# Patient Record
Sex: FEMALE | Race: BLACK OR AFRICAN AMERICAN | Employment: UNEMPLOYED | URBAN - METROPOLITAN AREA
[De-identification: names, ages, dates, MRNs, and addresses within clinical notes are randomized per-mention and may not be internally consistent; named-entity substitution may affect disease eponyms.]

---

## 2023-12-11 ENCOUNTER — APPOINTMENT (OUTPATIENT)
Dept: ULTRASOUND IMAGING | Facility: HOSPITAL | Age: 68
End: 2023-12-11
Attending: NURSE PRACTITIONER
Payer: MEDICARE

## 2023-12-11 ENCOUNTER — HOSPITAL ENCOUNTER (EMERGENCY)
Facility: HOSPITAL | Age: 68
Discharge: HOME | End: 2023-12-11
Payer: MEDICARE

## 2023-12-11 VITALS
SYSTOLIC BLOOD PRESSURE: 148 MMHG | BODY MASS INDEX: 38.68 KG/M2 | WEIGHT: 197 LBS | HEART RATE: 62 BPM | OXYGEN SATURATION: 98 % | RESPIRATION RATE: 16 BRPM | DIASTOLIC BLOOD PRESSURE: 86 MMHG | HEIGHT: 60 IN | TEMPERATURE: 98 F

## 2023-12-11 DIAGNOSIS — C54.1 ENDOMETRIAL CANCER (HCC): Primary | ICD-10-CM

## 2023-12-11 LAB
ALBUMIN SERPL-MCNC: 4.2 G/DL (ref 3.2–4.8)
ALBUMIN/GLOB SERPL: 1.2 {RATIO} (ref 1–2)
ALP LIVER SERPL-CCNC: 62 U/L
ALT SERPL-CCNC: 13 U/L
ANION GAP SERPL CALC-SCNC: 5 MMOL/L (ref 0–18)
ANTIBODY SCREEN: NEGATIVE
AST SERPL-CCNC: 17 U/L (ref ?–34)
BASOPHILS # BLD AUTO: 0.11 X10(3) UL (ref 0–0.2)
BASOPHILS NFR BLD AUTO: 0.9 %
BILIRUB SERPL-MCNC: 0.6 MG/DL (ref 0.2–1.1)
BILIRUB UR QL: NEGATIVE
BUN BLD-MCNC: 8 MG/DL (ref 9–23)
BUN/CREAT SERPL: 7.8 (ref 10–20)
CALCIUM BLD-MCNC: 9.9 MG/DL (ref 8.7–10.4)
CHLORIDE SERPL-SCNC: 105 MMOL/L (ref 98–112)
CLARITY UR: CLEAR
CO2 SERPL-SCNC: 31 MMOL/L (ref 21–32)
CREAT BLD-MCNC: 1.02 MG/DL
DEPRECATED RDW RBC AUTO: 42.2 FL (ref 35.1–46.3)
EGFRCR SERPLBLD CKD-EPI 2021: 60 ML/MIN/1.73M2 (ref 60–?)
EOSINOPHIL # BLD AUTO: 0.11 X10(3) UL (ref 0–0.7)
EOSINOPHIL NFR BLD AUTO: 0.9 %
ERYTHROCYTE [DISTWIDTH] IN BLOOD BY AUTOMATED COUNT: 13.7 % (ref 11–15)
GLOBULIN PLAS-MCNC: 3.6 G/DL (ref 2.8–4.4)
GLUCOSE BLD-MCNC: 96 MG/DL (ref 70–99)
GLUCOSE UR-MCNC: NORMAL MG/DL
HCT VFR BLD AUTO: 41.1 %
HGB BLD-MCNC: 13.6 G/DL
HGB UR QL STRIP.AUTO: NEGATIVE
IMM GRANULOCYTES # BLD AUTO: 0.04 X10(3) UL (ref 0–1)
IMM GRANULOCYTES NFR BLD: 0.3 %
KETONES UR-MCNC: NEGATIVE MG/DL
LEUKOCYTE ESTERASE UR QL STRIP.AUTO: NEGATIVE
LIPASE SERPL-CCNC: 42 U/L (ref 13–75)
LYMPHOCYTES # BLD AUTO: 2.81 X10(3) UL (ref 1–4)
LYMPHOCYTES NFR BLD AUTO: 24.1 %
MCH RBC QN AUTO: 27.8 PG (ref 26–34)
MCHC RBC AUTO-ENTMCNC: 33.1 G/DL (ref 31–37)
MCV RBC AUTO: 84 FL
MONOCYTES # BLD AUTO: 1.04 X10(3) UL (ref 0.1–1)
MONOCYTES NFR BLD AUTO: 8.9 %
NEUTROPHILS # BLD AUTO: 7.53 X10 (3) UL (ref 1.5–7.7)
NEUTROPHILS # BLD AUTO: 7.53 X10(3) UL (ref 1.5–7.7)
NEUTROPHILS NFR BLD AUTO: 64.9 %
NITRITE UR QL STRIP.AUTO: NEGATIVE
OSMOLALITY SERPL CALC.SUM OF ELEC: 290 MOSM/KG (ref 275–295)
PH UR: 6 [PH] (ref 5–8)
PLATELET # BLD AUTO: 431 10(3)UL (ref 150–450)
POTASSIUM SERPL-SCNC: 3.9 MMOL/L (ref 3.5–5.1)
PROT SERPL-MCNC: 7.8 G/DL (ref 5.7–8.2)
PROT UR-MCNC: NEGATIVE MG/DL
RBC # BLD AUTO: 4.89 X10(6)UL
RH BLOOD TYPE: POSITIVE
RH BLOOD TYPE: POSITIVE
SODIUM SERPL-SCNC: 141 MMOL/L (ref 136–145)
SP GR UR STRIP: 1.01 (ref 1–1.03)
UROBILINOGEN UR STRIP-ACNC: NORMAL
WBC # BLD AUTO: 11.6 X10(3) UL (ref 4–11)

## 2023-12-11 PROCEDURE — 86900 BLOOD TYPING SEROLOGIC ABO: CPT

## 2023-12-11 PROCEDURE — 85025 COMPLETE CBC W/AUTO DIFF WBC: CPT

## 2023-12-11 PROCEDURE — 86850 RBC ANTIBODY SCREEN: CPT

## 2023-12-11 PROCEDURE — 99285 EMERGENCY DEPT VISIT HI MDM: CPT

## 2023-12-11 PROCEDURE — 80053 COMPREHEN METABOLIC PANEL: CPT

## 2023-12-11 PROCEDURE — 86901 BLOOD TYPING SEROLOGIC RH(D): CPT

## 2023-12-11 PROCEDURE — 81003 URINALYSIS AUTO W/O SCOPE: CPT

## 2023-12-11 PROCEDURE — 76856 US EXAM PELVIC COMPLETE: CPT | Performed by: NURSE PRACTITIONER

## 2023-12-11 PROCEDURE — 99284 EMERGENCY DEPT VISIT MOD MDM: CPT

## 2023-12-11 PROCEDURE — 83690 ASSAY OF LIPASE: CPT

## 2023-12-11 PROCEDURE — 76830 TRANSVAGINAL US NON-OB: CPT | Performed by: NURSE PRACTITIONER

## 2023-12-11 PROCEDURE — 96374 THER/PROPH/DIAG INJ IV PUSH: CPT

## 2023-12-11 PROCEDURE — 96361 HYDRATE IV INFUSION ADD-ON: CPT

## 2023-12-11 RX ORDER — SODIUM CHLORIDE 9 MG/ML
INJECTION, SOLUTION INTRAVENOUS CONTINUOUS
Status: DISCONTINUED | OUTPATIENT
Start: 2023-12-11 | End: 2023-12-11

## 2023-12-11 RX ORDER — KETOROLAC TROMETHAMINE 15 MG/ML
15 INJECTION, SOLUTION INTRAMUSCULAR; INTRAVENOUS ONCE
Status: COMPLETED | OUTPATIENT
Start: 2023-12-11 | End: 2023-12-11

## 2023-12-11 NOTE — ED INITIAL ASSESSMENT (HPI)
C/o RLQ abd pain x2 weeks. Denies N/V/D, fevers or urinary issues. Reports vaginal bleeding x1 month, dark red, no clots. Reports lightheadedness when she stands up.

## 2025-01-01 ENCOUNTER — APPOINTMENT (OUTPATIENT)
Dept: CV DIAGNOSTICS | Facility: HOSPITAL | Age: 70
End: 2025-01-01
Attending: NURSE PRACTITIONER

## 2025-01-01 ENCOUNTER — HOSPITAL ENCOUNTER (INPATIENT)
Facility: HOSPITAL | Age: 70
LOS: 5 days | Discharge: HOSPICE | End: 2025-01-01
Attending: EMERGENCY MEDICINE | Admitting: HOSPITALIST

## 2025-01-01 ENCOUNTER — HOSPITAL ENCOUNTER (INPATIENT)
Facility: HOSPITAL | Age: 70
LOS: 4 days | End: 2025-01-01
Attending: HOSPITALIST | Admitting: HOSPITALIST

## 2025-01-01 ENCOUNTER — APPOINTMENT (OUTPATIENT)
Dept: GENERAL RADIOLOGY | Facility: HOSPITAL | Age: 70
End: 2025-01-01
Attending: EMERGENCY MEDICINE

## 2025-01-01 VITALS
OXYGEN SATURATION: 95 % | TEMPERATURE: 91 F | HEART RATE: 78 BPM | DIASTOLIC BLOOD PRESSURE: 44 MMHG | SYSTOLIC BLOOD PRESSURE: 62 MMHG | RESPIRATION RATE: 16 BRPM

## 2025-01-01 VITALS
HEART RATE: 74 BPM | RESPIRATION RATE: 28 BRPM | OXYGEN SATURATION: 97 % | SYSTOLIC BLOOD PRESSURE: 74 MMHG | WEIGHT: 147.81 LBS | TEMPERATURE: 98 F | BODY MASS INDEX: 29.02 KG/M2 | HEIGHT: 60 IN | DIASTOLIC BLOOD PRESSURE: 51 MMHG

## 2025-01-01 DIAGNOSIS — L89.159 PRESSURE INJURY OF SKIN OF SACRAL REGION, UNSPECIFIED INJURY STAGE: ICD-10-CM

## 2025-01-01 DIAGNOSIS — J90 PLEURAL EFFUSION: Primary | ICD-10-CM

## 2025-01-01 DIAGNOSIS — D69.6 THROMBOCYTOPENIA: ICD-10-CM

## 2025-01-01 LAB
ALBUMIN SERPL-MCNC: 2.3 G/DL (ref 3.2–4.8)
ALBUMIN SERPL-MCNC: 2.5 G/DL (ref 3.2–4.8)
ALBUMIN/GLOB SERPL: 0.6 (ref 1–2)
ALBUMIN/GLOB SERPL: 0.6 (ref 1–2)
ALP LIVER SERPL-CCNC: 309 U/L (ref 55–142)
ALP LIVER SERPL-CCNC: 342 U/L (ref 55–142)
ALT SERPL-CCNC: 10 U/L (ref 10–49)
ALT SERPL-CCNC: 9 U/L (ref 10–49)
ANION GAP SERPL CALC-SCNC: 6 MMOL/L (ref 0–18)
ANION GAP SERPL CALC-SCNC: 7 MMOL/L (ref 0–18)
ANION GAP SERPL CALC-SCNC: 9 MMOL/L (ref 0–18)
AST SERPL-CCNC: 17 U/L (ref ?–34)
AST SERPL-CCNC: 19 U/L (ref ?–34)
ATRIAL RATE: 89 BPM
BASOPHILS # BLD AUTO: 0.05 X10(3) UL (ref 0–0.2)
BASOPHILS # BLD: 0 X10(3) UL (ref 0–0.2)
BASOPHILS # BLD: 0 X10(3) UL (ref 0–0.2)
BASOPHILS NFR BLD AUTO: 0.5 %
BASOPHILS NFR BLD: 0 %
BASOPHILS NFR BLD: 0 %
BILIRUB SERPL-MCNC: 0.5 MG/DL (ref 0.2–1.1)
BILIRUB SERPL-MCNC: 0.6 MG/DL (ref 0.2–1.1)
BILIRUB UR QL: NEGATIVE
BUN BLD-MCNC: 33 MG/DL (ref 9–23)
BUN BLD-MCNC: 34 MG/DL (ref 9–23)
BUN BLD-MCNC: 37 MG/DL (ref 9–23)
BUN/CREAT SERPL: 25.6 (ref 10–20)
BUN/CREAT SERPL: 26.2 (ref 10–20)
BUN/CREAT SERPL: 28 (ref 10–20)
C DIFF TOX B STL QL: NEGATIVE
CALCIUM BLD-MCNC: 8.7 MG/DL (ref 8.7–10.4)
CALCIUM BLD-MCNC: 8.8 MG/DL (ref 8.7–10.4)
CALCIUM BLD-MCNC: 9.1 MG/DL (ref 8.7–10.4)
CHLORIDE SERPL-SCNC: 100 MMOL/L (ref 98–112)
CHLORIDE SERPL-SCNC: 101 MMOL/L (ref 98–112)
CHLORIDE SERPL-SCNC: 99 MMOL/L (ref 98–112)
CLARITY UR: CLEAR
CO2 SERPL-SCNC: 33 MMOL/L (ref 21–32)
CO2 SERPL-SCNC: 34 MMOL/L (ref 21–32)
CO2 SERPL-SCNC: 35 MMOL/L (ref 21–32)
COLOR UR: YELLOW
CREAT BLD-MCNC: 1.26 MG/DL (ref 0.55–1.02)
CREAT BLD-MCNC: 1.32 MG/DL (ref 0.55–1.02)
CREAT BLD-MCNC: 1.33 MG/DL (ref 0.55–1.02)
DEPRECATED RDW RBC AUTO: 61.2 FL (ref 35.1–46.3)
DEPRECATED RDW RBC AUTO: 62.1 FL (ref 35.1–46.3)
DEPRECATED RDW RBC AUTO: 62.7 FL (ref 35.1–46.3)
EGFRCR SERPLBLD CKD-EPI 2021: 43 ML/MIN/1.73M2 (ref 60–?)
EGFRCR SERPLBLD CKD-EPI 2021: 44 ML/MIN/1.73M2 (ref 60–?)
EGFRCR SERPLBLD CKD-EPI 2021: 46 ML/MIN/1.73M2 (ref 60–?)
EOSINOPHIL # BLD AUTO: 0 X10(3) UL (ref 0–0.7)
EOSINOPHIL # BLD: 0 X10(3) UL (ref 0–0.7)
EOSINOPHIL # BLD: 0 X10(3) UL (ref 0–0.7)
EOSINOPHIL NFR BLD AUTO: 0 %
EOSINOPHIL NFR BLD: 0 %
EOSINOPHIL NFR BLD: 0 %
ERYTHROCYTE [DISTWIDTH] IN BLOOD BY AUTOMATED COUNT: 19.9 % (ref 11–15)
ERYTHROCYTE [DISTWIDTH] IN BLOOD BY AUTOMATED COUNT: 20 % (ref 11–15)
ERYTHROCYTE [DISTWIDTH] IN BLOOD BY AUTOMATED COUNT: 20.1 % (ref 11–15)
GLOBULIN PLAS-MCNC: 3.6 G/DL (ref 2–3.5)
GLOBULIN PLAS-MCNC: 3.9 G/DL (ref 2–3.5)
GLUCOSE BLD-MCNC: 113 MG/DL (ref 70–99)
GLUCOSE BLD-MCNC: 131 MG/DL (ref 70–99)
GLUCOSE BLD-MCNC: 134 MG/DL (ref 70–99)
GLUCOSE BLDC GLUCOMTR-MCNC: 89 MG/DL (ref 70–99)
GLUCOSE UR-MCNC: NORMAL MG/DL
HCT VFR BLD AUTO: 23.7 % (ref 35–48)
HCT VFR BLD AUTO: 24.5 % (ref 35–48)
HCT VFR BLD AUTO: 26.7 % (ref 35–48)
HGB BLD-MCNC: 7.2 G/DL (ref 12–16)
HGB BLD-MCNC: 7.5 G/DL (ref 12–16)
HGB BLD-MCNC: 8.3 G/DL (ref 12–16)
HYALINE CASTS #/AREA URNS AUTO: PRESENT /LPF
IMM GRANULOCYTES # BLD AUTO: 0.31 X10(3) UL (ref 0–1)
IMM GRANULOCYTES NFR BLD: 2.8 %
KETONES UR-MCNC: NEGATIVE MG/DL
LEUKOCYTE ESTERASE UR QL STRIP.AUTO: NEGATIVE
LIPASE SERPL-CCNC: 20 U/L (ref 12–53)
LYMPHOCYTES # BLD AUTO: 0.58 X10(3) UL (ref 1–4)
LYMPHOCYTES NFR BLD AUTO: 5.3 %
LYMPHOCYTES NFR BLD: 0.45 X10(3) UL (ref 1–4)
LYMPHOCYTES NFR BLD: 1.04 X10(3) UL (ref 1–4)
LYMPHOCYTES NFR BLD: 4 %
LYMPHOCYTES NFR BLD: 8 %
MAGNESIUM SERPL-MCNC: 1.8 MG/DL (ref 1.6–2.6)
MAGNESIUM SERPL-MCNC: 2 MG/DL (ref 1.6–2.6)
MAGNESIUM SERPL-MCNC: 2 MG/DL (ref 1.6–2.6)
MCH RBC QN AUTO: 25.7 PG (ref 26–34)
MCH RBC QN AUTO: 25.9 PG (ref 26–34)
MCH RBC QN AUTO: 25.9 PG (ref 26–34)
MCHC RBC AUTO-ENTMCNC: 30.4 G/DL (ref 31–37)
MCHC RBC AUTO-ENTMCNC: 30.6 G/DL (ref 31–37)
MCHC RBC AUTO-ENTMCNC: 31.1 G/DL (ref 31–37)
MCV RBC AUTO: 83.4 FL (ref 80–100)
MCV RBC AUTO: 84.5 FL (ref 80–100)
MCV RBC AUTO: 84.6 FL (ref 80–100)
METAMYELOCYTES # BLD: 0.12 X10(3) UL (ref ?–0.01)
METAMYELOCYTES NFR BLD: 1 %
MONOCYTES # BLD AUTO: 0.86 X10(3) UL (ref 0.1–1)
MONOCYTES # BLD: 0.23 X10(3) UL (ref 0.1–1)
MONOCYTES # BLD: 0.35 X10(3) UL (ref 0.1–1)
MONOCYTES NFR BLD AUTO: 7.8 %
MONOCYTES NFR BLD: 2 %
MONOCYTES NFR BLD: 3 %
MYELOCYTES # BLD: 0.11 X10(3) UL (ref ?–0.01)
MYELOCYTES # BLD: 0.12 X10(3) UL (ref ?–0.01)
MYELOCYTES NFR BLD: 1 %
MYELOCYTES NFR BLD: 1 %
NEUTROPHILS # BLD AUTO: 9.02 X10 (3) UL (ref 1.5–7.7)
NEUTROPHILS # BLD AUTO: 9.17 X10 (3) UL (ref 1.5–7.7)
NEUTROPHILS # BLD AUTO: 9.21 X10 (3) UL (ref 1.5–7.7)
NEUTROPHILS # BLD AUTO: 9.21 X10(3) UL (ref 1.5–7.7)
NEUTROPHILS NFR BLD AUTO: 83.6 %
NEUTROPHILS NFR BLD: 86 %
NEUTROPHILS NFR BLD: 92 %
NEUTS BAND NFR BLD: 1 %
NEUTS HYPERSEG # BLD: 10.51 X10(3) UL (ref 1.5–7.7)
NEUTS HYPERSEG # BLD: 9.89 X10(3) UL (ref 1.5–7.7)
NITRITE UR QL STRIP.AUTO: NEGATIVE
NT-PROBNP SERPL-MCNC: 1243 PG/ML (ref ?–125)
NT-PROBNP SERPL-MCNC: 1615 PG/ML (ref ?–125)
OSMOLALITY SERPL CALC.SUM OF ELEC: 300 MOSM/KG (ref 275–295)
OSMOLALITY SERPL CALC.SUM OF ELEC: 302 MOSM/KG (ref 275–295)
OSMOLALITY SERPL CALC.SUM OF ELEC: 304 MOSM/KG (ref 275–295)
P AXIS: 70 DEGREES
P-R INTERVAL: 162 MS
PH UR: 5 (ref 5–8)
PLATELET # BLD AUTO: 79 10(3)UL (ref 150–450)
PLATELET # BLD AUTO: 81 10(3)UL (ref 150–450)
PLATELET # BLD AUTO: 84 10(3)UL (ref 150–450)
PLATELET MORPHOLOGY: NORMAL
PLATELET MORPHOLOGY: NORMAL
PLATELETS.RETICULATED NFR BLD AUTO: 8 % (ref 0–7)
PLATELETS.RETICULATED NFR BLD AUTO: 8 % (ref 0–7)
PLATELETS.RETICULATED NFR BLD AUTO: 8.8 % (ref 0–7)
POTASSIUM SERPL-SCNC: 3.8 MMOL/L (ref 3.5–5.1)
POTASSIUM SERPL-SCNC: 3.9 MMOL/L (ref 3.5–5.1)
POTASSIUM SERPL-SCNC: 4 MMOL/L (ref 3.5–5.1)
PROT SERPL-MCNC: 5.9 G/DL (ref 5.7–8.2)
PROT SERPL-MCNC: 6.4 G/DL (ref 5.7–8.2)
Q-T INTERVAL: 346 MS
QRS DURATION: 84 MS
QTC CALCULATION (BEZET): 420 MS
R AXIS: 87 DEGREES
RBC # BLD AUTO: 2.8 X10(6)UL (ref 3.8–5.3)
RBC # BLD AUTO: 2.9 X10(6)UL (ref 3.8–5.3)
RBC # BLD AUTO: 3.2 X10(6)UL (ref 3.8–5.3)
SODIUM SERPL-SCNC: 141 MMOL/L (ref 136–145)
SODIUM SERPL-SCNC: 141 MMOL/L (ref 136–145)
SODIUM SERPL-SCNC: 142 MMOL/L (ref 136–145)
SP GR UR STRIP: 1.01 (ref 1–1.03)
T AXIS: 84 DEGREES
TOTAL CELLS COUNTED BLD: 100
TOTAL CELLS COUNTED BLD: 100
TROPONIN I SERPL HS-MCNC: 4 NG/L (ref ?–34)
TROPONIN I SERPL HS-MCNC: 6 NG/L (ref ?–34)
UROBILINOGEN UR STRIP-ACNC: 2
VARIANT LYMPHS NFR BLD MANUAL: 1 % (ref ?–4)
VENTRICULAR RATE: 89 BPM
WBC # BLD AUTO: 11 X10(3) UL (ref 4–11)
WBC # BLD AUTO: 11.3 X10(3) UL (ref 4–11)
WBC # BLD AUTO: 11.5 X10(3) UL (ref 4–11)

## 2025-01-01 PROCEDURE — 99233 SBSQ HOSP IP/OBS HIGH 50: CPT | Performed by: HOSPITALIST

## 2025-01-01 PROCEDURE — 99233 SBSQ HOSP IP/OBS HIGH 50: CPT | Performed by: INTERNAL MEDICINE

## 2025-01-01 PROCEDURE — 99497 ADVNCD CARE PLAN 30 MIN: CPT | Performed by: HOSPITALIST

## 2025-01-01 PROCEDURE — 76376 3D RENDER W/INTRP POSTPROCES: CPT | Performed by: NURSE PRACTITIONER

## 2025-01-01 PROCEDURE — 99239 HOSP IP/OBS DSCHRG MGMT >30: CPT | Performed by: HOSPITALIST

## 2025-01-01 PROCEDURE — 99497 ADVNCD CARE PLAN 30 MIN: CPT | Performed by: INTERNAL MEDICINE

## 2025-01-01 PROCEDURE — 99223 1ST HOSP IP/OBS HIGH 75: CPT | Performed by: INTERNAL MEDICINE

## 2025-01-01 PROCEDURE — 93306 TTE W/DOPPLER COMPLETE: CPT | Performed by: NURSE PRACTITIONER

## 2025-01-01 PROCEDURE — 5A0945A ASSISTANCE WITH RESPIRATORY VENTILATION, 24-96 CONSECUTIVE HOURS, HIGH NASAL FLOW/VELOCITY: ICD-10-PCS | Performed by: HOSPITALIST

## 2025-01-01 PROCEDURE — G0316 PROLNG IP/OBS E/M EA ADDL 15 MIN: HCPCS | Performed by: INTERNAL MEDICINE

## 2025-01-01 PROCEDURE — 99498 ADVNCD CARE PLAN ADDL 30 MIN: CPT | Performed by: INTERNAL MEDICINE

## 2025-01-01 PROCEDURE — 71045 X-RAY EXAM CHEST 1 VIEW: CPT | Performed by: EMERGENCY MEDICINE

## 2025-01-01 PROCEDURE — 99223 1ST HOSP IP/OBS HIGH 75: CPT | Performed by: HOSPITALIST

## 2025-01-01 PROCEDURE — 5A0955A ASSISTANCE WITH RESPIRATORY VENTILATION, GREATER THAN 96 CONSECUTIVE HOURS, HIGH NASAL FLOW/VELOCITY: ICD-10-PCS | Performed by: HOSPITALIST

## 2025-01-01 RX ORDER — FUROSEMIDE 10 MG/ML
40 INJECTION INTRAMUSCULAR; INTRAVENOUS EVERY 8 HOURS PRN
Status: DISCONTINUED | OUTPATIENT
Start: 2025-01-01 | End: 2025-01-01

## 2025-01-01 RX ORDER — ACETAMINOPHEN 500 MG
500 TABLET ORAL EVERY 4 HOURS PRN
Status: DISCONTINUED | OUTPATIENT
Start: 2025-01-01 | End: 2025-01-01

## 2025-01-01 RX ORDER — METOCLOPRAMIDE HYDROCHLORIDE 5 MG/ML
5 INJECTION INTRAMUSCULAR; INTRAVENOUS EVERY 8 HOURS PRN
Status: DISCONTINUED | OUTPATIENT
Start: 2025-01-01 | End: 2025-01-01

## 2025-01-01 RX ORDER — HALOPERIDOL 5 MG/ML
1 INJECTION INTRAMUSCULAR
Status: DISCONTINUED | OUTPATIENT
Start: 2025-01-01 | End: 2025-01-01

## 2025-01-01 RX ORDER — DIAZEPAM 10 MG/2ML
2.5 INJECTION, SOLUTION INTRAMUSCULAR; INTRAVENOUS EVERY 6 HOURS PRN
Status: DISCONTINUED | OUTPATIENT
Start: 2025-01-01 | End: 2025-01-01

## 2025-01-01 RX ORDER — HYDROMORPHONE HYDROCHLORIDE 1 MG/ML
0.2 INJECTION, SOLUTION INTRAMUSCULAR; INTRAVENOUS; SUBCUTANEOUS EVERY 2 HOUR PRN
Refills: 0 | Status: DISCONTINUED | OUTPATIENT
Start: 2025-01-01 | End: 2025-01-01

## 2025-01-01 RX ORDER — ONDANSETRON 2 MG/ML
4 INJECTION INTRAMUSCULAR; INTRAVENOUS EVERY 6 HOURS PRN
Status: DISCONTINUED | OUTPATIENT
Start: 2025-01-01 | End: 2025-01-01

## 2025-01-01 RX ORDER — GLYCOPYRROLATE 0.2 MG/ML
0.4 INJECTION, SOLUTION INTRAMUSCULAR; INTRAVENOUS
Status: DISCONTINUED | OUTPATIENT
Start: 2025-01-01 | End: 2025-01-01

## 2025-01-01 RX ORDER — HYDROMORPHONE HYDROCHLORIDE 1 MG/ML
0.4 INJECTION, SOLUTION INTRAMUSCULAR; INTRAVENOUS; SUBCUTANEOUS EVERY 2 HOUR PRN
Refills: 0 | Status: DISCONTINUED | OUTPATIENT
Start: 2025-01-01 | End: 2025-01-01

## 2025-01-01 RX ORDER — MULTIVITAMIN WITH IRON
1000 TABLET ORAL DAILY
Status: DISCONTINUED | OUTPATIENT
Start: 2025-01-01 | End: 2025-01-01

## 2025-01-01 RX ORDER — HYDROMORPHONE HYDROCHLORIDE 1 MG/ML
0.2 INJECTION, SOLUTION INTRAMUSCULAR; INTRAVENOUS; SUBCUTANEOUS EVERY 4 HOURS
Refills: 0 | Status: DISCONTINUED | OUTPATIENT
Start: 2025-01-01 | End: 2025-01-01

## 2025-01-01 RX ORDER — CALCIUM CARBONATE 500 MG/1
1000 TABLET, CHEWABLE ORAL EVERY 6 HOURS PRN
Status: DISCONTINUED | OUTPATIENT
Start: 2025-01-01 | End: 2025-01-01

## 2025-01-01 RX ORDER — MORPHINE SULFATE 4 MG/ML
4 INJECTION, SOLUTION INTRAMUSCULAR; INTRAVENOUS EVERY 2 HOUR PRN
Status: DISCONTINUED | OUTPATIENT
Start: 2025-01-01 | End: 2025-01-01

## 2025-01-01 RX ORDER — MORPHINE SULFATE 20 MG/ML
10 SOLUTION ORAL EVERY 2 HOUR PRN
Refills: 0 | Status: DISCONTINUED | OUTPATIENT
Start: 2025-01-01 | End: 2025-01-01

## 2025-01-01 RX ORDER — MORPHINE SULFATE 2 MG/ML
1 INJECTION, SOLUTION INTRAMUSCULAR; INTRAVENOUS EVERY 2 HOUR PRN
Status: DISCONTINUED | OUTPATIENT
Start: 2025-01-01 | End: 2025-01-01

## 2025-01-01 RX ORDER — HYDROMORPHONE HYDROCHLORIDE 1 MG/ML
0.8 INJECTION, SOLUTION INTRAMUSCULAR; INTRAVENOUS; SUBCUTANEOUS EVERY 2 HOUR PRN
Refills: 0 | Status: DISCONTINUED | OUTPATIENT
Start: 2025-01-01 | End: 2025-01-01

## 2025-01-01 RX ORDER — ATROPINE SULFATE 10 MG/ML
2 SOLUTION/ DROPS OPHTHALMIC EVERY 2 HOUR PRN
Status: DISCONTINUED | OUTPATIENT
Start: 2025-01-01 | End: 2025-01-01

## 2025-01-01 RX ORDER — MORPHINE SULFATE 2 MG/ML
2 INJECTION, SOLUTION INTRAMUSCULAR; INTRAVENOUS EVERY 2 HOUR PRN
Status: DISCONTINUED | OUTPATIENT
Start: 2025-01-01 | End: 2025-01-01

## 2025-01-01 RX ORDER — MIDODRINE HYDROCHLORIDE 5 MG/1
10 TABLET ORAL ONCE
Status: COMPLETED | OUTPATIENT
Start: 2025-01-01 | End: 2025-01-01

## 2025-01-01 RX ORDER — HYDROMORPHONE HYDROCHLORIDE 1 MG/ML
0.2 INJECTION, SOLUTION INTRAMUSCULAR; INTRAVENOUS; SUBCUTANEOUS
Refills: 0 | Status: DISCONTINUED | OUTPATIENT
Start: 2025-01-01 | End: 2025-01-01

## 2025-01-01 RX ORDER — SCOPOLAMINE 1 MG/3D
1 PATCH, EXTENDED RELEASE TRANSDERMAL
Status: DISCONTINUED | OUTPATIENT
Start: 2025-01-01 | End: 2025-01-01

## 2025-01-01 RX ORDER — SODIUM CHLORIDE 0.9 % (FLUSH) 0.9 %
10 SYRINGE (ML) INJECTION AS NEEDED
Status: DISCONTINUED | OUTPATIENT
Start: 2025-01-01 | End: 2025-01-01

## 2025-01-01 RX ORDER — GLYCOPYRROLATE 0.2 MG/ML
0.4 INJECTION, SOLUTION INTRAMUSCULAR; INTRAVENOUS
Status: CANCELLED | OUTPATIENT
Start: 2025-01-01

## 2025-01-01 RX ORDER — ATROPINE SULFATE 10 MG/ML
2 SOLUTION/ DROPS OPHTHALMIC EVERY 2 HOUR PRN
Status: CANCELLED | OUTPATIENT
Start: 2025-01-01

## 2025-01-01 RX ORDER — HALOPERIDOL 5 MG/ML
1 INJECTION INTRAMUSCULAR
Status: CANCELLED | OUTPATIENT
Start: 2025-01-01

## 2025-01-01 RX ORDER — HALOPERIDOL 5 MG/ML
2 INJECTION INTRAMUSCULAR
Status: CANCELLED | OUTPATIENT
Start: 2025-01-01

## 2025-01-01 RX ORDER — ECHINACEA PURPUREA EXTRACT 125 MG
1 TABLET ORAL
Status: DISCONTINUED | OUTPATIENT
Start: 2025-01-01 | End: 2025-01-01

## 2025-01-01 RX ORDER — MAGNESIUM OXIDE 400 MG/1
400 TABLET ORAL ONCE
Status: COMPLETED | OUTPATIENT
Start: 2025-01-01 | End: 2025-01-01

## 2025-01-01 RX ORDER — HYDROMORPHONE HYDROCHLORIDE 1 MG/ML
0.2 INJECTION, SOLUTION INTRAMUSCULAR; INTRAVENOUS; SUBCUTANEOUS EVERY 2 HOUR PRN
Status: DISCONTINUED | OUTPATIENT
Start: 2025-01-01 | End: 2025-01-01

## 2025-01-01 RX ORDER — AMIODARONE HYDROCHLORIDE 200 MG/1
200 TABLET ORAL DAILY
Status: DISCONTINUED | OUTPATIENT
Start: 2025-01-01 | End: 2025-01-01

## 2025-01-01 RX ORDER — BISACODYL 10 MG
10 SUPPOSITORY, RECTAL RECTAL
Status: DISCONTINUED | OUTPATIENT
Start: 2025-01-01 | End: 2025-01-01

## 2025-01-01 RX ORDER — HALOPERIDOL 5 MG/ML
2 INJECTION INTRAMUSCULAR
Status: DISCONTINUED | OUTPATIENT
Start: 2025-01-01 | End: 2025-01-01

## 2025-01-01 RX ORDER — MIDODRINE HYDROCHLORIDE 5 MG/1
15 TABLET ORAL 3 TIMES DAILY
Status: DISCONTINUED | OUTPATIENT
Start: 2025-01-01 | End: 2025-01-01

## 2025-01-01 RX ORDER — MORPHINE SULFATE 2 MG/ML
1 INJECTION, SOLUTION INTRAMUSCULAR; INTRAVENOUS
Status: DISCONTINUED | OUTPATIENT
Start: 2025-01-01 | End: 2025-01-01

## 2025-01-01 RX ORDER — LEVOTHYROXINE SODIUM 100 UG/1
100 TABLET ORAL
Status: DISCONTINUED | OUTPATIENT
Start: 2025-01-01 | End: 2025-01-01

## 2025-01-01 RX ORDER — BUDESONIDE 0.5 MG/2ML
0.5 INHALANT ORAL 2 TIMES DAILY
Status: DISCONTINUED | OUTPATIENT
Start: 2025-01-01 | End: 2025-01-01

## 2025-01-01 RX ORDER — MIDODRINE HYDROCHLORIDE 5 MG/1
10 TABLET ORAL 3 TIMES DAILY
Status: DISCONTINUED | OUTPATIENT
Start: 2025-01-01 | End: 2025-01-01

## 2025-01-01 RX ORDER — BUMETANIDE 1 MG/1
1 TABLET ORAL
Status: DISCONTINUED | OUTPATIENT
Start: 2025-01-01 | End: 2025-01-01

## 2025-01-01 RX ORDER — HYOSCYAMINE SULFATE 0.125 MG
0.12 TABLET,DISINTEGRATING ORAL 4 TIMES DAILY PRN
Status: DISCONTINUED | OUTPATIENT
Start: 2025-01-01 | End: 2025-01-01

## 2025-01-01 RX ORDER — HYDROMORPHONE HYDROCHLORIDE 1 MG/ML
0.2 INJECTION, SOLUTION INTRAMUSCULAR; INTRAVENOUS; SUBCUTANEOUS EVERY 2 HOUR PRN
Refills: 0 | Status: CANCELLED | OUTPATIENT
Start: 2025-01-01

## 2025-04-11 ENCOUNTER — HOSPITAL ENCOUNTER (INPATIENT)
Facility: HOSPITAL | Age: 70
LOS: 5 days | Discharge: HOME HEALTH | End: 2025-04-17
Attending: EMERGENCY MEDICINE | Admitting: INTERNAL MEDICINE
Payer: MEDICARE

## 2025-04-11 ENCOUNTER — APPOINTMENT (OUTPATIENT)
Dept: CT IMAGING | Facility: HOSPITAL | Age: 70
End: 2025-04-11
Attending: EMERGENCY MEDICINE
Payer: MEDICARE

## 2025-04-11 ENCOUNTER — HOSPITAL ENCOUNTER (INPATIENT)
Facility: HOSPITAL | Age: 70
LOS: 5 days | Discharge: HOME | End: 2025-04-17
Attending: EMERGENCY MEDICINE | Admitting: INTERNAL MEDICINE
Payer: MEDICARE

## 2025-04-11 DIAGNOSIS — R06.02 SHORTNESS OF BREATH: Primary | ICD-10-CM

## 2025-04-11 DIAGNOSIS — R59.0 SUPRACLAVICULAR ADENOPATHY: ICD-10-CM

## 2025-04-11 DIAGNOSIS — R13.10 DYSPHAGIA, UNSPECIFIED TYPE: ICD-10-CM

## 2025-04-11 DIAGNOSIS — I31.39 PERICARDIAL EFFUSION (HCC): ICD-10-CM

## 2025-04-11 DIAGNOSIS — R91.8 LUNG MASS: ICD-10-CM

## 2025-04-11 DIAGNOSIS — J96.01 ACUTE HYPOXIC RESPIRATORY FAILURE (HCC): ICD-10-CM

## 2025-04-11 LAB
ALBUMIN SERPL-MCNC: 3.6 G/DL (ref 3.2–4.8)
ALP LIVER SERPL-CCNC: 186 U/L (ref 55–142)
ALT SERPL-CCNC: 11 U/L (ref 10–49)
ANION GAP SERPL CALC-SCNC: 9 MMOL/L (ref 0–18)
ANTIBODY SCREEN: NEGATIVE
APTT PPP: 35.9 SECONDS (ref 23–36)
AST SERPL-CCNC: 25 U/L (ref ?–34)
BASOPHILS # BLD AUTO: 0.05 X10(3) UL (ref 0–0.2)
BASOPHILS NFR BLD AUTO: 0.4 %
BILIRUB DIRECT SERPL-MCNC: 0.3 MG/DL (ref ?–0.3)
BILIRUB SERPL-MCNC: 0.8 MG/DL (ref 0.2–1.1)
BUN BLD-MCNC: 9 MG/DL (ref 9–23)
BUN/CREAT SERPL: 11 (ref 10–20)
CALCIUM BLD-MCNC: 8.8 MG/DL (ref 8.7–10.4)
CHLORIDE SERPL-SCNC: 101 MMOL/L (ref 98–112)
CO2 SERPL-SCNC: 28 MMOL/L (ref 21–32)
CREAT BLD-MCNC: 0.82 MG/DL (ref 0.55–1.02)
DEPRECATED RDW RBC AUTO: 58.6 FL (ref 35.1–46.3)
EGFRCR SERPLBLD CKD-EPI 2021: 77 ML/MIN/1.73M2 (ref 60–?)
EOSINOPHIL # BLD AUTO: 0.02 X10(3) UL (ref 0–0.7)
EOSINOPHIL NFR BLD AUTO: 0.2 %
ERYTHROCYTE [DISTWIDTH] IN BLOOD BY AUTOMATED COUNT: 20.7 % (ref 11–15)
GLUCOSE BLD-MCNC: 119 MG/DL (ref 70–99)
HCT VFR BLD AUTO: 26.7 % (ref 35–48)
HGB BLD-MCNC: 8.5 G/DL (ref 12–16)
IMM GRANULOCYTES # BLD AUTO: 0.07 X10(3) UL (ref 0–1)
IMM GRANULOCYTES NFR BLD: 0.6 %
INR BLD: 1.28 (ref 0.8–1.2)
LYMPHOCYTES # BLD AUTO: 1.06 X10(3) UL (ref 1–4)
LYMPHOCYTES NFR BLD AUTO: 8.6 %
MCH RBC QN AUTO: 25.1 PG (ref 26–34)
MCHC RBC AUTO-ENTMCNC: 31.8 G/DL (ref 31–37)
MCV RBC AUTO: 78.8 FL (ref 80–100)
MONOCYTES # BLD AUTO: 1.29 X10(3) UL (ref 0.1–1)
MONOCYTES NFR BLD AUTO: 10.4 %
NEUTROPHILS # BLD AUTO: 9.88 X10 (3) UL (ref 1.5–7.7)
NEUTROPHILS # BLD AUTO: 9.88 X10(3) UL (ref 1.5–7.7)
NEUTROPHILS NFR BLD AUTO: 79.8 %
NT-PROBNP SERPL-MCNC: 121 PG/ML (ref ?–125)
OSMOLALITY SERPL CALC.SUM OF ELEC: 286 MOSM/KG (ref 275–295)
PLATELET # BLD AUTO: 441 10(3)UL (ref 150–450)
POTASSIUM SERPL-SCNC: 4 MMOL/L (ref 3.5–5.1)
PROT SERPL-MCNC: 7.2 G/DL (ref 5.7–8.2)
PROTHROMBIN TIME: 16.7 SECONDS (ref 11.6–14.8)
RBC # BLD AUTO: 3.39 X10(6)UL (ref 3.8–5.3)
RH BLOOD TYPE: POSITIVE
SODIUM SERPL-SCNC: 138 MMOL/L (ref 136–145)
TROPONIN I SERPL HS-MCNC: <3 NG/L (ref ?–34)
WBC # BLD AUTO: 12.4 X10(3) UL (ref 4–11)

## 2025-04-11 PROCEDURE — 99223 1ST HOSP IP/OBS HIGH 75: CPT | Performed by: HOSPITALIST

## 2025-04-11 PROCEDURE — 70491 CT SOFT TISSUE NECK W/DYE: CPT | Performed by: EMERGENCY MEDICINE

## 2025-04-11 PROCEDURE — 71260 CT THORAX DX C+: CPT | Performed by: EMERGENCY MEDICINE

## 2025-04-11 RX ORDER — IPRATROPIUM BROMIDE AND ALBUTEROL SULFATE 2.5; .5 MG/3ML; MG/3ML
3 SOLUTION RESPIRATORY (INHALATION) ONCE
Status: COMPLETED | OUTPATIENT
Start: 2025-04-11 | End: 2025-04-11

## 2025-04-11 NOTE — ED INITIAL ASSESSMENT (HPI)
PT to ED via wc, with daughter.  PT reporting cough, IRVIN, and difficulty swallowing x 2 weeks, advised to go to ER by PCP.   ED Tech reports 87% on RA, in t2a on 2 L 100%.

## 2025-04-11 NOTE — ED QUICK NOTES
Reports allergic to steroids, unsure of name.   PT is poor historian, daughter is advocating for pt.

## 2025-04-12 ENCOUNTER — APPOINTMENT (OUTPATIENT)
Dept: CV DIAGNOSTICS | Facility: HOSPITAL | Age: 70
End: 2025-04-12
Attending: NURSE PRACTITIONER
Payer: MEDICARE

## 2025-04-12 PROBLEM — R91.8 LUNG MASS: Status: ACTIVE | Noted: 2025-01-01

## 2025-04-12 PROBLEM — I31.39 PERICARDIAL EFFUSION (HCC): Status: ACTIVE | Noted: 2025-01-01

## 2025-04-12 PROBLEM — R59.0 SUPRACLAVICULAR ADENOPATHY: Status: ACTIVE | Noted: 2025-04-12

## 2025-04-12 PROBLEM — J96.01 ACUTE HYPOXIC RESPIRATORY FAILURE (HCC): Status: ACTIVE | Noted: 2025-01-01

## 2025-04-12 PROBLEM — R13.10 DYSPHAGIA, UNSPECIFIED TYPE: Status: ACTIVE | Noted: 2025-01-01

## 2025-04-12 PROBLEM — R91.8 LUNG MASS: Status: ACTIVE | Noted: 2025-04-12

## 2025-04-12 PROBLEM — R59.0 SUPRACLAVICULAR ADENOPATHY: Status: ACTIVE | Noted: 2025-01-01

## 2025-04-12 PROBLEM — J96.01 ACUTE HYPOXIC RESPIRATORY FAILURE (HCC): Status: ACTIVE | Noted: 2025-04-12

## 2025-04-12 PROBLEM — R13.10 DYSPHAGIA, UNSPECIFIED TYPE: Status: ACTIVE | Noted: 2025-04-12

## 2025-04-12 PROBLEM — I31.39 PERICARDIAL EFFUSION (HCC): Status: ACTIVE | Noted: 2025-04-12

## 2025-04-12 LAB
ANION GAP SERPL CALC-SCNC: 7 MMOL/L (ref 0–18)
ATRIAL RATE: 104 BPM
BASOPHILS # BLD AUTO: 0.06 X10(3) UL (ref 0–0.2)
BASOPHILS NFR BLD AUTO: 0.6 %
BUN BLD-MCNC: 6 MG/DL (ref 9–23)
BUN/CREAT SERPL: 8.1 (ref 10–20)
CALCIUM BLD-MCNC: 8.8 MG/DL (ref 8.7–10.4)
CHLORIDE SERPL-SCNC: 103 MMOL/L (ref 98–112)
CO2 SERPL-SCNC: 28 MMOL/L (ref 21–32)
CREAT BLD-MCNC: 0.74 MG/DL (ref 0.55–1.02)
DEPRECATED HBV CORE AB SER IA-ACNC: 966 NG/ML (ref 50–306)
DEPRECATED RDW RBC AUTO: 58.4 FL (ref 35.1–46.3)
EGFRCR SERPLBLD CKD-EPI 2021: 88 ML/MIN/1.73M2 (ref 60–?)
EOSINOPHIL # BLD AUTO: 0.04 X10(3) UL (ref 0–0.7)
EOSINOPHIL NFR BLD AUTO: 0.4 %
ERYTHROCYTE [DISTWIDTH] IN BLOOD BY AUTOMATED COUNT: 20.5 % (ref 11–15)
GLUCOSE BLD-MCNC: 122 MG/DL (ref 70–99)
HCT VFR BLD AUTO: 26 % (ref 35–48)
HGB BLD-MCNC: 8.2 G/DL (ref 12–16)
IMM GRANULOCYTES # BLD AUTO: 0.08 X10(3) UL (ref 0–1)
IMM GRANULOCYTES NFR BLD: 0.7 %
IRON SATN MFR SERPL: 32 % (ref 15–50)
IRON SERPL-MCNC: 37 UG/DL (ref 50–170)
LYMPHOCYTES # BLD AUTO: 0.8 X10(3) UL (ref 1–4)
LYMPHOCYTES NFR BLD AUTO: 7.4 %
MCH RBC QN AUTO: 24.8 PG (ref 26–34)
MCHC RBC AUTO-ENTMCNC: 31.5 G/DL (ref 31–37)
MCV RBC AUTO: 78.5 FL (ref 80–100)
MONOCYTES # BLD AUTO: 1.26 X10(3) UL (ref 0.1–1)
MONOCYTES NFR BLD AUTO: 11.6 %
NEUTROPHILS # BLD AUTO: 8.61 X10 (3) UL (ref 1.5–7.7)
NEUTROPHILS # BLD AUTO: 8.61 X10(3) UL (ref 1.5–7.7)
NEUTROPHILS NFR BLD AUTO: 79.3 %
OSMOLALITY SERPL CALC.SUM OF ELEC: 285 MOSM/KG (ref 275–295)
P AXIS: 76 DEGREES
P-R INTERVAL: 142 MS
PLATELET # BLD AUTO: 417 10(3)UL (ref 150–450)
POTASSIUM SERPL-SCNC: 3.6 MMOL/L (ref 3.5–5.1)
Q-T INTERVAL: 322 MS
QRS DURATION: 80 MS
QTC CALCULATION (BEZET): 423 MS
R AXIS: 70 DEGREES
RBC # BLD AUTO: 3.31 X10(6)UL (ref 3.8–5.3)
SODIUM SERPL-SCNC: 138 MMOL/L (ref 136–145)
T AXIS: 75 DEGREES
TOTAL IRON BINDING CAPACITY: 117 UG/DL (ref 250–425)
TRANSFERRIN SERPL-MCNC: 70 MG/DL (ref 250–380)
VENTRICULAR RATE: 104 BPM
WBC # BLD AUTO: 10.9 X10(3) UL (ref 4–11)

## 2025-04-12 PROCEDURE — 99223 1ST HOSP IP/OBS HIGH 75: CPT | Performed by: INTERNAL MEDICINE

## 2025-04-12 PROCEDURE — 93306 TTE W/DOPPLER COMPLETE: CPT | Performed by: NURSE PRACTITIONER

## 2025-04-12 PROCEDURE — 99233 SBSQ HOSP IP/OBS HIGH 50: CPT | Performed by: HOSPITALIST

## 2025-04-12 PROCEDURE — 76376 3D RENDER W/INTRP POSTPROCES: CPT | Performed by: NURSE PRACTITIONER

## 2025-04-12 RX ORDER — ONDANSETRON 2 MG/ML
4 INJECTION INTRAMUSCULAR; INTRAVENOUS EVERY 6 HOURS PRN
Status: DISCONTINUED | OUTPATIENT
Start: 2025-04-12 | End: 2025-04-17

## 2025-04-12 RX ORDER — PREDNISONE 20 MG/1
40 TABLET ORAL
Status: DISCONTINUED | OUTPATIENT
Start: 2025-04-12 | End: 2025-04-15

## 2025-04-12 RX ORDER — IPRATROPIUM BROMIDE AND ALBUTEROL SULFATE 2.5; .5 MG/3ML; MG/3ML
3 SOLUTION RESPIRATORY (INHALATION) EVERY 6 HOURS PRN
Status: DISCONTINUED | OUTPATIENT
Start: 2025-04-12 | End: 2025-04-17

## 2025-04-12 RX ORDER — HEPARIN SODIUM 5000 [USP'U]/ML
5000 INJECTION, SOLUTION INTRAVENOUS; SUBCUTANEOUS EVERY 12 HOURS
Status: DISCONTINUED | OUTPATIENT
Start: 2025-04-12 | End: 2025-04-17

## 2025-04-12 RX ORDER — ZOLPIDEM TARTRATE 5 MG/1
5 TABLET ORAL NIGHTLY PRN
Status: DISCONTINUED | OUTPATIENT
Start: 2025-04-12 | End: 2025-04-17

## 2025-04-12 RX ORDER — METHYLPREDNISOLONE SODIUM SUCCINATE 40 MG/ML
40 INJECTION INTRAMUSCULAR; INTRAVENOUS EVERY 12 HOURS
Status: DISCONTINUED | OUTPATIENT
Start: 2025-04-12 | End: 2025-04-12

## 2025-04-12 RX ORDER — PANTOPRAZOLE SODIUM 40 MG/1
40 TABLET, DELAYED RELEASE ORAL
Status: DISCONTINUED | OUTPATIENT
Start: 2025-04-12 | End: 2025-04-17

## 2025-04-12 RX ORDER — LEVOTHYROXINE SODIUM 100 UG/1
100 TABLET ORAL
COMMUNITY

## 2025-04-12 RX ORDER — CODEINE PHOSPHATE AND GUAIFENESIN 10; 100 MG/5ML; MG/5ML
5 SOLUTION ORAL EVERY 4 HOURS PRN
Status: DISCONTINUED | OUTPATIENT
Start: 2025-04-12 | End: 2025-04-17

## 2025-04-12 RX ORDER — MAGNESIUM HYDROXIDE/ALUMINUM HYDROXICE/SIMETHICONE 120; 1200; 1200 MG/30ML; MG/30ML; MG/30ML
30 SUSPENSION ORAL 4 TIMES DAILY PRN
Status: DISCONTINUED | OUTPATIENT
Start: 2025-04-12 | End: 2025-04-17

## 2025-04-12 RX ORDER — IPRATROPIUM BROMIDE AND ALBUTEROL SULFATE 2.5; .5 MG/3ML; MG/3ML
3 SOLUTION RESPIRATORY (INHALATION)
Status: DISCONTINUED | OUTPATIENT
Start: 2025-04-12 | End: 2025-04-16

## 2025-04-12 RX ORDER — HYDRALAZINE HYDROCHLORIDE 20 MG/ML
10 INJECTION INTRAMUSCULAR; INTRAVENOUS EVERY 4 HOURS PRN
Status: DISCONTINUED | OUTPATIENT
Start: 2025-04-12 | End: 2025-04-17

## 2025-04-12 RX ORDER — HYDROCODONE BITARTRATE AND ACETAMINOPHEN 5; 325 MG/1; MG/1
1 TABLET ORAL EVERY 6 HOURS PRN
Status: DISCONTINUED | OUTPATIENT
Start: 2025-04-12 | End: 2025-04-17

## 2025-04-12 RX ORDER — ACETAMINOPHEN 325 MG/1
650 TABLET ORAL EVERY 6 HOURS PRN
Status: DISCONTINUED | OUTPATIENT
Start: 2025-04-12 | End: 2025-04-17

## 2025-04-12 RX ORDER — LEVOTHYROXINE SODIUM 100 UG/1
100 TABLET ORAL
Status: DISCONTINUED | OUTPATIENT
Start: 2025-04-12 | End: 2025-04-17

## 2025-04-12 NOTE — PLAN OF CARE
Problem: Patient Centered Care  Goal: Patient preferences are identified and integrated in the patient's plan of care  Description: Interventions:- What would you like us to know as we care for you?   - Provide timely, complete, and accurate information to patient/family- Incorporate patient and family knowledge, values, beliefs, and cultural backgrounds into the planning and delivery of care- Encourage patient/family to participate in care and decision-making at the level they choose- Honor patient and family perspectives and choices  Outcome: Progressing     Problem: Patient/Family Goals  Goal: Patient/Family Long Term Goal  Description: Patient's Long Term Goal: discharge home  Interventions:- - See additional Care Plan goals for specific interventions  Outcome: Progressing  Goal: Patient/Family Short Term Goal  Description: Patient's Short Term Goal: improve shortness of breath   Interventions: - See additional Care Plan goals for specific interventions  Outcome: Progressing     Problem: CARDIOVASCULAR - ADULT  Goal: Maintains optimal cardiac output and hemodynamic stability  Description: INTERVENTIONS:- Monitor vital signs, rhythm, and trends- Monitor for bleeding, hypotension and signs of decreased cardiac output- Evaluate effectiveness of vasoactive medications to optimize hemodynamic stability- Monitor arterial and/or venous puncture sites for bleeding and/or hematoma- Assess quality of pulses, skin color and temperature- Assess for signs of decreased coronary artery perfusion - ex. Angina- Evaluate fluid balance, assess for edema, trend weights  Outcome: Progressing  Goal: Absence of cardiac arrhythmias or at baseline  Description: INTERVENTIONS:- Continuous cardiac monitoring, monitor vital signs, obtain 12 lead EKG if indicated- Evaluate effectiveness of antiarrhythmic and heart rate control medications as ordered- Initiate emergency measures for life threatening arrhythmias- Monitor electrolytes and  administer replacement therapy as ordered  Outcome: Progressing     Problem: METABOLIC/FLUID AND ELECTROLYTES - ADULT  Goal: Glucose maintained within prescribed range  Description: INTERVENTIONS:- Monitor Blood Glucose as ordered- Assess for signs and symptoms of hyperglycemia and hypoglycemia- Administer ordered medications to maintain glucose within target range- Assess barriers to adequate nutritional intake and initiate nutrition consult as needed- Instruct patient on self management of diabetes  Outcome: Progressing  Goal: Electrolytes maintained within normal limits  Description: INTERVENTIONS:- Monitor labs and rhythm and assess patient for signs and symptoms of electrolyte imbalances- Administer electrolyte replacement as ordered- Monitor response to electrolyte replacements, including rhythm and repeat lab results as appropriate- Fluid restriction as ordered- Instruct patient on fluid and nutrition restrictions as appropriate  Outcome: Progressing  Goal: Hemodynamic stability and optimal renal function maintained  Description: INTERVENTIONS:- Monitor labs and assess for signs and symptoms of volume excess or deficit- Monitor intake, output and patient weight- Monitor urine specific gravity, serum osmolarity and serum sodium as indicated or ordered- Monitor response to interventions for patient's volume status, including labs, urine output, blood pressure (other measures as available)- Encourage oral intake as appropriate- Instruct patient on fluid and nutrition restrictions as appropriate  Outcome: Progressing     Bed in lowest position, call light in reach, frequent rounding, nonskid footwear, fall precautions in place.

## 2025-04-12 NOTE — CONSULTS
[facility]  part of 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    She presented with symptoms of increasing shortness of breath for the past month and difficulty swallowing for the past few days.  She states that shortness of breath is been present for a month however she advised to come in today due to difficulty swallowing as well.  She was supposed to have a biopsy done later this week for lymph node biopsies and the CT of her chest.  She states she is able to keep liquids down but has trouble with swallowing foods and solids.  She denies any prior cardiac history such as CAD or heart failure or arrhythmias.  Denies any prior recent fevers infections or chills.  Denies any prior history of pericardial effusions.  In the ER she underwent CTPA was negative for PE.  She was found to have significant lymphadenopathy both cervical and supraclavicular along with hilar left large left lung mass as well.    She was also found to have a pericardial effusion moderate-sized on CT and thus cardiology was consulted.  She denies any recent fevers infections chills, denies any trauma to the chest.  She states she has no prior effusion history that she is aware of.  She feels well.  Denies active chest pain,  shortness of breath or syncope.  Hemodynamically stable    Of note she has had prior admissions due to iron deficiency anemia with hemoglobin as low as 5.7 requiring units of PRBCs with appropriate rise in hemoglobin and symptomatic improvement.  She also has a longstanding history of clear and serous endometrial cancer with prior chemotherapy and radiation and brachytherapy with radiation oncology.  She has had prior imaging including CT chest and neck concerning for metastasis to the hilar mediastinal and supraclavicular lymph nodes and was advised on biopsy.  He was scheduled for an IR supraclavicular lymph node biopsy on April 15, 2025.  She receives her oncology care at [place].    History:  Past Medical History[1]  Past Surgical History[2]  Family History[3]   reports that she has never smoked. She has never used smokeless tobacco. She reports that she does not drink alcohol and does not use drugs.    Allergies:  Allergies[4]    Medications:  Current Hospital Medications[5]    Review of Systems:  All systems were reviewed and are negative except as described above in HPI.     Physical Exam:  Blood pressure 128/69, pulse 110, temperature 98.6 °F (37 °C), temperature source Oral, resp. rate 26, height 5' (1.524 m), weight 162 lb 4.1 oz (73.6 kg), SpO2 99%.  Temp (24hrs), Av.8 °F (37.1 °C), Min:98.6 °F (37 °C), Max:99 °F (37.2 °C)    Wt Readings from Last 3 Encounters:   25 162 lb 4.1 oz (73.6 kg)   23 197 lb (89.4 kg)     No intake or output data in the 24 hours ending 25 0839    Telemetry: SR   General: Alert and oriented in no apparent distress.  HEENT: No focal deficits.  Neck: No appreciable JVD, normal ROM.   Cardiac: Regular rate and rhythm, S1, S2 no rubs or gallops.  Lungs: Clear without wheezes, rales, rhonchi or dullness.  Normal excursions and effort.  Abdomen: Soft, mild tenderness to palpation.   Extremities: no edema.  Peripheral pulses are 2+.  Neurologic: Alert and oriented,  normal affect.  Skin: Warm and dry.     Laboratories and Data:  Diagnostics:  EKG: Sinus tachycardia, mild (102), and now in SR.   Echo: pending.   Stress Test: N/A  Cath: N/A  CTA Chest: negative PE, diffuse lymphadenopathy, and lung mass    Labs:   Lab Results   Component Value Date    WBC 10.9 04/12/2025    RBC 3.31 04/12/2025    HGB 8.2 04/12/2025    HCT 26.0 04/12/2025    MCV 78.5 04/12/2025    MCH 24.8 04/12/2025    MCHC 31.5 04/12/2025    RDW 20.5 04/12/2025    .0 04/12/2025     Lab Results   Component Value Date    INR 1.28 (H) 04/11/2025     No results for input(s): \"BNPML\" in the last 168 hours.  BUN (mg/dL)   Date Value   04/12/2025 6 (L)   04/11/2025 9   12/11/2023 8 (L)     Creatinine (mg/dL)   Date Value   04/12/2025 0.74   04/11/2025 0.82   12/11/2023 1.02       Assessment/Plan:  Problem List[6]    Pericardial Effusion   - noted on CT to have moderate pericardial effusion  - No prior pericardial effusion history, no prior recent fevers infections or chills, no recent trauma.  Concern for possible malignancy in nature etiology.  - Hemodynamically stable, no acute signs of clinical tamponade.  - Will obtain echocardiogram for further evaluation of pericardial effusion.  - Monitor closely for now.    Diffuse Lymphadenopathy   - found to have extensive mediastinal and perihilar lymphadenopathy with concern for malignancy given her history   - appreciate pulmonary and IR / oncology assistance in management and consideration for biopsy   - monitor clinically.     Lung mass  - pulmonary and IR consulted, oncology consulted, defer regarding further biopsy and management, concern given extensive oncology history.   - CT with masslike opacity in NATO  - appreciate other consultant management.     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     Dysphagia  - GI evaluation and oncology management as above.     Oniel Grijalva MD   Advanced Heart Failure and Transplant Cardiology   [facility] ([facility])     C5       [1]   Past Medical History:   Asthma (HCC)   [2]   Past Surgical History:  Procedure Laterality Date    Thyroidectomy      Total abdom hysterectomy     [3] No family history on file.  [4]   Allergies  Allergen Reactions    Aspirin Tightness in Throat    Penicillins HIVES    Other OTHER (SEE COMMENTS)     Pt states IV steroid allergy, states it makes her breathing worse    [5]   Current Facility-Administered Medications:     fluticasone furoate (Arnuity Ellipta) 200 MCG/ACT inhaler 1 puff, 1 puff, Inhalation, Daily    ondansetron (Zofran) 4 MG/2ML injection 4 mg, 4 mg, Intravenous, Q6H PRN    acetaminophen (Tylenol) tab 650 mg, 650 mg, Oral, Q6H PRN    heparin (Porcine) 5000 UNIT/ML injection 5,000 Units, 5,000 Units, Subcutaneous, Q12H    hydrALAzine (Apresoline) 20 mg/mL injection 10 mg, 10 mg, Intravenous, Q4H PRN    HYDROcodone-acetaminophen (Norco) 5-325 MG per tab 1 tablet, 1 tablet, Oral, Q6H PRN    ipratropium-albuterol (Duoneb) 0.5-2.5 (3) MG/3ML inhalation solution 3 mL, 3 mL, Nebulization, Q6H PRN    ipratropium-albuterol (Duoneb) 0.5-2.5 (3) MG/3ML inhalation solution 3 mL, 3 mL, Nebulization, Q6H WA    zolpidem (Ambien) tab 5 mg, 5 mg, Oral, Nightly PRN    alum-mag hydroxide-simethicone (Maalox) 200-200-20 MG/5ML oral suspension 30 mL, 30 mL, Oral, QID PRN    pantoprazole (Protonix) DR tab 40 mg, 40 mg, Oral, QAM AC    guaiFENesin-codeine (Robitussin AC) 100-10 MG/5ML oral solution 5 mL, 5 mL, Oral, Q4H PRN    levothyroxine (Synthroid) tab 100 mcg, 100 mcg, Oral, Before breakfast    predniSONE (Deltasone) tab 40 mg, 40 mg, Oral, Daily with breakfast  [6]   Patient Active Problem List  Diagnosis     Shortness of breath    Acute hypoxic respiratory failure (HCC)    Lung mass    Supraclavicular adenopathy    Dysphagia, unspecified type    Pericardial effusion (HCC)

## 2025-04-12 NOTE — ED PROVIDER NOTES
Patient Seen in: [facility] Emergency Department    History     Chief Complaint   Patient presents with    Difficulty Breathing     Stated Complaint: IRVIN, weakness    Subjective:   HPI  70-year-old female with asthma, history of endometrial carcinoma status post radiation therapy, JOHNNA and BSO and adjuvant chemotherapy in 2024, with recent admission to outside hospital for difficulty breathing and CT imaging concerning for supraclavicular lymphadenopathy and lung mass, who now presents for evaluation of dyspnea and dysphagia.  She has been progressively more short of breath over the past 1 week.  Additionally she is no longer able to swallow solid foods and is only able to tolerate soft foods or liquids.  She feels that food is getting stuck in her chest.  Additionally her shortness of breath is now both with exertion and at rest.  It is not improving with albuterol.  She typically does not wear oxygen at home but was placed on supplemental oxygen in ED triage for hypoxia.  No recent fevers.  She was scheduled for lymph node biopsy next week.    Her daughter is at bedside and provides the history that she received 3 units PRBC transfusion earlier this month for severe anemia.    Objective:     Past Medical History:    Asthma (HCC)              Past Surgical History:   Procedure Laterality Date    Thyroidectomy      Total abdom hysterectomy                  Social History     Socioeconomic History    Marital status: Single   Tobacco Use    Smoking status: Never    Smokeless tobacco: Never   Vaping Use    Vaping status: Never Used   Substance and Sexual Activity    Alcohol use: Never    Drug use: Never     Social Drivers of Health     Food Insecurity: No Food Insecurity (10/29/2024)    Received from [facility]    Food Insecurity     Currently or in the past 3 months, have you worried your food would run out before you had money to buy more?: No     In the past 12 months, have you run out of  food or been unable to get more?: No   Transportation Needs: No Transportation Needs (10/29/2024)    Received from [facility]    Transportation Needs     Currently or in the past 3 months, has lack of transportation kept you from medical appointments, getting food or medicine, or providing care to a family member?: Unrecognized value     Medical Transportation Needs?: No    Received from [facility]    Housing Stability                  Physical Exam     ED Triage Vitals   BP 04/11/25 1846 (!) 138/95   Pulse 04/11/25 1846 106   Resp 04/11/25 1846 24   Temp 04/11/25 1850 98.9 °F (37.2 °C)   Temp src 04/11/25 1850 Oral   SpO2 04/11/25 1846 99 %   O2 Device 04/11/25 1846 None (Room air)       Current Vitals:   Vital Signs  BP: 135/86  Pulse: 103  Resp: (!) 31  Temp: 98.9 °F (37.2 °C)  Temp src: Oral  MAP (mmHg): 99    Oxygen Therapy  SpO2: 94 %  O2 Device: Nasal cannula  O2 Flow Rate (L/min): 2 L/min        Physical Exam  Vitals and nursing note reviewed.   Constitutional:       Appearance: She is well-developed. She is ill-appearing.   HENT:      Head: Normocephalic and atraumatic.   Eyes:      Extraocular Movements: Extraocular movements intact.   Neck:      Comments: Palpable supraclavicular lymph node approximately 3 cm on the left side, also with anterior cervical adenopathy on the right side  Cardiovascular:      Rate and Rhythm: Normal rate and regular rhythm.      Heart sounds: Normal heart sounds.   Pulmonary:      Comments: Conversational dyspnea, scattered end expiratory wheezes  Abdominal:      General: There is no distension.      Palpations: Abdomen is soft.      Tenderness: There is no abdominal tenderness.   Musculoskeletal:         General: Normal range of motion.      Cervical back: Normal range of motion.   Skin:     General: Skin is warm.      Capillary Refill: Capillary refill takes less than 2 seconds.   Neurological:      Mental Status: She is alert.       Comments: No focal deficits       Differential diagnosis includes but is not limited to metastatic disease, primary lymphoma, esophageal metastasis, intrathoracic lymphadenopathy, PE, lymphangitic carcinomatosis, pneumonia  ED Course     Labs Reviewed   BASIC METABOLIC PANEL (8) - Abnormal; Notable for the following components:       Result Value    Glucose 119 (*)     All other components within normal limits   HEPATIC FUNCTION PANEL (7) - Abnormal; Notable for the following components:    Alkaline Phosphatase 186 (*)     All other components within normal limits   CBC WITH DIFFERENTIAL WITH PLATELET - Abnormal; Notable for the following components:    WBC 12.4 (*)     RBC 3.39 (*)     HGB 8.5 (*)     HCT 26.7 (*)     MCV 78.8 (*)     MCH 25.1 (*)     RDW-SD 58.6 (*)     RDW 20.7 (*)     Neutrophil Absolute Prelim 9.88 (*)     Neutrophil Absolute 9.88 (*)     Monocyte Absolute 1.29 (*)     All other components within normal limits   PROTHROMBIN TIME (PT) - Abnormal; Notable for the following components:    PT 16.7 (*)     INR 1.28 (*)     All other components within normal limits   TROPONIN I HIGH SENSITIVITY - Normal   PRO BETA NATRIURETIC PEPTIDE - Normal   PTT, ACTIVATED - Normal   TYPE AND SCREEN    Narrative:     The following orders were created for panel order Type and screen.  Procedure                               Abnormality         Status                     ---------                               -----------         ------                     ABORH (Blood Type)[[id number]]                               Final result               Antibody Screen[[id number]]                                  Final result                 Please view results for these tests on the individual orders.   ABORH (BLOOD TYPE)   ANTIBODY SCREEN     EKG    Rate, intervals and axes as noted on EKG Report.  Rate: 104  Rhythm: Sinus Rhythm  Reading: no STEMI, no ectopy, no prior EKG for comparison           ED Course as of 04/11/25  2249  ------------------------------------------------------------  Time: 04/11 2111  Comment: CBC with ongoing anemia  ------------------------------------------------------------  Time: 04/11 2111  Comment: BMP and LFTs unremarkable, troponin negative, proBNP normal                  CT CHEST PE AORTA (IV ONLY) (CPT=71260)  Result Date: 4/11/2025  CONCLUSION:  1. No evidence of acute pulmonary embolism to the level of the first order subsegmental pulmonary artery branches.  2. Masslike opacity involving the left upper lobe, concerning for potential neoplastic process.  3. Extensive bilateral nodules and micronodules, which could be metastatic in nature.  4. Marked mediastinal/perihilar lymphadenopathy, likely neoplastic.  5. Partially delineated supraclavicular lymphadenopathy.  6. Moderate pericardial effusion, potentially malignant.  7. Small bilateral pleural effusions are present with associated basilar atelectasis, with or without superimposed pneumonia.  8. Mild interlobular septal thickening is present and could be indicative of pulmonary interstitial edema or lymphangitic dissemination of malignancy.  9. Lesser incidental findings as above.    Dictated by (CST): Harpreet Jade MD on 4/11/2025 at 10:08 PM     Finalized by (CST): Harpreet Jade MD on 4/11/2025 at 10:20 PM          CT SOFT TISSUE OF NECK(CONTRAST ONLY) (CPT=70491)  Result Date: 4/11/2025  CONCLUSION:  1. Extensive lymphadenopathy, likely metastatic.  2. Lesser incidental findings as above.     Dictated by (CST): Harpreet Jade MD on 4/11/2025 at 9:59 PM     Finalized by (CST): Harpreet Jade MD on 4/11/2025 at 10:08 PM                    [facility]        Admission disposition: 4/11/2025 10:36 PM         I spent a total of 45 minutes of critical care time in obtaining history, performing a physical exam, bedside monitoring of interventions, collecting and interpreting tests and discussion with consultants but not including time spent performing  procedures.    Medical Decision Making  Patient placed on supplemental nasal cannula oxygen for hypoxia noted at triage.  Labs reviewed as above.  CT chest with concerns for left lung mass and possible lymphangitic dissemination, moderate pericardial effusion, and extensive lymphadenopathy.  Discussed these results with patient's family at bedside and explained that they are concerning for malignancy.  Patient will be admitted to the hospital for further management.    I notified GI, pulmonary, IR, cardiology for consults.  Discussed with hospitalist for admission.    Problems Addressed:  Acute hypoxic respiratory failure (HCC): complicated acute illness or injury with systemic symptoms that poses a threat to life or bodily functions  Dysphagia, unspecified type: undiagnosed new problem with uncertain prognosis  Lung mass: complicated acute illness or injury with systemic symptoms that poses a threat to life or bodily functions  Pericardial effusion (HCC): complicated acute illness or injury with systemic symptoms that poses a threat to life or bodily functions  Shortness of breath: complicated acute illness or injury with systemic symptoms that poses a threat to life or bodily functions  Supraclavicular adenopathy: undiagnosed new problem with uncertain prognosis    Amount and/or Complexity of Data Reviewed  Independent Historian: caregiver     Details: History from daughter as above  External Data Reviewed: notes.     Details: Reviewed discharge summary from outside hospital from 3/25/2025: Describes previous treatment for left upper lobe opacity with azithromycin and doxycycline as an outpatient in March, followed by 1 dose of Levaquin in the [place] ED    Labs: ordered. Decision-making details documented in ED Course.  Radiology: ordered and independent interpretation performed. Decision-making details documented in ED Course.  ECG/medicine tests: ordered and independent interpretation performed. Decision-making  details documented in ED Course.  Discussion of management or test interpretation with external provider(s): As above    Risk  Decision regarding hospitalization.      Disposition and Plan     Clinical Impression:  1. Shortness of breath    2. Acute hypoxic respiratory failure (HCC)    3. Lung mass    4. Supraclavicular adenopathy    5. Dysphagia, unspecified type    6. Pericardial effusion (HCC)         Disposition:  Admit  4/11/2025 10:36 pm    Follow-up:  No follow-up provider specified.  We recommend that you schedule follow up care with a primary care provider within the next three months to obtain basic health screening including reassessment of your blood pressure.      Medications Prescribed:  Current Discharge Medication List          Supplementary Documentation:         Hospital Problems       Present on Admission           ICD-10-CM Noted POA    * (Principal) Shortness of breath R06.02 4/11/2025 Unknown

## 2025-04-12 NOTE — PLAN OF CARE
Problem: Patient Centered Care  Goal: Patient preferences are identified and integrated in the patient's plan of care  Description: Interventions:- What would you like us to know as we care for you? Pt from home alone- Provide timely, complete, and accurate information to patient/family- Incorporate patient and family knowledge, values, beliefs, and cultural backgrounds into the planning and delivery of care- Encourage patient/family to participate in care and decision-making at the level they choose- Honor patient and family perspectives and choices  Outcome: Progressing     Problem: Patient/Family Goals  Goal: Patient/Family Long Term Goal  Description: Patient's Long Term Goal: Interventions:- To improve resp status- See additional Care Plan goals for specific interventions  Outcome: Progressing  Goal: Patient/Family Short Term Goal  Description: Patient's Short Term Goal: Interventions: - Return home- See additional Care Plan goals for specific interventions  Outcome: Progressing     Problem: CARDIOVASCULAR - ADULT  Goal: Maintains optimal cardiac output and hemodynamic stability  Description: INTERVENTIONS:- Monitor vital signs, rhythm, and trends- Monitor for bleeding, hypotension and signs of decreased cardiac output- Evaluate effectiveness of vasoactive medications to optimize hemodynamic stability- Monitor arterial and/or venous puncture sites for bleeding and/or hematoma- Assess quality of pulses, skin color and temperature- Assess for signs of decreased coronary artery perfusion - ex. Angina- Evaluate fluid balance, assess for edema, trend weights  Outcome: Progressing  Goal: Absence of cardiac arrhythmias or at baseline  Description: INTERVENTIONS:- Continuous cardiac monitoring, monitor vital signs, obtain 12 lead EKG if indicated- Evaluate effectiveness of antiarrhythmic and heart rate control medications as ordered- Initiate emergency measures for life threatening arrhythmias- Monitor electrolytes  and administer replacement therapy as ordered  Outcome: Progressing     Problem: METABOLIC/FLUID AND ELECTROLYTES - ADULT  Goal: Glucose maintained within prescribed range  Description: INTERVENTIONS:- Monitor Blood Glucose as ordered- Assess for signs and symptoms of hyperglycemia and hypoglycemia- Administer ordered medications to maintain glucose within target range- Assess barriers to adequate nutritional intake and initiate nutrition consult as needed- Instruct patient on self management of diabetes  Outcome: Progressing  Goal: Electrolytes maintained within normal limits  Description: INTERVENTIONS:- Monitor labs and rhythm and assess patient for signs and symptoms of electrolyte imbalances- Administer electrolyte replacement as ordered- Monitor response to electrolyte replacements, including rhythm and repeat lab results as appropriate- Fluid restriction as ordered- Instruct patient on fluid and nutrition restrictions as appropriate  Outcome: Progressing  Goal: Hemodynamic stability and optimal renal function maintained  Description: INTERVENTIONS:- Monitor labs and assess for signs and symptoms of volume excess or deficit- Monitor intake, output and patient weight- Monitor urine specific gravity, serum osmolarity and serum sodium as indicated or ordered- Monitor response to interventions for patient's volume status, including labs, urine output, blood pressure (other measures as available)- Encourage oral intake as appropriate- Instruct patient on fluid and nutrition restrictions as appropriate  Outcome: Progressing

## 2025-04-12 NOTE — CONSULTS
Is this a shared or split note between Advanced Practice Provider and Physician? Yes       [facility]   Gastroenterology Consultation Note    Kelli Fisher  Patient Status:    Inpatient  Date of Admission:         4/11/2025, Hospital day #0  Attending:   Jim Dai MD  PCP:     Taylor Gallardo MD    Reason for Consultation:  Dysphagia, anemia    History of Present Illness:  Kelli Fisher is a a(n) 70 year old female w/ active medical problems of asthma and history of endometrial cancer s/p JOHNNA, radiation & chemotherapy in 2024, who presents with shortness of breath & dysphagia.     GI consulted for anemia & dysphagia. recent admission at another other hospital and CT showed a lung mass and supraclavicular lymphadenopathy. Completed CT soft tissue neck and CT chest yesterday: Imaging reveals a left upper lobe mass concerning for malignancy, with extensive bilateral pulmonary nodules suggesting possible metastases. There is marked mediastinal and perihilar lymphadenopathy, as well as supraclavicular lymphadenopathy, all likely neoplastic. Moderate pericardial effusion, small bilateral pleural effusions with basilar atelectasis, and mild interlobular septal thickening raise concern for malignant involvement and possible lymphangitic spread.    Patient reports dysphagia with solids & pills x 4 days, sensation of foreign body getting stuck in mid chest, able to clear bolus with drinking water. Tolerating soft foods and liquids. She does reports epigastric pain intermittent for months. No N/V, occasional heartburn, takes tums as needed, last took tums about 1 month ago.    She received an iron infusion outpatient about 2 weeks ago. When admitted at [facility] last week she received 3 units of RBCs for hgb of 5.7. She denies any overt GI bleeding    Prior endoscopies:  EGD: none   Colonoscopy: none     History:  Past Medical History[1]  Past Surgical History[2]  Family History[3]   reports that she has  never smoked. She has never used smokeless tobacco. She reports that she does not drink alcohol and does not use drugs.    Allergies:  Allergies[4]    Medications:  Current Hospital Medications[5]  Prescriptions Prior to Admission[6]    Review of Systems:  CONSTITUTIONAL:  negative for fevers, rigors  EYES:  negative for diplopia   RESPIRATORY:  negative for severe shortness of breath  CARDIOVASCULAR:  negative for crushing sub-sternal chest pain  GASTROINTESTINAL:  see HPI  GENITOURINARY:  negative for dysuria or gross hematuria  SKIN:  negative for jaundice   ALLERGIC/IMMUNOLOGIC:  negative for hay fever  ENDOCRINE:  negative for cold intolerance and heat intolerance  MUSCULOSKELETAL:  negative for joint effusion/severe erythema  NEURO: negative for dizziness or loss of conscious or paresthesias  BEHAVIOR/PSYCH:  negative for psychotic behavior    Physical Exam:    Blood pressure 128/69, pulse 110, temperature 98.6 °F (37 °C), temperature source Oral, resp. rate 26, height 5' (1.524 m), weight 162 lb 4.1 oz (73.6 kg), SpO2 99%. Body mass index is 31.69 kg/m².    General: awake, alert and oriented, no acute distress  HEENT: moist mucus membranes  PULM: no conversational dyspnea  CARDIOVASCULAR: regular rate and rhythm, the extremities are warm and well perfused  GI: soft, non-tender, non-distended, + BS, no rebound/guarding   EXTREMITIES: no edema, moving all extremities  SKIN: no visible rash  NEURO: appropriate and interactive    Laboratory Data:  Lab Results   Component Value Date    WBC 10.9 04/12/2025    HGB 8.2 04/12/2025    HCT 26.0 04/12/2025    .0 04/12/2025    CREATSERUM 0.74 04/12/2025    BUN 6 04/12/2025     04/12/2025    K 3.6 04/12/2025     04/12/2025    CO2 28.0 04/12/2025     04/12/2025    CA 8.8 04/12/2025    ALB 3.6 04/11/2025    ALKPHO 186 04/11/2025    BILT 0.8 04/11/2025    TP 7.2 04/11/2025    AST 25 04/11/2025    ALT 11 04/11/2025    PTT 35.9 04/11/2025    INR 1.28  04/11/2025       Imaging:  CT CHEST PE AORTA (IV ONLY) (CPT=71260)  Result Date: 4/11/2025  CONCLUSION:  1. No evidence of acute pulmonary embolism to the level of the first order subsegmental pulmonary artery branches.  2. Masslike opacity involving the left upper lobe, concerning for potential neoplastic process.  3. Extensive bilateral nodules and micronodules, which could be metastatic in nature.  4. Marked mediastinal/perihilar lymphadenopathy, likely neoplastic.  5. Partially delineated supraclavicular lymphadenopathy.  6. Moderate pericardial effusion, potentially malignant.  7. Small bilateral pleural effusions are present with associated basilar atelectasis, with or without superimposed pneumonia.  8. Mild interlobular septal thickening is present and could be indicative of pulmonary interstitial edema or lymphangitic dissemination of malignancy.  9. Lesser incidental findings as above.    Dictated by (CST): Harpreet Jade MD on 4/11/2025 at 10:08 PM     Finalized by (CST): Harpreet Jade MD on 4/11/2025 at 10:20 PM          CT SOFT TISSUE OF NECK(CONTRAST ONLY) (CPT=70491)  Result Date: 4/11/2025  CONCLUSION:  1. Extensive lymphadenopathy, likely metastatic.  2. Lesser incidental findings as above.     Dictated by (CST): Harpreet Jade MD on 4/11/2025 at 9:59 PM     Finalized by (CST): Harpreet Jade MD on 4/11/2025 at 10:08 PM            Assessment & Plan   Kelli Fisher is a a(n) 70 year old female w/ active medical problems of asthma and history of endometrial cancer s/p JOHNNA, radiation & chemotherapy in 2024, who presents with shortness of breath & dysphagia.     #dysphagia  -solids/pills dysphagia x 4 days, no issue with liquids or soft foods.   -no obvious esophageal involvement on CT neck. Given the concerning CT findings--particularly the mass-like opacity, extensive pulmonary nodules, lymphadenopathy, and possible malignant effusions--further evaluation by Pulmonology is recommended prior to  proceeding with GI endoscopic procedures. These findings may impact both procedural risk and overall management strategy.  -per patient, plan for biopsy on Monday.   -can consider xray esophagus to assess for luminal abnormality, but suspect lung changes are etiology causing motility disturbance or extrinsic compression    #anemia  -hemoglobin stable today at 8.2, no overt GI bleeding  -iron low, % saturation WNL. TIBC low.   -ferritin elevated, likely as acute phase reactant  -no prior EGD/colonoscopy  -etiology possibly malignancy    Recommend:  -daily hemoglobin, goal >7  -monitor for any overt GI bleeding  -empiric treatment with PPI   -agree with pulm work up of lymphadenopathy  -soft diet okay from GI stance, DHT if needed  -defer endoscopic evaluation at this time    Margarette Farley APRTHEODORE    [facility] - Gastroenterology   04/12/25     Case discussed with Dr. Jade & UNRULY Garcia    This note was partially prepared using Dragon Medical voice recognition dictation software. As a result, errors may occur. When identified, these errors have been corrected. While every attempt is made to correct errors during dictation, discrepancies may still exist.          [1]   Past Medical History:   Asthma (HCC)   [2]   Past Surgical History:  Procedure Laterality Date    Thyroidectomy      Total abdom hysterectomy     [3] No family history on file.  [4]   Allergies  Allergen Reactions    Aspirin Tightness in Throat    Penicillins HIVES    Other OTHER (SEE COMMENTS)     Pt states IV steroid allergy, states it makes her breathing worse    [5]   Current Facility-Administered Medications:     fluticasone furoate (Arnuity Ellipta) 200 MCG/ACT inhaler 1 puff, 1 puff, Inhalation, Daily    ondansetron (Zofran) 4 MG/2ML injection 4 mg, 4 mg, Intravenous, Q6H PRN    acetaminophen (Tylenol) tab 650 mg, 650 mg, Oral, Q6H PRN    heparin (Porcine) 5000 UNIT/ML injection 5,000 Units, 5,000 Units, Subcutaneous, Q12H    hydrALAzine  (Apresoline) 20 mg/mL injection 10 mg, 10 mg, Intravenous, Q4H PRN    HYDROcodone-acetaminophen (Norco) 5-325 MG per tab 1 tablet, 1 tablet, Oral, Q6H PRN    ipratropium-albuterol (Duoneb) 0.5-2.5 (3) MG/3ML inhalation solution 3 mL, 3 mL, Nebulization, Q6H PRN    ipratropium-albuterol (Duoneb) 0.5-2.5 (3) MG/3ML inhalation solution 3 mL, 3 mL, Nebulization, Q6H WA    zolpidem (Ambien) tab 5 mg, 5 mg, Oral, Nightly PRN    alum-mag hydroxide-simethicone (Maalox) 200-200-20 MG/5ML oral suspension 30 mL, 30 mL, Oral, QID PRN    pantoprazole (Protonix) DR tab 40 mg, 40 mg, Oral, QAM AC    guaiFENesin-codeine (Robitussin AC) 100-10 MG/5ML oral solution 5 mL, 5 mL, Oral, Q4H PRN    levothyroxine (Synthroid) tab 100 mcg, 100 mcg, Oral, Before breakfast    predniSONE (Deltasone) tab 40 mg, 40 mg, Oral, Daily with breakfast  [6]   Medications Prior to Admission   Medication Sig Dispense Refill Last Dose/Taking    levothyroxine 100 MCG Oral Tab Take 1 tablet (100 mcg total) by mouth before breakfast.   Taking    Albuterol Sulfate HFA (VENTOLIN) 108 (90 BASE) MCG/ACT Inhalation Aero Soln Inhale 1-2 puffs into the lungs every 4 to 6 hours as needed for Wheezing or Shortness of Breath.   Taking As Needed    Fluticasone Propionate  HFA (FLOVENT HFA) 220 MCG/ACT Inhalation Aerosol Inhale 2 puffs into the lungs in the morning and 2 puffs before bedtime.   Taking    predniSONE (DELTASONE) 10 MG Oral Tab Take 40 mg by mouth daily. 40 MG DAILY X 2 DAYS THEN DECREASE BY 10 MG EVERY 2 DAYS THEN D/C

## 2025-04-12 NOTE — CONSULTS
[facility]  part of [facility]    Consult Note    Date:  4/12/2025  Date of Admission:  4/11/2025    Chief Complaint:   Kelli Fisher is a(n) 70 year old female with lung masses and supraclavicular lymph nodes.    HPI:   The patient carries a diagnosis of endometrial carcinoma status post hysterectomy and radiation and chemotherapy in 2024.  She developed dyspnea in mid March and was evaluated in the emergency room at [facility].  She was thought to have pneumonia and asthma exacerbation and was given antibiotics and steroids.  The clinical syndrome recurred and she returned to the emergency room and CT scan of the chest demonstrated lung masses and supraclavicular lymphadenopathy was identified.  The patient was ultimately evaluated at [place] and was given packed red blood cells as he was found to have severe anemia.  Plans were made for biopsy of supraclavicular lymphadenopathy to be performed next week.  However, in the interim she developed inability to swallow.  She notes that solid food will get caught in her chest.  She came to the emergency room for this concern.  She denies personal nor family history of deep venous thrombosis, TB exposure, hemoptysis.  She does have cough with clear phlegm.  There is no fever chills or shakes.  The patient has a prior history of thyroidectomy but reportedly benign findings and she carries the diagnosis of asthma.    History   Past Medical History[1]  Past Surgical History[2]  Family History[3]  Social History: Single, 7 children, no tobacco, no alcohol, used to work for [employer]  Short Social Hx on File[4]  Allergies/Medications:   Allergies: Allergies[5]  Prescriptions Prior to Admission[6]    Review of Systems:   Review of Systems:  Vision abnormal. Ear nose and throat normal. Bowel normal. Bladder function normal. No depression. No thyroid disease. No lymphatic system concerns.  No rash. Muscles and joints unremarkable. No  weight loss no weight gain.    Physical Exam:   Vital Signs:  Blood pressure 128/69, pulse 110, temperature 98.6 °F (37 °C), temperature source Oral, resp. rate 26, height 5' (1.524 m), weight 162 lb 4.1 oz (73.6 kg), SpO2 99%.     Alert white female  HEENT examination is unremarkable with pupils equal round and reactive to light and accommodation.   Neck without thyromegaly, JVD nor bruit.  Golf ball sized bilateral supraclavicular lymphadenopathy firm.  Lungs slightly diminished to auscultation and percussion.  Cardiac regular rate and rhythm no murmur.   Abdomen nontender, without hepatosplenomegaly and no mass appreciable.   Extremities without clubbing cyanosis nor edema.   Neurologic grossly intact with symmetric tone and strength and reflex.  Skin without gross abnormality    Results:     Lab Results   Component Value Date    WBC 10.9 04/12/2025    HGB 8.2 04/12/2025    HCT 26.0 04/12/2025    .0 04/12/2025    CREATSERUM 0.74 04/12/2025    BUN 6 04/12/2025     04/12/2025    K 3.6 04/12/2025     04/12/2025    CO2 28.0 04/12/2025     04/12/2025    CA 8.8 04/12/2025    ALB 3.6 04/11/2025    ALKPHO 186 04/11/2025    BILT 0.8 04/11/2025    TP 7.2 04/11/2025    AST 25 04/11/2025    ALT 11 04/11/2025    PTT 35.9 04/11/2025    INR 1.28 04/11/2025     CT scan of the chest-1. No evidence of acute pulmonary embolism to the level of the first order subsegmental pulmonary artery branches.      2. Masslike opacity involving the left upper lobe, concerning for potential neoplastic process.      3. Extensive bilateral nodules and micronodules, which could be metastatic in nature.      4. Marked mediastinal/perihilar lymphadenopathy, likely neoplastic.      5. Partially delineated supraclavicular lymphadenopathy.      6. Moderate pericardial effusion, potentially malignant.      7. Small bilateral pleural effusions are present with associated basilar atelectasis, with or without superimposed  pneumonia.      8. Mild interlobular septal thickening is present and could be indicative of pulmonary interstitial edema or lymphangitic dissemination of malignancy.    CT scan of the soft tissues of the neck-1. Extensive lymphadenopathy, likely metastatic.     Assessment/Plan:   1.  Lung lesions and dyspnea syndrome-my strong suspicion is that the patient has metastatic neoplasm involving her chest with multiple pulmonary nodules and left upper lobe lung masses.  She was treated for the possibility of pneumonia and asthma; however, I suspect neoplasm likely her uterine carcinoma was the underlying etiology.    Recommendations:  1.  Nebulizer as needed  2.  Needle aspirate of supraclavicular lymphadenopathy  3.  Arnuity Ellipta  4.  Steroid    2.  DVT prophylaxis-subcutaneous heparin    3.  History of adenocarcinoma the uterus    I am delighted to assist with Kelli's care.      Srinath Levy MD  Medical Director, Critical Care, [facility]  Medical Director, [facility]  Pager: [phone]               [1]   Past Medical History:   Asthma (HCC)   [2]   Past Surgical History:  Procedure Laterality Date    Thyroidectomy      Total abdom hysterectomy     [3] No family history on file.  [4]   Social History  Socioeconomic History    Marital status: Single   Tobacco Use    Smoking status: Never    Smokeless tobacco: Never   Vaping Use    Vaping status: Never Used   Substance and Sexual Activity    Alcohol use: Never    Drug use: Never     Social Drivers of Health     Food Insecurity: No Food Insecurity (4/12/2025)    NCSS - Food Insecurity     Worried About Running Out of Food in the Last Year: No     Ran Out of Food in the Last Year: No   Transportation Needs: No Transportation Needs (4/12/2025)    NCSS - Transportation     Lack of Transportation: No   Housing Stability: Not At Risk (4/12/2025)    NCSS - Housing/Utilities     Has Housing: Yes     Worried About Losing Housing: No     Unable to Get  Utilities: No   [5]   Allergies  Allergen Reactions    Aspirin Tightness in Throat    Penicillins HIVES    Other OTHER (SEE COMMENTS)     Pt states IV steroid allergy, states it makes her breathing worse    [6]   Medications Prior to Admission   Medication Sig    levothyroxine 100 MCG Oral Tab Take 1 tablet (100 mcg total) by mouth before breakfast.    Albuterol Sulfate HFA (VENTOLIN) 108 (90 BASE) MCG/ACT Inhalation Aero Soln Inhale 1-2 puffs into the lungs every 4 to 6 hours as needed for Wheezing or Shortness of Breath.    Fluticasone Propionate  HFA (FLOVENT HFA) 220 MCG/ACT Inhalation Aerosol Inhale 2 puffs into the lungs in the morning and 2 puffs before bedtime.    predniSONE (DELTASONE) 10 MG Oral Tab Take 40 mg by mouth daily. 40 MG DAILY X 2 DAYS THEN DECREASE BY 10 MG EVERY 2 DAYS THEN D/C

## 2025-04-12 NOTE — H&P
[facility]  part of [facility]    History & Physical    Kelli Fisher Patient Status:  Emergency    1955 MRN [id number]   Location [facility] EMERGENCY DEPARTMENT Attending Yara Conde MD   Hosp Day # 0 PCP Taylor Gallardo MD     Date:  2025  Date of Admission:  2025    Chief Complaint:  Chief Complaint   Patient presents with    Difficulty Breathing       History of Present Illness:  Kelli Fisher is a(n) 70 year old female, with a past medical history significant for endometrial carcinoma, considered to be free of disease earlier in the year presents with increasing shortness of breath and difficulty swallowing for the past few days.  Recent workup indicated likelihood of pneumonia however possibility of malignancy was also entertained, patient was scheduled to see oncology and have a biopsy done however was not able to make the appointment.  Today comes in with difficulty swallowing solids, claims she is able to keep liquids down however feels like they stay in her stomach for too long.  Workup in ER indicates significant lymphadenopathy cervical and supraclavicular along with hilar and a large left lung mass as well.    History:  Past Medical History:    Asthma (HCC)     Past Surgical History:   Procedure Laterality Date    Thyroidectomy      Total abdom hysterectomy       Family history: No sick contacts   reports that she has never smoked. She has never used smokeless tobacco. She reports that she does not drink alcohol and does not use drugs.    Allergies:  Allergies   Allergen Reactions    Aspirin Tightness in Throat    Penicillins HIVES       Home Medications:  Prior to Admission Medications   Prescriptions Last Dose Informant Patient Reported? Taking?   Albuterol Sulfate HFA (VENTOLIN) 108 (90 BASE) MCG/ACT Inhalation Aero Soln   Yes No   Sig: Inhale 1-2 puffs into the lungs every 4 to 6 hours as needed for Wheezing or Shortness of Breath.   Fluticasone  Propionate  HFA (FLOVENT HFA) 220 MCG/ACT Inhalation Aerosol   Yes No   Sig: Inhale 2 puffs into the lungs 2 (two) times daily.   predniSONE (DELTASONE) 10 MG Oral Tab   Yes No   Sig: Take 40 mg by mouth daily. 40 MG DAILY X 2 DAYS THEN DECREASE BY 10 MG EVERY 2 DAYS THEN D/C      Facility-Administered Medications: None       Review of Systems:  Constitutional:  Weakness, Fatigue.  Eye:  Negative.  Ear/Nose/Mouth/Throat:  Negative.  Respiratory: Shortness of breath and wheezing  Cardiovascular: Negative  Gastrointestinal: Difficulty swallowing, poor appetite  Genitourinary:  Negative  Endocrine:  Negative.  Immunologic:  Negative.  Musculoskeletal:  Negative.  Integumentary:  Negative.  Neurologic:  Negative.  Psychiatric:  Negative.  ROS reviewed as documented in chart    Physical Exam:  Temp:  [98.9 °F (37.2 °C)] 98.9 °F (37.2 °C)  Pulse:  [] 103  Resp:  [24-31] 31  BP: (131-138)/(80-95) 135/86  SpO2:  [94 %-99 %] 94 %    General:  Alert and oriented.  Diffuse skin problem:  None.  Eye:  Pupils are equal, round and reactive to light, extraocular movements are intact, Normal conjunctiva.  HENT:  Normocephalic, oral mucosa is moist.  Head:  Normocephalic, atraumatic.  Neck:  Supple, non-tender, no carotid bruit, no jugular venous distention, no lymphadenopathy, no thyromegaly.  Respiratory:  Lungs are clear to auscultation, respirations are non-labored, breath sounds are equal, symmetrical chest wall expansion.  Cardiovascular:  Normal rate, regular rhythm, no murmur, no edema.  Gastrointestinal:  Soft, non-tender, non-distended, normal bowel sounds, no organomegaly.  Lymphatics:  No lymphadenopathy neck, axilla, groin.  Musculoskeletal: Normal range of motion.  normal strength.  Feet:  Normal pulses.  Neurologic:  Alert, oriented, no focal deficits, cranial nerves II-XII are grossly intact.  Cognition and Speech: Extremely soft-spoken, difficult to understand at times  Psychiatric:  Cooperative, appropriate  mood & affect.      Laboratory Data:   Lab Results   Component Value Date    WBC 12.4 04/11/2025    HGB 8.5 04/11/2025    HCT 26.7 04/11/2025    .0 04/11/2025    CREATSERUM 0.82 04/11/2025    BUN 9 04/11/2025     04/11/2025    K 4.0 04/11/2025     04/11/2025    CO2 28.0 04/11/2025     04/11/2025    CA 8.8 04/11/2025    ALB 3.6 04/11/2025    ALKPHO 186 04/11/2025    BILT 0.8 04/11/2025    TP 7.2 04/11/2025    AST 25 04/11/2025    ALT 11 04/11/2025    PTT 35.9 04/11/2025    INR 1.28 04/11/2025       Imaging:  CT CHEST PE AORTA (IV ONLY) (CPT=71260)  Result Date: 4/11/2025  CONCLUSION:  1. No evidence of acute pulmonary embolism to the level of the first order subsegmental pulmonary artery branches.  2. Masslike opacity involving the left upper lobe, concerning for potential neoplastic process.  3. Extensive bilateral nodules and micronodules, which could be metastatic in nature.  4. Marked mediastinal/perihilar lymphadenopathy, likely neoplastic.  5. Partially delineated supraclavicular lymphadenopathy.  6. Moderate pericardial effusion, potentially malignant.  7. Small bilateral pleural effusions are present with associated basilar atelectasis, with or without superimposed pneumonia.  8. Mild interlobular septal thickening is present and could be indicative of pulmonary interstitial edema or lymphangitic dissemination of malignancy.  9. Lesser incidental findings as above.    Dictated by (CST): Harpreet Jade MD on 4/11/2025 at 10:08 PM     Finalized by (CST): Harpreet Jade MD on 4/11/2025 at 10:20 PM          CT SOFT TISSUE OF NECK(CONTRAST ONLY) (CPT=70491)  Result Date: 4/11/2025  CONCLUSION:  1. Extensive lymphadenopathy, likely metastatic.  2. Lesser incidental findings as above.     Dictated by (CST): Harpreet Jade MD on 4/11/2025 at 9:59 PM     Finalized by (CST): Harpreet Jade MD on 4/11/2025 at 10:08 PM           Assessment and Plan:    Left upper lobe lung mass possible  malignancy  Pulmonary has been consulted IR for possible biopsy considering extensive lymphadenopathy.     Asthma exacerbation   Patient started on DuoNebs, will continue the same as needed.  Patient claims to be allergic to IV steroids, however willing to take oral steroids.  Continue home inhalers.    Dysphagia  Likely secondary to compression from mediastinal adenopathy, GI consulted for possible EGD.  Clears for now.    Prophylaxis  Subcutaneous heparin    CODE STATUS  Full    Primary care physician  Taylor Gallardo MD    MDM: High, acute illness/severe exacerbation of chronic illness posing threat to life.  IV medications requiring close inpatient monitoring  60 minutes spent on this admission - examining patient, obtaining history, reviewing previous medical records, going over test results/imaging and discussing plan of care. All questions answered.     Disposition  Clinical course will dictate outcome      JOSEPH MERCADO MD  4/11/2025  10:39 PM

## 2025-04-12 NOTE — PROGRESS NOTES
[facility]  part of [facility]    Progress Note    Kelli Fisher Patient Status:  Inpatient    1955 MRN [id number]   Location [facility] 4W/SW/SE Attending Jim Dai MD   Hosp Day # 0 PCP Taylor Gallardo MD     Chief Complaint:     Shortness of breath    Subjective:   Subjective:    Patient was seen and examined this morning  Tachycardic normotensive afebrile states shortness of breath this stable at this time denies any fevers or chest pain    Objective:   Blood pressure 128/69, pulse 110, temperature 98.6 °F (37 °C), temperature source Oral, resp. rate 26, height 5' (1.524 m), weight 162 lb 4.1 oz (73.6 kg), SpO2 99%.  Physical Exam    General: Patient is alert and oriented x3 does not appear to be in acute distress at this time  HEENT: EOMI PERRLA, atraumatic normocephalic  Cardiac: S1-S2 appreciated  Lungs: Good air entry bilaterally clear to auscultation  Abdomen: Soft nontender nondistended positive bowel sounds  Ext: Peripheral pulses are positive  Neuro: No focal deficits noted  Psych: Normal mood  Skin: No rashes noted  MSK: Full range of motion intact      Results:   Lab Results   Component Value Date    WBC 10.9 2025    HGB 8.2 (L) 2025    HCT 26.0 (L) 2025    .0 2025    CREATSERUM 0.74 2025    BUN 6 (L) 2025     2025    K 3.6 2025     2025    CO2 28.0 2025     (H) 2025    CA 8.8 2025    ALB 3.6 2025    ALKPHO 186 (H) 2025    BILT 0.8 2025    TP 7.2 2025    AST 25 2025    ALT 11 2025    PTT 35.9 2025    INR 1.28 (H) 2025    LIP 42 2023    TROPHS <3 2025       CT CHEST PE AORTA (IV ONLY) (CPT=71260)  Result Date: 2025  CONCLUSION:  1. No evidence of acute pulmonary embolism to the level of the first order subsegmental pulmonary artery branches.  2. Masslike opacity involving the left upper lobe,  concerning for potential neoplastic process.  3. Extensive bilateral nodules and micronodules, which could be metastatic in nature.  4. Marked mediastinal/perihilar lymphadenopathy, likely neoplastic.  5. Partially delineated supraclavicular lymphadenopathy.  6. Moderate pericardial effusion, potentially malignant.  7. Small bilateral pleural effusions are present with associated basilar atelectasis, with or without superimposed pneumonia.  8. Mild interlobular septal thickening is present and could be indicative of pulmonary interstitial edema or lymphangitic dissemination of malignancy.  9. Lesser incidental findings as above.    Dictated by (CST): Harpreet Jade MD on 4/11/2025 at 10:08 PM     Finalized by (CST): Harpreet Jade MD on 4/11/2025 at 10:20 PM          CT SOFT TISSUE OF NECK(CONTRAST ONLY) (CPT=70491)  Result Date: 4/11/2025  CONCLUSION:  1. Extensive lymphadenopathy, likely metastatic.  2. Lesser incidental findings as above.     Dictated by (CST): Harpreet Jade MD on 4/11/2025 at 9:59 PM     Finalized by (CST): Harpreet Jade MD on 4/11/2025 at 10:08 PM          EKG 12 Lead  Result Date: 4/12/2025  Sinus tachycardia Otherwise normal ECG No previous ECGs found in [place]      Assessment & Plan:       Left upper lobe lung mass possible malignancy  -Pulmonary has been consulted IR for possible biopsy considering extensive lymphadenopathy.   -Will continue monitor closely A-line as I do not     Asthma exacerbation   -Patient started on DuoNebs, will continue the same as needed.  Patient claims to be allergic to -IV steroids, however willing to take oral steroids - patient is allergic to steroids.       Dysphagia  -Likely secondary to compression from mediastinal adenopathy, GI consulted for possible EGD.    -Clears for now.     Prophylaxis  Subcutaneous heparin     CODE STATUS  Full     Primary care physician  Taylor Gallardo MD       Disposition  Clinical course will dictate outcome    Global A/P  -  On CT patient noted to have moderate pericardial effusion  - This could possibly be related to a malignancy  - No current signs of acute tamponade  - Echocardiogram currently pending  - Diffuse lymphadenopathy with lung mass, has been consulted oncology consulted.  - History of endometrial cancer stage IIIb  - prognosis guarded  -Reviewed previous consultant notes  -Reviewed CBC, BMP, Mag, and Phos  -Reviewed tests ordered  -Repeat labs in am  -MDM: High, severe exacerbation of chronic illness posing a threat to life. IV medications requiring close inpatient monitoring.         **Certification      PHYSICIAN Certification of Need for Inpatient Hospitalization - Initial Certification    Patient will require inpatient services that will reasonably be expected to span two midnight's based on the clinical documentation in H+P.   Based on patients current state of illness, I anticipate that, after discharge, patient will require TBD.      Jim Dai MD  4/12/2025

## 2025-04-12 NOTE — PLAN OF CARE
Kelli Fisher Patient Status:  Inpatient    1955 MRN [id number]   Location [facility] 4W/SW/SE Attending Carole Maza MD   Hosp Day # 0 PCP Taylor Gallardo MD       Cardiology Nocturnal APN Note    Page Received: Dr. Conde, ED Physician    HPI:     Patient is a 70 year old female with PMH of asthma, endometrial carcinoma s/p radiation, chemo, JOHNNA and BSO () who presented to the ED with c/o dyspnea and dysphagia. Pt had a recent admission at another other hospital and CT showed a lung mass and supraclavicular lymphadenopathy. Pt reported she is unable to swallow solid foods and feels like solid food is getting stuck in her throat. Pt reporting only being able to swallow soft foods/liquids. Pt also reported having dyspnea at rest and with exertion. On arrival to ED, pt was noted to be hypoxic and supplemental oxygen was applied. Pt denied chest pain. Cardiac work up in ED showed a moderate pericardial effusion-potentially malignant. No PE. Small bilateral pleural effusions. [facility] consulted for pericardial effusion. No previous cardiac testing available for review. HPI\ obtained from chart review and information provided by ED physician.       ED Clinical Course    EKG: Sinus tachycardia. No acute ischemic changes    Labs: Troponin negative, BNP negative, WBC 12.4, Hgb 8.5    Imaging: As noted above in HPI    Medications: Nebulizer treatment        Assessment/Plan:    - 2D echocardiogram pending  - Continue to monitor overnight on telemetry  - Formal cardiology consult to follow in AM.       ABBIE Perez  [facility] [place]  2025  4:24 AM

## 2025-04-12 NOTE — ED QUICK NOTES
Orders for admission, patient is aware of plan and ready to go upstairs. Any questions, please call ED RN Alverto at extension [phone].     Patient Covid vaccination status: Unvaccinated     COVID Test Ordered in ED: None    COVID Suspicion at Admission: N/A    Running Infusions: Medication Infusions[1] None    Mental Status/LOC at time of transport: AxOx4    Other pertinent information:   CIWA score: N/A   NIH score:  N/A             [1]

## 2025-04-13 LAB
ANION GAP SERPL CALC-SCNC: 6 MMOL/L (ref 0–18)
BASOPHILS # BLD AUTO: 0.03 X10(3) UL (ref 0–0.2)
BASOPHILS NFR BLD AUTO: 0.2 %
BUN BLD-MCNC: 10 MG/DL (ref 9–23)
BUN/CREAT SERPL: 13.2 (ref 10–20)
CALCIUM BLD-MCNC: 8.7 MG/DL (ref 8.7–10.4)
CHLORIDE SERPL-SCNC: 103 MMOL/L (ref 98–112)
CO2 SERPL-SCNC: 29 MMOL/L (ref 21–32)
CREAT BLD-MCNC: 0.76 MG/DL (ref 0.55–1.02)
DEPRECATED RDW RBC AUTO: 57.8 FL (ref 35.1–46.3)
EGFRCR SERPLBLD CKD-EPI 2021: 85 ML/MIN/1.73M2 (ref 60–?)
EOSINOPHIL # BLD AUTO: 0 X10(3) UL (ref 0–0.7)
EOSINOPHIL NFR BLD AUTO: 0 %
ERYTHROCYTE [DISTWIDTH] IN BLOOD BY AUTOMATED COUNT: 20.2 % (ref 11–15)
GLUCOSE BLD-MCNC: 135 MG/DL (ref 70–99)
HCT VFR BLD AUTO: 24.1 % (ref 35–48)
HGB BLD-MCNC: 7.8 G/DL (ref 12–16)
IMM GRANULOCYTES # BLD AUTO: 0.15 X10(3) UL (ref 0–1)
IMM GRANULOCYTES NFR BLD: 0.9 %
LYMPHOCYTES # BLD AUTO: 1.15 X10(3) UL (ref 1–4)
LYMPHOCYTES NFR BLD AUTO: 6.8 %
MAGNESIUM SERPL-MCNC: 2 MG/DL (ref 1.6–2.6)
MCH RBC QN AUTO: 25.3 PG (ref 26–34)
MCHC RBC AUTO-ENTMCNC: 32.4 G/DL (ref 31–37)
MCV RBC AUTO: 78.2 FL (ref 80–100)
MONOCYTES # BLD AUTO: 1.48 X10(3) UL (ref 0.1–1)
MONOCYTES NFR BLD AUTO: 8.7 %
NEUTROPHILS # BLD AUTO: 14.17 X10 (3) UL (ref 1.5–7.7)
NEUTROPHILS # BLD AUTO: 14.17 X10(3) UL (ref 1.5–7.7)
NEUTROPHILS NFR BLD AUTO: 83.4 %
OSMOLALITY SERPL CALC.SUM OF ELEC: 287 MOSM/KG (ref 275–295)
PLATELET # BLD AUTO: 401 10(3)UL (ref 150–450)
POTASSIUM SERPL-SCNC: 3.8 MMOL/L (ref 3.5–5.1)
RBC # BLD AUTO: 3.08 X10(6)UL (ref 3.8–5.3)
SODIUM SERPL-SCNC: 138 MMOL/L (ref 136–145)
WBC # BLD AUTO: 17 X10(3) UL (ref 4–11)

## 2025-04-13 PROCEDURE — 99232 SBSQ HOSP IP/OBS MODERATE 35: CPT | Performed by: INTERNAL MEDICINE

## 2025-04-13 PROCEDURE — 99233 SBSQ HOSP IP/OBS HIGH 50: CPT | Performed by: HOSPITALIST

## 2025-04-13 NOTE — PROGRESS NOTES
[facility]  part of [facility]    Progress Note      Assessment and Plan:   1.  Lung lesions and dyspnea syndrome-my strong suspicion is that the patient has metastatic neoplasm involving her chest with multiple pulmonary nodules and left upper lobe lung masses.  She was treated for the possibility of pneumonia and asthma; however, I suspect neoplasm likely her uterine carcinoma was the underlying etiology.  She is breathing a little bit better with the steroid today.  Will await the biopsy tomorrow.     Recommendations:  1.  Nebulizer as needed  2.  Needle aspirate of supraclavicular lymphadenopathy  3.  Arnuity Ellipta  4.  Steroid     2.  DVT prophylaxis-subcutaneous heparin     3.  History of adenocarcinoma the uterus        Subjective:   Kelli Fisher is a(n) 70 year old female who is breathing little bit more comfortably.  Complains of mucus in the throat.    Objective:   Blood pressure 121/81, pulse 96, temperature 98.4 °F (36.9 °C), temperature source Oral, resp. rate 18, height 5' (1.524 m), weight 162 lb 4.1 oz (73.6 kg), SpO2 95%.    Physical Exam alert black female  HEENT examination is unremarkable with pupils equal round and reactive to light and accommodation.   Neck without adenopathy, thyromegaly, JVD nor bruit.   Lungs clear to auscultation and percussion.  Cardiac regular rate and rhythm no murmur.   Abdomen nontender, without hepatosplenomegaly and no mass appreciable.   Extremities without clubbing cyanosis nor edema.   Neurologic grossly intact with symmetric tone and strength and reflex.  Skin without gross abnormality     Results:     Lab Results   Component Value Date    WBC 17.0 04/13/2025    HGB 7.8 04/13/2025    HCT 24.1 04/13/2025    .0 04/13/2025    CREATSERUM 0.76 04/13/2025    BUN 10 04/13/2025     04/13/2025    K 3.8 04/13/2025     04/13/2025    CO2 29.0 04/13/2025     04/13/2025    CA 8.7 04/13/2025    MG 2.0 04/13/2025       Srinath BISHOP  MD [place]  Medical Director, Critical Care, [facility]  Medical Director, [facility]  Pager: [phone]

## 2025-04-13 NOTE — PLAN OF CARE
Patient a+o x4, on RA. Ambulates x1 with walker. Voiding freely. Patient reporting loose, clear stools overnight. Saline locked. Denies pain or nausea. Tolerating full liquid diet. Remote tele in place, no calls. Call light in reach.     Problem: Patient Centered Care  Goal: Patient preferences are identified and integrated in the patient's plan of care  Description: Interventions:- What would you like us to know as we care for you?   - Provide timely, complete, and accurate information to patient/family- Incorporate patient and family knowledge, values, beliefs, and cultural backgrounds into the planning and delivery of care- Encourage patient/family to participate in care and decision-making at the level they choose- Honor patient and family perspectives and choices  Outcome: Progressing     Problem: Patient/Family Goals  Goal: Patient/Family Long Term Goal  Description: Patient's Long Term Goal: discharge home  Interventions:- - See additional Care Plan goals for specific interventions  Outcome: Progressing  Goal: Patient/Family Short Term Goal  Description: Patient's Short Term Goal: improve shortness of breath   Interventions: - See additional Care Plan goals for specific interventions  Outcome: Progressing     Problem: CARDIOVASCULAR - ADULT  Goal: Maintains optimal cardiac output and hemodynamic stability  Description: INTERVENTIONS:- Monitor vital signs, rhythm, and trends- Monitor for bleeding, hypotension and signs of decreased cardiac output- Evaluate effectiveness of vasoactive medications to optimize hemodynamic stability- Monitor arterial and/or venous puncture sites for bleeding and/or hematoma- Assess quality of pulses, skin color and temperature- Assess for signs of decreased coronary artery perfusion - ex. Angina- Evaluate fluid balance, assess for edema, trend weights  Outcome: Progressing  Goal: Absence of cardiac arrhythmias or at baseline  Description: INTERVENTIONS:- Continuous cardiac  monitoring, monitor vital signs, obtain 12 lead EKG if indicated- Evaluate effectiveness of antiarrhythmic and heart rate control medications as ordered- Initiate emergency measures for life threatening arrhythmias- Monitor electrolytes and administer replacement therapy as ordered  Outcome: Progressing     Problem: METABOLIC/FLUID AND ELECTROLYTES - ADULT  Goal: Glucose maintained within prescribed range  Description: INTERVENTIONS:- Monitor Blood Glucose as ordered- Assess for signs and symptoms of hyperglycemia and hypoglycemia- Administer ordered medications to maintain glucose within target range- Assess barriers to adequate nutritional intake and initiate nutrition consult as needed- Instruct patient on self management of diabetes  Outcome: Progressing  Goal: Electrolytes maintained within normal limits  Description: INTERVENTIONS:- Monitor labs and rhythm and assess patient for signs and symptoms of electrolyte imbalances- Administer electrolyte replacement as ordered- Monitor response to electrolyte replacements, including rhythm and repeat lab results as appropriate- Fluid restriction as ordered- Instruct patient on fluid and nutrition restrictions as appropriate  Outcome: Progressing  Goal: Hemodynamic stability and optimal renal function maintained  Description: INTERVENTIONS:- Monitor labs and assess for signs and symptoms of volume excess or deficit- Monitor intake, output and patient weight- Monitor urine specific gravity, serum osmolarity and serum sodium as indicated or ordered- Monitor response to interventions for patient's volume status, including labs, urine output, blood pressure (other measures as available)- Encourage oral intake as appropriate- Instruct patient on fluid and nutrition restrictions as appropriate  Outcome: Progressing

## 2025-04-13 NOTE — PROGRESS NOTES
[facility]  part of [facility]    Progress Note    Kelli Fisher Patient Status:  Inpatient    1955 MRN [id number]   Location [facility] 4W/SW/SE Attending Jim Dai MD   Hosp Day # 1 PCP Taylor Gallardo MD     Chief Complaint:     Shortness of breath    Subjective:   Subjective:    Less shortness of breath, await biopsy    Objective:   Blood pressure 115/74, pulse 104, temperature 97.9 °F (36.6 °C), temperature source Temporal, resp. rate 17, height 5' (1.524 m), weight 162 lb 4.1 oz (73.6 kg), SpO2 96%.  Physical Exam    General: Patient is alert and oriented x3 does not appear to be in acute distress at this time  HEENT: EOMI PERRLA, atraumatic normocephalic  Cardiac: S1-S2 appreciated  Lungs: Good air entry bilaterally clear to auscultation  Abdomen: Soft nontender nondistended positive bowel sounds  Ext: Peripheral pulses are positive  Neuro: No focal deficits noted  Psych: Normal mood  Skin: No rashes noted  MSK: Full range of motion intact      Results:   Lab Results   Component Value Date    WBC 17.0 (H) 2025    HGB 7.8 (L) 2025    HCT 24.1 (L) 2025    .0 2025    CREATSERUM 0.76 2025    BUN 10 2025     2025    K 3.8 2025     2025    CO2 29.0 2025     (H) 2025    CA 8.7 2025    ALB 3.6 2025    ALKPHO 186 (H) 2025    BILT 0.8 2025    TP 7.2 2025    AST 25 2025    ALT 11 2025    PTT 35.9 2025    INR 1.28 (H) 2025    LIP 42 2023    MG 2.0 2025    TROPHS <3 2025       CT CHEST PE AORTA (IV ONLY) (CPT=71260)  Result Date: 2025  CONCLUSION:  1. No evidence of acute pulmonary embolism to the level of the first order subsegmental pulmonary artery branches.  2. Masslike opacity involving the left upper lobe, concerning for potential neoplastic process.  3. Extensive bilateral nodules and micronodules, which  could be metastatic in nature.  4. Marked mediastinal/perihilar lymphadenopathy, likely neoplastic.  5. Partially delineated supraclavicular lymphadenopathy.  6. Moderate pericardial effusion, potentially malignant.  7. Small bilateral pleural effusions are present with associated basilar atelectasis, with or without superimposed pneumonia.  8. Mild interlobular septal thickening is present and could be indicative of pulmonary interstitial edema or lymphangitic dissemination of malignancy.  9. Lesser incidental findings as above.    Dictated by (CST): Harpreet Jade MD on 4/11/2025 at 10:08 PM     Finalized by (CST): Harpreet Jade MD on 4/11/2025 at 10:20 PM          CT SOFT TISSUE OF NECK(CONTRAST ONLY) (CPT=70491)  Result Date: 4/11/2025  CONCLUSION:  1. Extensive lymphadenopathy, likely metastatic.  2. Lesser incidental findings as above.     Dictated by (CST): Harpreet Jade MD on 4/11/2025 at 9:59 PM     Finalized by (CST): Harpreet Jade MD on 4/11/2025 at 10:08 PM          EKG 12 Lead  Result Date: 4/12/2025  Sinus tachycardia Otherwise normal ECG No previous ECGs found in [facility] Confirmed by DO Escobar Asif ([id number]) on 4/12/2025 6:07:02 PM      Assessment & Plan:       Left upper lobe lung mass possible malignancy  -Pulmonary has been consulted IR for possible biopsy considering extensive lymphadenopathy.   -IR consulted for biopsy     Asthma exacerbation   -Patient started on DuoNebs, will continue the same as needed.  Patient claims to be allergic to -IV steroids, however willing to take oral steroids; patient is allergic to steroids.    -Patient has tolerated prednisone     Dysphagia  -Likely secondary to compression from mediastinal adenopathy, GI consulted for possible EGD.    -Clears for now.     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    Prophylaxis  Subcutaneous heparin     CODE STATUS  Full     Primary care physician  Taylor Gallardo MD     Disposition  Clinical course will dictate outcome    Global A/P  - On CT patient noted to have moderate pericardial effusion  - This could possibly be related to a malignancy  - No current signs of acute tamponade  - Diffuse lymphadenopathy with lung mass, has been consulted oncology consulted.  - History of endometrial cancer stage IIIb  - prognosis guarded  -Reviewed previous consultant notes  -Reviewed CBC, BMP, Mag, and Phos  -Reviewed tests ordered  -Repeat labs in am  -MDM: High, severe exacerbation of chronic illness posing a threat to life. IV medications requiring close inpatient monitoring.     Patient will require inpatient services that will reasonably be expected to span two midnight's based on the clinical documentation in H+P.   Based on patients current state of illness, I anticipate that, after discharge, patient will require TBD.    Luis Hart MD

## 2025-04-14 ENCOUNTER — APPOINTMENT (OUTPATIENT)
Dept: INTERVENTIONAL RADIOLOGY/VASCULAR | Facility: HOSPITAL | Age: 70
End: 2025-04-14
Attending: CLINICAL NURSE SPECIALIST
Payer: MEDICARE

## 2025-04-14 LAB
ANION GAP SERPL CALC-SCNC: 8 MMOL/L (ref 0–18)
BUN BLD-MCNC: 9 MG/DL (ref 9–23)
BUN/CREAT SERPL: 11.5 (ref 10–20)
CALCIUM BLD-MCNC: 8.8 MG/DL (ref 8.7–10.4)
CHLORIDE SERPL-SCNC: 102 MMOL/L (ref 98–112)
CO2 SERPL-SCNC: 29 MMOL/L (ref 21–32)
CREAT BLD-MCNC: 0.78 MG/DL (ref 0.55–1.02)
DEPRECATED RDW RBC AUTO: 59.1 FL (ref 35.1–46.3)
EGFRCR SERPLBLD CKD-EPI 2021: 82 ML/MIN/1.73M2 (ref 60–?)
ERYTHROCYTE [DISTWIDTH] IN BLOOD BY AUTOMATED COUNT: 20.5 % (ref 11–15)
GLUCOSE BLD-MCNC: 125 MG/DL (ref 70–99)
HCT VFR BLD AUTO: 26.1 % (ref 35–48)
HGB BLD-MCNC: 8.3 G/DL (ref 12–16)
MCH RBC QN AUTO: 25.2 PG (ref 26–34)
MCHC RBC AUTO-ENTMCNC: 31.8 G/DL (ref 31–37)
MCV RBC AUTO: 79.1 FL (ref 80–100)
OSMOLALITY SERPL CALC.SUM OF ELEC: 288 MOSM/KG (ref 275–295)
PLATELET # BLD AUTO: 406 10(3)UL (ref 150–450)
POTASSIUM SERPL-SCNC: 3.8 MMOL/L (ref 3.5–5.1)
RBC # BLD AUTO: 3.3 X10(6)UL (ref 3.8–5.3)
SODIUM SERPL-SCNC: 139 MMOL/L (ref 136–145)
WBC # BLD AUTO: 16.5 X10(3) UL (ref 4–11)

## 2025-04-14 PROCEDURE — 99232 SBSQ HOSP IP/OBS MODERATE 35: CPT | Performed by: PHYSICIAN ASSISTANT

## 2025-04-14 PROCEDURE — 99233 SBSQ HOSP IP/OBS HIGH 50: CPT | Performed by: HOSPITALIST

## 2025-04-14 PROCEDURE — 07D63ZX EXTRACTION OF LEFT AXILLARY LYMPHATIC, PERCUTANEOUS APPROACH, DIAGNOSTIC: ICD-10-PCS | Performed by: RADIOLOGY

## 2025-04-14 PROCEDURE — 99232 SBSQ HOSP IP/OBS MODERATE 35: CPT | Performed by: REGISTERED NURSE

## 2025-04-14 RX ORDER — CALCIUM CARBONATE 500 MG/1
500 TABLET, CHEWABLE ORAL EVERY 6 HOURS PRN
Status: DISCONTINUED | OUTPATIENT
Start: 2025-04-14 | End: 2025-04-17

## 2025-04-14 RX ORDER — MIDAZOLAM HYDROCHLORIDE 1 MG/ML
INJECTION INTRAMUSCULAR; INTRAVENOUS
Status: DISCONTINUED
Start: 2025-04-14 | End: 2025-04-14 | Stop reason: WASHOUT

## 2025-04-14 RX ORDER — LIDOCAINE HYDROCHLORIDE 20 MG/ML
INJECTION, SOLUTION EPIDURAL; INFILTRATION; INTRACAUDAL; PERINEURAL
Status: DISCONTINUED
Start: 2025-04-14 | End: 2025-04-14 | Stop reason: WASHOUT

## 2025-04-14 NOTE — PROGRESS NOTES
Progress Note  Kelli Fisher Patient Status:  Inpatient    1955 MRN [id number]   Location [facility] 4W/SW/SE Attending Luis Hart MD   Hosp Day # 2 PCP Taylor Gallardo MD     Subjective:  Pt c/o SOB early this am, improved post neb treatment. Now breathing comfortably; denies CP, SOB, orthopnea.     Objective:  /76 (BP Location: Right arm)   Pulse 98   Temp 98.8 °F (37.1 °C) (Oral)   Resp 16   Ht 5' (1.524 m)   Wt 162 lb 4.1 oz (73.6 kg)   SpO2 94%   BMI 31.69 kg/m²     Telemetry: NSR    Intake/Output:    Intake/Output Summary (Last 24 hours) at 2025 1202  Last data filed at 2025 1730  Gross per 24 hour   Intake 350 ml   Output --   Net 350 ml       Last 3 Weights   25 0142 162 lb 4.1 oz (73.6 kg)   25 0134 162 lb 4.1 oz (73.6 kg)   25 1846 172 lb (78 kg)   23 1116 197 lb (89.4 kg)       Labs:  Recent Labs   Lab 25  0703 25  0606 25  0628   * 135* 125*   BUN 6* 10 9   CREATSERUM 0.74 0.76 0.78   EGFRCR 88 85 82   CA 8.8 8.7 8.8    138 139   K 3.6 3.8 3.8    103 102   CO2 28.0 29.0 29.0     Recent Labs   Lab 25  0703 25  0606 25  0628   RBC 3.39* 3.31* 3.08* 3.30*   HGB 8.5* 8.2* 7.8* 8.3*   HCT 26.7* 26.0* 24.1* 26.1*   MCV 78.8* 78.5* 78.2* 79.1*   MCH 25.1* 24.8* 25.3* 25.2*   MCHC 31.8 31.5 32.4 31.8   RDW 20.7* 20.5* 20.2* 20.5*   NEPRELIM 9.88* 8.61* 14.17*  --    WBC 12.4* 10.9 17.0* 16.5*   .0 417.0 401.0 406.0         Recent Labs   Lab 25   TROPHS <3       Diagnostics:  No results found.   Review of Systems   Cardiovascular:  Negative for chest pain, dyspnea on exertion and leg swelling.   Respiratory:  Positive for shortness of breath. Negative for cough.        Physical Exam:    Gen: alert, oriented x 3, NAD  Heent: pupils equal, reactive. Mucous membranes moist.   Neck: no jvd  Cardiac: regular rate and rhythm, normal S1,S2, no murmur, clicks, rub or  gallop  Lungs: CTA  Abd: soft, NT/ND +bs  Ext: no edema  Skin: Warm, dry  Neuro: No focal deficits      Medications:    Scheduled Medications[1]  Medication Infusions[2]      Assessment:  Pericardial Effusion  Moderate pericardial effusion noted on CT  Echo w/small pericardial effusion w/o evidence of hemodynamic compromise  Echo LVEF 50-55%, no RWMA  NATO Mass, Mediastial/Perihilar Lymphadenopathy - Concern for Malignancy  S/P Lymph node biopsy today w/IR  Hx Endometrial Cancer s/p JOHNNA, Radiation, Chemotherapy  Follows w/[facility] for oncology needs  Dysphagia  GI following  Chronic Anemia, Hx PRASANNA - Hgb stable    Plan:  Echo w/small pericardial effusion - no concern for hemodynamic compromise  Further recommendations per primary, pulm, GI  Cardiology will sign off at this time. Can F/U with [facility] as outpatient with Dr Grijalva in 3-4 weeks      Plan of care discussed with patient, RN.    Emily Ariel, APRN  4/14/2025  12:02 PM        ===============================================  Agree with findings, assessment and plan as outlined above.   Undergoing biopsy today.  TTE reviewed, insignificant volume of pericardial effusion.  CT study is not gated, will overestimate pericardial effusion size.  Rest of plan is as above.  I have personally performed the medical decision making in its entirety.   Javi Huerta DO           [1]    fluticasone furoate  1 puff Inhalation Daily    heparin  5,000 Units Subcutaneous Q12H    ipratropium-albuterol  3 mL Nebulization Q6H WA    pantoprazole  40 mg Oral QAM AC    levothyroxine  100 mcg Oral Before breakfast    predniSONE  40 mg Oral Daily with breakfast   [2]

## 2025-04-14 NOTE — PAYOR COMM NOTE
--------------  ADMISSION REVIEW     Payor: [employer] MEDICARE ADV PPO  Subscriber #:  [id number]  Authorization Number: [id number]    Admit date: 4/12/25  Admit time:  1:24 AM       REVIEW DOCUMENTATION:     ED Provider Notes        ED Provider Notes signed by Yara Conde MD at 4/11/2025 10:49 PM        Patient Seen in: [facility] Emergency Department    History     Chief Complaint   Patient presents with    Difficulty Breathing     Stated Complaint: IRVIN, weakness    Subjective:   HPI  70-year-old female with asthma, history of endometrial carcinoma status post radiation therapy, JOHNNA and BSO and adjuvant chemotherapy in 2024, with recent admission to outside hospital for difficulty breathing and CT imaging concerning for supraclavicular lymphadenopathy and lung mass, who now presents for evaluation of dyspnea and dysphagia.  She has been progressively more short of breath over the past 1 week.  Additionally she is no longer able to swallow solid foods and is only able to tolerate soft foods or liquids.  She feels that food is getting stuck in her chest.  Additionally her shortness of breath is now both with exertion and at rest.  It is not improving with albuterol.  She typically does not wear oxygen at home but was placed on supplemental oxygen in ED triage for hypoxia.  No recent fevers.  She was scheduled for lymph node biopsy next week.    Her daughter is at bedside and provides the history that she received 3 units PRBC transfusion earlier this month for severe anemia.    Objective:     Past Medical History:    Asthma (HCC)       Physical Exam     ED Triage Vitals   BP 04/11/25 1846 (!) 138/95   Pulse 04/11/25 1846 106   Resp 04/11/25 1846 24   Temp 04/11/25 1850 98.9 °F (37.2 °C)   Temp src 04/11/25 1850 Oral   SpO2 04/11/25 1846 99 %   O2 Device 04/11/25 1846 None (Room air)       Current Vitals:   Vital Signs  BP: 135/86  Pulse: 103  Resp: (!) 31  Temp: 98.9 °F (37.2 °C)  Temp src: Oral  MAP (mmHg):  99    Oxygen Therapy  SpO2: 94 %  O2 Device: Nasal cannula  O2 Flow Rate (L/min): 2 L/min        Physical Exam  Vitals and nursing note reviewed.   Constitutional:       Appearance: She is well-developed. She is ill-appearing.   HENT:      Head: Normocephalic and atraumatic.   Eyes:      Extraocular Movements: Extraocular movements intact.   Neck:      Comments: Palpable supraclavicular lymph node approximately 3 cm on the left side, also with anterior cervical adenopathy on the right side  Cardiovascular:      Rate and Rhythm: Normal rate and regular rhythm.      Heart sounds: Normal heart sounds.   Pulmonary:      Comments: Conversational dyspnea, scattered end expiratory wheezes  Abdominal:      General: There is no distension.      Palpations: Abdomen is soft.      Tenderness: There is no abdominal tenderness.   Musculoskeletal:         General: Normal range of motion.      Cervical back: Normal range of motion.   Skin:     General: Skin is warm.      Capillary Refill: Capillary refill takes less than 2 seconds.   Neurological:      Mental Status: She is alert.      Comments: No focal deficits       Differential diagnosis includes but is not limited to metastatic disease, primary lymphoma, esophageal metastasis, intrathoracic lymphadenopathy, PE, lymphangitic carcinomatosis, pneumonia  ED Course     Labs Reviewed   BASIC METABOLIC PANEL (8) - Abnormal; Notable for the following components:       Result Value    Glucose 119 (*)     All other components within normal limits   HEPATIC FUNCTION PANEL (7) - Abnormal; Notable for the following components:    Alkaline Phosphatase 186 (*)     All other components within normal limits   CBC WITH DIFFERENTIAL WITH PLATELET - Abnormal; Notable for the following components:    WBC 12.4 (*)     RBC 3.39 (*)     HGB 8.5 (*)     HCT 26.7 (*)     MCV 78.8 (*)     MCH 25.1 (*)     RDW-SD 58.6 (*)     RDW 20.7 (*)     Neutrophil Absolute Prelim 9.88 (*)     Neutrophil Absolute  9.88 (*)     Monocyte Absolute 1.29 (*)     All other components within normal limits   PROTHROMBIN TIME (PT) - Abnormal; Notable for the following components:    PT 16.7 (*)     INR 1.28 (*)     All other components within normal limits   TROPONIN I HIGH SENSITIVITY - Normal   PRO BETA NATRIURETIC PEPTIDE - Normal   PTT, ACTIVATED - Normal   TYPE AND SCREEN    Narrative:     The following orders were created for panel order Type and screen.  Procedure                               Abnormality         Status                     ---------                               -----------         ------                     ABORH (Blood Type)[[id number]]                               Final result               Antibody Screen[[id number]]                                  Final result                 Please view results for these tests on the individual orders.   ABORH (BLOOD TYPE)   ANTIBODY SCREEN     EKG    Rate, intervals and axes as noted on EKG Report.  Rate: 104  Rhythm: Sinus Rhythm  Reading: no STEMI, no ectopy, no prior EKG for comparison           ED Course as of 04/11/25 2249  ------------------------------------------------------------  Time: 04/11 2111  Comment: CBC with ongoing anemia  ------------------------------------------------------------  Time: 04/11 2111  Comment: BMP and LFTs unremarkable, troponin negative, proBNP normal                  CT CHEST PE AORTA (IV ONLY) (CPT=71260)  Result Date: 4/11/2025  CONCLUSION:  1. No evidence of acute pulmonary embolism to the level of the first order subsegmental pulmonary artery branches.  2. Masslike opacity involving the left upper lobe, concerning for potential neoplastic process.  3. Extensive bilateral nodules and micronodules, which could be metastatic in nature.  4. Marked mediastinal/perihilar lymphadenopathy, likely neoplastic.  5. Partially delineated supraclavicular lymphadenopathy.  6. Moderate pericardial effusion, potentially malignant.  7. Small  bilateral pleural effusions are present with associated basilar atelectasis, with or without superimposed pneumonia.  8. Mild interlobular septal thickening is present and could be indicative of pulmonary interstitial edema or lymphangitic dissemination of malignancy.  9. Lesser incidental findings as above.    Dictated by (CST): Harpreet Jade MD on 4/11/2025 at 10:08 PM     Finalized by (CST): Harpreet Jade MD on 4/11/2025 at 10:20 PM          CT SOFT TISSUE OF NECK(CONTRAST ONLY) (CPT=70491)  Result Date: 4/11/2025  CONCLUSION:  1. Extensive lymphadenopathy, likely metastatic.  2. Lesser incidental findings as above.     Dictated by (CST): Harpreet Jade MD on 4/11/2025 at 9:59 PM     Finalized by (CST): Harpreet Jade MD on 4/11/2025 at 10:08 PM                   [facility]        Admission disposition: 4/11/2025 10:36 PM         I spent a total of 45 minutes of critical care time in obtaining history, performing a physical exam, bedside monitoring of interventions, collecting and interpreting tests and discussion with consultants but not including time spent performing procedures.    Medical Decision Making  Patient placed on supplemental nasal cannula oxygen for hypoxia noted at triage.  Labs reviewed as above.  CT chest with concerns for left lung mass and possible lymphangitic dissemination, moderate pericardial effusion, and extensive lymphadenopathy.  Discussed these results with patient's family at bedside and explained that they are concerning for malignancy.  Patient will be admitted to the hospital for further management.    I notified GI, pulmonary, IR, cardiology for consults.  Discussed with hospitalist for admission.    Problems Addressed:  Acute hypoxic respiratory failure (HCC): complicated acute illness or injury with systemic symptoms that poses a threat to life or bodily functions  Dysphagia, unspecified type: undiagnosed new problem with uncertain prognosis  Lung mass: complicated acute illness  or injury with systemic symptoms that poses a threat to life or bodily functions  Pericardial effusion (HCC): complicated acute illness or injury with systemic symptoms that poses a threat to life or bodily functions  Shortness of breath: complicated acute illness or injury with systemic symptoms that poses a threat to life or bodily functions  Supraclavicular adenopathy: undiagnosed new problem with uncertain prognosis    Amount and/or Complexity of Data Reviewed  Independent Historian: caregiver     Details: History from daughter as above  External Data Reviewed: notes.     Details: Reviewed discharge summary from outside hospital from 3/25/2025: Describes previous treatment for left upper lobe opacity with azithromycin and doxycycline as an outpatient in March, followed by 1 dose of Levaquin in the [place] ED    Labs: ordered. Decision-making details documented in ED Course.  Radiology: ordered and independent interpretation performed. Decision-making details documented in ED Course.  ECG/medicine tests: ordered and independent interpretation performed. Decision-making details documented in ED Course.  Discussion of management or test interpretation with external provider(s): As above    Risk  Decision regarding hospitalization.      Disposition and Plan     Clinical Impression:  1. Shortness of breath    2. Acute hypoxic respiratory failure (HCC)    3. Lung mass    4. Supraclavicular adenopathy    5. Dysphagia, unspecified type    6. Pericardial effusion (HCC)         Disposition:  Admit  4/11/2025 10:36 pm     Hospital Problems       Present on Admission           ICD-10-CM Noted POA    * (Principal) Shortness of breath R06.02 4/11/2025 Unknown              Signed by Yara Conde MD on 4/11/2025 10:49 PM           History and Physical    H&P signed by Valentine Banks MD at 4/12/2025  5:48 AM    [facility]  part of [facility]     History & Physical  Date:  4/11/2025  Date of Admission:   4/11/2025     Chief Complaint:      Chief Complaint   Patient presents with    Difficulty Breathing         History of Present Illness:  Kelli Fisher is a(n) 70 year old female, with a past medical history significant for endometrial carcinoma, considered to be free of disease earlier in the year presents with increasing shortness of breath and difficulty swallowing for the past few days.  Recent workup indicated likelihood of pneumonia however possibility of malignancy was also entertained, patient was scheduled to see oncology and have a biopsy done however was not able to make the appointment.  Today comes in with difficulty swallowing solids, claims she is able to keep liquids down however feels like they stay in her stomach for too long.  Workup in ER indicates significant lymphadenopathy cervical and supraclavicular along with hilar and a large left lung mass as well.     Imaging:  CT CHEST PE AORTA (IV ONLY) (CPT=71260)  Result Date: 4/11/2025  CONCLUSION:  1. No evidence of acute pulmonary embolism to the level of the first order subsegmental pulmonary artery branches.  2. Masslike opacity involving the left upper lobe, concerning for potential neoplastic process.  3. Extensive bilateral nodules and micronodules, which could be metastatic in nature.  4. Marked mediastinal/perihilar lymphadenopathy, likely neoplastic.  5. Partially delineated supraclavicular lymphadenopathy.  6. Moderate pericardial effusion, potentially malignant.  7. Small bilateral pleural effusions are present with associated basilar atelectasis, with or without superimposed pneumonia.  8. Mild interlobular septal thickening is present and could be indicative of pulmonary interstitial edema or lymphangitic dissemination of malignancy.  9. Lesser incidental findings as above.    Dictated by (CST): Harpreet Jade MD on 4/11/2025 at 10:08 PM     Finalized by (CST): Harpreet Jade MD on 4/11/2025 at 10:20 PM           CT SOFT TISSUE OF  NECK(CONTRAST ONLY) (CPT=70491)  Result Date: 4/11/2025  CONCLUSION:  1. Extensive lymphadenopathy, likely metastatic.  2. Lesser incidental findings as above.     Dictated by (CST): Harpreet Jade MD on 4/11/2025 at 9:59 PM     Finalized by (CST): Harpreet Jade MD on 4/11/2025 at 10:08 PM            Assessment and Plan:     Left upper lobe lung mass possible malignancy  Pulmonary has been consulted IR for possible biopsy considering extensive lymphadenopathy.      Asthma exacerbation   Patient started on DuoNebs, will continue the same as needed.  Patient claims to be allergic to IV steroids, however willing to take oral steroids.  Continue home inhalers.     Dysphagia  Likely secondary to compression from mediastinal adenopathy, GI consulted for possible EGD.  Clears for now.     Prophylaxis  Subcutaneous heparin     CODE STATUS  Full     Primary care physician  Taylor Gallardo MD     MDM: High, acute illness/severe exacerbation of chronic illness posing threat to life.  IV medications requiring close inpatient monitoring  60 minutes spent on this admission - examining patient, obtaining history, reviewing previous medical records, going over test results/imaging and discussing plan of care. All questions answered.      Disposition  Clinical course will dictate outcome        JOSEPH BANKS MD  4/11/2025  10:39 PM            Signed by Joseph Banks MD on 4/12/2025  5:48 AM       CONSULT  Assessment & Plan   Kelli Fisher is a a(n) 70 year old female w/ active medical problems of asthma and history of endometrial cancer s/p JOHNNA, radiation & chemotherapy in 2024, who presents with shortness of breath & dysphagia.      #dysphagia  -solids/pills dysphagia x 4 days, no issue with liquids or soft foods.   -no obvious esophageal involvement on CT neck. Given the concerning CT findings--particularly the mass-like opacity, extensive pulmonary nodules, lymphadenopathy, and possible malignant effusions--further  evaluation by Pulmonology is recommended prior to proceeding with GI endoscopic procedures. These findings may impact both procedural risk and overall management strategy.  -per patient, plan for biopsy on Monday.   -can consider xray esophagus to assess for luminal abnormality, but suspect lung changes are etiology causing motility disturbance or extrinsic compression     #anemia  -hemoglobin stable today at 8.2, no overt GI bleeding  -iron low, % saturation WNL. TIBC low.   -ferritin elevated, likely as acute phase reactant  -no prior EGD/colonoscopy  -etiology possibly malignancy     Recommend:  -daily hemoglobin, goal >7  -monitor for any overt GI bleeding  -empiric treatment with PPI   -agree with pulm work up of lymphadenopathy  -soft diet okay from GI stance, DHT if needed  -defer endoscopic evaluation at this time     Margarette Farley APRTHEODORE    [facility] - Gastroenterology   04/12/25           CONSULT  Assessment/Plan:   1.  Lung lesions and dyspnea syndrome-my strong suspicion is that the patient has metastatic neoplasm involving her chest with multiple pulmonary nodules and left upper lobe lung masses.  She was treated for the possibility of pneumonia and asthma; however, I suspect neoplasm likely her uterine carcinoma was the underlying etiology.     Recommendations:  1.  Nebulizer as needed  2.  Needle aspirate of supraclavicular lymphadenopathy  3.  Arnuity Ellipta  4.  Steroid     2.  DVT prophylaxis-subcutaneous heparin     3.  History of adenocarcinoma the uterus     I am delighted to assist with Kelli's care.        Srinath Levy MD    CONSULT  Pericardial Effusion   - noted on CT to have moderate pericardial effusion  - No prior pericardial effusion history, no prior recent fevers infections or chills, no recent trauma.  Concern for possible malignancy in nature etiology.  - Hemodynamically stable, no acute signs of clinical tamponade.  - Will obtain echocardiogram for further evaluation of  pericardial effusion.  - Monitor closely for now.     Diffuse Lymphadenopathy   - found to have extensive mediastinal and perihilar lymphadenopathy with concern for malignancy given her history   - appreciate pulmonary and IR / oncology assistance in management and consideration for biopsy   - monitor clinically.      Lung mass  - pulmonary and IR consulted, oncology consulted, defer regarding further biopsy and management, concern given extensive oncology history.   - CT with masslike opacity in NATO  - appreciate other consultant management.      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      Dysphagia  - GI evaluation and oncology management as above.      Oniel Grijalva MD   Advanced Heart Failure and Transplant Cardiology     4/12         Date of Service: 4/12/2025 10:05 AM     Signed            [facility]  part of [facility] [employer]     Progress Note        Chief Complaint:      Shortness of breath     Subjective:   Subjective:     Patient was seen and examined this morning  Tachycardic normotensive afebrile states shortness of breath this stable at this time denies any fevers or chest pain     Objective:   Blood pressure 128/69, pulse 110, temperature 98.6 °F (37 °C), temperature source Oral, resp. rate 26, height 5' (1.524 m), weight 162 lb 4.1 oz (73.6 kg), SpO2 99%.  Physical Exam     General: Patient is alert and oriented x3 does not appear to be in acute distress at this time  HEENT: EOMI PERRLA, atraumatic normocephalic  Cardiac: S1-S2 appreciated  Lungs: Good air entry bilaterally clear to auscultation  Abdomen: Soft nontender nondistended positive bowel sounds  Ext: Peripheral pulses are positive  Neuro: No  focal deficits noted  Psych: Normal mood  Skin: No rashes noted  MSK: Full range of motion intact        Results:         Lab Results   Component Value Date     WBC 10.9 04/12/2025     HGB 8.2 (L) 04/12/2025     HCT 26.0 (L) 04/12/2025     .0 04/12/2025     CREATSERUM 0.74 04/12/2025     BUN 6 (L) 04/12/2025      04/12/2025     K 3.6 04/12/2025      04/12/2025     CO2 28.0 04/12/2025      (H) 04/12/2025     CA 8.8 04/12/2025     ALB 3.6 04/11/2025     ALKPHO 186 (H) 04/11/2025     BILT 0.8 04/11/2025     TP 7.2 04/11/2025     AST 25 04/11/2025     ALT 11 04/11/2025     PTT 35.9 04/11/2025     INR 1.28 (H) 04/11/2025     LIP 42 12/11/2023     TROPHS <3 04/11/2025         CT CHEST PE AORTA (IV ONLY) (CPT=71260)  Result Date: 4/11/2025  CONCLUSION:  1. No evidence of acute pulmonary embolism to the level of the first order subsegmental pulmonary artery branches.  2. Masslike opacity involving the left upper lobe, concerning for potential neoplastic process.  3. Extensive bilateral nodules and micronodules, which could be metastatic in nature.  4. Marked mediastinal/perihilar lymphadenopathy, likely neoplastic.  5. Partially delineated supraclavicular lymphadenopathy.  6. Moderate pericardial effusion, potentially malignant.  7. Small bilateral pleural effusions are present with associated basilar atelectasis, with or without superimposed pneumonia.  8. Mild interlobular septal thickening is present and could be indicative of pulmonary interstitial edema or lymphangitic dissemination of malignancy.  9. Lesser incidental findings as above.    Dictated by (CST): Harpreet Jade MD on 4/11/2025 at 10:08 PM     Finalized by (CST): Harpreet Jdae MD on 4/11/2025 at 10:20 PM           CT SOFT TISSUE OF NECK(CONTRAST ONLY) (CPT=70491)  Result Date: 4/11/2025  CONCLUSION:  1. Extensive lymphadenopathy, likely metastatic.  2. Lesser incidental findings as above.     Dictated by (CST): Harpreet Jade,  MD on 4/11/2025 at 9:59 PM     Finalized by (CST): Harpreet Jade MD on 4/11/2025 at 10:08 PM           EKG 12 Lead  Result Date: 4/12/2025  Sinus tachycardia Otherwise normal ECG No previous ECGs found in [facility]        Assessment & Plan:       Left upper lobe lung mass possible malignancy  -Pulmonary has been consulted IR for possible biopsy considering extensive lymphadenopathy.   -Will continue monitor closely A-line as I do not     Asthma exacerbation   -Patient started on DuoNebs, will continue the same as needed.  Patient claims to be allergic to -IV steroids, however willing to take oral steroids - patient is allergic to steroids.       Dysphagia  -Likely secondary to compression from mediastinal adenopathy, GI consulted for possible EGD.    -Clears for now.     Prophylaxis  Subcutaneous heparin     CODE STATUS  Full     Primary care physician  Taylor Gallardo MD        Disposition  Clinical course will dictate outcome     Global A/P  - On CT patient noted to have moderate pericardial effusion  - This could possibly be related to a malignancy  - No current signs of acute tamponade  - Echocardiogram currently pending  - Diffuse lymphadenopathy with lung mass, has been consulted oncology consulted.  - History of endometrial cancer stage IIIb  - prognosis guarded  -Reviewed previous consultant notes  -Reviewed CBC, BMP, Mag, and Phos  -Reviewed tests ordered  -Repeat labs in am  -MDM: High, severe exacerbation of chronic illness posing a threat to life. IV medications requiring close inpatient monitoring.            **Certification        PHYSICIAN Certification of Need for Inpatient Hospitalization - Initial Certification     Patient will require inpatient services that will reasonably be expected to span two midnight's based on the clinical documentation in H+P.   Based on patients current state of illness, I anticipate that, after discharge, patient will require TBD.        Jim Dai MD  4/12/2025                        4/13      Date of Service: 4/13/2025  8:25 AM     Signed            [facility]  part of [facility]     Progress Note        Chief Complaint:      Shortness of breath     Subjective:   Subjective:     Less shortness of breath, await biopsy     Objective:   Blood pressure 115/74, pulse 104, temperature 97.9 °F (36.6 °C), temperature source Temporal, resp. rate 17, height 5' (1.524 m), weight 162 lb 4.1 oz (73.6 kg), SpO2 96%.  Physical Exam     General: Patient is alert and oriented x3 does not appear to be in acute distress at this time  HEENT: EOMI PERRLA, atraumatic normocephalic  Cardiac: S1-S2 appreciated  Lungs: Good air entry bilaterally clear to auscultation  Abdomen: Soft nontender nondistended positive bowel sounds  Ext: Peripheral pulses are positive  Neuro: No focal deficits noted  Psych: Normal mood  Skin: No rashes noted  MSK: Full range of motion intact        Results:         Lab Results   Component Value Date     WBC 17.0 (H) 04/13/2025     HGB 7.8 (L) 04/13/2025     HCT 24.1 (L) 04/13/2025     .0 04/13/2025     CREATSERUM 0.76 04/13/2025     BUN 10 04/13/2025      04/13/2025     K 3.8 04/13/2025      04/13/2025     CO2 29.0 04/13/2025      (H) 04/13/2025     CA 8.7 04/13/2025     ALB 3.6 04/11/2025     ALKPHO 186 (H) 04/11/2025     BILT 0.8 04/11/2025     TP 7.2 04/11/2025     AST 25 04/11/2025     ALT 11 04/11/2025     PTT 35.9 04/11/2025     INR 1.28 (H) 04/11/2025     LIP 42 12/11/2023     MG 2.0 04/13/2025     TROPHS <3 04/11/2025         CT CHEST PE AORTA (IV ONLY) (CPT=71260)  Result Date: 4/11/2025  CONCLUSION:  1. No evidence of acute pulmonary embolism to the level of the first order subsegmental pulmonary artery branches.  2. Masslike opacity involving the left upper lobe, concerning for potential neoplastic process.  3. Extensive bilateral nodules and micronodules, which could be metastatic in nature.  4. Marked  mediastinal/perihilar lymphadenopathy, likely neoplastic.  5. Partially delineated supraclavicular lymphadenopathy.  6. Moderate pericardial effusion, potentially malignant.  7. Small bilateral pleural effusions are present with associated basilar atelectasis, with or without superimposed pneumonia.  8. Mild interlobular septal thickening is present and could be indicative of pulmonary interstitial edema or lymphangitic dissemination of malignancy.  9. Lesser incidental findings as above.    Dictated by (CST): Harpreet Jade MD on 4/11/2025 at 10:08 PM     Finalized by (CST): Harpreet Jade MD on 4/11/2025 at 10:20 PM           CT SOFT TISSUE OF NECK(CONTRAST ONLY) (CPT=70491)  Result Date: 4/11/2025  CONCLUSION:  1. Extensive lymphadenopathy, likely metastatic.  2. Lesser incidental findings as above.     Dictated by (CST): Harpreet Jade MD on 4/11/2025 at 9:59 PM     Finalized by (CST): Harpreet Jade MD on 4/11/2025 at 10:08 PM           EKG 12 Lead  Result Date: 4/12/2025  Sinus tachycardia Otherwise normal ECG No previous ECGs found in [facility] Confirmed by DO Escobar Asif ([id number]) on 4/12/2025 6:07:02 PM        Assessment & Plan:       Left upper lobe lung mass possible malignancy  -Pulmonary has been consulted IR for possible biopsy considering extensive lymphadenopathy.   -IR consulted for biopsy     Asthma exacerbation   -Patient started on DuoNebs, will continue the same as needed.  Patient claims to be allergic to -IV steroids, however willing to take oral steroids; patient is allergic to steroids.    -Patient has tolerated prednisone     Dysphagia  -Likely secondary to compression from mediastinal adenopathy, GI consulted for possible EGD.    -Clears for now.     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     Prophylaxis  Subcutaneous heparin     CODE STATUS  Full     Primary care physician  Taylor Gallardo MD     Disposition  Clinical course will dictate outcome     Global A/P  - On CT patient noted to have moderate pericardial effusion  - This could possibly be related to a malignancy  - No current signs of acute tamponade  - Diffuse lymphadenopathy with lung mass, has been consulted oncology consulted.  - History of endometrial cancer stage IIIb  - prognosis guarded  -Reviewed previous consultant notes  -Reviewed CBC, BMP, Mag, and Phos  -Reviewed tests ordered  -Repeat labs in am  -MDM: High, severe exacerbation of chronic illness posing a threat to life. IV medications requiring close inpatient monitoring.      Patient will require inpatient services that will reasonably be expected to span two midnight's based on the clinical documentation in H+P.   Based on patients current state of illness, I anticipate that, after discharge, patient will require TBD.     Luis Hart MD                          4/14          Date of Service: 4/14/2025  9:13 AM     Signed            [facility]  part of [facility]     Progress Note        Chief Complaint:      Shortness of breath     Subjective:   Subjective:     Patient is off floor for biopsy this morning.     Objective:   Blood pressure 115/79, pulse 106, temperature 98.8 °F (37.1 °C), temperature source Oral, resp. rate 17, height 5' (1.524 m), weight 162 lb 4.1 oz (73.6 kg), SpO2 94%.        Results:         Lab Results   Component Value Date     WBC 16.5 (H) 04/14/2025     HGB 8.3 (L) 04/14/2025     HCT 26.1 (L) 04/14/2025     .0 04/14/2025     CREATSERUM 0.78 04/14/2025     BUN 9 04/14/2025      04/14/2025     K 3.8 04/14/2025      04/14/2025     CO2 29.0 04/14/2025      (H) 04/14/2025     CA 8.8 04/14/2025      ALB 3.6 04/11/2025     ALKPHO 186 (H) 04/11/2025     BILT 0.8 04/11/2025     TP 7.2 04/11/2025     AST 25 04/11/2025     ALT 11 04/11/2025     PTT 35.9 04/11/2025     INR 1.28 (H) 04/11/2025     LIP 42 12/11/2023     MG 2.0 04/13/2025     TROPHS <3 04/11/2025         No results found.               Assessment & Plan:       Left upper lobe lung mass possible malignancy  -Pulmonary has been consulted IR for possible biopsy considering extensive lymphadenopathy.   -IR consulted for biopsy     Asthma exacerbation   -Patient started on DuoNebs, will continue the same as needed.  Patient claims to be allergic to -IV steroids, however willing to take oral steroids; patient is allergic to steroids.    -Patient has tolerated prednisone     Dysphagia  -Likely secondary to compression from mediastinal adenopathy, GI consulted for possible EGD.    -Clears for now.     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     Prophylaxis  Subcutaneous heparin     CODE STATUS  Full     Primary care physician  Taylor Gallardo MD     Disposition  Clinical course will dictate outcome     Global A/P  - On CT patient noted to have moderate pericardial effusion  - This could possibly be related to a malignancy  - No current signs of acute tamponade  - Diffuse lymphadenopathy with lung mass, has been consulted oncology consulted.  - History of endometrial cancer stage IIIb  - prognosis guarded  -Reviewed previous consultant notes  -Reviewed CBC, BMP, Mag, and Phos  -Reviewed tests ordered  -Repeat labs in am  -MDM: High, severe exacerbation of chronic illness posing a threat to life. IV medications requiring close inpatient monitoring.       Patient will require inpatient services that will reasonably be expected to span two midnight's based on the clinical documentation in H+P.   Based on patients current state of illness, I anticipate that, after discharge, patient will require TBD.     Luis Hart MD                          Vitals (last day)       Date/Time Temp Pulse Resp BP SpO2 Weight O2 Device O2 Flow Rate (L/min) [facility]    04/14/25 1202 -- -- -- -- -- 169 lb (76.7 kg) -- -- CV    04/14/25 1002 -- 98 16 123/76 94 % -- None (Room air) -- BG    04/14/25 0653 -- -- -- -- 94 % -- None (Room air) -- KB    04/14/25 0559 -- -- -- 115/79 88 % -- None (Room air) -- KT    04/14/25 0511 98.8 °F (37.1 °C) 106 17 119/83 92 % -- None (Room air) -- KT    04/13/25 2251 -- 95 -- -- -- -- -- -- GT    04/13/25 2222 -- -- -- -- 96 % -- None (Room air) -- KT    04/13/25 2006 98.5 °F (36.9 °C) 103 17 116/77 93 % -- None (Room air) -- KT    04/13/25 1903 -- 94 -- -- -- -- -- -- GT    04/13/25 1205 -- 96 -- -- -- -- -- -- RW    04/13/25 1141 98.4 °F (36.9 °C) 102 18 121/81 95 % -- None (Room air) -- BS    04/13/25 0436 97.9 °F (36.6 °C) 104 17 115/74 96 % -- None (Room air) -- KB    04/13/25 0300 -- 88 -- -- -- -- -- -- GT            Medication Administration Report  for Phillip Kelli as of 04/05/25 through 04/14/25  Legend:       Medications 04/05 04/06 04/07 04/08 04/09 04/10 04/11 04/12 04/13 04/14                             calcium carbonate (Tums) chewable tab 500 mg  Dose: 500 mg  Freq: Every 6 hours PRN Route: OR  PRN Reasons: Indigestion,Heartburn  Start: 04/14/25 0540             [id number]        fluticasone furoate (Arnuity Ellipta) 200 MCG/ACT inhaler 1 puff  Dose: 1 puff  Freq: Daily Route: IN  Start: 04/12/25 0930   Admin Instructions:   RN TO ADMINISTER  RT To Administer First Dose.           [id number]      [id number]      18408        guaiFENesin-codeine (Robitussin AC) 100-10 MG/5ML oral solution 5 mL  Dose: 5 mL  Freq: Every 4 hours PRN Route: OR  PRN Reason:  cough  Start: 04/12/25 0124             99092        heparin (Porcine) 5000 UNIT/ML injection 5,000 Units  Dose: 5,000 Units  Freq: Every 12 hours Route: SC  Start: 04/12/25 0900           0900 [C]6     43603      29947     18787      (0900) [C]10     2100                                  ipratropium-albuterol (Duoneb) 0.5-2.5 (3) MG/3ML inhalation solution 3 mL  Dose: 3 mL  Freq: Every 6 hours while awake (RT) Route: Nebulization  Start: 04/12/25 0900   Order specific questions:              [id number]     [id number]     [id number]      479660     530312     057006      17     [id number]     2000        ipratropium-albuterol (Duoneb) 0.5-2.5 (3) MG/3ML inhalation solution 3 mL  Dose: 3 mL  Freq: Every 6 hours PRN Route: Nebulization  PRN Reason: Wheezing  Start: 04/12/25 0124   Order specific questions:                [id number]        levothyroxine (Synthroid) tab 100 mcg  Dose: 100 mcg  Freq: Before breakfast Route: OR  Indications of Use: HYPOTHYROIDISM  Start: 04/12/25 0700   Admin Instructions:   Give on an empty stomach. Hold tube feedings 1 hour before AND after.           [id number]      [id number]      [id number]                     pantoprazole (Protonix) DR tab 40 mg  Dose: 40 mg  Freq: Every morning before breakfast Route: OR  Start: 04/12/25 0700   Admin Instructions:   Do not crush           [id number]      [id number]      (0700) [C]25     [id number]        predniSONE (Deltasone) tab 40 mg  Dose: 40 mg  Freq: Daily with breakfast Route: OR  Start: 04/12/25 0800           [id number]      970708      992082                     Completed Medications   Medications 04/05 04/06 04/07 04/08 04/09 04/10 04/11 04/12 04/13 04/14   iopamidol 76% (ISOVUE-370) injection for power injector  Dose: 80 mL  Freq: IMG once as needed Route: IV  PRN Reason: contrast  Start: 04/11/25 2142 End: 04/11/25 2142 [id number]           ipratropium-albuterol (Duoneb) 0.5-2.5 (3) MG/3ML inhalation solution 3 mL  Dose: 3 mL  Freq: Once Route: Nebulization  Start: 04/11/25  1931 End: 04/11/25 1947   Order specific questions:             [id number]

## 2025-04-14 NOTE — BRIEF PROCEDURE NOTE
Patient in holding room 12. Patient A+Ox4, MD to bedside to confirm consent and explain procedure. Patient lying supine, draped in sterile fashion. Patient received 6mL of Lidocaine for local anesthetic. MD obtained sample via 4 passes, samples collected and confirmed by Cytology. Site covered in sterile bandage. Site clean, dry, and intact without signs of bleeding or hematoma. Patient tolerated procedure well. Report given to Mary TOLLIVER.

## 2025-04-14 NOTE — PLAN OF CARE
Patient alert, oriented, up with stand by assistance, no c/o pain, lymph node biopsy done in am, site is intact, family at bedside for most of the day, continue prednisone PO.  Problem: Patient Centered Care  Goal: Patient preferences are identified and integrated in the patient's plan of care  Description: Interventions:- What would you like us to know as we care for you?   - Provide timely, complete, and accurate information to patient/family- Incorporate patient and family knowledge, values, beliefs, and cultural backgrounds into the planning and delivery of care- Encourage patient/family to participate in care and decision-making at the level they choose- Honor patient and family perspectives and choices  Outcome: Progressing     Problem: Patient/Family Goals  Goal: Patient/Family Long Term Goal  Description: Patient's Long Term Goal: discharge home  Interventions:- - See additional Care Plan goals for specific interventions  Outcome: Progressing  Goal: Patient/Family Short Term Goal  Description: Patient's Short Term Goal: improve shortness of breath   Interventions: - See additional Care Plan goals for specific interventions  Outcome: Progressing

## 2025-04-14 NOTE — PROGRESS NOTES
[facility]  part of [facility]    Progress Note    Kelli Fisher Patient Status:  Inpatient    1955 MRN [id number]   Location [facility] 4W/SW/SE Attending Luis Hart MD   Hosp Day # 2 PCP Taylor Gallardo MD     Subjective:   Seen and examined while resting in bed. Daughter at bedside. Had shortness of breath this morning which resolved with nebulizer treatment. Has cough with clear phlegm and wheezing. No fever or chills. On room air.    Objective:   Blood pressure 115/79, pulse 106, temperature 98.8 °F (37.1 °C), temperature source Oral, resp. rate 17, height 5' (1.524 m), weight 162 lb 4.1 oz (73.6 kg), SpO2 94%.  Physical Exam  Vitals and nursing note reviewed.   Constitutional:       General: She is awake. She is not in acute distress.  HENT:      Head: Normocephalic and atraumatic.   Neck:      Comments: Left supraclavicular lymphadenopathy  Pulmonary:      Effort: Pulmonary effort is normal. No respiratory distress.      Breath sounds: No wheezing, rhonchi or rales.   Abdominal:      General: There is no distension.      Palpations: Abdomen is soft.      Tenderness: There is no abdominal tenderness.   Musculoskeletal:      Cervical back: Normal range of motion and neck supple.      Right lower leg: No edema.      Left lower leg: No edema.   Lymphadenopathy:      Cervical: Cervical adenopathy present.   Skin:     General: Skin is warm and dry.   Neurological:      General: No focal deficit present.      Mental Status: She is alert and oriented to person, place, and time.   Psychiatric:         Mood and Affect: Mood normal.         Behavior: Behavior is cooperative.       Results:   Lab Results   Component Value Date    WBC 16.5 (H) 2025    HGB 8.3 (L) 2025    HCT 26.1 (L) 2025    .0 2025    CREATSERUM 0.78 2025    BUN 9 2025     2025    K 3.8 2025     2025    CO2 29.0 2025     (H)  04/14/2025    CA 8.8 04/14/2025    ALB 3.6 04/11/2025    ALKPHO 186 (H) 04/11/2025    BILT 0.8 04/11/2025    TP 7.2 04/11/2025    AST 25 04/11/2025    ALT 11 04/11/2025    PTT 35.9 04/11/2025    INR 1.28 (H) 04/11/2025    LIP 42 12/11/2023    MG 2.0 04/13/2025    TROPHS <3 04/11/2025     CTA chest 4/11/2025:  1. No evidence of acute pulmonary embolism to the level of the first order subsegmental pulmonary artery branches.      2. Masslike opacity involving the left upper lobe, concerning for potential neoplastic process.      3. Extensive bilateral nodules and micronodules, which could be metastatic in nature.      4. Marked mediastinal/perihilar lymphadenopathy, likely neoplastic.      5. Partially delineated supraclavicular lymphadenopathy.      6. Moderate pericardial effusion, potentially malignant.      7. Small bilateral pleural effusions are present with associated basilar atelectasis, with or without superimposed pneumonia.      8. Mild interlobular septal thickening is present and could be indicative of pulmonary interstitial edema or lymphangitic dissemination of malignancy.      9. Lesser incidental findings as above.     Assessment & Plan:   Dyspnea  Pulmonary nodules and NATO lung mass  Likely due to metastases with h/o endometrial cancer  CTA chest 4/11 with multiple pulmonary nodules, NATO mass, mediastinal lymphadenopathy  Recently treated for possibility of pneumonia (s/p doxy, azithro) and asthma exacerbation  Breathing is improved with steroid and nebs  On room air  Plan:  -Await pathology from lymph node biopsy  -DuoNebs  -Prednisone  -Arnuity    History of endometrial cancer  Follows with gyne/onc at [facility]  Plan:  -Follow up gyne/onc after discharge  -Anticipate outpatient PET/CT    Anemia  History of iron deficiency  Recent admission for worsening anemia s/p pRBCs  Hemoglobin stable  Plan:  -Follow hemoglobin    DVT prophylaxis: Subcutaneous heparin    Joselo Rodrigues PA-C  Pulmonary  Medicine  4/14/2025

## 2025-04-14 NOTE — PROGRESS NOTES
[facility]     Gastroenterology Progress Note    Kelli Fisher Patient Status:  Inpatient    1955 MRN [id number]   Location [facility] 4W/SW/SE Attending Luis Hart MD   Hosp Day # 2 PCP Taylor Gallardo MD       Subjective:   Pt's daughter at bedside throughout visit  Pt sitting up in chair, had corn flakes, milk and a banana with breakfast  No painful or difficult swallowing with breakfast or meds, RN confirms no issues  No fever, chills  Complains that she has pain in her neck and clavicle region when coughing or turning her head  No fever, chills  No SOB or chest pain.  Objective:   Blood pressure 115/79, pulse 106, temperature 98.8 °F (37.1 °C), temperature source Oral, resp. rate 17, height 5' (1.524 m), weight 162 lb 4.1 oz (73.6 kg), SpO2 94%. Body mass index is 31.69 kg/m².    Gen: awake, alert patient, NAD  HEENT: EOMI, the sclera appears anicteric, oropharynx clear, mucus membranes appear moist, large lump L neck  CV: RRR  Lung: no conversational dyspnea   Abdomen: soft NTND abdomen with NABS appreciated   Skin: dry, warm, no jaundice  Ext: no LE edema is evident  Neuro: Alert, oriented x4 and interactive  Psych: calm, cooperative    Assessment and Plan:   Kelli Fisher is a a(n) 70 year old female w/ active medical problems of asthma and history of endometrial cancer s/p JOHNNA, radiation & chemotherapy in , who presents with shortness of breath & dysphagia.      #Dysphagia, resolved  -solids/pills dysphagia x 4 days, no issue with liquids or soft foods.   -no obvious esophageal involvement on CT neck. Given the concerning CT findings--particularly the mass-like opacity, extensive pulmonary nodules, lymphadenopathy, and possible malignant effusions--further evaluation by Pulmonology is recommended prior to proceeding with GI endoscopic procedures. These findings may impact both procedural risk and overall management strategy.  -s/p lymph node biopsy today, awaiting  path result     #Anemia  -hemoglobin stable today at 8.2, no overt GI bleeding  -iron low, % saturation WNL. TIBC low.   -ferritin elevated, likely as acute phase reactant  -no prior EGD/colonoscopy  -Given possible malignancy with new lymphadenopathy, likely source of anemia.  Conservative management at this time with IV IR and trending CBC     Recommend:  -Continue soft diet as tolerated  -daily hemoglobin, goal >7  -monitor for any overt GI bleeding  -empiric treatment with PPI   -agree with pulm work up of lymphadenopathy  -defer endoscopic evaluation at this time    GI available as needed.  Please call with further questions or concerns.    Case discussed with Tigist Garcia MD and Carmencita TOLLIVER.    Anahi Stewart DNP, FNP-BC  [facility]  4/14/2025      Results:     Lab Results   Component Value Date    WBC 16.5 (H) 04/14/2025    HGB 8.3 (L) 04/14/2025    HCT 26.1 (L) 04/14/2025    .0 04/14/2025    CREATSERUM 0.78 04/14/2025    BUN 9 04/14/2025     04/14/2025    K 3.8 04/14/2025     04/14/2025    CO2 29.0 04/14/2025     (H) 04/14/2025    CA 8.8 04/14/2025    ALB 3.6 04/11/2025    ALKPHO 186 (H) 04/11/2025    BILT 0.8 04/11/2025    TP 7.2 04/11/2025    AST 25 04/11/2025    ALT 11 04/11/2025    PTT 35.9 04/11/2025    INR 1.28 (H) 04/11/2025    LIP 42 12/11/2023    MG 2.0 04/13/2025       No results found.

## 2025-04-14 NOTE — PROGRESS NOTES
[facility]  part of [facility]    Progress Note    Kelli Fisher Patient Status:  Inpatient    1955 MRN [id number]   Location [facility] 4W/SW/SE Attending Jim Dai MD   Hosp Day # 2 PCP Taylor Gallardo MD     Chief Complaint:     Shortness of breath    Subjective:   Subjective:    Patient is off floor for biopsy this morning.    Objective:   Blood pressure 115/79, pulse 106, temperature 98.8 °F (37.1 °C), temperature source Oral, resp. rate 17, height 5' (1.524 m), weight 162 lb 4.1 oz (73.6 kg), SpO2 94%.      Results:   Lab Results   Component Value Date    WBC 16.5 (H) 2025    HGB 8.3 (L) 2025    HCT 26.1 (L) 2025    .0 2025    CREATSERUM 0.78 2025    BUN 9 2025     2025    K 3.8 2025     2025    CO2 29.0 2025     (H) 2025    CA 8.8 2025    ALB 3.6 2025    ALKPHO 186 (H) 2025    BILT 0.8 2025    TP 7.2 2025    AST 25 2025    ALT 11 2025    PTT 35.9 2025    INR 1.28 (H) 2025    LIP 42 2023    MG 2.0 2025    TROPHS <3 2025       No results found.            Assessment & Plan:       Left upper lobe lung mass possible malignancy  -Pulmonary has been consulted IR for possible biopsy considering extensive lymphadenopathy.   -IR consulted for biopsy     Asthma exacerbation   -Patient started on DuoNebs, will continue the same as needed.  Patient claims to be allergic to -IV steroids, however willing to take oral steroids; patient is allergic to steroids.    -Patient has tolerated prednisone     Dysphagia  -Likely secondary to compression from mediastinal adenopathy, GI consulted for possible EGD.    -Clears for now.     Pericardial effusion  -CT showed moderate pericardial effusion  -Echo showed small pericardial effusion   -Cards on consult    Endometrial cancer   -stage IIIb clear-cell and serous endometrial  cancer status post neoadjuvant EBRT and brachytherapy with radiation oncology, RA TLH, BSO in May 2024 and adjuvant chemotherapy with 4 cycles of carboplatin.    Prophylaxis  Subcutaneous heparin     CODE STATUS  Full     Primary care physician  Taylor Gallardo MD     Disposition  Clinical course will dictate outcome    Global A/P  - On CT patient noted to have moderate pericardial effusion  - This could possibly be related to a malignancy  - No current signs of acute tamponade  - Diffuse lymphadenopathy with lung mass, has been consulted oncology consulted.  - History of endometrial cancer stage IIIb  - prognosis guarded  -Reviewed previous consultant notes  -Reviewed CBC, BMP, Mag, and Phos  -Reviewed tests ordered  -Repeat labs in am  -MDM: High, severe exacerbation of chronic illness posing a threat to life. IV medications requiring close inpatient monitoring.     Patient will require inpatient services that will reasonably be expected to span two midnight's based on the clinical documentation in H+P.   Based on patients current state of illness, I anticipate that, after discharge, patient will require TBD.    Luis Hart MD

## 2025-04-14 NOTE — PROCEDURES
[facility]  part of [facility]  Procedure Note    Kelli Fisher Patient Status:  Inpatient    1955 MRN [id number]   Location [facility] 4W/SW/SE Attending Luis Hart MD   Hosp Day # 2 PCP Taylor Gallardo MD     Procedure: sonographic guided left suprclavicular lymph node    Pre-Procedure Diagnosis:  r/o metastatic disease    Post-Procedure Diagnosis: same    Anesthesia:  Local    Findings:  bulky supraclavicular lymphadenopathy    Specimens: 4 18 gauge core samples    Blood Loss:  minimal    Tourniquet Time: none  Complications:  None      Parviz Ragsdale MD  2025

## 2025-04-14 NOTE — DIETARY NOTE
ADULT NUTRITION INITIAL ASSESSMENT    Pt is at moderate nutrition risk.  Pt meets moderate malnutrition criteria.      RECOMMENDATIONS TO MD: See nutrition intervention for ONS (oral nutrition supplements)    ADMITTING DIAGNOSIS:  Shortness of breath [R06.02]  Pericardial effusion (HCC) [I31.39]  Lung mass [R91.8]  Supraclavicular adenopathy [R59.0]  Dysphagia, unspecified type [R13.10]  Acute hypoxic respiratory failure (HCC) [J96.01]  PERTINENT PAST MEDICAL HISTORY: Past Medical History[1]    PATIENT STATUS: Initial 04/14/25: Seeing pt due to screened at risk by RN at admission for unintentional wt loss and eating poorly due to decreased appetite. Pt with hx of endometrial cancer considered to be free of disease earlier in the year who presents with SOB ad difficulty swallowing for the past few days. Pt reports she can swallow liquids and some very soft solids. Steroids have improved her SOB some.  Bx this am, possible metastatic disease.  Per MD- Dysphagia likely secondary to compression from mediastinal adenopathy,      FOOD/NUTRITION RELATED HISTORY:  Appetite:  decreased due to difficulty swallowing.   Intake:  only liquids since admission   Intake Meeting Needs: No, but oral nutrition supplements (ONS) to maximize  Percent Meals Eaten (last 6 days)       None           Food Allergies: No Known Food Allergies (NKFA)  Cultural/Ethnic/Christian Preferences: Not Obtained    GASTROINTESTINAL: constipation, dysphagia, and nausea    MEDICATIONS: noted   fluticasone furoate  1 puff Inhalation Daily    heparin  5,000 Units Subcutaneous Q12H    ipratropium-albuterol  3 mL Nebulization Q6H WA    pantoprazole  40 mg Oral QAM AC    levothyroxine  100 mcg Oral Before breakfast    predniSONE  40 mg Oral Daily with breakfast     LABS: noted  Recent Labs     04/12/25  0703 04/13/25  0606 04/14/25  0628   * 135* 125*   BUN 6* 10 9   CREATSERUM 0.74 0.76 0.78   CA 8.8 8.7 8.8   MG  --  2.0  --     138 139   K 3.6  3.8 3.8    103 102   CO2 28.0 29.0 29.0   OSMOCALC 285 287 288       WEIGHT HISTORY:  Patient Weight(s) for the past 336 hrs:   Weight   04/14/25 1202 76.7 kg (169 lb)   04/12/25 0142 73.6 kg (162 lb 4.1 oz)   04/12/25 0134 73.6 kg (162 lb 4.1 oz)   04/11/25 1846 78 kg (172 lb)     Wt Readings from Last 10 Encounters:   04/14/25 76.7 kg (169 lb)   12/11/23 89.4 kg (197 lb)       ANTHROPOMETRICS:  HT: 152.4 cm (5')  Wt Readings from Last 1 Encounters:   04/14/25 76.7 kg (169 lb)     Last weight:  asked for standing scale wt--accurate. 169#  BMI: Body mass index is 33.01 kg/m².  Dosing Weight: 76.7 kg - recent weight, utilized for anthropometric calculations  BMI CLASSIFICATION: 30-34.9 kg/m2 - obesity class I  IBW/lbs (Calculated) Female: 100 lbs             169% IBW  Usual Body Wt: 195 lbs 1 yr ago, 175# a few weeks ago       87% UBW    NUTRITION RELATED PHYSICAL FINDINGS:  - Nutrition Focused Physical Exam (NFPE): mild depletion body fat triceps region and mild depletion muscle mass dorsal dale region and calf region  - Fluid Accumulation: none . See RN documentation for details  - Skin Integrity: intact. See RN documentation for details    CRITERIA FOR MALNUTRITION DIAGNOSIS:  Criteria for non-severe malnutrition diagnosis: chronic illness related to body fat mild depletion and muscle mass mild depletion.    NUTRITION DIAGNOSIS/PROBLEM:   Inadequate oral intake related to Decreased ability to consume sufficient energy in the setting of dysphagia as evidenced by pt reports of only able to eat liquids recently--possible metastatic disease.      NUTRITION INTERVENTION:     NUTRITION PRESCRIPTION:   Estimated Nutrition needs: --dosing wt of 76.7 kg - wt taken on 4/14  Calories: 1920 calories/day (25 calories per kg Dosing wt)  Protein: 68-82 g protein/day (1.5-1.8 g protein/kg Dosing wt)    - Diet:       Procedures    Low Fiber/Soft diet Low Fiber/Soft; Is Patient on Accuchecks? No        - Nutrition Care  Plan: Encouraged increased PO intake, Encouraged small frequent meals with emphasis on high calorie/high protein, and Initiated ONS (oral nutritional supplements)  - ONS (Oral Nutrition Supplements)/Meals/Snacks: Ensure Enlive (350 calories/ 20 g protein each) BID Vanilla   - Vitamin and mineral supplements: none  - Feeding assistance: meal set up  - Nutrition education: Discussed importance of adequate energy and protein intake    - Coordination of nutrition care: collaboration with other providers  - Discharge and transfer of nutrition care to new setting or provider: monitor plans    MONITOR AND EVALUATE/NUTRITION GOALS:  - Food and Nutrient Intake:      Monitor: adequacy of PO intake and adequacy of supplement intake  - Food and Nutrient Administration:      Monitor: N/A  - Anthropometric Measurement:    Monitor weight  - Nutrition Goals:      halt wt loss, maintain wt within 5%, PO and supplement greater than 75% of needs, labs within acceptable limits, and improved GI status--monitor swallowing status. Monitor bx results and plans.      DIETITIAN FOLLOW UP: RD to follow and monitor nutrition status    Mariangel Zavala RD, LDN   Clinical Dietitian [id number]         [1]   Past Medical History:   Asthma (HCC)

## 2025-04-14 NOTE — PLAN OF CARE
Patient a+o x4, on RA. Ambulates x1 with walker. Voiding freely. No bm overnight. Saline locked. Denies pain or nausea. Patient reporting sob in am, per md scheduled nebs given early. Tolerating low fiber/soft diet. Call light in reach.         Problem: Patient Centered Care  Goal: Patient preferences are identified and integrated in the patient's plan of care  Description: Interventions:- What would you like us to know as we care for you?   - Provide timely, complete, and accurate information to patient/family- Incorporate patient and family knowledge, values, beliefs, and cultural backgrounds into the planning and delivery of care- Encourage patient/family to participate in care and decision-making at the level they choose- Honor patient and family perspectives and choices  Outcome: Progressing     Problem: Patient/Family Goals  Goal: Patient/Family Long Term Goal  Description: Patient's Long Term Goal: discharge home  Interventions:- - See additional Care Plan goals for specific interventions  Outcome: Progressing  Goal: Patient/Family Short Term Goal  Description: Patient's Short Term Goal: improve shortness of breath   Interventions: - See additional Care Plan goals for specific interventions  Outcome: Progressing     Problem: CARDIOVASCULAR - ADULT  Goal: Maintains optimal cardiac output and hemodynamic stability  Description: INTERVENTIONS:- Monitor vital signs, rhythm, and trends- Monitor for bleeding, hypotension and signs of decreased cardiac output- Evaluate effectiveness of vasoactive medications to optimize hemodynamic stability- Monitor arterial and/or venous puncture sites for bleeding and/or hematoma- Assess quality of pulses, skin color and temperature- Assess for signs of decreased coronary artery perfusion - ex. Angina- Evaluate fluid balance, assess for edema, trend weights  Outcome: Progressing  Goal: Absence of cardiac arrhythmias or at baseline  Description: INTERVENTIONS:- Continuous  cardiac monitoring, monitor vital signs, obtain 12 lead EKG if indicated- Evaluate effectiveness of antiarrhythmic and heart rate control medications as ordered- Initiate emergency measures for life threatening arrhythmias- Monitor electrolytes and administer replacement therapy as ordered  Outcome: Progressing     Problem: METABOLIC/FLUID AND ELECTROLYTES - ADULT  Goal: Glucose maintained within prescribed range  Description: INTERVENTIONS:- Monitor Blood Glucose as ordered- Assess for signs and symptoms of hyperglycemia and hypoglycemia- Administer ordered medications to maintain glucose within target range- Assess barriers to adequate nutritional intake and initiate nutrition consult as needed- Instruct patient on self management of diabetes  Outcome: Progressing  Goal: Electrolytes maintained within normal limits  Description: INTERVENTIONS:- Monitor labs and rhythm and assess patient for signs and symptoms of electrolyte imbalances- Administer electrolyte replacement as ordered- Monitor response to electrolyte replacements, including rhythm and repeat lab results as appropriate- Fluid restriction as ordered- Instruct patient on fluid and nutrition restrictions as appropriate  Outcome: Progressing  Goal: Hemodynamic stability and optimal renal function maintained  Description: INTERVENTIONS:- Monitor labs and assess for signs and symptoms of volume excess or deficit- Monitor intake, output and patient weight- Monitor urine specific gravity, serum osmolarity and serum sodium as indicated or ordered- Monitor response to interventions for patient's volume status, including labs, urine output, blood pressure (other measures as available)- Encourage oral intake as appropriate- Instruct patient on fluid and nutrition restrictions as appropriate  Outcome: Progressing

## 2025-04-15 ENCOUNTER — APPOINTMENT (OUTPATIENT)
Dept: CT IMAGING | Facility: HOSPITAL | Age: 70
End: 2025-04-15
Attending: STUDENT IN AN ORGANIZED HEALTH CARE EDUCATION/TRAINING PROGRAM
Payer: MEDICARE

## 2025-04-15 LAB
ANION GAP SERPL CALC-SCNC: 6 MMOL/L (ref 0–18)
BUN BLD-MCNC: 13 MG/DL (ref 9–23)
BUN/CREAT SERPL: 17.3 (ref 10–20)
CALCIUM BLD-MCNC: 8.7 MG/DL (ref 8.7–10.4)
CHLORIDE SERPL-SCNC: 101 MMOL/L (ref 98–112)
CO2 SERPL-SCNC: 31 MMOL/L (ref 21–32)
CREAT BLD-MCNC: 0.75 MG/DL (ref 0.55–1.02)
DEPRECATED RDW RBC AUTO: 60.1 FL (ref 35.1–46.3)
EGFRCR SERPLBLD CKD-EPI 2021: 86 ML/MIN/1.73M2 (ref 60–?)
ERYTHROCYTE [DISTWIDTH] IN BLOOD BY AUTOMATED COUNT: 20.6 % (ref 11–15)
GLUCOSE BLD-MCNC: 134 MG/DL (ref 70–99)
HCT VFR BLD AUTO: 25.9 % (ref 35–48)
HGB BLD-MCNC: 8.3 G/DL (ref 12–16)
MCH RBC QN AUTO: 25.5 PG (ref 26–34)
MCHC RBC AUTO-ENTMCNC: 32 G/DL (ref 31–37)
MCV RBC AUTO: 79.7 FL (ref 80–100)
OSMOLALITY SERPL CALC.SUM OF ELEC: 288 MOSM/KG (ref 275–295)
PLATELET # BLD AUTO: 422 10(3)UL (ref 150–450)
POTASSIUM SERPL-SCNC: 4.1 MMOL/L (ref 3.5–5.1)
RBC # BLD AUTO: 3.25 X10(6)UL (ref 3.8–5.3)
SODIUM SERPL-SCNC: 138 MMOL/L (ref 136–145)
WBC # BLD AUTO: 17.9 X10(3) UL (ref 4–11)

## 2025-04-15 PROCEDURE — 74177 CT ABD & PELVIS W/CONTRAST: CPT | Performed by: STUDENT IN AN ORGANIZED HEALTH CARE EDUCATION/TRAINING PROGRAM

## 2025-04-15 PROCEDURE — 99233 SBSQ HOSP IP/OBS HIGH 50: CPT | Performed by: HOSPITALIST

## 2025-04-15 PROCEDURE — 99232 SBSQ HOSP IP/OBS MODERATE 35: CPT | Performed by: PHYSICIAN ASSISTANT

## 2025-04-15 RX ORDER — PREDNISONE 20 MG/1
20 TABLET ORAL
Status: DISCONTINUED | OUTPATIENT
Start: 2025-04-16 | End: 2025-04-17

## 2025-04-15 NOTE — PROGRESS NOTES
[facility]  [facility]    Progress Note    Kelli Fisher Patient Status:  Inpatient    1955 MRN [id number]   Location [facility] 4W/SW/SE Attending Luis Hart MD   Hosp Day # 3 PCP Taylor Gallardo MD     Subjective:   Seen and examined while resting in bed. Daughter at bedside. Has dyspnea on exertion and cough with white phlegm. No wheezing. No fever or chills. Appetite is improving. On room air.    Objective:   Blood pressure 115/84, pulse 100, temperature 99.1 °F (37.3 °C), temperature source Oral, resp. rate 18, height 5' (1.524 m), weight 169 lb (76.7 kg), SpO2 92%.  Physical Exam  Vitals and nursing note reviewed.   Constitutional:       General: She is awake. She is not in acute distress.  HENT:      Head: Normocephalic and atraumatic.   Neck:      Comments: Left supraclavicular lymphadenopathy  Pulmonary:      Effort: Pulmonary effort is normal. No respiratory distress.      Breath sounds: No wheezing, rhonchi or rales.   Abdominal:      General: There is no distension.      Palpations: Abdomen is soft.      Tenderness: There is no abdominal tenderness.   Musculoskeletal:      Cervical back: Normal range of motion and neck supple.      Right lower leg: No edema.      Left lower leg: No edema.   Lymphadenopathy:      Cervical: Cervical adenopathy present.   Skin:     General: Skin is warm and dry.   Neurological:      General: No focal deficit present.      Mental Status: She is alert and oriented to person, place, and time.   Psychiatric:         Mood and Affect: Mood normal.         Behavior: Behavior is cooperative.       Results:   Lab Results   Component Value Date    WBC 17.9 (H) 04/15/2025    HGB 8.3 (L) 04/15/2025    HCT 25.9 (L) 04/15/2025    .0 04/15/2025    CREATSERUM 0.75 04/15/2025    BUN 13 04/15/2025     04/15/2025    K 4.1 04/15/2025     04/15/2025    CO2 31.0 04/15/2025     (H) 04/15/2025    CA 8.7 04/15/2025    ALB 3.6  04/11/2025    ALKPHO 186 (H) 04/11/2025    BILT 0.8 04/11/2025    TP 7.2 04/11/2025    AST 25 04/11/2025    ALT 11 04/11/2025    PTT 35.9 04/11/2025    INR 1.28 (H) 04/11/2025    LIP 42 12/11/2023    MG 2.0 04/13/2025    TROPHS <3 04/11/2025     CTA chest 4/11/2025:  1. No evidence of acute pulmonary embolism to the level of the first order subsegmental pulmonary artery branches.      2. Masslike opacity involving the left upper lobe, concerning for potential neoplastic process.      3. Extensive bilateral nodules and micronodules, which could be metastatic in nature.      4. Marked mediastinal/perihilar lymphadenopathy, likely neoplastic.      5. Partially delineated supraclavicular lymphadenopathy.      6. Moderate pericardial effusion, potentially malignant.      7. Small bilateral pleural effusions are present with associated basilar atelectasis, with or without superimposed pneumonia.      8. Mild interlobular septal thickening is present and could be indicative of pulmonary interstitial edema or lymphangitic dissemination of malignancy.      9. Lesser incidental findings as above.     Assessment & Plan:   Dyspnea  Pulmonary nodules and NATO lung mass  Likely due to metastases with h/o endometrial cancer  CTA chest 4/11 with multiple pulmonary nodules, NATO mass, mediastinal lymphadenopathy  Recently treated for possibility of pneumonia (s/p doxy, azithro) and asthma exacerbation  Breathing is improved with steroid and nebs  On room air  Plan:  -Await pathology from lymph node biopsy  -Omar  -Decrease prednisone to 20 mg  -Arnuity    History of endometrial cancer  Previously followed with gyne/onc at [place]; intends to transfer care here  Concern for recurrence/metastases involving chest  S/p IR left supraclavicular biopsy 4/14 - path pending  Plan:  -Await pathology from lymph node biopsy  -Gyne onc consult  -Anticipate outpatient PET/CT    Anemia  History of iron deficiency  Recent admission for worsening  anemia s/p pRBCs  Hemoglobin stable  Plan:  -Follow hemoglobin    DVT prophylaxis: Subcutaneous heparin    Code status: Full code    D/w pulmonary Dr. Levy, hospitalist Dr. Smith, and RN.    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 I discussed the results with patient and one of her daughters at bedside. Gynecology oncology consult pending.    Joselo Rodrigues PA-C  Pulmonary Medicine  4/15/2025

## 2025-04-15 NOTE — PROGRESS NOTES
[facility]  part of [facility]    Progress Note    Kelli Fisher Patient Status:  Inpatient    1955 MRN [id number]   Location [facility] 4W/SW/SE Attending Jim Dai MD   Hosp Day # 3 PCP Taylor Gallardo MD     Chief Complaint:     Shortness of breath    Subjective:   Subjective:    Lymph node biopsy yesterday suggested endometrial cancer.    Patient walks independently, on room air, tearful for the results. She states she does not want to go back to [place], and wants to transfer care to [place].    I discussed with Dr. Penaloza.  I called her oncologist Dr. Arias at [place], who stated the appointment is still open for her.  I discussed with Dr. Herring, who will see the patient.    Objective:   Blood pressure 115/84, pulse 100, temperature 99.1 °F (37.3 °C), temperature source Oral, resp. rate 18, height 5' (1.524 m), weight 169 lb (76.7 kg), SpO2 92%.      Results:   Lab Results   Component Value Date    WBC 17.9 (H) 04/15/2025    HGB 8.3 (L) 04/15/2025    HCT 25.9 (L) 04/15/2025    .0 04/15/2025    CREATSERUM 0.75 04/15/2025    BUN 13 04/15/2025     04/15/2025    K 4.1 04/15/2025     04/15/2025    CO2 31.0 04/15/2025     (H) 04/15/2025    CA 8.7 04/15/2025    ALB 3.6 2025    ALKPHO 186 (H) 2025    BILT 0.8 2025    TP 7.2 2025    AST 25 2025    ALT 11 2025    PTT 35.9 2025    INR 1.28 (H) 2025    LIP 42 2023    MG 2.0 2025    TROPHS <3 2025       No results found.            Assessment & Plan:       Left upper lobe lung mass possible malignancy  -Pulmonary has been consulted IR for possible biopsy considering extensive lymphadenopathy.   -s/p sonographic guided left suprclavicular lymph node by IR on 25     Asthma exacerbation   -Patient started on DuoNebs, will continue the same as needed.  Patient claims to be allergic to -IV steroids, however willing to take oral steroids;  patient is allergic to steroids.    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  -patient would like to transfer oncology care from [place] to [place].  -Dr. Herring is consulted.    Dysphagia  -Likely secondary to compression from mediastinal adenopathy  -resolved  -GI signed off     Pericardial effusion  -CT showed moderate pericardial effusion  -Echo showed small pericardial effusion   -Cards signed off    Prophylaxis  Subcutaneous heparin     CODE STATUS  Full     Primary care physician  Taylor Gallardo MD     Disposition  Clinical course will dictate outcome    Global A/P  - On CT patient noted to have moderate pericardial effusion  - This could possibly be related to a malignancy  - No current signs of acute tamponade  - Diffuse lymphadenopathy with lung mass, has been consulted oncology consulted.  - History of endometrial cancer stage IIIb  - prognosis guarded  -Reviewed previous consultant notes  -Reviewed CBC, BMP, Mag, and Phos  -Reviewed tests ordered  -Repeat labs in am  -MDM: High, severe exacerbation of chronic illness posing a threat to life. IV medications requiring close inpatient monitoring.     Patient will require inpatient services that will reasonably be expected to span two midnight's based on the clinical documentation in H+P.   Based on patients current state of illness, I anticipate that, after discharge, patient will require TBD.    Luis Hart MD

## 2025-04-15 NOTE — PLAN OF CARE
Patient alert, oriented up to bathroom with assistance and a walker, family at bedside, pathology results discussed with patient by , surgical oncology on consult, pt has no c/o pain  Problem: Patient Centered Care  Goal: Patient preferences are identified and integrated in the patient's plan of care  Description: Interventions:- What would you like us to know as we care for you?   - Provide timely, complete, and accurate information to patient/family- Incorporate patient and family knowledge, values, beliefs, and cultural backgrounds into the planning and delivery of care- Encourage patient/family to participate in care and decision-making at the level they choose- Honor patient and family perspectives and choices  Outcome: Progressing     Problem: Patient/Family Goals  Goal: Patient/Family Long Term Goal  Description: Patient's Long Term Goal: discharge home  Interventions:- - See additional Care Plan goals for specific interventions  Outcome: Progressing  Goal: Patient/Family Short Term Goal  Description: Patient's Short Term Goal: improve shortness of breath   Interventions: - See additional Care Plan goals for specific interventions  Outcome: Progressing

## 2025-04-15 NOTE — PAYOR COMM NOTE
--------------  CONTINUED STAY REVIEW----REQUESTING ADDITIONAL DAY 4/15      Payor: [employer]  Subscriber #:  [id number]  Authorization Number: [id number]    Admit date: 4/12/25  Admit time:  1:24 AM       Date of Service: 4/15/2025 10:46 AM          [facility]  part of [facility]     Progress Note        Subjective:   Seen and examined while resting in bed. Daughter at bedside. Has dyspnea on exertion and cough with white phlegm. No wheezing. No fever or chills. Appetite is improving. On room air.     Objective:   Blood pressure 115/84, pulse 100, temperature 99.1 °F (37.3 °C), temperature source Oral, resp. rate 18, height 5' (1.524 m), weight 169 lb (76.7 kg), SpO2 92%.  Physical Exam  Vitals and nursing note reviewed.   Constitutional:       General: She is awake. She is not in acute distress.  HENT:      Head: Normocephalic and atraumatic.   Neck:      Comments: Left supraclavicular lymphadenopathy  Pulmonary:      Effort: Pulmonary effort is normal. No respiratory distress.      Breath sounds: No wheezing, rhonchi or rales.   Abdominal:      General: There is no distension.      Palpations: Abdomen is soft.      Tenderness: There is no abdominal tenderness.   Musculoskeletal:      Cervical back: Normal range of motion and neck supple.      Right lower leg: No edema.      Left lower leg: No edema.   Lymphadenopathy:      Cervical: Cervical adenopathy present.   Skin:     General: Skin is warm and dry.   Neurological:      General: No focal deficit present.      Mental Status: She is alert and oriented to person, place, and time.   Psychiatric:         Mood and Affect: Mood normal.         Behavior: Behavior is cooperative.         Results:         Lab Results   Component Value Date     WBC 17.9 (H) 04/15/2025     HGB 8.3 (L) 04/15/2025     HCT 25.9 (L) 04/15/2025     .0 04/15/2025     CREATSERUM 0.75 04/15/2025     BUN 13 04/15/2025      04/15/2025     K 4.1  04/15/2025      04/15/2025     CO2 31.0 04/15/2025      (H) 04/15/2025     CA 8.7 04/15/2025     ALB 3.6 04/11/2025     ALKPHO 186 (H) 04/11/2025     BILT 0.8 04/11/2025     TP 7.2 04/11/2025     AST 25 04/11/2025     ALT 11 04/11/2025     PTT 35.9 04/11/2025     INR 1.28 (H) 04/11/2025     LIP 42 12/11/2023     MG 2.0 04/13/2025     TROPHS <3 04/11/2025      CTA chest 4/11/2025:  1. No evidence of acute pulmonary embolism to the level of the first order subsegmental pulmonary artery branches.      2. Masslike opacity involving the left upper lobe, concerning for potential neoplastic process.      3. Extensive bilateral nodules and micronodules, which could be metastatic in nature.      4. Marked mediastinal/perihilar lymphadenopathy, likely neoplastic.      5. Partially delineated supraclavicular lymphadenopathy.      6. Moderate pericardial effusion, potentially malignant.      7. Small bilateral pleural effusions are present with associated basilar atelectasis, with or without superimposed pneumonia.      8. Mild interlobular septal thickening is present and could be indicative of pulmonary interstitial edema or lymphangitic dissemination of malignancy.      9. Lesser incidental findings as above.      Assessment & Plan:   Dyspnea  Pulmonary nodules and NATO lung mass  Likely due to metastases with h/o endometrial cancer  CTA chest 4/11 with multiple pulmonary nodules, NATO mass, mediastinal lymphadenopathy  Recently treated for possibility of pneumonia (s/p doxy, azithro) and asthma exacerbation  Breathing is improved with steroid and nebs  On room air  Plan:  -Await pathology from lymph node biopsy  -Omar  -Decrease prednisone to 20 mg  -Arnuity     History of endometrial cancer  Previously followed with gyne/onc at [place]; intends to transfer care here  Concern for recurrence/metastases involving chest  S/p IR left supraclavicular biopsy 4/14 - path pending  Plan:  -Await pathology from lymph  node biopsy  -Gyne onc consult  -Anticipate outpatient PET/CT     Anemia  History of iron deficiency  Recent admission for worsening anemia s/p pRBCs  Hemoglobin stable  Plan:  -Follow hemoglobin     DVT prophylaxis: Subcutaneous heparin     Code status: Full code     D/w pulmonary Dr. Levy, hospitalist Dr. Smith, and RN.     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 I discussed the results with patient and one of her daughters at bedside. Gynecology oncology consult pending.     Joselo Rodrigues PA-C  Pulmonary Medicine  4/15/2025               MEDICATIONS ADMINISTERED IN LAST 1 DAY:  acetaminophen (Tylenol) tab 650 mg       Date Action Dose Route User    4/15/2025 0541 Given 650 mg Oral Cassie Rene RN          calcium carbonate (Tums) chewable tab 500 mg       Date Action Dose Route User    4/14/2025 2125 Given 500 mg Oral Cassie Rene RN          fluticasone furoate (Arnuity Ellipta) 200 MCG/ACT inhaler 1 puff       Date Action Dose Route User    4/15/2025 1006 Given 1 puff Inhalation Mary Toure RN          heparin (Porcine) 5000 UNIT/ML injection 5,000 Units       Date Action Dose Route User    4/15/2025 1000 Given 5,000 Units Subcutaneous (Right Upper Arm) Mary Toure RN          ipratropium-albuterol (Duoneb) 0.5-2.5 (3) MG/3ML inhalation solution 3 mL       Date Action Dose Route User    4/15/2025 1331 Given 3 mL Nebulization Callada, Cecilia, RCP    4/15/2025 0840 Given 3 mL Nebulization Callada, Cecilia, RCP    4/14/2025 1822 Given 3 mL Nebulization Dinh Altamirano, RCSIENNA          levothyroxine (Synthroid) tab 100 mcg       Date Action Dose Route User    4/15/2025 0538 Given 100 mcg Oral Cassie Rene RN          pantoprazole (Protonix) DR tab 40 mg       Date Action Dose Route User    4/15/2025 0538 Given 40 mg Oral Cassie Rene RN          predniSONE (Deltasone) tab 40 mg       Date Action Dose Route User    4/15/2025  1000 Given 40 mg Oral Mary Toure, RN            Vitals (last day)       Date/Time Temp Pulse Resp BP SpO2 Weight O2 Device O2 Flow Rate (L/min) Who    04/15/25 1156 98.2 °F (36.8 °C) 104 18 125/79 95 % -- None (Room air) -- KM    04/15/25 0534 99.1 °F (37.3 °C) 100 18 115/84 92 % -- None (Room air) -- KB    04/14/25 2109 99.5 °F (37.5 °C) 102 17 122/78 94 % -- None (Room air) -- MM    04/14/25 1659 98.6 °F (37 °C) 104 16 116/84 94 % -- None (Room air) -- CV    04/14/25 1202 -- -- -- -- -- 169 lb (76.7 kg) -- -- CV    04/14/25 1002 -- 98 16 123/76 94 % -- None (Room air) -- BG    04/14/25 0653 -- -- -- -- 94 % -- None (Room air) -- KB    04/14/25 0559 -- -- -- 115/79 88 % -- None (Room air) -- KT    04/14/25 0511 98.8 °F (37.1 °C) 106 17 119/83 92 % -- None (Room air) -- KT

## 2025-04-15 NOTE — CONSULTS
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 She has been experiencing SOB for the past few weeks and has had multiple hospitalizations where it was suggested she had pneumonia or symptomatic anemia. Additionally she has experienced difficulty swallowing, stating she has the sensation of food feeling stuck when she swallows which has also been worsening. On admission she had a CT chest notable for 7cm L lung lesion, supraclavicular LAD and mediastinal LAD. This was biopsied yesterday, path consistent with recurrent endometrial cancer.     Patient has a history of endometrial cancer treated with radiation, surgery, and adjuvant chemotherapy. Her last treatment was in 2024. Full treatment history is below. She has been following with her Gyn Onc regularly with pelvic exams, but states her last CT scan was in Dec. She denies abdominal pain, nausea, vomiting, vaginal discharge or vaginal bleeding. No additional concerns or complaints.      2023- Evaluated for PMB, pelvic US with thickened endometrium to 2.7cm and endometrial masses   2023- Pap with adenocarcinoma   2024- cervical tissue with clear cell carcinoma and EMB with mixed clear cell carcinoma (90%) and serous carcinoma (10%)  2024-2024- 2 cycle of cisplatin, XRT and brachytherapy with Rad Onc  25: RA- TLH, BSO, cystoscopy for stage IIIB clear cell and serous endometrial cancer  2024: Completed 4 cycles of carboplatin (declined Taxol and Pembro per notes)  2025: CT  A/P without evidence of disease  3/25/25:  CT chest and neck revealed a left lung lass and nodularity with enlarged mediastinal, hilar, and supraclavicular lymph nodes   3/25/25-3/26/25: Admission for SOB, neck pain, declined blood transfusion    Medications:  Prescriptions Prior to Admission[1]    Allergies:  Allergies[2]     Past Medical History:  Past Medical History[3]    Past Surgical History:  Past Surgical History[4]    Past OB History:  OB History   No obstetric history on file.       Past GYN History:   postmenopausal    Social History:  Social History     Tobacco Use    Smoking status: Never    Smokeless tobacco: Never   Substance Use Topics    Alcohol use: Never        Review of Systems:  Negative other than HPI    OBJECTIVE:  Temp:  [98.2 °F (36.8 °C)-99.5 °F (37.5 °C)] 98.2 °F (36.8 °C)  Pulse:  [100-104] 104  Resp:  [17-18] 18  BP: (115-125)/(78-84) 125/79  SpO2:  [92 %-95 %] 95 %    Intake/Output Summary (Last 24 hours) at 4/15/2025 1830  Last data filed at 4/15/2025 1720  Gross per 24 hour   Intake 960 ml   Output --   Net 960 ml       Physical Exam:  General appearance: alert, appears stated age and cooperative  Head: Normocephalic, without obvious abnormality, atraumatic  Eyes: EOM's intact  Neck: palpable left supraclavicular adenopathy  Lungs: tachypnea, NAD  Abdomen: soft, non-tender; no masses,  no organomegaly  Pelvic: deferred  Neurologic: Grossly normal    Diagnostics:  IR BIOPSY  Result Date: 4/15/2025  PROCEDURE: IR BIOPSY  INDICATIONS: supraclavicular lymph node  COMPARISON:  CT chest 4/11/2025  (S): DARIEL Ragsdale MD  ANESTHESIA/SEDATION: Level of anesthesia/sedation:  Local Anesthetic  ESTIMATED BLOOD LOSS: Less than 5 mL  COMPLICATIONS: None  FINDINGS:  Informed consent was obtained. The patient was positioned supine. The left neck was sterilely prepped and draped.  Preliminary ultrasound demonstrated enlarged left supraclavicular lymphadenopathy. Local Lidocaine was administered.  Under ultrasound guidance, a coaxial 18 gauge needle was advanced into the lesion. A total of 4  cores were obtained.  Samples were touch prepped and reviewed with cytology, confirming adequacy. No appreciable complication         CONCLUSION:  1. Successful left supraclavicular lymphadenopathy biopsy as detailed above.     Dictated by (CST): Parviz Ragsdale MD on 4/15/2025 at 2:51 PM     Finalized by (CST): Parviz Ragsdale MD on 4/15/2025 at 2:54 PM          CARD ECHO 2D DOPPLER (CPT=93306)  Result Date: 2025  Transthoracic Echocardiogram Name:Kelli Fisher Date: 2025 :  1955 Ht:  (60in)  BP: 128 / 69 MRN:  [id number]    Age:  70years    Wt:  (162lb) HR: 96bpm Loc:  [facility]       Gndr: F          BSA: 1.71m^2 Sonographer: ANGELICA Prather RDCS Ordering:    Mercedes Martínez Consulting:  Eddie Kemp ---------------------------------------------------------------------------- History/Indications:  Rule out pericardial effusion ---------------------------------------------------------------------------- Procedure information:  A transthoracic complete 2D study was performed. Additional evaluation included M-mode, complete spectral Doppler, and color Doppler.  Patient status:  Inpatient.  Location:  Bedside.    This was a routine study. Transthoracic echocardiography for diagnosis, ventricular function evaluation, and assessment of valvular function. Image quality was adequate. The study was technically limited due to poor acoustic window availability, body habitus, and patient supine. ECG rhythm:   Normal sinus ---------------------------------------------------------------------------- Conclusions: 1. Left ventricle: The cavity size was normal. Wall thickness was normal.    Systolic function was normal. The estimated ejection fraction was 50-55%,    by biplane method of disks. No diagnostic evidence for regional wall    motion abnormalities. Left ventricular diastolic function parameters were    normal. 2.  Right ventricle: The cavity size was normal. Systolic function was    normal. 3. No significant valvular heart disease (stenosis or regurgitation) 4. Pericardium, extracardiac: A small pericardial effusion was identified    posterior to the heart. No echocardiogram signs of tamponade present (ie,    respiratory valve inflow variation) 5. Pulmonary arteries: Systolic pressure was moderately increased, estimated    to be 49mm Hg. * ---------------------------------------------------------------------------- * Findings: Left ventricle:  The cavity size was normal. Wall thickness was normal. Systolic function was normal. The estimated ejection fraction was 50-55%, by biplane method of disks. No diagnostic evidence for diffuse regional wall motion abnormalities. No diagnostic evidence for regional wall motion abnormalities. Left ventricular diastolic function parameters were normal. Left atrium:  The atrium was normal in size. Right ventricle:  The cavity size was normal. Systolic function was normal. Right atrium:  The atrium was normal in size. Mitral valve:  The valve was structurally normal. Leaflet separation was normal.  Doppler:  Transvalvular velocity was within the normal range. There was no evidence for stenosis. There was no significant regurgitation.    The valve area by pressure half-time was 6.88cm^2. The valve area index by pressure half-time was 4.03cm^2/m^2. Aortic valve:  The valve was structurally normal. The valve was probably trileaflet but cannot rule out bicuspid morphology Cusp separation was normal.  Doppler:  Transvalvular velocity was within the normal range. There was no evidence for stenosis. There was no significant regurgitation.    The valve area (VTI) was 1.43cm^2. The valve area (VTI) index was 0.84cm^2/m^2.   The mean systolic gradient was 7mm Hg. The peak systolic gradient was 14mm Hg. Tricuspid valve:  The valve is structurally normal. Leaflet separation was normal.  Doppler:   Transvalvular velocity was within the normal range. There was no evidence for stenosis. There was trivial regurgitation. Pulmonic valve:   The valve is structurally normal. Cusp separation was normal.  Doppler:  Transvalvular velocity was within the normal range. There was no evidence for stenosis. There was mild regurgitation. Pericardium:  A small pericardial effusion was identified posterior to the heart. No echocardiogram signs of tamponade present (ie, respiratory valve inflow variation) Aorta: Aortic root: The aortic root was normal-sized. Ascending aorta: The ascending aorta was normal. Pulmonary arteries: The main pulmonary artery was normal-sized. Systolic pressure was moderately increased, estimated to be 49mm Hg. Systemic veins:  Central venous respirophasic diameter changes are blunted (< 50%). Inferior vena cava: The IVC was dilated. ---------------------------------------------------------------------------- Measurements  Left ventricle                  Value          Ref  IVS thickness, ED, PLAX         0.8   cm       0.6 - 0.9  LV ID, ED, PLAX                 4.4   cm       3.8 - 5.2  LV ID, ES, PLAX                 2.8   cm       2.2 - 3.5  LV PW thickness, ED, PLAX       0.7   cm       0.6 - 0.9  IVS/LV PW ratio, ED, PLAX       1.14           ---------  LV PW/LV ID ratio, ED, PLAX     0.16           ---------  LV area, ES, A4C                12.6  cm^2     ---------  LV ejection fraction            66    %        54 - 74  Stroke volume/bsa, 2D           26    ml/m^2   ---------  LV end-diastolic volume,        54    ml       48 - 140  1-p A4C  LV end-systolic volume, 1-p     25    ml       12 - 60  A4C  LV ejection fraction, 1-p       51    %        46 - 78  A4C  Stroke volume, 1-p A4C          27    ml       ---------  LV end-diastolic                32    ml/m^2   30 - 82  volume/bsa, 1-p A4C  LV end-systolic volume/bsa,     14    ml/m^2   7 - 35  1-p A4C  Stroke volume/bsa, 1-p A4C      16     ml/m^2   ---------  LV end-diastolic volume,        64    ml       46 - 106  2-p  LV end-diastolic                38    ml/m^2   29 - 61  volume/bsa, 2-p  LV e', lateral                  11.7  cm/sec   >=10.0  LV E/e', lateral                5              <=13  LV e', medial                   10.1  cm/sec   >=7.0  LV E/e', medial                 6              ---------  LV e', average                  10.9  cm/sec   ---------  LV E/e', average                5              <=14  LVOT                            Value          Ref  LVOT ID                         1.8   cm       ---------  LVOT peak velocity, S           1.01  m/sec    ---------  LVOT VTI, S                     17.5  cm       ---------  LVOT peak gradient, S           4     mm Hg    ---------  LVOT mean gradient, S           2     mm Hg    ---------  Stroke volume (SV), LVOT DP     45    ml       ---------  Stroke index (SV/bsa), LVOT     26    ml/m^2   ---------  DP  Aortic valve                    Value          Ref  Aortic valve peak velocity,     1.84  m/sec    ---------  S  Aortic valve VTI, S             31.0  cm       ---------  Aortic mean gradient, S         7     mm Hg    ---------  Aortic peak gradient, S         14    mm Hg    ---------  Aortic valve area, VTI          1.43  cm^2     ---------  Aortic valve area/bsa, VTI      0.84  cm^2/m^2 ---------  Velocity ratio, peak,           0.55           ---------  LVOT/AV  Ascending aorta                 Value          Ref  Ascending aorta ID              3.4   cm       1.9 - 3.5  Left atrium                     Value          Ref  LA volume, ES, 1-p A4C          39    ml       22 - 52  LA volume, ES, 1-p A2C          35    ml       22 - 52  LA volume, ES, A/L              45    ml       ---------  LA volume/bsa, ES, A/L          26    ml/m^2   16 - 34  Mitral valve                    Value          Ref  Mitral E-wave peak velocity     0.58  m/sec    ---------  Mitral A-wave peak velocity      0.83  m/sec    ---------  Mitral deceleration time        109   ms       ---------  Mitral pressure half-time       32    ms       ---------  Mitral E/A ratio, peak          0.7            ---------  Mitral valve area, PHT, DP      6.88  cm^2     ---------  Mitral valve area/bsa, PHT,     4.03  cm^2/m^2 ---------  DP  Pulmonary artery                Value          Ref  PA pressure, S, DP              49    mm Hg    ---------  PA pressure, ED, DP             21    mm Hg    ---------  Tricuspid valve                 Value          Ref  Tricuspid regurg peak       (H) 2.92  m/sec    <=2.8  velocity  Tricuspid peak RV-RA            34    mm Hg    ---------  gradient  Systemic veins                  Value          Ref  Estimated CVP                   15    mm Hg    ---------  Inferior vena cava              Value          Ref  ID                          (H) 2.7   cm       <=2.1  Right ventricle                 Value          Ref  TAPSE, MM                       1.97  cm       >=1.70  RV pressure, S, DP              49    mm Hg    ---------  RV s', lateral                  17.6  cm/sec   >=9.5  Pulmonic valve                  Value          Ref  Pulmonic regurg velocity,       1.27  m/sec    ---------  ED  Pulmonic regurg gradient,       6     mm Hg    ---------  ED Legend: (L)  and  (H)  corinna values outside specified reference range. ---------------------------------------------------------------------------- Prepared and electronically signed by Oniel Grijalva 04/12/2025 16:07     CT CHEST PE AORTA (IV ONLY) (CPT=71260)  Result Date: 4/11/2025  PROCEDURE: CT CHEST PE AORTA (IV ONLY) (CPT=71260)  COMPARISON: [facility], CT SOFT TISSUE OF NECK (CONTRAST ONLY) (CPT=70491), 4/11/2025, 9:30 PM.  INDICATIONS: Shortness of breath. History of cancer.  TECHNIQUE: Multidetector CT images of the chest were obtained with non-ionic intravenous contrast material. Automated exposure control for dose reduction was used.  Adjustment of the mA and/or kV was done based on the patient's size. Iterative reconstruction technique for dose reduction was employed. Dose information was transmitted to the ACR (American College of Radiology) NRDR (National Radiology Data Registry), which includes the Dose Index Registry. Multiplanar reformats and maximum intensity projection images were created.   FINDINGS: VASCULATURE: There is adequate opacification of the pulmonary arterial tree. No suspicious filling defects are identified in the main, lobar, segmental, or proximal subsegmental pulmonary artery branches to suggest acute pulmonary embolism. The distal subsegmental branches are less well assessed. The main pulmonary artery trunk is normal in caliber, measuring 2.9 cm. CARDIAC: The heart is not enlarged. There is no bowing of the interventricular septum to suggest right ventricular strain. Moderate pericardial effusion extends into the superior pericardial recess. THORACIC AORTA: The thoracic aorta has normal-variant two-vessel configuration with a shared origin of the brachiocephalic trunk and left common carotid artery; no dissection is evident. LUNGS/PLEURA: Lobulated masslike airspace disease is demonstrated in the left upper lobe measuring 5.6 x 7.1 x 4.0 cm. Satellite lingular nodularity is evident measuring 1.5 x 1.8 cm, 0.4 cm in diameter, and 0.6 x 0.9 cm (series 4, images 71-72).  A 0.4 cm diameter right middle lobe nodule is seen, and there is a 0.6 x 0.7 cm anterior subpleural nodule (series 4, images 52 and 74, respectively). Right lower lobe nodules are seen measuring 0.7 x 0.6 cm and 0.8 x 0.7 cm (series 4, images 52 and 53). Numerous additional pulmonary nodules and micro nodules are seen.   Small bilateral pleural effusions are seen with associated compressive atelectasis, with or without superimposed airspace consolidation. Minimal interlobular septal thickening is present. No pneumothorax is detected.  AIRWAYS: The  tracheobronchial tree is without central mass or obstructing lesion. MEDIASTINUM/SCOTT: There is prevascular space lymphadenopathy measuring 2.5 x 1.7 cm and 1.5 x 1.8 cm. Right upper paratracheal lymphadenopathy measures up to 3.1 x 5.1 cm.  Infiltrative right lower paratracheal lymphadenopathy measures 4.4 x 2.8 cm. Aortopulmonary window lymphadenopathy measures 2.2 x 2.7 cm. Subcarinal lymphadenopathy measures 3.4 x 1.8 cm.  There may be periesophageal lymphadenopathy in the posterior mediastinum.  Right perihilar lymphadenopathy measures 1.6 x 1.9 cm. CHEST WALL: Please see the separately dictated concurrently performed contrast-enhanced CT of the soft tissues of the neck for further details regarding extensive supraclavicular lymphadenopathy. LIMITED ABDOMEN: Within the parameters of the arterial phase of contrast-enhancement, the included upper abdomen is unremarkable.  BONES: Slight scoliosis is noted. Multilevel degenerative changes are seen throughout the thoracic spine. Confluent anterior ossific bridging may reflect underlying diffuse idiopathic skeletal hyperostosis. Significant degenerative changes of the shoulders are present bilaterally. OTHER: A small hiatal hernia is evident.          CONCLUSION:  1. No evidence of acute pulmonary embolism to the level of the first order subsegmental pulmonary artery branches.  2. Masslike opacity involving the left upper lobe, concerning for potential neoplastic process.  3. Extensive bilateral nodules and micronodules, which could be metastatic in nature.  4. Marked mediastinal/perihilar lymphadenopathy, likely neoplastic.  5. Partially delineated supraclavicular lymphadenopathy.  6. Moderate pericardial effusion, potentially malignant.  7. Small bilateral pleural effusions are present with associated basilar atelectasis, with or without superimposed pneumonia.  8. Mild interlobular septal thickening is present and could be indicative of pulmonary interstitial  edema or lymphangitic dissemination of malignancy.  9. Lesser incidental findings as above.    Dictated by (CST): Harpreet Jade MD on 4/11/2025 at 10:08 PM     Finalized by (CST): Harpreet Jade MD on 4/11/2025 at 10:20 PM          CT SOFT TISSUE OF NECK(CONTRAST ONLY) (CPT=70491)  Result Date: 4/11/2025  PROCEDURE: CT SOFT TISSUE OF NECK (CONTRAST ONLY) (CPT=70491)  COMPARISON: None available.  INDICATIONS: Dysphagia.  TECHNIQUE: Multidetector CT images were obtained through the neck soft tissues following the infusion of non-ionic intravenous contrast material. Automated exposure control for dose reduction was used. Adjustment of the mA and/or kV was done based on the  patient's size. Iterative reconstruction technique for dose reduction was employed. Dose information was transmitted to the ACR (American College of Radiology) NRDR (National Radiology Data Registry), which includes the Dose Index Registry.   FINDINGS:  NASO/OROPHARYNX: No mass or significant asymmetry.  ORAL CAVITY/TONGUE: No visible mass or significant asymmetry.  COMPARTMENTS: The , parotid, carotid, retropharyngeal, and perivertebral spaces are without focal attenuation abnormality. The parapharyngeal fat planes are bilaterally symmetric without effacement. HYPOPHARYNX: No mass or other visible lesion.  LARYNX: No apparent vocal cord mass or asymmetry. NECK GLANDS: The parotid, submandibular, and thyroid glands are unremarkable.  LYMPH NODES: Extensive pathological-appearing and enlarged lymph nodes are demonstrated. A reference right level IVb medial supraclavicular node measures 3.3 x 3.7 cm; there appears to be an adjacent necrotic lymph node measuring 1.4 x 2.0 cm. A right supraclavicular lymph node laterally measures 1.6 x 1.5 cm. A level VIa heterogeneous necrotic-appearing lymph node measures 4.7 x 3.7 cm. Left level [place]/IVb lower jugular/medial supraclavicular nodes measure 3.5 x 3.0 cm and 3.9 x 3.5 cm. VASCULATURE: Limited  views are unremarkable.  SINUSES/MASTOIDS: Limited views show no significant fluid or mucosal thickening.  MEDIASTINUM: Limited views are notable for lymphadenopathy; please see the concurrently performed PE protocol chest CT for further details.  BONES: Multilevel degenerative changes are seen throughout the spine. There may be periapical abscess formation extending to the right maxillary alveolar recess. OTHER: Visualized portions of the contrast enhanced cerebrum and cerebellum are grossly unremarkable. There is dependent subsegmental atelectasis of the visualized lungs bilaterally.          CONCLUSION:  1. Extensive lymphadenopathy, likely metastatic.  2. Lesser incidental findings as above.     Dictated by (CST): Harpreet Jade MD on 4/11/2025 at 9:59 PM     Finalized by (CST): Harpreet Jade MD on 4/11/2025 at 10:08 PM            Data Review:   Recent Labs   Lab 04/13/25  0606 04/14/25  0628 04/15/25  0533   RBC 3.08*   < > 3.25*   HGB 7.8*   < > 8.3*   HCT 24.1*   < > 25.9*   MCV 78.2*   < > 79.7*   MCH 25.3*   < > 25.5*   MCHC 32.4   < > 32.0   RDW 20.2*   < > 20.6*   NEPRELIM 14.17*  --   --    WBC 17.0*   < > 17.9*   .0   < > 422.0    < > = values in this interval not displayed.       Recent Labs   Lab 04/11/25 2027 04/12/25  0703 04/13/25  0606 04/14/25  0628 04/15/25  0533   *   < > 135* 125* 134*   BUN 9   < > 10 9 13   CREATSERUM 0.82   < > 0.76 0.78 0.75   CA 8.8   < > 8.7 8.8 8.7   ALB 3.6  --   --   --   --       < > 138 139 138   K 4.0   < > 3.8 3.8 4.1      < > 103 102 101   CO2 28.0   < > 29.0 29.0 31.0   ALKPHO 186*  --   --   --   --    AST 25  --   --   --   --    ALT 11  --   --   --   --    BILT 0.8  --   --   --   --    TP 7.2  --   --   --   --     < > = values in this interval not displayed.       No results found for: \"\", \"CEA\", \"\"    ASSESSMENT:  Patient is a 70 year old a/f work up of progressive SOB, now found to have recurrent uterine serous  carcinoma. Patient had been treated at [place] as outlined above. Last treatment with Carboplatin in 9/2024 and PHILIP until now    PLAN:  Recurrent endometrial cancer  - Pathology and CT chest studies reviewed with patient and family at bedside. Reviewed that findings are consistent with recurrent cancer. Location of the LAD and lung mass are likely the etiology of her SOB and dysphagia   - Recommend CT AP to establish disease status and possible additional sites of metastasis   -  level added to AM labs to establish new baseline  - Dicussed that treatment of multifocal recurrence is systemic typically with chemotherapy +/- immunotherapy or other targeted treatments. Patient previously declined Taxol, but is agreeable to systemic treatments including chemotherapy agents   - Will arrange molecular testing of hysterectomy specimen as outpatient   - Pt desires to transfer care from [place]. Will establish outpatient follow up with Dr. Herring in 1 week. Follow up information provided and included in discharge paperwork     Dysphagia   - likely 2/2 esophageal compression from mediastinal LAD  - Pt tolerating soft diet w/o nausea/ vomiting     Anemia- Chronic  - Per review in care everywhere, recently pt was diagnosed with Hb of 5.7  - No active bleeding at that time  - Hb responded appropriately to transfusion and is stable ~8 since admission     Remainder of excellent care per primary team    All of the findings and plan were discussed with the patient and her family.  She notes understanding and agrees with the plan of care.  All questions were answered to the best of my ability at this time.    Alicia Franco MD  4/15/2025  6:30 PM         [1]   Medications Prior to Admission   Medication Sig Dispense Refill Last Dose/Taking    levothyroxine 100 MCG Oral Tab Take 1 tablet (100 mcg total) by mouth before breakfast.   Taking    Albuterol Sulfate HFA (VENTOLIN) 108 (90 BASE) MCG/ACT Inhalation Aero Soln Inhale 1-2 puffs  into the lungs every 4 to 6 hours as needed for Wheezing or Shortness of Breath.   Taking As Needed    Fluticasone Propionate  HFA (FLOVENT HFA) 220 MCG/ACT Inhalation Aerosol Inhale 2 puffs into the lungs in the morning and 2 puffs before bedtime.   Taking    predniSONE (DELTASONE) 10 MG Oral Tab Take 40 mg by mouth daily. 40 MG DAILY X 2 DAYS THEN DECREASE BY 10 MG EVERY 2 DAYS THEN D/C      [2]   Allergies  Allergen Reactions    Aspirin Tightness in Throat    Penicillins HIVES    Other OTHER (SEE COMMENTS)     Pt states IV steroid allergy, states it makes her breathing worse    [3]   Past Medical History:   Asthma (HCC)   [4]   Past Surgical History:  Procedure Laterality Date    Thyroidectomy      Total abdom hysterectomy

## 2025-04-15 NOTE — PLAN OF CARE
Patient a+o x4, on RA. Ambulates standby with walker. Voiding freely. Patient reported diarrhea overnight. Saline locked. Denies nausea. Prn tylenol given x1 for neck pain. Biopsy site band aid clean/dry/intact. Tolerating low fiber/soft diet. Call light in reach.     Problem: Patient Centered Care  Goal: Patient preferences are identified and integrated in the patient's plan of care  Description: Interventions:- What would you like us to know as we care for you?   - Provide timely, complete, and accurate information to patient/family- Incorporate patient and family knowledge, values, beliefs, and cultural backgrounds into the planning and delivery of care- Encourage patient/family to participate in care and decision-making at the level they choose- Honor patient and family perspectives and choices  Outcome: Progressing     Problem: Patient/Family Goals  Goal: Patient/Family Long Term Goal  Description: Patient's Long Term Goal: discharge home  Interventions:- - See additional Care Plan goals for specific interventions  Outcome: Progressing  Goal: Patient/Family Short Term Goal  Description: Patient's Short Term Goal: improve shortness of breath   Interventions: - See additional Care Plan goals for specific interventions  Outcome: Progressing     Problem: CARDIOVASCULAR - ADULT  Goal: Maintains optimal cardiac output and hemodynamic stability  Description: INTERVENTIONS:- Monitor vital signs, rhythm, and trends- Monitor for bleeding, hypotension and signs of decreased cardiac output- Evaluate effectiveness of vasoactive medications to optimize hemodynamic stability- Monitor arterial and/or venous puncture sites for bleeding and/or hematoma- Assess quality of pulses, skin color and temperature- Assess for signs of decreased coronary artery perfusion - ex. Angina- Evaluate fluid balance, assess for edema, trend weights  Outcome: Progressing  Goal: Absence of cardiac arrhythmias or at baseline  Description:  INTERVENTIONS:- Continuous cardiac monitoring, monitor vital signs, obtain 12 lead EKG if indicated- Evaluate effectiveness of antiarrhythmic and heart rate control medications as ordered- Initiate emergency measures for life threatening arrhythmias- Monitor electrolytes and administer replacement therapy as ordered  Outcome: Progressing     Problem: METABOLIC/FLUID AND ELECTROLYTES - ADULT  Goal: Glucose maintained within prescribed range  Description: INTERVENTIONS:- Monitor Blood Glucose as ordered- Assess for signs and symptoms of hyperglycemia and hypoglycemia- Administer ordered medications to maintain glucose within target range- Assess barriers to adequate nutritional intake and initiate nutrition consult as needed- Instruct patient on self management of diabetes  Outcome: Progressing  Goal: Electrolytes maintained within normal limits  Description: INTERVENTIONS:- Monitor labs and rhythm and assess patient for signs and symptoms of electrolyte imbalances- Administer electrolyte replacement as ordered- Monitor response to electrolyte replacements, including rhythm and repeat lab results as appropriate- Fluid restriction as ordered- Instruct patient on fluid and nutrition restrictions as appropriate  Outcome: Progressing  Goal: Hemodynamic stability and optimal renal function maintained  Description: INTERVENTIONS:- Monitor labs and assess for signs and symptoms of volume excess or deficit- Monitor intake, output and patient weight- Monitor urine specific gravity, serum osmolarity and serum sodium as indicated or ordered- Monitor response to interventions for patient's volume status, including labs, urine output, blood pressure (other measures as available)- Encourage oral intake as appropriate- Instruct patient on fluid and nutrition restrictions as appropriate  Outcome: Progressing

## 2025-04-16 ENCOUNTER — TELEPHONE (OUTPATIENT)
Dept: PULMONOLOGY | Facility: CLINIC | Age: 70
End: 2025-04-16

## 2025-04-16 ENCOUNTER — PATIENT OUTREACH (OUTPATIENT)
Dept: CASE MANAGEMENT | Age: 70
End: 2025-04-16

## 2025-04-16 DIAGNOSIS — R91.1 LESION OF LUNG: ICD-10-CM

## 2025-04-16 DIAGNOSIS — R91.8 LUNG MASS: ICD-10-CM

## 2025-04-16 DIAGNOSIS — R06.09 OTHER FORM OF DYSPNEA: Primary | ICD-10-CM

## 2025-04-16 LAB
ANION GAP SERPL CALC-SCNC: 8 MMOL/L (ref 0–18)
BUN BLD-MCNC: 13 MG/DL (ref 9–23)
BUN/CREAT SERPL: 16.9 (ref 10–20)
CALCIUM BLD-MCNC: 9 MG/DL (ref 8.7–10.4)
CANCER AG125 SERPL-ACNC: 210 U/ML (ref ?–30.2)
CANCER AG19-9 SERPL-ACNC: 156.3 U/ML (ref ?–35)
CEA SERPL-MCNC: <0.5 NG/ML (ref ?–5)
CHLORIDE SERPL-SCNC: 99 MMOL/L (ref 98–112)
CO2 SERPL-SCNC: 32 MMOL/L (ref 21–32)
CREAT BLD-MCNC: 0.77 MG/DL (ref 0.55–1.02)
DEPRECATED RDW RBC AUTO: 60.4 FL (ref 35.1–46.3)
EGFRCR SERPLBLD CKD-EPI 2021: 83 ML/MIN/1.73M2 (ref 60–?)
ERYTHROCYTE [DISTWIDTH] IN BLOOD BY AUTOMATED COUNT: 21 % (ref 11–15)
GLUCOSE BLD-MCNC: 118 MG/DL (ref 70–99)
HCT VFR BLD AUTO: 26.1 % (ref 35–48)
HGB BLD-MCNC: 8.4 G/DL (ref 12–16)
MCH RBC QN AUTO: 25.6 PG (ref 26–34)
MCHC RBC AUTO-ENTMCNC: 32.2 G/DL (ref 31–37)
MCV RBC AUTO: 79.6 FL (ref 80–100)
OSMOLALITY SERPL CALC.SUM OF ELEC: 289 MOSM/KG (ref 275–295)
PLATELET # BLD AUTO: 412 10(3)UL (ref 150–450)
POTASSIUM SERPL-SCNC: 4.1 MMOL/L (ref 3.5–5.1)
RBC # BLD AUTO: 3.28 X10(6)UL (ref 3.8–5.3)
SODIUM SERPL-SCNC: 139 MMOL/L (ref 136–145)
WBC # BLD AUTO: 18.5 X10(3) UL (ref 4–11)

## 2025-04-16 PROCEDURE — 99232 SBSQ HOSP IP/OBS MODERATE 35: CPT | Performed by: INTERNAL MEDICINE

## 2025-04-16 PROCEDURE — 99233 SBSQ HOSP IP/OBS HIGH 50: CPT | Performed by: INTERNAL MEDICINE

## 2025-04-16 RX ORDER — IPRATROPIUM BROMIDE AND ALBUTEROL SULFATE 2.5; .5 MG/3ML; MG/3ML
3 SOLUTION RESPIRATORY (INHALATION)
Status: DISCONTINUED | OUTPATIENT
Start: 2025-04-16 | End: 2025-04-17

## 2025-04-16 RX ORDER — ALBUTEROL SULFATE 0.83 MG/ML
2.5 SOLUTION RESPIRATORY (INHALATION) EVERY 6 HOURS PRN
Qty: 90 EACH | Refills: 3 | Status: SHIPPED | OUTPATIENT
Start: 2025-04-16

## 2025-04-16 NOTE — PROGRESS NOTES
[facility]  part of [facility]    Progress Note      Assessment and Plan:   1.  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  Has follow-up with gynecologic oncology.  Some dysphagia but tolerating soft diet.     Recommendations:  1.  Nebulizer-will set up at home with albuterol  2.  Discharge planning-okay to home from my perspective.  3.  Arnuity Ellipta  4.  Steroid-will discontinue steroid at discharge  5.  Outpatient follow-up with gynecologic oncology     2.  DVT prophylaxis-subcutaneous heparin     3.  History of adenocarcinoma the uterus        Subjective:   Kelli Fisher is a(n) 70 year old female who is breathing little bit more comfortably.  Tolerating soft diet.    Objective:   Blood pressure 120/77, pulse 104, temperature 98.7 °F (37.1 °C), temperature source Oral, resp. rate 20, height 5' (1.524 m), weight 169 lb (76.7 kg), SpO2 92%.    Physical Exam alert black female  HEENT examination is unremarkable with pupils equal round and reactive to light and accommodation.   Neck without adenopathy, thyromegaly, JVD nor bruit.   Lungs clear to auscultation and percussion.  Cardiac regular rate and rhythm no murmur.   Abdomen nontender, without hepatosplenomegaly and no mass appreciable.   Extremities without clubbing cyanosis nor edema.   Neurologic grossly intact with symmetric tone and strength and reflex.  Skin without gross abnormality     Results:     Lab Results   Component Value Date    WBC 18.5 04/16/2025    HGB 8.4 04/16/2025    HCT 26.1 04/16/2025    .0 04/16/2025    CREATSERUM 0.77 04/16/2025    BUN 13 04/16/2025     04/16/2025    K 4.1 04/16/2025    CL 99 04/16/2025    CO2 32.0 04/16/2025     04/16/2025    CA 9.0 04/16/2025       Srinath Levy MD  Medical Director, Critical Care, [facility]  Medical Director, [facility]  Pager: [phone]

## 2025-04-16 NOTE — PAYOR COMM NOTE
--------------  CONTINUED STAY REVIEW  -REQUESTING EXTRA DAY 4/16  Payor: [facility] MEDICARE ADV PPO  Subscriber #:  [id number]  Authorization Number: [id number]    Admit date: 4/12/25  Admit time:  1:24 AM    REVIEW DOCUMENTATION:      Signed     Date of Service: 4/16/2025 11:05 AM     Signed         [facility]  part of [facility]     Progress Note        Assessment and Plan:   1.  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  Has follow-up with gynecologic oncology.  Some dysphagia but tolerating soft diet.     Recommendations:  1.  Nebulizer-will set up at home with albuterol  2.  Discharge planning  3.  Arnuity Ellipta  4.  Steroid-will discontinue steroid at discharge  5.  Outpatient follow-up with gynecologic oncology     2.  DVT prophylaxis-subcutaneous heparin     3.  History of adenocarcinoma the uterus           Subjective:   Kelli Fisher is a(n) 70 year old female who is breathing little bit more comfortably.  Tolerating soft diet.     Objective:   Blood pressure 120/77, pulse 104, temperature 98.7 °F (37.1 °C), temperature source Oral, resp. rate 20, height 5' (1.524 m), weight 169 lb (76.7 kg), SpO2 92%.     Physical Exam alert black female  HEENT examination is unremarkable with pupils equal round and reactive to light and accommodation.   Neck without adenopathy, thyromegaly, JVD nor bruit.   Lungs clear to auscultation and percussion.  Cardiac regular rate and rhythm no murmur.   Abdomen nontender, without hepatosplenomegaly and no mass appreciable.   Extremities without clubbing cyanosis nor edema.   Neurologic grossly intact with symmetric tone and strength and reflex.  Skin without gross abnormality     Results:            Lab Results   Component Value Date     WBC 18.5 04/16/2025     HGB 8.4 04/16/2025     HCT 26.1 04/16/2025     .0 04/16/2025     CREATSERUM 0.77 04/16/2025     BUN 13 04/16/2025       04/16/2025     K 4.1 04/16/2025     CL 99 04/16/2025     CO2 32.0 04/16/2025      04/16/2025     CA 9.0 04/16/2025         Srianth Levy MD  Medical Director, Critical Care, [facility]  Medical Director, [facility]  Pager: [phone]                        MEDICATIONS ADMINISTERED IN LAST 1 DAY:  acetaminophen (Tylenol) tab 650 mg       Date Action Dose Route User    4/16/2025 1029 Given 650 mg Oral Shy Jin          fluticasone furoate (Arnuity Ellipta) 200 MCG/ACT inhaler 1 puff       Date Action Dose Route User    4/16/2025 1029 Given 1 puff Inhalation Shy Jin          heparin (Porcine) 5000 UNIT/ML injection 5,000 Units       Date Action Dose Route User    4/16/2025 1029 Given 5,000 Units Subcutaneous (Right Upper Abdomen) Shy Jin          iopamidol 76% (ISOVUE-370) injection for power injector       Date Action Dose Route User    4/15/2025 2152 Given 80 mL Intravenous Estelita Montes          ipratropium-albuterol (Duoneb) 0.5-2.5 (3) MG/3ML inhalation solution 3 mL       Date Action Dose Route User    4/16/2025 0739 Given 3 mL Nebulization Antoinette Hines RCP    4/15/2025 2054 Given 3 mL Nebulization Lena Caldwell RCP          ipratropium-albuterol (Duoneb) 0.5-2.5 (3) MG/3ML inhalation solution 3 mL       Date Action Dose Route User    4/16/2025 1207 Given 3 mL Nebulization Antoinette Hines RCP          levothyroxine (Synthroid) tab 100 mcg       Date Action Dose Route User    4/16/2025 0544 Given 100 mcg Oral Maribell Stoddard RN          pantoprazole (Protonix) DR tab 40 mg       Date Action Dose Route User    4/16/2025 0544 Given 40 mg Oral Maribell Stoddard RN          predniSONE (Deltasone) tab 20 mg       Date Action Dose Route User    4/16/2025 1029 Given 20 mg Oral Shy Jin            Vitals (last day)       Date/Time Temp Pulse Resp BP SpO2 Weight O2 Device O2 Flow Rate (L/min) [facility]    04/16/25 1241 98.1 °F (36.7 °C) 105 20 113/81 94 % -- None (Room air)  -- RH    04/16/25 0821 -- 104 20 120/77 92 % -- None (Room air) -- AG    04/16/25 0413 98.7 °F (37.1 °C) 100 16 124/81 93 % -- None (Room air) -- AD    04/15/25 2030 98.9 °F (37.2 °C) 99 18 137/90 95 % -- None (Room air) -- AD    04/15/25 1156 98.2 °F (36.8 °C) 104 18 125/79 95 % -- None (Room air) -- KM    04/15/25 0534 99.1 °F (37.3 °C) 100 18 115/84 92 % -- None (Room air) -- KB          CIWA Scores (since admission)       None

## 2025-04-16 NOTE — DISCHARGE INSTRUCTIONS
[facility] will process the request for nebulizer with your insurance and call you to set up delivery once approved. Any questions please call them:    [address] [place] [address]  Phone: [phone]  Fax: [phone]    Sometimes managing your health at home requires assistance.  The [facility] team has recognized your preference to use [facility].  They can be reached by phone at [phone].  The fax number for your reference is [phone].  A representative from the home health agency will contact you or your family to schedule your first visit.

## 2025-04-16 NOTE — TELEPHONE ENCOUNTER
Telephone call from Dr. Levy-order nebulizer and all related supplies. Order placed. Spoke to Yesi at [facility] who said she would expedite order. All documents faxed and confirmation received.

## 2025-04-16 NOTE — PLAN OF CARE
Pt is A&Ox 4, room air - PRN and scheduled nebs, tolerating low fiber soft diet, voiding freely, passing BMs, tolerating pain with hot packs, ambulating standby assist and a walker. While ambulating in the halls pt HR increased to 125bpm, O2 maintained above 97%. Call light within reach, calls appropriately, I-bed awareness/alarm on.       Problem: Patient Centered Care  Goal: Patient preferences are identified and integrated in the patient's plan of care  Description: Interventions:- What would you like us to know as we care for you?   - Provide timely, complete, and accurate information to patient/family- Incorporate patient and family knowledge, values, beliefs, and cultural backgrounds into the planning and delivery of care- Encourage patient/family to participate in care and decision-making at the level they choose- Honor patient and family perspectives and choices  Outcome: Progressing     Problem: Patient/Family Goals  Goal: Patient/Family Long Term Goal  Description: Patient's Long Term Goal: discharge home  Interventions:- - See additional Care Plan goals for specific interventions  Outcome: Progressing  Goal: Patient/Family Short Term Goal  Description: Patient's Short Term Goal: improve shortness of breath   Interventions: - See additional Care Plan goals for specific interventions  Outcome: Progressing     Problem: CARDIOVASCULAR - ADULT  Goal: Maintains optimal cardiac output and hemodynamic stability  Description: INTERVENTIONS:- Monitor vital signs, rhythm, and trends- Monitor for bleeding, hypotension and signs of decreased cardiac output- Evaluate effectiveness of vasoactive medications to optimize hemodynamic stability- Monitor arterial and/or venous puncture sites for bleeding and/or hematoma- Assess quality of pulses, skin color and temperature- Assess for signs of decreased coronary artery perfusion - ex. Angina- Evaluate fluid balance, assess for edema, trend weights  Outcome:  Progressing  Goal: Absence of cardiac arrhythmias or at baseline  Description: INTERVENTIONS:- Continuous cardiac monitoring, monitor vital signs, obtain 12 lead EKG if indicated- Evaluate effectiveness of antiarrhythmic and heart rate control medications as ordered- Initiate emergency measures for life threatening arrhythmias- Monitor electrolytes and administer replacement therapy as ordered  Outcome: Progressing     Problem: METABOLIC/FLUID AND ELECTROLYTES - ADULT  Goal: Glucose maintained within prescribed range  Description: INTERVENTIONS:- Monitor Blood Glucose as ordered- Assess for signs and symptoms of hyperglycemia and hypoglycemia- Administer ordered medications to maintain glucose within target range- Assess barriers to adequate nutritional intake and initiate nutrition consult as needed- Instruct patient on self management of diabetes  Outcome: Progressing  Goal: Electrolytes maintained within normal limits  Description: INTERVENTIONS:- Monitor labs and rhythm and assess patient for signs and symptoms of electrolyte imbalances- Administer electrolyte replacement as ordered- Monitor response to electrolyte replacements, including rhythm and repeat lab results as appropriate- Fluid restriction as ordered- Instruct patient on fluid and nutrition restrictions as appropriate  Outcome: Progressing  Goal: Hemodynamic stability and optimal renal function maintained  Description: INTERVENTIONS:- Monitor labs and assess for signs and symptoms of volume excess or deficit- Monitor intake, output and patient weight- Monitor urine specific gravity, serum osmolarity and serum sodium as indicated or ordered- Monitor response to interventions for patient's volume status, including labs, urine output, blood pressure (other measures as available)- Encourage oral intake as appropriate- Instruct patient on fluid and nutrition restrictions as appropriate  Outcome: Progressing

## 2025-04-16 NOTE — PROGRESS NOTES
[facility]  part of Located within [facility]     Hospitalist Progress Note     Kelli Fisher Patient Status:  Inpatient    1955 MRN [id number]   Location [facility] 4W/SW/SE Attending Joel Dejesus MD   Hosp Day # 4 PCP Taylor Gallardo MD     Chief Complaint:   Chief Complaint   Patient presents with    Difficulty Breathing        Subjective:     Patient seen sitting in chair.  Daughter on telephone.  Patient denies acute events overnight.  Patient reports she continues to have intermittent shortness of breath.  Feels better after nebulizer treatments.  Patient also describing pain at biopsy site  And dysphagia, but tolerating soft diet.    Objective:      Vital signs:  Vitals:    04/15/25 2030 25 0413 25 0821 25 1241   BP: 137/90 124/81 120/77 113/81   BP Location: Right arm Right arm Right arm Right arm   Pulse: 99 100 104 105   Resp: 18 16 20 20   Temp: 98.9 °F (37.2 °C) 98.7 °F (37.1 °C)  98.1 °F (36.7 °C)   TempSrc: Oral Oral  Temporal   SpO2: 95% 93% 92% 94%   Weight:       Height:           Intake/Output Summary (Last 24 hours) at 2025 1527  Last data filed at 4/15/2025 1720  Gross per 24 hour   Intake 480 ml   Output --   Net 480 ml           Physical Exam:    GENERAL:  Awake and alert, in no acute distress.  HEART:  Regular rhythm, mild tachycardia  LUNGS:  Air entry was decreased.  No increased work of breathing or wheezes   ABDOMEN: Soft and non-tender.    PSYCHIATRIC: Anxious mood    Diagnostic Data:    Labs:    Recent Labs   Lab 25  0703 25  0628 04/15/25  0533 25  0632   WBC  --    < > 16.5* 17.9* 18.5*   HGB  --    < > 8.3* 8.3* 8.4*   MCV  --    < > 79.1* 79.7* 79.6*   PLT  --    < > 406.0 422.0 412.0   INR 1.28*  --   --   --   --     < > = values in this interval not displayed.       Recent Labs   Lab 25  0703 25  0628 04/15/25  0533 25  0632   *   < > 125* 134* 118*   BUN 9   <  > 9 13 13   CREATSERUM 0.82   < > 0.78 0.75 0.77   CA 8.8   < > 8.8 8.7 9.0   ALB 3.6  --   --   --   --       < > 139 138 139   K 4.0   < > 3.8 4.1 4.1      < > 102 101 99   CO2 28.0   < > 29.0 31.0 32.0   ALKPHO 186*  --   --   --   --    AST 25  --   --   --   --    ALT 11  --   --   --   --    BILT 0.8  --   --   --   --    TP 7.2  --   --   --   --     < > = values in this interval not displayed.           Estimated Creatinine Clearance: 49.5 mL/min (based on SCr of 0.77 mg/dL).    Recent Labs   Lab 04/11/25 2027   PTP 16.7*   INR 1.28*            COVID-19  No results found for: \"COVID19\"    Pro-Calcitonin  No results for input(s): \"PCT\" in the last 168 hours.    Cardiac  Recent Labs   Lab 04/11/25 2027   PBNP 121       Inflammatory Markers  Recent Labs   Lab 04/12/25  0703   [place]VIL[place] 966*       Culture:  No results found for this visit on 04/11/25.    CT ABDOMEN+PELVIS(CONTRAST ONLY)(CPT=74177)  Result Date: 4/16/2025  CONCLUSION:  1. History of recurrent endometrial carcinoma on recent supraclavicular lymph node biopsy.  Partially imaged randomly distributed right greater than left basilar lung nodules, which are concerning for hematogenous pulmonary metastases.  These are better assessed on recent prior chest CT. 2. Small 1 cm low-attenuation right hepatic lobe lesion is indeterminate, but a small hepatic metastasis cannot be excluded.  Consider further evaluation with either PET-CT or a liver protocol MRI of the abdomen without and with contrast.  Correlation with any available outside institution imaging would also be helpful to assess stability. 3. Small 0.7 cm indeterminate attenuation left lower pole renal cortical lesion.  Differential considerations include a small solid renal mass versus complex cyst.  Given small size, this should be reassessed on anticipated follow-up exam. 4. No definite additional metastatic disease within the abdomen. 5. Hysterectomy and bilateral salpingo oophorectomy.  6. Small circumferential pericardial effusion.  There are also trace right greater than left pleural effusions.  Malignant pleural and pericardial fluid cannot be excluded. 7. Mild scattered colonic diverticulosis. 8. Small retrocardiac hiatal hernia. 9. Circumferential urinary bladder wall thickening, which may relate to incomplete distention or cystitis.  If there are referable symptoms, urinalysis correlation is requested. 10. Anatomic variant retroaortic left renal vein. 11. Mild anasarca.   A preliminary report was issued by the [facility] teleradiology service. There are no major discrepancies.  elm-remote  Dictated by (CST): Lemuel Gallegos MD on 4/16/2025 at 10:56 AM     Finalized by (CST): Lemuel Gallegos MD on 4/16/2025 at 11:06 AM          IR BIOPSY  Result Date: 4/15/2025  CONCLUSION:  1. Successful left supraclavicular lymphadenopathy biopsy as detailed above.     Dictated by (CST): Parviz Ragsdale MD on 4/15/2025 at 2:51 PM     Finalized by (CST): Parviz Ragsdale MD on 4/15/2025 at 2:54 PM                  Medications: Scheduled Medications[1]    Assessment & Plan:      Left upper lobe lung mass possible malignancy  -Pulmonary has been consulted   -s/p sonographic guided left suprclavicular lymph node by IR on 04/14/25  -Follow-up pathology     Acute asthma exacerbation   -Resolving  - Continue on DuoNebs, will continue the same as needed.    - Pulmonology on consult, recommend discontinuing steroids and discharge.  - Plan for home nebulizer and treatment on discharge.  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  -patient would like to transfer oncology care from [place] to [place].  -Dr. Herring consulted.     Dysphagia  -Likely secondary to compression from mediastinal  adenopathy  - improving, able to tolerate soft diet.  -GI signed off     Pericardial effusion  -CT showed moderate pericardial effusion  -Echo showed small pericardial effusion   -Cards signed off    Dietitian Malnutrition Assessment    Evaluation for Malnutrition: Criteria for non-severe malnutrition diagnosis-         chronic illness related to   Body fat mild depletion., Muscle mass mild depletion.       RD Malnutrition Care Plan: Encouraged increased PO intake., Encouraged small frequent meals with emphasis on high calorie/high protein., Intiated ONS (oral nutritional supplements).    Body Fat/Muscle Mass: Mild depletion body fat., Mild depletion muscle mass. triceps region. dorsal dale region., calf region.    Physician Assessment     Patient has a diagnosis of moderate malnutrition    Addendum: Patient and RN, patient having increased anxiety and concerned about discharging home.  Social work assisting with establishment of outpatient nebulizers.    Plan of care discussed with patient at bedside with daughter on telephone. Discussed management/test result(s) with Rn and pulmonology consultant    Quality:  DVT Prophylaxis: Heparin  CODE status: Full  Estimated date of discharge: TBD  Discharge is dependent on: clinical stability    55 minutes spent discussing with other providers, examining patient, obtaining history, reviewing medical records, interpreting and communicating test results/imaging, ordering tests/medications, discussing plan of care and documenting information.      Joel Dejesus MD          This note was prepared using Dragon Medical voice recognition dictation software. As a result errors may occur. When identified these errors have been corrected. While every attempt is made to correct errors during dictation discrepancies may still exist         [1]    ipratropium-albuterol  3 mL Nebulization QID    predniSONE  20 mg Oral Daily with breakfast    fluticasone furoate  1 puff Inhalation  Daily    heparin  5,000 Units Subcutaneous Q12H    pantoprazole  40 mg Oral QAM AC    levothyroxine  100 mcg Oral Before breakfast

## 2025-04-16 NOTE — CM/SW NOTE
MDO Will need albuterol nebulizer to be used every 6 hours as needed.    Sent inquiry to MD/Pulm/RN requesting full dosing instructions for neb. Await reply.    HME ref sent, liaison notified of need.    1320  Per Dr Levy -nebulizer with Albuterol 1 unit dosed vial every 6 hours as needed. LIBERTAD entered order as above for MD to cosign.    CM notified team that pt will need a script for the Albuterol to fill at her pharmacy    Signed order added to ref, liaison notified of dc order for today.    1500  HME notified CM-the order was approved however, [facility] cannot find a qualifying dx for a nebulizer documented.  Insurance approved dx are: emphysema, bronchitis, asthma and COPD only. Not any of pts documented pulmonary conditions. LIBERTAD asked liaison to notify pt of above.    LIBERTAD called [facility]. LIBERTAD was told that an MD can call in an order to [phone] or fax an order that states \"nebulizer with complete kit\" to [phone]. They can provide one to pt at a discounted rate of $57.45     Per RN pt is requesting her pharmacy be checked.    CM spoke with [facility] on file, they have one in stock however, it is $70 out of pocket only, they do not submit to insurance.  Per RN pt is agreeable to cost.    LIBERTAD notified MD/Pulm MD and RN of need for script for nebulizer, complete kit and prescribed medication to be sent to [facility].    Plan  home    / to remain available for support and/or discharge planning.     Ashley Muñoz, UNRULY    Ext [phone]

## 2025-04-16 NOTE — PROGRESS NOTES
Hospital Follow up for Specialist (expt Discharge 4/16 elm)   Called out to pt who is IP stated to call daughter to schedule    Cardiology   Oniel Grijalva : follow up 3 days  [facility]   [address] [facility] [address]   [phone]  Gynecology Oncology   Dominick Herring: follow up 1w  [address] [facility] [address]   [place], [place] [address] [phone]  PCP   Taylor Gallardo   [address]  [place] [address] [phone]   Left message on voicemail for patient to call transitions specialist back to schedule follow up appointments. Provided Transitions specialist scheduling phone number [phone].

## 2025-04-16 NOTE — PLAN OF CARE
CT abdomen pelvis completed. Patient denied pain. Patient ambulates stand by assist with a walker. Fall precautions in place, bed in lowest position and call light within reach. Patient up in chair with family at the bedside at the start of shift.    Problem: Patient Centered Care  Goal: Patient preferences are identified and integrated in the patient's plan of care  Description: Interventions:- What would you like us to know as we care for you?   - Provide timely, complete, and accurate information to patient/family- Incorporate patient and family knowledge, values, beliefs, and cultural backgrounds into the planning and delivery of care- Encourage patient/family to participate in care and decision-making at the level they choose- Honor patient and family perspectives and choices  Outcome: Progressing     Problem: Patient/Family Goals  Goal: Patient/Family Long Term Goal  Description: Patient's Long Term Goal: discharge home  Interventions:- - See additional Care Plan goals for specific interventions  Outcome: Progressing  Goal: Patient/Family Short Term Goal  Description: Patient's Short Term Goal: improve shortness of breath   Interventions: - See additional Care Plan goals for specific interventions  Outcome: Progressing     Problem: CARDIOVASCULAR - ADULT  Goal: Maintains optimal cardiac output and hemodynamic stability  Description: INTERVENTIONS:- Monitor vital signs, rhythm, and trends- Monitor for bleeding, hypotension and signs of decreased cardiac output- Evaluate effectiveness of vasoactive medications to optimize hemodynamic stability- Monitor arterial and/or venous puncture sites for bleeding and/or hematoma- Assess quality of pulses, skin color and temperature- Assess for signs of decreased coronary artery perfusion - ex. Angina- Evaluate fluid balance, assess for edema, trend weights  Outcome: Progressing  Goal: Absence of cardiac arrhythmias or at baseline  Description: INTERVENTIONS:- Continuous  cardiac monitoring, monitor vital signs, obtain 12 lead EKG if indicated- Evaluate effectiveness of antiarrhythmic and heart rate control medications as ordered- Initiate emergency measures for life threatening arrhythmias- Monitor electrolytes and administer replacement therapy as ordered  Outcome: Progressing     Problem: METABOLIC/FLUID AND ELECTROLYTES - ADULT  Goal: Glucose maintained within prescribed range  Description: INTERVENTIONS:- Monitor Blood Glucose as ordered- Assess for signs and symptoms of hyperglycemia and hypoglycemia- Administer ordered medications to maintain glucose within target range- Assess barriers to adequate nutritional intake and initiate nutrition consult as needed- Instruct patient on self management of diabetes  Outcome: Progressing  Goal: Electrolytes maintained within normal limits  Description: INTERVENTIONS:- Monitor labs and rhythm and assess patient for signs and symptoms of electrolyte imbalances- Administer electrolyte replacement as ordered- Monitor response to electrolyte replacements, including rhythm and repeat lab results as appropriate- Fluid restriction as ordered- Instruct patient on fluid and nutrition restrictions as appropriate  Outcome: Progressing  Goal: Hemodynamic stability and optimal renal function maintained  Description: INTERVENTIONS:- Monitor labs and assess for signs and symptoms of volume excess or deficit- Monitor intake, output and patient weight- Monitor urine specific gravity, serum osmolarity and serum sodium as indicated or ordered- Monitor response to interventions for patient's volume status, including labs, urine output, blood pressure (other measures as available)- Encourage oral intake as appropriate- Instruct patient on fluid and nutrition restrictions as appropriate  Outcome: Progressing

## 2025-04-17 VITALS
HEART RATE: 113 BPM | SYSTOLIC BLOOD PRESSURE: 112 MMHG | DIASTOLIC BLOOD PRESSURE: 73 MMHG | OXYGEN SATURATION: 95 % | BODY MASS INDEX: 33.18 KG/M2 | WEIGHT: 169 LBS | TEMPERATURE: 99 F | RESPIRATION RATE: 22 BRPM | HEIGHT: 60 IN

## 2025-04-17 LAB — GLUCOSE BLDC GLUCOMTR-MCNC: 130 MG/DL (ref 70–99)

## 2025-04-17 PROCEDURE — 99232 SBSQ HOSP IP/OBS MODERATE 35: CPT | Performed by: INTERNAL MEDICINE

## 2025-04-17 PROCEDURE — 99239 HOSP IP/OBS DSCHRG MGMT >30: CPT | Performed by: INTERNAL MEDICINE

## 2025-04-17 NOTE — CM/SW NOTE
CM rec'd call from son Phillip [phone] stating pt called him and told him she has been sitting outside the apt for over an hour.    CM called [facility] for f/u. Per [facility] they are waiting to hear from their amb dispatcher for an upd. CM provided sons name and contact and asked [facility] to call him with upd asap. Son was upd to expect a call.    CM called [facility] with above. Requested he f/u and expedite lift assist asap and call son with resolution.    / to remain available for support and/or discharge planning.     Ashley Muñoz, RN    Ext [phone]

## 2025-04-17 NOTE — PROGRESS NOTES
Calling patient's daughterTheresa to schedule appointments (exp dc 04/16)    Dr Oniel Grijalva  Cardiology  [facility]  [address] [facility] [address]  [place], [place] [id number] [phone]  Follow up 3 days  Apt made:  Tue 05/06 @3:30pm w/ABBIE Cancino Dr  Internal Medicine  [address] [place], [place] [id number] [phone]  Apt made:  Tue 04/22 @4:00pm  Confirmed w/pt's daughterTheresa  Closing encounter

## 2025-04-17 NOTE — DISCHARGE SUMMARY
[facility]  part of [facility]    Discharge Summary    Kelli Fisher Patient Status:  Inpatient    1955 MRN [id number]   Location [facility] 4W/SW/SE Attending Joel Dejesus MD   Hosp Day # 5 PCP Taylor Gallardo MD     Date of Admission: 2025     Date of Discharge: 25      Lace+ Score: 52  59-90 High Risk  29-58 Medium Risk  0-28   Low Risk.    TCM Follow-Up Recommendation:  LACE 29-58: Moderate Risk of readmission after discharge from the hospital.    DISCHARGE DX: Principal Problem:    Shortness of breath  Active Problems:    Acute hypoxic respiratory failure (HCC)    Lung mass    Supraclavicular adenopathy    Dysphagia, unspecified type    Pericardial effusion (HCC)       The patient was seen and examined on day of discharge and this discharge summary is in conjunction with any daily progress note from day of discharge.    HPI per admitting physician: \"Kelli Fisher is a(n) 70 year old female, with a past medical history significant for endometrial carcinoma, considered to be free of disease earlier in the year presents with increasing shortness of breath and difficulty swallowing for the past few days.  Recent workup indicated likelihood of pneumonia however possibility of malignancy was also entertained, patient was scheduled to see oncology and have a biopsy done however was not able to make the appointment.  Today comes in with difficulty swallowing solids, claims she is able to keep liquids down however feels like they stay in her stomach for too long.  Workup in ER indicates significant lymphadenopathy cervical and supraclavicular along with hilar and a large left lung mass as well.\"    Hospital Course:      Left upper lobe lung mass possible malignancy  -Pulmonary consulted   -s/p sonographic guided left suprclavicular lymph node by IR on 25  -Follow-up pathology     Acute asthma exacerbation   -Resolving  - Pulmonology on consult  - Plan for home  nebulizer and treatment on discharge.  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  -patient would like to transfer oncology care from [place] to [place].  -Dr. Herring consulted. Plan for follow up as outpt     Dysphagia  -Likely secondary to compression from mediastinal adenopathy  - improving, able to tolerate soft diet.  -GI signed off     Pericardial effusion  -CT showed moderate pericardial effusion  -Echo showed small pericardial effusion   -Cards signed off      Physical Exam:    Vitals:    04/16/25 1241 04/16/25 1952 04/17/25 0511 04/17/25 0945   BP: 113/81 122/76 127/81    BP Location: Right arm Right arm Right arm    Pulse: 105 112 108 (!) 128   Resp: 20 20 20 26   Temp: 98.1 °F (36.7 °C)  99.3 °F (37.4 °C)    TempSrc: Temporal Temporal Oral    SpO2: 94% 96% 95%    Weight:       Height:         Patient Weight for the past 72 hrs:   Weight   04/14/25 1202 169 lb (76.7 kg)     No intake or output data in the 24 hours ending 04/17/25 1052      GENERAL:  Awake and alert, in no acute distress.  HEART:  Regular rhythm, mild tachycardia  LUNGS:  Air entry was decreased.  No increased work of breathing or wheezes   ABDOMEN: Soft and non-tender.    PSYCHIATRIC: Normal mood    CULTURE:   No results found for this visit on 04/11/25.    IMAGING STUDIES: SOME MAY NEED FOLLOW UP WITH PCP   CT ABDOMEN+PELVIS(CONTRAST ONLY)(CPT=74177)  Result Date: 4/16/2025  CONCLUSION:  1. History of recurrent endometrial carcinoma on recent supraclavicular lymph node biopsy.  Partially imaged randomly distributed right greater than left basilar lung nodules, which are concerning for hematogenous pulmonary metastases.  These are better assessed on recent prior chest CT. 2. Small 1 cm low-attenuation right hepatic lobe lesion is  indeterminate, but a small hepatic metastasis cannot be excluded.  Consider further evaluation with either PET-CT or a liver protocol MRI of the abdomen without and with contrast.  Correlation with any available outside institution imaging would also be helpful to assess stability. 3. Small 0.7 cm indeterminate attenuation left lower pole renal cortical lesion.  Differential considerations include a small solid renal mass versus complex cyst.  Given small size, this should be reassessed on anticipated follow-up exam. 4. No definite additional metastatic disease within the abdomen. 5. Hysterectomy and bilateral salpingo oophorectomy. 6. Small circumferential pericardial effusion.  There are also trace right greater than left pleural effusions.  Malignant pleural and pericardial fluid cannot be excluded. 7. Mild scattered colonic diverticulosis. 8. Small retrocardiac hiatal hernia. 9. Circumferential urinary bladder wall thickening, which may relate to incomplete distention or cystitis.  If there are referable symptoms, urinalysis correlation is requested. 10. Anatomic variant retroaortic left renal vein. 11. Mild anasarca.   A preliminary report was issued by the [facility] Radiology teleradiology service. There are no major discrepancies.  elm-remote  Dictated by (CST): Lemuel Gallegos MD on 4/16/2025 at 10:56 AM     Finalized by (CST): Lemuel Gallegos MD on 4/16/2025 at 11:06 AM          IR BIOPSY  Result Date: 4/15/2025  CONCLUSION:  1. Successful left supraclavicular lymphadenopathy biopsy as detailed above.     Dictated by (CST): Parviz Ragsdale MD on 4/15/2025 at 2:51 PM     Finalized by (CST): Parviz Ragsdale MD on 4/15/2025 at 2:54 PM          CT CHEST PE AORTA (IV ONLY) (CPT=71260)  Result Date: 4/11/2025  CONCLUSION:  1. No evidence of acute pulmonary embolism to the level of the first order subsegmental pulmonary artery branches.  2. Masslike opacity involving the left upper lobe, concerning for potential  neoplastic process.  3. Extensive bilateral nodules and micronodules, which could be metastatic in nature.  4. Marked mediastinal/perihilar lymphadenopathy, likely neoplastic.  5. Partially delineated supraclavicular lymphadenopathy.  6. Moderate pericardial effusion, potentially malignant.  7. Small bilateral pleural effusions are present with associated basilar atelectasis, with or without superimposed pneumonia.  8. Mild interlobular septal thickening is present and could be indicative of pulmonary interstitial edema or lymphangitic dissemination of malignancy.  9. Lesser incidental findings as above.    Dictated by (CST): Harpreet Jade MD on 4/11/2025 at 10:08 PM     Finalized by (CST): Harpreet Jade MD on 4/11/2025 at 10:20 PM          CT SOFT TISSUE OF NECK(CONTRAST ONLY) (CPT=70491)  Result Date: 4/11/2025  CONCLUSION:  1. Extensive lymphadenopathy, likely metastatic.  2. Lesser incidental findings as above.     Dictated by (CST): Harpreet Jade MD on 4/11/2025 at 9:59 PM     Finalized by (CST): Harpreet Jade MD on 4/11/2025 at 10:08 PM            LABS :     Lab Results   Component Value Date    WBC 18.5 (H) 04/16/2025    HGB 8.4 (L) 04/16/2025    HCT 26.1 (L) 04/16/2025    .0 04/16/2025    CREATSERUM 0.77 04/16/2025    BUN 13 04/16/2025     04/16/2025    K 4.1 04/16/2025    CL 99 04/16/2025    CO2 32.0 04/16/2025     (H) 04/16/2025    CA 9.0 04/16/2025    ALB 3.6 04/11/2025    ALKPHO 186 (H) 04/11/2025    BILT 0.8 04/11/2025    TP 7.2 04/11/2025    AST 25 04/11/2025    ALT 11 04/11/2025    PTT 35.9 04/11/2025    INR 1.28 (H) 04/11/2025    LIP 42 12/11/2023    MG 2.0 04/13/2025       Recent Labs   Lab 04/11/25 2026 04/12/25  0703 04/13/25  0606 04/14/25  0628 04/15/25  0533 04/16/25  0632   RBC 3.39* 3.31* 3.08* 3.30* 3.25* 3.28*   HGB 8.5* 8.2* 7.8* 8.3* 8.3* 8.4*   HCT 26.7* 26.0* 24.1* 26.1* 25.9* 26.1*   MCV 78.8* 78.5* 78.2* 79.1* 79.7* 79.6*   MCH 25.1* 24.8* 25.3* 25.2*  25.5* 25.6*   MCHC 31.8 31.5 32.4 31.8 32.0 32.2   RDW 20.7* 20.5* 20.2* 20.5* 20.6* 21.0*   NEPRELIM 9.88* 8.61* 14.17*  --   --   --    WBC 12.4* 10.9 17.0* 16.5* 17.9* 18.5*   .0 417.0 401.0 406.0 422.0 412.0     Recent Labs   Lab 04/11/25 2027 04/12/25  0703 04/14/25  0628 04/15/25  0533 04/16/25  0632   *   < > 125* 134* 118*   BUN 9   < > 9 13 13   CREATSERUM 0.82   < > 0.78 0.75 0.77   CA 8.8   < > 8.8 8.7 9.0   ALB 3.6  --   --   --   --       < > 139 138 139   K 4.0   < > 3.8 4.1 4.1      < > 102 101 99   CO2 28.0   < > 29.0 31.0 32.0   ALKPHO 186*  --   --   --   --    AST 25  --   --   --   --    ALT 11  --   --   --   --    BILT 0.8  --   --   --   --    TP 7.2  --   --   --   --     < > = values in this interval not displayed.     Lab Results   Component Value Date    INR 1.28 (H) 04/11/2025       Disposition: Discharge to Home    Condition at Discharge: Stable     Discharge Medications:      Discharge Medications        START taking these medications        Instructions Prescription details   albuterol (2.5 MG/3ML) 0.083% Nebu  Commonly known as: Ventolin  Replaces: albuterol 108 (90 Base) MCG/ACT Aers      Take 3 mL (2.5 mg total) by nebulization every 6 (six) hours as needed for Wheezing.   Quantity: 90 each  Refills: 3            CONTINUE taking these medications        Instructions Prescription details   Fluticasone Propionate  MCG/ACT Aero  Commonly known as: FLOVENT HFA  Notes to patient: FOR SHORTNESS OF BREATH / WHEEZING      Inhale 2 puffs into the lungs in the morning and 2 puffs before bedtime.   Refills: 0     levothyroxine 100 MCG Tabs  Commonly known as: Synthroid  Notes to patient: FOR LOW THYROID      Take 1 tablet (100 mcg total) by mouth before breakfast.   Refills: 0            STOP taking these medications      albuterol 108 (90 Base) MCG/ACT Aers  Commonly known as: Ventolin HFA  Replaced by: albuterol (2.5 MG/3ML) 0.083% Nebu        predniSONE 10  MG Tabs  Commonly known as: Deltasone                  Where to Get Your Medications        These medications were sent to [id number] - [place], [place] - [address], [phone], [phone]  [address] [facility] [address]      Phone: [phone]   albuterol (2.5 MG/3ML) 0.083% Nebu         Follow up Visits  Oniel Grijalva MD  [address] [facility] [address] [phone]    Go on 5/6/2025  @3:30pm with ABBIE Cancino; for your Cardiology follow up appointment    Dominick Herring DO  [facility] [place] [address], [address] [address] [phone]    Go in 1 week(s)  Office will call to ebony Gallardo MD    Consultants         Provider   Role Specialty     Dominick Herring DO      Consulting Physician Gynecology Oncology     Geoff ePnaloza MD      Consulting Physician Hematology and Oncology     Eddie Kemp MD      Consulting Physician Interventional, Cardiology     Mercedes Martínez APRN      Consulting Physician Nurse Practitioner     SIDDHARTHA Jade MD      Consulting Physician GASTROENTEROLOGY     Srinath Levy MD      Consulting Physician PULMONARY DISEASES     Tony Loaiza MD      Consulting Physician Radiology     Carole Maza MD      Consulting Physician HOSPITALIST              Other Discharge Instructions:       Discharge Instructions         [facility] will process the request for nebulizer with your insurance and call you to set up delivery once approved. Any questions please call them:    [address] [place] [address]  Phone: [phone]  Fax: [phone]        ----------------------------------------------------  38 MIN SPENT ON THIS DC   Joel Dejesus MD    4/17/2025

## 2025-04-17 NOTE — CM/SW NOTE
CM was called by PT-Anticipated therapy need: Home with Home Healthcare    Pt did not meet criteria for home 02    Ref sent, f2f done    CM self ref to case for dc planning    CM called pt who is agreeable to [facility] and [facility] if available. Confirmed address on file and PCP.    Pt requested medicar to get home and has 3 flts of stairs. CM suggested medicar ,covered by her medicaid and lift asst. Pt agreeable.    CM placed medicar w/ lift asst on will call, pcs done. RN to schedule  time at [phone]  CM was notified by RN that pt is saying that her landlord told her the stretcher wont fit bc the hallways are too narrow.     Pt told the same thing to the [facility] liaison and added that her son/dtr will help her up the stairs.    CM cancelled the lift assist    Plan  Home with [facility]    / to remain available for support and/or discharge planning.     Ashley Muñoz, UNRULY    Ext [phone]

## 2025-04-17 NOTE — PROGRESS NOTES
[facility]  part of [facility]    Progress Note      Assessment and Plan:   1.  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  Has follow-up with gynecologic oncology.  Some dysphagia but tolerating soft diet.  Continues to stabilize and is okay to discharge home from my perspective.     Recommendations:  1.  I ordered a home nebulizer and sent in prescription for albuterol unit dose vials.  2.  Discharge planning-okay to home from my perspective.  3.  Arnuity Ellipta  4.  Steroid-will discontinue steroid at discharge  5.  Outpatient follow-up with gynecologic oncology     2.  DVT prophylaxis-subcutaneous heparin     3.  History of adenocarcinoma the uterus        Subjective:   Kelli Fisher is a(n) 70 year old female who is breathing little bit more comfortably.  Tolerating soft diet.    Objective:   Blood pressure 127/81, pulse 108, temperature 99.3 °F (37.4 °C), temperature source Oral, resp. rate 20, height 5' (1.524 m), weight 169 lb (76.7 kg), SpO2 95%.    Physical Exam alert black female  HEENT examination is unremarkable with pupils equal round and reactive to light and accommodation.   Neck without adenopathy, thyromegaly, JVD nor bruit.   Lungs clear to auscultation and percussion.  Cardiac regular rate and rhythm no murmur.   Abdomen nontender, without hepatosplenomegaly and no mass appreciable.   Extremities without clubbing cyanosis nor edema.   Neurologic grossly intact with symmetric tone and strength and reflex.  Skin without gross abnormality     Results:            Srinath Levy MD  Medical Director, Critical Care, [facility]  Medical Director, [facility]  Pager: [phone]

## 2025-04-17 NOTE — PLAN OF CARE
Kelli is A&Ox4. Room air. Denies need for oral pain medication, using heat packs as needed. Saline locked, tolerating low fiber/soft diet. Call light within reach.     Problem: Patient Centered Care  Goal: Patient preferences are identified and integrated in the patient's plan of care  Description: Interventions:- What would you like us to know as we care for you?   - Provide timely, complete, and accurate information to patient/family- Incorporate patient and family knowledge, values, beliefs, and cultural backgrounds into the planning and delivery of care- Encourage patient/family to participate in care and decision-making at the level they choose- Honor patient and family perspectives and choices  Outcome: Progressing     Problem: Patient/Family Goals  Goal: Patient/Family Long Term Goal  Description: Patient's Long Term Goal: discharge home  Interventions:- - See additional Care Plan goals for specific interventions  Outcome: Progressing  Goal: Patient/Family Short Term Goal  Description: Patient's Short Term Goal: improve shortness of breath   Interventions: - See additional Care Plan goals for specific interventions  Outcome: Progressing     Problem: CARDIOVASCULAR - ADULT  Goal: Maintains optimal cardiac output and hemodynamic stability  Description: INTERVENTIONS:- Monitor vital signs, rhythm, and trends- Monitor for bleeding, hypotension and signs of decreased cardiac output- Evaluate effectiveness of vasoactive medications to optimize hemodynamic stability- Monitor arterial and/or venous puncture sites for bleeding and/or hematoma- Assess quality of pulses, skin color and temperature- Assess for signs of decreased coronary artery perfusion - ex. Angina- Evaluate fluid balance, assess for edema, trend weights  Outcome: Progressing  Goal: Absence of cardiac arrhythmias or at baseline  Description: INTERVENTIONS:- Continuous cardiac monitoring, monitor vital signs, obtain 12 lead EKG if indicated- Evaluate  effectiveness of antiarrhythmic and heart rate control medications as ordered- Initiate emergency measures for life threatening arrhythmias- Monitor electrolytes and administer replacement therapy as ordered  Outcome: Progressing     Problem: METABOLIC/FLUID AND ELECTROLYTES - ADULT  Goal: Glucose maintained within prescribed range  Description: INTERVENTIONS:- Monitor Blood Glucose as ordered- Assess for signs and symptoms of hyperglycemia and hypoglycemia- Administer ordered medications to maintain glucose within target range- Assess barriers to adequate nutritional intake and initiate nutrition consult as needed- Instruct patient on self management of diabetes  Outcome: Progressing  Goal: Electrolytes maintained within normal limits  Description: INTERVENTIONS:- Monitor labs and rhythm and assess patient for signs and symptoms of electrolyte imbalances- Administer electrolyte replacement as ordered- Monitor response to electrolyte replacements, including rhythm and repeat lab results as appropriate- Fluid restriction as ordered- Instruct patient on fluid and nutrition restrictions as appropriate  Outcome: Progressing  Goal: Hemodynamic stability and optimal renal function maintained  Description: INTERVENTIONS:- Monitor labs and assess for signs and symptoms of volume excess or deficit- Monitor intake, output and patient weight- Monitor urine specific gravity, serum osmolarity and serum sodium as indicated or ordered- Monitor response to interventions for patient's volume status, including labs, urine output, blood pressure (other measures as available)- Encourage oral intake as appropriate- Instruct patient on fluid and nutrition restrictions as appropriate  Outcome: Progressing

## 2025-04-17 NOTE — PROGRESS NOTES
[facility]  part of [facility]    Gynecologic Oncology Post-Op Progress Note    Kelil Fisher Patient Status:  Inpatient    1955 MRN [id number]   Location 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 She continues to experience SOB but is improving. Ambulated today with PT. Otherwise denies pain, chest pain, nausea or vomiting    Objective/Physical Exam:  General: Alert, orientated x3.  Cooperative.  No apparent distress.  Vital Signs:  Blood pressure 127/81, pulse 108, temperature 99.3 °F (37.4 °C), temperature source Oral, resp. rate 20, height 5' (1.524 m), weight 169 lb (76.7 kg), SpO2 95%.    Labs:  Recent Labs   Lab 25  0606 25  0628 25  0632   RBC 3.08*   < > 3.28*   HGB 7.8*   < > 8.4*   HCT 24.1*   < > 26.1*   MCV 78.2*   < > 79.6*   MCH 25.3*   < > 25.6*   MCHC 32.4   < > 32.2   RDW 20.2*   < > 21.0*   NEPRELIM 14.17*  --   --    WBC 17.0*   < > 18.5*   .0   < > 412.0    < > = values in this interval not displayed.       Recent Labs   Lab 25  0703 25  0628 04/15/25  0533 25  0632   *   < > 125* 134* 118*   BUN 9   < > 9 13 13   CREATSERUM 0.82   < > 0.78 0.75 0.77   CA 8.8   < > 8.8 8.7 9.0   ALB 3.6  --   --   --   --       < > 139 138 139   K 4.0   < > 3.8 4.1 4.1      < > 102 101 99   CO2 28.0   < > 29.0 31.0 32.0   ALKPHO 186*  --   --   --   --    AST 25  --   --   --   --    ALT 11  --   --   --   --    BILT 0.8  --   --   --   --    TP 7.2  --   --   --   --     < > = values in this interval not displayed.       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 Patient had been treated at [facility] as outlined above. Last treatment with Carboplatin in 9/2024 and PHILIP until now.    CT AP reviewed with patient and her daughter. Explained findings of non-specific 1cm liver lesion could represent metastasis. Otherwise no other signs of recurrence.      and CEA elevated, are makers of treatment response that will be followed      PLAN:  Recurrent endometrial cancer- pt wishes to transfer care to Dr. Herring  - Pathology and CT chest studies reviewed with patient and family at bedside. Reviewed that findings are consistent with recurrent cancer. Location of the LAD and lung mass are likely the etiology of her SOB and dysphagia   - Dicussed that treatment of multifocal recurrence is systemic typically with chemotherapy +/- immunotherapy or other targeted treatments. Patient previously declined Taxol, but is agreeable to systemic treatments including chemotherapy agents  - Will consider port as outpatient   - Will arrange molecular testing of hysterectomy specimen as outpatient   - Follow up scheduled with Dr. Herring 4/23. Confirmed with patient and daughter they are aware of the appointment      Dysphagia   - likely 2/2 esophageal compression from mediastinal LAD  - Pt tolerating soft diet w/o nausea/ vomiting      Anemia- Chronic  - Per review in care everywhere, recently pt was diagnosed with Hb of 5.7  - No active bleeding at that time  - Hb responded appropriately to transfusion and is stable ~8 since admission      Remainder of excellent care per primary team. Patient ok for discharge from Gyn Onc POV when medically cleared      All of the findings and plan were discussed with the patient and her family.  She notes understanding and agrees with the plan of care.  All questions were answered to the best of my ability at this time.    Alicia Franco MD       [1]   Patient Active Problem List  Diagnosis     Shortness of breath    Acute hypoxic respiratory failure (HCC)    Lung mass    Supraclavicular adenopathy    Dysphagia, unspecified type    Pericardial effusion (HCC)

## 2025-04-17 NOTE — PHYSICAL THERAPY NOTE
PHYSICAL THERAPY EVALUATION - INPATIENT     Room Number: 472/472-A  Evaluation Date: 4/17/2025  Type of Evaluation: Initial   Physician Order: PT Eval and Treat    Presenting Problem: new onset SOB +trouble swallowing, new lung mass  Co-Morbidities : endometrial cancer s/p radiation, chemo +JOHNNA/BSO 2024; asthma  Reason for Therapy: Mobility Dysfunction and Discharge Planning    PHYSICAL THERAPY ASSESSMENT   Patient is a 70 year old female admitted 4/11/2025 for SOB and difficulty swallowing, new lymphadenopathy and lung mass.  Prior to admission, patient's baseline is independent and living alone in an apartment, able to navigate the 3 flights of stairs without difficulty, no assistive device, driving and shopping.  Patient is currently functioning below baseline with gait, stair negotiation, standing prolonged periods, and performing household tasks.  Patient is requiring modified independent and minimal assist as a result of the following impairments: decreased endurance/aerobic capacity, decreased muscular endurance, medical status, and elevated HR .  Physical Therapy will continue to follow for duration of hospitalization.    Patient will benefit from continued skilled PT Services at discharge to promote prior level of function and safety with additional support and return home with home health PT. Would also benefit from transport into her home upon dc to eliminate need to climb 3 flights of stairs, can work with HHPT to progress. Does have the skills to return home pending lift assist, cannot tolerate climbing 3 flights of stairs    PLAN DURING HOSPITALIZATION  Nursing Mobility Recommendation : 1 Assist  PT Device Recommendation: Rolling walker, Cane, Other (Comment) (as needed)  PT Treatment Plan: Endurance, Energy conservation, Patient education, Gait training, Stair training  Rehab Potential : Good  Frequency (Obs): 3x/week     PHYSICAL THERAPY MEDICAL/SOCIAL HISTORY   History related to current admission:  Kelli Fisher is a(n) 70 year old female, with a past medical history significant for endometrial carcinoma, considered to be free of disease earlier in the year presents with increasing shortness of breath and difficulty swallowing for the past few days.  Recent workup indicated likelihood of pneumonia however possibility of malignancy was also entertained, patient was scheduled to see oncology and have a biopsy done however was not able to make the appointment.  Today comes in with difficulty swallowing solids, claims she is able to keep liquids down however feels like they stay in her stomach for too long.  Workup in ER indicates significant lymphadenopathy cervical and supraclavicular along with hilar and a large left lung mass as well     Problem List  Principal Problem:    Shortness of breath  Active Problems:    Acute hypoxic respiratory failure (HCC)    Lung mass    Supraclavicular adenopathy    Dysphagia, unspecified type    Pericardial effusion (HCC)      HOME SITUATION  Type of Home: Apartment  Home Layout: One level, Other (Comment) (multiple flights to enter apt)  Stairs to Enter : 45 (3 flights of about 15 steps)   Railing: Yes              Lives With: Alone    Drives: Yes   Patient Regularly Uses: Rolling walker, Cane     Prior Level of [place]: Patient reports she lives alone and is independent without a device and manages her apt without difficulty. Drives, shops. Does own RW and cane but does not use    SUBJECTIVE  \"I still feel SOB when I am walking, it is better with oxygen\"    PHYSICAL THERAPY EXAMINATION   OBJECTIVE  Precautions: None  Fall Risk: Standard fall risk    PAIN ASSESSMENT  Rating: 3          COGNITION  Overall Cognitive Status:  WFL - within functional limits    RANGE OF MOTION AND STRENGTH ASSESSMENT  Upper extremity ROM and strength are within functional limits   Lower extremity ROM is within functional limits   Lower extremity strength is within functional limits      BALANCE   Normal sit and stand balance      ACTIVITY TOLERANCE  Pulse: (!) 128 (110 at rest; 128 with activity)  Heart Rate Source: Monitor  Resp: 26                O2 WALK  Oxygen Therapy  SPO2% on Room Air at Rest: 94  SPO2% Ambulation on Room Air: 96    AM-PAC '6-Clicks' INPATIENT SHORT FORM - BASIC MOBILITY  How much difficulty does the patient currently have...  Patient Difficulty: Turning over in bed (including adjusting bedclothes, sheets and blankets)?: None   Patient Difficulty: Sitting down on and standing up from a chair with arms (e.g., wheelchair, bedside commode, etc.): None   Patient Difficulty: Moving from lying on back to sitting on the side of the bed?: None   How much help from another person does the patient currently need...   Help from Another: Moving to and from a bed to a chair (including a wheelchair)?: None   Help from Another: Need to walk in hospital room?: A Little   Help from Another: Climbing 3-5 steps with a railing?: A Little     AM-PAC Score:  Raw Score: 22   Approx Degree of Impairment: 20.91%   Standardized Score (AM-PAC Scale): 53.28   CMS Modifier (G-Code): CJ    FUNCTIONAL ABILITY STATUS  Functional Mobility/Gait Assessment  Gait Assistance: Modified independent  Distance (ft): 100'x2  Assistive Device: Rolling walker  Pattern: Within Functional Limits (slow pace, cues for coordinating breathing, pacing)  Stairs: Stairs  How Many Stairs: 8  Device: 1 Rail  Assist: Minimal assist  Pattern: Ascend and Descend  Ascend and Descend : Step to  Rolling: independent  Supine to Sit: independent  Sit to Supine: independent  Sit to Stand: independent    Exercise/Education Provided:  Energy conservation  Functional activity tolerated  Gait training  Posture  Stair training    Skilled Therapy Provided: UNRULY Finn approves participation in therapy session, RN has pulse oximeter/finger oximeter ready for patient so this is utilized during session to help pt with pacing/energy conservation.  Patient will benefit from this device at home. Patient presents sitting up in chair with son at bedside, reports she is feeling a bit better but apprehensive about all the steps she has to climb to enter her home. She does not currently have the ability to climb the 3 flights so recommend she have lift assist into home and work on this with HHPT. She is able to navigate steps slowly with step to pattern and cues and training in pursed lip breathing and self monitoring. She is able to walk room distances without a device slow and steady with SpO2 94-98%. She is educated throughout session on use of pursed lip breathing and coordinating with all activity, will benefit from ongoing training at home. Heart rates 110-130 at rest and with activity, pt does need to move more slowly than normal and cannot tolerate more than one flight of steps currently. Patient verbalizes understanding of pacing, energy conservation, use of RW or cane, importance of ongoing daily activity but breaking into smaller tasks with good understanding but will benefit from further training. All questions answered and needs in reach, RN aware as is LIBERTAD Ramirez who is working on HH and transport. Pt is up in chair, all needs in reach and family in room.    The patient's Approx Degree of Impairment: 20.91% has been calculated based on documentation in the [facility] '6 clicks' Inpatient Basic Mobility Short Form.  Research supports that patients with this level of impairment may benefit from home with no needs. HOWEVER, pt with significantly elevated baseline and activity HR and will benefit from ongoing skilled therapy to address energy conservation, pacing, use of device as well as STAIR navigation. Patient would also benefit from transport into the home at time of discharge. This was all communicated to LIBERTAD Ramirez who will work with pt on this. Final disposition will be made by interdisciplinary medical team.    Patient End of Session: Up in chair, Needs  met, Call light within reach, RN aware of session/findings, All patient questions and concerns addressed, Hospital anti-slip socks, Family present, Discussed recommendations with /, Other (Comment) (sent message to Dr Dejesus)    CURRENT GOALS  Goals to be met by: 4/25/25  Patient Goal Patient's self-stated goal is: to be independent   Goal #1      Goal #1   Current Status    Goal #2      Goal #2  Current Status    Goal #3 Patient is able to ambulate 450 feet with assist device: walker - rolling at assistance level: modified independent with SpO2 93% or better on room air   Goal #3   Current Status    Goal #4 Patient will negotiate 15 stairs/one curb w/ assistive device and supervision and second person to carry a stool/chair for each landing as pt is not able to get up from sitting on the step with SpO2 of 93% or better on room air   Goal #4   Current Status    Goal #5 Patient to demonstrate independence with home activity/exercise instructions provided to patient in preparation for discharge.   Goal #5   Current Status    Goal #6 Patient to demonstrate independence with pacing and energy conservation throughout all mobility   Goal #6  Current Status      Patient Evaluation Complexity Level:  History Moderate - 1 or 2 personal factors and/or co-morbidities   Examination of body systems Moderate - addressing a total of 3 or more elements   Clinical Presentation Low- Stable   Clinical Decision Making  Low Complexity     Gait Training: 15 minutes  Therapeutic Activity:  18 minutes

## 2025-04-17 NOTE — PLAN OF CARE
Pt A/O x4, VSS. Schd nebs continued. Heat packs for pain to the neck. Ambulating with assist. All dc information given to pt & daughter. Verbalized understanding. Pt going home with C via [facility].     Problem: Patient Centered Care  Goal: Patient preferences are identified and integrated in the patient's plan of care  Description: Interventions:- What would you like us to know as we care for you?   - Provide timely, complete, and accurate information to patient/family- Incorporate patient and family knowledge, values, beliefs, and cultural backgrounds into the planning and delivery of care- Encourage patient/family to participate in care and decision-making at the level they choose- Honor patient and family perspectives and choices  Outcome: Adequate for Discharge     Problem: Patient/Family Goals  Goal: Patient/Family Long Term Goal  Description: Patient's Long Term Goal: discharge home  Interventions:- - See additional Care Plan goals for specific interventions  Outcome: Adequate for Discharge  Goal: Patient/Family Short Term Goal  Description: Patient's Short Term Goal: improve shortness of breath   Interventions: - See additional Care Plan goals for specific interventions  Outcome: Adequate for Discharge     Problem: CARDIOVASCULAR - ADULT  Goal: Maintains optimal cardiac output and hemodynamic stability  Description: INTERVENTIONS:- Monitor vital signs, rhythm, and trends- Monitor for bleeding, hypotension and signs of decreased cardiac output- Evaluate effectiveness of vasoactive medications to optimize hemodynamic stability- Monitor arterial and/or venous puncture sites for bleeding and/or hematoma- Assess quality of pulses, skin color and temperature- Assess for signs of decreased coronary artery perfusion - ex. Angina- Evaluate fluid balance, assess for edema, trend weights  Outcome: Adequate for Discharge  Goal: Absence of cardiac arrhythmias or at baseline  Description: INTERVENTIONS:- Continuous  cardiac monitoring, monitor vital signs, obtain 12 lead EKG if indicated- Evaluate effectiveness of antiarrhythmic and heart rate control medications as ordered- Initiate emergency measures for life threatening arrhythmias- Monitor electrolytes and administer replacement therapy as ordered  Outcome: Adequate for Discharge     Problem: METABOLIC/FLUID AND ELECTROLYTES - ADULT  Goal: Glucose maintained within prescribed range  Description: INTERVENTIONS:- Monitor Blood Glucose as ordered- Assess for signs and symptoms of hyperglycemia and hypoglycemia- Administer ordered medications to maintain glucose within target range- Assess barriers to adequate nutritional intake and initiate nutrition consult as needed- Instruct patient on self management of diabetes  Outcome: Adequate for Discharge  Goal: Electrolytes maintained within normal limits  Description: INTERVENTIONS:- Monitor labs and rhythm and assess patient for signs and symptoms of electrolyte imbalances- Administer electrolyte replacement as ordered- Monitor response to electrolyte replacements, including rhythm and repeat lab results as appropriate- Fluid restriction as ordered- Instruct patient on fluid and nutrition restrictions as appropriate  Outcome: Adequate for Discharge  Goal: Hemodynamic stability and optimal renal function maintained  Description: INTERVENTIONS:- Monitor labs and assess for signs and symptoms of volume excess or deficit- Monitor intake, output and patient weight- Monitor urine specific gravity, serum osmolarity and serum sodium as indicated or ordered- Monitor response to interventions for patient's volume status, including labs, urine output, blood pressure (other measures as available)- Encourage oral intake as appropriate- Instruct patient on fluid and nutrition restrictions as appropriate  Outcome: Adequate for Discharge

## 2025-04-18 NOTE — PAYOR COMM NOTE
--------------  DISCHARGE REVIEW    Payor: BC[employer] MEDICARE ADV PPO  Subscriber #:  [id number]  Authorization Number: [id number]    Admit date: 25  Admit time:   1:24 AM  Discharge Date: 2025  2:25 PM     Admitting Physician: Carole Maza MD  Attending Physician:  No att. providers found  Primary Care Physician: Taylor Gallardo MD          Discharge Summary Notes        Discharge Summary signed by Joel Dejesus MD at 2025  4:35 PM       Author: Joel Dejesus MD Specialty: HOSPITALIST, Internal Medicine Author Type: Physician    Filed: 2025  4:35 PM Date of Service: 2025 10:51 AM Status: Signed    : Joel Dejesus MD (Physician)         [facility]  part of [facility]    Discharge Summary    Kelli Fisher Patient Status:  Inpatient    1955 MRN [id number]   Location [facility] 4W/SW/SE Attending Joel Dejesus MD   Hosp Day # 5 PCP Taylor Gallardo MD     Date of Admission: 2025     Date of Discharge: 25      Lace+ Score: 52  59-90 High Risk  29-58 Medium Risk  0-28   Low Risk.    TCM Follow-Up Recommendation:  LACE 29-58: Moderate Risk of readmission after discharge from the hospital.    DISCHARGE DX: Principal Problem:    Shortness of breath  Active Problems:    Acute hypoxic respiratory failure (HCC)    Lung mass    Supraclavicular adenopathy    Dysphagia, unspecified type    Pericardial effusion (HCC)       The patient was seen and examined on day of discharge and this discharge summary is in conjunction with any daily progress note from day of discharge.    HPI per admitting physician: \"Kelli Fisher is a(n) 70 year old female, with a past medical history significant for endometrial carcinoma, considered to be free of disease earlier in the year presents with increasing shortness of breath and difficulty swallowing for the past few days.  Recent workup indicated likelihood of pneumonia however possibility of  malignancy was also entertained, patient was scheduled to see oncology and have a biopsy done however was not able to make the appointment.  Today comes in with difficulty swallowing solids, claims she is able to keep liquids down however feels like they stay in her stomach for too long.  Workup in ER indicates significant lymphadenopathy cervical and supraclavicular along with hilar and a large left lung mass as well.\"    Hospital Course:      Left upper lobe lung mass possible malignancy  -Pulmonary consulted   -s/p sonographic guided left suprclavicular lymph node by IR on 04/14/25  -Follow-up pathology     Acute asthma exacerbation   -Resolving  - Pulmonology on consult  - Plan for home nebulizer and treatment on discharge.  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  -patient would like to transfer oncology care from [place] to [place].  -Dr. Herring consulted. Plan for follow up as outpt     Dysphagia  -Likely secondary to compression from mediastinal adenopathy  - improving, able to tolerate soft diet.  -GI signed off     Pericardial effusion  -CT showed moderate pericardial effusion  -Echo showed small pericardial effusion   -Cards signed off      Physical Exam:    Vitals:    04/16/25 1241 04/16/25 1952 04/17/25 0511 04/17/25 0945   BP: 113/81 122/76 127/81    BP Location: Right arm Right arm Right arm    Pulse: 105 112 108 (!) 128   Resp: 20 20 20 26   Temp: 98.1 °F (36.7 °C)  99.3 °F (37.4 °C)    TempSrc: Temporal Temporal Oral    SpO2: 94% 96% 95%    Weight:       Height:         Patient Weight for the past 72 hrs:   Weight   04/14/25 1202 169 lb (76.7 kg)     No intake or output data in the 24 hours ending 04/17/25 1052      GENERAL:  Awake and alert, in no acute distress.  HEART:  Regular  rhythm, mild tachycardia  LUNGS:  Air entry was decreased.  No increased work of breathing or wheezes   ABDOMEN: Soft and non-tender.    PSYCHIATRIC: Normal mood    CULTURE:   No results found for this visit on 04/11/25.    IMAGING STUDIES: SOME MAY NEED FOLLOW UP WITH PCP   CT ABDOMEN+PELVIS(CONTRAST ONLY)(CPT=74177)  Result Date: 4/16/2025  CONCLUSION:  1. History of recurrent endometrial carcinoma on recent supraclavicular lymph node biopsy.  Partially imaged randomly distributed right greater than left basilar lung nodules, which are concerning for hematogenous pulmonary metastases.  These are better assessed on recent prior chest CT. 2. Small 1 cm low-attenuation right hepatic lobe lesion is indeterminate, but a small hepatic metastasis cannot be excluded.  Consider further evaluation with either PET-CT or a liver protocol MRI of the abdomen without and with contrast.  Correlation with any available outside institution imaging would also be helpful to assess stability. 3. Small 0.7 cm indeterminate attenuation left lower pole renal cortical lesion.  Differential considerations include a small solid renal mass versus complex cyst.  Given small size, this should be reassessed on anticipated follow-up exam. 4. No definite additional metastatic disease within the abdomen. 5. Hysterectomy and bilateral salpingo oophorectomy. 6. Small circumferential pericardial effusion.  There are also trace right greater than left pleural effusions.  Malignant pleural and pericardial fluid cannot be excluded. 7. Mild scattered colonic diverticulosis. 8. Small retrocardiac hiatal hernia. 9. Circumferential urinary bladder wall thickening, which may relate to incomplete distention or cystitis.  If there are referable symptoms, urinalysis correlation is requested. 10. Anatomic variant retroaortic left renal vein. 11. Mild anasarca.   A preliminary report was issued by the [employer] [facility] teleradiology service. There are no major  discrepancies.  elm-remote  Dictated by (CST): Lemuel Gallegos MD on 4/16/2025 at 10:56 AM     Finalized by (CST): Lemuel Gallegos MD on 4/16/2025 at 11:06 AM          IR BIOPSY  Result Date: 4/15/2025  CONCLUSION:  1. Successful left supraclavicular lymphadenopathy biopsy as detailed above.     Dictated by (CST): Parviz Ragsdale MD on 4/15/2025 at 2:51 PM     Finalized by (CST): Parviz Ragsdale MD on 4/15/2025 at 2:54 PM          CT CHEST PE AORTA (IV ONLY) (CPT=71260)  Result Date: 4/11/2025  CONCLUSION:  1. No evidence of acute pulmonary embolism to the level of the first order subsegmental pulmonary artery branches.  2. Masslike opacity involving the left upper lobe, concerning for potential neoplastic process.  3. Extensive bilateral nodules and micronodules, which could be metastatic in nature.  4. Marked mediastinal/perihilar lymphadenopathy, likely neoplastic.  5. Partially delineated supraclavicular lymphadenopathy.  6. Moderate pericardial effusion, potentially malignant.  7. Small bilateral pleural effusions are present with associated basilar atelectasis, with or without superimposed pneumonia.  8. Mild interlobular septal thickening is present and could be indicative of pulmonary interstitial edema or lymphangitic dissemination of malignancy.  9. Lesser incidental findings as above.    Dictated by (CST): Harpreet Jade MD on 4/11/2025 at 10:08 PM     Finalized by (CST): Harpreet Jade MD on 4/11/2025 at 10:20 PM          CT SOFT TISSUE OF NECK(CONTRAST ONLY) (CPT=70491)  Result Date: 4/11/2025  CONCLUSION:  1. Extensive lymphadenopathy, likely metastatic.  2. Lesser incidental findings as above.     Dictated by (CST): Harpreet Jade MD on 4/11/2025 at 9:59 PM     Finalized by (CST): Harpreet Jade MD on 4/11/2025 at 10:08 PM            LABS :     Lab Results   Component Value Date    WBC 18.5 (H) 04/16/2025    HGB 8.4 (L) 04/16/2025    HCT 26.1 (L) 04/16/2025    .0 04/16/2025    CREATSERUM 0.77  04/16/2025    BUN 13 04/16/2025     04/16/2025    K 4.1 04/16/2025    CL 99 04/16/2025    CO2 32.0 04/16/2025     (H) 04/16/2025    CA 9.0 04/16/2025    ALB 3.6 04/11/2025    ALKPHO 186 (H) 04/11/2025    BILT 0.8 04/11/2025    TP 7.2 04/11/2025    AST 25 04/11/2025    ALT 11 04/11/2025    PTT 35.9 04/11/2025    INR 1.28 (H) 04/11/2025    LIP 42 12/11/2023    MG 2.0 04/13/2025       Recent Labs   Lab 04/11/25 2026 04/12/25  0703 04/13/25  0606 04/14/25  0628 04/15/25  0533 04/16/25  0632   RBC 3.39* 3.31* 3.08* 3.30* 3.25* 3.28*   HGB 8.5* 8.2* 7.8* 8.3* 8.3* 8.4*   HCT 26.7* 26.0* 24.1* 26.1* 25.9* 26.1*   MCV 78.8* 78.5* 78.2* 79.1* 79.7* 79.6*   MCH 25.1* 24.8* 25.3* 25.2* 25.5* 25.6*   MCHC 31.8 31.5 32.4 31.8 32.0 32.2   RDW 20.7* 20.5* 20.2* 20.5* 20.6* 21.0*   NEPRELIM 9.88* 8.61* 14.17*  --   --   --    WBC 12.4* 10.9 17.0* 16.5* 17.9* 18.5*   .0 417.0 401.0 406.0 422.0 412.0     Recent Labs   Lab 04/11/25 2027 04/12/25  0703 04/14/25  0628 04/15/25  0533 04/16/25  0632   *   < > 125* 134* 118*   BUN 9   < > 9 13 13   CREATSERUM 0.82   < > 0.78 0.75 0.77   CA 8.8   < > 8.8 8.7 9.0   ALB 3.6  --   --   --   --       < > 139 138 139   K 4.0   < > 3.8 4.1 4.1      < > 102 101 99   CO2 28.0   < > 29.0 31.0 32.0   ALKPHO 186*  --   --   --   --    AST 25  --   --   --   --    ALT 11  --   --   --   --    BILT 0.8  --   --   --   --    TP 7.2  --   --   --   --     < > = values in this interval not displayed.     Lab Results   Component Value Date    INR 1.28 (H) 04/11/2025       Disposition: Discharge to Home    Condition at Discharge: Stable     Discharge Medications:      Discharge Medications        START taking these medications        Instructions Prescription details   albuterol (2.5 MG/3ML) 0.083% Nebu  Commonly known as: Ventolin  Replaces: albuterol 108 (90 Base) MCG/ACT Aers      Take 3 mL (2.5 mg total) by nebulization every 6 (six) hours as needed for  Wheezing.   Quantity: 90 each  Refills: 3            CONTINUE taking these medications        Instructions Prescription details   Fluticasone Propionate  MCG/ACT Aero  Commonly known as: FLOVENT HFA  Notes to patient: FOR SHORTNESS OF BREATH / WHEEZING      Inhale 2 puffs into the lungs in the morning and 2 puffs before bedtime.   Refills: 0     levothyroxine 100 MCG Tabs  Commonly known as: Synthroid  Notes to patient: FOR LOW THYROID      Take 1 tablet (100 mcg total) by mouth before breakfast.   Refills: 0            STOP taking these medications      albuterol 108 (90 Base) MCG/ACT Aers  Commonly known as: Ventolin HFA  Replaced by: albuterol (2.5 MG/3ML) 0.083% Nebu        predniSONE 10 MG Tabs  Commonly known as: Deltasone                  Where to Get Your Medications        These medications were sent to [id number] [address], [phone], [phone]  [address] [place] [id number]      Phone: [phone]   albuterol (2.5 MG/3ML) 0.083% Nebu         Follow up Visits  Oniel Grijalva MD  [address] [place] [address] [id number] [phone]    Go on 5/6/2025  @3:30pm with ABBIE Cancino; for your Cardiology follow up appointment    Dominick Herring DO  [facility] [place] [address], [address] [id number] [phone]    Go in 1 week(s)  Office will call to ebony Gallardo MD    Consultants         Provider   Role Specialty     Dominick Herring DO      Consulting Physician Gynecology Oncology     Geoff Penaloza MD      Consulting Physician Hematology and Oncology     Eddie Kemp MD      Consulting Physician Interventional, Cardiology     Mercedes Martínez APRN      Consulting Physician Nurse Practitioner     SIDDHARTHA Jade MD      Consulting Physician GASTROENTEROLOGY     Srinath Levy MD      Consulting Physician PULMONARY DISEASES     Tony Loaiza MD      Consulting Physician Radiology      Carole Maza MD      Consulting Physician HOSPITALIST              Other Discharge Instructions:       Discharge Instructions         [facility] will process the request for nebulizer with your insurance and call you to set up delivery once approved. Any questions please call them:    [address] [place] [address]  Phone: [phone]  Fax: [phone]        ----------------------------------------------------  38 MIN SPENT ON THIS DC   Joel Dejesus MD    4/17/2025      Electronically signed by Joel Dejesus MD on 4/17/2025  4:35 PM         REVIEWER COMMENTS

## 2025-04-24 VITALS — HEIGHT: 60 IN | WEIGHT: 169 LBS | BODY MASS INDEX: 33.18 KG/M2

## 2025-04-24 RX ORDER — SODIUM CHLORIDE 9 MG/ML
INJECTION, SOLUTION INTRAVENOUS CONTINUOUS
OUTPATIENT
Start: 2025-04-24

## 2025-04-29 ENCOUNTER — APPOINTMENT (OUTPATIENT)
Dept: GENERAL RADIOLOGY | Facility: HOSPITAL | Age: 70
End: 2025-04-29
Payer: MEDICARE

## 2025-04-29 ENCOUNTER — HOSPITAL ENCOUNTER (INPATIENT)
Facility: HOSPITAL | Age: 70
LOS: 6 days | Discharge: HOME HEALTH | End: 2025-05-06
Attending: EMERGENCY MEDICINE | Admitting: STUDENT IN AN ORGANIZED HEALTH CARE EDUCATION/TRAINING PROGRAM
Payer: MEDICARE

## 2025-04-29 ENCOUNTER — APPOINTMENT (OUTPATIENT)
Dept: CT IMAGING | Facility: HOSPITAL | Age: 70
End: 2025-04-29
Attending: EMERGENCY MEDICINE
Payer: MEDICARE

## 2025-04-29 DIAGNOSIS — D50.9 MICROCYTIC ANEMIA: ICD-10-CM

## 2025-04-29 DIAGNOSIS — C54.1 ENDOMETRIAL CANCER (HCC): ICD-10-CM

## 2025-04-29 DIAGNOSIS — R91.8 LUNG MASS: Primary | ICD-10-CM

## 2025-04-29 DIAGNOSIS — I31.39 PERICARDIAL EFFUSION (HCC): ICD-10-CM

## 2025-04-29 LAB
ALBUMIN SERPL-MCNC: 3.4 G/DL (ref 3.2–4.8)
ALBUMIN/GLOB SERPL: 0.9 {RATIO} (ref 1–2)
ALP LIVER SERPL-CCNC: 250 U/L (ref 55–142)
ALT SERPL-CCNC: 7 U/L (ref 10–49)
ANION GAP SERPL CALC-SCNC: 10 MMOL/L (ref 0–18)
ANTIBODY SCREEN: NEGATIVE
APTT PPP: 35.1 SECONDS (ref 23–36)
AST SERPL-CCNC: 17 U/L (ref ?–34)
BILIRUB SERPL-MCNC: 0.6 MG/DL (ref 0.2–1.1)
BUN BLD-MCNC: 11 MG/DL (ref 9–23)
BUN/CREAT SERPL: 11.8 (ref 10–20)
CALCIUM BLD-MCNC: 8.8 MG/DL (ref 8.7–10.4)
CHLORIDE SERPL-SCNC: 97 MMOL/L (ref 98–112)
CO2 SERPL-SCNC: 27 MMOL/L (ref 21–32)
CREAT BLD-MCNC: 0.93 MG/DL (ref 0.55–1.02)
EGFRCR SERPLBLD CKD-EPI 2021: 67 ML/MIN/1.73M2 (ref 60–?)
GLOBULIN PLAS-MCNC: 3.7 G/DL (ref 2–3.5)
GLUCOSE BLD-MCNC: 125 MG/DL (ref 70–99)
INR BLD: 1.33 (ref 0.8–1.2)
NT-PROBNP SERPL-MCNC: 239 PG/ML (ref ?–125)
OSMOLALITY SERPL CALC.SUM OF ELEC: 279 MOSM/KG (ref 275–295)
POTASSIUM SERPL-SCNC: 3.9 MMOL/L (ref 3.5–5.1)
PROT SERPL-MCNC: 7.1 G/DL (ref 5.7–8.2)
PROTHROMBIN TIME: 17.2 SECONDS (ref 11.6–14.8)
RH BLOOD TYPE: POSITIVE
SODIUM SERPL-SCNC: 134 MMOL/L (ref 136–145)
TROPONIN I SERPL HS-MCNC: 3 NG/L (ref ?–34)

## 2025-04-29 PROCEDURE — 99223 1ST HOSP IP/OBS HIGH 75: CPT | Performed by: INTERNAL MEDICINE

## 2025-04-29 PROCEDURE — 71045 X-RAY EXAM CHEST 1 VIEW: CPT | Performed by: EMERGENCY MEDICINE

## 2025-04-29 PROCEDURE — 71260 CT THORAX DX C+: CPT | Performed by: EMERGENCY MEDICINE

## 2025-04-29 PROCEDURE — 30233N1 TRANSFUSION OF NONAUTOLOGOUS RED BLOOD CELLS INTO PERIPHERAL VEIN, PERCUTANEOUS APPROACH: ICD-10-PCS | Performed by: INTERNAL MEDICINE

## 2025-04-30 ENCOUNTER — APPOINTMENT (OUTPATIENT)
Dept: CV DIAGNOSTICS | Facility: HOSPITAL | Age: 70
End: 2025-04-30
Attending: INTERNAL MEDICINE
Payer: MEDICARE

## 2025-04-30 PROBLEM — D50.9 MICROCYTIC ANEMIA: Status: ACTIVE | Noted: 2025-04-30

## 2025-04-30 PROBLEM — D50.9 MICROCYTIC ANEMIA: Status: ACTIVE | Noted: 2025-01-01

## 2025-04-30 PROBLEM — C54.1 ENDOMETRIAL CANCER (HCC): Status: ACTIVE | Noted: 2025-01-01

## 2025-04-30 PROBLEM — C54.1 ENDOMETRIAL CANCER (HCC): Status: ACTIVE | Noted: 2025-04-30

## 2025-04-30 LAB
ANION GAP SERPL CALC-SCNC: 6 MMOL/L (ref 0–18)
ATRIAL RATE: 122 BPM
BASOPHILS # BLD AUTO: 0.03 X10(3) UL (ref 0–0.2)
BASOPHILS # BLD AUTO: 0.04 X10(3) UL (ref 0–0.2)
BASOPHILS NFR BLD AUTO: 0.3 %
BASOPHILS NFR BLD AUTO: 0.3 %
BUN BLD-MCNC: 9 MG/DL (ref 9–23)
BUN/CREAT SERPL: 11.8 (ref 10–20)
CALCIUM BLD-MCNC: 8.2 MG/DL (ref 8.7–10.4)
CHLORIDE SERPL-SCNC: 104 MMOL/L (ref 98–112)
CO2 SERPL-SCNC: 26 MMOL/L (ref 21–32)
CREAT BLD-MCNC: 0.76 MG/DL (ref 0.55–1.02)
DEPRECATED RDW RBC AUTO: 60.1 FL (ref 35.1–46.3)
DEPRECATED RDW RBC AUTO: 60.1 FL (ref 35.1–46.3)
EGFRCR SERPLBLD CKD-EPI 2021: 85 ML/MIN/1.73M2 (ref 60–?)
EOSINOPHIL # BLD AUTO: 0.01 X10(3) UL (ref 0–0.7)
EOSINOPHIL # BLD AUTO: 0.02 X10(3) UL (ref 0–0.7)
EOSINOPHIL NFR BLD AUTO: 0.1 %
EOSINOPHIL NFR BLD AUTO: 0.2 %
ERYTHROCYTE [DISTWIDTH] IN BLOOD BY AUTOMATED COUNT: 20.7 % (ref 11–15)
ERYTHROCYTE [DISTWIDTH] IN BLOOD BY AUTOMATED COUNT: 21.9 % (ref 11–15)
GLUCOSE BLD-MCNC: 102 MG/DL (ref 70–99)
HCT VFR BLD AUTO: 21.3 % (ref 35–48)
HCT VFR BLD AUTO: 22.1 % (ref 35–48)
HGB BLD-MCNC: 6.8 G/DL (ref 12–16)
HGB BLD-MCNC: 7 G/DL (ref 12–16)
IMM GRANULOCYTES # BLD AUTO: 0.09 X10(3) UL (ref 0–1)
IMM GRANULOCYTES # BLD AUTO: 0.09 X10(3) UL (ref 0–1)
IMM GRANULOCYTES NFR BLD: 0.7 %
IMM GRANULOCYTES NFR BLD: 0.8 %
L PNEUMO AG UR QL: NEGATIVE
LYMPHOCYTES # BLD AUTO: 1.06 X10(3) UL (ref 1–4)
LYMPHOCYTES # BLD AUTO: 1.23 X10(3) UL (ref 1–4)
LYMPHOCYTES NFR BLD AUTO: 10.7 %
LYMPHOCYTES NFR BLD AUTO: 7.8 %
MCH RBC QN AUTO: 24.6 PG (ref 26–34)
MCH RBC QN AUTO: 25.2 PG (ref 26–34)
MCHC RBC AUTO-ENTMCNC: 31.7 G/DL (ref 31–37)
MCHC RBC AUTO-ENTMCNC: 31.9 G/DL (ref 31–37)
MCV RBC AUTO: 77.2 FL (ref 80–100)
MCV RBC AUTO: 79.5 FL (ref 80–100)
MONOCYTES # BLD AUTO: 1.3 X10(3) UL (ref 0.1–1)
MONOCYTES # BLD AUTO: 1.33 X10(3) UL (ref 0.1–1)
MONOCYTES NFR BLD AUTO: 11.3 %
MONOCYTES NFR BLD AUTO: 9.8 %
MRSA DNA SPEC QL NAA+PROBE: NEGATIVE
NEUTROPHILS # BLD AUTO: 11 X10 (3) UL (ref 1.5–7.7)
NEUTROPHILS # BLD AUTO: 11 X10(3) UL (ref 1.5–7.7)
NEUTROPHILS # BLD AUTO: 8.86 X10 (3) UL (ref 1.5–7.7)
NEUTROPHILS # BLD AUTO: 8.86 X10(3) UL (ref 1.5–7.7)
NEUTROPHILS NFR BLD AUTO: 76.7 %
NEUTROPHILS NFR BLD AUTO: 81.3 %
OSMOLALITY SERPL CALC.SUM OF ELEC: 281 MOSM/KG (ref 275–295)
P AXIS: 58 DEGREES
P-R INTERVAL: 128 MS
PLATELET # BLD AUTO: 361 10(3)UL (ref 150–450)
PLATELET # BLD AUTO: 419 10(3)UL (ref 150–450)
POTASSIUM SERPL-SCNC: 4.3 MMOL/L (ref 3.5–5.1)
PROCALCITONIN SERPL-MCNC: 0.33 NG/ML (ref ?–0.05)
Q-T INTERVAL: 298 MS
QRS DURATION: 74 MS
QTC CALCULATION (BEZET): 424 MS
R AXIS: 25 DEGREES
RBC # BLD AUTO: 2.76 X10(6)UL (ref 3.8–5.3)
RBC # BLD AUTO: 2.78 X10(6)UL (ref 3.8–5.3)
SODIUM SERPL-SCNC: 136 MMOL/L (ref 136–145)
STREP PNEUMO ANTIGEN, URINE: NEGATIVE
T AXIS: 51 DEGREES
VENTRICULAR RATE: 122 BPM
WBC # BLD AUTO: 11.5 X10(3) UL (ref 4–11)
WBC # BLD AUTO: 13.5 X10(3) UL (ref 4–11)

## 2025-04-30 PROCEDURE — 93325 DOPPLER ECHO COLOR FLOW MAPG: CPT | Performed by: INTERNAL MEDICINE

## 2025-04-30 PROCEDURE — 93308 TTE F-UP OR LMTD: CPT | Performed by: INTERNAL MEDICINE

## 2025-04-30 PROCEDURE — 93321 DOPPLER ECHO F-UP/LMTD STD: CPT | Performed by: INTERNAL MEDICINE

## 2025-04-30 PROCEDURE — 99223 1ST HOSP IP/OBS HIGH 75: CPT | Performed by: INTERNAL MEDICINE

## 2025-04-30 RX ORDER — IPRATROPIUM BROMIDE AND ALBUTEROL SULFATE 2.5; .5 MG/3ML; MG/3ML
3 SOLUTION RESPIRATORY (INHALATION) EVERY 4 HOURS PRN
Status: DISCONTINUED | OUTPATIENT
Start: 2025-04-30 | End: 2025-05-02

## 2025-04-30 RX ORDER — SODIUM CHLORIDE 9 MG/ML
INJECTION, SOLUTION INTRAVENOUS CONTINUOUS
Status: DISCONTINUED | OUTPATIENT
Start: 2025-04-30 | End: 2025-05-03

## 2025-04-30 RX ORDER — LEVOTHYROXINE SODIUM 100 UG/1
100 TABLET ORAL
Status: DISCONTINUED | OUTPATIENT
Start: 2025-04-30 | End: 2025-05-06

## 2025-04-30 RX ORDER — ACETAMINOPHEN 325 MG/1
650 TABLET ORAL EVERY 6 HOURS PRN
Status: DISCONTINUED | OUTPATIENT
Start: 2025-04-30 | End: 2025-05-06

## 2025-04-30 RX ORDER — MORPHINE SULFATE 2 MG/ML
2 INJECTION, SOLUTION INTRAMUSCULAR; INTRAVENOUS EVERY 2 HOUR PRN
Status: DISCONTINUED | OUTPATIENT
Start: 2025-04-30 | End: 2025-05-06

## 2025-04-30 RX ORDER — MORPHINE SULFATE 4 MG/ML
4 INJECTION, SOLUTION INTRAMUSCULAR; INTRAVENOUS EVERY 2 HOUR PRN
Status: DISCONTINUED | OUTPATIENT
Start: 2025-04-30 | End: 2025-05-06

## 2025-04-30 RX ORDER — MORPHINE SULFATE 2 MG/ML
1 INJECTION, SOLUTION INTRAMUSCULAR; INTRAVENOUS EVERY 2 HOUR PRN
Status: DISCONTINUED | OUTPATIENT
Start: 2025-04-30 | End: 2025-05-06

## 2025-04-30 NOTE — PLAN OF CARE
Problem: Patient Centered Care  Goal: Patient preferences are identified and integrated in the patient's plan of care  Description: Interventions:- What would you like us to know as we care for you?- Provide timely, complete, and accurate information to patient/family- Incorporate patient and family knowledge, values, beliefs, and cultural backgrounds into the planning and delivery of care- Encourage patient/family to participate in care and decision-making at the level they choose- Honor patient and family perspectives and choices  Outcome: Progressing     Pt admitted to room 455. Morphine prn for pain. 2 L O2 maintained. SOB increased when laying flat in bed. IVF started. IV antibiotic given. Neb treatment for mild wheezing. Denies nausea. Purewick in place. Safety measures in place. Frequent rounding being done

## 2025-04-30 NOTE — H&P
[facility]  part of 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 She was recently admitted from  through 2025, for shortness of breath, during which a left upper lobe lung mass was discovered and biopsied, revealing metastatic adenocarcinoma consistent with her known serous endometrial carcinoma. A small pericardial effusion was also diagnosed during that admission.    Today, the patient reports noticing progressive shortness of breath. Upon initial evaluation, she was found to be tachycardic with a pulse of 117, afebrile, and with stable blood pressure. She has underlying acute and chronic anemia, with a hemoglobin of 6.8, representing a 2-gram drop since her recent discharge. The patient was admitted for further treatment and evaluation of her acute hypoxic respiratory failure, which is likely secondary to her recently diagnosed metastasized endometrial carcinoma. There is also concern for a possible superimposed pneumonia.    During her current visit to the emergency room, the patient received one unit of packed red blood cells. A bedside ultrasound completed by the ER physician showed the previously known pericardial effusion without tamponade. Cardiology and Gastroenterology were consulted due to the pericardial effusion and acute and chronic anemia, respectively. Pulmonology was also consulted, and a CT test was ordered for further  evaluation.      History   Past Medical History[1]  Past Surgical History[2]  Family History[3]  Social History:  Short Social Hx on File[4]  Allergies/Medications:   Allergies: Allergies[5]  Prescriptions Prior to Admission[6]    Review of Systems:     Constitutional:  Positive for fatigue.   HENT: Negative.     Eyes: Negative.    Respiratory:  Positive for shortness of breath.    Cardiovascular: Negative.    Gastrointestinal:  Positive for abdominal pain.   Endocrine: Negative.    Genitourinary: Negative.    Musculoskeletal: Negative.    Allergic/Immunologic: Negative.    Neurological: Negative.    Hematological: Negative.    Psychiatric/Behavioral: Negative.         Physical Exam:   Vital Signs:  Blood pressure 109/68, pulse 109, temperature 99.3 °F (37.4 °C), temperature source Oral, resp. rate 15, height 5' (1.524 m), weight 165 lb (74.8 kg), SpO2 100%.  Physical Exam  Vitals reviewed.   Constitutional:       Appearance: She is ill-appearing.   HENT:      Nose: Nose normal.      Mouth/Throat:      Mouth: Mucous membranes are moist.   Eyes:      Pupils: Pupils are equal, round, and reactive to light.   Cardiovascular:      Rate and Rhythm: Tachycardia present.   Pulmonary:      Effort: Respiratory distress present.   Abdominal:      General: Abdomen is flat.   Musculoskeletal:      Cervical back: Normal range of motion.   Skin:     General: Skin is warm.   Neurological:      General: No focal deficit present.           Results:     Lab Results   Component Value Date    WBC 13.5 (H) 04/29/2025    HGB 6.8 (LL) 04/29/2025    HCT 21.3 (L) 04/29/2025    .0 04/29/2025    CREATSERUM 0.93 04/29/2025    BUN 11 04/29/2025     (L) 04/29/2025    K 3.9 04/29/2025    CL 97 (L) 04/29/2025    CO2 27.0 04/29/2025     (H) 04/29/2025    CA 8.8 04/29/2025    ALB 3.4 04/29/2025    ALKPHO 250 (H) 04/29/2025    BILT 0.6 04/29/2025    TP 7.1 04/29/2025    AST 17 04/29/2025    ALT 7 (L) 04/29/2025    PTT 35.1  04/29/2025    INR 1.33 (H) 04/29/2025    LIP 42 12/11/2023    MG 2.0 04/13/2025    TROPHS 3 04/29/2025     No results found.  EKG 12 Lead  Result Date: 4/29/2025  Sinus tachycardia with Premature atrial complexes Low voltage QRS, consider pulmonary disease, pericardial effusion, or normal variant Borderline ECG When compared with ECG of 11-APR-2025 18:37, Premature atrial complexes are now Present      Assessment/Plan:     Assessment and Plan:    1. Acute hypoxic respiratory failure:     - Patient presents with progressive shortness of breath     - Likely secondary to recently diagnosed metastasized endometrial carcinoma     - Left upper lobe lung mass identified during recent admission (April 11-17, 2025)     - Biopsy revealed metastatic adenocarcinoma consistent with known serous endometrial carcinoma     - Concern for possible superimposed pneumonia     - Patient is tachycardic with a pulse of 117 and requires supplemental oxygen     - Initiate empiric antibiotic therapy for possible pneumonia     - Pulmonology consultation for further evaluation and management     - Continue supplemental oxygen as needed     - Monitor oxygen saturation and respiratory status closely    2. Acute and chronic anemia:     - Acute exacerbation of chronic anemia     - Hemoglobin dropped to 6.8 g/dL, a 2 g/dL decrease since recent discharge     - Likely multifactorial, potentially related to underlying malignancy and recent treatments     - Administer 1 unit of packed red blood cells (PRBCs) as ordered in the emergency room     - Gastroenterology consultation for further evaluation of anemia     - Monitor hemoglobin levels closely     - Continue IV PPI     - Evaluate for potential sources of blood loss    3. Pericardial effusion:     - Known small pericardial effusion diagnosed during recent admission     - Bedside ultrasound by ER physician showed no evidence of tamponade     - Cardiology consultation for further evaluation and  management     - Monitor for signs of hemodynamic compromise    4. Leukocytosis:     - Elevated white blood cell count noted on complete blood count     - Potentially indicating an inflammatory or infectious process     - Monitor white blood cell count closely     - Correlate with clinical presentation and other diagnostic studies    5. Stage 3b clear cell and serous endometrial cancer with lung metastasis:     - Known history of stage 3b endometrial cancer with metastasis to lungs     - Status post chemoradiation therapy and surgery     - Recent biopsy of left upper lobe lung mass confirmed metastatic disease         diet: general  ivf: 0.9 NS  dvt prophylaxis: Hold in setting of anemia.   antibiotics: none  tubes: none  central lines: none  code status: full code  dispo: home upon medical clearance    Greater than 70 minutes, >50% time spent counseling and coordinating care regarding treatment and workup.          Crow Arzate MD  4/30/2025         [1]   Past Medical History:   Asthma (HCC)   [2]   Past Surgical History:  Procedure Laterality Date    Thyroidectomy      Total abdom hysterectomy     [3] No family history on file.  [4]   Social History  Socioeconomic History    Marital status: Single   Tobacco Use    Smoking status: Never    Smokeless tobacco: Never   Vaping Use    Vaping status: Never Used   Substance and Sexual Activity    Alcohol use: Never    Drug use: Never     Social Drivers of Health     Food Insecurity: No Food Insecurity (4/12/2025)    NCSS - Food Insecurity     Worried About Running Out of Food in the Last Year: No     Ran Out of Food in the Last Year: No   Transportation Needs: No Transportation Needs (4/12/2025)    NCSS - Transportation     Lack of Transportation: No   Housing Stability: Not At Risk (4/12/2025)    NCSS - Housing/Utilities     Has Housing: Yes     Worried About Losing Housing: No     Unable to Get Utilities: No   [5]   Allergies  Allergen Reactions    Aspirin  Tightness in Throat    Penicillins HIVES    Other OTHER (SEE COMMENTS)     Pt states IV steroid allergy, states it makes her breathing worse    [6] (Not in a hospital admission)

## 2025-04-30 NOTE — ED INITIAL ASSESSMENT (HPI)
Pt arrives to triage with c/o chest discomfort and dehydration x 1 day.    Pt states normally uses the washroom 5+ times a day and has only used the washroom once today.    Hx of asthma

## 2025-04-30 NOTE — ED QUICK NOTES
Orders for admission, patient is aware of plan and ready to go upstairs. Any questions, please call ED RN Valeri at extension [phone].     Patient Covid vaccination status: Unvaccinated     COVID Test Ordered in ED: None    COVID Suspicion at Admission: N/A    Running Infusions: Medication Infusions[1]     Mental Status/LOC at time of transport: A&O x 4    Other pertinent information:   CIWA score: N/A   NIH score:  N/A             [1]

## 2025-04-30 NOTE — CONSULTS
[facility]  part of [facility]    Cardiology Consultation    Kelli Fisher Patient Status:  Inpatient    1955 MRN [id number]   Location [facility] 4W/SW/SE Attending Joel Dejesus MD   Hosp Day # 0 PCP Taylor Gallardo MD     Date of Admission:  2025  Date of Consult:  2025  Reason for Consultation: Pericardial effusion    History of Present Illness:   Patient is a 70 year old female with significant past medical history of metastatic endometrial cancer with lung mets who presents with worsening dyspnea.    In the ED, she was tachycardic with rates in the 100-110s, satting okay on room air.  ECG and rhythm strips with sinus tachycardia.  Labs significant for leukocytosis, anemia.  CXR with left lung mass and diffuse adenopathy, normal heart size, no effusion or pneumothorax.  Assessment and Plan:   Endometrial cancer with lung metastasis  Small pericardial effusion    Dyspnea on exertion  - Presents with worsening dyspnea, found to be tachypneic and tachycardic  - Concerning for infectious process, demand/supply mismatch 2/t anemia or PE, tamponade   - CXR redemonstrating lung mass with diffuse adenopathy; otherwise no vascular congestion or obvious infiltrate  - Will need to evaluate for PE; undergoing CTA of the chest  - Patient did have small pericardial effusion without tamponade on recent TTE   - Currently HDS without clinical tamponade on exam   - Will further assess with a repeat limited TTE  - Appreciate pulm and oncology evaluation    Past Medical History  Past Medical History[1]    Past Surgical History  Past Surgical History[2]    Family History  Family History[3]    Social History  Pediatric History   Patient Parents    Not on file     Other Topics Concern    Not on file   Social History Narrative    Not on file           Current Medications:  Current Hospital Medications[4]  Prescriptions Prior to Admission[5]    Allergies  Allergies[6]    Review of  Systems:   ROS  10 point review of systems completed and negative except as noted.    Physical Exam:   Patient Vitals for the past 24 hrs:   BP Temp Temp src Pulse Resp SpO2 Height Weight   04/30/25 1229 106/74 99 °F (37.2 °C) Oral -- 18 93 % -- --   04/30/25 1114 -- -- -- 102 -- 95 % -- --   04/30/25 0957 113/68 98.7 °F (37.1 °C) Oral 101 20 100 % -- --   04/30/25 0338 97/77 -- Oral 104 20 99 % -- --   04/30/25 0300 103/63 99.3 °F (37.4 °C) Oral 102 19 98 % -- --   04/30/25 0245 109/75 99 °F (37.2 °C) Oral 105 15 98 % -- --   04/30/25 0100 104/72 -- -- 104 15 100 % -- --   04/30/25 0036 109/68 99.3 °F (37.4 °C) Oral 109 15 100 % -- --   04/30/25 0021 111/70 100.1 °F (37.8 °C) -- 112 14 99 % -- --   04/30/25 0000 111/76 -- -- 108 19 100 % -- --   04/29/25 2115 105/85 -- -- 117 17 96 % -- --   04/29/25 2050 -- 97.4 °F (36.3 °C) -- -- -- -- -- --   04/29/25 2038 94/53 -- -- 88 18 95 % 5' (1.524 m) 165 lb (74.8 kg)       Intake/Output:   Last 3 shifts:   Intake/Output                   04/28/25 0700 - 04/29/25 0659 (Not Admitted) 04/29/25 0700 - 04/30/25 0659 04/30/25 0700 - 05/01/25 0659       Intake    Blood  --  350  --    Volume (Transfuse RBC) -- 350 --    Total Intake -- 350 --       Output    Total Output -- -- --       Net I/O     -- 350 --          Vent Settings:    Hemodynamic parameters (last 24 hours):    Scheduled Meds: Scheduled Medications[7]  Continuous Infusions: Medication Infusions[8]    Physical Exam:  General: Alert and oriented x 3.   HEENT: Normocephalic, anicteric sclera, neck supple, no thyromegaly or adenopathy.  Neck: + JVD, carotids 2+, no bruits.  Cardiac: Tachycardic, regular rhythm  Lungs: tachypneic, diminished BS  Abdomen: Soft, non-tender. No organosplenomegally, mass or rebound, BS-present.  Extremities: Without clubbing or cyanosis. No edema.    Neurologic: Alert and oriented, normal affect. No focal defects  Skin: Warm and dry.     Results:   Laboratory Data:  Lab Results    Component Value Date    WBC 11.5 (H) 04/30/2025    HGB 7.0 (L) 04/30/2025    HCT 22.1 (L) 04/30/2025    .0 04/30/2025    CREATSERUM 0.76 04/30/2025    BUN 9 04/30/2025     04/30/2025    K 4.3 04/30/2025     04/30/2025    CO2 26.0 04/30/2025     (H) 04/30/2025    CA 8.2 (L) 04/30/2025    ALB 3.4 04/29/2025    ALKPHO 250 (H) 04/29/2025    TP 7.1 04/29/2025    AST 17 04/29/2025    ALT 7 (L) 04/29/2025    PTT 35.1 04/29/2025    INR 1.33 (H) 04/29/2025    PTP 17.2 (H) 04/29/2025    LIP 42 12/11/2023    MG 2.0 04/13/2025         Recent Labs   Lab 04/29/25 2158 04/30/25  0909   * 102*   BUN 11 9   CREATSERUM 0.93 0.76   CA 8.8 8.2*   * 136   K 3.9 4.3   CL 97* 104   CO2 27.0 26.0     Recent Labs   Lab 04/29/25 2158 04/30/25  0909   RBC 2.76* 2.78*   HGB 6.8* 7.0*   HCT 21.3* 22.1*   MCV 77.2* 79.5*   MCH 24.6* 25.2*   MCHC 31.9 31.7   RDW 21.9* 20.7*   NEPRELIM 11.00* 8.86*   WBC 13.5* 11.5*   .0 361.0       No results for input(s): \"BNPML\" in the last 168 hours.    No results for input(s): \"TROP\", \"CK\" in the last 168 hours.    Imaging:  No results found.    Thank you for allowing me to participate in the care of your patient.    Javi Huerta, [facility] [place]       [1]   Past Medical History:   Asthma (HCC)    Esophageal reflux    History of blood transfusion    Visual impairment   [2]   Past Surgical History:  Procedure Laterality Date    Thyroidectomy      Total abdom hysterectomy     [3] History reviewed. No pertinent family history.  [4]   Current Facility-Administered Medications   Medication Dose Route Frequency    sodium chloride 0.9% infusion   Intravenous Continuous    ceFEPIme (Maxipime) 1 g in sodium chloride 0.9% 100mL IVPB-YANA  1 g Intravenous Q12H    [START ON 5/1/2025] vancomycin (Vancocin) 1,000 mg in sodium chloride 0.9% 250 mL IVPB-ADDV  15 mg/kg Intravenous Q24H    morphINE PF 2 MG/ML injection 1 mg  1 mg Intravenous Q2H PRN     Or    morphINE PF 2 MG/ML injection 2 mg  2 mg Intravenous Q2H PRN    Or    morphINE PF 4 MG/ML injection 4 mg  4 mg Intravenous Q2H PRN    ipratropium-albuterol (Duoneb) 0.5-2.5 (3) MG/3ML inhalation solution 3 mL  3 mL Nebulization Q4H PRN    levothyroxine (Synthroid) tab 100 mcg  100 mcg Oral Before breakfast    fluticasone furoate (Arnuity Ellipta) 200 MCG/ACT inhaler 1 puff  1 puff Inhalation Daily   [5]   Medications Prior to Admission   Medication Sig    levothyroxine 100 MCG Oral Tab Take 1 tablet (100 mcg total) by mouth before breakfast.    albuterol (2.5 MG/3ML) 0.083% Inhalation Nebu Soln Take 3 mL (2.5 mg total) by nebulization every 6 (six) hours as needed for Wheezing.    Fluticasone Propionate  HFA (FLOVENT HFA) 220 MCG/ACT Inhalation Aerosol Inhale 2 puffs into the lungs in the morning and 2 puffs before bedtime.   [6]   Allergies  Allergen Reactions    Aspirin Tightness in Throat    Penicillins HIVES    Other OTHER (SEE COMMENTS)     Pt states IV steroid allergy, states it makes her breathing worse    [7]    cefepime  1 g Intravenous Q12H    [START ON 5/1/2025] vancomycin  15 mg/kg Intravenous Q24H    levothyroxine  100 mcg Oral Before breakfast    fluticasone furoate  1 puff Inhalation Daily   [8]    sodium chloride 100 mL/hr at 04/30/25 7026

## 2025-04-30 NOTE — PAYOR COMM NOTE
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  Patient noticed worsening breathing throughout the day as well as chest pressure.  Also notes decreased urinary output.  Has chronic dark stools due to iron supplementation.  No nausea, vomiting.  History of Present Illness             Physical Exam     ED Triage Vitals   BP 04/29/25 2038 94/53   Pulse 04/29/25 2038 88   Resp 04/29/25 2038 18   Temp 04/29/25 2050 97.4 °F (36.3 °C)   Temp src --    SpO2 04/29/25 2038 95 %   O2 Device 04/29/25 2038 None (Room air)       Current Vitals:   Vital Signs  BP: 105/85  Pulse: 117  Resp: 17  Temp: 97.4 °F (36.3 °C)  MAP (mmHg): 93    Oxygen Therapy  SpO2: 96 %  O2 Device: None (Room air)    Physical Exam  Vital signs reviewed. Nursing note reviewed.  Constitutional: Well-developed. Well-nourished. In no acute distress  HENT: Mucous membranes moist.   EYES: No scleral icterus or conjunctival injection.  NECK: Full ROM. Supple.   CARDIAC: tachycardic. Normal S1/ S2. 2+ distal pulses. No edema  PULM/CHEST: Clear to auscultation bilaterally. No wheezes. Tachypneic, speaking in 1-2 word sentences  ABD: Soft, non-tender, non-distended.   RECTAL: deferred  Extremities: No obvious deformity  NEURO: Awake, alert, following commands, moving extremities, answering questions.   SKIN: Warm and dry. No rash or lesions.  PSYCH: Normal judgment. Normal affect.      Physical Exam         ED Course     Labs Reviewed   COMP METABOLIC PANEL (14) - Abnormal; Notable for the following components:       Result Value    Glucose 125 (*)     Sodium 134 (*)     Chloride 97 (*)     ALT 7 (*)     Alkaline Phosphatase 250 (*)     Globulin  3.7 (*)     A/G Ratio 0.9 (*)     All other components within normal limits   CBC WITH DIFFERENTIAL WITH PLATELET - Abnormal; Notable for the following components:    WBC 13.5 (*)     RBC 2.76 (*)     HGB 6.8 (*)     HCT 21.3 (*)     MCV 77.2 (*)     MCH 24.6 (*)     RDW-SD 60.1 (*)     RDW 21.9 (*)     Neutrophil Absolute Prelim 11.00 (*)     All other components within normal limits   PRO BETA NATRIURETIC PEPTIDE - Abnormal; Notable for the following components:    Pro-Beta Natriuretic Peptide 239 (*)     All other components within normal limits   PROTHROMBIN TIME (PT) - Abnormal; Notable for the following components:    PT 17.2 (*)     INR 1.33 (*)     All other components within normal limits   TROPONIN I HIGH SENSITIVITY - Normal   PTT, ACTIVATED - Normal   MD BLOOD SMEAR CONSULT   TYPE AND SCREEN    Narrative:     The following orders were created for panel order Type and screen.  Procedure                               Abnormality         Status                     ---------                               -----------         ------                     ABORH (Blood Type)[[id number]]                               In process                 Antibody Screen[[id number]]                                  In process                   Please view results for these tests on the individual orders.   PREPARE RBC   ABORH (BLOOD TYPE)   ANTIBODY SCREEN     EKG    Rate, intervals and axes as noted on EKG Report.  Rate: 122  Rhythm: sinus tachycardia  Reading: For atrial contractions, nonspecific ST and T wave changes      MDM      Assessment:Patient is a 70 year old female presenting to the ED due to chest pain, shortness of breath.    Comorbidities/chronic illnesses impacting care:  recent diagnosis of metastatic endometrial carcinoma to lung    History obtained from: patient, daughters    External records and previous hospitalization records reviewed and documented below    Consideration of Social Determinants of Health and Impact on Medical Decision Making:  Housing/Transportation/Financial Strain/Access to healthcare/Food insecurity/family or Community support/Language and Literacy/Substance abuse/Mental health concerns/Disabilities     -none    Radiography/Imaging:  XR CHEST AP PORTABLE  (CPT=71045)    (Results Pending)   CTA CHEST (CPT=71275)    (Results Pending)           ED course  Patient arrives here to ER tachypneic, tachycardic.  She speaking in 1-2 word sentences, unclear if this is totally physiologic versus her just not feeling well.  Reviewed her inpatient stay from recent where she was diagnosed with what looks like per pathology metastases of her prior endometrial carcinoma to her lung.  Bedside ultrasound initially does show no pericardial effusion though does have good respiratory variation of IVC making tamponade very unlikely.  Concern for pulmonary embolism, pneumonia, effusion among other things causing her worsening dyspnea and chest pressure.  Will check labs, cardiac markers, needs CTA chest for PE rule out    Laboratory results above were independently viewed and interpreted as: Leukocytosis with microcytic anemia, elevated alkaline phosphatase likely due to metastases    Will Give Protonix, order 1 unit of blood due to her anemia.  Will consult GI, patient will likely need admission    Imaging pending at time of signout. Discussed with on coming physician. Suspect she will likely need admission.    Time spent providing Critical Care Services to the patient (excluding time spent performing separately billable procedures): 35 minutes.  Most of the time was spent at the bedside of the patient, reevaluating the patient and vital signs, explaining conditions and results  to the patient and/or family, as well as speaking to the PMD and/or Consultant(s).              Medications   pantoprazole (Protonix) 80 mg in sodium chloride 0.9% 100 mL IV bolus (80 mg Intravenous New Bag 4/29/25 1771)   sodium chloride 0.9 % IV bolus 1,000 mL (1,000 mL Intravenous New Bag 4/29/25 2150)     Medical Decision Making    Disposition and Plan     Clinical Impression:  1. Lung mass    2. Endometrial cancer (HCC)    3. Pericardial effusion (HCC)    4. Microcytic anemia         Disposition:  There is no disposition on file for this visit.  There is no disposition time on file for this visit.    Follow-up:  No follow-up provider specified.  We recommend that you schedule follow up care with a primary care provider within the next three months to obtain basic health screening including reassessment of your blood pressure.  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 She was recently admitted from April 11th through April 17th, 2025, for shortness of breath, during which a left upper lobe lung mass was discovered and biopsied, revealing metastatic adenocarcinoma consistent with her known serous endometrial carcinoma. A small pericardial effusion was also diagnosed during that admission.    Today, the patient reports noticing progressive shortness of breath. Upon initial evaluation, she was found to be tachycardic with a pulse of 117, afebrile, and with stable blood pressure. She has  underlying acute and chronic anemia, with a hemoglobin of 6.8, representing a 2-gram drop since her recent discharge. The patient was admitted for further treatment and evaluation of her acute hypoxic respiratory failure, which is likely secondary to her recently diagnosed metastasized endometrial carcinoma. There is also concern for a possible superimposed pneumonia.    During her current visit to the emergency room, the patient received one unit of packed red blood cells. A bedside ultrasound completed by the ER physician showed the previously known pericardial effusion without tamponade. Cardiology and Gastroenterology were consulted due to the pericardial effusion and acute and chronic anemia, respectively. Pulmonology was also consulted, and a CT test was ordered for further evaluation.  Review of Systems:     Constitutional:  Positive for fatigue.   HENT: Negative.     Eyes: Negative.    Respiratory:  Positive for shortness of breath.    Cardiovascular: Negative.    Gastrointestinal:  Positive for abdominal pain.   Endocrine: Negative.    Genitourinary: Negative.    Musculoskeletal: Negative.    Allergic/Immunologic: Negative.    Neurological: Negative.    Hematological: Negative.    Psychiatric/Behavioral: Negative.         Physical Exam:   Vital Signs:  Blood pressure 109/68, pulse 109, temperature 99.3 °F (37.4 °C), temperature source Oral, resp. rate 15, height 5' (1.524 m), weight 165 lb (74.8 kg), SpO2 100%.  Physical Exam  Vitals reviewed.   Constitutional:       Appearance: She is ill-appearing.   HENT:      Nose: Nose normal.      Mouth/Throat:      Mouth: Mucous membranes are moist.   Eyes:      Pupils: Pupils are equal, round, and reactive to light.   Cardiovascular:      Rate and Rhythm: Tachycardia present.   Pulmonary:      Effort: Respiratory distress present.   Abdominal:      General: Abdomen is flat.   Musculoskeletal:      Cervical back: Normal range of motion.   Skin:     General: Skin is  warm.   Neurological:      General: No focal deficit present.           Results:     Lab Results   Component Value Date    WBC 13.5 (H) 04/29/2025    HGB 6.8 (LL) 04/29/2025    HCT 21.3 (L) 04/29/2025    .0 04/29/2025    CREATSERUM 0.93 04/29/2025    BUN 11 04/29/2025     (L) 04/29/2025    K 3.9 04/29/2025    CL 97 (L) 04/29/2025    CO2 27.0 04/29/2025     (H) 04/29/2025    CA 8.8 04/29/2025    ALB 3.4 04/29/2025    ALKPHO 250 (H) 04/29/2025    BILT 0.6 04/29/2025    TP 7.1 04/29/2025    AST 17 04/29/2025    ALT 7 (L) 04/29/2025    PTT 35.1 04/29/2025    INR 1.33 (H) 04/29/2025    LIP 42 12/11/2023    MG 2.0 04/13/2025    TROPHS 3 04/29/2025     No results found.  EKG 12 Lead  Result Date: 4/29/2025  Sinus tachycardia with Premature atrial complexes Low voltage QRS, consider pulmonary disease, pericardial effusion, or normal variant Borderline ECG When compared with ECG of 11-APR-2025 18:37, Premature atrial complexes are now Present      Assessment/Plan:     Assessment and Plan:    1. Acute hypoxic respiratory failure:     - Patient presents with progressive shortness of breath     - Likely secondary to recently diagnosed metastasized endometrial carcinoma     - Left upper lobe lung mass identified during recent admission (April 11-17, 2025)     - Biopsy revealed metastatic adenocarcinoma consistent with known serous endometrial carcinoma     - Concern for possible superimposed pneumonia     - Patient is tachycardic with a pulse of 117 and requires supplemental oxygen     - Initiate empiric antibiotic therapy for possible pneumonia     - Pulmonology consultation for further evaluation and management     - Continue supplemental oxygen as needed     - Monitor oxygen saturation and respiratory status closely    2. Acute and chronic anemia:     - Acute exacerbation of chronic anemia     - Hemoglobin dropped to 6.8 g/dL, a 2 g/dL decrease since recent discharge     - Likely multifactorial,  potentially related to underlying malignancy and recent treatments     - Administer 1 unit of packed red blood cells (PRBCs) as ordered in the emergency room     - Gastroenterology consultation for further evaluation of anemia     - Monitor hemoglobin levels closely     - Continue IV PPI     - Evaluate for potential sources of blood loss    3. Pericardial effusion:     - Known small pericardial effusion diagnosed during recent admission     - Bedside ultrasound by ER physician showed no evidence of tamponade     - Cardiology consultation for further evaluation and management     - Monitor for signs of hemodynamic compromise    4. Leukocytosis:     - Elevated white blood cell count noted on complete blood count     - Potentially indicating an inflammatory or infectious process     - Monitor white blood cell count closely     - Correlate with clinical presentation and other diagnostic studies    5. Stage 3b clear cell and serous endometrial cancer with lung metastasis:     - Known history of stage 3b endometrial cancer with metastasis to lungs     - Status post chemoradiation therapy and surgery     - Recent biopsy of left upper lobe lung mass confirmed metastatic disease         diet: general  ivf: 0.9 NS  dvt prophylaxis: Hold in setting of anemia.   antibiotics: none  tubes: none  central lines: none  code status: full code  dispo: home upon medical clearance    Greater than 70 minutes, >50% time spent counseling and coordinating care regarding treatment and workup.      Crow Arzate MD  4/30/2025  Electronically signed by Crow Arzate MD on 4/30/2025  1:14 AM       CONSULTS 4/30   Date of Service: 4/30/2025 10:24 AM  Consult Orders   ED Consult to Pulmonary/Critical Care [[id number]] ordered by Benny Medley MD at 04/29/25 2241          Signed         Initial Pulmonary Medicine Inpatient Consultation      Consult requested by Dr. Dr. Dejesus for PNA.        HPI: 71 yo woman with hx of endometrial CA  with pulmonary mets admitted with shortness of breath, coughing, wheezing, fevers, chills. Lives alone and not on supp 02. Has a nebulizer machine a home   Recent admission or shortness of breath during which time NATO lung mass was biopsied.   Now admitted for shortness of breath. Found to be anemic with Hg 6.8. CT concerning for possible PNA  Started on supp 02, vanco/cefepime        REVIEW OF SYSTEMS:  Positives and negatives as stated in HPI. Remainder of 12 pt review of systems otherwise are negative.          Diagnosis Date    Asthma (HCC)      Esophageal reflux      History of blood transfusion      Visual impairment       PHYSICAL EXAM:  /68 (BP Location: Left arm)   Pulse 101   Temp 98.7 °F (37.1 °C) (Oral)   Resp 20   Ht 5' (1.524 m)   Wt 165 lb (74.8 kg)   SpO2 100%   BMI 32.22 kg/m²   CONSTITUTIONAL: alert, oriented, no apparent distress, appears weak  HEENT: atraumatic normocephalic  MOUTH: mucous membranes are moist. No OP exudates  NECK/THROAT: no JVD. Trachea midline. No obvious thyromegaly  LUNG: clear upper b/l no wheezing, + let sided crackles. Chest symmetric with respiratory motion  HEART: regular rate and rhythm, no obvious murmers or gallops note  ABD: soft non tender. + bowel sounds. No organomegaly noted  EXT: no clubbing, cyanosis. + 1 b/l LE edema  NEURO/MUSCULOSKELETAL: no gross deficits  SKIN: warm, dry. No obvious lesions noted  LYMPH: no obvious LAD        IMAGES:  Chest CT PE 4/30/25  FINDINGS:   No PE   Normal heart size with stable small to moderate circumferential pericardial effusion   No short interval change in left upper lobe mass, diffuse intrathoracic adenopathy. Trace left pleural effusion similar to prior.  Trace right effusion has resolved A few of the right-sided pulmonary nodules may have mildly increased in size.  Adenopathy causes severe narrowing of the lower right IJ with collaterals in the chest wall   CONCLUSION: No PE.         LABS:       Recent Labs    Lab 04/29/25 2158 04/30/25  0909   RBC 2.76* 2.78*   HGB 6.8* 7.0*   HCT 21.3* 22.1*   MCV 77.2* 79.5*   MCH 24.6* 25.2*   MCHC 31.9 31.7   RDW 21.9* 20.7*   NEPRELIM 11.00* 8.86*   WBC 13.5* 11.5*   .0 361.0              Recent Labs   Lab 04/29/25 2158 04/30/25  0909   * 102*   BUN 11 9   CREATSERUM 0.93 0.76   EGFRCR 67 85   CA 8.8 8.2*   ALB 3.4  --    * 136   K 3.9 4.3   CL 97* 104   CO2 27.0 26.0   ALKPHO 250*  --    AST 17  --    ALT 7*  --    BILT 0.6  --    TP 7.1  --             ASSESSMENT/PLAN:  Endometrial ca with lung mets  -oncology f/u     Possible PNA  -continue vanco/cefepime  -check MRSA nares   -check urine leg, strep pneumo, sputum cx  -check swallow eval given trouble swallowing  -PRN nebs     Acute anemia on chronic anemia  -s/p pRBC transfusion      Proph  -DVT: SCDS given anemai     Thank you for the opportunity to care for Kelli Fisher.        DARIEL Reaves DO, MPH  Pulmonary Critical Care Medicine  [facility] Pulmonary and Critical Care Medicine                     MEDICATIONS ADMINISTERED IN LAST 1 DAY:  Transfuse RBC       Date Action Dose Route User    4/30/2025 0217 Rate/Dose Change (none) Intravenous Valeri Banks, RN    4/30/2025 0040 Rate/Dose Change (none) Intravenous Valeri Banks, RN    4/30/2025 0021 New Bag (none) Intravenous Agueda Garza RN          ceFEPIme (Maxipime) 1 g in sodium chloride 0.9% 100mL IVPB-YANA       Date Action Dose Route User    4/30/2025 0617 New Bag 1 g Intravenous Dianna Andrews RN          ceFEPIme (Maxipime) 2 g in sodium chloride 0.9% 100mL IVPB-YANA       Date Action Dose Route User    4/30/2025 0003 New Bag 2 g Intravenous Valeri Banks, RN          iopamidol 76% (ISOVUE-370) injection for power injector       Date Action Dose Route User    4/29/2025 5390 Given 80 mL Intravenous Droz, Katrin          ipratropium-albuterol (Duoneb) 0.5-2.5 (3) MG/3ML inhalation solution 3 mL       Date Action Dose Route User     4/30/2025 0515 Given 3 mL Nebulization Ashlyn Elliott RCP          morphINE PF 2 MG/ML injection 2 mg       Date Action Dose Route User    4/30/2025 0449 Given 2 mg Intravenous Dianna Andrews RN          pantoprazole (Protonix) 80 mg in sodium chloride 0.9% 100 mL IV bolus       Date Action Dose Route User    4/29/2025 2251 New Bag 80 mg Intravenous Valeri Banks RN          sodium chloride 0.9% infusion       Date Action Dose Route User    4/30/2025 0356 New Bag (none) Intravenous Dianna Andrews RN          sodium chloride 0.9 % IV bolus 1,000 mL       Date Action Dose Route User    4/29/2025 2150 New Bag 1,000 mL Intravenous Valeri Banks RN          vancomycin (Vancocin) 1,000 mg in sodium chloride 0.9% 250 mL IVPB-ADDV       Date Action Dose Route User    4/30/2025 0042 New Bag 1,000 mg Intravenous Valeri Banks RN            Vitals (last day)       Date/Time Temp Pulse Resp BP SpO2 Weight O2 Device O2 Flow Rate (L/min) [facility]    04/30/25 0957 98.7 °F (37.1 °C) 101 20 113/68 100 % -- Nasal cannula 2 L/min JS    04/30/25 0338 -- 104 20 97/77 99 % -- Nasal cannula 2 L/min KS    04/30/25 0300 99.3 °F (37.4 °C) 102 19 103/63 98 % -- Nasal cannula 2 L/min AJ    04/30/25 0245 99 °F (37.2 °C) 105 15 109/75 98 % -- Nasal cannula -- DB    04/30/25 0100 -- 104 15 104/72 100 % -- Nasal cannula -- DB    04/30/25 0036 99.3 °F (37.4 °C) 109 15 109/68 100 % -- Nasal cannula -- DB    04/30/25 0021 100.1 °F (37.8 °C) 112 14 111/70 99 % -- -- -- GE    04/30/25 0000 -- 108 19 111/76 100 % -- None (Room air) -- DB    04/29/25 2115 -- 117 17 105/85 96 % -- None (Room air) -- DB    04/29/25 2112 -- -- -- -- -- -- None (Room air) -- DB    04/29/25 2050 97.4 °F (36.3 °C) -- -- -- -- -- -- -- LT    04/29/25 2038 -- 88 18 94/53 95 % 165 lb (74.8 kg) None (Room air) -- LT            Blood Transfusion Record       Product Unit Status Volume Start End            Transfuse RBC       25  [id number]  [id number] Completed 04/30/25  0253 350 mL 04/30/25 0021 04/30/25 0245

## 2025-04-30 NOTE — PROGRESS NOTES
[facility] Pharmacy Dosing Service      Initial Pharmacokinetic Consult for Vancomycin Dosing     Kelli Fisher is a 70 year old female who is being initiated on vancomycin therapy for pneumonia.  Pharmacy has been asked to dose vancomycin by Dr Arzate.  The initial treatment and monitoring approach will be steady state AUC strategy.        Weight and Temperature:    Wt Readings from Last 1 Encounters:   25 74.8 kg (165 lb)        Temp Readings from Last 1 Encounters:   25 99.3 °F (37.4 °C) (Oral)      Labs:   Recent Labs   Lab 25  2158   CREATSERUM 0.93      Estimated Creatinine Clearance: 41 mL/min (based on SCr of 0.93 mg/dL).     Recent Labs   Lab 25  2158   WBC 13.5*          The Pharmacokinetic Target is:     to 600 mg-h/L and trough <=15 mg/L    Renal Dosing Considerations:    None     Assessment/Plan:   Initial/Loading dose: Has received 1000 mg IV (15 mg/kg, capped at 2250 mg) x 1 initial dose.      Maintenance dose: Pharmacy will dose vancomycin at 1000 mg IV every 24 hours    Monitorin) Plan for vancomycin peak and trough to be obtained at steady state    2) Pharmacy will order SCr as clinically indicated to assess renal function.    3) Pharmacy will monitor for toxicity and efficacy, adjust vancomycin dose and/or frequency, and order vancomycin levels as appropriate per the Pharmacy and Therapeutics Committee approved protocol until discontinuation of the medication.       We appreciate the opportunity to assist in the care of this patient.     Hailey Khan, PharmD  2025  4:00 AM  [facility] Pharmacy Extension: [phone]

## 2025-04-30 NOTE — HOME CARE LIAISON
This is a current pt with [facility] . Pt will need a BECKI order entered prior to DC for the below services. Please add a F2F if more services need to be added. RN/PT/SW.

## 2025-04-30 NOTE — ED PROVIDER NOTES
Patient Seen in: 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  Patient noticed worsening breathing throughout the day as well as chest pressure.  Also notes decreased urinary output.  Has chronic dark stools due to iron supplementation.  No nausea, vomiting.  History of Present Illness               Objective:     Past Medical History:    Asthma (HCC)              Past Surgical History:   Procedure Laterality Date    Thyroidectomy      Total abdom hysterectomy                  Social History     Socioeconomic History    Marital status: Single   Tobacco Use    Smoking status: Never    Smokeless tobacco: Never   Vaping Use    Vaping status: Never Used   Substance and Sexual Activity    Alcohol use: Never    Drug use: Never     Social Drivers of Health     Food Insecurity: No Food Insecurity (4/12/2025)    NCSS - Food Insecurity     Worried About Running Out of Food in the Last Year: No     Ran Out of Food in the Last Year: No   Transportation Needs: No Transportation Needs (4/12/2025)    NCSS - Transportation     Lack of Transportation: No   Housing Stability: Not At Risk (4/12/2025)    NCSS - Housing/Utilities     Has Housing: Yes     Worried About Losing Housing: No     Unable to Get Utilities: No                                Physical Exam     ED Triage Vitals   BP 04/29/25 2038 94/53   Pulse 04/29/25 2038 88   Resp 04/29/25 2038 18   Temp 04/29/25 2050 97.4 °F (36.3 °C)   Temp src --    SpO2 04/29/25 2038 95 %   O2 Device 04/29/25 2038 None (Room air)       Current Vitals:   Vital Signs  BP: 105/85  Pulse: 117  Resp: 17  Temp: 97.4 °F (36.3 °C)  MAP (mmHg): 93    Oxygen Therapy  SpO2: 96 %  O2 Device: None (Room air)        Physical Exam  Vital signs reviewed.  Nursing note reviewed.  Constitutional: Well-developed. Well-nourished. In no acute distress  HENT: Mucous membranes moist.   EYES: No scleral icterus or conjunctival injection.  NECK: Full ROM. Supple.   CARDIAC: tachycardic. Normal S1/ S2. 2+ distal pulses. No edema  PULM/CHEST: Clear to auscultation bilaterally. No wheezes. Tachypneic, speaking in 1-2 word sentences  ABD: Soft, non-tender, non-distended.   RECTAL: deferred  Extremities: No obvious deformity  NEURO: Awake, alert, following commands, moving extremities, answering questions.   SKIN: Warm and dry. No rash or lesions.  PSYCH: Normal judgment. Normal affect.      Physical Exam                ED Course     Labs Reviewed   COMP METABOLIC PANEL (14) - Abnormal; Notable for the following components:       Result Value    Glucose 125 (*)     Sodium 134 (*)     Chloride 97 (*)     ALT 7 (*)     Alkaline Phosphatase 250 (*)     Globulin  3.7 (*)     A/G Ratio 0.9 (*)     All other components within normal limits   CBC WITH DIFFERENTIAL WITH PLATELET - Abnormal; Notable for the following components:    WBC 13.5 (*)     RBC 2.76 (*)     HGB 6.8 (*)     HCT 21.3 (*)     MCV 77.2 (*)     MCH 24.6 (*)     RDW-SD 60.1 (*)     RDW 21.9 (*)     Neutrophil Absolute Prelim 11.00 (*)     All other components within normal limits   PRO BETA NATRIURETIC PEPTIDE - Abnormal; Notable for the following components:    Pro-Beta Natriuretic Peptide 239 (*)     All other components within normal limits   PROTHROMBIN TIME (PT) - Abnormal; Notable for the following components:    PT 17.2 (*)     INR 1.33 (*)     All other components within normal limits   TROPONIN I HIGH SENSITIVITY - Normal   PTT, ACTIVATED - Normal   MD BLOOD SMEAR CONSULT   TYPE AND SCREEN    Narrative:     The following orders were created for panel order Type and screen.  Procedure                               Abnormality         Status                     ---------                               -----------          ------                     ABORH (Blood Type)[[id number]]                               In process                 Antibody Screen[[id number]]                                  In process                   Please view results for these tests on the individual orders.   PREPARE RBC   ABORH (BLOOD TYPE)   ANTIBODY SCREEN     EKG    Rate, intervals and axes as noted on EKG Report.  Rate: 122  Rhythm: sinus tachycardia  Reading: For atrial contractions, nonspecific ST and T wave changes              Results                                 MDM      Assessment:Patient is a 70 year old female presenting to the ED due to chest pain, shortness of breath.    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    History obtained from: patient, daughters    External records and previous hospitalization records reviewed and documented below    Consideration of Social Determinants of Health and Impact on Medical Decision Making:  Housing/Transportation/Financial Strain/Access to healthcare/Food insecurity/family or Community support/Language and Literacy/Substance abuse/Mental health concerns/Disabilities     -none    Radiography/Imaging:  XR CHEST AP PORTABLE  (CPT=71045)    (Results Pending)   CTA CHEST (CPT=71275)    (Results Pending)           ED course  Patient arrives here to ER tachypneic, tachycardic.  She speaking in 1-2 word sentences, unclear if this is totally physiologic versus her just not feeling well.  Reviewed her inpatient stay from recent where she was diagnosed with what looks like per pathology metastases of her prior endometrial carcinoma to her lung.  Bedside ultrasound initially does show no pericardial effusion though does have good respiratory variation of IVC making tamponade very unlikely.  Concern for pulmonary embolism, pneumonia, effusion among other things causing her worsening dyspnea and chest pressure.  Will check labs, cardiac  markers, needs CTA chest for PE rule out    Laboratory results above were independently viewed and interpreted as: Leukocytosis with microcytic anemia, elevated alkaline phosphatase likely due to metastases    Will Give Protonix, order 1 unit of blood due to her anemia.  Will consult GI, patient will likely need admission    Imaging pending at time of signout. Discussed with on coming physician. Suspect she will likely need admission.    Time spent providing Critical Care Services to the patient (excluding time spent performing separately billable procedures): 35 minutes.  Most of the time was spent at the bedside of the patient, reevaluating the patient and vital signs, explaining conditions and results to the patient and/or family, as well as speaking to the PMD and/or Consultant(s).              Medications   pantoprazole (Protonix) 80 mg in sodium chloride 0.9% 100 mL IV bolus (80 mg Intravenous New Bag 4/29/25 0316)   sodium chloride 0.9 % IV bolus 1,000 mL (1,000 mL Intravenous New Bag 4/29/25 2150)                 Medical Decision Making      Disposition and Plan     Clinical Impression:  1. Lung mass    2. Endometrial cancer (HCC)    3. Pericardial effusion (HCC)    4. Microcytic anemia         Disposition:  There is no disposition on file for this visit.  There is no disposition time on file for this visit.    Follow-up:  No follow-up provider specified.  We recommend that you schedule follow up care with a primary care provider within the next three months to obtain basic health screening including reassessment of your blood pressure.      Medications Prescribed:  Current Discharge Medication List          Supplementary Documentation:

## 2025-04-30 NOTE — CM/SW NOTE
Pt is known to CM from recent admission.    Pt lives alone in a 3 flat building. Pt reports her dtrs live upstairs and downstairs. She has 3 flts of stairs to her apt. 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    Upon dc pt was sent home w/ [facility], pt to purchase nebulizer from her pharmacy and used [employer] w/ lift asst and stair chair due to narrow halls in apt.    Walk test was done, pt did not meet criteria for home 02 at that time.    Pt waited for over an hour for lift assit to arrive to the home. Will plan to use amb with stair chair at dc.    Readmitted now with SOB, anemia-hgb 6.8, possible PNA.    Currently on 2L 02 with sat 100%    5/2 1430  CM was notified by PT that pt has barriers to dc home with re to her limited mobility, she might need EDUARDO.    CM called dtr Theresa to discuss plan.    Kwtay sts there is no family in the building but there are tenants and pt lives in the middle level of a 3 flat.    Per Theresa they have initiated the process for senior services cg however, no one has started yet. CM offered a cg list to assist with prison care until cg starts.    Dtr confirms [facility] has started care and SW has initiated DME req for commode and shower chair however, they have not rec'd and PCP office claims they have no pending ref. CM lvm for [facility] liaison to investigate and expedite.    Pt has a walker >5 years, Dtr requested wheelchair.     Per dtr they do not want pt to go to a EDUARDO, the family will provide for whatever needs she has.    MD will need to sign order and document:    Wheelchair:   Kelli Fisher requires a wheelchair to complete mobility related ADL's within the home. Kelli Fisher is unable to complete the majority of ADL's with a cane or walker.  Kelli Fisher has a caregiver to help propel a standard wheelchair. Patient has adequate space within their home to safely use the wheelchair. The patient has not expressed an unwillingness to use the wheelchair and patient can’t use  cane or walker to resolve mobility limitation.The wheelchair can be used safely in the home for MRADL’s    Plan  [facility]    / to remain available for support and/or discharge planning.     Ashley Muñoz RN    Ext [phone]

## 2025-04-30 NOTE — CONSULTS
Initial Pulmonary Medicine Inpatient Consultation           Consult requested by Dr. Dr. Dejesus for PNA.        HPI: 69 yo woman with hx of endometrial CA with pulmonary mets admitted with shortness of breath, coughing, wheezing, fevers, chills. Lives alone and not on supp 02. Has a nebulizer machine a home   Recent admission or shortness of breath during which time NATO lung mass was biopsied.   Now admitted for shortness of breath. Found to be anemic with Hg 6.8. CT concerning for possible PNA  Started on supp 02, vanco/cefepime       REVIEW OF SYSTEMS:  Positives and negatives as stated in HPI. Remainder of 12 pt review of systems otherwise are negative.     PAST MEDICAL HISTORY:  Past Medical History[1]     PAST SURGICAL HISTORY:  Past Surgical History[2]     PAST FAMILY HISTORY:  Family History[3]     PAST SOCIAL HISTORY:  Social Hx on file[4]  Never smoked  Lives at home alone     ALLERGIES:  Allergies[5]     MEDS:  Home Medications:  Medications Taking[6]  Scheduled Medication:  Scheduled Medications[7]  Continuous Infusing Medication:  Medication Infusions[8]  PRN Medications:  PRN Medications[9]       PHYSICAL EXAM:  /68 (BP Location: Left arm)   Pulse 101   Temp 98.7 °F (37.1 °C) (Oral)   Resp 20   Ht 5' (1.524 m)   Wt 165 lb (74.8 kg)   SpO2 100%   BMI 32.22 kg/m²   CONSTITUTIONAL: alert, oriented, no apparent distress, appears weak  HEENT: atraumatic normocephalic  MOUTH: mucous membranes are moist. No OP exudates  NECK/THROAT: no JVD. Trachea midline. No obvious thyromegaly  LUNG: clear upper b/l no wheezing, + let sided crackles. Chest symmetric with respiratory motion  HEART: regular rate and rhythm, no obvious murmers or gallops note  ABD: soft non tender. + bowel sounds. No organomegaly noted  EXT: no clubbing, cyanosis. + 1 b/l LE edema  NEURO/MUSCULOSKELETAL: no gross deficits  SKIN: warm, dry. No obvious lesions noted  LYMPH: no obvious LAD       IMAGES:  Chest CT PE  4/30/25  FINDINGS:   No PE   Normal heart size with stable small to moderate circumferential pericardial effusion   No short interval change in left upper lobe mass, diffuse intrathoracic adenopathy. Trace left pleural effusion similar to prior.  Trace right effusion has resolved A few of the right-sided pulmonary nodules may have mildly increased in size.  Adenopathy causes severe narrowing of the lower right IJ with collaterals in the chest wall   CONCLUSION: No PE.       LABS:  Recent Labs   Lab 04/29/25 2158 04/30/25  0909   RBC 2.76* 2.78*   HGB 6.8* 7.0*   HCT 21.3* 22.1*   MCV 77.2* 79.5*   MCH 24.6* 25.2*   MCHC 31.9 31.7   RDW 21.9* 20.7*   NEPRELIM 11.00* 8.86*   WBC 13.5* 11.5*   .0 361.0       Recent Labs   Lab 04/29/25 2158 04/30/25  0909   * 102*   BUN 11 9   CREATSERUM 0.93 0.76   EGFRCR 67 85   CA 8.8 8.2*   ALB 3.4  --    * 136   K 3.9 4.3   CL 97* 104   CO2 27.0 26.0   ALKPHO 250*  --    AST 17  --    ALT 7*  --    BILT 0.6  --    TP 7.1  --           ASSESSMENT/PLAN:  Endometrial ca with lung mets  -oncology f/u    Possible PNA  -continue vanco/cefepime  -check MRSA nares   -check urine leg, strep pneumo, sputum cx  -check swallow eval given trouble swallowing  -PRN nebs    Acute anemia on chronic anemia  -s/p pRBC transfusion     Proph  -DVT: SCDS given anemai     Thank you for the opportunity to care for Kelli Fisher.       DARIEL Reaves DO, MPH  Pulmonary Critical Care Medicine  [facility]          [1]   Past Medical History:  Diagnosis Date    Asthma (HCC)     Esophageal reflux     History of blood transfusion     Visual impairment    [2]   Past Surgical History:  Procedure Laterality Date    Thyroidectomy      Total abdom hysterectomy     [3] History reviewed. No pertinent family history.  [4]   Social History  Socioeconomic History    Marital status: Single   Tobacco Use    Smoking status: Never    Smokeless tobacco: Never    Vaping Use    Vaping status: Never Used   Substance and Sexual Activity    Alcohol use: Never    Drug use: Never   [5]   Allergies  Allergen Reactions    Aspirin Tightness in Throat    Penicillins HIVES    Other OTHER (SEE COMMENTS)     Pt states IV steroid allergy, states it makes her breathing worse    [6]   Outpatient Medications Marked as Taking for the 4/29/25 encounter (Hospital Encounter)   Medication Sig Dispense Refill    levothyroxine 100 MCG Oral Tab Take 1 tablet (100 mcg total) by mouth before breakfast.     [7]    cefepime  1 g Intravenous Q12H    [START ON 5/1/2025] vancomycin  15 mg/kg Intravenous Q24H   [8]    sodium chloride 100 mL/hr at 04/30/25 0356   [9]   morphINE **OR** morphINE **OR** morphINE    ipratropium-albuterol

## 2025-05-01 LAB
ANION GAP SERPL CALC-SCNC: 10 MMOL/L (ref 0–18)
BASOPHILS # BLD AUTO: 0.03 X10(3) UL (ref 0–0.2)
BASOPHILS NFR BLD AUTO: 0.2 %
BLOOD TYPE BARCODE: 7300
BUN BLD-MCNC: 10 MG/DL (ref 9–23)
BUN/CREAT SERPL: 13.7 (ref 10–20)
CALCIUM BLD-MCNC: 8.5 MG/DL (ref 8.7–10.4)
CHLORIDE SERPL-SCNC: 101 MMOL/L (ref 98–112)
CO2 SERPL-SCNC: 24 MMOL/L (ref 21–32)
CREAT BLD-MCNC: 0.73 MG/DL (ref 0.55–1.02)
DEPRECATED RDW RBC AUTO: 63.6 FL (ref 35.1–46.3)
EGFRCR SERPLBLD CKD-EPI 2021: 89 ML/MIN/1.73M2 (ref 60–?)
EOSINOPHIL # BLD AUTO: 0.04 X10(3) UL (ref 0–0.7)
EOSINOPHIL NFR BLD AUTO: 0.3 %
ERYTHROCYTE [DISTWIDTH] IN BLOOD BY AUTOMATED COUNT: 21.2 % (ref 11–15)
GLUCOSE BLD-MCNC: 130 MG/DL (ref 70–99)
GLUCOSE BLDC GLUCOMTR-MCNC: 171 MG/DL (ref 70–99)
HCT VFR BLD AUTO: 24.5 % (ref 35–48)
HGB BLD-MCNC: 7.3 G/DL (ref 12–16)
IMM GRANULOCYTES # BLD AUTO: 0.12 X10(3) UL (ref 0–1)
IMM GRANULOCYTES NFR BLD: 1 %
LYMPHOCYTES # BLD AUTO: 0.77 X10(3) UL (ref 1–4)
LYMPHOCYTES NFR BLD AUTO: 6.3 %
MCH RBC QN AUTO: 24.5 PG (ref 26–34)
MCHC RBC AUTO-ENTMCNC: 29.8 G/DL (ref 31–37)
MCV RBC AUTO: 82.2 FL (ref 80–100)
MONOCYTES # BLD AUTO: 1.14 X10(3) UL (ref 0.1–1)
MONOCYTES NFR BLD AUTO: 9.4 %
NEUTROPHILS # BLD AUTO: 10.03 X10 (3) UL (ref 1.5–7.7)
NEUTROPHILS # BLD AUTO: 10.03 X10(3) UL (ref 1.5–7.7)
NEUTROPHILS NFR BLD AUTO: 82.8 %
OSMOLALITY SERPL CALC.SUM OF ELEC: 281 MOSM/KG (ref 275–295)
PLATELET # BLD AUTO: 328 10(3)UL (ref 150–450)
POTASSIUM SERPL-SCNC: 4 MMOL/L (ref 3.5–5.1)
RBC # BLD AUTO: 2.98 X10(6)UL (ref 3.8–5.3)
SODIUM SERPL-SCNC: 135 MMOL/L (ref 136–145)
UNIT VOLUME: 350 ML
WBC # BLD AUTO: 12.1 X10(3) UL (ref 4–11)

## 2025-05-01 PROCEDURE — 99232 SBSQ HOSP IP/OBS MODERATE 35: CPT | Performed by: INTERNAL MEDICINE

## 2025-05-01 PROCEDURE — 99291 CRITICAL CARE FIRST HOUR: CPT | Performed by: HOSPITALIST

## 2025-05-01 RX ORDER — METOPROLOL TARTRATE 1 MG/ML
5 INJECTION, SOLUTION INTRAVENOUS ONCE
Status: DISCONTINUED | OUTPATIENT
Start: 2025-05-01 | End: 2025-05-02

## 2025-05-01 RX ORDER — BISACODYL 10 MG
10 SUPPOSITORY, RECTAL RECTAL
Status: DISCONTINUED | OUTPATIENT
Start: 2025-05-01 | End: 2025-05-06

## 2025-05-01 RX ORDER — ACETAMINOPHEN 10 MG/ML
1000 INJECTION, SOLUTION INTRAVENOUS EVERY 6 HOURS PRN
Status: DISCONTINUED | OUTPATIENT
Start: 2025-05-01 | End: 2025-05-06

## 2025-05-01 RX ORDER — METOPROLOL TARTRATE 1 MG/ML
INJECTION, SOLUTION INTRAVENOUS
Status: COMPLETED
Start: 2025-05-01 | End: 2025-05-01

## 2025-05-01 RX ORDER — DILTIAZEM HYDROCHLORIDE 5 MG/ML
10 INJECTION INTRAVENOUS ONCE
Status: DISCONTINUED | OUTPATIENT
Start: 2025-05-01 | End: 2025-05-01

## 2025-05-01 RX ORDER — SODIUM PHOSPHATE, DIBASIC AND SODIUM PHOSPHATE, MONOBASIC 7; 19 G/230ML; G/230ML
1 ENEMA RECTAL ONCE AS NEEDED
Status: DISCONTINUED | OUTPATIENT
Start: 2025-05-01 | End: 2025-05-06

## 2025-05-01 RX ORDER — IPRATROPIUM BROMIDE AND ALBUTEROL SULFATE 2.5; .5 MG/3ML; MG/3ML
3 SOLUTION RESPIRATORY (INHALATION)
Status: DISCONTINUED | OUTPATIENT
Start: 2025-05-02 | End: 2025-05-02

## 2025-05-01 RX ORDER — DILTIAZEM HYDROCHLORIDE 30 MG/1
30 TABLET, FILM COATED ORAL ONCE
Status: COMPLETED | OUTPATIENT
Start: 2025-05-01 | End: 2025-05-01

## 2025-05-01 RX ORDER — DOCUSATE SODIUM 100 MG/1
100 CAPSULE, LIQUID FILLED ORAL 2 TIMES DAILY
Status: DISCONTINUED | OUTPATIENT
Start: 2025-05-01 | End: 2025-05-03

## 2025-05-01 RX ORDER — ONDANSETRON 2 MG/ML
4 INJECTION INTRAMUSCULAR; INTRAVENOUS EVERY 6 HOURS PRN
Status: DISCONTINUED | OUTPATIENT
Start: 2025-05-01 | End: 2025-05-02

## 2025-05-01 RX ORDER — PROCHLORPERAZINE EDISYLATE 5 MG/ML
10 INJECTION INTRAMUSCULAR; INTRAVENOUS EVERY 6 HOURS PRN
Status: DISCONTINUED | OUTPATIENT
Start: 2025-05-01 | End: 2025-05-06

## 2025-05-01 RX ORDER — METOPROLOL TARTRATE 1 MG/ML
5 INJECTION, SOLUTION INTRAVENOUS EVERY 6 HOURS
Status: DISCONTINUED | OUTPATIENT
Start: 2025-05-02 | End: 2025-05-02

## 2025-05-01 RX ORDER — POLYETHYLENE GLYCOL 3350 17 G/17G
17 POWDER, FOR SOLUTION ORAL DAILY PRN
Status: DISCONTINUED | OUTPATIENT
Start: 2025-05-01 | End: 2025-05-06

## 2025-05-01 NOTE — SLP NOTE
ADULT SWALLOWING EVALUATION    ASSESSMENT    ASSESSMENT/OVERALL IMPRESSION:  PPE REQUIRED. THIS THERAPIST WORE GLOVES FOR DURATION OF EVALUATION. HANDS WASHED UPON ENTRANCE/EXIT.    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 She was recently admitted from April 11th through April 17th, 2025, for shortness of breath, during which a left upper lobe lung mass was discovered and biopsied, revealing metastatic adenocarcinoma consistent with her known serous endometrial carcinoma. A small pericardial effusion was also diagnosed during that admission. Today, the patient reports noticing progressive shortness of breath.\"    SLP BSSE orders received and acknowledged. A swallow evaluation warranted per \"difficulty swallowing\". Pt afebrile with weak/breathy vocal quality, on 3L/Min, with oxygen saturation at 92%. Pt with no hx of dysphagia at [facility].   Pt positioned 90 degrees in bed, alert/cooperative. Pt with no complaints of pain. Oral motor skills within functional limits. Pt presented with trials of hard solids, mildly thick liquids via cup and thin liquids via straw. Pt with adequate oral acceptance and bilabial seal across all trials. Pt with intact bite, prolonged mastication of solids, and delayed A-P transit. Pt's swallow response appears delayed with reduced hyolaryngeal elevation/excursion. No clinical signs of aspiration (e.g., immediate/delayed throat clear, immediate/delayed cough, wet vocal quality, increased O2 effort) observed across all trials. 4/29 CXR indicates \"Left lung mass with significant diffuse intrathoracic adenopathy seen on recent CT Heart size likely normal for technique No pneumothorax or large effusion\".  Oxygen status remained stable t/o the entire evaluation.     At this time, pt presents with mild oral dysphagia and probable pharyngeal dysfunction. Recommend a  soft bite sized diet and mildly thick liquids with strict adherence to safe swallowing compensatory strategies. Results and recommendations reviewed with RN, pt. Pt v/u to all results/recommendations. Recommendations remain written on whiteboard. SLP collaborated with RN for MD diet orders.     PLAN: SLP to f/u x2-3 meal assessments, monitor imaging, and VFSS if clinically indicated       RECOMMENDATIONS   Diet Recommendations - Solids: Mechanical soft chopped/ Soft & Bite Sized  Diet Recommendations - Liquids: Nectar thick liquids/ Mildly thick                    Compensatory Strategies Recommended: Multiple swallows, Alternate consistencies  Aspiration Precautions: Upright position, Slow rate, Small bites, Small sips, No straw  Medication Administration Recommendations:  (as tolerated)  Treatment Plan/Recommendations: Aspiration precautions    HISTORY   MEDICAL HISTORY  Reason for Referral:  (difficulty swallowing)    Problem List  Principal Problem:    Lung mass  Active Problems:    Pericardial effusion (HCC)    Endometrial cancer (HCC)    Microcytic anemia      Past Medical History  Past Medical History[1]    Prior Living Situation: Home alone  Diet Prior to Admission: Regular, Thin liquids  Precautions: Aspiration    Patient/Family Goals: did not state    SWALLOWING HISTORY  Current Diet Consistency: Regular, Thin liquids  Dysphagia History: none  Imaging Results: 4/29 CT CHEST  CONCLUSION: No PE.       SUBJECTIVE       OBJECTIVE   ORAL MOTOR EXAMINATION  Dentition: Functional  Symmetry: Within Functional Limits  Strength: Within Functional Limits     Range of Motion: Within Functional Limits  Rate of Motion: Within Functional Limits    Voice Quality: Weak, Breathy  Respiratory Status: Supplemental O2, Nasal cannula  Consistencies Trialed: Nectar thick liquids, Thin liquids, Hard solid  Method of Presentation: Self presentation, Straw, Cup  Patient Positioned: Upright, Midline    Oral Phase of Swallow:  Impaired  Bolus Retrieval: Intact  Bilabial Seal: Intact  Bolus Formation: Impaired  Bolus Propulsion: Impaired  Mastication: Impaired  Retention: Impaired    Pharyngeal Phase of Swallow: Appears Impaired  Laryngeal Elevation Timing: Appears impaired  Laryngeal Elevation Strength: Appears impaired  Laryngeal Elevation Coordination: Appears impaired  (Please note: Silent aspiration cannot be evaluated clinically. Videofluoroscopic Swallow Study is required to rule-out silent aspiration.)    Esophageal Phase of Swallow: No complaints consistent with possible esophageal involvement  Comments: NA          GOALS  Goal #1 The patient will tolerate soft bite sized consistency and mildly thick liquids without overt signs or symptoms of aspiration with 100 % accuracy over 1-2 session(s).  In Progress   Goal #2 The patient/family/caregiver will demonstrate understanding and implementation of aspiration precautions and swallow strategies independently over 1-2 session(s).    In Progress   Goal #3 The patient will tolerate trial upgrade of soft easy to chew/regular consistency and thin liquids without overt signs or symptoms of aspiration with 100 % accuracy over 1-2 session(s).  In Progress   Goal #4 The patient will utilize compensatory strategies as outlined by  BSSE (clinical evaluation) including Slow rate, Small bites, Small sips, Multiple swallows, Alternate liquids/solids, No straws, Upright 90 degrees, Upright 90 degrees 30 mins after meal, Eliminate distractions with PRN assistance 100 % of the time across 2 sessions.    In Progress     FOLLOW UP  Treatment Plan/Recommendations: Aspiration precautions  Duration: 1 week  Follow Up Needed (Documentation Required): Yes  SLP Follow-up Date: 05/02/25    Thank you for your referral.   If you have any questions, please contact NI Mason M.S. CCC-SLP  Speech Language Pathologist  Phone Number Ext. [phone]         [1]   Past Medical History:   Asthma  (HCC)    Esophageal reflux    History of blood transfusion    Visual impairment

## 2025-05-01 NOTE — PROGRESS NOTES
[facility]  part of [facility]    Progress Note      Assessment and Plan:     1.  Respiratory syndrome-I highly doubt pneumonia as the CAT scan simply redemonstrates the previously identified masses which were slightly increased.  She has severe anemia contributing to dyspnea.  No evidence of PE.  No evidence of tamponade physiology.  Status posttransfusion.    Recommendations:  1.  Would consider for shortening course of antibiotic  2.  Okay from my perspective to proceed with line placement and initiation of chemotherapeutic for the underlying malignancy.    2.  DVT prophylaxis-SCDs in patient with severe anemia.    3.  Metastatic endometrial carcinoma-as per gynecologic oncology.    Subjective:   Kelli Fisher is a(n) 70 year old female who has mild dyspnea at baseline    Objective:   Blood pressure 131/86, pulse 103, temperature 98 °F (36.7 °C), temperature source Oral, resp. rate 20, height 5' (1.524 m), weight 163 lb (73.9 kg), SpO2 97%.    Physical Exam alert female  HEENT examination is unremarkable with pupils equal round and reactive to light and accommodation.   Neck without adenopathy, thyromegaly, JVD nor bruit.   Lungs slightly diminished to auscultation and percussion.  Cardiac regular rate and rhythm no murmur.   Abdomen nontender, without hepatosplenomegaly and no mass appreciable.   Extremities without clubbing cyanosis nor edema.   Neurologic grossly intact with symmetric tone and strength and reflex.  Skin without gross abnormality     Results:     Lab Results   Component Value Date    WBC 12.1 05/01/2025    HGB 7.3 05/01/2025    HCT 24.5 05/01/2025    .0 05/01/2025    CREATSERUM 0.73 05/01/2025    BUN 10 05/01/2025     05/01/2025    K 4.0 05/01/2025     05/01/2025    CO2 24.0 05/01/2025     05/01/2025    CA 8.5 05/01/2025       Srinath Levy MD  Medical Director, Critical Care, [facility]  Medical Director, [facility]  Pager:  [phone]

## 2025-05-01 NOTE — PROGRESS NOTES
[facility]  part of [facility] Hospitalist Progress Note     Kelli Fisher Patient Status:  Inpatient    1955 MRN [id number]   Location [facility] 4W/SW/SE Attending Joel Dejesus MD   Hosp Day # 1 PCP Taylor Gallardo MD     Chief Complaint:   Chief Complaint   Patient presents with    Chest Pain Angina        Subjective:     Patient seen sitting in bed.  Multiple phone members at bedside.  Patient reports still feeling short of breath today.  Denies signs of active bleeding.    Objective:      Vital signs:  Vitals:    25 2123 25 0448 25 0633 25 0937   BP:  109/75  131/86   BP Location:  Right arm  Right arm   Pulse:   106 103   Resp:  20  20   Temp: 99.8 °F (37.7 °C) 99.1 °F (37.3 °C)  98 °F (36.7 °C)   TempSrc: Oral Oral  Oral   SpO2:  100% 92% 97%   Weight:       Height:           Intake/Output Summary (Last 24 hours) at 2025 1120  Last data filed at 2025 0529  Gross per 24 hour   Intake 1900 ml   Output 300 ml   Net 1600 ml           Physical Exam:    GENERAL:  Awake and alert, in no acute distress.  HEART:  Regular rhythm, mild tachycardia  LUNGS:  Air entry was decreased.  Mild increased work of breathing.  No wheezes   ABDOMEN: Soft and non-tender.    PSYCHIATRIC: Anxious mood    Diagnostic Data:    Labs:    Recent Labs   Lab 25  0909 25  0719   WBC 13.5* 11.5* 12.1*   HGB 6.8* 7.0* 7.3*   MCV 77.2* 79.5* 82.2   .0 361.0 328.0   INR 1.33*  --   --        Recent Labs   Lab 25  0909 25  0719   * 102* 130*   BUN 11 9 10   CREATSERUM 0.93 0.76 0.73   CA 8.8 8.2* 8.5*   ALB 3.4  --   --    * 136 135*   K 3.9 4.3 4.0   CL 97* 104 101   CO2 27.0 26.0 24.0   ALKPHO 250*  --   --    AST 17  --   --    ALT 7*  --   --    BILT 0.6  --   --    TP 7.1  --   --            Estimated Creatinine Clearance: 52.2 mL/min (based on SCr of 0.73 mg/dL).    Recent Labs   Lab  04/29/25  2158   PTP 17.2*   INR 1.33*            COVID-19  No results found for: \"COVID19\"    Pro-Calcitonin  Recent Labs   Lab 04/30/25  1146   PCT 0.33*       Cardiac  Recent Labs   Lab 04/29/25  2200   PBNP 239*       Inflammatory Markers  No results for input(s): \"CRP\", \"ORVILLE\", \"LDH\", \"DDIMER\" in the last 168 hours.    Culture:  Hospital Encounter on 04/29/25   1. Blood Culture     Status: None (Preliminary result)    Collection Time: 04/30/25  9:09 AM    Specimen: Blood,peripheral   Result Value Ref Range    Blood Culture Result No Growth 1 Day N/A       No results found.    EKG 12 Lead  Result Date: 4/30/2025  Sinus tachycardia with Premature atrial complexes Low voltage QRS, consider pulmonary disease, pericardial effusion, or normal variant Borderline ECG When compared with ECG of 11-APR-2025 18:37, Premature atrial complexes are now Present Confirmed by CALIXTO MAGAÑA ([id number]) on 4/30/2025 2:44:01 PM      Medications: Scheduled Medications[1]    Assessment & Plan:        Acute hypoxic respiratory failure:     - Patient presented with progressive shortness of breath     - Likely secondary to recently diagnosed metastasized endometrial carcinoma     - Left upper lobe lung mass identified during recent admission (April 11-17, 2025)     - 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     - Initiated on empiric antibiotic therapy for possible pneumonia.      - Pulmonology consultation after further discussion, less concern for pneumonia.  Plan for short course of antibiotics.  Recommend evaluation inpatient for possible chemotherapy by oncology team.  Appreciate further evaluation and management      Acute and chronic anemia:     - Acute exacerbation of chronic anemia     - Hemoglobin 6.8 on admission.  Status post 1 unit PRBC.     -  Likely multifactorial, potentially related to underlying malignancy and recent treatments      - Monitor hemoglobin levels      - Continue PPI        Pericardial effusion:     - Known small pericardial effusion diagnosed during recent admission     - Bedside ultrasound by ER physician showed no evidence of tamponade     - Cardiology consultation for further evaluation and management     - Monitor for signs of hemodynamic compromise     Leukocytosis:     - Elevated white blood cell count noted on complete blood count     - Potentially indicating an inflammatory or infectious process     - Monitor white blood cell count closely     - Correlate with clinical presentation and other diagnostic studies     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  - If plans to initiate inpatient chemotherapy, IR available for port placement.  Consult in place      Plan of care discussed with patient and family at bedside . Discussed management/test result(s) with Rn, IR and pulmonology consultant    Quality:  DVT Prophylaxis: SCDs  CODE status: Full  Estimated date of discharge: TBD  Discharge is dependent on: clinical stability    82 minutes spent discussing with other providers, examining patient, obtaining history, reviewing medical records, interpreting and communicating test results/imaging, ordering tests/medications, discussing plan of care and documenting information.      Joel Dejesus MD          This note was prepared using Dragon Medical voice recognition dictation software. As a result errors may occur. When identified these errors have been corrected. While every attempt is made to correct errors during dictation discrepancies  may still exist         [1]    docusate sodium  100 mg Oral BID    cefepime  1 g Intravenous Q12H    levothyroxine  100 mcg Oral Before breakfast    fluticasone furoate  1 puff Inhalation Daily

## 2025-05-01 NOTE — PLAN OF CARE
Problem: Patient Centered Care  Goal: Patient preferences are identified and integrated in the patient's plan of care  Description: Interventions:- What would you like us to know as we care for you?- Provide timely, complete, and accurate information to patient/family- Incorporate patient and family knowledge, values, beliefs, and cultural backgrounds into the planning and delivery of care- Encourage patient/family to participate in care and decision-making at the level they choose- Honor patient and family perspectives and choices  Outcome: Progressing    Pt resting in bed. Up to bathroom with rolling chair. Generalized weakness. Purewick in use. Denies pain. 2-3L O2 overnight. Neb treatments prn. SOB with activity. Pt requested medication to help have BM, miralax given. Passing gas. Small BM in AM. Tylenol in evening for temp. IVF continued. IV antibiotics given. Daughters at bedside in evening. Safety measures in place. Frequent rounding being done.

## 2025-05-01 NOTE — PLAN OF CARE
Echocardiogram results reviewed with Dr. Huerta. ECHO is small-moderate pericardial effusion. HD stable, no further cardiac recommendations. Please call with questions or concerns.     Meggan Garcia, MSN, FNP-BC, JADE  05/01/25   4:21 PM  [phone] [place]  [phone] [place]

## 2025-05-01 NOTE — PLAN OF CARE
A&Ox4, tolerating clear liquids, SLP ordered, per patient \"feels food gets stuck\" 1LO2, prn nebs, moist productive cough/sputum pending, voiding per purewick, no bm, up to chair with standby assist and walker, 2Decho today.  Patient home with home health.    Problem: Patient Centered Care  Goal: Patient preferences are identified and integrated in the patient's plan of care  Description: Interventions:- What would you like us to know as we care for you? - Provide timely, complete, and accurate information to patient/family- Incorporate patient and family knowledge, values, beliefs, and cultural backgrounds into the planning and delivery of care- Encourage patient/family to participate in care and decision-making at the level they choose- Honor patient and family perspectives and choices  Outcome: Progressing

## 2025-05-01 NOTE — PAYOR COMM NOTE
--------------  CONTINUED STAY REVIEW    Payor: [employer] MEDICARE ADV PPO  Subscriber #:  [id number]  Authorization Number: [id number]    Admit date: 25  Admit time:  3:34 AM    REVIEW DOCUMENTATION:     Kelli Fisher #[id number] (70 year old F) (Adm: 25)  [facility] 4TH [id number]     Joel Dejesus MD   Physician  Internal Medicine     Progress Notes     Signed     Date of Service: 2025 11:20 AM     Signed       Expand All Collapse All    [facility]  part of [facility] Hospitalist Progress Note            Kelli Fisher Patient Status:  Inpatient    1955 MRN [id number]   Location [facility] 4W/SW/SE Attending Joel Dejesus MD   Hosp Day # 1 PCP Taylor Gallardo MD      Chief Complaint:       Chief Complaint   Patient presents with    Chest Pain Angina         Subjective:  Patient seen sitting in bed.  Multiple phone members at bedside.  Patient reports still feeling short of breath today.  Denies signs of active bleeding.     Objective:  Vital signs:  Vitals          Vitals:     25 2123 25 0448 25 0633 25 0937   BP:   109/75   131/86   BP Location:   Right arm   Right arm   Pulse:     106 103   Resp:   20   20   Temp: 99.8 °F (37.7 °C) 99.1 °F (37.3 °C)   98 °F (36.7 °C)   TempSrc: Oral Oral   Oral   SpO2:   100% 92% 97%   Weight:           Height:                    Intake/Output Summary (Last 24 hours) at 2025 1120  Last data filed at 2025 0529      Gross per 24 hour   Intake 1900 ml   Output 300 ml   Net 1600 ml               Physical Exam:    GENERAL:  Awake and alert, in no acute distress.  HEART:  Regular rhythm, mild tachycardia  LUNGS:  Air entry was decreased.  Mild increased work of breathing.  No wheezes   ABDOMEN: Soft and non-tender.    PSYCHIATRIC: Anxious mood     Diagnostic Data:    Labs:           Recent Labs   Lab 25  2158 25  0909 25  0719   WBC 13.5* 11.5* 12.1*   HGB 6.8* 7.0* 7.3*   MCV  77.2* 79.5* 82.2   .0 361.0 328.0   INR 1.33*  --   --                Recent Labs   Lab 04/29/25 2158 04/30/25  0909 05/01/25  0719   * 102* 130*   BUN 11 9 10   CREATSERUM 0.93 0.76 0.73   CA 8.8 8.2* 8.5*   ALB 3.4  --   --    * 136 135*   K 3.9 4.3 4.0   CL 97* 104 101   CO2 27.0 26.0 24.0   ALKPHO 250*  --   --    AST 17  --   --    ALT 7*  --   --    BILT 0.6  --   --    TP 7.1  --   --                Estimated Creatinine Clearance: 52.2 mL/min (based on SCr of 0.73 mg/dL).         Recent Labs   Lab 04/29/25 2158   PTP 17.2*   INR 1.33*            COVID-19  No results found for: \"COVID19\"     Pro-Calcitonin      Recent Labs   Lab 04/30/25  1146   PCT 0.33*         Cardiac      Recent Labs   Lab 04/29/25  2200   PBNP 239*         Inflammatory Markers  No results for input(s): \"CRP\", \"ORVILLE\", \"LDH\", \"DDIMER\" in the last 168 hours.     Culture:        Hospital Encounter on 04/29/25   1. Blood Culture     Status: None (Preliminary result)     Collection Time: 04/30/25  9:09 AM     Specimen: Blood,peripheral   Result Value Ref Range     Blood Culture Result No Growth 1 Day N/A         No results found.     EKG 12 Lead  Result Date: 4/30/2025  Sinus tachycardia with Premature atrial complexes Low voltage QRS, consider pulmonary disease, pericardial effusion, or normal variant Borderline ECG When compared with ECG of 11-APR-2025 18:37, Premature atrial complexes are now Present Confirmed by CALIXTO MAGAÑA ([id number]) on 4/30/2025 2:44:01 PM        Medications: [Scheduled Medications]    [Scheduled Medications]   docusate sodium  100 mg Oral BID    cefepime  1 g Intravenous Q12H    levothyroxine  100 mcg Oral Before breakfast    fluticasone furoate  1 puff Inhalation Daily        Assessment & Plan:  Acute hypoxic respiratory failure:     - Patient presented with progressive shortness of breath     - Likely secondary to recently diagnosed metastasized endometrial carcinoma     - Left upper lobe lung mass  identified during recent admission (April 11-17, 2025)     - 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     - Initiated on empiric antibiotic therapy for possible pneumonia.      - Pulmonology consultation after further discussion, less concern for pneumonia.  Plan for short course of antibiotics.  Recommend evaluation inpatient for possible chemotherapy by oncology team.  Appreciate further evaluation and management      Acute and chronic anemia:     - Acute exacerbation of chronic anemia     - Hemoglobin 6.8 on admission.  Status post 1 unit PRBC.     - Likely multifactorial, potentially related to underlying malignancy and recent treatments      - Monitor hemoglobin levels      - Continue PPI        Pericardial effusion:     - Known small pericardial effusion diagnosed during recent admission     - Bedside ultrasound by ER physician showed no evidence of tamponade     - Cardiology consultation for further evaluation and management     - Monitor for signs of hemodynamic compromise     Leukocytosis:     - Elevated white blood cell count noted on complete blood count     - Potentially indicating an inflammatory or infectious process     - Monitor white blood cell count closely     - Correlate with clinical presentation and other diagnostic studies     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  - If plans to initiate inpatient chemotherapy, IR available for port placement.  Consult in place        Plan of care discussed with patient and family at  bedside . Discussed management/test result(s) with Rn, IR and pulmonology consultant     Quality:  DVT Prophylaxis: SCDs  CODE status: Full  Estimated date of discharge: TBD  Discharge is dependent on: clinical stability     82 minutes spent discussing with other providers, examining patient, obtaining history, reviewing medical records, interpreting and communicating test results/imaging, ordering tests/medications, discussing plan of care and documenting information.        Joel Dejesus MD     This note was prepared using Dragon Medical voice recognition dictation software. As a result errors may occur. When identified these errors have been corrected. While every attempt is made to correct errors during dictation discrepancies may still exist                        MEDICATIONS ADMINISTERED IN LAST 1 DAY:  acetaminophen (Tylenol) tab 650 mg       Date Action Dose Route User    4/30/2025 2020 Given 650 mg Oral Dianna Andrews RN          ceFEPIme (Maxipime) 1 g in sodium chloride 0.9% 100mL IVPB-YANA       Date Action Dose Route User    5/1/2025 0529 New Bag 1 g Intravenous Dianna Andrews RN    4/30/2025 1827 New Bag 1 g Intravenous Klaudia Drake RN          fluticasone furoate (Arnuity Ellipta) 200 MCG/ACT inhaler 1 puff       Date Action Dose Route User    5/1/2025 0938 Given 1 puff Inhalation RociouBrittani S, RCP          ipratropium-albuterol (Duoneb) 0.5-2.5 (3) MG/3ML inhalation solution 3 mL       Date Action Dose Route User    5/1/2025 1041 Given 3 mL Nebulization RociouBrittani S RCP    5/1/2025 0545 Given 3 mL Nebulization Natasha, Katherine, RCP    5/1/2025 0052 Given 3 mL Nebulization Natasha, Katherine, RCP    4/30/2025 1812 Given 3 mL Nebulization Shelia Ruiz, FABIOLA          levothyroxine (Synthroid) tab 100 mcg       Date Action Dose Route User    5/1/2025 0529 Given 100 mcg Oral Dianna Andrews RN          polyethylene glycol (PEG 3350) (Miralax) 17 g oral packet 17 g       Date  Action Dose Route User    5/1/2025 0533 Given 17 g Oral Dianna Andrews RN          sodium chloride 0.9% infusion       Date Action Dose Route User    5/1/2025 1130 Rate/Dose Change (none) Intravenous Angella Zarco RN    4/30/2025 2320 New Bag (none) Intravenous Dianna Andrews RN          vancomycin (Vancocin) 1,000 mg in sodium chloride 0.9% 250 mL IVPB-ADDV       Date Action Dose Route User    5/1/2025 0027 New Bag 1,000 mg Intravenous Dianna Andrews RN            Vitals (last day)       Date/Time Temp Pulse Resp BP SpO2 Weight O2 Device O2 Flow Rate (L/min) [facility]    05/01/25 1519 -- 117 -- 120/94 94 % -- Nasal cannula 3 L/min SE    05/01/25 1321 98 °F (36.7 °C) -- 20 129/92 96 % -- Nasal cannula 3 L/min RH    05/01/25 0937 98 °F (36.7 °C) 103 20 131/86 97 % -- Nasal cannula 3 L/min KW    05/01/25 0633 -- 106 -- -- 92 % -- Nasal cannula 3 L/min KS    05/01/25 0535 -- -- -- -- -- -- -- 2 L/min KS    05/01/25 0448 99.1 °F (37.3 °C) -- 20 109/75 100 % -- Nasal cannula 3 L/min KG    04/30/25 2123 99.8 °F (37.7 °C) -- -- -- -- -- -- -- KG    04/30/25 2000 -- 116 -- -- -- -- -- -- KD    04/30/25 1950 100.2 °F (37.9 °C) -- 20 116/76 95 % -- Nasal cannula 2 L/min KG    04/30/25 1800 -- -- -- -- -- 163 lb (73.9 kg) -- -- JS    04/30/25 1229 99 °F (37.2 °C) -- 18 106/74 93 % -- Nasal cannula 1 L/min RH    04/30/25 1114 -- 102 -- -- 95 % -- Nasal cannula 1 L/min JS    04/30/25 0957 98.7 °F (37.1 °C) 101 20 113/68 100 % -- Nasal cannula 2 L/min JS    04/30/25 0338 -- 104 20 97/77 99 % -- Nasal cannula 2 L/min KS    04/30/25 0300 99.3 °F (37.4 °C) 102 19 103/63 98 % -- Nasal cannula 2 L/min AJ    04/30/25 0245 99 °F (37.2 °C) 105 15 109/75 98 % -- Nasal cannula -- DB    04/30/25 0100 -- 104 15 104/72 100 % -- Nasal cannula -- DB    04/30/25 0036 99.3 °F (37.4 °C) 109 15 109/68 100 % -- Nasal cannula -- DB    04/30/25 0021 100.1 °F (37.8 °C) 112 14 111/70 99 % -- -- -- GE    04/30/25 0000 -- 108 19 111/76  100 % -- None (Room air) -- DB          CIWA Scores (since admission)       None          Blood Transfusion Record       Product Unit Status Volume Start End            Transfuse RBC       25  [id number]  [id number] Completed 04/30/25 0253 350 mL 04/30/25 0021 04/30/25 0245

## 2025-05-01 NOTE — DIETARY NOTE
ADULT NUTRITION INITIAL ASSESSMENT    Pt is at moderate nutrition risk.  Pt meets moderate malnutrition criteria.      CRITERIA FOR MALNUTRITION DIAGNOSIS:  Criteria for non-severe malnutrition diagnosis: chronic illness related to wt loss 5% in 1 month, body fat mild depletion, and muscle mass mild depletion.    RECOMMENDATIONS TO MD: See Nutrition Intervention for ONS specifics     ADMITTING DIAGNOSIS:  Pericardial effusion (HCC) [I31.39]  Microcytic anemia [D50.9]  Lung mass [R91.8]  Endometrial cancer (HCC) [C54.1]  PERTINENT PAST MEDICAL HISTORY: Past Medical History[1]    PATIENT STATUS: Initial 05/01/25: Pt admit for Lung Mass. Shortness of Breath.   PMH sig for Metastatic Endometrial Carcinoma. Pt assessed due to screening at risk for MST Score of 2 (weight loss and decreased appetite). Met with patient and family this am.   Patient consumed a few bites of scrambled eggs. Discussed thickened liquid recommendation with patient and family. Not able to provide Ensure at this time d/t nectar thick consistency recommendation. Discussed other ONS options and patient agreed to trial. Discussion reveals patient consumes Ensure occasionally but reports \"it's sweet.\" Discussed other brands to trial. Requesting vanilla flavor.   Family has been focused on getting patient to eat small, frequent meals + emphasis on high protein intake (use of ONS). Encouraged to continue.     Reviewed Speech Therapist Documentation Today with Recommendations.     Reviewed recent RD assessment completed on 4/14/25.     FOOD/NUTRITION RELATED HISTORY:  Appetite: Decreased due to difficulty swallowing.   Intake:  Negligible   Intake Meeting Needs: No, but oral nutrition supplements (ONS) to maximize  Percent Meals Eaten (last 3 days)       Date/Time Percent Meals Eaten (%)    04/30/25 1045 10 %     Percent Meals Eaten (%): food getting stuck in her throat at 04/30/25 1045           Food Allergies: No Known Food Allergies  (NKFA)  Cultural/Ethnic/Gnosticist Preferences: None    GASTROINTESTINAL: dysphagia and Swallow evaluation noted  + BM on 5/1/25.     MEDICATIONS: reviewed - Noted IVF   Scheduled Medications[2]    LABS: reviewed  Recent Labs     04/29/25  2158 04/30/25  0909 05/01/25  0719   * 102* 130*   BUN 11 9 10   CREATSERUM 0.93 0.76 0.73   CA 8.8 8.2* 8.5*   * 136 135*   K 3.9 4.3 4.0   CL 97* 104 101   CO2 27.0 26.0 24.0   OSMOCALC 279 281 281     NUTRITION RELATED PHYSICAL FINDINGS:  - Nutrition Focused Physical Exam (NFPE): mild depletion body fat triceps region and mild depletion muscle mass dorsal dale region and calf region   - Fluid Accumulation:  Non-Pitting Edema to Bilateral LE/Feet  See RN documentation for details  - Skin Integrity: at risk see RN documentation for details    ANTHROPOMETRICS:  HT: 152.4 cm (5')  WT: 73.9 kg (163 lb) + Edema   BMI: Body mass index is 31.83 kg/m².  BMI CLASSIFICATION: 30-34.9 kg/m2 - obesity class I  IBW: 100 lbs        163% IBW  Usual Body Wt: 162 lbs 4/12/25      100% UBW  Represents 5% wt loss x 1 month using weight on 4/3/25.   Noted 4/3/25 Encounter: 172#   Noted 2/20/25 Encounter: 182# and 12/19/24: 183#     WEIGHT HISTORY:  Patient Weight(s) for the past 336 hrs:   Weight   04/30/25 1800 73.9 kg (163 lb)   04/29/25 2038 74.8 kg (165 lb)     Wt Readings from Last 10 Encounters:   04/30/25 73.9 kg (163 lb)   04/14/25 76.7 kg (169 lb)   12/11/23 89.4 kg (197 lb)     NUTRITION DIAGNOSIS/PROBLEM:    Moderate Malnutrition related to Chronic - Physiological causes resulting in anorexia or diminished intake Dysphagia, Shortness of Breath as evidenced by muscle mass and body fat depletion + weight loss.     NUTRITION INTERVENTION:     NUTRITION PRESCRIPTION:   Estimated Nutrition needs: --dosing wt of 74 kg - wt taken on 4/30/25  Calories: 1500 calories/day (20 calories per kg Dosing wt)  Protein: 68 g protein/day (1.5 g protein/kg Ideal body wt (IBW))  Fluid Needs:  1800 ml     - Diet:       Procedures    Regular/General diet Fluid Consistency: Nectar Thick / Mildly Thick Liquids; Texture Consistency: Chopped / Soft & Bite Sized; Is Patient on Accuchecks? No    + No Straw   - RD Malnutrition Care Plan: Encouraged increased PO intake, Encouraged small frequent meals with emphasis on high calorie/high protein, and Initiated ONS (oral nutritional supplements)  - Medical Food Supplements-RD added Magic Cup (290 calories/ 9 g protein each) Daily Vanilla and Mighty Shake (300 calories/ 9 g protein each) BID Vanilla. Rational/use of oral supplements discussed.  - Vitamin and mineral supplements: none  - Feeding assistance: meal set up  - Nutrition education: Discussed importance of adequate energy and protein intake with patient and family. Emphasis on drinking ONS between meals. Discussed ability to order snacks as desires.     - Coordination of nutrition care: collaboration with other providers  - Discharge and transfer of nutrition care to new setting or provider: monitor plans    MONITOR AND EVALUATE/NUTRITION GOALS:  - Food and Nutrient Intake:      Monitor: adequacy of PO intake, tolerance of PO intake, adequacy of supplement intake, and tolerance of supplement intake  - Food and Nutrient Administration:      Monitor: N/A  - Anthropometric Measurement:    Monitor weight  - Nutrition Goals:      allow wt loss due to fluid losses, PO and supplement greater than 75% of needs, minimize lean body mass loss, and improved GI status (monitor swallowing status and adjust ONS as able).     DIETITIAN FOLLOW UP: RD to follow and monitor nutrition status    Shu Cohen RDN, LDN, CDE   Clinical Nutrition  Ext [phone]         [1]   Past Medical History:   Asthma (HCC)    Esophageal reflux    History of blood transfusion    Visual impairment   [2]    docusate sodium  100 mg Oral BID    cefepime  1 g Intravenous Q12H    levothyroxine  100 mcg Oral Before breakfast    fluticasone furoate  1  puff Inhalation Daily

## 2025-05-02 ENCOUNTER — APPOINTMENT (OUTPATIENT)
Dept: GENERAL RADIOLOGY | Facility: HOSPITAL | Age: 70
End: 2025-05-02
Attending: INTERNAL MEDICINE
Payer: MEDICARE

## 2025-05-02 LAB
ANION GAP SERPL CALC-SCNC: 10 MMOL/L (ref 0–18)
ANION GAP SERPL CALC-SCNC: 10 MMOL/L (ref 0–18)
ATRIAL RATE: 109 BPM
ATRIAL RATE: 96 BPM
BASOPHILS # BLD AUTO: 0.04 X10(3) UL (ref 0–0.2)
BASOPHILS NFR BLD AUTO: 0.3 %
BUN BLD-MCNC: 10 MG/DL (ref 9–23)
BUN BLD-MCNC: 9 MG/DL (ref 9–23)
BUN/CREAT SERPL: 13.6 (ref 10–20)
BUN/CREAT SERPL: 14.1 (ref 10–20)
CALCIUM BLD-MCNC: 8.5 MG/DL (ref 8.7–10.4)
CALCIUM BLD-MCNC: 8.6 MG/DL (ref 8.7–10.4)
CHLORIDE SERPL-SCNC: 102 MMOL/L (ref 98–112)
CHLORIDE SERPL-SCNC: 103 MMOL/L (ref 98–112)
CO2 SERPL-SCNC: 23 MMOL/L (ref 21–32)
CO2 SERPL-SCNC: 23 MMOL/L (ref 21–32)
CREAT BLD-MCNC: 0.66 MG/DL (ref 0.55–1.02)
CREAT BLD-MCNC: 0.71 MG/DL (ref 0.55–1.02)
DEPRECATED RDW RBC AUTO: 61.8 FL (ref 35.1–46.3)
EGFRCR SERPLBLD CKD-EPI 2021: 92 ML/MIN/1.73M2 (ref 60–?)
EGFRCR SERPLBLD CKD-EPI 2021: 95 ML/MIN/1.73M2 (ref 60–?)
EOSINOPHIL # BLD AUTO: 0.01 X10(3) UL (ref 0–0.7)
EOSINOPHIL NFR BLD AUTO: 0.1 %
ERYTHROCYTE [DISTWIDTH] IN BLOOD BY AUTOMATED COUNT: 21 % (ref 11–15)
GLUCOSE BLD-MCNC: 152 MG/DL (ref 70–99)
GLUCOSE BLD-MCNC: 153 MG/DL (ref 70–99)
GLUCOSE BLDC GLUCOMTR-MCNC: 175 MG/DL (ref 70–99)
HCT VFR BLD AUTO: 24.6 % (ref 35–48)
HGB BLD-MCNC: 7.5 G/DL (ref 12–16)
HGB BLD-MCNC: 8.9 G/DL (ref 12–16)
IMM GRANULOCYTES # BLD AUTO: 0.15 X10(3) UL (ref 0–1)
IMM GRANULOCYTES NFR BLD: 1 %
LYMPHOCYTES # BLD AUTO: 1.04 X10(3) UL (ref 1–4)
LYMPHOCYTES NFR BLD AUTO: 6.6 %
MAGNESIUM SERPL-MCNC: 1.7 MG/DL (ref 1.6–2.6)
MCH RBC QN AUTO: 24.9 PG (ref 26–34)
MCHC RBC AUTO-ENTMCNC: 30.5 G/DL (ref 31–37)
MCV RBC AUTO: 81.7 FL (ref 80–100)
MONOCYTES # BLD AUTO: 1.47 X10(3) UL (ref 0.1–1)
MONOCYTES NFR BLD AUTO: 9.4 %
NEUTROPHILS # BLD AUTO: 12.93 X10 (3) UL (ref 1.5–7.7)
NEUTROPHILS # BLD AUTO: 12.93 X10(3) UL (ref 1.5–7.7)
NEUTROPHILS NFR BLD AUTO: 82.6 %
OSMOLALITY SERPL CALC.SUM OF ELEC: 282 MOSM/KG (ref 275–295)
OSMOLALITY SERPL CALC.SUM OF ELEC: 284 MOSM/KG (ref 275–295)
P AXIS: 44 DEGREES
P AXIS: 55 DEGREES
P-R INTERVAL: 132 MS
P-R INTERVAL: 144 MS
PLATELET # BLD AUTO: 311 10(3)UL (ref 150–450)
POTASSIUM SERPL-SCNC: 4.2 MMOL/L (ref 3.5–5.1)
POTASSIUM SERPL-SCNC: 4.3 MMOL/L (ref 3.5–5.1)
Q-T INTERVAL: 336 MS
Q-T INTERVAL: 356 MS
QRS DURATION: 70 MS
QRS DURATION: 76 MS
QTC CALCULATION (BEZET): 449 MS
QTC CALCULATION (BEZET): 452 MS
R AXIS: 41 DEGREES
R AXIS: 43 DEGREES
RBC # BLD AUTO: 3.01 X10(6)UL (ref 3.8–5.3)
SODIUM SERPL-SCNC: 135 MMOL/L (ref 136–145)
SODIUM SERPL-SCNC: 136 MMOL/L (ref 136–145)
T AXIS: 51 DEGREES
T AXIS: 60 DEGREES
TSI SER-ACNC: 3.71 UIU/ML (ref 0.55–4.78)
VENTRICULAR RATE: 109 BPM
VENTRICULAR RATE: 96 BPM
VIT B12 SERPL-MCNC: >2000 PG/ML (ref 211–911)
WBC # BLD AUTO: 15.6 X10(3) UL (ref 4–11)

## 2025-05-02 PROCEDURE — 99233 SBSQ HOSP IP/OBS HIGH 50: CPT | Performed by: INTERNAL MEDICINE

## 2025-05-02 PROCEDURE — 71045 X-RAY EXAM CHEST 1 VIEW: CPT | Performed by: INTERNAL MEDICINE

## 2025-05-02 RX ORDER — DILTIAZEM HYDROCHLORIDE 5 MG/ML
5 INJECTION INTRAVENOUS ONCE
Status: DISCONTINUED | OUTPATIENT
Start: 2025-05-02 | End: 2025-05-02

## 2025-05-02 RX ORDER — METOPROLOL TARTRATE 1 MG/ML
5 INJECTION, SOLUTION INTRAVENOUS ONCE
Status: COMPLETED | OUTPATIENT
Start: 2025-05-02 | End: 2025-05-02

## 2025-05-02 RX ORDER — LEVALBUTEROL INHALATION SOLUTION 1.25 MG/3ML
1.25 SOLUTION RESPIRATORY (INHALATION) EVERY 6 HOURS SCHEDULED
Status: DISCONTINUED | OUTPATIENT
Start: 2025-05-02 | End: 2025-05-02

## 2025-05-02 RX ORDER — METOPROLOL TARTRATE 1 MG/ML
INJECTION, SOLUTION INTRAVENOUS
Status: COMPLETED
Start: 2025-05-02 | End: 2025-05-02

## 2025-05-02 RX ORDER — AMIODARONE HYDROCHLORIDE 200 MG/1
200 TABLET ORAL
Status: DISCONTINUED | OUTPATIENT
Start: 2025-05-02 | End: 2025-05-06

## 2025-05-02 RX ORDER — MAGNESIUM SULFATE HEPTAHYDRATE 40 MG/ML
2 INJECTION, SOLUTION INTRAVENOUS ONCE
Status: COMPLETED | OUTPATIENT
Start: 2025-05-02 | End: 2025-05-02

## 2025-05-02 RX ORDER — DILTIAZEM HYDROCHLORIDE 5 MG/ML
5 INJECTION INTRAVENOUS ONCE
Status: COMPLETED | OUTPATIENT
Start: 2025-05-02 | End: 2025-05-02

## 2025-05-02 RX ORDER — LEVALBUTEROL INHALATION SOLUTION 1.25 MG/3ML
1.25 SOLUTION RESPIRATORY (INHALATION) EVERY 6 HOURS PRN
Status: DISCONTINUED | OUTPATIENT
Start: 2025-05-02 | End: 2025-05-06

## 2025-05-02 RX ORDER — SODIUM CHLORIDE 9 MG/ML
INJECTION, SOLUTION INTRAVENOUS ONCE
Status: COMPLETED | OUTPATIENT
Start: 2025-05-02 | End: 2025-05-02

## 2025-05-02 NOTE — PLAN OF CARE
See RRT x2  notes. Patient is AxO4. Receiving 4L of O2 per nasal cannula. R jaw pain managed with morphine prn.  On a regular, nectar thick/mildly thickened liquids /pureed diet. Minimal PO intake overnight. Voiding per Purewick. No BM overnight. IVF and IV abx continued. Ambulates with x2 assist stand-pivot to rolling chair. Plan for IR consult for possibly port placement and oncology consult.         Problem: Patient Centered Care  Goal: Patient preferences are identified and integrated in the patient's plan of care  Description: Interventions:- What would you like us to know as we care for you? - Provide timely, complete, and accurate information to patient/family- Incorporate patient and family knowledge, values, beliefs, and cultural backgrounds into the planning and delivery of care- Encourage patient/family to participate in care and decision-making at the level they choose- Honor patient and family perspectives and choices  5/2/2025 0206 by Goldberg, Rachel, RN  Outcome: Progressing  5/2/2025 0205 by Goldberg, Rachel, RN  Outcome: Progressing     Problem: PAIN - ADULT  Goal: Verbalizes/displays adequate comfort level or patient's stated pain goal  Description: INTERVENTIONS:- Encourage pt to monitor pain and request assistance- Assess pain using appropriate pain scale- Administer analgesics based on type and severity of pain and evaluate response- Implement non-pharmacological measures as appropriate and evaluate response- Consider cultural and social influences on pain and pain management- Manage/alleviate anxiety- Utilize distraction and/or relaxation techniques- Monitor for opioid side effects- Notify MD/LIP if interventions unsuccessful or patient reports new pain- Anticipate increased pain with activity and pre-medicate as appropriate  Outcome: Progressing     Problem: RISK FOR INFECTION - ADULT  Goal: Absence of fever/infection during anticipated neutropenic period  Description: INTERVENTIONS- Monitor  WBC- Administer growth factors as ordered- Implement neutropenic guidelines  Outcome: Progressing     Problem: SAFETY ADULT - FALL  Goal: Free from fall injury  Description: INTERVENTIONS:- Assess pt frequently for physical needs- Identify cognitive and physical deficits and behaviors that affect risk of falls.- [place] fall precautions as indicated by assessment.- Educate pt/family on patient safety including physical limitations- Instruct pt to call for assistance with activity based on assessment- Modify environment to reduce risk of injury- Provide assistive devices as appropriate- Consider OT/PT consult to assist with strengthening/mobility- Encourage toileting schedule  Outcome: Progressing     Problem: CARDIOVASCULAR - ADULT  Goal: Maintains optimal cardiac output and hemodynamic stability  Description: INTERVENTIONS:- Monitor vital signs, rhythm, and trends- Monitor for bleeding, hypotension and signs of decreased cardiac output- Evaluate effectiveness of vasoactive medications to optimize hemodynamic stability- Monitor arterial and/or venous puncture sites for bleeding and/or hematoma- Assess quality of pulses, skin color and temperature- Assess for signs of decreased coronary artery perfusion - ex. Angina- Evaluate fluid balance, assess for edema, trend weights  Outcome: Progressing  Goal: Absence of cardiac arrhythmias or at baseline  Description: INTERVENTIONS:- Continuous cardiac monitoring, monitor vital signs, obtain 12 lead EKG if indicated- Evaluate effectiveness of antiarrhythmic and heart rate control medications as ordered- Initiate emergency measures for life threatening arrhythmias- Monitor electrolytes and administer replacement therapy as ordered  Outcome: Progressing

## 2025-05-02 NOTE — PLAN OF CARE
Patient transferred to room 337 post RRT. HR remains 100-120s on monitor. Patient ST on tele. O2 sat 83% on 6L on arrival. Patient currently on 8L via high flow nasal cannula. 1 unit of PRBCs infusing now. Family at bedside. Patient updated on POC and verbalized understanding.    Problem: Patient Centered Care  Goal: Patient preferences are identified and integrated in the patient's plan of care  Description: Interventions:- What would you like us to know as we care for you?  I live at home with family.  - Provide timely, complete, and accurate information to patient/family- Incorporate patient and family knowledge, values, beliefs, and cultural backgrounds into the planning and delivery of care- Encourage patient/family to participate in care and decision-making at the level they choose- Honor patient and family perspectives and choices  Outcome: Progressing     Problem: PAIN - ADULT  Goal: Verbalizes/displays adequate comfort level or patient's stated pain goal  Description: INTERVENTIONS:- Encourage pt to monitor pain and request assistance- Assess pain using appropriate pain scale- Administer analgesics based on type and severity of pain and evaluate response- Implement non-pharmacological measures as appropriate and evaluate response- Consider cultural and social influences on pain and pain management- Manage/alleviate anxiety- Utilize distraction and/or relaxation techniques- Monitor for opioid side effects- Notify MD/LIP if interventions unsuccessful or patient reports new pain- Anticipate increased pain with activity and pre-medicate as appropriate  Outcome: Progressing     Problem: RISK FOR INFECTION - ADULT  Goal: Absence of fever/infection during anticipated neutropenic period  Description: INTERVENTIONS- Monitor WBC- Administer growth factors as ordered- Implement neutropenic guidelines  Outcome: Progressing     Problem: SAFETY ADULT - FALL  Goal: Free from fall injury  Description: INTERVENTIONS:-  Assess pt frequently for physical needs- Identify cognitive and physical deficits and behaviors that affect risk of falls.- [place] fall precautions as indicated by assessment.- Educate pt/family on patient safety including physical limitations- Instruct pt to call for assistance with activity based on assessment- Modify environment to reduce risk of injury- Provide assistive devices as appropriate- Consider OT/PT consult to assist with strengthening/mobility- Encourage toileting schedule  Outcome: Progressing     Problem: CARDIOVASCULAR - ADULT  Goal: Maintains optimal cardiac output and hemodynamic stability  Description: INTERVENTIONS:- Monitor vital signs, rhythm, and trends- Monitor for bleeding, hypotension and signs of decreased cardiac output- Evaluate effectiveness of vasoactive medications to optimize hemodynamic stability- Monitor arterial and/or venous puncture sites for bleeding and/or hematoma- Assess quality of pulses, skin color and temperature- Assess for signs of decreased coronary artery perfusion - ex. Angina- Evaluate fluid balance, assess for edema, trend weights  Outcome: Progressing  Goal: Absence of cardiac arrhythmias or at baseline  Description: INTERVENTIONS:- Continuous cardiac monitoring, monitor vital signs, obtain 12 lead EKG if indicated- Evaluate effectiveness of antiarrhythmic and heart rate control medications as ordered- Initiate emergency measures for life threatening arrhythmias- Monitor electrolytes and administer replacement therapy as ordered  Outcome: Progressing

## 2025-05-02 NOTE — PHYSICAL THERAPY NOTE
PHYSICAL THERAPY EVALUATION - INPATIENT     Room Number: 455/455-A  Evaluation Date: 5/2/2025  Type of Evaluation: Initial   Physician Order: PT Eval and Treat    Presenting Problem: difficulty breathing  Co-Morbidities : endometrial ca to lung  Reason for Therapy: Mobility Dysfunction and Discharge Planning    PHYSICAL THERAPY ASSESSMENT   Patient is a 70 year old female admitted 4/29/2025 for difficulty breathing.  Prior to admission, patient's baseline is amb with cane in community, has assist from dtr for laundry.  Patient is currently functioning below baseline with bed mobility, transfers, gait, and stair negotiation.  Patient is requiring contact guard assist and minimal assist as a result of the following impairments: decreased functional strength, decreased endurance/aerobic capacity, decreased muscular endurance, and increased O2 needs from baseline.  Physical Therapy will continue to follow for duration of hospitalization.    Patient will benefit from continued skilled PT Services at discharge to promote prior level of function.  Anticipate patient will return home with home health PT. Communicated with SW concerns for stairs at home, per SW pt will have transport to/from home for d/c and for chemo.    PLAN DURING HOSPITALIZATION  Nursing Mobility Recommendation : 1 Assist  PT Device Recommendation: Rolling walker, Gait belt  PT Treatment Plan: Bed mobility, Body mechanics, Endurance, Energy conservation, Patient education, Family education, Gait training, Strengthening, Stair training, Transfer training, Balance training  Rehab Potential : Fair  Frequency (Obs): 5x/week     PHYSICAL THERAPY MEDICAL/SOCIAL HISTORY       Problem List  Principal Problem:    Lung mass  Active Problems:    Pericardial effusion (HCC)    Endometrial cancer (HCC)    Microcytic anemia      HOME SITUATION  Type of Home: Apartment  Home Layout: One level  Stairs to Enter :  (3 flights, pt lives in a 3 flat)   Railing:  (partial  rail)    Stairs to Bedroom: 0         Lives With: Alone (per chart, dtrs live above and below pt; per SW pt has [facility])        Patient Regularly Uses: Glasses, Cane, Rollator (uses cane for community)         SUBJECTIVE  \"I'm having a hard time breathing.\"    PHYSICAL THERAPY EXAMINATION   OBJECTIVE  Precautions: Bed/chair alarm  Fall Risk:  (moderate d/t SOB)    WEIGHT BEARING RESTRICTION       PAIN ASSESSMENT  Ratin  Location: throat (from difficulty swallowing, RN and MD aware)  Management Techniques: Activity promotion, Repositioning    COGNITION  Overall Cognitive Status:  WFL - within functional limits    RANGE OF MOTION AND STRENGTH ASSESSMENT  Upper extremity ROM and strength are within functional limits   Lower extremity ROM is within functional limits   Lower extremity strength is within functional limits     BALANCE  Static Sitting: Fair  Dynamic Sitting: Fair -  Static Standing: Fair -  Dynamic Standing: Fair -    ADDITIONAL TESTS                                    NEUROLOGICAL FINDINGS                      ACTIVITY TOLERANCE  Pulse: 101  Heart Rate Source: Monitor                   O2 WALK  Oxygen Therapy  SPO2% on Oxygen at Rest: 95  At rest oxygen flow (liters per minute): 3  SPO2% Ambulation on Oxygen: 88  Ambulation oxygen flow (liters per minute): 3    AM-PAC '6-Clicks' INPATIENT SHORT FORM - BASIC MOBILITY  How much difficulty does the patient currently have...  Patient Difficulty: Turning over in bed (including adjusting bedclothes, sheets and blankets)?: A Little   Patient Difficulty: Sitting down on and standing up from a chair with arms (e.g., wheelchair, bedside commode, etc.): A Little   Patient Difficulty: Moving from lying on back to sitting on the side of the bed?: A Little   How much help from another person does the patient currently need...   Help from Another: Moving to and from a bed to a chair (including a wheelchair)?: A Little   Help from Another: Need to walk in  hospital room?: A Little   Help from Another: Climbing 3-5 steps with a railing?: A Lot     AM-PAC Score:  Raw Score: 17   Approx Degree of Impairment: 50.57%   Standardized Score (AM-PAC Scale): 42.13   CMS Modifier (G-Code): CK    FUNCTIONAL ABILITY STATUS  Functional Mobility/Gait Assessment  Gait Assistance: Contact guard assist  Distance (ft): 5  Assistive Device: Rolling walker  Pattern: Shuffle (fwd flexed)  Rolling: stand-by assist  Supine to Sit: contact guard assist and minimal assist  Sit to Supine:  NT  Sit to Stand: contact guard assist    Exercise/Education Provided:  Bed mobility  Gait training  Transfer training  PT Eval    Skilled Therapy Provided: Pt ok to be seen per RN Yola. Pt educ in role of PT and PT POC. Pt's family at bedside at beginning and end of session, supportive. Pt demo'd safe bed mobility, transfers, and short distance gait. Pt limited by SOB. Pt's O2 at 88%, O3 inc to 4L. Pt recovered within 1 min to >90. O2 left at 4L as pt was talking with MD. RN aware.    The patient's Approx Degree of Impairment: 50.57% has been calculated based on documentation in the [facility] '6 clicks' Inpatient Basic Mobility Short Form.  Research supports that patients with this level of impairment may benefit from HHPT vs GR. Per , pt has transportation to/from home for d/c and chemo. Without transportation and assist from family, would rec GR as pt does not have the physical capability to safely perform 3 flights of stairs at this time. Spoke with RN regarding concerns as well.  Final disposition will be made by interdisciplinary medical team.    *Update from : Pt's dtrs do NOT live in the building, but dtrs and son are available to assist her at home as needed. Pt and family do not want GR, and plan is for home with HHPT.    Patient End of Session: Up in chair, Needs met, Call light within reach, RN aware of session/findings, All patient questions and concerns addressed, Hospital anti-slip socks, With   staff, Family present    CURRENT GOALS  Goals to be met by: 5/16/25  Patient Goal Patient's self-stated goal is: less pain with swallowing   Goal #1 Patient is able to demonstrate supine - sit EOB @ level: modified independent     Goal #1   Current Status    Goal #2 Patient is able to demonstrate transfers Sit to/from Stand at assistance level: modified independent with walker - rolling     Goal #2  Current Status    Goal #3 Patient is able to ambulate 50 feet with assist device: walker - rolling at assistance level: supervision   Goal #3   Current Status    Goal #4 Patient will negotiate 24 stairs w/ assistive device and supervision   Goal #4   Current Status    Goal #5 Patient to demonstrate independence with home activity/exercise instructions provided to patient in preparation for discharge.   Goal #5   Current Status    Goal #6    Goal #6  Current Status      Patient Evaluation Complexity Level:  History Moderate - 1 or 2 personal factors and/or co-morbidities   Examination of body systems Moderate - addressing a total of 3 or more elements   Clinical Presentation  Moderate - Evolving   Clinical Decision Making  Moderate Complexity     Therapeutic Activity:  15 minutes

## 2025-05-02 NOTE — PROGRESS NOTES
RRT called to room 455    On my arrival patient sitting up in bed, complaining of warm feeling going through her body somewhat short of breath with palpitations.  Claims been experiencing similar symptoms on and off since yesterday, earlier had an episode of SVT, as per RN now heart rate in the 200s as well.    On exam  Temperature 98.9 pulse 180  Respiration 20  /82  Pulse ox 96% on 2 L nasal cannula  Alert oriented appears anxious  Chest clear to auscultation  Heart tachycardic  Abdomen soft  Extremities no edema    Assessment and plan    PSVT  Patient responding to vagal maneuvers, heart rate now in 1 teens which has been ongoing for few days now.  Will give Lopressor 5 mg IV x 1, use 5 mg every 6 as needed first for rate greater than 140.    35 minutes of critical care time spent with patient including coordinating care with ancillary staff.

## 2025-05-02 NOTE — CONSULTS
[facility]                                                CARDIAC EP CONSULT NOTE      Patient Name: Kelli Fisher MRN: [id number]   : 1955 CSN: [id number]     CARDIAC EP CONSULT NOTE    Reason for consultation:   Asked by general cardiology Dr. Huerta and Joel Melendez MD to provide evaluation and management of arrhythmias.    Assessment and Recommendations:     Paroxysmal atrial tachycardia  -Very symptomatic  -She has frequent PACs, and recurrent episodes of AT, no evidence for AF  -One episode of sinus arrest after she received multiple sequential doses of BB and CCB, but since then conversions to SR have been unremarkable.  Her sinus rates and AV conduction appear normal.    RECS:  -Stop metoprolol tartrate  -Start amiodarone 200 mg PO TID x 5 days, then decrease to 200 mg once daily  -Will require amio monitoring as an outpt.  -Minimize sympathetic stress: avoid stimulants, beta-agonist nebs, and hypoxemia, and transfuse to Hgb >8.    Thank you for the EP consultation.    Tahira Carreno MD  Cardiac Electrophysiology  [facility]  2025  C5-EP      History of present illness:   The patient is a 70 year old admitted with respirator failure due to pneumonia, aspiration in setting of metastatic endometrial cancer, also with severe anemia. She has had episodes of SVT rates >200 bpm associated with palpitations and worsening dyspnea. Attempts to control this with IV BB and PO crushed CCB have been difficult.  One time she had a prolonged sinus arrest after serial administration of rate agents. She has been treated with low doses IV diltiazem with success in terminating the episodes.    ROS:   Constitutional: + fatigue  ENT: No mouth pain, neck pain, running nose, headaches or swollen glands.  Skin: No rashes, pruritus or skin changes,  Respiratory: + shortness of breath.  CV: No chest  pain or claudication.  Musculoskeletal: No joint pain, stiffness or swelling.  : No dysuria or hematuria.  GI: + difficulty swallowing, no nausea, vomiting or diarrhea. No blood in stools.  Neurologic: No seizures, tremors, weakness or numbness.    Past Medical, Surgical, Family, and Social Histories:   Past Medical History[1]  Past Surgical History[2]  Family History[3]    SHX:  The patient reports that she has never smoked. She has never used smokeless tobacco. She reports that she does not drink alcohol and does not use drugs.    Allergies:   Allergies[4]    Medications:     Current Outpatient Medications   Medication Instructions    albuterol (VENTOLIN) 2.5 mg, Nebulization, Every 6 hours PRN    Fluticasone Propionate  HFA (FLOVENT HFA) 220 MCG/ACT Inhalation Aerosol 2 puffs, 2 times daily    levothyroxine (SYNTHROID) 100 mcg, Before breakfast      INPT MEDS:  Scheduled Medications[5]  Medication Infusions[6]    PHYSICAL EXAM:   Blood pressure 103/79, pulse 90, temperature 98.4 °F (36.9 °C), temperature source Oral, resp. rate 24, height 152.4 cm (5'), weight 163 lb (73.9 kg), SpO2 99%.  GEN - in mild resp distress  HEENT - atraumatic  Neck - no masses  Lungs - labored resps, pursed lip breathing, symmetric excursion  CV - no precordial heave  Ext - no cyanosis  Skin - no rash  Neuro - alert, oriented     LABS AND DATA:     ECG 05/02/2025: Sinus 96 bpm    TELE: Sinus  bpm. Episodes of atrial tachycardia, rates 150-210 bpm.    Lab Results   Component Value Date    WBC 15.6 (H) 05/02/2025    HGB 7.5 (L) 05/02/2025    HCT 24.6 (L) 05/02/2025    .0 05/02/2025    CREATSERUM 0.71 05/02/2025    BUN 10 05/02/2025     (L) 05/02/2025    K 4.2 05/02/2025     05/02/2025    CO2 23.0 05/02/2025     (H) 05/02/2025    CA 8.5 (L) 05/02/2025    ALB 3.4 04/29/2025    ALKPHO 250 (H) 04/29/2025    BILT 0.6 04/29/2025    TP 7.1 04/29/2025    AST 17 04/29/2025    ALT 7 (L) 04/29/2025    PTT 35.1  04/29/2025    INR 1.33 (H) 04/29/2025    TSH 3.705 05/02/2025    LIP 42 12/11/2023    MG 1.7 05/01/2025       Imaging: I independently visualized all relevant chest imaging in PACS, agree with radiology interpretation except where noted.    XR CHEST AP PORTABLE  (CPT=71045)  Result Date: 5/2/2025  CONCLUSION:   Stable opacity left perihilar mid lung.  Mild opacity right lung base which may reflect atelectasis with or without superimposed pneumonia.    Dictated by (CST): Casey Diallo MD on 5/02/2025 at 7:18 AM     Finalized by (CST): Casey Diallo MD on 5/02/2025 at 7:19 AM              ------------------------------------------------------------------------------------------------------------------------------           [1]   Past Medical History:   Asthma (HCC)    Esophageal reflux    History of blood transfusion    Visual impairment   [2]   Past Surgical History:  Procedure Laterality Date    Thyroidectomy      Total abdom hysterectomy     [3] History reviewed. No pertinent family history.  [4]   Allergies  Allergen Reactions    Aspirin Tightness in Throat    Penicillins HIVES    Other OTHER (SEE COMMENTS)     Pt states IV steroid allergy, states it makes her breathing worse    [5]    metoprolol tartrate  12.5 mg Oral 2x Daily(Beta Blocker)    docusate sodium  100 mg Oral BID    mylanta-dicyclomine-lidocaine 2% (G.I. Cocktail) oral liquid   Oral Once    levothyroxine  100 mcg Oral Before breakfast    fluticasone furoate  1 puff Inhalation Daily   [6]    sodium chloride 100 mL/hr at 05/02/25 3415

## 2025-05-02 NOTE — SIGNIFICANT EVENT
RRT    *See RRT Documentation Record*    Reason the RRT was called: Multiple brief episodes of symptomatic PSVT, rate up to 230s. One episode followed by junctional heart block.   Assessment of patient leading up to RRT: Patient c/o \"heart racing super fast\", Tele called for 5  episodes of brief (15 -17 seconds) episodes of PSVT. One episode of PSVT was followed by 29 seconds of junctional heart block.   Interventions/Testing: EKG.  5mg of Metoprolol given IV.   Patient Outcome/Disposition: Patient to stay on floor. 5mg of metoprolol given and ordered Q6 hr. Tele called after the rapid response to correct that it was not junctional heart block, it was actually complete heart block. MD notified. Metoprolol discontinued.   Family Notified: yes  Name of family notified: hTeresa Fisher, Daughter

## 2025-05-02 NOTE — SIGNIFICANT EVENT
RRT    *See RRT Documentation Record*    Reason the RRT was called: SVT   Assessment of patient leading up to RRT:  Tele called for SVT, SVT sustained for 5 minutes with rate up to 220s. Breathing was tachypneic and labored, patient c/o SOB.  Denies any chest pain. C/o \"it feels like I can't breathe and it feels like everything is shutting down from the neck down\".   Interventions/Testing:  EKG. IV metoprolol.  Patient Outcome/Disposition: HR returned to NSR. Patient to stay on floor. Duonebs discontinued and replaced with Xopenex.   Family Notified: yes  Name of family notified: Theresa Fisher, daughter

## 2025-05-02 NOTE — SLP NOTE
SPEECH DAILY NOTE - INPATIENT    ASSESSMENT & PLAN   ASSESSMENT    Video swallow study unable to be completed d/t RRT. Family requesting pt be upgraded to thin liquids despite SLP's recommendations for mildly thick liquids. Family educated on aspiration, s/s of aspiration, aspiration precautions and safe swallow strategies. Family would like to attempt thin liquids when pt is feeling better despite aspiration risk. SLP d/w MD/RN and order was changed to thin liquids. SLP will continue to follow.     Diet Recommendations - Solids: Puree  Diet Recommendations - Liquids: Thin Liquids (family requesting with assumption of aspiration risk)    Compensatory Strategies Recommended: Liquids via spoon  Aspiration Precautions: Upright position, Slow rate, Small bites, Small sips, No straw  Medication Administration Recommendations:  (as tolerated)    Patient Experiencing Pain: No              Treatment Plan  Treatment Plan/Recommendations: Videofluoroscopic swallow study, Aspiration precautions    Interdisciplinary Communication: Discussed with RN          GOALS  Goal #1 The patient will tolerate soft bite sized consistency and thin liquids without overt signs or symptoms of aspiration with 100 % accuracy over 1-2 session(s).    No CSA on current diet of PUREED/mildly-thick liquids. Downgraded by RN. Pt reported \"difficulty getting bite size food down\".    Pt requesting upgrade to thin liquids and assuming aspiration risk. D/W MD    Revised    Goal #2 The patient/family/caregiver will demonstrate understanding and implementation of aspiration precautions and swallow strategies independently over 1-2 session(s).    Diet recommendations/swallow status discussed with dtr; v/u.     In Progress   Goal #3 The patient will tolerate trial upgrade of soft/hard solid consistency and thin liquids without overt signs or symptoms of aspiration with 100 % accuracy over 1-2 session(s).    SOB with all thin liquid controlled trials.      Revised   Goal #4 The patient will utilize compensatory strategies as outlined by  BSSE (clinical evaluation) including Slow rate, Small bites, Small sips, Multiple swallows, Alternate liquids/solids, No straws, Upright 90 degrees, Upright 90 degrees 30 mins after meal, Eliminate distractions with PRN assistance 100 % of the time across 2 sessions.    SLP fed Pt; strategies executed.     In Progress     FOLLOW UP  Follow Up Needed (Documentation Required): Yes  SLP Follow-up Date: 05/03/25  Duration: 1 week    Session: 2    If you have any questions, please contact NI Mason M.S. CCC-SLP  Speech Language Pathologist  Phone Number Ext. [phone]

## 2025-05-02 NOTE — PROGRESS NOTES
On call Jujuurnist 05/02/25    RRT called to room 455.    On arrival patient sitting upright in bed, continues to complain of palpitations shortness of breath, recently received DuoNeb immediately thereafter went to SVT.  Heart rate in the 200s.    On exam  Temperature 98  Pulse 218  /70  Respiration 24  Pulse ox 98% on 2 L nasal cannula  Alert oriented appears anxious  Chest clear  Heart tachycardic  Abdomen soft  Extremities no edema    Assessment and plan    PSVT  Likely triggered by DuoNeb, will switch to Xopenex, again responded well to vagal maneuvers, Lopressor on hold after brief heart block per RN.    35 minutes of critical care time spent with patient occluding coordinating care with ancillary staff.

## 2025-05-02 NOTE — PLAN OF CARE
RRT    *See RRT Documentation Record*    Reason the RRT was called: SVT HR in 220's sustaining  Assessment of patient leading up to RRT: Tachypneic, Nauseous, Diaphoretic  Interventions/Testing: IV Metoprolol, Vagal Maneuver, Labs  Patient Outcome/Disposition: HR remained ST in 180s. Transferred to CV room 337  Family Notified: yes at bedside  Name of family notified: Theresa Fisher

## 2025-05-02 NOTE — PROGRESS NOTES
[facility]  part of [facility]    Progress Note    Kelli Fisher Patient Status:  Inpatient    1955 MRN [id number]   Location [facility] 4W/SW/SE Attending Joel Dejesus MD   Hosp Day # 2 PCP Taylor Gallardo MD       Subjective:     Constitutional:  Positive for fatigue.   HENT:  Negative for congestion.    Respiratory:  Positive for cough and shortness of breath.    Cardiovascular:  Negative for chest pain.   Gastrointestinal:  Negative for abdominal distention.   Neurological:  Negative for seizures.   Psychiatric/Behavioral:  Negative for agitation.      Patient was seen and examined  Up to the chair on 4 L of oxygen  Dyspnea with minimal activity  Fatigue  Poor oral intake  Events from last night reviewed with episode of palpitation and RRT   Objective:   Blood pressure 117/69, pulse 90, temperature 98.4 °F (36.9 °C), temperature source Oral, resp. rate 26, height 5' (1.524 m), weight 163 lb (73.9 kg), SpO2 98%.  Physical Exam  Vitals and nursing note reviewed.   Constitutional:       General: She is not in acute distress.     Appearance: She is ill-appearing.   HENT:      Head: Atraumatic.      Mouth/Throat:      Mouth: Mucous membranes are moist.   Eyes:      Pupils: Pupils are equal, round, and reactive to light.   Cardiovascular:      Rate and Rhythm: Normal rate.      Heart sounds:      No gallop.   Pulmonary:      Breath sounds: No wheezing.      Comments: Diminished breath sounds in the left lower lung field  Abdominal:      General: Abdomen is flat. Bowel sounds are normal.      Palpations: Abdomen is soft.      Tenderness: There is no guarding.   Musculoskeletal:      Right lower leg: No edema.      Left lower leg: No edema.   Skin:     General: Skin is dry.   Neurological:      General: No focal deficit present.      Mental Status: Mental status is at baseline.         Results:   Lab Results   Component Value Date    WBC 15.6 (H) 2025    HGB 7.5 (L)  05/02/2025    HCT 24.6 (L) 05/02/2025    .0 05/02/2025    CREATSERUM 0.71 05/02/2025    BUN 10 05/02/2025     (L) 05/02/2025    K 4.2 05/02/2025     05/02/2025    CO2 23.0 05/02/2025     (H) 05/02/2025    CA 8.5 (L) 05/02/2025    ALB 3.4 04/29/2025    ALKPHO 250 (H) 04/29/2025    BILT 0.6 04/29/2025    TP 7.1 04/29/2025    AST 17 04/29/2025    ALT 7 (L) 04/29/2025    PTT 35.1 04/29/2025    INR 1.33 (H) 04/29/2025    TSH 3.705 05/02/2025    LIP 42 12/11/2023    MG 1.7 05/01/2025    TROPHS 3 04/29/2025       XR CHEST AP PORTABLE  (CPT=71045)  Result Date: 5/2/2025  CONCLUSION:   Stable opacity left perihilar mid lung.  Mild opacity right lung base which may reflect atelectasis with or without superimposed pneumonia.    Dictated by (CST): Casey Diallo MD on 5/02/2025 at 7:18 AM     Finalized by (CST): Casey Diallo MD on 5/02/2025 at 7:19 AM          EKG 12 Lead  Result Date: 5/2/2025  Normal sinus rhythm Normal ECGg When compared with ECG of 01-MAY-2025 23:31, No significant change was found Confirmed by TRINI CARBAJAL ELMER ([id number]) on 5/2/2025 9:50:37 AM    EKG 12 Lead  Result Date: 5/2/2025  Sinus tachycardia Otherwise normal ECG When compared with ECG of 29-APR-2025 20:52, Premature atrial complexes are no longer Present Confirmed by TRINI CARBAJAL ELMER ([id number]) on 5/2/2025 9:50:24 AM      Assessment & Plan:       1-metastatic endometrial carcinoma  Recent biopsy of the supraclavicular lymph node on 4/14/25 was positive for 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  Gynecology oncology following    2-acute respiratory failure with hypoxia  CT with no evidence of PE  Lung metastasis and adenopathy with severe narrowing of the lower right IJ  Left upper lobe large lung mass/metastasis    O2 therapy and currently on 4 L  As needed nebulizers  On empiric antibiotics for possible postobstructive pneumonia    3-pericardial effusion reported small to moderate  With no  evidence of tamponade    4-DVT prophylaxis  On SCD since patient with severe anemia    5-anemia  Transfuse for hemoglobin less than 7    6-full code    Overall poor prognosis  High risk and complex  Discussed with family at bedside            Yamila Grover MD  5/2/2025

## 2025-05-02 NOTE — SLP NOTE
SPEECH DAILY NOTE - INPATIENT    ASSESSMENT & PLAN       Proper PPE worn. Hands sanitized upon entrance/exit Pt room.         Pt alert, afebrile and on 4L/min 02 via NC (previous day on 3L). Pt seen for swallow analysis per BSE/VFSS recommendations (after consulting with RN- reported Pt with two episodes RRT called. Diet reduced to pureed at Pt's request). Pt agreed to participate. Dtr arrived during session; education completed. Pt's preferred method of learning verbal. Pt upright in bed; observed with current diet of pureed/mildly-thick liquids for monitoring diet tolerance. Pt requesting upgrade to thin liquids- reported \"will not drink the thick liquids\". Swallowing precautions/strategies discussed as SLP directed oral trials.Lingual skills for bolus control of all trials. No significant oral retention.  Pharyngeal response appeared delayed per hyolaryngeal elevation to completion (functional rise/strength per palpation). No overt CSA on pureed nor mildly-thick liquids. SOB with all controlled trials of thin liquid. No diet upgrade recommended until a VFSS confirms safe swallow of this consistency. Collaborated with RN regarding Pt's swallowing plan of care. Per RN, Pt with SOB observed with oral intake. No report of difficulty taking meds. No new CXR completed. Sp02 ~98% during this session. Call light within Pt's reach upon SLP discharge from room.        CXR 5/02/25:  CONCLUSION:   Stable opacity left perihilar mid lung.   Mild opacity right lung base which may reflect atelectasis with or without superimposed pneumonia.         PLAN:  VFSS is recommended to further objectively assess pharyngeal function to determine safest least restrictive diet given clinical observations and CXR results.           Diet Recommendations - Solids: pureed  Diet Recommendations - Liquids: Nectar thick liquids/ Mildly thick    Compensatory Strategies Recommended: Multiple swallows, Alternate consistencies  Aspiration Precautions:  Upright position, Slow rate, Small bites, Small sips, No straw  Medication Administration Recommendations:  (as tolerated)    Patient Experiencing Pain: No  Treatment Plan  Treatment Plan/Recommendations: Videofluoroscopic swallow study  Interdisciplinary Communication: Discussed with RN  Plan posted at bedside          GOALS  Goal #1 The patient will tolerate soft bite sized consistency and mildly thick liquids without overt signs or symptoms of aspiration with 100 % accuracy over 1-2 session(s).    No CSA on current diet of PUREED/mildly-thick liquids. Downgraded by RN. Pt reported \"difficulty getting bite size food down\".        In Progress   Goal #2 The patient/family/caregiver will demonstrate understanding and implementation of aspiration precautions and swallow strategies independently over 1-2 session(s).    Diet recommendations/swallow status discussed with dtr; v/u.     In Progress   Goal #3 The patient will tolerate trial upgrade of soft easy to chew/regular consistency and thin liquids without overt signs or symptoms of aspiration with 100 % accuracy over 1-2 session(s).    SOB with all thin liquid controlled trials.     In Progress   Goal #4 The patient will utilize compensatory strategies as outlined by  BSSE (clinical evaluation) including Slow rate, Small bites, Small sips, Multiple swallows, Alternate liquids/solids, No straws, Upright 90 degrees, Upright 90 degrees 30 mins after meal, Eliminate distractions with PRN assistance 100 % of the time across 2 sessions.    SLP fed Pt; strategies executed.     In Progress   FOLLOW UP  Follow Up Needed (Documentation Required): Yes  SLP Follow-up Date: 05/02/25  Duration: 1 week    Session: 1    If you have any questions, please contact   Candelaria Hicks M.S. [facility]/SLP  Speech-Language Pathologist  [facility]  #[phone]

## 2025-05-02 NOTE — PAYOR COMM NOTE
--------------  CONTINUED STAY REVIEW    Payor: [employer] MEDICARE ADV PPO  Subscriber #:  [id number]  Authorization Number: [id number]    Admit date: 4/30/25  Admit time:  3:34 AM    REVIEW DOCUMENTATION:  PROGRESS NOTES   5/2      Date of Service: 5/2/2025  5:19 AM       On call Nocturnist 05/02/25     RRT called to room 455.     On arrival patient sitting upright in bed, continues to complain of palpitations shortness of breath, recently received DuoNeb immediately thereafter went to SVT.  Heart rate in the 200s.     On exam  Temperature 98  Pulse 218  /70  Respiration 24  Pulse ox 98% on 2 L nasal cannula  Alert oriented appears anxious  Chest clear  Heart tachycardic  Abdomen soft  Extremities no edema     Assessment and plan     PSVT  Likely triggered by DuoNeb, will switch to Xopenex, again responded well to vagal maneuvers, Lopressor on hold after brief heart block per RN.     35 minutes of critical care time spent with patient occluding coordinating care with ancillary staff.        Revision History       CONSULT PROGRESS NOTES 5/2   #1    Date of Service: 5/2/2025 12:51 PM     Signed       Expand All Collapse All       [facility]  part of [facility]     Progress Note       Subjective:     Constitutional:  Positive for fatigue.   HENT:  Negative for congestion.    Respiratory:  Positive for cough and shortness of breath.    Cardiovascular:  Negative for chest pain.   Gastrointestinal:  Negative for abdominal distention.   Neurological:  Negative for seizures.   Psychiatric/Behavioral:  Negative for agitation.       Patient was seen and examined  Up to the chair on 4 L of oxygen  Dyspnea with minimal activity  Fatigue  Poor oral intake  Events from last night reviewed with episode of palpitation and RRT   Objective:  Blood pressure 117/69, pulse 90, temperature 98.4 °F (36.9 °C), temperature source Oral, resp. rate 26, height 5' (1.524 m), weight 163 lb (73.9 kg), SpO2 98%.        Results:        Lab Results   Component Value Date     WBC 15.6 (H) 05/02/2025     HGB 7.5 (L) 05/02/2025     HCT 24.6 (L) 05/02/2025     .0 05/02/2025     CREATSERUM 0.71 05/02/2025     BUN 10 05/02/2025      (L) 05/02/2025     K 4.2 05/02/2025      05/02/2025     CO2 23.0 05/02/2025      (H) 05/02/2025     CA 8.5 (L) 05/02/2025     ALB 3.4 04/29/2025     ALKPHO 250 (H) 04/29/2025     BILT 0.6 04/29/2025     TP 7.1 04/29/2025     AST 17 04/29/2025     ALT 7 (L) 04/29/2025     PTT 35.1 04/29/2025     INR 1.33 (H) 04/29/2025     TSH 3.705 05/02/2025     LIP 42 12/11/2023     MG 1.7 05/01/2025     TROPHS 3 04/29/2025         XR CHEST AP PORTABLE  (CPT=71045)  Result Date: 5/2/2025  CONCLUSION:   Stable opacity left perihilar mid lung.  Mild opacity right lung base which may reflect atelectasis with or without superimposed pneumonia.    Dictated by (CST): Casey Diallo MD on 5/02/2025 at 7:18 AM     Finalized by (CST): Casey Diallo MD on 5/02/2025 at 7:19 AM           EKG 12 Lead  Result Date: 5/2/2025  Normal sinus rhythm Normal ECGg When compared with ECG of 01-MAY-2025 23:31, No significant change was found Confirmed by TRINI CARBAJAL ELMER ([id number]) on 5/2/2025 9:50:37 AM     EKG 12 Lead  Result Date: 5/2/2025  Sinus tachycardia Otherwise normal ECG When compared with ECG of 29-APR-2025 20:52, Premature atrial complexes are no longer Present Confirmed by TRINI CARBAJAL ELMER ([id number]) on 5/2/2025 9:50:24 AM        Assessment & Plan:  1-metastatic endometrial carcinoma  Recent biopsy of the supraclavicular lymph node on 4/14/25 was positive for 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  Gynecology oncology following     2-acute respiratory failure with hypoxia  CT with no evidence of PE  Lung metastasis and adenopathy with severe narrowing of the lower right IJ  Left upper lobe large lung mass/metastasis     O2 therapy and currently on 4 L  As needed  nebulizers  On empiric antibiotics for possible postobstructive pneumonia     3-pericardial effusion reported small to moderate  With no evidence of tamponade     4-DVT prophylaxis  On SCD since patient with severe anemia     5-anemia  Transfuse for hemoglobin less than 7     6-full code     Overall poor prognosis  High risk and complex  Discussed with family at bedside                 Yamila Grover MD  5/2/2025                    #2     Date of Service: 5/2/2025  1:34 PM  Consult Orders   Consult to Oncology [[id number]] ordered by Joel Dejesus MD at 05/01/25 1134          Signed         [facility]  part of [facility]     Non-Surgical ConsultDate of Admission:  4/29/2025     SUBJECTIVE:     Reason for Admission:  SOB     History of Present Illness:  Patient is a 70 year old female. 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      She is admitted for SOB. Plan was for initiation of chemotherapy outpatient in the near future and she completed chemotherapy teaching this week.      Last night and again this afternoon in SVT. Actively being transferred to tele. Patient feeling SOB and hot.     Gyn Onc History;  She did complete neoadjuvant XRT and brachytherapy in 4/2024. She is s/p robotic assisted total hysterectomy, bilateral salpingo-oophorectomy, cystoscopy on 5/28/24. Pathology was consistent with grade 3 endometrial carcinoma. She was following with gyn/on Dr. Lina Arias and completed 4 cycles of carboplatin in 09/2024. Per records, patient declined paclitaxel and pembrolizumab. 1/2025 CT AP notes no evidence of recurrent, residual or metastatic disease. More recently, she was diagnosed with pneumonia at [facility] on 3/13/25. She presented to [place] ED on 3/25/25 due to neck pain and dyspnea. CT of chest shows left lung mass with nodularity and enlarged pathologic mediastinal, hilar, and supraclavicular lymph nodes. CT  neck also shows numerous enlarged lymph nodes. Patient did present to [facility] with SOB, along with difficulty swallowing. Imaging performed during evaluation, noted to have 7 cm lung mass. Biopsy performed, consistent with recurrent endometrial ca.    Review of Systems:  Denies vaginal bleeding  Denies abdominal pain  As per HPI     OBJECTIVE:  Temp:  [97.5 °F (36.4 °C)-99.8 °F (37.7 °C)] 98.4 °F (36.9 °C)  Pulse:  [] 110  Resp:  [16-28] 26  BP: ()/(65-94) 128/92  SpO2:  [85 %-100 %] 94 %     Intake/Output Summary (Last 24 hours) at 5/2/2025 1334  Last data filed at 5/2/2025 0623      Gross per 24 hour   Intake 100 ml   Output 350 ml   Net -250 ml         Physical Exam:  General appearance: alert, appears uncomfortable  Head: Normocephalic, without obvious abnormality, atraumatic. Cool washcloth on head  Eyes: conjunctivae/corneas clear. PERRL, EOM's intact.  Lungs: tachypneic, mid90s on 6L NC  Heart: tachycardic, 180  Neurologic: Grossly normal  Remainder of exam deferred as preparing to transfer     Diagnostics:  XR CHEST AP PORTABLE  (CPT=71045)  Result Date: 5/2/2025  PROCEDURE:   XR CHEST AP PORTABLE  (CPT=71045) TIME:              622 hours.   COMPARISON:             [facility], XR CHEST AP PORTABLE (CPT=71045), 4/29/2025, 10:40 PM.  [facility], X CHEST PORTABLE, 8/13/2014, 8:51 PM.  INDICATIONS:   Shortness of breath.  TECHNIQUE:             Single view.   FINDINGS:              CARDIAC/MEDIAST:    Heart and mediastinum are unchanged. LUNGS/PLEURA:                     Opacity in the left perihilar mid lung not significantly changed.  Low lung volumes.  There is mild opacity in the right lung base.  No significant pleural effusion or pneumothorax. OTHER:                 Stable appearance of the osseous structures.           CONCLUSION:   Stable opacity left perihilar mid lung.  Mild opacity right lung base which may reflect atelectasis with or without superimposed  pneumonia.    Dictated by (CST): Casey Diallo MD on 2025 at 7:18 AM     Finalized by (CST): Casey Diallo MD on 2025 at 7:19 AM           [facility] (CPT=93308/13296/12476)  Result Date: 2025  Transthoracic Echocardiogram Name:Kelli Fisher Date: 2025 :  1955 Ht:  (60in)  BP: 106 / 74 MRN:  [id number]    Age:  70years    Wt:  (165lb) HR: 108bpm Loc:  [facility]       Gndr: F          BSA: 1.72m^2 Sonographer: James PINTO RVT Ordering:    Javi Huerta Consulting:  Ector Byrnes ---------------------------------------------------------------------------- History/Indications:  Pericardial effusion. ---------------------------------------------------------------------------- Procedure information:  A transthoracic echocardiogram, limited study was performed. Additional evaluation included M-mode and limited 2D.  Patient status:  Inpatient.  Location:  Bedside.    Comparison was made to the study of 2025.    This was a routine study. Transthoracic echocardiography for ventricular function evaluation and assessment of pericardial effusion and hemodynamic status. Image quality was adequate. ECG rhythm:   Sinus tachycardia ---------------------------------------------------------------------------- Conclusions: 1. Left ventricle: The cavity size was normal. Wall thickness was mildly    increased. Systolic function was normal. The estimated ejection fraction    was 60-65%, by visual assessment. No diagnostic evidence for regional    wall motion abnormalities. Unable to assess LV diastolic function. 2. Pericardium, extracardiac: A small to moderate, free-flowing pericardial    effusion was identified circumferential to the heart. Maximal diameter in    diastole is 1.1cm. Features were not consistent with tamponade    physiology. 3. Inferior vena cava: The IVC was dilated. Respirophasic diameter changes    are blunted (< 50%), consistent with elevated central venous  pressure. * ---------------------------------------------------------------------------- * Findings: Left ventricle:  The cavity size was normal. Wall thickness was mildly increased. Systolic function was normal. The estimated ejection fraction was 60-65%, by visual assessment. No diagnostic evidence for regional wall motion abnormalities. Unable to assess LV diastolic function. Right ventricle:  The cavity size was normal. Mitral valve:  The valve was structurally normal. Aortic valve:  The valve was structurally normal. Tricuspid valve:  The valve is structurally normal. Pulmonic valve:   Not well visualized. Pericardium:  A small to moderate, free-flowing pericardial effusion was identified circumferential to the heart. Maximal diameter in diastole is 1.1cm.  Doppler:  Features were not consistent with tamponade physiology. Systemic veins: Inferior vena cava: The IVC was dilated.  Respirophasic diameter changes are blunted (< 50%), consistent with elevated central venous pressure. ---------------------------------------------------------------------------- Measurements  Left ventricle              Value    Ref      04/12/2025  IVS thickness, ED, PLAX (H) 1.0   cm 0.6 -    0.8                                       0.9  LV ID, ED, PLAX             3.9   cm 3.8 -    4.4                                       5.2  LV ID, ES, PLAX             2.5   cm 2.2 -    2.8                                       3.5  LV PW thickness, ED,    (H) 1.0   cm 0.6 -    0.7  PLAX                                 0.9  IVS/LV PW ratio, ED,        1.00     -------- 1.14  PLAX  LV PW/LV ID ratio, ED,      0.26     -------- 0.16  PLAX  LV ejection fraction        66    %  54 - 74  66  Inferior vena cava          Value    Ref      04/12/2025  ID                      (H) 2.3   cm <=2.1    2.7 Legend: (L)  and  (H)  corinna values outside specified reference range. ---------------------------------------------------------------------------- Prepared  and electronically signed by Javi Huerta 04/30/2025 13:43      CT CHEST PE AORTA (IV ONLY) (CPT=71260)  Result Date: 4/30/2025  PROCEDURE:   CT CHEST PE AORTA (IV ONLY) (CPT=71260)  COMPARISON:             [facility], CT CHEST PE AORTA (IV ONLY) (CPT=71260), 4/11/2025, 9:30 PM.  INDICATIONS:             dyspnea, lung cancer  TECHNIQUE:         CT images of the chest were obtained with non-ionic intravenous contrast material.  Automated exposure control for dose reduction was used. Adjustment of the mA and/or kV was done based on the patient's size. Use of iterative reconstruction technique for dose reduction was used. Dose information is transmitted to the ACR (American College of Radiology) NRDR (National Radiology Data Registry) which includes the Dose Index Registry.  FINDINGS:   No PE  Normal heart size with stable small to moderate circumferential pericardial effusion  No short interval change in left upper lobe mass, diffuse intrathoracic adenopathy  Trace left pleural effusion similar to prior.  Trace right effusion has resolved   A few of the right-sided pulmonary nodules may have mildly increased in size.  Adenopathy causes severe narrowing of the lower right IJ with collaterals in the chest wall    CONCLUSION:     No PE.   [facility] provided a prelim report for this exam. This final report has no significant discrepancies with the [facility] report. Finalized by (CST): Chris Lovell MD on 4/30/2025 at 9:39 AM           XR CHEST AP PORTABLE  (CPT=71045)  Result Date: 4/30/2025  PROCEDURE:   XR CHEST AP PORTABLE  (CPT=71045)  COMPARISON:         [facility], CT CHEST PE AORTA (IV ONLY) (CPT=71260), 4/29/2025, 11:50 PM.  INDICATIONS:           Chest discomfort x1 day.  Findings:  Left lung mass with significant diffuse intrathoracic adenopathy seen on recent CT  Heart size likely normal for technique  No pneumothorax or large effusion  [facility] provided a prelim  report for this exam. This final report has no significant discrepancies with the [facility] report. Finalized by (CST): Chris Lovell MD on 4/30/2025 at 6:45 AM           CT ABDOMEN+PELVIS(CONTRAST ONLY)(CPT=74177)  Result Date: 4/16/2025  PROCEDURE:   CT ABDOMEN + PELVIS (CONTRAST ONLY) (CPT=74177)  COMPARISON:      [facility], CT SOFT TISSUE OF NECK (CONTRAST ONLY) (CPT=70491), 4/11/2025, 9:30 PM.  [facility], CT CHEST PE AORTA (IV ONLY) (CPT=71260), 4/11/2025, 9:30 PM.  US PELVIS TRANSABDOMINAL AND TRANSVAGINAL (DXT=05262/64862), 12/11/2023, 3:54 PM.  INDICATIONS:  recurrent endometrial cancer, assess disease status  TECHNIQUE:            Multidetector CT images of the abdomen and pelvis were obtained with non-ionic intravenous contrast material. Automated exposure control for dose reduction was used. Adjustment of the mA and/or kV was done based on the patient's size. Iterative reconstruction technique for dose reduction was employed.  FINDINGS: LUNG BASES:       The heart is normal in size. The small circumferential pericardial effusion.  Trace dependent right greater than pleural effusions.  Distributed nodules.  These include a reference 0.8 cm nodule in the right lower lobe (series 6, image 4). LIVER: Small indeterminate 1 cm low-attenuation lesion at the periphery of the right hepatic lobe (series 2, image 16). BILIARY: The gallbladder is present. PANCREAS:        No lesion, fluid collection, ductal dilatation, or atrophy.  SPLEEN:           No enlargement.  ADRENALS:    No defined mass or abnormal enlargement.  KIDNEYS:         Symmetric enhancement is seen without evidence of hydronephrosis or underlying solid masses.  Small 0.7 cm indeterminate attenuation left lower pole renal cortical lesion (series 7, image 53). GI/MESENTERY:            There is no evidence of bowel obstruction.  Mild gastric distention noted; enteric contrast propagates to the mid-distal small bowel.   Please note that some segments of the colon are incompletely distended and suboptimally evaluated, mild scattered colonic diverticulosis with mild-to-moderate colonic fecal burden.  Normal appendix. URINARY BLADDER:     Incompletely distended and suboptimally evaluated. PELVIC NODES: No lymphadenopathy.   PELVIC ORGANS:         Hysterectomy and bilateral oophorectomy. VASCULATURE:         No aneurysm is detected.  Retroaortic left renal vein noted; mild atherosclerosis. RETROPERITONEUM:  No mass or lymphadenopathy is apparent.  BONES:             Demineralization.  Osteitis pubis.  Transitional lumbosacral anatomy with sacralization of L5 on the left.  Mild levoscoliosis of the lumbar spine with multilevel lumbar spine degenerative changes. ABDOMINAL WALL:            Small fat containing umbilical hernia.  Mild anasarca. OTHER:  No free air or fluid is seen in the abdomen or pelvis.           CONCLUSION:  1. History of recurrent endometrial carcinoma on recent supraclavicular lymph node biopsy.  Partially imaged randomly distributed right greater than left basilar lung nodules, which are concerning for hematogenous pulmonary metastases.  These are better assessed on recent prior chest CT. 2. Small 1 cm low-attenuation right hepatic lobe lesion is indeterminate, but a small hepatic metastasis cannot be excluded.  Consider further evaluation with either PET-CT or a liver protocol MRI of the abdomen without and with contrast.  Correlation with any available outside institution imaging would also be helpful to assess stability. 3. Small 0.7 cm indeterminate attenuation left lower pole renal cortical lesion.  Differential considerations include a small solid renal mass versus complex cyst.  Given small size, this should be reassessed on anticipated follow-up exam. 4. No definite additional metastatic disease within the abdomen. 5. Hysterectomy and bilateral salpingo oophorectomy. 6. Small circumferential pericardial  effusion.  There are also trace right greater than left pleural effusions.  Malignant pleural and pericardial fluid cannot be excluded. 7. Mild scattered colonic diverticulosis. 8. Small retrocardiac hiatal hernia. 9. Circumferential urinary bladder wall thickening, which may relate to incomplete distention or cystitis.  If there are referable symptoms, urinalysis correlation is requested. 10. Anatomic variant retroaortic left renal vein. 11. Mild anasarca.   A preliminary report was issued by the [facility] teleradiology service. There are no major discrepancies.  elm-remote  Dictated by (CST): Lemuel Gallegos MD on 4/16/2025 at 10:56 AM     Finalized by (CST): Lemuel Gallegos MD on 4/16/2025 at 11:06 AM                Data Review:       Recent Labs   Lab 05/02/25  0605   RBC 3.01*   HGB 7.5*   HCT 24.6*   MCV 81.7   MCH 24.9*   MCHC 30.5*   RDW 21.0*   NEPRELIM 12.93*   WBC 15.6*   .0                 Recent Labs   Lab 04/29/25  2158 04/30/25  0909 05/01/25  0719 05/01/25  2336 05/02/25  0605   *   < > 130* 152* 153*   BUN 11   < > 10 9 10   CREATSERUM 0.93   < > 0.73 0.66 0.71   CA 8.8   < > 8.5* 8.6* 8.5*   ALB 3.4  --   --   --   --    *   < > 135* 136 135*   K 3.9   < > 4.0 4.3 4.2   CL 97*   < > 101 103 102   CO2 27.0   < > 24.0 23.0 23.0   ALKPHO 250*  --   --   --   --    AST 17  --   --   --   --    ALT 7*  --   --   --   --    BILT 0.6  --   --   --   --    TP 7.1  --   --   --   --     < > = values in this interval not displayed.               Lab Results   Component Value Date      210.0 (H) 04/16/2025     CEA <0.5 04/16/2025      156.3 (H) 04/16/2025      Left supraclavicular lymph node biopsy 4/14/25:  Left supraclavicular lymph node; biopsy:  ?    Metastatic adenocarcinoma, consistent with the patient's known serous endometrial carcinoma.  ?    See comment.     Comment:  The patient has a history of clear-cell and serous endometrial carcinoma status post neoadjuvant  EBRT and brachytherapy with radiation oncology and a left upper lobe lung mass, as well as extensive lymphadenopathy.      The left supraclavicular lymph node biopsy shows highly atypical cells with large, hyperchromatic, pleomorphic nuclei, prominent nucleoli, and vacuolated cytoplasm.  Touch preps performed on the biopsy show similar atypical cells.      Immunohistochemical stains performed on the biopsy demonstrate that the tumor cells are positive for pankeratin (AE1/AE3), CK7, PAX8, and p16, and negative for  CK20, TTF1, p40, ER, and p53.     The morphological and immunohistochemical findings support the diagnosis of metastatic adenocarcinoma, consistent with the patient's know serous endometrial carcinoma.     ASSESSMENT:  Patient is a 70 year old female with recurrent metastatic endometrial cancer admitted with symptomatic SOB with know large pulmonary metastasis     PLAN:  #SOB: 2/2 large pulmonary metastatic disease. CTPE negative for PE. No significant pleural effusions. Appreciate Pulmonary recs on work up and symptom management    #SVT: RRT this am with HR 200s. RR note reviewed. Again in SVT, actively being transferred to Tele. Appreciate Cardiology recs     #Recurrent endometrial cancer: Metastatic. Chemotherapy recommended and will be initiated as soon as patient clinically stabilizes. Plan is for Carbo/Taxol/Keytruda with Dr. Herring. Discussed with patient and family that clinical stabilization take priority at this time.     #Anemia: No reported bleeding. Trend Hb. If persistently <8, would recommend transfusion to Hb>8 in anticipation of chemotherapy. Monitor for bleeding    #Leukocytosis: No fevers. Given disease distribution, possible pseudoobstructive pneumonia. Monitor for s/sx of infection. May be related to tumor burden. On Abx.      Gyn Onc will continue to follow her clinical course. Patient's goals at this time are for further cancer therapy.     All of the findings and plan were  discussed with the patient and her 2 children.  She notes understanding and agrees with the plan of care.  All questions were answered to the best of my ability at this time.     Thu Prasad MD  5/2/2025  1:34 PM                   EDICATIONS ADMINISTERED IN LAST 1 DAY:  acetaminophen (Ofirmev) 10 mg/mL infusion premix 1,000 mg       Date Action Dose Route User    5/1/2025 1919 New Bag 1,000 mg Intravenous Angella Zarco RN          ceFEPIme (Maxipime) 1 g in sodium chloride 0.9% 100mL IVPB-YANA       Date Action Dose Route User    5/2/2025 0623 New Bag 1 g Intravenous Goldberg, Rachel, RN    5/1/2025 1751 New Bag 1 g Intravenous Angella Zarco RN          dilTIAZem (cardIZEM) tab 30 mg       Date Action Dose Route User    5/1/2025 2301 Given 30 mg Oral Goldberg, Rachel, RN          ipratropium-albuterol (Duoneb) 0.5-2.5 (3) MG/3ML inhalation solution 3 mL       Date Action Dose Route User    5/2/2025 0443 Given 3 mL Nebulization NatashaDirk kwokeda, RCP    5/1/2025 2142 Given 3 mL Nebulization Lauren Rojas, RCP    5/1/2025 1613 Given 3 mL Nebulization AmbattuBrittani S, RCP          Levalbuterol HCl (XOPENEX) nebulizer solution 1.25 mg       Date Action Dose Route User    5/2/2025 1232 Given 1.25 mg Nebulization Suggs, Kathy R, RCP    5/2/2025 0726 Given 1.25 mg Nebulization Suggs, Kathy R, RCP          magnesium sulfate in sterile water for injection 2 g/50mL IVPB premix 2 g       Date Action Dose Route User    5/2/2025 0515 New Bag 2 g Intravenous Goldberg, Rachel, RN          metoprolol (Lopressor) 5 mg/5mL injection       Date Action Dose Route User    5/1/2025 2334 Given 5 mg (none) Goldberg, Rachel, RN          metoprolol (Lopressor) 5 mg/5mL injection 5 mg       Date Action Dose Route User    5/2/2025 0515 Given 5 mg Intravenous Goldberg, Sandra, RN          morphINE PF 2 MG/ML injection 2 mg       Date Action Dose Route User    5/1/2025 2214 Given 2 mg Intravenous Goldberg, Rachel, RN           sodium chloride 0.9% infusion       Date Action Dose Route User    5/1/2025 1702 Rate/Dose Change (none) Intravenous Angella Zarco RN    5/1/2025 1656 New Bag (none) Intravenous Angella Zarco RN            Vitals (last day)       Date/Time Temp Pulse Resp BP SpO2 Weight O2 Device O2 Flow Rate (L/min) [facility]    05/02/25 1303 -- 110 -- 128/92 94 % -- Nasal cannula 6 L/min     05/02/25 1254 -- -- -- -- 92 % -- Nasal cannula 4 L/min     05/02/25 1253 -- 131 -- 95/65 85 % -- Nasal cannula 4 L/min     05/02/25 1205 -- -- -- -- 98 % -- Nasal cannula 4 L/min     05/02/25 1200 -- -- -- -- 88 % -- Nasal cannula 3 L/min     05/02/25 0939 98.4 °F (36.9 °C) -- -- 117/69 99 % -- Nasal cannula 4 L/min LM    05/02/25 0741 -- -- -- -- 98 % -- Nasal cannula 4 L/min KN    05/02/25 0518 -- 90 26 127/90 95 % -- Nasal cannula 4 L/min     05/02/25 0515 -- 95 -- -- -- -- -- -- KD    05/02/25 0512 97.8 °F (36.6 °C) -- 28 135/85 93 % -- Nasal cannula 3 L/min RG    05/02/25 0512 -- 221 -- -- -- -- -- -- KD    05/02/25 0507 -- 206 -- -- -- -- -- --     05/02/25 0412 97.5 °F (36.4 °C) 86 22 127/81 100 % -- Nasal cannula 3 L/min KJ    05/01/25 2324 -- 81 -- -- -- -- -- -- KD    05/01/25 2323 97.9 °F (36.6 °C) -- 24 122/82 97 % -- Nasal cannula 3 L/min     05/01/25 2323 -- 221 -- -- -- -- -- -- KD    05/01/25 2320 -- 39 -- -- -- -- -- -- KD    05/01/25 2216 98.5 °F (36.9 °C) 119 18 137/82 95 % -- Nasal cannula 3 L/min RG    05/01/25 2007 99.1 °F (37.3 °C) 126 16 119/80 97 % -- Nasal cannula 3 L/min BR    05/01/25 2000 -- 125 -- -- -- -- -- -- KD    05/01/25 1845 99.8 °F (37.7 °C) -- -- -- -- -- -- -- KW    05/01/25 1653 -- 124 20 126/85 94 % -- Nasal cannula 3 L/min KW    05/01/25 1621 -- -- 20 -- 95 % -- -- 3 L/min BA    05/01/25 1519 -- 117 -- 120/94 94 % -- Nasal cannula 3 L/min SE    05/01/25 1321 98 °F (36.7 °C) -- 20 129/92 96 % -- Nasal cannula 3 L/min RH    05/01/25 0937 98 °F (36.7 °C) 103 20 131/86 97 %  -- Nasal cannula 3 L/min     05/01/25 0633 -- 106 -- -- 92 % -- Nasal cannula 3 L/min KS    05/01/25 0535 -- -- -- -- -- -- -- 2 L/min KS    05/01/25 0448 99.1 °F (37.3 °C) -- 20 109/75 100 % -- Nasal cannula 3 L/min KG         Blood Transfusion Record       Product Unit Status Volume Start End            Transfuse RBC       25  [id number]  [id number] Completed 04/30/25 0253 350 mL 04/30/25 0021 04/30/25 0245

## 2025-05-02 NOTE — PROGRESS NOTES
Progress Note  Kleli Fisher Patient Status:  Inpatient    1955 MRN [id number]   Location [facility] 4W/SW/SE Attending Joel Dejesus MD   Hosp Day # 2 PCP Taylor Gallardo MD     Subjective:  Pt reported palpitations, heart racing, SOB overnight. Still with difficulty swallowing as well, pending possible video swallow exam    Objective:  /69 (BP Location: Left arm)   Pulse 90   Temp 98.4 °F (36.9 °C) (Oral)   Resp 26   Ht 5' (1.524 m)   Wt 163 lb (73.9 kg)   SpO2 99%   BMI 31.83 kg/m²     Telemetry: NSR; multiple episodes PSVT overnight 150's-200; transient episode of junctional bradycardia/CHB (29 sec duration) s/p IV BB, po CCB     Intake/Output:    Intake/Output Summary (Last 24 hours) at 2025 1023  Last data filed at 2025 0623  Gross per 24 hour   Intake 100 ml   Output 350 ml   Net -250 ml       Last 3 Weights   25 1800 163 lb (73.9 kg)   25 2038 165 lb (74.8 kg)   25 1202 169 lb (76.7 kg)   25 0142 162 lb 4.1 oz (73.6 kg)   25 0134 162 lb 4.1 oz (73.6 kg)   25 1846 172 lb (78 kg)   23 1116 197 lb (89.4 kg)       Labs:  Recent Labs   Lab 25  0719 25  2336 25  0605   * 152* 153*   BUN 10 9 10   CREATSERUM 0.73 0.66 0.71   EGFRCR 89 95 92   CA 8.5* 8.6* 8.5*   * 136 135*   K 4.0 4.3 4.2    103 102   CO2 24.0 23.0 23.0     Recent Labs   Lab 25  0909 25  0719 25  0605   RBC 2.78* 2.98* 3.01*   HGB 7.0* 7.3* 7.5*   HCT 22.1* 24.5* 24.6*   MCV 79.5* 82.2 81.7   MCH 25.2* 24.5* 24.9*   MCHC 31.7 29.8* 30.5*   RDW 20.7* 21.2* 21.0*   NEPRELIM 8.86* 10.03* 12.93*   WBC 11.5* 12.1* 15.6*   .0 328.0 311.0         Recent Labs   Lab 25  2158   TROPHS 3       Diagnostics:  XR CHEST AP PORTABLE  (CPT=71045)  Result Date: 2025  CONCLUSION:   Stable opacity left perihilar mid lung.  Mild opacity right lung base which may reflect atelectasis with or without superimposed  pneumonia.    Dictated by (CST): Casey Diallo MD on 5/02/2025 at 7:18 AM     Finalized by (CST): Casey Diallo MD on 5/02/2025 at 7:19 AM           Review of Systems   Cardiovascular:  Positive for dyspnea on exertion, irregular heartbeat and palpitations. Negative for chest pain.   Respiratory:  Positive for shortness of breath.        Physical Exam:    Gen: alert, oriented x 3, NAD  Heent: pupils equal, reactive. Mucous membranes moist.   Neck: + jvd  Cardiac: regular rate and rhythm, normal S1,S2, no murmur, clicks, rub or gallop  Lungs: CTA  Abd: soft, NT/ND +bs  Ext: no edema  Skin: Warm, dry  Neuro: No focal deficits      Medications:    Scheduled Medications[1]  Medication Infusions[2]      Assessment:  Acute Hypoxic Respiratory Insufficiency (Possible PNA, Known Lung Mets)  IV antibx for possible PNA  Nebs - switched from duoneb to xopenex  Pulm following  Paroxysmal SVT, Transient Bradycardia/CHB  Overnight w/RRT x2 - multiple episodes of narrow complex tachycardia w/rates 150's -200's- likely exacerbated by neb treatment  S/P diltiazem, IV metoprolol - then w/transient period of bradycardia/CHB rates 30's  Now maintaining NSR this am   Known Pericardial Effusion  Limited Echo w/LVEF 60-65%, small-moderate pericardial effusion w/o evidence of tamponade physiology  Echo 4/12 w/small pericardial effusion  Metastatic Endometrial Cancer  Heme/Onc following  Acute on Chronic Anemia  Hgb 6.8 on 4/29 s/p PRBC; Hgb stable 7's  PPI  Hypothyroidism - levothyroxine    Plan:  Episodes of PSVT may be exacerbated by duonebs - now switched to xopenex. Maintaining NSR this am thus far.  Check TSH level  EP to see today for further recommendations    ADDENDUM: 1356  Notified of additional RRT for 's. Pt spontaneously converted to 's, BP stable. Discussed with Dr Carreno who will see patient.     Plan of care discussed with patient, RN.    Emily George, APRN  5/2/2025  10:23 AM             [1]    Levalbuterol  HCl  1.25 mg Nebulization 4 times per day    docusate sodium  100 mg Oral BID    mylanta-dicyclomine-lidocaine 2% (G.I. Cocktail) oral liquid   Oral Once    metoprolol  5 mg Intravenous Once    cefepime  1 g Intravenous Q12H    levothyroxine  100 mcg Oral Before breakfast    fluticasone furoate  1 puff Inhalation Daily   [2]    sodium chloride 83 mL/hr at 05/01/25 1702

## 2025-05-02 NOTE — DISCHARGE INSTRUCTIONS
Non-Emergency Medical  transportation resources:  [place]- [phone]  [facility]-[phone]  [facility] [phone]     **When you book, You will need to discuss needing assist from them to get up/down 3 flights of stairs    HME for home O2  [facility]  [address] [place] [address]  Phone: [phone]  Fax: [phone]

## 2025-05-02 NOTE — RESPIRATORY THERAPY NOTE
RESPIRATORY THERAPY RAPID RESPONSE NOTE    RRT called for respiratory distress? yes-SVT /   Breathing pattern: Labored  Patient currently being seen by Respiratory Care? yes    RT interventions necessary? yes   Oxygen applied/increased? yes- nasal cannula 6 lpm- O2 sat 95%  Nebulizer performed? no-  done at 1232.    1345: RRT called for SVT, RT intervention not needed.

## 2025-05-02 NOTE — PROGRESS NOTES
[facility]  part of [facility] Progress Note     Kelli Fisher Patient Status:  Inpatient    1955 MRN [id number]   Location [facility] 4W/SW/SE Attending Joel Dejesus MD   Hosp Day # 2 PCP Taylor Gallardo MD     Chief Complaint:   Chief Complaint   Patient presents with    Chest Pain Angina        Subjective:     Patient seen sitting in bed.  Family at bedside.  Patient reports continued subjective short of breath.  Denies signs of active bleeding.    Overnight, patient with several episodes of severe tachycardia.  Treated as SVT.    Objective:      Vital signs:  Vitals:    25 0515 25 0518 25 0741 25 0939   BP:  127/90  117/69   BP Location:  Right arm  Left arm   Pulse: 95 90     Resp:  26     Temp:    98.4 °F (36.9 °C)   TempSrc:    Oral   SpO2:  95% 98% 99%   Weight:       Height:           Intake/Output Summary (Last 24 hours) at 2025 1145  Last data filed at 2025 0623  Gross per 24 hour   Intake 100 ml   Output 350 ml   Net -250 ml           Physical Exam:    GENERAL:  Awake and alert, in no acute distress.  HEART:  Regular rhythm, tachycardia  LUNGS:  Air entry was decreased.  Mild increased work of breathing.  No wheezes   ABDOMEN: Soft and non-tender.    PSYCHIATRIC: Anxious mood    Diagnostic Data:    Labs:    Recent Labs   Lab 25  0909 25  0719 25  0605   WBC 13.5* 11.5* 12.1* 15.6*   HGB 6.8* 7.0* 7.3* 7.5*   MCV 77.2* 79.5* 82.2 81.7   .0 361.0 328.0 311.0   INR 1.33*  --   --   --        Recent Labs   Lab 25  0909 25  0719 25  2336 25  0605   *   < > 130* 152* 153*   BUN 11   < > 10 9 10   CREATSERUM 0.93   < > 0.73 0.66 0.71   CA 8.8   < > 8.5* 8.6* 8.5*   ALB 3.4  --   --   --   --    *   < > 135* 136 135*   K 3.9   < > 4.0 4.3 4.2   CL 97*   < > 101 103 102   CO2 27.0   < > 24.0 23.0 23.0   ALKPHO 250*  --   --   --   --     AST 17  --   --   --   --    ALT 7*  --   --   --   --    BILT 0.6  --   --   --   --    TP 7.1  --   --   --   --     < > = values in this interval not displayed.           Estimated Creatinine Clearance: 53.7 mL/min (based on SCr of 0.71 mg/dL).    Recent Labs   Lab 04/29/25  2158   PTP 17.2*   INR 1.33*            COVID-19  No results found for: \"COVID19\"    Pro-Calcitonin  Recent Labs   Lab 04/30/25  1146   PCT 0.33*       Cardiac  Recent Labs   Lab 04/29/25  2200   PBNP 239*       Inflammatory Markers  No results for input(s): \"CRP\", \"ORVILLE\", \"LDH\", \"DDIMER\" in the last 168 hours.    Culture:  Hospital Encounter on 04/29/25   1. Blood Culture     Status: None (Preliminary result)    Collection Time: 04/30/25  9:09 AM    Specimen: Blood,peripheral   Result Value Ref Range    Blood Culture Result No Growth 2 Days N/A       XR CHEST AP PORTABLE  (CPT=71045)  Result Date: 5/2/2025  CONCLUSION:   Stable opacity left perihilar mid lung.  Mild opacity right lung base which may reflect atelectasis with or without superimposed pneumonia.    Dictated by (CST): Casey Diallo MD on 5/02/2025 at 7:18 AM     Finalized by (CST): Casey Diallo MD on 5/02/2025 at 7:19 AM            EKG 12 Lead  Result Date: 5/2/2025  Normal sinus rhythm Normal ECGg When compared with ECG of 01-MAY-2025 23:31, No significant change was found Confirmed by TRINI CARBAJAL ELMER ([id number]) on 5/2/2025 9:50:37 AM    EKG 12 Lead  Result Date: 5/2/2025  Sinus tachycardia Otherwise normal ECG When compared with ECG of 29-APR-2025 20:52, Premature atrial complexes are no longer Present Confirmed by TRINI CARBAJAL ELMER ([id number]) on 5/2/2025 9:50:24 AM      Medications: Scheduled Medications[1]    Assessment & Plan:        Acute hypoxic respiratory failure:     - Patient presented with progressive shortness of breath     - Likely secondary to recently diagnosed metastasized endometrial carcinoma     - Left upper lobe lung mass identified during recent  admission (April 11-17, 2025)     - 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     - Initially started on empiric antibiotic therapy for possible pneumonia.      - Pulmonology consultation after further discussion, less concern for pneumonia.    -Patient completed short course of antibiotics.    - Oncology team consulted.  Planning for initiation of chemotherapy Monday.    -Appreciate further mentations      Acute and chronic anemia:     - Acute exacerbation of chronic anemia     - Hemoglobin 6.8 on admission.  Status post 1 unit PRBC.     - Likely multifactorial, potentially related to underlying malignancy and recent treatments  -Hemoglobin 7.5 today.      - Monitor hemoglobin levels      - Continue PPI        Pericardial effusion:     - Known small pericardial effusion diagnosed during recent admission     - Bedside ultrasound by ER physician showed no evidence of tamponade     - Cardiology consultation for further evaluation and management     - Monitor for signs of hemodynamic compromise     Leukocytosis:     - Elevated white blood cell count noted on complete blood count     - Potentially indicating an inflammatory or infectious process     - Monitor white blood cell count closely     - Correlate with clinical presentation and other diagnostic studies     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  -IR available for port placement.        Plan of care discussed with patient and family at bedside . Discussed management/test result(s) with  Rn, IR and cardiology consultant    Addendum: Multiple RRT's called for elevated heart rates.  Found to have rates into the 200s.  Patient with increased shortness of breath and nausea at time of episodes.  Multiple attempts to control with vagal maneuvers, IV beta-blocker and oral calcium channel blockers.  On most recent RRT, patient did have prolonged episode of elevated rates.  IV diltiazem ordered at that time which improved rate and rhythm.  Transferring patient to telemetry for closer monitoring and care.    Addendum: Patient evaluated by EP.  Expressed concerns for paroxysmal atrial tachycardia.  Recommended stopping metoprolol.  Patient started on amiodarone.  EP also recommended transfusing for goal hemoglobin greater than 8.  1 unit PRBC ordered.    Quality:  DVT Prophylaxis: SCDs  CODE status: Full  Estimated date of discharge: TBD  Discharge is dependent on: clinical stability    140 minutes spent discussing with other providers, examining patient, obtaining history, reviewing medical records, interpreting and communicating test results/imaging, ordering tests/medications, discussing plan of care and documenting information.      Joel Dejesus MD          This note was prepared using Dragon Medical voice recognition dictation software. As a result errors may occur. When identified these errors have been corrected. While every attempt is made to correct errors during dictation discrepancies may still exist         [1]    Levalbuterol HCl  1.25 mg Nebulization 4 times per day    docusate sodium  100 mg Oral BID    mylanta-dicyclomine-lidocaine 2% (G.I. Cocktail) oral liquid   Oral Once    cefepime  1 g Intravenous Q12H    levothyroxine  100 mcg Oral Before breakfast    fluticasone furoate  1 puff Inhalation Daily

## 2025-05-02 NOTE — CONSULTS
[facility]  part of [facility]    Non-Surgical Consult    Kelli Fisher Patient Status:  Inpatient    1955 MRN [id number]   Location [facility] 4W/SW/SE Attending Joel Dejesus MD   Hosp Day # 2 PCP Taylor Gallardo MD       Date of Admission:  2025    SUBJECTIVE:    Reason for Admission:  SOB    History of Present Illness:  Patient is a 70 year old female. 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     She is admitted for SOB. Plan was for initiation of chemotherapy outpatient in the near future and she completed chemotherapy teaching this week.     Last night and again this afternoon in SVT. Actively being transferred to tele. Patient feeling SOB and hot.    Gyn Onc History;  She did complete neoadjuvant XRT and brachytherapy in 2024. She is s/p robotic assisted total hysterectomy, bilateral salpingo-oophorectomy, cystoscopy on 24. Pathology was consistent with grade 3 endometrial carcinoma. She was following with gyn/on Dr. Lina Arias and completed 4 cycles of carboplatin in 2024. Per records, patient declined paclitaxel and pembrolizumab. 2025 CT AP notes no evidence of recurrent, residual or metastatic disease. More recently, she was diagnosed with pneumonia at [facility] on 3/13/25. She presented to [place] ED on 3/25/25 due to neck pain and dyspnea. CT of chest shows left lung mass with nodularity and enlarged pathologic mediastinal, hilar, and supraclavicular lymph nodes. CT neck also shows numerous enlarged lymph nodes. Patient did present to [place] with SOB, along with difficulty swallowing. Imaging performed during evaluation, noted to have 7 cm lung mass. Biopsy performed, consistent with recurrent endometrial ca.     Medications:  Prescriptions Prior to Admission[1]    Allergies:  Allergies[2]     Past Medical History:  Past Medical History[3]    Past Surgical History:  Past Surgical  History[4]    Social History:  Social History     Tobacco Use    Smoking status: Never    Smokeless tobacco: Never   Substance Use Topics    Alcohol use: Never        Review of Systems:  Denies vaginal bleeding  Denies abdominal pain  As per HPI    OBJECTIVE:  Temp:  [97.5 °F (36.4 °C)-99.8 °F (37.7 °C)] 98.4 °F (36.9 °C)  Pulse:  [] 110  Resp:  [16-28] 26  BP: ()/(65-94) 128/92  SpO2:  [85 %-100 %] 94 %    Intake/Output Summary (Last 24 hours) at 5/2/2025 1334  Last data filed at 5/2/2025 0623  Gross per 24 hour   Intake 100 ml   Output 350 ml   Net -250 ml       Physical Exam:  General appearance: alert, appears uncomfortable  Head: Normocephalic, without obvious abnormality, atraumatic. Cool washcloth on head  Eyes: conjunctivae/corneas clear. PERRL, EOM's intact.  Lungs: tachypneic, mid90s on 6L NC  Heart: tachycardic, 180  Neurologic: Grossly normal  Remainder of exam deferred as preparing to transfer    Diagnostics:  XR CHEST AP PORTABLE  (CPT=71045)  Result Date: 5/2/2025  PROCEDURE: XR CHEST AP PORTABLE  (CPT=71045) TIME: 622 hours.   COMPARISON: [facility], XR CHEST AP PORTABLE (CPT=71045), 4/29/2025, 10:40 PM.  [facility], X CHEST PORTABLE, 8/13/2014, 8:51 PM.  INDICATIONS: Shortness of breath.  TECHNIQUE:   Single view.   FINDINGS:  CARDIAC/MEDIAST: Heart and mediastinum are unchanged. LUNGS/PLEURA:  Opacity in the left perihilar mid lung not significantly changed.  Low lung volumes.  There is mild opacity in the right lung base.  No significant pleural effusion or pneumothorax. OTHER:  Stable appearance of the osseous structures.          CONCLUSION:   Stable opacity left perihilar mid lung.  Mild opacity right lung base which may reflect atelectasis with or without superimposed pneumonia.    Dictated by (CST): Casey Diallo MD on 5/02/2025 at 7:18 AM     Finalized by (CST): Casey Diallo MD on 5/02/2025 at 7:19 AM          CARD ECHO LIMITED  (IIK=[id number])  Result Date: 2025  Transthoracic Echocardiogram Name:Kelli Fisher Date: 2025 :  1955 Ht:  (60in)  BP: 106 / 74 MRN:  [id number]    Age:  70years    Wt:  (165lb) HR: 108bpm Loc:  [facility]       Gndr: F          BSA: 1.72m^2 Sonographer: James PINTO RVT Ordering:    Javi Huerta Consulting:  Ector Byrnes ---------------------------------------------------------------------------- History/Indications:  Pericardial effusion. ---------------------------------------------------------------------------- Procedure information:  A transthoracic echocardiogram, limited study was performed. Additional evaluation included M-mode and limited 2D.  Patient status:  Inpatient.  Location:  Bedside.    Comparison was made to the study of 2025.    This was a routine study. Transthoracic echocardiography for ventricular function evaluation and assessment of pericardial effusion and hemodynamic status. Image quality was adequate. ECG rhythm:   Sinus tachycardia ---------------------------------------------------------------------------- Conclusions: 1. Left ventricle: The cavity size was normal. Wall thickness was mildly    increased. Systolic function was normal. The estimated ejection fraction    was 60-65%, by visual assessment. No diagnostic evidence for regional    wall motion abnormalities. Unable to assess LV diastolic function. 2. Pericardium, extracardiac: A small to moderate, free-flowing pericardial    effusion was identified circumferential to the heart. Maximal diameter in    diastole is 1.1cm. Features were not consistent with tamponade    physiology. 3. Inferior vena cava: The IVC was dilated. Respirophasic diameter changes    are blunted (< 50%), consistent with elevated central venous pressure. * ---------------------------------------------------------------------------- * Findings: Left ventricle:  The cavity size was normal. Wall thickness was mildly  increased. Systolic function was normal. The estimated ejection fraction was 60-65%, by visual assessment. No diagnostic evidence for regional wall motion abnormalities. Unable to assess LV diastolic function. Right ventricle:  The cavity size was normal. Mitral valve:  The valve was structurally normal. Aortic valve:  The valve was structurally normal. Tricuspid valve:  The valve is structurally normal. Pulmonic valve:   Not well visualized. Pericardium:  A small to moderate, free-flowing pericardial effusion was identified circumferential to the heart. Maximal diameter in diastole is 1.1cm.  Doppler:  Features were not consistent with tamponade physiology. Systemic veins: Inferior vena cava: The IVC was dilated.  Respirophasic diameter changes are blunted (< 50%), consistent with elevated central venous pressure. ---------------------------------------------------------------------------- Measurements  Left ventricle              Value    Ref      04/12/2025  IVS thickness, ED, PLAX (H) 1.0   cm 0.6 -    0.8                                       0.9  LV ID, ED, PLAX             3.9   cm 3.8 -    4.4                                       5.2  LV ID, ES, PLAX             2.5   cm 2.2 -    2.8                                       3.5  LV PW thickness, ED,    (H) 1.0   cm 0.6 -    0.7  PLAX                                 0.9  IVS/LV PW ratio, ED,        1.00     -------- 1.14  PLAX  LV PW/LV ID ratio, ED,      0.26     -------- 0.16  PLAX  LV ejection fraction        66    %  54 - 74  66  Inferior vena cava          Value    Ref      04/12/2025  ID                      (H) 2.3   cm <=2.1    2.7 Legend: (L)  and  (H)  corinna values outside specified reference range. ---------------------------------------------------------------------------- Prepared and electronically signed by Javi Huerta 04/30/2025 13:43     CT CHEST PE AORTA (IV ONLY) (CPT=71260)  Result Date: 4/30/2025  PROCEDURE: CT CHEST PE AORTA (IV ONLY)  (CPT=71260)  COMPARISON: [facility], CT CHEST PE AORTA (IV ONLY) (CPT=71260), 4/11/2025, 9:30 PM.  INDICATIONS: dyspnea, lung cancer  TECHNIQUE: CT images of the chest were obtained with non-ionic intravenous contrast material.  Automated exposure control for dose reduction was used. Adjustment of the mA and/or kV was done based on the patient's size. Use of iterative reconstruction technique for dose reduction was used. Dose information is transmitted to the ACR (American College of Radiology) NRDR (National Radiology Data Registry) which includes the Dose Index Registry.  FINDINGS:  No PE  Normal heart size with stable small to moderate circumferential pericardial effusion  No short interval change in left upper lobe mass, diffuse intrathoracic adenopathy  Trace left pleural effusion similar to prior.  Trace right effusion has resolved   A few of the right-sided pulmonary nodules may have mildly increased in size.  Adenopathy causes severe narrowing of the lower right IJ with collaterals in the chest wall    CONCLUSION: No PE.   [facility] provided a prelim report for this exam. This final report has no significant discrepancies with the [facility] report. Finalized by (CST): Chris Lovell MD on 4/30/2025 at 9:39 AM          XR CHEST AP PORTABLE  (CPT=71045)  Result Date: 4/30/2025  PROCEDURE: XR CHEST AP PORTABLE  (CPT=71045)  COMPARISON: [facility], CT CHEST PE AORTA (IV ONLY) (CPT=71260), 4/29/2025, 11:50 PM.  INDICATIONS: Chest discomfort x1 day.  Findings:  Left lung mass with significant diffuse intrathoracic adenopathy seen on recent CT  Heart size likely normal for technique  No pneumothorax or large effusion  [facility] provided a prelim report for this exam. This final report has no significant discrepancies with the [facility] report. Finalized by (CST): Chris Lovell MD on 4/30/2025 at 6:45 AM          CT ABDOMEN+PELVIS(CONTRAST ONLY)(CPT=74177)  Result Date:  4/16/2025  PROCEDURE: CT ABDOMEN + PELVIS (CONTRAST ONLY) (CPT=74177)  COMPARISON: [facility], CT SOFT TISSUE OF NECK (CONTRAST ONLY) (CPT=70491), 4/11/2025, 9:30 PM.  [facility], CT CHEST PE AORTA (IV ONLY) (CPT=71260), 4/11/2025, 9:30 PM.  US PELVIS TRANSABDOMINAL AND TRANSVAGINAL (THN=[id number]), 12/11/2023, 3:54 PM.  INDICATIONS: recurrent endometrial cancer, assess disease status  TECHNIQUE: Multidetector CT images of the abdomen and pelvis were obtained with non-ionic intravenous contrast material. Automated exposure control for dose reduction was used. Adjustment of the mA and/or kV was done based on the patient's size. Iterative reconstruction technique for dose reduction was employed.  FINDINGS: LUNG BASES: The heart is normal in size. The small circumferential pericardial effusion.  Trace dependent right greater than pleural effusions.  Distributed nodules.  These include a reference 0.8 cm nodule in the right lower lobe (series 6, image 4). LIVER: Small indeterminate 1 cm low-attenuation lesion at the periphery of the right hepatic lobe (series 2, image 16). BILIARY: The gallbladder is present. PANCREAS: No lesion, fluid collection, ductal dilatation, or atrophy.  SPLEEN: No enlargement.  ADRENALS:   No defined mass or abnormal enlargement.  KIDNEYS:   Symmetric enhancement is seen without evidence of hydronephrosis or underlying solid masses.  Small 0.7 cm indeterminate attenuation left lower pole renal cortical lesion (series 7, image 53). GI/MESENTERY:  There is no evidence of bowel obstruction.  Mild gastric distention noted; enteric contrast propagates to the mid-distal small bowel.  Please note that some segments of the colon are incompletely distended and suboptimally evaluated, mild scattered colonic diverticulosis with mild-to-moderate colonic fecal burden.  Normal appendix. URINARY BLADDER: Incompletely distended and suboptimally evaluated. PELVIC NODES: No  lymphadenopathy.   PELVIC ORGANS: Hysterectomy and bilateral oophorectomy. VASCULATURE:   No aneurysm is detected.  Retroaortic left renal vein noted; mild atherosclerosis. RETROPERITONEUM: No mass or lymphadenopathy is apparent.  BONES:   Demineralization.  Osteitis pubis.  Transitional lumbosacral anatomy with sacralization of L5 on the left.  Mild levoscoliosis of the lumbar spine with multilevel lumbar spine degenerative changes. ABDOMINAL WALL: Small fat containing umbilical hernia.  Mild anasarca. OTHER: No free air or fluid is seen in the abdomen or pelvis.          CONCLUSION:  1. History of recurrent endometrial carcinoma on recent supraclavicular lymph node biopsy.  Partially imaged randomly distributed right greater than left basilar lung nodules, which are concerning for hematogenous pulmonary metastases.  These are better assessed on recent prior chest CT. 2. Small 1 cm low-attenuation right hepatic lobe lesion is indeterminate, but a small hepatic metastasis cannot be excluded.  Consider further evaluation with either PET-CT or a liver protocol MRI of the abdomen without and with contrast.  Correlation with any available outside institution imaging would also be helpful to assess stability. 3. Small 0.7 cm indeterminate attenuation left lower pole renal cortical lesion.  Differential considerations include a small solid renal mass versus complex cyst.  Given small size, this should be reassessed on anticipated follow-up exam. 4. No definite additional metastatic disease within the abdomen. 5. Hysterectomy and bilateral salpingo oophorectomy. 6. Small circumferential pericardial effusion.  There are also trace right greater than left pleural effusions.  Malignant pleural and pericardial fluid cannot be excluded. 7. Mild scattered colonic diverticulosis. 8. Small retrocardiac hiatal hernia. 9. Circumferential urinary bladder wall thickening, which may relate to incomplete distention or cystitis.  If  there are referable symptoms, urinalysis correlation is requested. 10. Anatomic variant retroaortic left renal vein. 11. Mild anasarca.   A preliminary report was issued by the [facility] teleradiology service. There are no major discrepancies.  elm-remote  Dictated by (CST): Lemuel Gallegos MD on 2025 at 10:56 AM     Finalized by (CST): Lemuel Gallegos MD on 2025 at 11:06 AM          IR BIOPSY  Result Date: 4/15/2025  PROCEDURE: IR BIOPSY  INDICATIONS: supraclavicular lymph node  COMPARISON:  CT chest 2025  (S): DARIEL Ragsdale MD  ANESTHESIA/SEDATION: Level of anesthesia/sedation:  Local Anesthetic  ESTIMATED BLOOD LOSS: Less than 5 mL  COMPLICATIONS: None  FINDINGS:  Informed consent was obtained. The patient was positioned supine. The left neck was sterilely prepped and draped.  Preliminary ultrasound demonstrated enlarged left supraclavicular lymphadenopathy. Local Lidocaine was administered. Under ultrasound guidance, a coaxial 18 gauge needle was advanced into the lesion. A total of 4  cores were obtained.  Samples were touch prepped and reviewed with cytology, confirming adequacy. No appreciable complication         CONCLUSION:  1. Successful left supraclavicular lymphadenopathy biopsy as detailed above.     Dictated by (CST): Parviz Ragsdale MD on 4/15/2025 at 2:51 PM     Finalized by (CST): Parviz Ragsdale MD on 4/15/2025 at 2:54 PM          CARD ECHO 2D DOPPLER (CPT=93306)  Result Date: 2025  Transthoracic Echocardiogram Name:Kelli Fisher Date: 2025 :  1955 Ht:  (60in)  BP: 128 / 69 MRN:  [id number]    Age:  70years    Wt:  (162lb) HR: 96bpm Loc:  [facility]       Gndr: F          BSA: 1.71m^2 Sonographer: ANGELICA Prather RDCS Ordering:    Mercedes Martínez Consulting:  Eddie Kemp ---------------------------------------------------------------------------- History/Indications:  Rule out pericardial effusion  ---------------------------------------------------------------------------- Procedure information:  A transthoracic complete 2D study was performed. Additional evaluation included M-mode, complete spectral Doppler, and color Doppler.  Patient status:  Inpatient.  Location:  Bedside.    This was a routine study. Transthoracic echocardiography for diagnosis, ventricular function evaluation, and assessment of valvular function. Image quality was adequate. The study was technically limited due to poor acoustic window availability, body habitus, and patient supine. ECG rhythm:   Normal sinus ---------------------------------------------------------------------------- Conclusions: 1. Left ventricle: The cavity size was normal. Wall thickness was normal.    Systolic function was normal. The estimated ejection fraction was 50-55%,    by biplane method of disks. No diagnostic evidence for regional wall    motion abnormalities. Left ventricular diastolic function parameters were    normal. 2. Right ventricle: The cavity size was normal. Systolic function was    normal. 3. No significant valvular heart disease (stenosis or regurgitation) 4. Pericardium, extracardiac: A small pericardial effusion was identified    posterior to the heart. No echocardiogram signs of tamponade present (ie,    respiratory valve inflow variation) 5. Pulmonary arteries: Systolic pressure was moderately increased, estimated    to be 49mm Hg. * ---------------------------------------------------------------------------- * Findings: Left ventricle:  The cavity size was normal. Wall thickness was normal. Systolic function was normal. The estimated ejection fraction was 50-55%, by biplane method of disks. No diagnostic evidence for diffuse regional wall motion abnormalities. No diagnostic evidence for regional wall motion abnormalities. Left ventricular diastolic function parameters were normal. Left atrium:  The atrium was normal in size. Right  ventricle:  The cavity size was normal. Systolic function was normal. Right atrium:  The atrium was normal in size. Mitral valve:  The valve was structurally normal. Leaflet separation was normal.  Doppler:  Transvalvular velocity was within the normal range. There was no evidence for stenosis. There was no significant regurgitation.    The valve area by pressure half-time was 6.88cm^2. The valve area index by pressure half-time was 4.03cm^2/m^2. Aortic valve:  The valve was structurally normal. The valve was probably trileaflet but cannot rule out bicuspid morphology Cusp separation was normal.  Doppler:  Transvalvular velocity was within the normal range. There was no evidence for stenosis. There was no significant regurgitation.    The valve area (VTI) was 1.43cm^2. The valve area (VTI) index was 0.84cm^2/m^2.   The mean systolic gradient was 7mm Hg. The peak systolic gradient was 14mm Hg. Tricuspid valve:  The valve is structurally normal. Leaflet separation was normal.  Doppler:  Transvalvular velocity was within the normal range. There was no evidence for stenosis. There was trivial regurgitation. Pulmonic valve:   The valve is structurally normal. Cusp separation was normal.  Doppler:  Transvalvular velocity was within the normal range. There was no evidence for stenosis. There was mild regurgitation. Pericardium:  A small pericardial effusion was identified posterior to the heart. No echocardiogram signs of tamponade present (ie, respiratory valve inflow variation) Aorta: Aortic root: The aortic root was normal-sized. Ascending aorta: The ascending aorta was normal. Pulmonary arteries: The main pulmonary artery was normal-sized. Systolic pressure was moderately increased, estimated to be 49mm Hg. Systemic veins:  Central venous respirophasic diameter changes are blunted (< 50%). Inferior vena cava: The IVC was dilated. ---------------------------------------------------------------------------- Measurements   Left ventricle                  Value          Ref  IVS thickness, ED, PLAX         0.8   cm       0.6 - 0.9  LV ID, ED, PLAX                 4.4   cm       3.8 - 5.2  LV ID, ES, PLAX                 2.8   cm       2.2 - 3.5  LV PW thickness, ED, PLAX       0.7   cm       0.6 - 0.9  IVS/LV PW ratio, ED, PLAX       1.14           ---------  LV PW/LV ID ratio, ED, PLAX     0.16           ---------  LV area, ES, A4C                12.6  cm^2     ---------  LV ejection fraction            66    %        54 - 74  Stroke volume/bsa, 2D           26    ml/m^2   ---------  LV end-diastolic volume,        54    ml       48 - 140  1-p A4C  LV end-systolic volume, 1-p     25    ml       12 - 60  A4C  LV ejection fraction, 1-p       51    %        46 - 78  A4C  Stroke volume, 1-p A4C          27    ml       ---------  LV end-diastolic                32    ml/m^2   30 - 82  volume/bsa, 1-p A4C  LV end-systolic volume/bsa,     14    ml/m^2   7 - 35  1-p A4C  Stroke volume/bsa, 1-p A4C      16    ml/m^2   ---------  LV end-diastolic volume,        64    ml       46 - 106  2-p  LV end-diastolic                38    ml/m^2   29 - 61  volume/bsa, 2-p  LV e', lateral                  11.7  cm/sec   >=10.0  LV E/e', lateral                5              <=13  LV e', medial                   10.1  cm/sec   >=7.0  LV E/e', medial                 6              ---------  LV e', average                  10.9  cm/sec   ---------  LV E/e', average                5              <=14  LVOT                            Value          Ref  LVOT ID                         1.8   cm       ---------  LVOT peak velocity, S           1.01  m/sec    ---------  LVOT VTI, S                     17.5  cm       ---------  LVOT peak gradient, S           4     mm Hg    ---------  LVOT mean gradient, S           2     mm Hg    ---------  Stroke volume (SV), LVOT DP     45    ml       ---------  Stroke index (SV/bsa), LVOT     26    ml/m^2   ---------  DP   Aortic valve                    Value          Ref  Aortic valve peak velocity,     1.84  m/sec    ---------  S  Aortic valve VTI, S             31.0  cm       ---------  Aortic mean gradient, S         7     mm Hg    ---------  Aortic peak gradient, S         14    mm Hg    ---------  Aortic valve area, VTI          1.43  cm^2     ---------  Aortic valve area/bsa, VTI      0.84  cm^2/m^2 ---------  Velocity ratio, peak,           0.55           ---------  LVOT/AV  Ascending aorta                 Value          Ref  Ascending aorta ID              3.4   cm       1.9 - 3.5  Left atrium                     Value          Ref  LA volume, ES, 1-p A4C          39    ml       22 - 52  LA volume, ES, 1-p A2C          35    ml       22 - 52  LA volume, ES, A/L              45    ml       ---------  LA volume/bsa, ES, A/L          26    ml/m^2   16 - 34  Mitral valve                    Value          Ref  Mitral E-wave peak velocity     0.58  m/sec    ---------  Mitral A-wave peak velocity     0.83  m/sec    ---------  Mitral deceleration time        109   ms       ---------  Mitral pressure half-time       32    ms       ---------  Mitral E/A ratio, peak          0.7            ---------  Mitral valve area, PHT, DP      6.88  cm^2     ---------  Mitral valve area/bsa, PHT,     4.03  cm^2/m^2 ---------  DP  Pulmonary artery                Value          Ref  PA pressure, S, DP              49    mm Hg    ---------  PA pressure, ED, DP             21    mm Hg    ---------  Tricuspid valve                 Value          Ref  Tricuspid regurg peak       (H) 2.92  m/sec    <=2.8  velocity  Tricuspid peak RV-RA            34    mm Hg    ---------  gradient  Systemic veins                  Value          Ref  Estimated CVP                   15    mm Hg    ---------  Inferior vena cava              Value          Ref  ID                          (H) 2.7   cm       <=2.1  Right ventricle                 Value          Ref  TAPSE, MM                        1.97  cm       >=1.70  RV pressure, S, DP              49    mm Hg    ---------  RV s', lateral                  17.6  cm/sec   >=9.5  Pulmonic valve                  Value          Ref  Pulmonic regurg velocity,       1.27  m/sec    ---------  ED  Pulmonic regurg gradient,       6     mm Hg    ---------  ED Legend: (L)  and  (H)  corinna values outside specified reference range. ---------------------------------------------------------------------------- Prepared and electronically signed by Oniel Grijalva 04/12/2025 16:07     CT CHEST PE AORTA (IV ONLY) (CPT=71260)  Result Date: 4/11/2025  PROCEDURE: CT CHEST PE AORTA (IV ONLY) (CPT=71260)  COMPARISON: [facility], CT SOFT TISSUE OF NECK (CONTRAST ONLY) (CPT=70491), 4/11/2025, 9:30 PM.  INDICATIONS: Shortness of breath. History of cancer.  TECHNIQUE: Multidetector CT images of the chest were obtained with non-ionic intravenous contrast material. Automated exposure control for dose reduction was used. Adjustment of the mA and/or kV was done based on the patient's size. Iterative reconstruction technique for dose reduction was employed. Dose information was transmitted to the ACR (American College of Radiology) NRDR (National Radiology Data Registry), which includes the Dose Index Registry. Multiplanar reformats and maximum intensity projection images were created.   FINDINGS: VASCULATURE: There is adequate opacification of the pulmonary arterial tree. No suspicious filling defects are identified in the main, lobar, segmental, or proximal subsegmental pulmonary artery branches to suggest acute pulmonary embolism. The distal subsegmental branches are less well assessed. The main pulmonary artery trunk is normal in caliber, measuring 2.9 cm. CARDIAC: The heart is not enlarged. There is no bowing of the interventricular septum to suggest right ventricular strain. Moderate pericardial effusion extends into the superior pericardial recess.  THORACIC AORTA: The thoracic aorta has normal-variant two-vessel configuration with a shared origin of the brachiocephalic trunk and left common carotid artery; no dissection is evident. LUNGS/PLEURA: Lobulated masslike airspace disease is demonstrated in the left upper lobe measuring 5.6 x 7.1 x 4.0 cm. Satellite lingular nodularity is evident measuring 1.5 x 1.8 cm, 0.4 cm in diameter, and 0.6 x 0.9 cm (series 4, images 71-72).  A 0.4 cm diameter right middle lobe nodule is seen, and there is a 0.6 x 0.7 cm anterior subpleural nodule (series 4, images 52 and 74, respectively). Right lower lobe nodules are seen measuring 0.7 x 0.6 cm and 0.8 x 0.7 cm (series 4, images 52 and 53). Numerous additional pulmonary nodules and micro nodules are seen.   Small bilateral pleural effusions are seen with associated compressive atelectasis, with or without superimposed airspace consolidation. Minimal interlobular septal thickening is present. No pneumothorax is detected.  AIRWAYS: The tracheobronchial tree is without central mass or obstructing lesion. MEDIASTINUM/SCOTT: There is prevascular space lymphadenopathy measuring 2.5 x 1.7 cm and 1.5 x 1.8 cm. Right upper paratracheal lymphadenopathy measures up to 3.1 x 5.1 cm.  Infiltrative right lower paratracheal lymphadenopathy measures 4.4 x 2.8 cm. Aortopulmonary window lymphadenopathy measures 2.2 x 2.7 cm. Subcarinal lymphadenopathy measures 3.4 x 1.8 cm.  There may be periesophageal lymphadenopathy in the posterior mediastinum.  Right perihilar lymphadenopathy measures 1.6 x 1.9 cm. CHEST WALL: Please see the separately dictated concurrently performed contrast-enhanced CT of the soft tissues of the neck for further details regarding extensive supraclavicular lymphadenopathy. LIMITED ABDOMEN: Within the parameters of the arterial phase of contrast-enhancement, the included upper abdomen is unremarkable.  BONES: Slight scoliosis is noted. Multilevel degenerative changes are  seen throughout the thoracic spine. Confluent anterior ossific bridging may reflect underlying diffuse idiopathic skeletal hyperostosis. Significant degenerative changes of the shoulders are present bilaterally. OTHER: A small hiatal hernia is evident.          CONCLUSION:  1. No evidence of acute pulmonary embolism to the level of the first order subsegmental pulmonary artery branches.  2. Masslike opacity involving the left upper lobe, concerning for potential neoplastic process.  3. Extensive bilateral nodules and micronodules, which could be metastatic in nature.  4. Marked mediastinal/perihilar lymphadenopathy, likely neoplastic.  5. Partially delineated supraclavicular lymphadenopathy.  6. Moderate pericardial effusion, potentially malignant.  7. Small bilateral pleural effusions are present with associated basilar atelectasis, with or without superimposed pneumonia.  8. Mild interlobular septal thickening is present and could be indicative of pulmonary interstitial edema or lymphangitic dissemination of malignancy.  9. Lesser incidental findings as above.    Dictated by (CST): Harpreet Jade MD on 4/11/2025 at 10:08 PM     Finalized by (CST): Harpreet Jade MD on 4/11/2025 at 10:20 PM          CT SOFT TISSUE OF NECK(CONTRAST ONLY) (CPT=70491)  Result Date: 4/11/2025  PROCEDURE: CT SOFT TISSUE OF NECK (CONTRAST ONLY) (CPT=70491)  COMPARISON: None available.  INDICATIONS: Dysphagia.  TECHNIQUE: Multidetector CT images were obtained through the neck soft tissues following the infusion of non-ionic intravenous contrast material. Automated exposure control for dose reduction was used. Adjustment of the mA and/or kV was done based on the  patient's size. Iterative reconstruction technique for dose reduction was employed. Dose information was transmitted to the ACR (American College of Radiology) NRDR (National Radiology Data Registry), which includes the Dose Index Registry.   FINDINGS:  NASO/OROPHARYNX: No mass  or significant asymmetry.  ORAL CAVITY/TONGUE: No visible mass or significant asymmetry.  COMPARTMENTS: The , parotid, carotid, retropharyngeal, and perivertebral spaces are without focal attenuation abnormality. The parapharyngeal fat planes are bilaterally symmetric without effacement. HYPOPHARYNX: No mass or other visible lesion.  LARYNX: No apparent vocal cord mass or asymmetry. NECK GLANDS: The parotid, submandibular, and thyroid glands are unremarkable.  LYMPH NODES: Extensive pathological-appearing and enlarged lymph nodes are demonstrated. A reference right level IVb medial supraclavicular node measures 3.3 x 3.7 cm; there appears to be an adjacent necrotic lymph node measuring 1.4 x 2.0 cm. A right supraclavicular lymph node laterally measures 1.6 x 1.5 cm. A level VIa heterogeneous necrotic-appearing lymph node measures 4.7 x 3.7 cm. Left level [place]/IVb lower jugular/medial supraclavicular nodes measure 3.5 x 3.0 cm and 3.9 x 3.5 cm. VASCULATURE: Limited views are unremarkable.  SINUSES/MASTOIDS: Limited views show no significant fluid or mucosal thickening.  MEDIASTINUM: Limited views are notable for lymphadenopathy; please see the concurrently performed PE protocol chest CT for further details.  BONES: Multilevel degenerative changes are seen throughout the spine. There may be periapical abscess formation extending to the right maxillary alveolar recess. OTHER: Visualized portions of the contrast enhanced cerebrum and cerebellum are grossly unremarkable. There is dependent subsegmental atelectasis of the visualized lungs bilaterally.          CONCLUSION:  1. Extensive lymphadenopathy, likely metastatic.  2. Lesser incidental findings as above.     Dictated by (CST): Harpreet Jade MD on 4/11/2025 at 9:59 PM     Finalized by (CST): Harpreet Jade MD on 4/11/2025 at 10:08 PM            Data Review:   Recent Labs   Lab 05/02/25  0605   RBC 3.01*   HGB 7.5*   HCT 24.6*   MCV 81.7   MCH 24.9*    MCHC 30.5*   RDW 21.0*   NEPRELIM 12.93*   WBC 15.6*   .0       Recent Labs   Lab 04/29/25  2158 04/30/25  0909 05/01/25  0719 05/01/25  2336 05/02/25  0605   *   < > 130* 152* 153*   BUN 11   < > 10 9 10   CREATSERUM 0.93   < > 0.73 0.66 0.71   CA 8.8   < > 8.5* 8.6* 8.5*   ALB 3.4  --   --   --   --    *   < > 135* 136 135*   K 3.9   < > 4.0 4.3 4.2   CL 97*   < > 101 103 102   CO2 27.0   < > 24.0 23.0 23.0   ALKPHO 250*  --   --   --   --    AST 17  --   --   --   --    ALT 7*  --   --   --   --    BILT 0.6  --   --   --   --    TP 7.1  --   --   --   --     < > = values in this interval not displayed.       Lab Results   Component Value Date     210.0 (H) 04/16/2025    CEA <0.5 04/16/2025     156.3 (H) 04/16/2025     Left supraclavicular lymph node biopsy 4/14/25:  Left supraclavicular lymph node; biopsy:  ?    Metastatic adenocarcinoma, consistent with the patient's known serous endometrial carcinoma.  ?    See comment.     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      The left supraclavicular lymph node biopsy shows highly atypical cells with large, hyperchromatic, pleomorphic nuclei, prominent nucleoli, and vacuolated cytoplasm.  Touch preps performed on the biopsy show similar atypical cells.      Immunohistochemical stains performed on the biopsy demonstrate that the tumor cells are positive for pankeratin (AE1/AE3), CK7, PAX8, and p16, and negative for  CK20, TTF1, p40, ER, and p53.     The morphological and immunohistochemical findings support the diagnosis of metastatic adenocarcinoma, consistent with the patient's know serous endometrial carcinoma.    ASSESSMENT:  Patient is a 70 year old female with recurrent metastatic endometrial cancer admitted with symptomatic SOB with know large  pulmonary metastasis    PLAN:  #SOB: 2/2 large pulmonary metastatic disease. CTPE negative for PE. No significant pleural effusions. Appreciate Pulmonary recs on work up and symptom management    #SVT: RRT this am with HR 200s. RR note reviewed. Again in SVT, actively being transferred to Tele. Appreciate Cardiology recs    #Recurrent endometrial cancer: Metastatic. Chemotherapy recommended and will be initiated as soon as patient clinically stabilizes. Plan is for Carbo/Taxol/Keytruda with Dr. Herring. Discussed with patient and family that clinical stabilization take priority at this time.    #Anemia: No reported bleeding. Trend Hb. If persistently <8, would recommend transfusion to Hb>8 in anticipation of chemotherapy. Monitor for bleeding    #Leukocytosis: No fevers. Given disease distribution, possible pseudoobstructive pneumonia. Monitor for s/sx of infection. May be related to tumor burden. On Abx.     Gyn Onc will continue to follow her clinical course. Patient's goals at this time are for further cancer therapy.    All of the findings and plan were discussed with the patient and her 2 children.  She notes understanding and agrees with the plan of care.  All questions were answered to the best of my ability at this time.    Thu Prasad MD  5/2/2025  1:34 PM          [1]   Medications Prior to Admission   Medication Sig Dispense Refill Last Dose/Taking    levothyroxine 100 MCG Oral Tab Take 1 tablet (100 mcg total) by mouth before breakfast.   4/29/2025    albuterol (2.5 MG/3ML) 0.083% Inhalation Nebu Soln Take 3 mL (2.5 mg total) by nebulization every 6 (six) hours as needed for Wheezing. 90 each 3     Fluticasone Propionate  HFA (FLOVENT HFA) 220 MCG/ACT Inhalation Aerosol Inhale 2 puffs into the lungs in the morning and 2 puffs before bedtime.      [2]   Allergies  Allergen Reactions    Aspirin Tightness in Throat    Penicillins HIVES    Other OTHER (SEE COMMENTS)     Pt states IV steroid  allergy, states it makes her breathing worse    [3]   Past Medical History:   Asthma (HCC)    Esophageal reflux    History of blood transfusion    Visual impairment   [4]   Past Surgical History:  Procedure Laterality Date    Thyroidectomy      Total abdom hysterectomy

## 2025-05-02 NOTE — SPIRITUAL CARE NOTE
Spiritual Care Visit Note    Patient Name: Kelli Fisher Date of Spiritual Care Visit: 25   : 1955 Primary Dx: Lung mass       Referred By: Referral From: Care Coordination    Spiritual Care Taxonomy:    Intended Effects: Convey a calming presence    Methods: Offer support    Interventions: Silent prayer, Identify supportive relationship(s), Provide hospitality    Visit Type/Summary:     - Spiritual Care: Responded to a request via the on call phone Patients daughter and son expressed appreciation for  visit.  remains available as needed for follow up.    Spiritual Care support can be requested via an [facility] consult. For urgent/immediate needs, please contact the On Call  at: [place]: ext [phone]    Rev Yudi Gabriel MDiv

## 2025-05-03 ENCOUNTER — APPOINTMENT (OUTPATIENT)
Dept: GENERAL RADIOLOGY | Facility: HOSPITAL | Age: 70
End: 2025-05-03
Attending: INTERNAL MEDICINE
Payer: MEDICARE

## 2025-05-03 LAB
ANION GAP SERPL CALC-SCNC: 9 MMOL/L (ref 0–18)
BASOPHILS # BLD AUTO: 0.04 X10(3) UL (ref 0–0.2)
BASOPHILS NFR BLD AUTO: 0.3 %
BUN BLD-MCNC: 10 MG/DL (ref 9–23)
BUN/CREAT SERPL: 15.4 (ref 10–20)
CALCIUM BLD-MCNC: 8.9 MG/DL (ref 8.7–10.4)
CHLORIDE SERPL-SCNC: 104 MMOL/L (ref 98–112)
CO2 SERPL-SCNC: 23 MMOL/L (ref 21–32)
CREAT BLD-MCNC: 0.65 MG/DL (ref 0.55–1.02)
DEPRECATED RDW RBC AUTO: 58.9 FL (ref 35.1–46.3)
EGFRCR SERPLBLD CKD-EPI 2021: 95 ML/MIN/1.73M2 (ref 60–?)
EOSINOPHIL # BLD AUTO: 0 X10(3) UL (ref 0–0.7)
EOSINOPHIL NFR BLD AUTO: 0 %
ERYTHROCYTE [DISTWIDTH] IN BLOOD BY AUTOMATED COUNT: 19.9 % (ref 11–15)
GLUCOSE BLD-MCNC: 131 MG/DL (ref 70–99)
HCT VFR BLD AUTO: 28.8 % (ref 35–48)
HGB BLD-MCNC: 9 G/DL (ref 12–16)
IMM GRANULOCYTES # BLD AUTO: 0.18 X10(3) UL (ref 0–1)
IMM GRANULOCYTES NFR BLD: 1.3 %
LYMPHOCYTES # BLD AUTO: 0.89 X10(3) UL (ref 1–4)
LYMPHOCYTES NFR BLD AUTO: 6.4 %
MAGNESIUM SERPL-MCNC: 1.9 MG/DL (ref 1.6–2.6)
MCH RBC QN AUTO: 25.2 PG (ref 26–34)
MCHC RBC AUTO-ENTMCNC: 31.3 G/DL (ref 31–37)
MCV RBC AUTO: 80.7 FL (ref 80–100)
MONOCYTES # BLD AUTO: 1.4 X10(3) UL (ref 0.1–1)
MONOCYTES NFR BLD AUTO: 10.1 %
NEUTROPHILS # BLD AUTO: 11.42 X10 (3) UL (ref 1.5–7.7)
NEUTROPHILS # BLD AUTO: 11.42 X10(3) UL (ref 1.5–7.7)
NEUTROPHILS NFR BLD AUTO: 81.9 %
OSMOLALITY SERPL CALC.SUM OF ELEC: 283 MOSM/KG (ref 275–295)
PLATELET # BLD AUTO: 301 10(3)UL (ref 150–450)
POTASSIUM SERPL-SCNC: 3.9 MMOL/L (ref 3.5–5.1)
RBC # BLD AUTO: 3.57 X10(6)UL (ref 3.8–5.3)
SODIUM SERPL-SCNC: 136 MMOL/L (ref 136–145)
WBC # BLD AUTO: 13.9 X10(3) UL (ref 4–11)

## 2025-05-03 PROCEDURE — 74230 X-RAY XM SWLNG FUNCJ C+: CPT | Performed by: INTERNAL MEDICINE

## 2025-05-03 PROCEDURE — 99232 SBSQ HOSP IP/OBS MODERATE 35: CPT | Performed by: INTERNAL MEDICINE

## 2025-05-03 RX ORDER — FUROSEMIDE 10 MG/ML
40 INJECTION INTRAMUSCULAR; INTRAVENOUS ONCE
Status: COMPLETED | OUTPATIENT
Start: 2025-05-03 | End: 2025-05-03

## 2025-05-03 NOTE — SLP NOTE
SPEECH DAILY NOTE - INPATIENT    ASSESSMENT & PLAN   ASSESSMENT  Pt was seen for ongoing dysphagia intervention. Pt continues to present with possible pharyngeal dysphagia and is in need of an objective instrumental swallow assessment to analyze swallow physiology.   RN was consulted before this visit and family is by bedside. Pt was sitting independently on a chair. Pt is currently on thin liquids and pureed solids. Thin liquids are based on pt preference with the assumption go aspiration risk.   Pt was observed drinking sips of thin liquids. Pt consumed approximately 3-4 sips by cup. Pt presented with overt s/s of possible laryngeal penetration/aspiration. Pt produced consistent throat clear, am immediate cough accompanied by evident shortness of breath. Pt also expectorated some of the boluses saying \"I am choking on these\". Pt and family were re-educated on aspiration risks, especially given concerning Chest Xray findings. The need for an objective assessment was discussed and pt/family agreed for the pt to participate. Pt declined other trials this session but agreed to ingest other consistencies if a VFSS was completed.     Radiology services have been contacted and a VFSS will be completed later today depending on radiology schedule/availability.     Diet Recommendations - Solids: Puree  Diet Recommendations - Liquids: Thin Liquids (family requesting with assumption of aspiration risk)    Compensatory Strategies Recommended: Liquids via spoon  Aspiration Precautions: Upright position, Slow rate, Small bites, Small sips, No straw  Medication Administration Recommendations:  (as tolerated)    Patient Experiencing Pain: No                Treatment Plan  Treatment Plan/Recommendations: Videofluoroscopic swallow study, Aspiration precautions    Interdisciplinary Communication: Discussed with RN  Plan posted at bedside            GOALS    To be revised pending VFSS    Goal #1 The patient will tolerate soft bite  sized consistency and thin liquids without overt signs or symptoms of aspiration with 100 % accuracy over 1-2 session(s).     No CSA on current diet of PUREED/mildly-thick liquids. Downgraded by RN. Pt reported \"difficulty getting bite size food down\".    Pt requesting upgrade to thin liquids and assuming aspiration risk. D/W MD     Revised    Goal #2 The patient/family/caregiver will demonstrate understanding and implementation of aspiration precautions and swallow strategies independently over 1-2 session(s).     Diet recommendations/swallow status discussed with dtr; v/u.      In Progress   Goal #3 The patient will tolerate trial upgrade of soft/hard solid consistency and thin liquids without overt signs or symptoms of aspiration with 100 % accuracy over 1-2 session(s).     SOB with all thin liquid controlled trials.      Revised   Goal #4 The patient will utilize compensatory strategies as outlined by  BSSE (clinical evaluation) including Slow rate, Small bites, Small sips, Multiple swallows, Alternate liquids/solids, No straws, Upright 90 degrees, Upright 90 degrees 30 mins after meal, Eliminate distractions with PRN assistance 100 % of the time across 2 sessions.     SLP fed Pt; strategies executed.      In Progress        FOLLOW UP  Follow Up Needed (Documentation Required): Yes  SLP Follow-up Date: 05/03/25  Duration: 1 week    Session: 3    If you have any questions, please contact NI Mireles, Ph.D., [facility]SLP  Speech-Language Pathologist  Phone number Ext.: [phone]

## 2025-05-03 NOTE — PROGRESS NOTES
Progress Note  Kelli Fisher Patient Status:  Inpatient    1955 MRN [id number]   Location [facility] 3W/SW Attending Joel Dejesus MD   Hosp Day # 3 PCP Taylor Gallardo MD     Subjective:  No acute events overnight.  Sitting up in a chair this morning, O2 demand trending down, still with significant dyspnea this morning.  Denies any chest pain, palpitations or dizziness at this time. HR significantly improved with oral amiodarone, no recurrence of SVT per tele review.     Objective:  /90 (BP Location: Right arm)   Pulse 100   Temp 98.1 °F (36.7 °C) (Oral)   Resp (!) 28   Ht 5' (1.524 m)   Wt 175 lb 12.8 oz (79.7 kg)   SpO2 97%   BMI 34.33 kg/m²     Telemetry: ST, HR 100s, PVCs       Intake/Output:    Intake/Output Summary (Last 24 hours) at 5/3/2025 0836  Last data filed at 5/3/2025 0623  Gross per 24 hour   Intake 2232.62 ml   Output 500 ml   Net 1732.62 ml       Last 3 Weights   25 0549 175 lb 12.8 oz (79.7 kg)   25 1800 163 lb (73.9 kg)   25 2038 165 lb (74.8 kg)   25 1202 169 lb (76.7 kg)   25 0142 162 lb 4.1 oz (73.6 kg)   25 0134 162 lb 4.1 oz (73.6 kg)   25 1846 172 lb (78 kg)   23 1116 197 lb (89.4 kg)       Labs:  Recent Labs   Lab 25  2336 25  0605 25  0732   * 153* 131*   BUN 9 10 10   CREATSERUM 0.66 0.71 0.65   EGFRCR 95 92 95   CA 8.6* 8.5* 8.9    135* 136   K 4.3 4.2 3.9    102 104   CO2 23.0 23.0 23.0     Recent Labs   Lab 25  0719 25  0605 25  2224 25  0732   RBC 2.98* 3.01*  --  3.57*   HGB 7.3* 7.5* 8.9* 9.0*   HCT 24.5* 24.6*  --  28.8*   MCV 82.2 81.7  --  80.7   MCH 24.5* 24.9*  --  25.2*   MCHC 29.8* 30.5*  --  31.3   RDW 21.2* 21.0*  --  19.9*   NEPRELIM 10.03* 12.93*  --  11.42*   WBC 12.1* 15.6*  --  13.9*   .0 311.0  --  301.0         Recent Labs   Lab 25  2158   TROPHS 3       Review of Systems   Constitutional: Negative.    Cardiovascular:  Positive for dyspnea on exertion and leg swelling.   Respiratory:  Positive for shortness of breath.        Physical Exam:    Gen: alert, oriented x 3, NAD  Heent: pupils equal, reactive. Mucous membranes moist.   Neck: no jvd  Cardiac: regular rate and rhythm, normal S1,S2, no murmur, gallop or rub   Lungs: bibasilar crackles, diminished   Abd: soft, NT/ND +bs  Ext: generalized edema  Skin: Warm, dry  Neuro: No focal deficits        Medications:    Scheduled Medications[1]  Medication Infusions[2]    Assessment:  Acute Hypoxic Respiratory Insufficiency (Possible PNA, Known Lung Mets)  IV antibx for possible PNA  Nebs - switched from duoneb to xopenex  Pulm following  Symptomatic Paroxysmal SVT, Transient Bradycardia/CHB  Multiple episodes of narrow complex tachycardia w/rates 150's -200's- likely exacerbated by neb treatment yesterday   S/P diltiazem, IV metoprolol - then w/transient period of bradycardia/CHB rates 30's  Now maintaining NSR/ST with amiodarone load   TSH wnl  Volume expansion   Generalized edema, overall positive fluid status +~4.5L  Known Pericardial Effusion  Limited Echo w/LVEF 60-65%, small-moderate pericardial effusion w/o evidence of tamponade physiology  Echo 4/12 w/small pericardial effusion  Metastatic Endometrial Cancer  Heme/Onc following  Acute on Chronic Anemia  Hgb 6.8 on 4/29 s/p PRBC; Hgb stable at 9.0 today   PPI  Hypothyroidism - levothyroxine    Plan:  Appears volume overloaded this morning-will order dose of lasix today, overall fluid positive ~4.5L.  Heart rates improved significantly with oral amiodarone-continue the load, will decrease the dose once load completed.  Continue supportive care per primary, pulm.    Plan of care discussed with patient, RN.    Aislinn Romero, ABBIE  5/3/2025  8:36 AM  [phone]        CARDIOLOGIST ADDENDUM:    I agree with the findings and complexity of problems addressed and approve of the plan detailed above. I have  personally performed the assessment and plan, and summarized the salient points of the medical decision making below:    Problems:  -acute PSVT, extremely symptomatic  -pericardial effusion without tamponade on most recent echo  -respiratory failure, primary pulm etiology    Plan and Risks:  -escalation of therapy with loading of amiodarone, and requiring intensive monitoring for toxicity with continuous tele-, ECG, and blood work  -monitor hemodynamics, re-assess effusion early next week    Pt is not yet meeting tx goals.    Tahira Carreno M.D.   Cardiology/Electrophysiology   5/3/2025  L3  -----------------------------------------           [1]    docusate  100 mg Oral BID    amiodarone  200 mg Oral TID Beta Blocker/Cardiac    mylanta-dicyclomine-lidocaine 2% (G.I. Cocktail) oral liquid   Oral Once    levothyroxine  100 mcg Oral Before breakfast    fluticasone furoate  1 puff Inhalation Daily   [2]    sodium chloride 100 mL/hr at 05/03/25 0839

## 2025-05-03 NOTE — SLP NOTE
ADULT VIDEOFLUOROSCOPIC SWALLOWING STUDY    Admission Date: 4/29/2025  Evaluation Date: 05/03/25  Radiologist: Dr. Jade    RECOMMENDATIONS   Diet Recommendations - Solids: Mechanical soft ground/ Minced & Moist  Diet Recommendations - Liquids: Thin Liquids    Further Follow-up:  Follow Up Needed (Documentation Required): Yes  SLP Follow-up Date: 05/04/25  Compensatory Strategies Recommended: Liquids via spoon  Aspiration Precautions: Upright position, Slow rate, Small bites, Small sips  Medication Administration Recommendations: No restrictions (as tolerated)  Treatment Plan/Recommendations: Aspiration precautions    HISTORY   Background/Objective Information:    Problem List  Principal Problem:    Lung mass  Active Problems:    Pericardial effusion (HCC)    Endometrial cancer (HCC)    Microcytic anemia      Past Medical History  Past Medical History[1]    Current Diet Consistency: Regular, Thin liquids  Prior Level of Function: Unknown  Prior Living Situation: Home alone  History of Recent: Difficulty breathing  Precautions: Aspiration  Imaging results:     Chest Xray - 05/02:    Stable opacity left perihilar mid lung.      Mild opacity right lung base which may reflect atelectasis with or without superimposed pneumonia.     Reason for Referral:  (difficulty swallowing)  Overt clinical s/s of laryngeal penetration/aspiration    Family/Patient Goals:  Eat safely     ASSESSMENT   DYSPHAGIA ASSESSMENT  Test completed in conjunction with Radiologist.  Patient Positioned: Upright, Standard chair.  Patient Viewed: Lateral.  Patient Alertness: Fully alert.  Consistencies Presented: Thin liquids, Puree, Hard solid to assess oropharyngeal swallow function and assess for compensatory strategies to improve safe swallow function.    THIN LIQUIDS  Method of Presentation: Straw  Oral Phase of Swallow (VFSS - Thin Liquids): Within Functional Limits  Triggered at: Valleculae, Pyriform sinuses  Residue Severity, Location:  None  Laryngeal Penetration: Trace, During the swallow  Tracheal Aspiration: None        PUREE  Oral Phase of Swallow (VFSS - Puree): Within Functional Limits  Triggered at: Valleculae  Premature Spillage to: Valleculae  Residue Severity, Location: Mild, Valleculae  Cleared/Reduced with: Secondary swallow  Laryngeal Penetration: None  Tracheal Aspiration: None     HARD SOLID  Oral Phase of Swallow (VFSS - Hard Solid): Within Functional Limits  Triggered at: Valleculae  Premature Spillage to: Valleculae  Residue Severity, Location: Trace, Valleculae  Cleared/Reduced with: Secondary swallow  Laryngeal Penetration: None  Penetration Aspiration Scale Score: Score 1: Material does not enter airway       Overall Impression:     A videofluoroscopic swallow study was completed in conjunction with radiology. The exam is indicative of mild pharyngeal dysphagia with mildly reduced hyolaryngeal excursion, base of the tongue to posterior pharyngeal wall retraction and mildly delayed swallow initiation. Pt exhibited functionally adequate oral bolus control, velopharyngeal closure, pharyngeal stripping and UES opening.   Pt was positioned upright on a standard VFSS chair and images were obtained in the lateral view. Barium compounds in liquid, puree and cracker coated with barium pudding were presented to the pt.     Pt exhibited functionally adequate oral control. Mild pre-swallow pooling in the valleculae and inconsistently to the pyriform sinus was noted for thin liquids. Pre-swallow pooling to the valleculae was noted for pudding and solid consistencies. Trace laryngeal penetration during continuous sips of thin liquids was noted, however pt cleared it spontaneously and these events are consistent with age related changes, Mild vallecular residue was noted for pudding, and pt cleared it spontaneously with a repeat swallow. No other events of laryngeal penetration/aspiration or residue was observed.     Recommendations:  Minced  and moist solids (per pt preference) and thin liquids.    SLP to f/u x1-2 to ensure tolerance to diet. Aspiration precautions were re-inforced pt was educated extensively on the VFSS results with images. Pt/family verbalized understanding. RN was appraised of the results.               GOALS  Goal #1 The patient will tolerate minced and moist consistency and thin liquids without overt signs or symptoms of aspiration with 100 % accuracy over 2 session(s).  In Progress   Goal #2 The patient/family/caregiver will demonstrate understanding and implementation of aspiration precautions and swallow strategies independently over 2 session(s).    In Progress     PLAN:     Minced and moist solids (per pt preference) and thin liquids.    SLP to f/u x1-2 to ensure tolerance to diet. Aspiration precautions were re-inforced pt was educated extensively on the VFSS results with images. Pt/family verbalized understanding. RN was appraised of the results.     EDUCATION/INSTRUCTION  Reviewed results and recommendations with patient/family/caregiver.  Agreement/Understanding verbalized and all questions answered to their apparent satisfaction.    INTERDISCIPLINARY COMMUNICATION  Reviewed results with Radiologist; agreement verbalized.    Patient Experiencing Pain: No                FOLLOW UP  Treatment Plan/Recommendations: Aspiration precautions  Duration: 1 week    Thank you for your referral.   If you have any questions, please contact Nilay Wiley, SLP  Nilay Wiley, Ph.D., [facility]-SLP  Speech-Language Pathologist  Phone number Ext.: [phone]       [1]   Past Medical History:   Asthma (HCC)    Esophageal reflux    History of blood transfusion    Visual impairment

## 2025-05-03 NOTE — PROGRESS NOTES
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  No obstetric history on file.  No LMP recorded. Patient has had a hysterectomy.  Patient's states that overall she feels she is doing much better over the past 48 hours.  She denies any nausea or vomiting, or fevers or chills.  She denies any vaginal bleeding.  She does state that she is beginning to get hungry and is eating a small amount of chicken noodle soup.  In addition she had a bowel movement this morning at 4 AM and continues to pass flatus.  She states that she is still winded however her shortness of breath has improved.    Medications:  Prescriptions Prior to Admission[1]    Allergies:  Allergies[2]       Review of Systems:  See pertinent positives and negative in the HPI    OBJECTIVE:  Patient Vitals for the past 24 hrs:   BP Temp Temp src Pulse Resp SpO2 Weight   25 0824 128/90 98.1 °F (36.7 °C) Oral 100 (!) 28 97 % --   25 0549 (!) 146/96 97.7 °F (36.5 °C) Axillary 104 (!) 30 95 % 175 lb 12.8 oz (79.7 kg)   25 0437 -- -- -- 107 (!) 38 95 % --   25 2346 -- -- -- 106 (!) 32 94 % --   25 2225 122/86 98.4 °F (36.9 °C) Axillary 96 (!) 28 96 % --   25 (!) 131/95 98.7 °F (37.1 °C) Axillary 96 26 95 % --   25 (!) 125/95 98.8 °F (37.1 °C) Axillary 98 (!) 28 96 % --   25 1908 115/83 98.9 °F (37.2 °C) Axillary 98 (!) 30 98 % --   25 1823 112/84 99.1 °F (37.3 °C) Axillary 101 (!) 30 97 % --   25 1808 (!) 115/91 99.1 °F (37.3 °C) Axillary 102 (!)  28 97 % --   05/02/25 1715 -- -- -- 109 -- 98 % --   05/02/25 1515 -- -- -- -- -- 93 % --   05/02/25 1500 (!) 143/92 98 °F (36.7 °C) Oral 119 (!) 30 91 % --   05/02/25 1455 -- -- -- 120 -- (!) 84 % --   05/02/25 1439 103/79 -- -- (!) 188 24 99 % --   05/02/25 1417 (!) 116/94 -- -- (!) 180 -- -- --   05/02/25 1406 96/74 -- -- -- -- -- --   05/02/25 1403 (!) 86/49 -- Oral 85 -- -- --   05/02/25 1303 (!) 128/92 -- Oral 110 -- 94 % --   05/02/25 1254 -- -- -- -- -- 92 % --   05/02/25 1253 95/65 -- -- (!) 131 -- (!) 85 % --       Intake/Output Summary (Last 24 hours) at 5/3/2025 1240  Last data filed at 5/3/2025 1155  Gross per 24 hour   Intake 2632.62 ml   Output 620 ml   Net 2012.62 ml       Physical Exam:  General appearance: alert, appears stated age and cooperative  Head: Normocephalic, without obvious abnormality, atraumatic  Eyes: conjunctivae/corneas clear. PERRL, EOM's intact.  Ears:  external ear canals both ears  Throat: lips, mucosa, and tongue normal; teeth and gums normal  Back: symmetric, no curvature. ROM normal. No CVA tenderness.  Extremities: extremities normal, atraumatic, no cyanosis or edema  Skin: Skin color, texture, turgor normal. No rashes or lesions  Lymph nodes: Cervical, supraclavicular, and axillary nodes normal.  Neurologic: Grossly normal    Diagnostics:  XR VIDEO SWALLOW (CPT=74230)  Result Date: 5/3/2025  PROCEDURE: XR VIDEO SWALLOW (CPT=74230)  COMPARISON: None available.  INDICATIONS: Clinical concern for aspiration.  TECHNIQUE: A swallowing evaluation was performed in the Radiology Department in conjunction with the Department of Speech and Hearing.  A separate detailed report will be issued by the speech pathologist who recorded the study. Fluoroscopy was provided by a radiologist who was present during the procedure.    Materials of various consistencies were given by mouth, and swallowing was evaluated fluoroscopically.   # of FLUOROGRAPHIC CINE FILES:  7 FLUOROSCOPY IMAGES  OBTAINED:  1 FLUOROSCOPY TIME:  2.1 minutes RADIATION DOSE (Dose Area Product):  [id number]  FINDINGS:  ASPIRATION/PENETRATION:   None.  STRUCTURE: No visible obstruction, stricture, or dilatation.  OTHER: Negative.           CONCLUSION:  No penetration or aspiration.    Dictated by (CST): Harpreet Jade MD on 2025 at 12:08 PM     Finalized by (CST): Harpreet Jade MD on 2025 at 12:09 PM          XR CHEST AP PORTABLE  (CPT=71045)  Result Date: 2025  PROCEDURE: XR CHEST AP PORTABLE  (CPT=71045) TIME: 622 hours.   COMPARISON: [facility], XR CHEST AP PORTABLE (CPT=71045), 2025, 10:40 PM.  [facility], X CHEST PORTABLE, 2014, 8:51 PM.  INDICATIONS: Shortness of breath.  TECHNIQUE:   Single view.   FINDINGS:  CARDIAC/MEDIAST: Heart and mediastinum are unchanged. LUNGS/PLEURA:  Opacity in the left perihilar mid lung not significantly changed.  Low lung volumes.  There is mild opacity in the right lung base.  No significant pleural effusion or pneumothorax. OTHER:  Stable appearance of the osseous structures.          CONCLUSION:   Stable opacity left perihilar mid lung.  Mild opacity right lung base which may reflect atelectasis with or without superimposed pneumonia.    Dictated by (CST): Casey Diallo MD on 2025 at 7:18 AM     Finalized by (CST): Casey Diallo MD on 2025 at 7:19 AM          CARD ECHO [facility] (CPT=93308/93816/70000)  Result Date: 2025  Transthoracic Echocardiogram Name:Kelli Fisher Date: 2025 :  1955 Ht:  (60in)  BP: 106 / 74 MRN:  [id number]    Age:  70years    Wt:  (165lb) HR: 108bpm Loc:  [facility]       Gndr: F          BSA: 1.72m^2 Sonographer: James PINTO, RVT Ordering:    Javi Huerta Consulting:  Ector Byrnes ---------------------------------------------------------------------------- History/Indications:  Pericardial effusion.  ---------------------------------------------------------------------------- Procedure information:  A transthoracic echocardiogram, limited study was performed. Additional evaluation included M-mode and limited 2D.  Patient status:  Inpatient.  Location:  Bedside.    Comparison was made to the study of 04/12/2025.    This was a routine study. Transthoracic echocardiography for ventricular function evaluation and assessment of pericardial effusion and hemodynamic status. Image quality was adequate. ECG rhythm:   Sinus tachycardia ---------------------------------------------------------------------------- Conclusions: 1. Left ventricle: The cavity size was normal. Wall thickness was mildly    increased. Systolic function was normal. The estimated ejection fraction    was 60-65%, by visual assessment. No diagnostic evidence for regional    wall motion abnormalities. Unable to assess LV diastolic function. 2. Pericardium, extracardiac: A small to moderate, free-flowing pericardial    effusion was identified circumferential to the heart. Maximal diameter in    diastole is 1.1cm. Features were not consistent with tamponade    physiology. 3. Inferior vena cava: The IVC was dilated. Respirophasic diameter changes    are blunted (< 50%), consistent with elevated central venous pressure. * ---------------------------------------------------------------------------- * Findings: Left ventricle:  The cavity size was normal. Wall thickness was mildly increased. Systolic function was normal. The estimated ejection fraction was 60-65%, by visual assessment. No diagnostic evidence for regional wall motion abnormalities. Unable to assess LV diastolic function. Right ventricle:  The cavity size was normal. Mitral valve:  The valve was structurally normal. Aortic valve:  The valve was structurally normal. Tricuspid valve:  The valve is structurally normal. Pulmonic valve:   Not well visualized. Pericardium:  A small to moderate,  free-flowing pericardial effusion was identified circumferential to the heart. Maximal diameter in diastole is 1.1cm.  Doppler:  Features were not consistent with tamponade physiology. Systemic veins: Inferior vena cava: The IVC was dilated.  Respirophasic diameter changes are blunted (< 50%), consistent with elevated central venous pressure. ---------------------------------------------------------------------------- Measurements  Left ventricle              Value    Ref      04/12/2025  IVS thickness, ED, PLAX (H) 1.0   cm 0.6 -    0.8                                       0.9  LV ID, ED, PLAX             3.9   cm 3.8 -    4.4                                       5.2  LV ID, ES, PLAX             2.5   cm 2.2 -    2.8                                       3.5  LV PW thickness, ED,    (H) 1.0   cm 0.6 -    0.7  PLAX                                 0.9  IVS/LV PW ratio, ED,        1.00     -------- 1.14  PLAX  LV PW/LV ID ratio, ED,      0.26     -------- 0.16  PLAX  LV ejection fraction        66    %  54 - 74  66  Inferior vena cava          Value    Ref      04/12/2025  ID                      (H) 2.3   cm <=2.1    2.7 Legend: (L)  and  (H)  corinna values outside specified reference range. ---------------------------------------------------------------------------- Prepared and electronically signed by Javi Huerta 04/30/2025 13:43     CT CHEST PE AORTA (IV ONLY) (CPT=71260)  Result Date: 4/30/2025  PROCEDURE: CT CHEST PE AORTA (IV ONLY) (CPT=71260)  COMPARISON: [facility], CT CHEST PE AORTA (IV ONLY) (CPT=71260), 4/11/2025, 9:30 PM.  INDICATIONS: dyspnea, lung cancer  TECHNIQUE: CT images of the chest were obtained with non-ionic intravenous contrast material.  Automated exposure control for dose reduction was used. Adjustment of the mA and/or kV was done based on the patient's size. Use of iterative reconstruction technique for dose reduction was used. Dose information is transmitted to the ACR  (American College of Radiology) NRDR (National Radiology Data Registry) which includes the Dose Index Registry.  FINDINGS:  No PE  Normal heart size with stable small to moderate circumferential pericardial effusion  No short interval change in left upper lobe mass, diffuse intrathoracic adenopathy  Trace left pleural effusion similar to prior.  Trace right effusion has resolved   A few of the right-sided pulmonary nodules may have mildly increased in size.  Adenopathy causes severe narrowing of the lower right IJ with collaterals in the chest wall    CONCLUSION: No PE.   [facility] provided a prelim report for this exam. This final report has no significant discrepancies with the [facility] report. Finalized by (CST): Chris Lovell MD on 4/30/2025 at 9:39 AM          XR CHEST AP PORTABLE  (CPT=71045)  Result Date: 4/30/2025  PROCEDURE: XR CHEST AP PORTABLE  (CPT=71045)  COMPARISON: [facility], CT CHEST PE AORTA (IV ONLY) (CPT=71260), 4/29/2025, 11:50 PM.  INDICATIONS: Chest discomfort x1 day.  Findings:  Left lung mass with significant diffuse intrathoracic adenopathy seen on recent CT  Heart size likely normal for technique  No pneumothorax or large effusion  [facility] provided a prelim report for this exam. This final report has no significant discrepancies with the [facility] report. Finalized by (CST): Chris Lovell MD on 4/30/2025 at 6:45 AM          CT ABDOMEN+PELVIS(CONTRAST ONLY)(CPT=74177)  Result Date: 4/16/2025  PROCEDURE: CT ABDOMEN + PELVIS (CONTRAST ONLY) (CPT=74177)  COMPARISON: [facility], CT SOFT TISSUE OF NECK (CONTRAST ONLY) (CPT=70491), 4/11/2025, 9:30 PM.  [facility], CT CHEST PE AORTA (IV ONLY) (CPT=71260), 4/11/2025, 9:30 PM.  US PELVIS TRANSABDOMINAL AND TRANSVAGINAL (XBG=85886/77141), 12/11/2023, 3:54 PM.  INDICATIONS: recurrent endometrial cancer, assess disease status  TECHNIQUE: Multidetector CT images of the abdomen and pelvis were  obtained with non-ionic intravenous contrast material. Automated exposure control for dose reduction was used. Adjustment of the mA and/or kV was done based on the patient's size. Iterative reconstruction technique for dose reduction was employed.  FINDINGS: LUNG BASES: The heart is normal in size. The small circumferential pericardial effusion.  Trace dependent right greater than pleural effusions.  Distributed nodules.  These include a reference 0.8 cm nodule in the right lower lobe (series 6, image 4). LIVER: Small indeterminate 1 cm low-attenuation lesion at the periphery of the right hepatic lobe (series 2, image 16). BILIARY: The gallbladder is present. PANCREAS: No lesion, fluid collection, ductal dilatation, or atrophy.  SPLEEN: No enlargement.  ADRENALS:   No defined mass or abnormal enlargement.  KIDNEYS:   Symmetric enhancement is seen without evidence of hydronephrosis or underlying solid masses.  Small 0.7 cm indeterminate attenuation left lower pole renal cortical lesion (series 7, image 53). GI/MESENTERY:  There is no evidence of bowel obstruction.  Mild gastric distention noted; enteric contrast propagates to the mid-distal small bowel.  Please note that some segments of the colon are incompletely distended and suboptimally evaluated, mild scattered colonic diverticulosis with mild-to-moderate colonic fecal burden.  Normal appendix. URINARY BLADDER: Incompletely distended and suboptimally evaluated. PELVIC NODES: No lymphadenopathy.   PELVIC ORGANS: Hysterectomy and bilateral oophorectomy. VASCULATURE:   No aneurysm is detected.  Retroaortic left renal vein noted; mild atherosclerosis. RETROPERITONEUM: No mass or lymphadenopathy is apparent.  BONES:   Demineralization.  Osteitis pubis.  Transitional lumbosacral anatomy with sacralization of L5 on the left.  Mild levoscoliosis of the lumbar spine with multilevel lumbar spine degenerative changes. ABDOMINAL WALL: Small fat containing umbilical hernia.   Mild anasarca. OTHER: No free air or fluid is seen in the abdomen or pelvis.          CONCLUSION:  1. History of recurrent endometrial carcinoma on recent supraclavicular lymph node biopsy.  Partially imaged randomly distributed right greater than left basilar lung nodules, which are concerning for hematogenous pulmonary metastases.  These are better assessed on recent prior chest CT. 2. Small 1 cm low-attenuation right hepatic lobe lesion is indeterminate, but a small hepatic metastasis cannot be excluded.  Consider further evaluation with either PET-CT or a liver protocol MRI of the abdomen without and with contrast.  Correlation with any available outside institution imaging would also be helpful to assess stability. 3. Small 0.7 cm indeterminate attenuation left lower pole renal cortical lesion.  Differential considerations include a small solid renal mass versus complex cyst.  Given small size, this should be reassessed on anticipated follow-up exam. 4. No definite additional metastatic disease within the abdomen. 5. Hysterectomy and bilateral salpingo oophorectomy. 6. Small circumferential pericardial effusion.  There are also trace right greater than left pleural effusions.  Malignant pleural and pericardial fluid cannot be excluded. 7. Mild scattered colonic diverticulosis. 8. Small retrocardiac hiatal hernia. 9. Circumferential urinary bladder wall thickening, which may relate to incomplete distention or cystitis.  If there are referable symptoms, urinalysis correlation is requested. 10. Anatomic variant retroaortic left renal vein. 11. Mild anasarca.   A preliminary report was issued by the [facility] teleradiology service. There are no major discrepancies.  elm-remote  Dictated by (CST): Lemuel Gallegos MD on 4/16/2025 at 10:56 AM     Finalized by (CST): Lemuel Gallegos MD on 4/16/2025 at 11:06 AM          IR BIOPSY  Result Date: 4/15/2025  PROCEDURE: IR BIOPSY  INDICATIONS: supraclavicular lymph  node  COMPARISON:  CT chest 2025  (S): DARIEL Ragsdale MD  ANESTHESIA/SEDATION: Level of anesthesia/sedation:  Local Anesthetic  ESTIMATED BLOOD LOSS: Less than 5 mL  COMPLICATIONS: None  FINDINGS:  Informed consent was obtained. The patient was positioned supine. The left neck was sterilely prepped and draped.  Preliminary ultrasound demonstrated enlarged left supraclavicular lymphadenopathy. Local Lidocaine was administered. Under ultrasound guidance, a coaxial 18 gauge needle was advanced into the lesion. A total of 4  cores were obtained.  Samples were touch prepped and reviewed with cytology, confirming adequacy. No appreciable complication         CONCLUSION:  1. Successful left supraclavicular lymphadenopathy biopsy as detailed above.     Dictated by (CST): Parviz Ragsdale MD on 4/15/2025 at 2:51 PM     Finalized by (CST): Parviz Ragsdale MD on 4/15/2025 at 2:54 PM          CARD ECHO 2D DOPPLER (CPT=93306)  Result Date: 2025  Transthoracic Echocardiogram Name:Kelli Fisher Date: 2025 :  1955 Ht:  (60in)  BP: 128 / 69 MRN:  [id number]    Age:  70years    Wt:  (162lb) HR: 96bpm Loc:  [facility]       Gndr: F          BSA: 1.71m^2 Sonographer: ANGELICA Prather RDCS Ordering:    Mercedes Martínez Consulting:  Eddie Kemp ---------------------------------------------------------------------------- History/Indications:  Rule out pericardial effusion ---------------------------------------------------------------------------- Procedure information:  A transthoracic complete 2D study was performed. Additional evaluation included M-mode, complete spectral Doppler, and color Doppler.  Patient status:  Inpatient.  Location:  Bedside.    This was a routine study. Transthoracic echocardiography for diagnosis, ventricular function evaluation, and assessment of valvular function. Image quality was adequate. The study was technically limited due to poor acoustic window availability, body habitus, and patient  supine. ECG rhythm:   Normal sinus ---------------------------------------------------------------------------- Conclusions: 1. Left ventricle: The cavity size was normal. Wall thickness was normal.    Systolic function was normal. The estimated ejection fraction was 50-55%,    by biplane method of disks. No diagnostic evidence for regional wall    motion abnormalities. Left ventricular diastolic function parameters were    normal. 2. Right ventricle: The cavity size was normal. Systolic function was    normal. 3. No significant valvular heart disease (stenosis or regurgitation) 4. Pericardium, extracardiac: A small pericardial effusion was identified    posterior to the heart. No echocardiogram signs of tamponade present (ie,    respiratory valve inflow variation) 5. Pulmonary arteries: Systolic pressure was moderately increased, estimated    to be 49mm Hg. * ---------------------------------------------------------------------------- * Findings: Left ventricle:  The cavity size was normal. Wall thickness was normal. Systolic function was normal. The estimated ejection fraction was 50-55%, by biplane method of disks. No diagnostic evidence for diffuse regional wall motion abnormalities. No diagnostic evidence for regional wall motion abnormalities. Left ventricular diastolic function parameters were normal. Left atrium:  The atrium was normal in size. Right ventricle:  The cavity size was normal. Systolic function was normal. Right atrium:  The atrium was normal in size. Mitral valve:  The valve was structurally normal. Leaflet separation was normal.  Doppler:  Transvalvular velocity was within the normal range. There was no evidence for stenosis. There was no significant regurgitation.    The valve area by pressure half-time was 6.88cm^2. The valve area index by pressure half-time was 4.03cm^2/m^2. Aortic valve:  The valve was structurally normal. The valve was probably trileaflet but cannot rule out bicuspid  morphology Cusp separation was normal.  Doppler:  Transvalvular velocity was within the normal range. There was no evidence for stenosis. There was no significant regurgitation.    The valve area (VTI) was 1.43cm^2. The valve area (VTI) index was 0.84cm^2/m^2.   The mean systolic gradient was 7mm Hg. The peak systolic gradient was 14mm Hg. Tricuspid valve:  The valve is structurally normal. Leaflet separation was normal.  Doppler:  Transvalvular velocity was within the normal range. There was no evidence for stenosis. There was trivial regurgitation. Pulmonic valve:   The valve is structurally normal. Cusp separation was normal.  Doppler:  Transvalvular velocity was within the normal range. There was no evidence for stenosis. There was mild regurgitation. Pericardium:  A small pericardial effusion was identified posterior to the heart. No echocardiogram signs of tamponade present (ie, respiratory valve inflow variation) Aorta: Aortic root: The aortic root was normal-sized. Ascending aorta: The ascending aorta was normal. Pulmonary arteries: The main pulmonary artery was normal-sized. Systolic pressure was moderately increased, estimated to be 49mm Hg. Systemic veins:  Central venous respirophasic diameter changes are blunted (< 50%). Inferior vena cava: The IVC was dilated. ---------------------------------------------------------------------------- Measurements  Left ventricle                  Value          Ref  IVS thickness, ED, PLAX         0.8   cm       0.6 - 0.9  LV ID, ED, PLAX                 4.4   cm       3.8 - 5.2  LV ID, ES, PLAX                 2.8   cm       2.2 - 3.5  LV PW thickness, ED, PLAX       0.7   cm       0.6 - 0.9  IVS/LV PW ratio, ED, PLAX       1.14           ---------  LV PW/LV ID ratio, ED, PLAX     0.16           ---------  LV area, ES, A4C                12.6  cm^2     ---------  LV ejection fraction            66    %        54 - 74  Stroke volume/bsa, 2D           26    ml/m^2    ---------  LV end-diastolic volume,        54    ml       48 - 140  1-p A4C  LV end-systolic volume, 1-p     25    ml       12 - 60  A4C  LV ejection fraction, 1-p       51    %        46 - 78  A4C  Stroke volume, 1-p A4C          27    ml       ---------  LV end-diastolic                32    ml/m^2   30 - 82  volume/bsa, 1-p A4C  LV end-systolic volume/bsa,     14    ml/m^2   7 - 35  1-p A4C  Stroke volume/bsa, 1-p A4C      16    ml/m^2   ---------  LV end-diastolic volume,        64    ml       46 - 106  2-p  LV end-diastolic                38    ml/m^2   29 - 61  volume/bsa, 2-p  LV e', lateral                  11.7  cm/sec   >=10.0  LV E/e', lateral                5              <=13  LV e', medial                   10.1  cm/sec   >=7.0  LV E/e', medial                 6              ---------  LV e', average                  10.9  cm/sec   ---------  LV E/e', average                5              <=14  LVOT                            Value          Ref  LVOT ID                         1.8   cm       ---------  LVOT peak velocity, S           1.01  m/sec    ---------  LVOT VTI, S                     17.5  cm       ---------  LVOT peak gradient, S           4     mm Hg    ---------  LVOT mean gradient, S           2     mm Hg    ---------  Stroke volume (SV), LVOT DP     45    ml       ---------  Stroke index (SV/bsa), LVOT     26    ml/m^2   ---------  DP  Aortic valve                    Value          Ref  Aortic valve peak velocity,     1.84  m/sec    ---------  S  Aortic valve VTI, S             31.0  cm       ---------  Aortic mean gradient, S         7     mm Hg    ---------  Aortic peak gradient, S         14    mm Hg    ---------  Aortic valve area, VTI          1.43  cm^2     ---------  Aortic valve area/bsa, VTI      0.84  cm^2/m^2 ---------  Velocity ratio, peak,           0.55           ---------  LVOT/AV  Ascending aorta                 Value          Ref  Ascending aorta ID              3.4   cm        1.9 - 3.5  Left atrium                     Value          Ref  LA volume, ES, 1-p A4C          39    ml       22 - 52  LA volume, ES, 1-p A2C          35    ml       22 - 52  LA volume, ES, A/L              45    ml       ---------  LA volume/bsa, ES, A/L          26    ml/m^2   16 - 34  Mitral valve                    Value          Ref  Mitral E-wave peak velocity     0.58  m/sec    ---------  Mitral A-wave peak velocity     0.83  m/sec    ---------  Mitral deceleration time        109   ms       ---------  Mitral pressure half-time       32    ms       ---------  Mitral E/A ratio, peak          0.7            ---------  Mitral valve area, PHT, DP      6.88  cm^2     ---------  Mitral valve area/bsa, PHT,     4.03  cm^2/m^2 ---------  DP  Pulmonary artery                Value          Ref  PA pressure, S, DP              49    mm Hg    ---------  PA pressure, ED, DP             21    mm Hg    ---------  Tricuspid valve                 Value          Ref  Tricuspid regurg peak       (H) 2.92  m/sec    <=2.8  velocity  Tricuspid peak RV-RA            34    mm Hg    ---------  gradient  Systemic veins                  Value          Ref  Estimated CVP                   15    mm Hg    ---------  Inferior vena cava              Value          Ref  ID                          (H) 2.7   cm       <=2.1  Right ventricle                 Value          Ref  TAPSE, MM                       1.97  cm       >=1.70  RV pressure, S, DP              49    mm Hg    ---------  RV s', lateral                  17.6  cm/sec   >=9.5  Pulmonic valve                  Value          Ref  Pulmonic regurg velocity,       1.27  m/sec    ---------  ED  Pulmonic regurg gradient,       6     mm Hg    ---------  ED Legend: (L)  and  (H)  corinna values outside specified reference range. ---------------------------------------------------------------------------- Prepared and electronically signed by Oniel Grijalva 04/12/2025 16:07     CT CHEST PE  AORTA (IV ONLY) (CPT=71260)  Result Date: 4/11/2025  PROCEDURE: CT CHEST PE AORTA (IV ONLY) (CPT=71260)  COMPARISON: [facility], CT SOFT TISSUE OF NECK (CONTRAST ONLY) (CPT=70491), 4/11/2025, 9:30 PM.  INDICATIONS: Shortness of breath. History of cancer.  TECHNIQUE: Multidetector CT images of the chest were obtained with non-ionic intravenous contrast material. Automated exposure control for dose reduction was used. Adjustment of the mA and/or kV was done based on the patient's size. Iterative reconstruction technique for dose reduction was employed. Dose information was transmitted to the ACR (American College of Radiology) NRDR (National Radiology Data Registry), which includes the Dose Index Registry. Multiplanar reformats and maximum intensity projection images were created.   FINDINGS: VASCULATURE: There is adequate opacification of the pulmonary arterial tree. No suspicious filling defects are identified in the main, lobar, segmental, or proximal subsegmental pulmonary artery branches to suggest acute pulmonary embolism. The distal subsegmental branches are less well assessed. The main pulmonary artery trunk is normal in caliber, measuring 2.9 cm. CARDIAC: The heart is not enlarged. There is no bowing of the interventricular septum to suggest right ventricular strain. Moderate pericardial effusion extends into the superior pericardial recess. THORACIC AORTA: The thoracic aorta has normal-variant two-vessel configuration with a shared origin of the brachiocephalic trunk and left common carotid artery; no dissection is evident. LUNGS/PLEURA: Lobulated masslike airspace disease is demonstrated in the left upper lobe measuring 5.6 x 7.1 x 4.0 cm. Satellite lingular nodularity is evident measuring 1.5 x 1.8 cm, 0.4 cm in diameter, and 0.6 x 0.9 cm (series 4, images 71-72).  A 0.4 cm diameter right middle lobe nodule is seen, and there is a 0.6 x 0.7 cm anterior subpleural nodule (series 4, images 52  and 74, respectively). Right lower lobe nodules are seen measuring 0.7 x 0.6 cm and 0.8 x 0.7 cm (series 4, images 52 and 53). Numerous additional pulmonary nodules and micro nodules are seen.   Small bilateral pleural effusions are seen with associated compressive atelectasis, with or without superimposed airspace consolidation. Minimal interlobular septal thickening is present. No pneumothorax is detected.  AIRWAYS: The tracheobronchial tree is without central mass or obstructing lesion. MEDIASTINUM/SCOTT: There is prevascular space lymphadenopathy measuring 2.5 x 1.7 cm and 1.5 x 1.8 cm. Right upper paratracheal lymphadenopathy measures up to 3.1 x 5.1 cm.  Infiltrative right lower paratracheal lymphadenopathy measures 4.4 x 2.8 cm. Aortopulmonary window lymphadenopathy measures 2.2 x 2.7 cm. Subcarinal lymphadenopathy measures 3.4 x 1.8 cm.  There may be periesophageal lymphadenopathy in the posterior mediastinum.  Right perihilar lymphadenopathy measures 1.6 x 1.9 cm. CHEST WALL: Please see the separately dictated concurrently performed contrast-enhanced CT of the soft tissues of the neck for further details regarding extensive supraclavicular lymphadenopathy. LIMITED ABDOMEN: Within the parameters of the arterial phase of contrast-enhancement, the included upper abdomen is unremarkable.  BONES: Slight scoliosis is noted. Multilevel degenerative changes are seen throughout the thoracic spine. Confluent anterior ossific bridging may reflect underlying diffuse idiopathic skeletal hyperostosis. Significant degenerative changes of the shoulders are present bilaterally. OTHER: A small hiatal hernia is evident.          CONCLUSION:  1. No evidence of acute pulmonary embolism to the level of the first order subsegmental pulmonary artery branches.  2. Masslike opacity involving the left upper lobe, concerning for potential neoplastic process.  3. Extensive bilateral nodules and micronodules, which could be metastatic in  nature.  4. Marked mediastinal/perihilar lymphadenopathy, likely neoplastic.  5. Partially delineated supraclavicular lymphadenopathy.  6. Moderate pericardial effusion, potentially malignant.  7. Small bilateral pleural effusions are present with associated basilar atelectasis, with or without superimposed pneumonia.  8. Mild interlobular septal thickening is present and could be indicative of pulmonary interstitial edema or lymphangitic dissemination of malignancy.  9. Lesser incidental findings as above.    Dictated by (CST): Harpreet Jade MD on 4/11/2025 at 10:08 PM     Finalized by (CST): Harpreet Jade MD on 4/11/2025 at 10:20 PM          CT SOFT TISSUE OF NECK(CONTRAST ONLY) (CPT=70491)  Result Date: 4/11/2025  PROCEDURE: CT SOFT TISSUE OF NECK (CONTRAST ONLY) (CPT=70491)  COMPARISON: None available.  INDICATIONS: Dysphagia.  TECHNIQUE: Multidetector CT images were obtained through the neck soft tissues following the infusion of non-ionic intravenous contrast material. Automated exposure control for dose reduction was used. Adjustment of the mA and/or kV was done based on the  patient's size. Iterative reconstruction technique for dose reduction was employed. Dose information was transmitted to the ACR (American College of Radiology) NRDR (National Radiology Data Registry), which includes the Dose Index Registry.   FINDINGS:  NASO/OROPHARYNX: No mass or significant asymmetry.  ORAL CAVITY/TONGUE: No visible mass or significant asymmetry.  COMPARTMENTS: The , parotid, carotid, retropharyngeal, and perivertebral spaces are without focal attenuation abnormality. The parapharyngeal fat planes are bilaterally symmetric without effacement. HYPOPHARYNX: No mass or other visible lesion.  LARYNX: No apparent vocal cord mass or asymmetry. NECK GLANDS: The parotid, submandibular, and thyroid glands are unremarkable.  LYMPH NODES: Extensive pathological-appearing and enlarged lymph nodes are demonstrated. A  reference right level IVb medial supraclavicular node measures 3.3 x 3.7 cm; there appears to be an adjacent necrotic lymph node measuring 1.4 x 2.0 cm. A right supraclavicular lymph node laterally measures 1.6 x 1.5 cm. A level VIa heterogeneous necrotic-appearing lymph node measures 4.7 x 3.7 cm. Left level [place]/IVb lower jugular/medial supraclavicular nodes measure 3.5 x 3.0 cm and 3.9 x 3.5 cm. VASCULATURE: Limited views are unremarkable.  SINUSES/MASTOIDS: Limited views show no significant fluid or mucosal thickening.  MEDIASTINUM: Limited views are notable for lymphadenopathy; please see the concurrently performed PE protocol chest CT for further details.  BONES: Multilevel degenerative changes are seen throughout the spine. There may be periapical abscess formation extending to the right maxillary alveolar recess. OTHER: Visualized portions of the contrast enhanced cerebrum and cerebellum are grossly unremarkable. There is dependent subsegmental atelectasis of the visualized lungs bilaterally.          CONCLUSION:  1. Extensive lymphadenopathy, likely metastatic.  2. Lesser incidental findings as above.     Dictated by (CST): Harpreet Jade MD on 4/11/2025 at 9:59 PM     Finalized by (CST): Harpreet Jade MD on 4/11/2025 at 10:08 PM            Data Review:   Recent Labs   Lab 05/01/25  0719 05/02/25  0605 05/02/25  2224 05/03/25  0732   RBC 2.98* 3.01*  --  3.57*   HGB 7.3* 7.5* 8.9* 9.0*   HCT 24.5* 24.6*  --  28.8*   WBC 12.1* 15.6*  --  13.9*   .0 311.0  --  301.0       Recent Labs   Lab 04/29/25  2158 04/30/25  0909 05/01/25  2336 05/02/25  0605 05/03/25  0732   *   < > 152* 153* 131*   BUN 11   < > 9 10 10   CREATSERUM 0.93   < > 0.66 0.71 0.65   CA 8.8   < > 8.6* 8.5* 8.9   ALB 3.4  --   --   --   --    *   < > 136 135* 136   K 3.9   < > 4.3 4.2 3.9   CL 97*   < > 103 102 104   CO2 27.0   < > 23.0 23.0 23.0   ALKPHO 250*  --   --   --   --    AST 17  --   --   --   --    ALT 7*  --    --   --   --    BILT 0.6  --   --   --   --    TP 7.1  --   --   --   --     < > = values in this interval not displayed.       Lab Results   Component Value Date     210.0 (H) 04/16/2025    CEA <0.5 04/16/2025     156.3 (H) 04/16/2025       ASSESSMENT/PLAN:  Patient is a 70 year old female No obstetric history on file.   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  We have already submitted and got the approval for her chemotherapy to start Tuesday morning May 6 at 730 this are scheduled infusion.  Because the chemotherapeutic drugs that I have chosen are all considered outpatient by Medicare, I would prefer to keep the current schedule for Tuesday morning.  However if the patient will need to remain hospitalized, we will plan on asking the Chief medical officer to approve as an inpatient regimen.  I did discuss this with the entire family of over 7 people and explained the drugs as well as her current situation and answered all of their questions to their satisfaction.  Pulmonary metastases: Patient has large left lung metastasis as well as multiple small metastases on the right.  These are not resectable and her only treatment option will be chemotherapy.  Pulmonary is on consult and appreciate their recommendations  Cardiac: Patient has had multiple bouts of SVT and  is now on amiodarone per cardiology.  Appreciate their input.  Diet: I did encourage a high-protein diet mainly with chicken noodle soup and eggs to make it easier for her to swallow as well as digest.  Currently there is no sign of a bowel obstruction and her appetite appears to be improving.  Disposition: Patient is currently planned for an inpatient port placement Monday morning and will consider inpatient versus outpatient chemotherapy pending her progress on that  day.      All of the findings and plan were discussed with the patient.  She notes understanding and agrees with the plan of care.  All questions were answered to the best of my ability at this time.    CASEY GRIJALVA,   5/3/2025  12:40 PM          [1]   Medications Prior to Admission   Medication Sig Dispense Refill Last Dose/Taking    levothyroxine 100 MCG Oral Tab Take 1 tablet (100 mcg total) by mouth before breakfast.   4/29/2025    albuterol (2.5 MG/3ML) 0.083% Inhalation Nebu Soln Take 3 mL (2.5 mg total) by nebulization every 6 (six) hours as needed for Wheezing. 90 each 3     Fluticasone Propionate  HFA (FLOVENT HFA) 220 MCG/ACT Inhalation Aerosol Inhale 2 puffs into the lungs in the morning and 2 puffs before bedtime.      [2]   Allergies  Allergen Reactions    Aspirin Tightness in Throat    Penicillins HIVES    Other OTHER (SEE COMMENTS)     Pt states IV steroid allergy, states it makes her breathing worse

## 2025-05-03 NOTE — PROGRESS NOTES
Provider Clarification     Additional clarification of the pneumonia being treated.    Type: Possible, but less likely Pneumonia. Treated empirically with abx, now discontinued.     This note is part of the patient's medical record.

## 2025-05-03 NOTE — PLAN OF CARE
Problem: Patient Centered Care  Goal: Patient preferences are identified and integrated in the patient's plan of care  Description: Interventions:- What would you like us to know as we care for you?  I live at home with family.  - Provide timely, complete, and accurate information to patient/family- Incorporate patient and family knowledge, values, beliefs, and cultural backgrounds into the planning and delivery of care- Encourage patient/family to participate in care and decision-making at the level they choose- Honor patient and family perspectives and choices  Outcome: Progressing     Problem: PAIN - ADULT  Goal: Verbalizes/displays adequate comfort level or patient's stated pain goal  Description: INTERVENTIONS:- Encourage pt to monitor pain and request assistance- Assess pain using appropriate pain scale- Administer analgesics based on type and severity of pain and evaluate response- Implement non-pharmacological measures as appropriate and evaluate response- Consider cultural and social influences on pain and pain management- Manage/alleviate anxiety- Utilize distraction and/or relaxation techniques- Monitor for opioid side effects- Notify MD/LIP if interventions unsuccessful or patient reports new pain- Anticipate increased pain with activity and pre-medicate as appropriate  Outcome: Progressing     Problem: RISK FOR INFECTION - ADULT  Goal: Absence of fever/infection during anticipated neutropenic period  Description: INTERVENTIONS- Monitor WBC- Administer growth factors as ordered- Implement neutropenic guidelines  Outcome: Progressing     Problem: SAFETY ADULT - FALL  Goal: Free from fall injury  Description: INTERVENTIONS:- Assess pt frequently for physical needs- Identify cognitive and physical deficits and behaviors that affect risk of falls.- [place] fall precautions as indicated by assessment.- Educate pt/family on patient safety including physical limitations- Instruct pt to call for assistance  with activity based on assessment- Modify environment to reduce risk of injury- Provide assistive devices as appropriate- Consider OT/PT consult to assist with strengthening/mobility- Encourage toileting schedule  Outcome: Progressing     Problem: CARDIOVASCULAR - ADULT  Goal: Maintains optimal cardiac output and hemodynamic stability  Description: INTERVENTIONS:- Monitor vital signs, rhythm, and trends- Monitor for bleeding, hypotension and signs of decreased cardiac output- Evaluate effectiveness of vasoactive medications to optimize hemodynamic stability- Monitor arterial and/or venous puncture sites for bleeding and/or hematoma- Assess quality of pulses, skin color and temperature- Assess for signs of decreased coronary artery perfusion - ex. Angina- Evaluate fluid balance, assess for edema, trend weights  Outcome: Progressing  Goal: Absence of cardiac arrhythmias or at baseline  Description: INTERVENTIONS:- Continuous cardiac monitoring, monitor vital signs, obtain 12 lead EKG if indicated- Evaluate effectiveness of antiarrhythmic and heart rate control medications as ordered- Initiate emergency measures for life threatening arrhythmias- Monitor electrolytes and administer replacement therapy as ordered  Outcome: Progressing

## 2025-05-03 NOTE — PROGRESS NOTES
Provider Clarification     Additional information on the patient's nutritional status.    The patient has moderate malnutrition     This note is part of the patient's medical record.

## 2025-05-03 NOTE — PROGRESS NOTES
[facility]  part of [place] Hospitalist Progress Note     Kelli Fisher Patient Status:  Inpatient    1955 MRN [id number]   Location [facility] 4W/SW/SE Attending Joel Dejesus MD   Hosp Day # 3 PCP Taylor Gallardo MD     Chief Complaint:   Chief Complaint   Patient presents with    Chest Pain Angina        Subjective:     Patient seen sitting in chair.  Multiple family members at bedside.  Patient denies acute events overnight.  Patient reports feeling much better today.  Patient seen post swallow study.  Patient denied troubles with swallow study.  Asking for reinitiation of diet.  Patient states shortness of breath improved today.      Objective:      Vital signs:  Vitals:    25 2346 25 0437 25 0549 25 0824   BP:   (!) 146/96 128/90   BP Location:   Right arm Right arm   Pulse: 106 107 104 100   Resp: (!) 32 (!) 38 (!) 30 (!) 28   Temp:   97.7 °F (36.5 °C) 98.1 °F (36.7 °C)   TempSrc:   Axillary Oral   SpO2: 94% 95% 95% 97%   Weight:   175 lb 12.8 oz (79.7 kg)    Height:           Intake/Output Summary (Last 24 hours) at 5/3/2025 1142  Last data filed at 5/3/2025 0800  Gross per 24 hour   Intake 2232.62 ml   Output 620 ml   Net 1612.62 ml           Physical Exam:    GENERAL:  Awake and alert, in no acute distress.  HEART:  Regular rhythm, mild tachycardia  LUNGS:  Air entry was decreased.  No increased work of breathing.  No wheezes   ABDOMEN: Soft and non-tender.    PSYCHIATRIC: Normal mood    Diagnostic Data:    Labs:    Recent Labs   Lab 25  2158 25  0909 25  0719 25  0605 25  2224 25  0732   WBC 13.5*   < > 12.1* 15.6*  --  13.9*   HGB 6.8*   < > 7.3* 7.5* 8.9* 9.0*   MCV 77.2*   < > 82.2 81.7  --  80.7   .0   < > 328.0 311.0  --  301.0   INR 1.33*  --   --   --   --   --     < > = values in this interval not displayed.       Recent Labs   Lab 25  2158 25  0909 25  9316  05/02/25  0605 05/03/25  0732   *   < > 152* 153* 131*   BUN 11   < > 9 10 10   CREATSERUM 0.93   < > 0.66 0.71 0.65   CA 8.8   < > 8.6* 8.5* 8.9   ALB 3.4  --   --   --   --    *   < > 136 135* 136   K 3.9   < > 4.3 4.2 3.9   CL 97*   < > 103 102 104   CO2 27.0   < > 23.0 23.0 23.0   ALKPHO 250*  --   --   --   --    AST 17  --   --   --   --    ALT 7*  --   --   --   --    BILT 0.6  --   --   --   --    TP 7.1  --   --   --   --     < > = values in this interval not displayed.           Estimated Creatinine Clearance: 58.7 mL/min (based on SCr of 0.65 mg/dL).    Recent Labs   Lab 04/29/25  2158   PTP 17.2*   INR 1.33*            COVID-19  No results found for: \"COVID19\"    Pro-Calcitonin  Recent Labs   Lab 04/30/25  1146   PCT 0.33*       Cardiac  Recent Labs   Lab 04/29/25  2200   PBNP 239*       Inflammatory Markers  No results for input(s): \"CRP\", \"ORVILLE\", \"LDH\", \"DDIMER\" in the last 168 hours.    Culture:  Hospital Encounter on 04/29/25   1. Blood Culture     Status: None (Preliminary result)    Collection Time: 04/30/25  9:09 AM    Specimen: Blood,peripheral   Result Value Ref Range    Blood Culture Result No Growth 3 Days N/A       XR CHEST AP PORTABLE  (CPT=71045)  Result Date: 5/2/2025  CONCLUSION:   Stable opacity left perihilar mid lung.  Mild opacity right lung base which may reflect atelectasis with or without superimposed pneumonia.    Dictated by (CST): Casey Diallo MD on 5/02/2025 at 7:18 AM     Finalized by (CST): Casey Diallo MD on 5/02/2025 at 7:19 AM            EKG 12 Lead  Result Date: 5/2/2025  Normal sinus rhythm Normal ECGg When compared with ECG of 01-MAY-2025 23:31, No significant change was found Confirmed by TRINI CARBAJAL ELMER ([id number]) on 5/2/2025 9:50:37 AM    EKG 12 Lead  Result Date: 5/2/2025  Sinus tachycardia Otherwise normal ECG When compared with ECG of 29-APR-2025 20:52, Premature atrial complexes are no longer Present Confirmed by TRINI CARBAJAL ELMER ([id number])  on 5/2/2025 9:50:24 AM      Medications: Scheduled Medications[1]    Assessment & Plan:        Acute hypoxic respiratory failure:     - Patient presented with progressive shortness of breath     - Likely secondary to recently diagnosed metastasized endometrial carcinoma     - Left upper lobe lung mass identified during recent admission (April 11-17, 2025)     - 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     - Initially started on empiric antibiotic therapy for possible pneumonia.       - Pulmonology consultation after further discussion, less concern for pneumonia.    -Patient completed short course of antibiotics.    - Oncology team consulted.  Plan for port placement on Monday.  Planning for initiation of chemotherapy on Tuesday (when approved by insurance).    -Appreciate further mentations      Acute and chronic anemia:     - Acute exacerbation of chronic anemia     - Hemoglobin 6.8 on admission.  Status post 1 unit PRBC.     - Likely multifactorial, potentially related to underlying malignancy and recent treatments  -Patient received 1 unit PRBC on 5/2 after recommendations from cardiology to maintain hemoglobin greater than 8.  -Hemoglobin 9 today.      - Monitor hemoglobin levels      - Continue PPI        Pericardial effusion:     - Known small pericardial effusion diagnosed during recent admission     - Bedside ultrasound by ER physician showed no evidence of tamponade     - Cardiology consultation for further evaluation and management     - Monitor for signs of hemodynamic compromise     Leukocytosis:     - Elevated white blood cell count noted on complete blood count     - Potentially indicating an inflammatory or infectious process     - Monitor white blood cell count closely     - Correlate with clinical presentation and  other diagnostic studies     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  -IR available for port placement.      Symptomatic Paroxysmal SVT  -Multiple episodes of narrow complex tachycardia w/rates 150's -200's  -S/P diltiazem, IV metoprolol   - EP consulted, recommended initiation of amiodarone.  -Maintaining sinus rhythm, mild tachycardia today.  -Keeping amiodarone per cardiology.  Appreciate further recommendations.      Plan of care discussed with patient and family at bedside . Discussed management/test result(s) with Rn, pulmonology and oncology consultant      Quality:  DVT Prophylaxis: SCDs  CODE status: Full  Estimated date of discharge: TBD  Discharge is dependent on: clinical stability    54 minutes spent discussing with other providers, examining patient, obtaining history, reviewing medical records, interpreting and communicating test results/imaging, ordering tests/medications, discussing plan of care and documenting information.      Joel Dejesus MD          This note was prepared using Dragon Medical voice recognition dictation software. As a result errors may occur. When identified these errors have been corrected. While every attempt is made to correct errors during dictation discrepancies may still exist         [1]    docusate  100 mg Oral BID    amiodarone  200 mg Oral TID Beta Blocker/Cardiac    mylanta-dicyclomine-lidocaine 2% (G.I. Cocktail) oral liquid   Oral Once    levothyroxine  100 mcg Oral Before breakfast    fluticasone furoate  1 puff Inhalation Daily

## 2025-05-03 NOTE — PLAN OF CARE
Pt passed video swallow. Diet upgraded. Pt still with very poor appetite. Encouraged to eat protein. Plan for port placement Monday and Chemo Tuesday pending insurance approval.      Problem: Patient Centered Care  Goal: Patient preferences are identified and integrated in the patient's plan of care  Description: Interventions:- What would you like us to know as we care for you?  I live at home with family.  - Provide timely, complete, and accurate information to patient/family- Incorporate patient and family knowledge, values, beliefs, and cultural backgrounds into the planning and delivery of care- Encourage patient/family to participate in care and decision-making at the level they choose- Honor patient and family perspectives and choices  Outcome: Progressing     Problem: PAIN - ADULT  Goal: Verbalizes/displays adequate comfort level or patient's stated pain goal  Description: INTERVENTIONS:- Encourage pt to monitor pain and request assistance- Assess pain using appropriate pain scale- Administer analgesics based on type and severity of pain and evaluate response- Implement non-pharmacological measures as appropriate and evaluate response- Consider cultural and social influences on pain and pain management- Manage/alleviate anxiety- Utilize distraction and/or relaxation techniques- Monitor for opioid side effects- Notify MD/LIP if interventions unsuccessful or patient reports new pain- Anticipate increased pain with activity and pre-medicate as appropriate  Outcome: Progressing     Problem: RISK FOR INFECTION - ADULT  Goal: Absence of fever/infection during anticipated neutropenic period  Description: INTERVENTIONS- Monitor WBC- Administer growth factors as ordered- Implement neutropenic guidelines  Outcome: Progressing     Problem: SAFETY ADULT - FALL  Goal: Free from fall injury  Description: INTERVENTIONS:- Assess pt frequently for physical needs- Identify cognitive and physical deficits and behaviors that  affect risk of falls.- [place] fall precautions as indicated by assessment.- Educate pt/family on patient safety including physical limitations- Instruct pt to call for assistance with activity based on assessment- Modify environment to reduce risk of injury- Provide assistive devices as appropriate- Consider OT/PT consult to assist with strengthening/mobility- Encourage toileting schedule  Outcome: Progressing     Problem: CARDIOVASCULAR - ADULT  Goal: Maintains optimal cardiac output and hemodynamic stability  Description: INTERVENTIONS:- Monitor vital signs, rhythm, and trends- Monitor for bleeding, hypotension and signs of decreased cardiac output- Evaluate effectiveness of vasoactive medications to optimize hemodynamic stability- Monitor arterial and/or venous puncture sites for bleeding and/or hematoma- Assess quality of pulses, skin color and temperature- Assess for signs of decreased coronary artery perfusion - ex. Angina- Evaluate fluid balance, assess for edema, trend weights  Outcome: Progressing  Goal: Absence of cardiac arrhythmias or at baseline  Description: INTERVENTIONS:- Continuous cardiac monitoring, monitor vital signs, obtain 12 lead EKG if indicated- Evaluate effectiveness of antiarrhythmic and heart rate control medications as ordered- Initiate emergency measures for life threatening arrhythmias- Monitor electrolytes and administer replacement therapy as ordered  Outcome: Progressing

## 2025-05-03 NOTE — PROGRESS NOTES
[facility]  part of [facility]    Progress Note      Assessment and Plan:     1.  Respiratory syndrome-I highly doubt pneumonia as the CAT scan simply redemonstrates the previously identified masses which were slightly increased.  She has severe anemia contributing to dyspnea.  No evidence of PE.  No evidence of tamponade physiology.  Status posttransfusion.    The patient is breathing comfortably at rest with oxygen and has a swallow evaluation planned for today.    Recommendations:  1.  Await video swallow  2.  Okay from my perspective to proceed with line placement and initiation of chemotherapeutic for the underlying malignancy.    2.  DVT prophylaxis-SCDs in patient with severe anemia.    3.  Metastatic endometrial carcinoma-as per gynecologic oncology.  To get port placed on Monday and then initiation of chemotherapeutic on Tuesday.    4.  Recurrent episodes of atrial tachycardia with frequent PACs    Recommendations: As per EP, amiodarone    Subjective:   Kelli Fisher is a(n) 70 year old female who has mild dyspnea at baseline    Objective:   Blood pressure 128/90, pulse 100, temperature 98.1 °F (36.7 °C), temperature source Oral, resp. rate (!) 28, height 5' (1.524 m), weight 175 lb 12.8 oz (79.7 kg), SpO2 97%.    Physical Exam alert female  HEENT examination is unremarkable with pupils equal round and reactive to light and accommodation.   Neck without adenopathy, thyromegaly, JVD nor bruit.   Lungs slightly diminished to auscultation and percussion.  Cardiac regular rate and rhythm no murmur.   Abdomen nontender, without hepatosplenomegaly and no mass appreciable.   Extremities without clubbing cyanosis nor edema.   Neurologic grossly intact with symmetric tone and strength and reflex.  Skin without gross abnormality     Results:     Lab Results   Component Value Date    WBC 13.9 05/03/2025    HGB 9.0 05/03/2025    HCT 28.8 05/03/2025    .0 05/03/2025    CREATSERUM 0.65  05/03/2025    BUN 10 05/03/2025     05/03/2025    K 3.9 05/03/2025     05/03/2025    CO2 23.0 05/03/2025     05/03/2025    CA 8.9 05/03/2025    MG 1.9 05/03/2025    B12 >2,000 05/02/2025       Srinath Levy MD  Medical Director, Critical Care, [facility]  Medical Director, [facility]  Pager: [phone]

## 2025-05-04 LAB
ANION GAP SERPL CALC-SCNC: 7 MMOL/L (ref 0–18)
BASOPHILS # BLD AUTO: 0.03 X10(3) UL (ref 0–0.2)
BASOPHILS NFR BLD AUTO: 0.2 %
BLOOD TYPE BARCODE: 7300
BUN BLD-MCNC: 12 MG/DL (ref 9–23)
BUN/CREAT SERPL: 15.8 (ref 10–20)
CALCIUM BLD-MCNC: 8.7 MG/DL (ref 8.7–10.4)
CANCER AG125 SERPL-ACNC: 443 U/ML (ref ?–30.2)
CANCER AG19-9 SERPL-ACNC: 230.8 U/ML (ref ?–35)
CHLORIDE SERPL-SCNC: 103 MMOL/L (ref 98–112)
CO2 SERPL-SCNC: 26 MMOL/L (ref 21–32)
CREAT BLD-MCNC: 0.76 MG/DL (ref 0.55–1.02)
DEPRECATED RDW RBC AUTO: 59.5 FL (ref 35.1–46.3)
EGFRCR SERPLBLD CKD-EPI 2021: 85 ML/MIN/1.73M2 (ref 60–?)
EOSINOPHIL # BLD AUTO: 0.02 X10(3) UL (ref 0–0.7)
EOSINOPHIL NFR BLD AUTO: 0.2 %
ERYTHROCYTE [DISTWIDTH] IN BLOOD BY AUTOMATED COUNT: 20.3 % (ref 11–15)
GLUCOSE BLD-MCNC: 137 MG/DL (ref 70–99)
HCT VFR BLD AUTO: 28.8 % (ref 35–48)
HGB BLD-MCNC: 9 G/DL (ref 12–16)
IMM GRANULOCYTES # BLD AUTO: 0.19 X10(3) UL (ref 0–1)
IMM GRANULOCYTES NFR BLD: 1.4 %
LYMPHOCYTES # BLD AUTO: 0.9 X10(3) UL (ref 1–4)
LYMPHOCYTES NFR BLD AUTO: 6.8 %
MCH RBC QN AUTO: 25 PG (ref 26–34)
MCHC RBC AUTO-ENTMCNC: 31.3 G/DL (ref 31–37)
MCV RBC AUTO: 80 FL (ref 80–100)
MONOCYTES # BLD AUTO: 1.41 X10(3) UL (ref 0.1–1)
MONOCYTES NFR BLD AUTO: 10.6 %
NEUTROPHILS # BLD AUTO: 10.71 X10 (3) UL (ref 1.5–7.7)
NEUTROPHILS # BLD AUTO: 10.71 X10(3) UL (ref 1.5–7.7)
NEUTROPHILS NFR BLD AUTO: 80.8 %
OSMOLALITY SERPL CALC.SUM OF ELEC: 284 MOSM/KG (ref 275–295)
PLATELET # BLD AUTO: 316 10(3)UL (ref 150–450)
POTASSIUM SERPL-SCNC: 3.7 MMOL/L (ref 3.5–5.1)
RBC # BLD AUTO: 3.6 X10(6)UL (ref 3.8–5.3)
SODIUM SERPL-SCNC: 136 MMOL/L (ref 136–145)
UNIT VOLUME: 350 ML
WBC # BLD AUTO: 13.3 X10(3) UL (ref 4–11)

## 2025-05-04 PROCEDURE — 99233 SBSQ HOSP IP/OBS HIGH 50: CPT | Performed by: INTERNAL MEDICINE

## 2025-05-04 RX ORDER — AMIODARONE HYDROCHLORIDE 200 MG/1
200 TABLET ORAL DAILY
Status: DISCONTINUED | OUTPATIENT
Start: 2025-05-07 | End: 2025-05-06

## 2025-05-04 RX ORDER — AMIODARONE HYDROCHLORIDE 200 MG/1
200 TABLET ORAL DAILY
Qty: 90 TABLET | Refills: 0 | Status: SHIPPED | OUTPATIENT
Start: 2025-05-07 | End: 2025-05-05

## 2025-05-04 NOTE — PLAN OF CARE
Problem: Patient Centered Care  Goal: Patient preferences are identified and integrated in the patient's plan of care  Description: Interventions:- What would you like us to know as we care for you?  I live at home with family.  - Provide timely, complete, and accurate information to patient/family- Incorporate patient and family knowledge, values, beliefs, and cultural backgrounds into the planning and delivery of care- Encourage patient/family to participate in care and decision-making at the level they choose- Honor patient and family perspectives and choices  Outcome: Progressing     Problem: PAIN - ADULT  Goal: Verbalizes/displays adequate comfort level or patient's stated pain goal  Description: INTERVENTIONS:- Encourage pt to monitor pain and request assistance- Assess pain using appropriate pain scale- Administer analgesics based on type and severity of pain and evaluate response- Implement non-pharmacological measures as appropriate and evaluate response- Consider cultural and social influences on pain and pain management- Manage/alleviate anxiety- Utilize distraction and/or relaxation techniques- Monitor for opioid side effects- Notify MD/LIP if interventions unsuccessful or patient reports new pain- Anticipate increased pain with activity and pre-medicate as appropriate  Outcome: Progressing     Problem: RISK FOR INFECTION - ADULT  Goal: Absence of fever/infection during anticipated neutropenic period  Description: INTERVENTIONS- Monitor WBC- Administer growth factors as ordered- Implement neutropenic guidelines  Outcome: Progressing     Problem: SAFETY ADULT - FALL  Goal: Free from fall injury  Description: INTERVENTIONS:- Assess pt frequently for physical needs- Identify cognitive and physical deficits and behaviors that affect risk of falls.- [place] fall precautions as indicated by assessment.- Educate pt/family on patient safety including physical limitations- Instruct pt to call for assistance  with activity based on assessment- Modify environment to reduce risk of injury- Provide assistive devices as appropriate- Consider OT/PT consult to assist with strengthening/mobility- Encourage toileting schedule  Outcome: Progressing     Problem: CARDIOVASCULAR - ADULT  Goal: Maintains optimal cardiac output and hemodynamic stability  Description: INTERVENTIONS:- Monitor vital signs, rhythm, and trends- Monitor for bleeding, hypotension and signs of decreased cardiac output- Evaluate effectiveness of vasoactive medications to optimize hemodynamic stability- Monitor arterial and/or venous puncture sites for bleeding and/or hematoma- Assess quality of pulses, skin color and temperature- Assess for signs of decreased coronary artery perfusion - ex. Angina- Evaluate fluid balance, assess for edema, trend weights  Outcome: Progressing  Goal: Absence of cardiac arrhythmias or at baseline  Description: INTERVENTIONS:- Continuous cardiac monitoring, monitor vital signs, obtain 12 lead EKG if indicated- Evaluate effectiveness of antiarrhythmic and heart rate control medications as ordered- Initiate emergency measures for life threatening arrhythmias- Monitor electrolytes and administer replacement therapy as ordered  Outcome: Progressing

## 2025-05-04 NOTE — PLAN OF CARE
Patient is alert and oriented x4. Remains on 4L O2. Patient continues to have poor appetite. Plan is for port placement in AM. Patient and family updated on plan of care.     Problem: Patient Centered Care  Goal: Patient preferences are identified and integrated in the patient's plan of care  Description: Interventions:- What would you like us to know as we care for you?  I live at home with family.  - Provide timely, complete, and accurate information to patient/family- Incorporate patient and family knowledge, values, beliefs, and cultural backgrounds into the planning and delivery of care- Encourage patient/family to participate in care and decision-making at the level they choose- Honor patient and family perspectives and choices  Outcome: Progressing     Problem: PAIN - ADULT  Goal: Verbalizes/displays adequate comfort level or patient's stated pain goal  Description: INTERVENTIONS:- Encourage pt to monitor pain and request assistance- Assess pain using appropriate pain scale- Administer analgesics based on type and severity of pain and evaluate response- Implement non-pharmacological measures as appropriate and evaluate response- Consider cultural and social influences on pain and pain management- Manage/alleviate anxiety- Utilize distraction and/or relaxation techniques- Monitor for opioid side effects- Notify MD/LIP if interventions unsuccessful or patient reports new pain- Anticipate increased pain with activity and pre-medicate as appropriate  Outcome: Progressing     Problem: RISK FOR INFECTION - ADULT  Goal: Absence of fever/infection during anticipated neutropenic period  Description: INTERVENTIONS- Monitor WBC- Administer growth factors as ordered- Implement neutropenic guidelines  Outcome: Progressing     Problem: SAFETY ADULT - FALL  Goal: Free from fall injury  Description: INTERVENTIONS:- Assess pt frequently for physical needs- Identify cognitive and physical deficits and behaviors that affect  risk of falls.- [place] fall precautions as indicated by assessment.- Educate pt/family on patient safety including physical limitations- Instruct pt to call for assistance with activity based on assessment- Modify environment to reduce risk of injury- Provide assistive devices as appropriate- Consider OT/PT consult to assist with strengthening/mobility- Encourage toileting schedule  Outcome: Progressing     Problem: CARDIOVASCULAR - ADULT  Goal: Maintains optimal cardiac output and hemodynamic stability  Description: INTERVENTIONS:- Monitor vital signs, rhythm, and trends- Monitor for bleeding, hypotension and signs of decreased cardiac output- Evaluate effectiveness of vasoactive medications to optimize hemodynamic stability- Monitor arterial and/or venous puncture sites for bleeding and/or hematoma- Assess quality of pulses, skin color and temperature- Assess for signs of decreased coronary artery perfusion - ex. Angina- Evaluate fluid balance, assess for edema, trend weights  Outcome: Progressing  Goal: Absence of cardiac arrhythmias or at baseline  Description: INTERVENTIONS:- Continuous cardiac monitoring, monitor vital signs, obtain 12 lead EKG if indicated- Evaluate effectiveness of antiarrhythmic and heart rate control medications as ordered- Initiate emergency measures for life threatening arrhythmias- Monitor electrolytes and administer replacement therapy as ordered  Outcome: Progressing

## 2025-05-04 NOTE — PROGRESS NOTES
Progress Note  Kelli Fisher Patient Status:  Inpatient    1955 MRN [id number]   Location [facility] 3W/SW Attending Joel Dejesus MD   Hosp Day # 4 PCP Taylor Gallardo MD     Subjective:  No acute events overnight.  Siting up in a chair his morning,on O2 at 4L, reports feeling short of breath and has dry mouth. Denies any chest pain, palpitations or dizziness at this time.  No reoccurrence of SVT per tele review.     Objective:  /89 (BP Location: Right arm)   Pulse 101   Temp 97.6 °F (36.4 °C) (Oral)   Resp (!) 30   Ht 5' (1.524 m)   Wt 175 lb 12.8 oz (79.7 kg)   SpO2 97%   BMI 34.33 kg/m²     Telemetry: SR/ST, HR 100s      Intake/Output:    Intake/Output Summary (Last 24 hours) at 2025 0823  Last data filed at 2025 0611  Gross per 24 hour   Intake 745 ml   Output 670 ml   Net 75 ml       Last 3 Weights   25 0549 175 lb 12.8 oz (79.7 kg)   25 1800 163 lb (73.9 kg)   25 2038 165 lb (74.8 kg)   25 1202 169 lb (76.7 kg)   25 0142 162 lb 4.1 oz (73.6 kg)   25 0134 162 lb 4.1 oz (73.6 kg)   25 1846 172 lb (78 kg)   23 1116 197 lb (89.4 kg)       Labs:  Recent Labs   Lab 25  0605 25  0732 25  0703   * 131* 137*   BUN 10 10 12   CREATSERUM 0.71 0.65 0.76   EGFRCR 92 95 85   CA 8.5* 8.9 8.7   * 136 136   K 4.2 3.9 3.7    104 103   CO2 23.0 23.0 26.0     Recent Labs   Lab 25  0605 25  2224 25  0732 25  0703   RBC 3.01*  --  3.57* 3.60*   HGB 7.5* 8.9* 9.0* 9.0*   HCT 24.6*  --  28.8* 28.8*   MCV 81.7  --  80.7 80.0   MCH 24.9*  --  25.2* 25.0*   MCHC 30.5*  --  31.3 31.3   RDW 21.0*  --  19.9* 20.3*   NEPRELIM 12.93*  --  11.42* 10.71*   WBC 15.6*  --  13.9* 13.3*   .0  --  301.0 316.0         Recent Labs   Lab 25  2158   TROPHS 3       Review of Systems   Constitutional: Negative.   Cardiovascular:  Positive for dyspnea on exertion, leg swelling and orthopnea.    Respiratory:  Positive for shortness of breath.        Physical Exam:    Gen: alert, oriented x 3, NAD  Heent: pupils equal, reactive. Mucous membranes moist.   Neck: no jvd  Cardiac: regular rate and rhythm, normal S1,S2, no murmur, gallop or rub   Lungs: scattered bibasilar crackles, diminished   Abd: soft, NT/ND +bs  Ext: generalized edema  Skin: Warm, dry  Neuro: No focal deficits        Medications:    Scheduled Medications[1]  Medication Infusions[2]      Assessment:  Acute Hypoxic Respiratory Insufficiency (Possible PNA, Known Lung Mets)  IV antibx for possible PNA  Nebs - switched from duoneb to xopenex  Pulm following  Symptomatic Paroxysmal SVT, Transient Bradycardia/CHB  Multiple episodes of narrow complex tachycardia w/rates 150's -200's- likely exacerbated by neb treatment yesterday   S/P diltiazem, IV metoprolol - then w/transient period of bradycardia/CHB rates 30's  Now maintaining NSR/ST with amiodarone load   TSH wnl  Volume expansion   Generalized edema, overall positive fluid status +~4.5L  Known Pericardial Effusion  Limited Echo w/LVEF 60-65%, small-moderate pericardial effusion w/o evidence of tamponade physiology  Echo 4/12 w/small pericardial effusion  Metastatic Endometrial Cancer  Heme/Onc following  Acute on Chronic Anemia  Hgb 6.8 on 4/29 s/p PRBC; Hgb stable at 9.0 today   PPI  Hypothyroidism - levothyroxine    Plan:  No significant improvement in breathing or edema post dose of IV lasix yesterday-will hold off further diuresis at this time.  Hear rates improved on oral amiodarone-continue loading, will taper down once completed.  Plan to initiate chemo on Tuesday per oncology.  Further management per primary, pulm, oncology.       Plan of care discussed with patient, RN.    Aislinn Romero, ABBIE  5/4/2025  8:23 AM  [phone]        CARDIOLOGIST ADDENDUM:     I agree with the findings and complexity of problems addressed and approve of the plan detailed above. I have personally  performed the assessment and plan, and summarized the salient points of the medical decision making below:     Problems:  -acute PSVT, extremely symptomatic  -pericardial effusion without tamponade on most recent echo  -respiratory failure, primary pulm etiology     Plan and Risks:  -escalation of therapy with loading of amiodarone, and requiring intensive monitoring for toxicity with continuous tele-, ECG, and blood work  -monitor hemodynamics, recheck echo in 24-48 hours to monitor change in effusion and potential relation to symptoms/hemodynamics     Pt is not yet meeting tx goals.     Tahira Carreno M.D.   Cardiology/Electrophysiology   5/4/2025  L3  -----------------------------------------         [1]    docusate  100 mg Oral BID    amiodarone  200 mg Oral TID Beta Blocker/Cardiac    mylanta-dicyclomine-lidocaine 2% (G.I. Cocktail) oral liquid   Oral Once    levothyroxine  100 mcg Oral Before breakfast    fluticasone furoate  1 puff Inhalation Daily   [2]

## 2025-05-04 NOTE — PROGRESS NOTES
[facility]  part of [facility]ist Progress Note     Kelli Fisher Patient Status:  Inpatient    1955 MRN [id number]   Location [facility] 4W/SW/SE Attending Joel Dejesus MD   Hosp Day # 4 PCP Taylor Gallardo MD     Chief Complaint:   Chief Complaint   Patient presents with    Chest Pain Angina        Subjective:     Patient seen sitting in chair.  No family at bedside.   Patient denies acute events overnight.  Patient reports additional episode of fast heart rate occurring earlier today.  Feeling better at time of interview.  Patient stated she is feeling anxious.  Patient states shortness of breath is similar to previous.        Objective:      Vital signs:  Vitals:    25 0248 25 0531 25 0840 25 1121   BP:  132/89 115/86 125/85   BP Location:  Right arm Right arm Right arm   Pulse: 112 101 104 114   Resp: (!) 36 (!) 30 (!) 28 (!) 28   Temp:  97.6 °F (36.4 °C) 97.2 °F (36.2 °C)    TempSrc:  Oral Oral    SpO2: 95% 97% 96% 95%   Weight:       Height:           Intake/Output Summary (Last 24 hours) at 2025 1337  Last data filed at 2025 0900  Gross per 24 hour   Intake 335 ml   Output 321 ml   Net 14 ml           Physical Exam:    GENERAL:  Awake and alert, in no acute distress.  HEART:  Regular rhythm, mild tachycardia  LUNGS:  Air entry was decreased.  Mild increased work of breathing.  No wheezes   ABDOMEN: Soft and non-tender.    PSYCHIATRIC: Normal mood    Diagnostic Data:    Labs:    Recent Labs   Lab 25  2158 25  0909 25  0605 25  2224 25  0732 25  0703   WBC 13.5*   < > 15.6*  --  13.9* 13.3*   HGB 6.8*   < > 7.5* 8.9* 9.0* 9.0*   MCV 77.2*   < > 81.7  --  80.7 80.0   .0   < > 311.0  --  301.0 316.0   INR 1.33*  --   --   --   --   --     < > = values in this interval not displayed.       Recent Labs   Lab 25  2158 25  0909 25  0605 25  0732 25  0703   GLU  125*   < > 153* 131* 137*   BUN 11   < > 10 10 12   CREATSERUM 0.93   < > 0.71 0.65 0.76   CA 8.8   < > 8.5* 8.9 8.7   ALB 3.4  --   --   --   --    *   < > 135* 136 136   K 3.9   < > 4.2 3.9 3.7   CL 97*   < > 102 104 103   CO2 27.0   < > 23.0 23.0 26.0   ALKPHO 250*  --   --   --   --    AST 17  --   --   --   --    ALT 7*  --   --   --   --    BILT 0.6  --   --   --   --    TP 7.1  --   --   --   --     < > = values in this interval not displayed.           Estimated Creatinine Clearance: 50.2 mL/min (based on SCr of 0.76 mg/dL).    Recent Labs   Lab 04/29/25  2158   PTP 17.2*   INR 1.33*            COVID-19  No results found for: \"COVID19\"    Pro-Calcitonin  Recent Labs   Lab 04/30/25  1146   PCT 0.33*       Cardiac  Recent Labs   Lab 04/29/25  2200   PBNP 239*       Inflammatory Markers  No results for input(s): \"CRP\", \"ORVILLE\", \"LDH\", \"DDIMER\" in the last 168 hours.    Culture:  Hospital Encounter on 04/29/25   1. Blood Culture     Status: None (Preliminary result)    Collection Time: 04/30/25  9:09 AM    Specimen: Blood,peripheral   Result Value Ref Range    Blood Culture Result No Growth 4 Days N/A       XR VIDEO SWALLOW (CPT=74230)  Result Date: 5/3/2025  CONCLUSION:  No penetration or aspiration.    Dictated by (CST): Harpreet Jade MD on 5/03/2025 at 12:08 PM     Finalized by (CST): Harpreet Jade MD on 5/03/2025 at 12:09 PM          XR CHEST AP PORTABLE  (CPT=71045)  Result Date: 5/2/2025  CONCLUSION:   Stable opacity left perihilar mid lung.  Mild opacity right lung base which may reflect atelectasis with or without superimposed pneumonia.    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     - Initially started on empiric antibiotic therapy for possible pneumonia.       - Pulmonology consultation after further discussion, less concern for pneumonia.    -Patient completed short course of antibiotics.    - Oncology team consulted.  Plan for port placement on Monday.  Planning for initiation of chemotherapy on Tuesday (when approved by insurance).  Attempting to have first chemotherapy session as inpatient if approved by insurance.  -Appreciate further recommendations      Acute and chronic anemia:     - Acute exacerbation of chronic anemia     - Hemoglobin 6.8 on admission.  Status post 1 unit PRBC.     - Likely multifactorial, potentially related to underlying malignancy and recent treatments  -Patient received 1 unit PRBC on 5/2 after recommendations from cardiology to maintain hemoglobin greater than 8.  -Hemoglobin stabilizing      - Monitor hemoglobin levels      - Continue PPI        Pericardial effusion:     - Known small pericardial effusion diagnosed during recent admission     - Bedside ultrasound by ER physician showed no evidence of tamponade     - Cardiology consultation for further evaluation and management     - Monitor for signs of hemodynamic compromise     Leukocytosis:     - Elevated white blood cell count noted on complete blood count     - Potentially indicating an inflammatory or infectious process     - Monitor white blood cell count closely     - Correlate with clinical presentation and other diagnostic studies     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  -IR available for port placement.      Symptomatic Paroxysmal SVT  -Multiple episodes of narrow complex tachycardia w/rates 150's -200's  -S/P diltiazem, IV metoprolol   - EP consulted, recommended initiation of amiodarone.  -Maintaining sinus rhythm, mild tachycardia today.  -Keeping amiodarone per cardiology.  Appreciate further recommendations.      Plan of care discussed with patient at bedside . Discussed management/test result(s) with Rn and pharmacy consultant      Quality:  DVT Prophylaxis: SCDs  CODE status: Full  Estimated date of discharge: TBD  Discharge is dependent on: clinical stability    52 minutes spent discussing with other providers, examining patient, obtaining history, reviewing medical records, interpreting and communicating test results/imaging, ordering tests/medications, discussing plan of care and documenting information.      Joel Dejesus MD          This note was prepared using Dragon Medical voice recognition dictation software. As a result errors may occur. When identified these errors have been corrected. While every attempt is made to correct errors during dictation discrepancies may still exist         [1]    [START ON 5/7/2025] amiodarone  200 mg Oral Daily    docusate  100 mg Oral BID    amiodarone  200 mg Oral TID Beta Blocker/Cardiac    mylanta-dicyclomine-lidocaine 2% (G.I. Cocktail) oral liquid   Oral Once    levothyroxine  100 mcg Oral Before breakfast    fluticasone furoate  1 puff Inhalation Daily

## 2025-05-04 NOTE — PROGRESS NOTES
Pulmonary Medicine Inpatient Progress Note           Consult requested by Dr. Dr. Dejesus for PNA.       Subjective:  On 4 LNC  Afebrile  Video swallow yest was nl  Feels weak, still having breathing trouble. Takes shallow breaths      From the initial consultation.  HPI: 69 yo woman with hx of endometrial CA with pulmonary mets admitted with shortness of breath, coughing, wheezing, fevers, chills. Lives alone and not on supp 02. Has a nebulizer machine a home   Recent admission or shortness of breath during which time NATO lung mass was biopsied.   Now admitted for shortness of breath. Found to be anemic with Hg 6.8. CT concerning for possible PNA  Started on supp 02, vanco/cefepime       REVIEW OF SYSTEMS:  Positives and negatives as stated in HPI. Remainder of 12 pt review of systems otherwise are negative.     PAST MEDICAL HISTORY:  Past Medical History[1]     PAST SURGICAL HISTORY:  Past Surgical History[2]     PAST FAMILY HISTORY:  Family History[3]     PAST SOCIAL HISTORY:  Social Hx on file[4]  Never smoked  Lives at home alone     ALLERGIES:  Allergies[5]     MEDS:  Home Medications:  Medications Taking[6]  Scheduled Medication:  Scheduled Medications[7]  Continuous Infusing Medication:  Medication Infusions[8]  PRN Medications:  PRN Medications[9]       PHYSICAL EXAM:  /89 (BP Location: Right arm)   Pulse 101   Temp 97.6 °F (36.4 °C) (Oral)   Resp (!) 30   Ht 5' (1.524 m)   Wt 175 lb 12.8 oz (79.7 kg)   SpO2 97%   BMI 34.33 kg/m²   CONSTITUTIONAL: alert, oriented, no apparent distress, appears weak  HEENT: atraumatic normocephalic  MOUTH: mucous membranes are moist. No OP exudates  NECK/THROAT: no JVD. Trachea midline. No obvious thyromegaly  LUNG:  shallow but clear upper b/l no wheezing. + bibasilar crackles. Chest symmetric with respiratory motion  HEART: regular rate and rhythm, no obvious murmers or gallops note  ABD: soft non tender. + bowel sounds. No organomegaly noted  EXT: no  clubbing, cyanosis. + 2 b/l LE edema      IMAGES:  Video swallow 5/3/25  No penetration or aspiration.     CXR 5/2/25  CONCLUSION:   Stable opacity left perihilar mid lung.   Mild opacity right lung base which may reflect atelectasis with or without superimposed pneumonia.     Chest CT PE 4/30/25  FINDINGS:   No PE   Normal heart size with stable small to moderate circumferential pericardial effusion   No short interval change in left upper lobe mass, diffuse intrathoracic adenopathy. Trace left pleural effusion similar to prior.  Trace right effusion has resolved A few of the right-sided pulmonary nodules may have mildly increased in size.  Adenopathy causes severe narrowing of the lower right IJ with collaterals in the chest wall   CONCLUSION: No PE.       LABS:  Recent Labs   Lab 05/01/25  0719 05/02/25  0605 05/02/25  2224 05/03/25  0732   RBC 2.98* 3.01*  --  3.57*   HGB 7.3* 7.5* 8.9* 9.0*   HCT 24.5* 24.6*  --  28.8*   MCV 82.2 81.7  --  80.7   MCH 24.5* 24.9*  --  25.2*   MCHC 29.8* 30.5*  --  31.3   RDW 21.2* 21.0*  --  19.9*   NEPRELIM 10.03* 12.93*  --  11.42*   WBC 12.1* 15.6*  --  13.9*   .0 311.0  --  301.0       Recent Labs   Lab 04/29/25  2158 04/30/25  0909 05/01/25  2336 05/02/25  0605 05/03/25  0732   *   < > 152* 153* 131*   BUN 11   < > 9 10 10   CREATSERUM 0.93   < > 0.66 0.71 0.65   EGFRCR 67   < > 95 92 95   CA 8.8   < > 8.6* 8.5* 8.9   ALB 3.4  --   --   --   --    *   < > 136 135* 136   K 3.9   < > 4.3 4.2 3.9   CL 97*   < > 103 102 104   CO2 27.0   < > 23.0 23.0 23.0   ALKPHO 250*  --   --   --   --    AST 17  --   --   --   --    ALT 7*  --   --   --   --    BILT 0.6  --   --   --   --    TP 7.1  --   --   --   --     < > = values in this interval not displayed.          ASSESSMENT/PLAN:  Endometrial ca with lung mets  -oncology f/u  -plan for port placement tomorrow to start chemo    Possible PNA  -was on vanco/cefepime-stopped with low susp for PNA  -urine leg, strep  pneumo, mrsa nares, sputum cx neg  -checked swallow eval given trouble swallowing-was neg for aspiration  -can check PCT if concern for infection   -PRN nebs  -on arnuity    Acute anemia on chronic anemia  -s/p pRBC transfusion     Proph  -DVT: SCDS given anemia     Thank you for the opportunity to care for Kelli Fisher.       DARIEL Reaves DO, MPH  Pulmonary Critical Care Medicine  [facility] Pulmonary and Critical Care Medicine          [1]   Past Medical History:  Diagnosis Date    Asthma (HCC)     Esophageal reflux     History of blood transfusion     Visual impairment    [2]   Past Surgical History:  Procedure Laterality Date    Thyroidectomy      Total abdom hysterectomy     [3] History reviewed. No pertinent family history.  [4]   Social History  Socioeconomic History    Marital status: Single   Tobacco Use    Smoking status: Never    Smokeless tobacco: Never   Vaping Use    Vaping status: Never Used   Substance and Sexual Activity    Alcohol use: Never    Drug use: Never   [5]   Allergies  Allergen Reactions    Aspirin Tightness in Throat    Penicillins HIVES    Other OTHER (SEE COMMENTS)     Pt states IV steroid allergy, states it makes her breathing worse    [6]   Outpatient Medications Marked as Taking for the 4/29/25 encounter (Hospital Encounter)   Medication Sig Dispense Refill    levothyroxine 100 MCG Oral Tab Take 1 tablet (100 mcg total) by mouth before breakfast.     [7]    docusate  100 mg Oral BID    amiodarone  200 mg Oral TID Beta Blocker/Cardiac    mylanta-dicyclomine-lidocaine 2% (G.I. Cocktail) oral liquid   Oral Once    levothyroxine  100 mcg Oral Before breakfast    fluticasone furoate  1 puff Inhalation Daily   [8] [9]   Levalbuterol HCl    polyethylene glycol (PEG 3350)    magnesium hydroxide    bisacodyl    fleet enema    prochlorperazine    acetaminophen    morphINE **OR** morphINE **OR** morphINE    acetaminophen

## 2025-05-05 ENCOUNTER — APPOINTMENT (OUTPATIENT)
Dept: INTERVENTIONAL RADIOLOGY/VASCULAR | Facility: HOSPITAL | Age: 70
End: 2025-05-05
Attending: NURSE PRACTITIONER
Payer: MEDICARE

## 2025-05-05 ENCOUNTER — APPOINTMENT (OUTPATIENT)
Dept: CV DIAGNOSTICS | Facility: HOSPITAL | Age: 70
End: 2025-05-05
Attending: NURSE PRACTITIONER
Payer: MEDICARE

## 2025-05-05 LAB
ANION GAP SERPL CALC-SCNC: 8 MMOL/L (ref 0–18)
BASOPHILS # BLD AUTO: 0.03 X10(3) UL (ref 0–0.2)
BASOPHILS NFR BLD AUTO: 0.2 %
BUN BLD-MCNC: 15 MG/DL (ref 9–23)
BUN/CREAT SERPL: 19.5 (ref 10–20)
CALCIUM BLD-MCNC: 8.8 MG/DL (ref 8.7–10.4)
CHLORIDE SERPL-SCNC: 104 MMOL/L (ref 98–112)
CO2 SERPL-SCNC: 28 MMOL/L (ref 21–32)
CREAT BLD-MCNC: 0.77 MG/DL (ref 0.55–1.02)
DEPRECATED RDW RBC AUTO: 60.1 FL (ref 35.1–46.3)
EGFRCR SERPLBLD CKD-EPI 2021: 83 ML/MIN/1.73M2 (ref 60–?)
EOSINOPHIL # BLD AUTO: 0.01 X10(3) UL (ref 0–0.7)
EOSINOPHIL NFR BLD AUTO: 0.1 %
ERYTHROCYTE [DISTWIDTH] IN BLOOD BY AUTOMATED COUNT: 20.2 % (ref 11–15)
GLUCOSE BLD-MCNC: 131 MG/DL (ref 70–99)
HCT VFR BLD AUTO: 29.2 % (ref 35–48)
HGB BLD-MCNC: 9 G/DL (ref 12–16)
IMM GRANULOCYTES # BLD AUTO: 0.21 X10(3) UL (ref 0–1)
IMM GRANULOCYTES NFR BLD: 1.4 %
LYMPHOCYTES # BLD AUTO: 1.18 X10(3) UL (ref 1–4)
LYMPHOCYTES NFR BLD AUTO: 8 %
MCH RBC QN AUTO: 25.4 PG (ref 26–34)
MCHC RBC AUTO-ENTMCNC: 30.8 G/DL (ref 31–37)
MCV RBC AUTO: 82.5 FL (ref 80–100)
MONOCYTES # BLD AUTO: 1.69 X10(3) UL (ref 0.1–1)
MONOCYTES NFR BLD AUTO: 11.4 %
NEUTROPHILS # BLD AUTO: 11.66 X10 (3) UL (ref 1.5–7.7)
NEUTROPHILS # BLD AUTO: 11.66 X10(3) UL (ref 1.5–7.7)
NEUTROPHILS NFR BLD AUTO: 78.9 %
OSMOLALITY SERPL CALC.SUM OF ELEC: 293 MOSM/KG (ref 275–295)
PLATELET # BLD AUTO: 247 10(3)UL (ref 150–450)
POTASSIUM SERPL-SCNC: 3.8 MMOL/L (ref 3.5–5.1)
RBC # BLD AUTO: 3.54 X10(6)UL (ref 3.8–5.3)
SODIUM SERPL-SCNC: 140 MMOL/L (ref 136–145)
WBC # BLD AUTO: 14.8 X10(3) UL (ref 4–11)

## 2025-05-05 PROCEDURE — 93325 DOPPLER ECHO COLOR FLOW MAPG: CPT | Performed by: NURSE PRACTITIONER

## 2025-05-05 PROCEDURE — 93308 TTE F-UP OR LMTD: CPT | Performed by: NURSE PRACTITIONER

## 2025-05-05 PROCEDURE — 0JH63XZ INSERTION OF TUNNELED VASCULAR ACCESS DEVICE INTO CHEST SUBCUTANEOUS TISSUE AND FASCIA, PERCUTANEOUS APPROACH: ICD-10-PCS | Performed by: RADIOLOGY

## 2025-05-05 PROCEDURE — 99232 SBSQ HOSP IP/OBS MODERATE 35: CPT | Performed by: INTERNAL MEDICINE

## 2025-05-05 PROCEDURE — 93321 DOPPLER ECHO F-UP/LMTD STD: CPT | Performed by: NURSE PRACTITIONER

## 2025-05-05 PROCEDURE — 02HV33Z INSERTION OF INFUSION DEVICE INTO SUPERIOR VENA CAVA, PERCUTANEOUS APPROACH: ICD-10-PCS | Performed by: RADIOLOGY

## 2025-05-05 PROCEDURE — B5181ZA FLUOROSCOPY OF SUPERIOR VENA CAVA USING LOW OSMOLAR CONTRAST, GUIDANCE: ICD-10-PCS | Performed by: RADIOLOGY

## 2025-05-05 RX ORDER — PANTOPRAZOLE SODIUM 40 MG/1
40 TABLET, DELAYED RELEASE ORAL DAILY
Qty: 30 TABLET | Refills: 0 | Status: ON HOLD | OUTPATIENT
Start: 2025-05-05

## 2025-05-05 RX ORDER — LIDOCAINE HYDROCHLORIDE 20 MG/ML
INJECTION, SOLUTION INFILTRATION; PERINEURAL
Status: COMPLETED
Start: 2025-05-05 | End: 2025-05-05

## 2025-05-05 RX ORDER — DEXTROSE MONOHYDRATE AND SODIUM CHLORIDE 5; .9 G/100ML; G/100ML
INJECTION, SOLUTION INTRAVENOUS CONTINUOUS
Status: DISCONTINUED | OUTPATIENT
Start: 2025-05-05 | End: 2025-05-06

## 2025-05-05 RX ORDER — AMIODARONE HYDROCHLORIDE 200 MG/1
TABLET ORAL
Qty: 93 TABLET | Refills: 0 | Status: ON HOLD | OUTPATIENT
Start: 2025-05-07 | End: 2025-06-07

## 2025-05-05 RX ORDER — MIDAZOLAM HYDROCHLORIDE 1 MG/ML
INJECTION INTRAMUSCULAR; INTRAVENOUS
Status: DISCONTINUED
Start: 2025-05-05 | End: 2025-05-05 | Stop reason: WASHOUT

## 2025-05-05 NOTE — PROGRESS NOTES
05/05/25 1500   Mobility   O2 walk? Yes   SPO2% on Room Air at Rest 91   SPO2% on Oxygen at Rest 96   At rest oxygen flow (liters per minute) 4   SPO2% Ambulation on Room Air 88   SPO2% Ambulation on Oxygen 91   Ambulation oxygen flow (liters per minute) 4

## 2025-05-05 NOTE — PROGRESS NOTES
[facility]  part of [facility]ist Progress Note     Kelli Fisher Patient Status:  Inpatient    1955 MRN [id number]   Location [facility] 4W/SW/SE Attending Joel Dejesus MD   Hosp Day # 5 PCP Taylor Gallardo MD     Chief Complaint:   Chief Complaint   Patient presents with    Chest Pain Angina        Subjective:     Patient seen sitting in bed.  Daughter at bedside.   Patient denies acute events overnight.  Patient reports feeling better today.  Patient states shortness of breath similar to previous.  Patient expressed concerns for prolonged n.p.o. for port placement today.    Objective:      Vital signs:  Vitals:    25 0300 25 0500 25 0541 25 0759   BP:   118/80 142/86   BP Location:   Left arm Left arm   Pulse: 113  107    Resp:       Temp:   97.8 °F (36.6 °C) 98.1 °F (36.7 °C)   TempSrc:   Oral Oral   SpO2:   96% 95%   Weight:  174 lb 9.6 oz (79.2 kg)     Height:           Intake/Output Summary (Last 24 hours) at 2025 1146  Last data filed at 2025 0900  Gross per 24 hour   Intake 240 ml   Output --   Net 240 ml           Physical Exam:    GENERAL:  Awake and alert, in no acute distress.  HEART:  Regular rhythm, mild tachycardia  LUNGS:  Air entry was decreased.  No increased work of breathing.  No wheezes   ABDOMEN: Soft and non-tender.    PSYCHIATRIC: Normal mood    Diagnostic Data:    Labs:    Recent Labs   Lab 25  0909 25  0732 25  0703 25  0721   WBC 13.5*   < > 13.9* 13.3* 14.8*   HGB 6.8*   < > 9.0* 9.0* 9.0*   MCV 77.2*   < > 80.7 80.0 82.5   .0   < > 301.0 316.0 247.0   INR 1.33*  --   --   --   --     < > = values in this interval not displayed.       Recent Labs   Lab 25  0909 25  0732 25  0703 25  0721   *   < > 131* 137* 131*   BUN 11   < > 10 12 15   CREATSERUM 0.93   < > 0.65 0.76 0.77   CA 8.8   < > 8.9 8.7 8.8   ALB 3.4   --   --   --   --    *   < > 136 136 140   K 3.9   < > 3.9 3.7 3.8   CL 97*   < > 104 103 104   CO2 27.0   < > 23.0 26.0 28.0   ALKPHO 250*  --   --   --   --    AST 17  --   --   --   --    ALT 7*  --   --   --   --    BILT 0.6  --   --   --   --    TP 7.1  --   --   --   --     < > = values in this interval not displayed.           Estimated Creatinine Clearance: 49.5 mL/min (based on SCr of 0.77 mg/dL).    Recent Labs   Lab 04/29/25  2158   PTP 17.2*   INR 1.33*            COVID-19  No results found for: \"COVID19\"    Pro-Calcitonin  Recent Labs   Lab 04/30/25  1146   PCT 0.33*       Cardiac  Recent Labs   Lab 04/29/25  2200   PBNP 239*       Inflammatory Markers  No results for input(s): \"CRP\", \"ORVILLE\", \"LDH\", \"DDIMER\" in the last 168 hours.    Culture:  Hospital Encounter on 04/29/25   1. Blood Culture     Status: None    Collection Time: 04/30/25  9:09 AM    Specimen: Blood,peripheral   Result Value Ref Range    Blood Culture Result No Growth 5 Days N/A       XR VIDEO SWALLOW (CPT=74230)  Result Date: 5/3/2025  CONCLUSION:  No penetration or aspiration.    Dictated by (CST): Harpreet Jade MD on 5/03/2025 at 12:08 PM     Finalized by (CST): Harpreet Jade MD on 5/03/2025 at 12:09 PM                    Medications: Scheduled Medications[1]    Assessment & Plan:        Acute hypoxic respiratory failure:     - Patient presented with progressive shortness of breath     - Likely secondary to recently diagnosed metastasized endometrial carcinoma     - Left upper lobe lung mass identified during recent admission (April 11-17, 2025)     - 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     - Initially started on empiric antibiotic therapy for possible pneumonia.       - Pulmonology consultation after further discussion, less concern for  pneumonia.    -Patient completed short course of antibiotics.    - Oncology team consulted.  Plan for port placement on 5/5.  Planning for initiation of chemotherapy on 5/6 as outpatient.  -Appreciate further recommendations      Acute and chronic anemia:     - Acute exacerbation of chronic anemia     - Hemoglobin 6.8 on admission.  Status post 1 unit PRBC.     - Likely multifactorial, potentially related to underlying malignancy and recent treatments  -Patient received 1 unit PRBC on 5/2 after recommendations from cardiology to maintain hemoglobin greater than 8.  -Hemoglobin remained stable      - Monitor hemoglobin levels      - Continue PPI        Pericardial effusion:     - Known small pericardial effusion diagnosed during recent admission     - Bedside ultrasound by ER physician showed no evidence of tamponade     - Cardiology consultation for further evaluation and management     - Monitor for signs of hemodynamic compromise     Leukocytosis:     - Elevated white blood cell count noted on complete blood count     - Potentially indicating an inflammatory or infectious process     - Monitor white blood cell count closely     - Correlate with clinical presentation and other diagnostic studies     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  Appreciate further recommendations.  -IR available for port placement on 5/5.      Symptomatic Paroxysmal SVT  -Multiple episodes of narrow complex tachycardia w/rates 150's -200's  -S/P diltiazem, IV metoprolol   - EP consulted, recommended initiation of amiodarone.  -Maintaining sinus rhythm, mild tachycardia .  - Continuing on p.o. amiodarone per cardiology.  Appreciate  further recommendations.      Plan of care discussed with patient and daughter at bedside . Discussed management/test result(s) with Rn, pulmonology and oncology consultants.        Quality:  DVT Prophylaxis: SCDs  CODE status: Full  Estimated date of discharge: TBD  Discharge is dependent on: clinical stability    55 minutes spent discussing with other providers, examining patient, obtaining history, reviewing medical records, interpreting and communicating test results/imaging, ordering tests/medications, discussing plan of care and documenting information.      Joel Dejesus MD          This note was prepared using Dragon Medical voice recognition dictation software. As a result errors may occur. When identified these errors have been corrected. While every attempt is made to correct errors during dictation discrepancies may still exist         [1]    pantoprazole  40 mg Intravenous Daily    [START ON 5/7/2025] amiodarone  200 mg Oral Daily    docusate  100 mg Oral BID    amiodarone  200 mg Oral TID Beta Blocker/Cardiac    mylanta-dicyclomine-lidocaine 2% (G.I. Cocktail) oral liquid   Oral Once    levothyroxine  100 mcg Oral Before breakfast    fluticasone furoate  1 puff Inhalation Daily

## 2025-05-05 NOTE — CM/SW NOTE
Confirmed with daughter that plan is to dc home and 0700 tomorrow and that daughter will drive her to her appt for chemo at 0740 in [facility].  Daughter provided a list of transport providers that can assist Kelli home after her chemo.    Kelli's chemo is 6 hours in duration.  O2 sat is 88% when ambulating.  O2 order and verbiage provided to HME.  Two Oxygen tanks provided that will go with patient tomorrow.  Kelli requires O2 when ambulating.    [facility] notified of early am dc.  New F2F was provided.    PLAN:  DC at 0700 on 5/6.  [facility] to follow.  Daughter has transport resources.  O2 through HME for ambulation- 2 tanks delivered to room.  HME will deliver remaining O2 later.  Daughter notified of above and IP team.    Melissa Jenkins MBA BSN RN ALBA CALLEJAS  RN Case Manager  [phone]

## 2025-05-05 NOTE — PAYOR COMM NOTE
--------------  CONTINUED STAY REVIEW    Payor: [employer] MEDICARE ADV PPO  Subscriber #:  [id number]  Authorization Number: [id number]    Admit date: 4/30/25  Admit time:  3:34 AM    5/3    Sitting up in a chair this morning, O2 demand trending down, still with significant dyspnea this morning.  Denies any chest pain, palpitations or dizziness at this time. HR significantly improved with oral amiodarone, no recurrence of SVT per tele review.     Constitutional: Negative.   Cardiovascular:  Positive for dyspnea on exertion and leg swelling.   Respiratory:  Positive for shortness of breath.      Assessment:  Acute Hypoxic Respiratory Insufficiency (Possible PNA, Known Lung Mets)  IV antibx for possible PNA  Nebs - switched from duoneb to xopenex  Pulm following  Symptomatic Paroxysmal SVT, Transient Bradycardia/CHB  Multiple episodes of narrow complex tachycardia w/rates 150's -200's- likely exacerbated by neb treatment yesterday   S/P diltiazem, IV metoprolol - then w/transient period of bradycardia/CHB rates 30's  Now maintaining NSR/ST with amiodarone load   TSH wnl  Volume expansion   Generalized edema, overall positive fluid status +~4.5L  Known Pericardial Effusion  Limited Echo w/LVEF 60-65%, small-moderate pericardial effusion w/o evidence of tamponade physiology  Echo 4/12 w/small pericardial effusion  Metastatic Endometrial Cancer  Heme/Onc following  Acute on Chronic Anemia  Hgb 6.8 on 4/29 s/p PRBC; Hgb stable at 9.0 today   PPI  Hypothyroidism - levothyroxine     Plan:  Appears volume overloaded this morning-will order dose of lasix today, overall fluid positive ~4.5L.  Heart rates improved significantly with oral amiodarone-continue the load, will decrease the dose once load completed.  Continue supportive care per primary, pulm.     5/4  Ob gyn     Date of Admission:  4/29/2025  Reason for Admission:  Stage IV progressive endometrial cancer with lung metastases  Failure to thrive  Respiratory  insufficiency  Cardiac arrhythmias   .  Patient's states that overall she feels she is doing much better over the past 48 hours.  She denies any nausea or vomiting, or fevers or chills.  She denies any vaginal bleeding.  She does state that she is beginning to get hungry and is eating a small amount of chicken noodle soup.  In addition she had a bowel movement this morning at 4 AM and continues to pass flatus.  She states that she is still winded however her shortness of breath has improved.    PULMONOLOGY    ASSESSMENT/PLAN:  Endometrial ca with lung mets  -oncology f/u  -plan for port placement tomorrow to start chemo     Possible PNA  -was on vanco/cefepime-stopped with low susp for PNA  -urine leg, strep pneumo, mrsa nares, sputum cx neg  -checked swallow eval given trouble swallowing-was neg for aspiration  -can check PCT if concern for infection   -PRN nebs  -on arnuity     Acute anemia on chronic anemia  -s/p pRBC transfusion     MEDICATIONS ADMINISTERED IN LAST 1 DAY:  amiodarone (Pacerone) tab 200 mg       Date Action Dose Route User    5/5/2025 0542 Given 200 mg Oral Alma Castano RN    5/4/2025 2136 Given 200 mg Oral Alma Castano RN    5/4/2025 1350 Given 200 mg Oral Chantel Parker RN          Levalbuterol HCl (XOPENEX) nebulizer solution 1.25 mg       Date Action Dose Route User    5/5/2025 0902 Given 1.25 mg Nebulization Lizabeth West RCP    5/4/2025 2211 Given 1.25 mg Nebulization Amber Victor RCP    5/4/2025 1737 Given 1.25 mg Nebulization Antoinette Hines, SIENNA          levothyroxine (Synthroid) tab 100 mcg       Date Action Dose Route User    5/5/2025 0542 Given 100 mcg Oral Alma Castano RN          polyethylene glycol (PEG 3350) (Miralax) 17 g oral packet 17 g       Date Action Dose Route User    5/4/2025 2135 Given 17 g Oral Alma Castano RN            Vitals (last day)       Date/Time Temp Pulse Resp BP SpO2 Weight O2 Device O2 Flow Rate (L/min) [facility]    05/05/25 0759 98.1 °F  (36.7 °C) -- -- 142/86 95 % -- Nasal cannula 4 L/min GM    05/05/25 0541 97.8 °F (36.6 °C) 107 -- 118/80 96 % -- Nasal cannula 4 L/min MI    05/05/25 0500 -- -- -- -- -- 174 lb 9.6 oz (79.2 kg) -- -- BP    05/05/25 0300 -- 113 -- -- -- -- -- -- EL    05/04/25 2131 97.9 °F (36.6 °C) 122 25 124/89 96 % -- Nasal cannula 4 L/min MI    05/04/25 1900 -- 124 -- -- -- -- -- -- EL    05/04/25 1348 97.6 °F (36.4 °C) 113 30 146/90 94 % -- Nasal cannula 4 L/min     05/04/25 1121 -- 114 28 125/85 95 % -- Nasal cannula 4 L/min     05/04/25 0840 97.2 °F (36.2 °C) 104 28 115/86 96 % -- Nasal cannula 4 L/min     05/04/25 0531 97.6 °F (36.4 °C) 101 30 132/89 97 % -- Nasal cannula 4 L/min AS    05/04/25 0248 -- 112 36 -- 95 % -- Nasal cannula 4 L/min AS          CIWA Scores (since admission)       None          Blood Transfusion Record       Product Unit Status Volume Start End            Transfuse RBC       25  [id number]  [id number] Completed 05/02/25 2207 404 mL 05/02/25 1808 05/02/25 2130       25  [id number]  [id number] Completed 04/30/25 0253 350 mL 04/30/25 0021 04/30/25 0245

## 2025-05-05 NOTE — PROGRESS NOTES
[facility]  part of [facility]    Progress Note      Assessment and Plan:     1.  Respiratory syndrome-I highly doubt pneumonia as the CAT scan simply redemonstrates the previously identified masses which were slightly increased.  She has severe anemia contributing to dyspnea.  No evidence of PE.  No evidence of tamponade physiology.  Status posttransfusion.    The patient is breathing comfortably at rest with oxygen and the video swallow was unrevealing.    Recommendations:  1.  Discharge planning  2.  Await port today    2.  DVT prophylaxis-SCDs in patient with severe anemia.    3.  Metastatic endometrial carcinoma-as per gynecologic oncology.  To get port placed on Monday and then initiation of chemotherapeutic on Tuesday.    4.  Recurrent episodes of atrial tachycardia with frequent PACs    Recommendations: As per EP, amiodarone    Subjective:   Kelli Fisher is a(n) 70 year old female who has mild dyspnea at baseline but now off oxygen    Objective:   Blood pressure 142/86, pulse 107, temperature 98.1 °F (36.7 °C), temperature source Oral, resp. rate 25, height 5' (1.524 m), weight 174 lb 9.6 oz (79.2 kg), SpO2 95%.    Physical Exam alert female  HEENT examination is unremarkable with pupils equal round and reactive to light and accommodation.   Neck without adenopathy, thyromegaly, JVD nor bruit.   Lungs slightly diminished to auscultation and percussion.  Cardiac regular rate and rhythm no murmur.   Abdomen nontender, without hepatosplenomegaly and no mass appreciable.   Extremities without clubbing cyanosis nor edema.   Neurologic grossly intact with symmetric tone and strength and reflex.  Skin without gross abnormality     Results:     Lab Results   Component Value Date    WBC 14.8 05/05/2025    HGB 9.0 05/05/2025    HCT 29.2 05/05/2025    .0 05/05/2025    CREATSERUM 0.77 05/05/2025    BUN 15 05/05/2025     05/05/2025    K 3.8 05/05/2025     05/05/2025    CO2 28.0  05/05/2025     05/05/2025    CA 8.8 05/05/2025       Srinath Levy MD  Medical Director, Critical Care, [facility]  Medical Director, [facility]  Pager: [phone]

## 2025-05-05 NOTE — CM/SW NOTE
Oxygen:  I had a face to face visit with Kelli Fisher and I reviewed the patient's walking O2 study completed by nursing on @5/5/25. The patient is mobile in their home and is in need of a portable oxygen tank. The home oxygen will improve the Kelli Fisher's condition.

## 2025-05-05 NOTE — PROGRESS NOTES
Home O2 DME  I had a face to face visit with Kelliwillie Fisher and I reviewed the patient's walking O2 study completed by nursing on 5/5/25. The patient is mobile in their home and is in need of a portable oxygen tank. The home oxygen will improve the Kelli Fisher's condition.    Joel Dejesus MD

## 2025-05-05 NOTE — BRIEF PROCEDURE NOTE
[facility]  part of [facility]  Procedure Note    Kelli Fisher Patient Status:  Inpatient    1955 MRN [id number]   Location [facility] INTERVENTIONAL SUITES Attending Joel Dejesus MD   Hosp Day # 5 PCP Taylor Gallardo MD     Procedure: chest port    Pre-Procedure Diagnosis:  metastatic endometrial cancer    Post-Procedure Diagnosis: as above    Anesthesia:  Local    Findings:  chest port placed via R IJ    Blood Loss:  minimal      Complications:  None      Rony Hopkins MD  2025

## 2025-05-05 NOTE — PLAN OF CARE
Pt is Aox4. Continues on 4L O2. Port placement completed this afternoon. Plan is to discharge early tomorrow morning and go straight to chemotherapy appointment. Patient and family updated on plan of care.       Problem: Patient Centered Care  Goal: Patient preferences are identified and integrated in the patient's plan of care  Description: Interventions:- What would you like us to know as we care for you?  I live at home with family.  - Provide timely, complete, and accurate information to patient/family- Incorporate patient and family knowledge, values, beliefs, and cultural backgrounds into the planning and delivery of care- Encourage patient/family to participate in care and decision-making at the level they choose- Honor patient and family perspectives and choices  Outcome: Progressing     Problem: PAIN - ADULT  Goal: Verbalizes/displays adequate comfort level or patient's stated pain goal  Description: INTERVENTIONS:- Encourage pt to monitor pain and request assistance- Assess pain using appropriate pain scale- Administer analgesics based on type and severity of pain and evaluate response- Implement non-pharmacological measures as appropriate and evaluate response- Consider cultural and social influences on pain and pain management- Manage/alleviate anxiety- Utilize distraction and/or relaxation techniques- Monitor for opioid side effects- Notify MD/LIP if interventions unsuccessful or patient reports new pain- Anticipate increased pain with activity and pre-medicate as appropriate  Outcome: Progressing     Problem: RISK FOR INFECTION - ADULT  Goal: Absence of fever/infection during anticipated neutropenic period  Description: INTERVENTIONS- Monitor WBC- Administer growth factors as ordered- Implement neutropenic guidelines  Outcome: Progressing     Problem: SAFETY ADULT - FALL  Goal: Free from fall injury  Description: INTERVENTIONS:- Assess pt frequently for physical needs- Identify cognitive and physical  deficits and behaviors that affect risk of falls.- [place] fall precautions as indicated by assessment.- Educate pt/family on patient safety including physical limitations- Instruct pt to call for assistance with activity based on assessment- Modify environment to reduce risk of injury- Provide assistive devices as appropriate- Consider OT/PT consult to assist with strengthening/mobility- Encourage toileting schedule  Outcome: Progressing     Problem: CARDIOVASCULAR - ADULT  Goal: Maintains optimal cardiac output and hemodynamic stability  Description: INTERVENTIONS:- Monitor vital signs, rhythm, and trends- Monitor for bleeding, hypotension and signs of decreased cardiac output- Evaluate effectiveness of vasoactive medications to optimize hemodynamic stability- Monitor arterial and/or venous puncture sites for bleeding and/or hematoma- Assess quality of pulses, skin color and temperature- Assess for signs of decreased coronary artery perfusion - ex. Angina- Evaluate fluid balance, assess for edema, trend weights  Outcome: Progressing  Goal: Absence of cardiac arrhythmias or at baseline  Description: INTERVENTIONS:- Continuous cardiac monitoring, monitor vital signs, obtain 12 lead EKG if indicated- Evaluate effectiveness of antiarrhythmic and heart rate control medications as ordered- Initiate emergency measures for life threatening arrhythmias- Monitor electrolytes and administer replacement therapy as ordered  Outcome: Progressing

## 2025-05-05 NOTE — PROGRESS NOTES
Port insertion today pending cardiac clearance  Discussed procedure, risks, benefits with patient who agrees to proceed

## 2025-05-05 NOTE — PLAN OF CARE
Patient A&O4, 4 L of oxygen, up with 1 and a walker. Blood streaked cough with mucus. MD notified. Plan Port Placement  Problem: Patient Centered Care  Goal: Patient preferences are identified and integrated in the patient's plan of care  Description: Interventions:- What would you like us to know as we care for you?  I live at home with family.  - Provide timely, complete, and accurate information to patient/family- Incorporate patient and family knowledge, values, beliefs, and cultural backgrounds into the planning and delivery of care- Encourage patient/family to participate in care and decision-making at the level they choose- Honor patient and family perspectives and choices  Outcome: Progressing     Problem: PAIN - ADULT  Goal: Verbalizes/displays adequate comfort level or patient's stated pain goal  Description: INTERVENTIONS:- Encourage pt to monitor pain and request assistance- Assess pain using appropriate pain scale- Administer analgesics based on type and severity of pain and evaluate response- Implement non-pharmacological measures as appropriate and evaluate response- Consider cultural and social influences on pain and pain management- Manage/alleviate anxiety- Utilize distraction and/or relaxation techniques- Monitor for opioid side effects- Notify MD/LIP if interventions unsuccessful or patient reports new pain- Anticipate increased pain with activity and pre-medicate as appropriate  Outcome: Progressing     Problem: RISK FOR INFECTION - ADULT  Goal: Absence of fever/infection during anticipated neutropenic period  Description: INTERVENTIONS- Monitor WBC- Administer growth factors as ordered- Implement neutropenic guidelines  Outcome: Progressing     Problem: SAFETY ADULT - FALL  Goal: Free from fall injury  Description: INTERVENTIONS:- Assess pt frequently for physical needs- Identify cognitive and physical deficits and behaviors that affect risk of falls.- [place] fall precautions as indicated by  assessment.- Educate pt/family on patient safety including physical limitations- Instruct pt to call for assistance with activity based on assessment- Modify environment to reduce risk of injury- Provide assistive devices as appropriate- Consider OT/PT consult to assist with strengthening/mobility- Encourage toileting schedule  Outcome: Progressing     Problem: CARDIOVASCULAR - ADULT  Goal: Maintains optimal cardiac output and hemodynamic stability  Description: INTERVENTIONS:- Monitor vital signs, rhythm, and trends- Monitor for bleeding, hypotension and signs of decreased cardiac output- Evaluate effectiveness of vasoactive medications to optimize hemodynamic stability- Monitor arterial and/or venous puncture sites for bleeding and/or hematoma- Assess quality of pulses, skin color and temperature- Assess for signs of decreased coronary artery perfusion - ex. Angina- Evaluate fluid balance, assess for edema, trend weights  Outcome: Progressing  Goal: Absence of cardiac arrhythmias or at baseline  Description: INTERVENTIONS:- Continuous cardiac monitoring, monitor vital signs, obtain 12 lead EKG if indicated- Evaluate effectiveness of antiarrhythmic and heart rate control medications as ordered- Initiate emergency measures for life threatening arrhythmias- Monitor electrolytes and administer replacement therapy as ordered  Outcome: Progressing

## 2025-05-05 NOTE — PROGRESS NOTES
[facility] Cardiology Progress Note    Kelli Fisher Patient Status:  Inpatient    1955 MRN [id number]   Location [facility] 3W/SW Attending Joel Dejesus MD   Hosp Day # 5 PCP Taylor Gallardo MD     Subjective:  Denies cp, palpitations, lightheadedness, dizziness. + Mild dyspnea   Remains on 4L 02 NC   Family member at bedside     Objective:  /86 (BP Location: Left arm)   Pulse 107   Temp 98.1 °F (36.7 °C) (Oral)   Resp 25   Ht 152.4 cm (5')   Wt 174 lb 9.6 oz (79.2 kg)   SpO2 95%   BMI 34.10 kg/m²     Telemetry: SR / ST , 107 bpm       Intake/Output:    Intake/Output Summary (Last 24 hours) at 2025 0942  Last data filed at 2025 1835  Gross per 24 hour   Intake 240 ml   Output --   Net 240 ml       Last 3 Weights   25 0500 174 lb 9.6 oz (79.2 kg)   25 0549 175 lb 12.8 oz (79.7 kg)   25 1800 163 lb (73.9 kg)   25 2038 165 lb (74.8 kg)   25 1202 169 lb (76.7 kg)   25 0142 162 lb 4.1 oz (73.6 kg)   25 0134 162 lb 4.1 oz (73.6 kg)   25 1846 172 lb (78 kg)   23 1116 197 lb (89.4 kg)       Labs:  Recent Labs   Lab 25  0732 25  0703 25  0721   * 137* 131*   BUN 10 12 15   CREATSERUM 0.65 0.76 0.77   EGFRCR 95 85 83   CA 8.9 8.7 8.8    136 140   K 3.9 3.7 3.8    103 104   CO2 23.0 26.0 28.0     Recent Labs   Lab 25  0732 25  0703 25  0721   RBC 3.57* 3.60* 3.54*   HGB 9.0* 9.0* 9.0*   HCT 28.8* 28.8* 29.2*   MCV 80.7 80.0 82.5   MCH 25.2* 25.0* 25.4*   MCHC 31.3 31.3 30.8*   RDW 19.9* 20.3* 20.2*   NEPRELIM 11.42* 10.71* 11.66*   WBC 13.9* 13.3* 14.8*   .0 316.0 247.0         Recent Labs   Lab 25  2158   TROPHS 3       Diagnostics:     25 Limited echo   1. Left ventricle: The cavity size was normal. Wall thickness was mildly      increased. Systolic function was normal. The estimated ejection fraction      was 60-65%, by visual assessment. No  diagnostic evidence for regional      wall motion abnormalities. Unable to assess LV diastolic function.   2. Pericardium, extracardiac: A small to moderate, free-flowing pericardial      effusion was identified circumferential to the heart. Maximal diameter in      diastole is 1.1cm. Features were not consistent with tamponade      physiology.   3. Inferior vena cava: The IVC was dilated. Respirophasic diameter changes      are blunted (< 50%), consistent with elevated central venous pressure.     Review of Systems   Respiratory:  Negative for wheezing.    Cardiovascular:  Positive for leg swelling. Negative for chest pain, palpitations and orthopnea.         Physical Exam:    General: Alert and oriented x 3. No apparent distress.   HEENT: Normocephalic, anicteric sclera, neck supple, no thyromegaly or adenopathy.  Neck: No JVD, carotids 2+, no bruits.  Cardiac: Regular rate & rhythm. S1, S2 normal. No murmur, pericardial rub, S3, or extra cardiac sounds.  Lungs: Bibasilar crackles. Normal excursions and effort.  Abdomen: Soft, non-tender. No organosplenomegally, mass or rebound, BS-present.  Extremities: Without clubbing or cyanosis.  +Mod BLE edema   Neurologic: Alert and oriented, normal affect. No focal defects  Skin: Warm and dry.       Medications:  Scheduled Medications[1]  Medication Infusions[2]    Assessment:    Acute Hypoxic Respiratory Insufficiency (Possible PNA, Known Lung Mets)  Ct chest negative for PE   IV antibx for possible PNA  Nebs - switched from duoneb to xopenex  Pulm following  Symptomatic Paroxysmal SVT, Transient Bradycardia/CHB  Multiple episodes of narrow complex tachycardia w/rates 150's -200's- likely exacerbated by neb treatments  S/P diltiazem, IV metoprolol - then w/transient period of bradycardia/CHB rates 30's  Now maintaining NSR/ST with amiodarone load   TSH wnl  Volume expansion   Generalized edema, overall positive fluid status   Off of diuretics   Known Pericardial  Effusion  Echo 4/12 w/small pericardial effusion  Limited Echo this admission w/LVEF 60-65%, small-moderate pericardial effusion w/o evidence of tamponade physiology  Metastatic Endometrial Cancer  Heme/Onc following  Acute on Chronic Anemia  S/p prbc. Hgb stable   On PPI  Hypothyroidism - on  levothyroxine    Plan:    In sinus rhythm . No tele events overnight; no recurrent SVT   Continue amiodarone load w/ 200mg tid x 1 week, then 200mg daily as ordered   BLE edema appreciated . No significant change s/p IV lasix. Off of diuretic therapy at this time. Apply compression stockings to BLE   Recheck limited echo to monitor change in effusion   Tentative plan for port insertion today to initiate chemotherapy tomorrow     BELINDA Torres  5/5/2025  9:42 AM  Ph [phone] ([place])  Ph [phone] ([place])      Cardiology attending    Chart reviewed, patient    Patient in good spirits.  Appears weak but comfortable.  History and physical as above.    Scheduled for port today.  Her cardiac status is stable to l permit the procedure.  She has been holding sinus rhythm continuously,  On p.o. amiodarone.  Mild physiologic tachycardia.    Plan as above.  Limited echo.               [1]    pantoprazole  40 mg Intravenous Daily    [START ON 5/7/2025] amiodarone  200 mg Oral Daily    docusate  100 mg Oral BID    amiodarone  200 mg Oral TID Beta Blocker/Cardiac    mylanta-dicyclomine-lidocaine 2% (G.I. Cocktail) oral liquid   Oral Once    levothyroxine  100 mcg Oral Before breakfast    fluticasone furoate  1 puff Inhalation Daily   [2]

## 2025-05-05 NOTE — PROGRESS NOTES
05/05/25 0750   VISIT TYPE   SLP Inpatient Visit Type (Documentation Required) Attempted Treatment   FOLLOW UP/PLAN   Follow Up Needed (Documentation Required) Yes   SLP Follow-up Date 05/06/25     SLP attempt this AM. Pt to remain NPO for possible port placement. SLP to follow up as pt cleared for PO and/or as schedule allows.    Thank you.    Leeanne Ross M.S. CCC-SLP  Speech Language Pathologist  Phone Number Ext. [phone]

## 2025-05-05 NOTE — CM/SW NOTE
New orders received from attending for Home RN, PT, OT and speech evaluations.  CM completed a new F2F.  [facility] notified of new F2F orders.    Plan:  Home with [facility] at [place].    Melissa CRUZN RN ALBA CALLEJAS RN Case Manager  [phone]

## 2025-05-05 NOTE — CM/SW NOTE
05/05/25 1635   Discharge disposition   Expected discharge disposition Home-Health   Post Acute Care Provider Residential   DME/Infusion Providers [facility]   Discharge transportation Private car     DC planned for 5/6 at 0700.  See CM note for details of dc plan.    Melissa Jenkins MBA BSN RN CRUNRULY CALLEJAS  RN Case Manager  [phone]

## 2025-05-06 ENCOUNTER — HOSPITAL ENCOUNTER (OUTPATIENT)
Dept: INTERVENTIONAL RADIOLOGY/VASCULAR | Facility: HOSPITAL | Age: 70
Discharge: HOME | End: 2025-05-06
Attending: OBSTETRICS & GYNECOLOGY
Payer: MEDICARE

## 2025-05-06 VITALS
HEIGHT: 60 IN | SYSTOLIC BLOOD PRESSURE: 113 MMHG | RESPIRATION RATE: 20 BRPM | TEMPERATURE: 98 F | HEART RATE: 111 BPM | WEIGHT: 174.63 LBS | DIASTOLIC BLOOD PRESSURE: 83 MMHG | OXYGEN SATURATION: 98 % | BODY MASS INDEX: 34.28 KG/M2

## 2025-05-06 NOTE — PLAN OF CARE
Problem: Patient Centered Care  Goal: Patient preferences are identified and integrated in the patient's plan of care  Description: Interventions:- What would you like us to know as we care for you?  I live at home with family.  - Provide timely, complete, and accurate information to patient/family- Incorporate patient and family knowledge, values, beliefs, and cultural backgrounds into the planning and delivery of care- Encourage patient/family to participate in care and decision-making at the level they choose- Honor patient and family perspectives and choices  Outcome: Progressing     Problem: PAIN - ADULT  Goal: Verbalizes/displays adequate comfort level or patient's stated pain goal  Description: INTERVENTIONS:- Encourage pt to monitor pain and request assistance- Assess pain using appropriate pain scale- Administer analgesics based on type and severity of pain and evaluate response- Implement non-pharmacological measures as appropriate and evaluate response- Consider cultural and social influences on pain and pain management- Manage/alleviate anxiety- Utilize distraction and/or relaxation techniques- Monitor for opioid side effects- Notify MD/LIP if interventions unsuccessful or patient reports new pain- Anticipate increased pain with activity and pre-medicate as appropriate  Outcome: Progressing     Problem: RISK FOR INFECTION - ADULT  Goal: Absence of fever/infection during anticipated neutropenic period  Description: INTERVENTIONS- Monitor WBC- Administer growth factors as ordered- Implement neutropenic guidelines  Outcome: Progressing     Problem: CARDIOVASCULAR - ADULT  Goal: Maintains optimal cardiac output and hemodynamic stability  Description: INTERVENTIONS:- Monitor vital signs, rhythm, and trends- Monitor for bleeding, hypotension and signs of decreased cardiac output- Evaluate effectiveness of vasoactive medications to optimize hemodynamic stability- Monitor arterial and/or venous puncture sites  for bleeding and/or hematoma- Assess quality of pulses, skin color and temperature- Assess for signs of decreased coronary artery perfusion - ex. Angina- Evaluate fluid balance, assess for edema, trend weights  Outcome: Progressing  Goal: Absence of cardiac arrhythmias or at baseline  Description: INTERVENTIONS:- Continuous cardiac monitoring, monitor vital signs, obtain 12 lead EKG if indicated- Evaluate effectiveness of antiarrhythmic and heart rate control medications as ordered- Initiate emergency measures for life threatening arrhythmias- Monitor electrolytes and administer replacement therapy as ordered  Outcome: Progressing     Problem: CARDIOVASCULAR - ADULT  Goal: Maintains optimal cardiac output and hemodynamic stability  Description: INTERVENTIONS:- Monitor vital signs, rhythm, and trends- Monitor for bleeding, hypotension and signs of decreased cardiac output- Evaluate effectiveness of vasoactive medications to optimize hemodynamic stability- Monitor arterial and/or venous puncture sites for bleeding and/or hematoma- Assess quality of pulses, skin color and temperature- Assess for signs of decreased coronary artery perfusion - ex. Angina- Evaluate fluid balance, assess for edema, trend weights  Outcome: Progressing  Goal: Absence of cardiac arrhythmias or at baseline  Description: INTERVENTIONS:- Continuous cardiac monitoring, monitor vital signs, obtain 12 lead EKG if indicated- Evaluate effectiveness of antiarrhythmic and heart rate control medications as ordered- Initiate emergency measures for life threatening arrhythmias- Monitor electrolytes and administer replacement therapy as ordered  Outcome: Progressing

## 2025-05-06 NOTE — DISCHARGE SUMMARY
[facility]  part of [facility]    Discharge Summary    Kelli Fisher Patient Status:  Inpatient    1955 MRN [id number]   Location [facility] 3W/SW Attending No att. providers found   Hosp Day # 6 PCP Taylor Gallardo MD     Date of Admission: 2025     Date of Discharge: 25      Lace+ Score: 73  59-90 High Risk  29-58 Medium Risk  0-28   Low Risk.    TCM Follow-Up Recommendation:  LACE > 58: High Risk of readmission after discharge from the hospital.    DISCHARGE DX: Principal Problem:    Lung mass  Active Problems:    Pericardial effusion (HCC)    Endometrial cancer (HCC)    Microcytic anemia       This discharge summary is in conjunction with any daily progress note from day of discharge.    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 She was recently admitted from  through 2025, for shortness of breath, during which a left upper lobe lung mass was discovered and biopsied, revealing metastatic adenocarcinoma consistent with her known serous endometrial carcinoma. A small pericardial effusion was also diagnosed during that admission.     Today, the patient reports noticing progressive shortness of breath. Upon initial evaluation, she was found to be tachycardic with a pulse of 117, afebrile, and with stable blood pressure. She has underlying acute and chronic anemia, with a hemoglobin of 6.8, representing a 2-gram drop since her recent discharge. The patient was admitted for further treatment and evaluation of her acute hypoxic respiratory failure, which is likely secondary to her recently diagnosed metastasized endometrial carcinoma. There is also concern for a possible superimposed pneumonia.     During her current visit to the emergency room, the patient received one unit of packed red blood cells.  A bedside ultrasound completed by the ER physician showed the previously known pericardial effusion without tamponade. Cardiology and Gastroenterology were consulted due to the pericardial effusion and acute and chronic anemia, respectively. Pulmonology was also consulted, and a CT test was ordered for further evaluation.\"    Hospital Course:       Acute hypoxic respiratory failure:     - Patient presented with progressive shortness of breath     - Likely secondary to recently diagnosed metastasized endometrial carcinoma     - Left upper lobe lung mass identified during recent admission (April 11-17, 2025)     - 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     - Initially started on empiric antibiotic therapy for possible pneumonia.       - Pulmonology consultation after further discussion, less concern for pneumonia.    -Patient completed short course of antibiotics.    - Oncology team consulted.  Plan for port placement on 5/5.  Planning for initiation of chemotherapy on 5/6 as outpatient.  -Home O2 established to continue supplemental O2 on dc      Acute and chronic anemia:     - Acute exacerbation of chronic anemia     - Hemoglobin 6.8 on admission.  Status post 1 unit PRBC.     - Likely multifactorial, potentially related to underlying malignancy and recent treatments  -Patient received 1 unit PRBC on 5/2 after recommendations from cardiology to maintain hemoglobin greater than 8.  -Hemoglobin remained stable     - Continue PPI        Pericardial effusion:     - Known small pericardial effusion diagnosed during recent admission     - Bedside ultrasound by ER physician showed no evidence of tamponade     - Cardiology consulted     Stage 3b clear cell and serous endometrial cancer with lung metastasis:     - Known history of stage 3b  endometrial cancer with metastasis to lungs     - Status post chemoradiation therapy and surgery     - Recent biopsy of left upper lobe lung mass confirmed metastatic disease  - Oncology consulted, plan for initiation of chemotherapy on 5/6.  Appreciate further recommendations.  -s/p IR port placement on 5/5.       Symptomatic Paroxysmal SVT  -Multiple episodes of narrow complex tachycardia w/rates 150's -200's  -S/P diltiazem, IV metoprolol   - EP consulted, recommended initiation of amiodarone.  -Maintaining sinus rhythm, mild tachycardia .  - Continuing on p.o. amiodarone per cardiology.  F/u as outpt        Vitals:    05/05/25 2034 05/05/25 2131 05/05/25 2205 05/06/25 0559   BP: 128/77 128/77 132/88 113/83   BP Location: Right arm Right arm Right arm Right arm   Pulse: 116 118 116 111   Resp: 20 20  20   Temp: 97.9 °F (36.6 °C) 98.2 °F (36.8 °C)  98 °F (36.7 °C)   TempSrc: Oral Oral  Oral   SpO2: 98% 99%  98%   Weight:       Height:         Patient Weight for the past 72 hrs:   Weight   05/05/25 0500 174 lb 9.6 oz (79.2 kg)       Intake/Output Summary (Last 24 hours) at 5/6/2025 1605  Last data filed at 5/5/2025 1808  Gross per 24 hour   Intake --   Output 500 ml   Net -500 ml         CULTURE:   Hospital Encounter on 04/29/25   1. Blood Culture     Status: None    Collection Time: 04/30/25  9:09 AM    Specimen: Blood,peripheral   Result Value Ref Range    Blood Culture Result No Growth 5 Days N/A       IMAGING STUDIES: SOME MAY NEED FOLLOW UP WITH PCP   IR PORT A CATH INSERTION EXCHNGE CHECK  Result Date: 5/6/2025  CONCLUSION:  Ultrasound and fluoroscopic guided surgical placement of chest port    Dictated by (CST): Rony Hopkins MD on 5/06/2025 at 10:44 AM     Finalized by (CST): Rony Hopkins MD on 5/06/2025 at 10:45 AM          XR VIDEO SWALLOW (CPT=74230)  Result Date: 5/3/2025  CONCLUSION:  No penetration or aspiration.    Dictated by (CST): Harpreet Jade MD on 5/03/2025 at 12:08 PM     Finalized by  (CST): Harpreet Jade MD on 5/03/2025 at 12:09 PM          XR CHEST AP PORTABLE  (CPT=71045)  Result Date: 5/2/2025  CONCLUSION:   Stable opacity left perihilar mid lung.  Mild opacity right lung base which may reflect atelectasis with or without superimposed pneumonia.    Dictated by (CST): Casey Diallo MD on 5/02/2025 at 7:18 AM     Finalized by (CST): Casey Diallo MD on 5/02/2025 at 7:19 AM            LABS :     Lab Results   Component Value Date    WBC 14.8 (H) 05/05/2025    HGB 9.0 (L) 05/05/2025    HCT 29.2 (L) 05/05/2025    .0 05/05/2025    CREATSERUM 0.77 05/05/2025    BUN 15 05/05/2025     05/05/2025    K 3.8 05/05/2025     05/05/2025    CO2 28.0 05/05/2025     (H) 05/05/2025    CA 8.8 05/05/2025    ALB 3.4 04/29/2025    ALKPHO 250 (H) 04/29/2025    BILT 0.6 04/29/2025    TP 7.1 04/29/2025    AST 17 04/29/2025    ALT 7 (L) 04/29/2025    PTT 35.1 04/29/2025    INR 1.33 (H) 04/29/2025    TSH 3.705 05/02/2025    LIP 42 12/11/2023    MG 1.9 05/03/2025    B12 >2,000 (H) 05/02/2025       Recent Labs   Lab 05/03/25  0732 05/04/25  0703 05/05/25  0721   RBC 3.57* 3.60* 3.54*   HGB 9.0* 9.0* 9.0*   HCT 28.8* 28.8* 29.2*   MCV 80.7 80.0 82.5   MCH 25.2* 25.0* 25.4*   MCHC 31.3 31.3 30.8*   RDW 19.9* 20.3* 20.2*   NEPRELIM 11.42* 10.71* 11.66*   WBC 13.9* 13.3* 14.8*   .0 316.0 247.0     Recent Labs   Lab 04/29/25  2158 04/30/25  0909 05/03/25  0732 05/04/25  0703 05/05/25  0721   *   < > 131* 137* 131*   BUN 11   < > 10 12 15   CREATSERUM 0.93   < > 0.65 0.76 0.77   CA 8.8   < > 8.9 8.7 8.8   ALB 3.4  --   --   --   --    *   < > 136 136 140   K 3.9   < > 3.9 3.7 3.8   CL 97*   < > 104 103 104   CO2 27.0   < > 23.0 26.0 28.0   ALKPHO 250*  --   --   --   --    AST 17  --   --   --   --    ALT 7*  --   --   --   --    BILT 0.6  --   --   --   --    TP 7.1  --   --   --   --     < > = values in this interval not displayed.     Lab Results   Component Value Date     INR 1.33 (H) 04/29/2025    INR 1.28 (H) 04/11/2025       Disposition: Discharge to Home    Condition at Discharge: Stable     Discharge Medications:      Discharge Medications        START taking these medications        Instructions Prescription details   amiodarone 200 MG Tabs  Commonly known as: Pacerone  Start taking on: May 7, 2025      Take 1 tablet (200 mg total) by mouth in the morning, at noon, and at bedtime for 1 day, THEN 1 tablet (200 mg total) in the morning, at noon, and at bedtime.   Stop taking on: June 7, 2025  Quantity: 93 tablet  Refills: 0     pantoprazole 40 MG Tbec  Commonly known as: Protonix      Take 1 tablet (40 mg total) by mouth daily.   Quantity: 30 tablet  Refills: 0            CONTINUE taking these medications        Instructions Prescription details   Fluticasone Propionate  MCG/ACT Aero  Commonly known as: FLOVENT HFA      Inhale 2 puffs into the lungs in the morning and 2 puffs before bedtime.   Refills: 0     levothyroxine 100 MCG Tabs  Commonly known as: Synthroid      Take 1 tablet (100 mcg total) by mouth before breakfast.   Refills: 0            STOP taking these medications      albuterol (2.5 MG/3ML) 0.083% Nebu  Commonly known as: Ventolin                  Where to Get Your Medications        These medications were sent to [id number] - [address] AT [address], [phone], [phone]  [address] within [facility] [address]      Phone: [phone]   amiodarone 200 MG Tabs  pantoprazole 40 MG Tbec         Follow up Visits  Tahira Carreno MD  [address]  [address]  [facility] [id number] [phone]    Follow up in 2 week(s)  Office will call you for follow up appt    Taylor Gallardo MD  [address]  [address]  [facility] [id number] [phone]    Follow up in 2 week(s)      Dominick Herring DO  [address]  [address]  [facility] [id number] [phone]    Follow up      Taylor  MD Sami    Consultants         Provider   Role Specialty     Khadar Segura MD      Consulting Physician Interventional, Cardiology     Rony Hopkins MD      Consulting Physician INTERVENTIONAL, RADIOLOGY     Dominick Herring,       Consulting Physician Gynecology Oncology     Ector Byrnes MD      Consulting Physician GASTROENTEROLOGY     Mercedes Martínez, ABBIE      Consulting Physician Nurse Practitioner     Everett Perales DO      Consulting Physician PULMONARY DISEASES              Other Discharge Instructions:       Discharge Instructions         Non-Emergency Medical  transportation resources:  [facility] [phone]  [phone]  [facility] [phone]     **When you book, You will need to discuss needing assist from them to get up/down 3 flights of stairs    HME for home O2  [facility]  [address] [place] [address]  Phone: [phone]  Fax: [phone]        ----------------------------------------------------  34 MIN SPENT ON THIS DC   Joel Dejesus MD    5/6/2025

## 2025-05-07 NOTE — PAYOR COMM NOTE
--------------  DISCHARGE REVIEW    Payor: [employer] MEDICARE ADV PPO  Subscriber #:  [id number]  Authorization Number: [id number]    Admit date: 25  Admit time:   3:34 AM  Discharge Date: 2025  6:53 AM     Admitting Physician: Vero Masterson MD  Attending Physician:  No att. providers found  Primary Care Physician: Taylor Gallardo MD          Discharge Summary Notes        Discharge Summary signed by Joel Dejesus MD at 2025  4:07 PM       Author: Joel Dejesus MD Specialty: HOSPITALIST, Internal Medicine Author Type: Physician    Filed: 2025  4:07 PM Date of Service: 2025  4:05 PM Status: Signed    : Joel Dejesus MD (Physician)         [facility]  part of [facility]    Discharge Summary    Kelli Fisher Patient Status:  Inpatient    1955 MRN [id number]   Location [facility] 3W/ Attending No att. providers found   Hosp Day # 6 PCP Taylor Gallardo MD     Date of Admission: 2025     Date of Discharge: 25      Lace+ Score: 73  59-90 High Risk  29-58 Medium Risk  0-28   Low Risk.    TCM Follow-Up Recommendation:  LACE > 58: High Risk of readmission after discharge from the hospital.    DISCHARGE DX: Principal Problem:    Lung mass  Active Problems:    Pericardial effusion (HCC)    Endometrial cancer (HCC)    Microcytic anemia       This discharge summary is in conjunction with any daily progress note from day of discharge.    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 She was recently admitted from  through 2025, for shortness of breath, during which a left upper lobe lung mass was discovered and biopsied, revealing metastatic adenocarcinoma consistent with her known serous endometrial carcinoma. A small pericardial effusion was also diagnosed during  that admission.     Today, the patient reports noticing progressive shortness of breath. Upon initial evaluation, she was found to be tachycardic with a pulse of 117, afebrile, and with stable blood pressure. She has underlying acute and chronic anemia, with a hemoglobin of 6.8, representing a 2-gram drop since her recent discharge. The patient was admitted for further treatment and evaluation of her acute hypoxic respiratory failure, which is likely secondary to her recently diagnosed metastasized endometrial carcinoma. There is also concern for a possible superimposed pneumonia.     During her current visit to the emergency room, the patient received one unit of packed red blood cells. A bedside ultrasound completed by the ER physician showed the previously known pericardial effusion without tamponade. Cardiology and Gastroenterology were consulted due to the pericardial effusion and acute and chronic anemia, respectively. Pulmonology was also consulted, and a CT test was ordered for further evaluation.\"    Hospital Course:       Acute hypoxic respiratory failure:     - Patient presented with progressive shortness of breath     - Likely secondary to recently diagnosed metastasized endometrial carcinoma     - Left upper lobe lung mass identified during recent admission (April 11-17, 2025)     - 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     - Initially started on empiric antibiotic therapy for possible pneumonia.       - Pulmonology consultation after further discussion, less concern for pneumonia.    -Patient completed short course of antibiotics.    - Oncology team consulted.  Plan for port placement on 5/5.  Planning for initiation of chemotherapy on 5/6 as outpatient.  -Home O2 established to continue supplemental O2 on dc       Acute and chronic anemia:     - Acute exacerbation of chronic anemia     - Hemoglobin 6.8 on admission.  Status post 1 unit PRBC.     - Likely multifactorial, potentially related to underlying malignancy and recent treatments  -Patient received 1 unit PRBC on 5/2 after recommendations from cardiology to maintain hemoglobin greater than 8.  -Hemoglobin remained stable     - Continue PPI        Pericardial effusion:     - Known small pericardial effusion diagnosed during recent admission     - Bedside ultrasound by ER physician showed no evidence of tamponade     - Cardiology consulted     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  Appreciate further recommendations.  -s/p IR port placement on 5/5.       Symptomatic Paroxysmal SVT  -Multiple episodes of narrow complex tachycardia w/rates 150's -200's  -S/P diltiazem, IV metoprolol   - EP consulted, recommended initiation of amiodarone.  -Maintaining sinus rhythm, mild tachycardia .  - Continuing on p.o. amiodarone per cardiology.  F/u as outpt        Vitals:    05/05/25 2034 05/05/25 2131 05/05/25 2205 05/06/25 0559   BP: 128/77 128/77 132/88 113/83   BP Location: Right arm Right arm Right arm Right arm   Pulse: 116 118 116 111   Resp: 20 20  20   Temp: 97.9 °F (36.6 °C) 98.2 °F (36.8 °C)  98 °F (36.7 °C)   TempSrc: Oral Oral  Oral   SpO2: 98% 99%  98%   Weight:       Height:         Patient Weight for the past 72 hrs:   Weight   05/05/25 0500 174 lb 9.6 oz (79.2 kg)       Intake/Output Summary (Last 24 hours) at 5/6/2025 1605  Last data filed at 5/5/2025 1808  Gross per 24 hour   Intake --   Output 500 ml   Net -500 ml         CULTURE:   Hospital Encounter on 04/29/25    1. Blood Culture     Status: None    Collection Time: 04/30/25  9:09 AM    Specimen: Blood,peripheral   Result Value Ref Range    Blood Culture Result No Growth 5 Days N/A       IMAGING STUDIES: SOME MAY NEED FOLLOW UP WITH PCP   IR PORT A CATH INSERTION EXCHNGE CHECK  Result Date: 5/6/2025  CONCLUSION:  Ultrasound and fluoroscopic guided surgical placement of chest port    Dictated by (CST): Rony Hopkins MD on 5/06/2025 at 10:44 AM     Finalized by (CST): Rony Hopkins MD on 5/06/2025 at 10:45 AM          XR VIDEO SWALLOW (CPT=74230)  Result Date: 5/3/2025  CONCLUSION:  No penetration or aspiration.    Dictated by (CST): Harpreet Jade MD on 5/03/2025 at 12:08 PM     Finalized by (CST): Harpreet Jade MD on 5/03/2025 at 12:09 PM          XR CHEST AP PORTABLE  (CPT=71045)  Result Date: 5/2/2025  CONCLUSION:   Stable opacity left perihilar mid lung.  Mild opacity right lung base which may reflect atelectasis with or without superimposed pneumonia.    Dictated by (CST): Casey Diallo MD on 5/02/2025 at 7:18 AM     Finalized by (CST): Casey Diallo MD on 5/02/2025 at 7:19 AM            LABS :     Lab Results   Component Value Date    WBC 14.8 (H) 05/05/2025    HGB 9.0 (L) 05/05/2025    HCT 29.2 (L) 05/05/2025    .0 05/05/2025    CREATSERUM 0.77 05/05/2025    BUN 15 05/05/2025     05/05/2025    K 3.8 05/05/2025     05/05/2025    CO2 28.0 05/05/2025     (H) 05/05/2025    CA 8.8 05/05/2025    ALB 3.4 04/29/2025    ALKPHO 250 (H) 04/29/2025    BILT 0.6 04/29/2025    TP 7.1 04/29/2025    AST 17 04/29/2025    ALT 7 (L) 04/29/2025    PTT 35.1 04/29/2025    INR 1.33 (H) 04/29/2025    TSH 3.705 05/02/2025    LIP 42 12/11/2023    MG 1.9 05/03/2025    B12 >2,000 (H) 05/02/2025       Recent Labs   Lab 05/03/25  0732 05/04/25  0703 05/05/25  0721   RBC 3.57* 3.60* 3.54*   HGB 9.0* 9.0* 9.0*   HCT 28.8* 28.8* 29.2*   MCV 80.7 80.0 82.5   MCH 25.2* 25.0* 25.4*   MCHC 31.3 31.3 30.8*   RDW  19.9* 20.3* 20.2*   NEPRELIM 11.42* 10.71* 11.66*   WBC 13.9* 13.3* 14.8*   .0 316.0 247.0     Recent Labs   Lab 04/29/25  2158 04/30/25  0909 05/03/25  0732 05/04/25  0703 05/05/25  0721   *   < > 131* 137* 131*   BUN 11   < > 10 12 15   CREATSERUM 0.93   < > 0.65 0.76 0.77   CA 8.8   < > 8.9 8.7 8.8   ALB 3.4  --   --   --   --    *   < > 136 136 140   K 3.9   < > 3.9 3.7 3.8   CL 97*   < > 104 103 104   CO2 27.0   < > 23.0 26.0 28.0   ALKPHO 250*  --   --   --   --    AST 17  --   --   --   --    ALT 7*  --   --   --   --    BILT 0.6  --   --   --   --    TP 7.1  --   --   --   --     < > = values in this interval not displayed.     Lab Results   Component Value Date    INR 1.33 (H) 04/29/2025    INR 1.28 (H) 04/11/2025       Disposition: Discharge to Home    Condition at Discharge: Stable     Discharge Medications:      Discharge Medications        START taking these medications        Instructions Prescription details   amiodarone 200 MG Tabs  Commonly known as: Pacerone  Start taking on: May 7, 2025      Take 1 tablet (200 mg total) by mouth in the morning, at noon, and at bedtime for 1 day, THEN 1 tablet (200 mg total) in the morning, at noon, and at bedtime.   Stop taking on: June 7, 2025  Quantity: 93 tablet  Refills: 0     pantoprazole 40 MG Tbec  Commonly known as: Protonix      Take 1 tablet (40 mg total) by mouth daily.   Quantity: 30 tablet  Refills: 0            CONTINUE taking these medications        Instructions Prescription details   Fluticasone Propionate  MCG/ACT Aero  Commonly known as: FLOVENT HFA      Inhale 2 puffs into the lungs in the morning and 2 puffs before bedtime.   Refills: 0     levothyroxine 100 MCG Tabs  Commonly known as: Synthroid      Take 1 tablet (100 mcg total) by mouth before breakfast.   Refills: 0            STOP taking these medications      albuterol (2.5 MG/3ML) 0.083% Nebu  Commonly known as: Ventolin                  Where to Get Your  Medications        These medications were sent to [id number] - [place], [place] - [address] AT [facility] [address]  & [facility], [phone], [phone]  [address], [facility] [address]      Phone: [phone]   amiodarone 200 MG Tabs  pantoprazole 40 MG Tbec         Follow up Visits  Tahira Carreno MD  [address]  [address]  [facility] [id number] [phone]    Follow up in 2 week(s)  Office will call you for follow up appt    Taylor Gallardo MD  [address]  [address]  [facility] [id number] [phone]    Follow up in 2 week(s)      Dominick Herring DO  [address]  [address]  [facility] [id number] [phone]    Follow up      Taylor Gallardo MD    Consultants         Provider   Role Specialty     Khadar Segura MD      Consulting Physician Interventional, Cardiology     Rony Hopkins MD      Consulting Physician INTERVENTIONAL, RADIOLOGY     Dominick Herring DO      Consulting Physician Gynecology Oncology     Ector Byrnes MD      Consulting Physician GASTROENTEROLOGY     Mercedes Martínez APRN      Consulting Physician Nurse Practitioner     Everett Perales DO      Consulting Physician PULMONARY DISEASES              Other Discharge Instructions:       Discharge Instructions         Non-Emergency Medical  transportation resources:  [facility] [phone]  [phone]  [facility] [phone]     **When you book, You will need to discuss needing assist from them to get up/down 3 flights of stairs    HME for home O2  [facility]  [address] [place] [address]  Phone: [phone]  Fax: [phone]        ----------------------------------------------------  34 MIN SPENT ON THIS DC   Joel Dejesus MD    5/6/2025      Electronically signed by Joel Dejesus MD on 5/6/2025  4:07 PM       5/5 Pulmonology        Assessment and Plan:      1.  Respiratory syndrome-I highly doubt pneumonia as the CAT scan simply  redemonstrates the previously identified masses which were slightly increased.  She has severe anemia contributing to dyspnea.  No evidence of PE.  No evidence of tamponade physiology.  Status posttransfusion.     The patient is breathing comfortably at rest with oxygen and the video swallow was unrevealing.     Recommendations:  1.  Discharge planning  2.  Await port today     2.  DVT prophylaxis-SCDs in patient with severe anemia.     3.  Metastatic endometrial carcinoma-as per gynecologic oncology.  To get port placed on Monday and then initiation of chemotherapeutic on Tuesday.     4.  Recurrent episodes of atrial tachycardia with frequent PACs     Recommendations: As per EP, amiodarone     Subjective:   Kelli Fisher is a(n) 70 year old female who has mild dyspnea at baseline but now off oxygen     Objective:   Blood pressure 142/86, pulse 107, temperature 98.1 °F (36.7 °C), temperature source Oral, resp. rate 25, height 5' (1.524 m), weight 174 lb 9.6 oz (79.2 kg), SpO2 95%.     Physical Exam alert female  HEENT examination is unremarkable with pupils equal round and reactive to light and accommodation.   Neck without adenopathy, thyromegaly, JVD nor bruit.   Lungs slightly diminished to auscultation and percussion.  Cardiac regular rate and rhythm no murmur.   Abdomen nontender, without hepatosplenomegaly and no mass appreciable.   Extremities without clubbing cyanosis nor edema.   Neurologic grossly intact with symmetric tone and strength and reflex.  Skin without gross abnormality     Results:            Lab Results   Component Value Date     WBC 14.8 05/05/2025     HGB 9.0 05/05/2025     HCT 29.2 05/05/2025     .0 05/05/2025     CREATSERUM 0.77 05/05/2025     BUN 15 05/05/2025      05/05/2025     K 3.8 05/05/2025      05/05/2025     CO2 28.0 05/05/2025      05/05/2025     CA 8.8 05/05/2025         Srinath Levy MD  Medical Director, Critical Care, [facility]  Medical Director,  [facility]  Pager: [phone]               Electronically signed by Srinath Levy MD at 5/5/2025 11:52 AM

## 2025-05-08 ENCOUNTER — TELEPHONE (OUTPATIENT)
Dept: INTERNAL MEDICINE UNIT | Facility: HOSPITAL | Age: 70
End: 2025-05-08

## 2025-05-08 NOTE — TELEPHONE ENCOUNTER
Hospitalist office received call from daughter Theresa. Kylechuyita states mom was discharged from hospital on 5/6, reports instructions on discharge paperwork say to stop albuterol nebulizer however was not given the name or prescription for the new nebulizer solution that was started in hospital. Obduliomigelchuyita states she did reach out to her mom's PCP Dr. Gallardo, states Dr. Gallardo is requesting the name of the nebulizer medication.This RN reviewed chart, per notes from hospitalist and cardiology duonebs discontinued, changed to Xopenex 1.25mg prn. This RN called Dr. Gallardo's office and provided this information, per staff member will provide information to Dr. Gallardo and follow up with caren Cardenas. This RN returned call to Theresa, message left to inform of above.

## 2025-05-12 ENCOUNTER — APPOINTMENT (OUTPATIENT)
Dept: GENERAL RADIOLOGY | Facility: HOSPITAL | Age: 70
DRG: 270 | End: 2025-05-12
Payer: MEDICARE

## 2025-05-12 ENCOUNTER — HOSPITAL ENCOUNTER (INPATIENT)
Facility: HOSPITAL | Age: 70
LOS: 24 days | Discharge: NURSING, REHAB OR LONG-TERM CARE | DRG: 270 | End: 2025-06-05
Attending: EMERGENCY MEDICINE | Admitting: HOSPITALIST
Payer: MEDICARE

## 2025-05-12 DIAGNOSIS — D61.818 PANCYTOPENIA (HCC): ICD-10-CM

## 2025-05-12 DIAGNOSIS — I31.39 PERICARDIAL EFFUSION (HCC): ICD-10-CM

## 2025-05-12 DIAGNOSIS — I48.91 ATRIAL FIBRILLATION WITH RVR (HCC): ICD-10-CM

## 2025-05-12 DIAGNOSIS — I50.9 HEART FAILURE, UNSPECIFIED HF CHRONICITY, UNSPECIFIED HEART FAILURE TYPE (HCC): ICD-10-CM

## 2025-05-12 DIAGNOSIS — J96.01 ACUTE RESPIRATORY FAILURE WITH HYPOXIA (HCC): Primary | ICD-10-CM

## 2025-05-12 PROBLEM — D69.6 THROMBOCYTOPENIA: Status: ACTIVE | Noted: 2025-05-12

## 2025-05-12 PROBLEM — R79.89 AZOTEMIA: Status: ACTIVE | Noted: 2025-05-12

## 2025-05-12 PROBLEM — R73.9 HYPERGLYCEMIA: Status: ACTIVE | Noted: 2025-05-12

## 2025-05-12 PROBLEM — R73.9 HYPERGLYCEMIA: Status: ACTIVE | Noted: 2025-01-01

## 2025-05-12 PROBLEM — D69.6 THROMBOCYTOPENIA: Status: ACTIVE | Noted: 2025-01-01

## 2025-05-12 PROBLEM — R79.89 AZOTEMIA: Status: ACTIVE | Noted: 2025-01-01

## 2025-05-12 LAB
ALBUMIN SERPL-MCNC: 3.2 G/DL (ref 3.2–4.8)
ALP LIVER SERPL-CCNC: 142 U/L (ref 55–142)
ALT SERPL-CCNC: 10 U/L (ref 10–49)
ANION GAP SERPL CALC-SCNC: 7 MMOL/L (ref 0–18)
ANTIBODY SCREEN: NEGATIVE
AST SERPL-CCNC: 26 U/L (ref ?–34)
ATRIAL RATE: 105 BPM
BASOPHILS # BLD AUTO: 0 X10(3) UL (ref 0–0.2)
BASOPHILS NFR BLD AUTO: 0 %
BILIRUB DIRECT SERPL-MCNC: 0.4 MG/DL (ref ?–0.3)
BILIRUB SERPL-MCNC: 0.7 MG/DL (ref 0.2–1.1)
BUN BLD-MCNC: 22 MG/DL (ref 9–23)
BUN/CREAT SERPL: 28.2 (ref 10–20)
CALCIUM BLD-MCNC: 9 MG/DL (ref 8.7–10.4)
CHLORIDE SERPL-SCNC: 102 MMOL/L (ref 98–112)
CO2 SERPL-SCNC: 32 MMOL/L (ref 21–32)
CREAT BLD-MCNC: 0.78 MG/DL (ref 0.55–1.02)
DEPRECATED RDW RBC AUTO: 65.1 FL (ref 35.1–46.3)
EGFRCR SERPLBLD CKD-EPI 2021: 82 ML/MIN/1.73M2 (ref 60–?)
EOSINOPHIL # BLD AUTO: 0.01 X10(3) UL (ref 0–0.7)
EOSINOPHIL NFR BLD AUTO: 2.1 %
ERYTHROCYTE [DISTWIDTH] IN BLOOD BY AUTOMATED COUNT: 21.5 % (ref 11–15)
EST. AVERAGE GLUCOSE BLD GHB EST-MCNC: 143 MG/DL (ref 68–126)
FLUAV + FLUBV RNA SPEC NAA+PROBE: NEGATIVE
FLUAV + FLUBV RNA SPEC NAA+PROBE: NEGATIVE
GLUCOSE BLD-MCNC: 233 MG/DL (ref 70–99)
GLUCOSE BLDC GLUCOMTR-MCNC: 236 MG/DL (ref 70–99)
GLUCOSE BLDC GLUCOMTR-MCNC: 246 MG/DL (ref 70–99)
HBA1C MFR BLD: 6.6 % (ref ?–5.7)
HCT VFR BLD AUTO: 24.6 % (ref 35–48)
HGB BLD-MCNC: 7.3 G/DL (ref 12–16)
IMM GRANULOCYTES # BLD AUTO: 0.01 X10(3) UL (ref 0–1)
IMM GRANULOCYTES NFR BLD: 2.1 %
LACTATE SERPL-SCNC: 1.6 MMOL/L (ref 0.5–2)
LACTATE SERPL-SCNC: 2.1 MMOL/L (ref 0.5–2)
LYMPHOCYTES # BLD AUTO: 0.29 X10(3) UL (ref 1–4)
LYMPHOCYTES NFR BLD AUTO: 61.7 %
MAGNESIUM SERPL-MCNC: 1.5 MG/DL (ref 1.6–2.6)
MCH RBC QN AUTO: 24.6 PG (ref 26–34)
MCHC RBC AUTO-ENTMCNC: 29.7 G/DL (ref 31–37)
MCV RBC AUTO: 82.8 FL (ref 80–100)
MONOCYTES # BLD AUTO: 0.05 X10(3) UL (ref 0.1–1)
MONOCYTES NFR BLD AUTO: 10.6 %
NEUTROPHILS # BLD AUTO: 0.11 X10 (3) UL (ref 1.5–7.7)
NEUTROPHILS # BLD AUTO: 0.11 X10(3) UL (ref 1.5–7.7)
NEUTROPHILS NFR BLD AUTO: 23.5 %
NT-PROBNP SERPL-MCNC: 1110 PG/ML (ref ?–125)
OSMOLALITY SERPL CALC.SUM OF ELEC: 303 MOSM/KG (ref 275–295)
P AXIS: 57 DEGREES
P-R INTERVAL: 132 MS
PLATELET # BLD AUTO: 50 10(3)UL (ref 150–450)
PLATELETS.RETICULATED NFR BLD AUTO: 8.7 % (ref 0–7)
POTASSIUM SERPL-SCNC: 4.2 MMOL/L (ref 3.5–5.1)
PROT SERPL-MCNC: 6.8 G/DL (ref 5.7–8.2)
Q-T INTERVAL: 268 MS
Q-T INTERVAL: 342 MS
QRS DURATION: 74 MS
QRS DURATION: 74 MS
QTC CALCULATION (BEZET): 406 MS
QTC CALCULATION (BEZET): 452 MS
R AXIS: 22 DEGREES
R AXIS: 32 DEGREES
RBC # BLD AUTO: 2.97 X10(6)UL (ref 3.8–5.3)
RH BLOOD TYPE: POSITIVE
RSV RNA SPEC NAA+PROBE: NEGATIVE
SARS-COV-2 RNA RESP QL NAA+PROBE: NOT DETECTED
SODIUM SERPL-SCNC: 141 MMOL/L (ref 136–145)
T AXIS: 20 DEGREES
T AXIS: 57 DEGREES
TROPONIN I SERPL HS-MCNC: 5 NG/L (ref ?–34)
TROPONIN I SERPL HS-MCNC: 5 NG/L (ref ?–34)
VENTRICULAR RATE: 105 BPM
VENTRICULAR RATE: 138 BPM
WBC # BLD AUTO: 0.5 X10(3) UL (ref 4–11)

## 2025-05-12 PROCEDURE — 71045 X-RAY EXAM CHEST 1 VIEW: CPT | Performed by: EMERGENCY MEDICINE

## 2025-05-12 PROCEDURE — 99223 1ST HOSP IP/OBS HIGH 75: CPT | Performed by: INTERNAL MEDICINE

## 2025-05-12 PROCEDURE — 99223 1ST HOSP IP/OBS HIGH 75: CPT | Performed by: HOSPITALIST

## 2025-05-12 RX ORDER — VANCOMYCIN HYDROCHLORIDE
15 EVERY 24 HOURS
Status: DISCONTINUED | OUTPATIENT
Start: 2025-05-13 | End: 2025-05-16

## 2025-05-12 RX ORDER — PANTOPRAZOLE SODIUM 40 MG/1
40 TABLET, DELAYED RELEASE ORAL
Status: DISCONTINUED | OUTPATIENT
Start: 2025-05-13 | End: 2025-05-17

## 2025-05-12 RX ORDER — NYSTATIN 100000 [USP'U]/ML
5 SUSPENSION ORAL 4 TIMES DAILY
Status: DISCONTINUED | OUTPATIENT
Start: 2025-05-12 | End: 2025-05-21

## 2025-05-12 RX ORDER — DILTIAZEM HYDROCHLORIDE 5 MG/ML
10 INJECTION INTRAVENOUS ONCE
Status: DISCONTINUED | OUTPATIENT
Start: 2025-05-12 | End: 2025-05-12

## 2025-05-12 RX ORDER — PANTOPRAZOLE SODIUM 40 MG/1
40 TABLET, DELAYED RELEASE ORAL DAILY
Status: DISCONTINUED | OUTPATIENT
Start: 2025-05-12 | End: 2025-05-12

## 2025-05-12 RX ORDER — DEXTROSE MONOHYDRATE 25 G/50ML
50 INJECTION, SOLUTION INTRAVENOUS
Status: DISCONTINUED | OUTPATIENT
Start: 2025-05-12 | End: 2025-06-05

## 2025-05-12 RX ORDER — DILTIAZEM HYDROCHLORIDE 30 MG/1
30 TABLET, FILM COATED ORAL EVERY 6 HOURS SCHEDULED
Status: CANCELLED | OUTPATIENT
Start: 2025-05-12

## 2025-05-12 RX ORDER — ACETAMINOPHEN 500 MG
500 TABLET ORAL EVERY 4 HOURS PRN
Status: DISCONTINUED | OUTPATIENT
Start: 2025-05-12 | End: 2025-05-21

## 2025-05-12 RX ORDER — FUROSEMIDE 20 MG/1
20 TABLET ORAL DAILY
COMMUNITY
End: 2025-06-05

## 2025-05-12 RX ORDER — NICOTINE POLACRILEX 4 MG
30 LOZENGE BUCCAL
Status: DISCONTINUED | OUTPATIENT
Start: 2025-05-12 | End: 2025-06-05

## 2025-05-12 RX ORDER — LEVOTHYROXINE SODIUM 100 UG/1
100 TABLET ORAL
COMMUNITY

## 2025-05-12 RX ORDER — POTASSIUM CHLORIDE 1500 MG/1
1 TABLET, EXTENDED RELEASE ORAL DAILY
COMMUNITY

## 2025-05-12 RX ORDER — MAGNESIUM OXIDE 400 MG/1
800 TABLET ORAL ONCE
Status: COMPLETED | OUTPATIENT
Start: 2025-05-12 | End: 2025-05-12

## 2025-05-12 RX ORDER — NICOTINE POLACRILEX 4 MG
15 LOZENGE BUCCAL
Status: DISCONTINUED | OUTPATIENT
Start: 2025-05-12 | End: 2025-06-05

## 2025-05-12 RX ORDER — ONDANSETRON 2 MG/ML
4 INJECTION INTRAMUSCULAR; INTRAVENOUS EVERY 6 HOURS PRN
Status: DISCONTINUED | OUTPATIENT
Start: 2025-05-12 | End: 2025-05-28

## 2025-05-12 RX ORDER — LEVALBUTEROL INHALATION SOLUTION 1.25 MG/3ML
1.25 SOLUTION RESPIRATORY (INHALATION) EVERY 8 HOURS PRN
COMMUNITY
End: 2025-06-05

## 2025-05-12 RX ORDER — FUROSEMIDE 10 MG/ML
40 INJECTION INTRAMUSCULAR; INTRAVENOUS ONCE
Status: COMPLETED | OUTPATIENT
Start: 2025-05-12 | End: 2025-05-12

## 2025-05-12 RX ORDER — FUROSEMIDE 10 MG/ML
40 INJECTION INTRAMUSCULAR; INTRAVENOUS
Status: DISCONTINUED | OUTPATIENT
Start: 2025-05-12 | End: 2025-05-13

## 2025-05-12 RX ORDER — DILTIAZEM HYDROCHLORIDE 5 MG/ML
10 INJECTION INTRAVENOUS ONCE
Status: CANCELLED | OUTPATIENT
Start: 2025-05-12 | End: 2025-05-12

## 2025-05-12 RX ORDER — AMIODARONE HYDROCHLORIDE 150 MG/3ML
150 INJECTION, SOLUTION INTRAVENOUS ONCE
Status: DISCONTINUED | OUTPATIENT
Start: 2025-05-12 | End: 2025-05-12 | Stop reason: CLARIF

## 2025-05-12 RX ORDER — METOCLOPRAMIDE HYDROCHLORIDE 5 MG/ML
5 INJECTION INTRAMUSCULAR; INTRAVENOUS EVERY 8 HOURS PRN
Status: DISCONTINUED | OUTPATIENT
Start: 2025-05-12 | End: 2025-05-21

## 2025-05-12 RX ORDER — IPRATROPIUM BROMIDE AND ALBUTEROL SULFATE 2.5; .5 MG/3ML; MG/3ML
3 SOLUTION RESPIRATORY (INHALATION) EVERY 6 HOURS PRN
Status: DISCONTINUED | OUTPATIENT
Start: 2025-05-12 | End: 2025-06-05

## 2025-05-12 RX ORDER — VANCOMYCIN HYDROCHLORIDE
20 ONCE
Status: COMPLETED | OUTPATIENT
Start: 2025-05-12 | End: 2025-05-12

## 2025-05-12 RX ORDER — LEVOTHYROXINE SODIUM 100 UG/1
100 TABLET ORAL
Status: DISCONTINUED | OUTPATIENT
Start: 2025-05-13 | End: 2025-06-05

## 2025-05-12 NOTE — ED QUICK NOTES
Orders for admission, patient is aware of plan and ready to go upstairs. Any questions, please call ED RN Denisse at extension [phone].     Patient Covid vaccination status: Unvaccinated     COVID Test Ordered in ED: SARS-CoV-2/Flu A and B/RSV by PCR (GeneXpert)    COVID Suspicion at Admission: N/A    Running Infusions: Medication Infusions[1] Amiodarone    Mental Status/LOC at time of transport: AOx4    Other pertinent information: extremely weak, recent chemo, recent admission, repeat lactic @ 1800 ____  CIWA score: N/A   NIH score:  N/A               [1]    amiodarone 1 mg/min (05/12/25 9776)

## 2025-05-12 NOTE — CONSULTS
[facility]  part of [facility]    Report of Consultation    Kelli Fisher Patient Status:  Emergency    1955 MRN [id number]   Location [facility] EMERGENCY DEPARTMENT Attending Rudy Wan MD   Hosp Day # 0 PCP Taylor Gallardo MD     Date of Admission:  2025  Date of Consult: 2025    Reason for Consultation:   Consults  Dyspnea and fatigue and cough and generalized pain    History provided by:patient and daughter  HPI:     Chief Complaint   Patient presents with    Difficulty Breathing     HPI      70-year-old female with a stage IV metastatic endometrial carcinoma with extensive lung mets with multiple lung masses largest left upper lobe with recent multiple hospitalizations with biopsy of the supraclavicular lymph node was positive for malignancy and patient was just discharged from the hospital last week.    She received outpatient chemotherapy few days ago and today presented from home after home nurse reported weakness and fatigue and cough with white sputum with minimal pinkish sputum with increased heart rate and tachypnea  Patient with generalized weakness and fatigue  Lower extremity edema  Denied abdominal pain with no reported vomiting or diarrhea  Poor oral intake and poor appetite    History   Past Medical History[1]  Past Surgical History[2]  Family History[3]  Social History:  Short Social Hx on File[4]  Allergies/Medications:   Allergies: Allergies[5]  Prescriptions Prior to Admission[6]    Review of Systems:     Constitutional:  Positive for chills and fatigue. Negative for fever.   HENT:  Negative for congestion.    Respiratory:  Positive for cough and shortness of breath. Negative for wheezing.    Cardiovascular:  Positive for leg swelling. Negative for chest pain and palpitations.   Gastrointestinal:  Negative for abdominal pain and abdominal distention.   Musculoskeletal:  Positive for back pain.   Skin:  Negative for color change.    Neurological:  Negative for seizures.   Psychiatric/Behavioral:  Positive for decreased concentration. Negative for agitation.        Physical Exam:   Vital Signs:   oral temperature is 98.7 °F (37.1 °C). Her blood pressure is 112/71 and her pulse is 108. Her respiration is 26 and oxygen saturation is 98%.   Physical Exam  Vitals and nursing note reviewed.   Constitutional:       General: She is in acute distress.      Appearance: She is ill-appearing.   HENT:      Head: Atraumatic.      Nose: Nose normal.      Mouth/Throat:      Mouth: Mucous membranes are moist.   Eyes:      General: No scleral icterus.  Cardiovascular:      Rate and Rhythm: Regular rhythm. Tachycardia present.      Heart sounds:      No gallop.   Pulmonary:      Effort: No respiratory distress.      Breath sounds: No stridor. Rales present. No wheezing or rhonchi.   Abdominal:      General: Abdomen is flat. Bowel sounds are normal. There is no distension.      Palpations: Abdomen is soft.      Tenderness: There is no guarding.   Musculoskeletal:      Right lower leg: Edema present.      Left lower leg: Edema present.   Skin:     General: Skin is dry.   Neurological:      General: No focal deficit present.      Mental Status: She is oriented to person, place, and time.         Results:     Lab Results   Component Value Date    WBC 0.5 (LL) 05/12/2025    HGB 7.3 (L) 05/12/2025    HCT 24.6 (L) 05/12/2025    PLT 50.0 (L) 05/12/2025    CREATSERUM 0.78 05/12/2025    BUN 22 05/12/2025     05/12/2025    K 4.2 05/12/2025     05/12/2025    CO2 32.0 05/12/2025     (H) 05/12/2025    CA 9.0 05/12/2025    ALB 3.2 05/12/2025    ALKPHO 142 05/12/2025    BILT 0.7 05/12/2025    TP 6.8 05/12/2025    AST 26 05/12/2025    ALT 10 05/12/2025    PTT 35.1 04/29/2025    INR 1.33 (H) 04/29/2025    TSH 3.705 05/02/2025    LIP 42 12/11/2023    MG 1.9 05/03/2025    TROPHS 5 05/12/2025    B12 >2,000 (H) 05/02/2025     No results found.  EKG  Result Date:  5/12/2025  Normal sinus rhythm converting to atrial tachycardia Low voltage QRS, consider pulmonary disease, pericardial effusion, or normal variant Abnormal ECG When compared with ECG of 12-MAY-2025 13:34, Atrial tachycardia now present Confirmed by TRINI ROSS CASH ([id number]) on 5/12/2025 2:52:07 PM    EKG  Result Date: 5/12/2025  Sinus tachycardia Low voltage QRS, consider pulmonary disease, pericardial effusion, or normal variant Cannot rule out Anterior infarct , age undetermined Abnormal ECG When compared with ECG of 02-MAY-2025 06:44, Nonspecific T wave abnormality now evident in Anterior leads Confirmed by TRINI ROSS CASH ([id number]) on 5/12/2025 2:50:26 PM      Impression:     1-metastatic stage IV endometrial carcinoma with lung mets with large NATO mass  S/p recent chemotherapy last week .      2- pancytopenia with severe neutropenia and anemia thrombocytopenia  Secondary to chemotherapy  hematology and oncology consultation    3-immunocompromise host with severe neutropenia  Cough and fatigue and chills and tachycardia with possible sepsis    Check cultures  Empiric antibiotics with cefepime and vancomycin    4-chronic respiratory failure secondary to 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   O2 therapy    5-pericardial effusion seen recently on echo on 5/5/2025  EKG with low voltage  Might need repeat echo    6-DVT prophylaxis  SCD only since low platelet    7-full code  Setting limit is appropriate  Recommend palliative care consult      D/w pt and daughter   High risk , complex , poor prognosis   D/w ER MD Yamila Grover MD  5/12/2025         [1]   Past Medical History:   Asthma (HCC)    Esophageal reflux    History of blood transfusion    Visual impairment   [2]   Past Surgical History:  Procedure Laterality Date    Thyroidectomy      Total abdom hysterectomy     [3] No family history on file.  [4]   Social History  Socioeconomic History    Marital status: Single   Tobacco Use    Smoking  status: Never    Smokeless tobacco: Never   Vaping Use    Vaping status: Never Used   Substance and Sexual Activity    Alcohol use: Never    Drug use: Never     Social Drivers of Health     Food Insecurity: No Food Insecurity (4/30/2025)    NCSS - Food Insecurity     Worried About Running Out of Food in the Last Year: No     Ran Out of Food in the Last Year: No   Transportation Needs: No Transportation Needs (4/30/2025)    NCSS - Transportation     Lack of Transportation: No   Housing Stability: Not At Risk (4/30/2025)    NCSS - Housing/Utilities     Has Housing: Yes     Worried About Losing Housing: No     Unable to Get Utilities: No   [5]   Allergies  Allergen Reactions    Aspirin Tightness in Throat    Penicillins HIVES    Other OTHER (SEE COMMENTS)     Pt states IV steroid allergy, states it makes her breathing worse    [6] (Not in a hospital admission)

## 2025-05-12 NOTE — CONSULTS
[facility]  part of [facility]    Cardiology Consultation    Kelli Fisher Patient Status:  Emergency    1955 MRN [id number]   Location [facility] EMERGENCY DEPARTMENT Attending Rudy Wan MD   Hosp Day # 0 PCP Taylor Gallardo MD     Date of Admission:  2025  Date of Consult:  2025  Reason for Consultation:     History of Present Illness:   Patient is a 70 year old female with significant past medical history of metastatic endometrial cancer with lung mets, anemia, paroxysmal SVT, small pericardial effusion who presents to ED with increase in dyspnea and palpitations.   Of note, pt was recently hospitalized with acute hypoxic respiratory failure, responded well to diuretics, had paroxysmal SVT on amiodarone due to junctional rhythm with CCB/BB and unchanged pericardial effusion.   In the ED, pt with tachycardiac and on 4L of supplemental O2. ECG and rhythm strips with paroxysmal AT/AF. Labs revealed neutropenia (WBC of 0.5K) and worsening anemia with Hg of 7.3.  Assessment and Plan:   Neutropenia   Dyspnea on exertion  Endometrial cancer with lung metastasis  Acute on chronic anemia  Small pericardial effusion  - Presents with worsening dyspnea  - Found to be neutropenic, wrosening anemia (down to 7.3)  - Concerning for infectious process, demand/supply mismatch 2/t anemia or PE, tamponade   - Obtain CXR  - Check d-dimer  - Continue amiodarone for maintenance of SR, did not tolerate BB/CCB during last hospitalization due to junctional rhythm, intermittent AVB  - Appreciate pulm and oncology evaluation    Past Medical History  Past Medical History[1]    Past Surgical History  Past Surgical History[2]    Family History  Family History[3]    Social History  Pediatric History   Patient Parents    Not on file     Other Topics Concern    Not on file   Social History Narrative    Not on file           Current Medications:  Current Hospital  Medications[4]  Prescriptions Prior to Admission[5]    Allergies  Allergies[6]    Review of Systems:   ROS  10 point review of systems completed and negative except as noted.    Physical Exam:   Patient Vitals for the past 24 hrs:   BP Temp Temp src Pulse Resp SpO2   05/12/25 1400 112/71 -- -- 108 26 98 %   05/12/25 1312 108/71 98.7 °F (37.1 °C) Oral 108 25 99 %       Intake/Output:   Last 3 shifts:   Vent Settings:    Hemodynamic parameters (last 24 hours):    Scheduled Meds: Scheduled Medications[7]  Continuous Infusions: Medication Infusions[8]    Physical Exam:  General: Alert and oriented x 3. No apparent distress.   HEENT: Normocephalic, anicteric sclera, neck supple, no thyromegaly or adenopathy.  Neck: No JVD, carotids 2+, no bruits.  Cardiac: Irreg irreg  Lungs: Diminished BS  Abdomen: Soft, non-tender. No organosplenomegally, mass or rebound, BS-present.  Extremities: Without clubbing or cyanosis. No edema.    Neurologic: Alert and oriented, normal affect. No focal defects  Skin: Warm and dry.     Results:   Laboratory Data:  Lab Results   Component Value Date    WBC 0.5 (LL) 05/12/2025    HGB 7.3 (L) 05/12/2025    HCT 24.6 (L) 05/12/2025    PLT 50.0 (L) 05/12/2025    CREATSERUM 0.77 05/05/2025    BUN 15 05/05/2025     05/05/2025    K 3.8 05/05/2025     05/05/2025    CO2 28.0 05/05/2025     (H) 05/05/2025    CA 8.8 05/05/2025    ALB 3.4 04/29/2025    ALKPHO 250 (H) 04/29/2025    TP 7.1 04/29/2025    AST 17 04/29/2025    ALT 7 (L) 04/29/2025    PTT 35.1 04/29/2025    INR 1.33 (H) 04/29/2025    PTP 17.2 (H) 04/29/2025    TSH 3.705 05/02/2025    LIP 42 12/11/2023    MG 1.9 05/03/2025    B12 >2,000 (H) 05/02/2025         No results for input(s): \"GLU\", \"BUN\", \"CREATSERUM\", \"GFRAA\", \"GFRNAA\", \"CA\", \"NA\", \"K\", \"CL\", \"CO2\" in the last 168 hours.  Recent Labs   Lab 05/12/25  1410   RBC 2.97*   HGB 7.3*   HCT 24.6*   MCV 82.8   MCH 24.6*   MCHC 29.7*   RDW 21.5*   NEPRELIM 0.11*   WBC 0.5*   PLT  50.0*       No results for input(s): \"BNPML\" in the last 168 hours.    No results for input(s): \"TROP\", \"CK\" in the last 168 hours.    Imaging:  No results found.    Thank you for allowing me to participate in the care of your patient.    Javi Huerta, [facility] [place]         [1]   Past Medical History:   Asthma (HCC)    Esophageal reflux    History of blood transfusion    Visual impairment   [2]   Past Surgical History:  Procedure Laterality Date    Thyroidectomy      Total abdom hysterectomy     [3] No family history on file.  [4]   No current facility-administered medications for this encounter.   [5] (Not in a hospital admission)  [6]   Allergies  Allergen Reactions    Aspirin Tightness in Throat    Penicillins HIVES    Other OTHER (SEE COMMENTS)     Pt states IV steroid allergy, states it makes her breathing worse    [7] [8]

## 2025-05-12 NOTE — ED INITIAL ASSESSMENT (HPI)
Pt sent in by PCP for IRVIN and HTN x3 days. Per pt has noticed more swelling in her legs.     Pt on 4L at baseline.

## 2025-05-12 NOTE — H&P
[facility]    PATIENT'S NAME: ROMIE MONTERO   ATTENDING PHYSICIAN: Rudy Wan MD   PATIENT ACCOUNT#:   [id number]    LOCATION:  [address]  MEDICAL RECORD #:   [id number]       YOB: 1955  ADMISSION DATE:       05/12/2025    HISTORY AND PHYSICAL EXAMINATION    CHIEF COMPLAINT:  Atrial fibrillation with rapid ventricular response, neutropenia, and possible heart failure.    HISTORY OF PRESENT ILLNESS:  The patient is a 70-year-old  female who was recently diagnosed with recurrent metastatic endometrial cancer, discharged from the hospital last week and initiate on chemotherapy.  Receive her first chemotherapy 6 days ago.  For the last few days been having progressive palpitations and dyspnea on exertion.  Today came into the emergency department for evaluation.  CBC showed white blood cell count of 0.5, absolute neutrophil count of 0.11, hemoglobin 7.3.  Chemistry was unremarkable except for glucose 233.  She had no history of diabetes.  Chest x-ray showed suboptimal inspiration; no acute finding or significant change from prior; left mid lung upper lobe mass compatible with known metastatic malignancy; stable mediastinal lymph node metastases.    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  She had declined paclitaxel and Keytruda at that time.  She had recent recurrent disease with mediastinal supraclavicular lung metastatic proved by supraclavicular lymph node biopsy.  Also, history of asthma, gastroesophageal reflux disease, and paroxysmal atrial tachycardia.     PAST SURGICAL HISTORY:  Thyroidectomy and total abdominal hysterectomy/bilateral salpingo-oophorectomy.    MEDICATIONS:  Please see medication reconciliation list.     ALLERGIES:  Aspirin and  penicillin.    FAMILY HISTORY:  Positive for hypertension.    SOCIAL HISTORY:  No tobacco or drug use.  No alcohol use.  Lives with her family.    REVIEW OF SYSTEMS:  The patient has had palpitations and chest discomfort associated with dyspnea on exertion and leg edema for the last few days.  She was discharged from the hospital recently after she was found to have paroxysmal atrial tachycardia, on amiodarone p.o.  Also, recent hospitalization showed negative aspiration on video swallow study.      PHYSICAL EXAMINATION:    GENERAL:  Alert, oriented to time, place, and person, moderate acute distress.   VITAL SIGNS:  Temperature 98.7.  Pulse 101.  Respiratory rate 26.  Blood pressure 112/71.  Pulse ox 98% on room air.  HEENT:  Atraumatic.  Oropharynx clear.  Dry mucous membranes.  Ears, nose normal.  Eyes:  Anicteric sclerae.  NECK:  Supple.  No lymphadenopathy.  Positive jugular venous distention.   LUNGS:  The patient is visibly tachypneic.  Diminished breathing sounds both lung fields.  HEART:  Irregularly irregular rhythm.  Tachycardic.  ABDOMEN:  Soft, nondistended.  No tenderness.  Positive bowel sounds.  EXTREMITIES:  +2 to 3 edema both legs.  No clubbing or cyanosis.  NEUROLOGIC:  Motor and sensory intact.    ASSESSMENT:    1.   Atrial fibrillation with rapid ventricular response.  2.   Possible heart failure.  3.   Neutropenia.  Recently started on chemotherapy.  4.   Recurrent endometrial adenocarcinoma.  5.   Hyperglycemia and possible underlying new-onset diabetes mellitus type 2.    PLAN:  The patient will be admitted to telemetry floor.  IV amiodarone drip.  IV Lasix.  Cardiology, Pulmonary, and Gyne Oncology consults.  Monitor her hemodynamic status.  Start her on IV cefepime.  Monitor temperature curve.   Further recommendations to follow.     Dictated By Fabrice Mitchell MD  d: 05/12/2025 15:12:24  t: 05/12/2025 15:20:11  Job [id number]  FB/    cc: Rudy Wan MD

## 2025-05-12 NOTE — CONSULTS
[facility]  part of [facility]    Non-Surgical Consult    Kelli Fisher Patient Status:  Inpatient    1955 MRN [id number]   Location [facility] 3W/SW Attending Fabrice Mitchell MD   Hosp Day # 0 PCP Taylor Gallardo MD       Date of Admission:  2025    SUBJECTIVE:  Patient comes in appears to be weakness, shortness of breath, dyspnea, palpitations and failure to thrive.  She denies any fevers or chills.  She also denies any nausea or vomiting.  Patient did receive her first cycle of chemotherapy including carboplatin, Taxol, Keytruda on May 6 and did receive Fulphila which is the equivalent of Neulasta on May 7, 2025.  Patient does not have a sore throat and difficulty swallowing.  She denies any nausea or vomiting.  Patient's last bowel movement was approximately 36 to 48 hours ago.  However she is passing flatus.  She notes that she has not had a bowel movement because she has not been eating much.    Reason for Admission:  Stage IVb endometrial clear-cell adenocarcinoma    History of Present Illness:  Patient is a 70 year old female.  No obstetric history on file.  No LMP recorded. Patient has had a hysterectomy.  Patient is a 70-year-old with stage IVb uterine adenocarcinoma clear-cell type who recently transferred her care to me.  She was noted to have a large left lung mass most compatible with metastatic disease.  She also has mediastinal lymphadenopathy.  She does have a history of asthma, GERD, and paroxysmal atrial tachycardia.    Medications:  Prescriptions Prior to Admission[1]    Allergies:  Allergies[2]     Past Medical History:  Past Medical History[3]    Past Surgical History:  Past Surgical History[4]    Past OB History:  OB History   No obstetric history on file.       Past GYN History:   Stage IV clear-cell uterine adenocarcinoma    Social History:  Social History     Tobacco Use    Smoking status: Never    Smokeless tobacco: Never   Substance Use Topics     Alcohol use: Never        Review of Systems:  Weak, dyspnea, tolerating small amount of regular diet      OBJECTIVE:  Temp:  [98.7 °F (37.1 °C)] 98.7 °F (37.1 °C)  Pulse:  [102-154] 102  Resp:  [19-27] 27  BP: (100-128)/(71-88) 121/85  SpO2:  [98 %-100 %] 100 %    Intake/Output Summary (Last 24 hours) at 5/12/2025 1732  Last data filed at 5/12/2025 1656  Gross per 24 hour   Intake 350 ml   Output 900 ml   Net -550 ml       Physical Exam:  General appearance: alert, appears stated age and cooperative  Head: Normocephalic, without obvious abnormality, atraumatic  Eyes: conjunctivae/corneas clear. PERRL, EOM's intact.   Ears:  external ear canals both ears  Throat: Patient has what appears to be thrush in the back of her throat  Extremities: extremities normal, atraumatic, no cyanosis or edema  Skin: Skin color, texture, turgor normal. No rashes or lesions  Lymph nodes: Cervical, supraclavicular, and axillary nodes normal.  Neurologic: Grossly normal    Diagnostics:  XR CHEST AP PORTABLE  (CPT=71045)  Result Date: 5/12/2025  PROCEDURE: XR CHEST AP PORTABLE  (CPT=71045) TIME: 1443 hours  COMPARISON: [facility], XR CHEST AP PORTABLE (CPT=71045), 4/29/2025, 10:40 PM.  [facility], CT CHEST PE AORTA (IV ONLY) (CPT=71260), 4/29/2025, 11:50 PM.  [facility], XR CHEST AP PORTABLE (CPT=71045), 5/02/2025, 6:22 AM.  INDICATIONS: Low oxygen, shortness of breath.  History of lung cancer.  TECHNIQUE:   Single view.   FINDINGS:  CARDIAC/VASC: Enlarged cardiac silhouette is related to known pericardial effusion. Pulmonary vascularity normal. MEDIAST/SCOTT:   Stable marked mediastinal lymphadenopathy. LUNGS/PLEURA: Stable left upper lobe mass.  Suboptimal inspiration.  No significant changes BONES: No fracture or visible bony lesion. OTHER: Right Port-A-Cath tip in superior right atrium.         CONCLUSION:  1. Suboptimal inspiration.  No acute finding or significant change. 2. Left mid  lung/upper lobe mass compatible with known malignancy. 3. Stable mediastinal lymph node metastases. 4. Stable pericardial effusion.    Dictated by (CST): Jayson Fournier MD on 5/12/2025 at 3:04 PM     Finalized by (CST): Jayson Fournier MD on 5/12/2025 at 3:08 PM          IR PORT A CATH INSERTION EXCHNGE CHECK  Result Date: 5/6/2025  PROCEDURE: IR PORT A CATH INSERTION  INDICATIONS: port insertion  COMPARISON: None.  (S):  Kellie  ANESTHESIA/SEDATION: Level of anesthesia/sedation: Moderate sedation (conscious sedation) Anesthesia/sedation administered by: Nurse or other independent trained observer who has no other duties with continuous monitoring of the patient's level of consciousness and physiologic status, under the personal supervision of the attending physician. Total intra-service sedation time:  15 min  ESTIMATED BLOOD LOSS: Less than 5 mL  COMPLICATIONS: None  FINDINGS:  Informed consent was obtained. The right side of the neck and chest were sterilely prepped and draped.  The procedure was performed with the patient in a sitting semi upright position, as she could not tolerate lying flat due to dyspnea.  Ultrasound demonstrated the right internal jugular vein to be patent. Local Lidocaine was administered. Under ultrasound guidance, the vein was accessed with a micropuncture set; an image was saved.  Under fluoroscopic guidance, a 0.035 inch wire was advanced into the inferior vena cava. The access was dilated. A peel-away sheath was advanced over the Amplatz wire and secured.  Local Lidocaine was administered, and a horizontal skin incision was made in the chest several cm from the venotomy with a 15 blade. Blunt dissection of the soft tissues was then performed to create a pocket for the chest port device.  The port catheter was tunneled from the pocket to the venotomy. The catheter was advanced through the peel-away sheath to the superior vena cava-right atrial junction and cut to appropriate  length. The port catheter was attached to the port device which was then placed into the pocket.  The port was accessed with a non-coring needle. It aspirated and flushed well. The catheter was flushed with heparin and secured to the skin. The subcutaneous pocket was then closed with interrupted deep 4-0 absorbable sutures. Skin glue was applied over  the pocket incision and the venotomy.         CONCLUSION:  Ultrasound and fluoroscopic guided surgical placement of chest port    Dictated by (CST): Rony Hopkins MD on 2025 at 10:44 AM     Finalized by (CST): Rony Hopkins MD on 2025 at 10:45 AM          [place] (CPT=93308/14842/30383)  Result Date: 2025  Transthoracic Echocardiogram Name:Kelli Fisher Date: 2025 :  1955 Ht:  (60in)  BP: 142 / 86 MRN:  [id number]    Age:  70years    Wt:  (174lb) HR: 106bpm Loc:  [facility]       Gndr: F          BSA: 1.76m^2 Sonographer: Chris DOMINGUEZ Ordering:    Stella Gomez Consulting:  Khadar Segura ---------------------------------------------------------------------------- History/Indications:   Pericardial effusion. Lung mass. ---------------------------------------------------------------------------- Procedure information:  A transthoracic echocardiogram, limited study was performed. Additional evaluation included M-mode and limited 2D.  Patient status:  Inpatient.  Location:  Bedside.    Comparison was made to the study of 2025.    This was a routine study. Transthoracic echocardiography for diagnosis and ventricular function evaluation. Image quality was adequate. ECG rhythm:   Sinus tachycardia  Study completion:  There were no complications. ---------------------------------------------------------------------------- Conclusions: 1. Left ventricle: The cavity size was normal. Wall thickness was normal.    Systolic function was vigorous. The estimated ejection fraction was    65-70%, by biplane method of disks. No diagnostic  evidence for regional    wall motion abnormalities. Unable to assess LV diastolic function. 2. Pericardium, extracardiac: A small to moderate, free-flowing pericardial    effusion was identified circumferential to the heart. Probably not    hemodynamically significant. Impressions:  This study is compared with previous dated 4/30/2025: No significant change since prior study. * ---------------------------------------------------------------------------- * Findings: Left ventricle:  The cavity size was normal. Wall thickness was normal. Systolic function was vigorous. The estimated ejection fraction was 65-70%, by biplane method of disks. No diagnostic evidence for regional wall motion abnormalities. Unable to assess LV diastolic function. Ventricular septum:   Thickness was normal. Left atrium:  The left atrial volume was normal. Right ventricle:  Well visualized. The cavity size was normal. Systolic function was normal. Right atrium:  Well visualized. The atrium was normal in size. Mitral valve:  Well visualized. The leaflets were mildly calcified. Aortic valve:  Well visualized.  The valve was trileaflet. The leaflets were mildly calcified. Tricuspid valve:  Well visualized. Pulmonic valve:   Well visualized. Pericardium:  A small to moderate, free-flowing pericardial effusion was identified circumferential to the heart. Probably not hemodynamically significant. Aorta: Aortic root: The aortic root was normal. Ascending aorta: The ascending aorta was normal. Pulmonary arteries: Systolic pressure could not be accurately estimated. ---------------------------------------------------------------------------- Measurements  Left ventricle         Value        Ref       04/30/2025  IVS thickness, ED, (H) 1.0   cm     0.6 - 0.9 1.0  PLAX  LV ID, ED, PLAX    (L) 3.6   cm     3.8 - 5.2 3.9  LV ID, ES, PLAX        2.4   cm     2.2 - 3.5 2.5  LV PW thickness,   (H) 1.0   cm     0.6 - 0.9 1.0  ED, PLAX  IVS/LV PW ratio,        1.00         --------- 1.00  ED, PLAX  LV PW/LV ID ratio,     0.28         --------- 0.26  ED, PLAX  LV ejection            63    %      54 - 74   66  fraction  LV end-diastolic       62    ml     48 - 140  ----------  volume, 1-p A4C  LV ejection            65    %      46 - 78   ----------  fraction, 1-p A4C  Stroke volume, 1-p     40    ml     --------- ----------  A4C  LV end-diastolic       35    ml/m^2 30 - 82   ----------  volume/bsa, 1-p  A4C  Stroke volume/bsa,     23    ml/m^2 --------- ----------  1-p A4C  LV end-diastolic       61    ml     46 - 106  ----------  volume, 2-p  LV end-systolic        21    ml     14 - 42   ----------  volume, 2-p  LV ejection            65    %      54 - 74   ----------  fraction, 2-p  Stroke volume, 2-p     40    ml     --------- ----------  LV end-diastolic       35    ml/m^2 29 - 61   ----------  volume/bsa, 2-p  LV end-systolic        12    ml/m^2 8 - 24    ----------  volume/bsa, 2-p  Stroke volume/bsa,     22.5  ml/m^2 --------- ----------  2-p  LVOT                   Value        Ref       04/30/2025  LVOT ID                2     cm     --------- ----------  Aortic root            Value        Ref       04/30/2025  Aortic root ID         3.2   cm     2.5 - 4.0 ----------  Ascending aorta        Value        Ref       04/30/2025  Ascending aorta ID (H) 3.7   cm     1.9 - 3.5 ----------  Left atrium            Value        Ref       04/30/2025  LA ID, A-P, ES         2.7   cm     2.7 - 3.8 ----------  LA volume, S           43    ml     22 - 52   ----------  LA volume/bsa, S       24    ml/m^2 16 - 34   ----------  LA volume, ES, 1-p     39    ml     22 - 52   ----------  A4C  LA volume, ES, 1-p     46    ml     22 - 52   ----------  A2C  LA volume, ES, A/L     46    ml     --------- ----------  LA volume/bsa, ES,     26    ml/m^2 16 - 34   ----------  A/L  LA/aortic root         0.84         --------- ----------  ratio  Right atrium           Value        Ref        04/30/2025  RA ID, S-I, ES,        4.8   cm     3.4 - 5.3 ----------  A4C  RA ID/bsa, S-I,        2.7   cm/m^2 1.9 - 3.1 ----------  ES, A4C  RA area, ES, A4C       14    cm^2   10 - 18   ----------  RA volume, ES, 1-p     32    ml     --------- ----------  A4C  RA volume/bsa, ES,     18    ml/m^2 9 - 33    ----------  1-p A4C  Systemic veins         Value        Ref       04/30/2025  Estimated CVP          15    mm Hg  --------- ----------  Inferior vena cava     Value        Ref       04/30/2025  ID                 (H) 2.4   cm     <=2.1     2.3 Legend: (L)  and  (H)  corinna values outside specified reference range. ---------------------------------------------------------------------------- Prepared and electronically signed by Margareth Haas 05/05/2025 12:36     XR VIDEO SWALLOW (CPT=74230)  Result Date: 5/3/2025  PROCEDURE: XR VIDEO SWALLOW (CPT=74230)  COMPARISON: None available.  INDICATIONS: Clinical concern for aspiration.  TECHNIQUE: A swallowing evaluation was performed in the Radiology Department in conjunction with the Department of Speech and Hearing.  A separate detailed report will be issued by the speech pathologist who recorded the study. Fluoroscopy was provided by a radiologist who was present during the procedure.    Materials of various consistencies were given by mouth, and swallowing was evaluated fluoroscopically.   # of FLUOROGRAPHIC CINE FILES:  7 FLUOROSCOPY IMAGES OBTAINED:  1 FLUOROSCOPY TIME:  2.1 minutes RADIATION DOSE (Dose Area Product):  [id number]  FINDINGS:  ASPIRATION/PENETRATION:   None.  STRUCTURE: No visible obstruction, stricture, or dilatation.  OTHER: Negative.           CONCLUSION:  No penetration or aspiration.    Dictated by (CST): Harpreet Jade MD on 5/03/2025 at 12:08 PM     Finalized by (CST): Harpreet Jade MD on 5/03/2025 at 12:09 PM          XR CHEST AP PORTABLE  (CPT=71045)  Result Date: 5/2/2025  PROCEDURE: XR CHEST AP PORTABLE  (CPT=71045) TIME: 622 hours.    COMPARISON: [facility], XR CHEST AP PORTABLE (CPT=71045), 2025, 10:40 PM.  [facility], X CHEST PORTABLE, 2014, 8:51 PM.  INDICATIONS: Shortness of breath.  TECHNIQUE:   Single view.   FINDINGS:  CARDIAC/MEDIAST: Heart and mediastinum are unchanged. LUNGS/PLEURA:  Opacity in the left perihilar mid lung not significantly changed.  Low lung volumes.  There is mild opacity in the right lung base.  No significant pleural effusion or pneumothorax. OTHER:  Stable appearance of the osseous structures.          CONCLUSION:   Stable opacity left perihilar mid lung.  Mild opacity right lung base which may reflect atelectasis with or without superimposed pneumonia.    Dictated by (CST): Casey Diallo MD on 2025 at 7:18 AM     Finalized by (CST): Casey Diallo MD on 2025 at 7:19 AM          CARD ECHO LIMITED (CPT=93308/10143/78059)  Result Date: 2025  Transthoracic Echocardiogram Name:Kelli Fisher Date: 2025 :  1955 Ht:  (60in)  BP: 106 / 74 MRN:  [id number]    Age:  70years    Wt:  (165lb) HR: 108bpm Loc:  [facility]       Gndr: F          BSA: 1.72m^2 Sonographer: James PINTO ALIZE Ordering:    Javi Huerta Consulting:  Ector Byrnes ---------------------------------------------------------------------------- History/Indications:  Pericardial effusion. ---------------------------------------------------------------------------- Procedure information:  A transthoracic echocardiogram, limited study was performed. Additional evaluation included M-mode and limited 2D.  Patient status:  Inpatient.  Location:  Bedside.    Comparison was made to the study of 2025.    This was a routine study. Transthoracic echocardiography for ventricular function evaluation and assessment of pericardial effusion and hemodynamic status. Image quality was adequate. ECG rhythm:   Sinus tachycardia  ---------------------------------------------------------------------------- Conclusions: 1. Left ventricle: The cavity size was normal. Wall thickness was mildly    increased. Systolic function was normal. The estimated ejection fraction    was 60-65%, by visual assessment. No diagnostic evidence for regional    wall motion abnormalities. Unable to assess LV diastolic function. 2. Pericardium, extracardiac: A small to moderate, free-flowing pericardial    effusion was identified circumferential to the heart. Maximal diameter in    diastole is 1.1cm. Features were not consistent with tamponade    physiology. 3. Inferior vena cava: The IVC was dilated. Respirophasic diameter changes    are blunted (< 50%), consistent with elevated central venous pressure. * ---------------------------------------------------------------------------- * Findings: Left ventricle:  The cavity size was normal. Wall thickness was mildly increased. Systolic function was normal. The estimated ejection fraction was 60-65%, by visual assessment. No diagnostic evidence for regional wall motion abnormalities. Unable to assess LV diastolic function. Right ventricle:  The cavity size was normal. Mitral valve:  The valve was structurally normal. Aortic valve:  The valve was structurally normal. Tricuspid valve:  The valve is structurally normal. Pulmonic valve:   Not well visualized. Pericardium:  A small to moderate, free-flowing pericardial effusion was identified circumferential to the heart. Maximal diameter in diastole is 1.1cm.  Doppler:  Features were not consistent with tamponade physiology. Systemic veins: Inferior vena cava: The IVC was dilated.  Respirophasic diameter changes are blunted (< 50%), consistent with elevated central venous pressure. ---------------------------------------------------------------------------- Measurements  Left ventricle              Value    Ref      04/12/2025  IVS thickness, ED, PLAX (H) 1.0   cm 0.6 -     0.8                                       0.9  LV ID, ED, PLAX             3.9   cm 3.8 -    4.4                                       5.2  LV ID, ES, PLAX             2.5   cm 2.2 -    2.8                                       3.5  LV PW thickness, ED,    (H) 1.0   cm 0.6 -    0.7  PLAX                                 0.9  IVS/LV PW ratio, ED,        1.00     -------- 1.14  PLAX  LV PW/LV ID ratio, ED,      0.26     -------- 0.16  PLAX  LV ejection fraction        66    %  54 - 74  66  Inferior vena cava          Value    Ref      04/12/2025  ID                      (H) 2.3   cm <=2.1    2.7 Legend: (L)  and  (H)  corinna values outside specified reference range. ---------------------------------------------------------------------------- Prepared and electronically signed by Javi Huerta 04/30/2025 13:43     CT CHEST PE AORTA (IV ONLY) (CPT=71260)  Result Date: 4/30/2025  PROCEDURE: CT CHEST PE AORTA (IV ONLY) (CPT=71260)  COMPARISON: [facility], CT CHEST PE AORTA (IV ONLY) (CPT=71260), 4/11/2025, 9:30 PM.  INDICATIONS: dyspnea, lung cancer  TECHNIQUE: CT images of the chest were obtained with non-ionic intravenous contrast material.  Automated exposure control for dose reduction was used. Adjustment of the mA and/or kV was done based on the patient's size. Use of iterative reconstruction technique for dose reduction was used. Dose information is transmitted to the ACR (American College of Radiology) NRDR (National Radiology Data Registry) which includes the Dose Index Registry.  FINDINGS:  No PE  Normal heart size with stable small to moderate circumferential pericardial effusion  No short interval change in left upper lobe mass, diffuse intrathoracic adenopathy  Trace left pleural effusion similar to prior.  Trace right effusion has resolved   A few of the right-sided pulmonary nodules may have mildly increased in size.  Adenopathy causes severe narrowing of the lower right IJ with collaterals in  the chest wall    CONCLUSION: No PE.   [facility] provided a prelim report for this exam. This final report has no significant discrepancies with the [facility] report. Finalized by (CST): Chris Lovell MD on 4/30/2025 at 9:39 AM          XR CHEST AP PORTABLE  (CPT=71045)  Result Date: 4/30/2025  PROCEDURE: XR CHEST AP PORTABLE  (CPT=71045)  COMPARISON: [facility], CT CHEST PE AORTA (IV ONLY) (CPT=71260), 4/29/2025, 11:50 PM.  INDICATIONS: Chest discomfort x1 day.  Findings:  Left lung mass with significant diffuse intrathoracic adenopathy seen on recent CT  Heart size likely normal for technique  No pneumothorax or large effusion  [facility] provided a prelim report for this exam. This final report has no significant discrepancies with the [facility] report. Finalized by (CST): Chris Lovell MD on 4/30/2025 at 6:45 AM          CT ABDOMEN+PELVIS(CONTRAST ONLY)(CPT=74177)  Result Date: 4/16/2025  PROCEDURE: CT ABDOMEN + PELVIS (CONTRAST ONLY) (CPT=74177)  COMPARISON: [facility], CT SOFT TISSUE OF NECK (CONTRAST ONLY) (CPT=70491), 4/11/2025, 9:30 PM.  [facility], CT CHEST PE AORTA (IV ONLY) (CPT=71260), 4/11/2025, 9:30 PM.  US PELVIS TRANSABDOMINAL AND TRANSVAGINAL (QMU=12882/19015), 12/11/2023, 3:54 PM.  INDICATIONS: recurrent endometrial cancer, assess disease status  TECHNIQUE: Multidetector CT images of the abdomen and pelvis were obtained with non-ionic intravenous contrast material. Automated exposure control for dose reduction was used. Adjustment of the mA and/or kV was done based on the patient's size. Iterative reconstruction technique for dose reduction was employed.  FINDINGS: LUNG BASES: The heart is normal in size. The small circumferential pericardial effusion.  Trace dependent right greater than pleural effusions.  Distributed nodules.  These include a reference 0.8 cm nodule in the right lower lobe (series 6, image 4). LIVER: Small indeterminate 1 cm  low-attenuation lesion at the periphery of the right hepatic lobe (series 2, image 16). BILIARY: The gallbladder is present. PANCREAS: No lesion, fluid collection, ductal dilatation, or atrophy.  SPLEEN: No enlargement.  ADRENALS:   No defined mass or abnormal enlargement.  KIDNEYS:   Symmetric enhancement is seen without evidence of hydronephrosis or underlying solid masses.  Small 0.7 cm indeterminate attenuation left lower pole renal cortical lesion (series 7, image 53). GI/MESENTERY:  There is no evidence of bowel obstruction.  Mild gastric distention noted; enteric contrast propagates to the mid-distal small bowel.  Please note that some segments of the colon are incompletely distended and suboptimally evaluated, mild scattered colonic diverticulosis with mild-to-moderate colonic fecal burden.  Normal appendix. URINARY BLADDER: Incompletely distended and suboptimally evaluated. PELVIC NODES: No lymphadenopathy.   PELVIC ORGANS: Hysterectomy and bilateral oophorectomy. VASCULATURE:   No aneurysm is detected.  Retroaortic left renal vein noted; mild atherosclerosis. RETROPERITONEUM: No mass or lymphadenopathy is apparent.  BONES:   Demineralization.  Osteitis pubis.  Transitional lumbosacral anatomy with sacralization of L5 on the left.  Mild levoscoliosis of the lumbar spine with multilevel lumbar spine degenerative changes. ABDOMINAL WALL: Small fat containing umbilical hernia.  Mild anasarca. OTHER: No free air or fluid is seen in the abdomen or pelvis.          CONCLUSION:  1. History of recurrent endometrial carcinoma on recent supraclavicular lymph node biopsy.  Partially imaged randomly distributed right greater than left basilar lung nodules, which are concerning for hematogenous pulmonary metastases.  These are better assessed on recent prior chest CT. 2. Small 1 cm low-attenuation right hepatic lobe lesion is indeterminate, but a small hepatic metastasis cannot be excluded.  Consider further evaluation  with either PET-CT or a liver protocol MRI of the abdomen without and with contrast.  Correlation with any available outside institution imaging would also be helpful to assess stability. 3. Small 0.7 cm indeterminate attenuation left lower pole renal cortical lesion.  Differential considerations include a small solid renal mass versus complex cyst.  Given small size, this should be reassessed on anticipated follow-up exam. 4. No definite additional metastatic disease within the abdomen. 5. Hysterectomy and bilateral salpingo oophorectomy. 6. Small circumferential pericardial effusion.  There are also trace right greater than left pleural effusions.  Malignant pleural and pericardial fluid cannot be excluded. 7. Mild scattered colonic diverticulosis. 8. Small retrocardiac hiatal hernia. 9. Circumferential urinary bladder wall thickening, which may relate to incomplete distention or cystitis.  If there are referable symptoms, urinalysis correlation is requested. 10. Anatomic variant retroaortic left renal vein. 11. Mild anasarca.   A preliminary report was issued by the [facility] teleradiology service. There are no major discrepancies.  elm-remote  Dictated by (CST): Lemuel Gallegos MD on 4/16/2025 at 10:56 AM     Finalized by (CST): Lemuel Gallegos MD on 4/16/2025 at 11:06 AM          IR BIOPSY  Result Date: 4/15/2025  PROCEDURE: IR BIOPSY  INDICATIONS: supraclavicular lymph node  COMPARISON:  CT chest 4/11/2025  (S): DARIEL Ragsdale MD  ANESTHESIA/SEDATION: Level of anesthesia/sedation:  Local Anesthetic  ESTIMATED BLOOD LOSS: Less than 5 mL  COMPLICATIONS: None  FINDINGS:  Informed consent was obtained. The patient was positioned supine. The left neck was sterilely prepped and draped.  Preliminary ultrasound demonstrated enlarged left supraclavicular lymphadenopathy. Local Lidocaine was administered. Under ultrasound guidance, a coaxial 18 gauge needle was advanced into the lesion. A total of 4  cores  were obtained.  Samples were touch prepped and reviewed with cytology, confirming adequacy. No appreciable complication         CONCLUSION:  1. Successful left supraclavicular lymphadenopathy biopsy as detailed above.     Dictated by (CST): Parviz Ragsdale MD on 4/15/2025 at 2:51 PM     Finalized by (CST): Parviz Ragsdale MD on 4/15/2025 at 2:54 PM            Data Review:   Recent Labs   Lab 05/12/25  1410   RBC 2.97*   HGB 7.3*   HCT 24.6*   MCV 82.8   MCH 24.6*   MCHC 29.7*   RDW 21.5*   NEPRELIM 0.11*   WBC 0.5*   PLT 50.0*       Recent Labs   Lab 05/12/25  1411   *   BUN 22   CREATSERUM 0.78   CA 9.0   ALB 3.2      K 4.2      CO2 32.0   ALKPHO 142   AST 26   ALT 10   BILT 0.7   TP 6.8       Lab Results   Component Value Date     443.0 (H) 05/04/2025     210.0 (H) 04/16/2025    CEA <0.5 04/16/2025     230.8 (H) 05/04/2025     156.3 (H) 04/16/2025       ASSESSMENT/PLAN:  Patient is a 70 year old female   Stage IV recurrent endometrial adenocarcinoma:  She was previously being treated at [facility] at which time was noted to have progression of disease and therefore the patient recently transferred her care to me approximately 2 weeks ago.  Her first cycle of carboplatin, Taxol, Keytruda 6 days ago on May 6, 2025.   Pancytopenia: It is highly unusual to have all her counts jerry this quickly after just 1 cycle of chemotherapy.  Will obviously drop all the doses in future cycles if she ever gets to this point.  Neutropenia: PT did get Fulphila which is a Neulasta equivalent on May 7.  Will recommend Neupogen to be given daily until her ANC is above 1500.  Thereafter will recommend stopping her injections.  Will recommend patient gargle with Listerine and possibly brush her teeth with her fingers to minimize any trauma to her gums and prevent seeding of her blood with bacteria.  Also would recommend not to have any type of rectal exams, suppositories, or enemas.  If the patient begins  to have fevers, would recommend consulting ID, Dr. Marlon Jones, and support her with dual antibiotics.  Thrombocytopenia: Will only recommend transfusing platelets if she begins to have unprovoked bleeding such as GI bleeds, nosebleeds, bleeding from her gums while brushing her teeth, or bleeding from her IV.  Anemia: Would recommend transfusing 1 unit of packed RBCs if her hemoglobin drops below 7.0 or begins having active bleeding.  Thrush: I do suspect this is secondary to her neutropenia.  I did order a swish and swallow for nystatin oral solutions.  I do not believe the patient needs TPN at this time.    Goals of care:  although the prognosis is very poor, patient still has multiple chemo regimens that can potentially be highly effective with supportive care.  I have had and will continue to have very long discussions with the family about goals of care and I explicitly stated that as long as she does not have end-organ failure, I am willing to continue to administer chemotherapy.  If the patient's symptoms do not improve, may consider consulting palliative care to help manage some of the symptoms.  Diet: Patient does have a sore throat and I suspect secondary to thrush.  However she does not appear to be at risk for aspiration as she is fully alert and able to talk without drooling.  From my perspective, I think it is acceptable to have the patient drink protein shakes as well as ice cream to help her nutritional status until her immune system kicks back in.  Cardiac arrhythmias: Very much appreciate cardiac consult        All of the findings and plan were discussed with the patient.  She notes understanding and agrees with the plan of care.  All questions were answered to the best of my ability at this time.    CASEY GRIJALVA,   5/12/2025  5:32 PM         [1]   Medications Prior to Admission   Medication Sig Dispense Refill Last Dose/Taking    levothyroxine (SYNTHROID) 100 MCG Oral Tab Take 1 tablet  (100 mcg total) by mouth before breakfast.   5/12/2025 at  7:00 AM    Potassium Chloride ER 20 MEQ Oral Tab CR Take 1 tablet by mouth daily.   5/12/2025 at  7:00 AM    furosemide 20 MG Oral Tab Take 1 tablet (20 mg total) by mouth daily.   5/12/2025 at  7:00 AM    Levalbuterol HCl 1.25 MG/3ML Inhalation Nebu Soln Take 3 mL (1.25 mg total) by nebulization every 8 (eight) hours as needed for Wheezing or Shortness of Breath.   Past Week    pantoprazole 40 MG Oral Tab EC Take 1 tablet (40 mg total) by mouth daily. 30 tablet 0 5/12/2025 at  7:00 AM    amiodarone 200 MG Oral Tab Take 1 tablet (200 mg total) by mouth in the morning, at noon, and at bedtime for 1 day, THEN 1 tablet (200 mg total) in the morning, at noon, and at bedtime. 93 tablet 0 5/12/2025 at 12:00 PM    Fluticasone Propionate  HFA (FLOVENT HFA) 220 MCG/ACT Inhalation Aerosol Inhale 1 puff into the lungs every 12 (twelve) hours. Rinse mouth following use.   5/8/2025   [2]   Allergies  Allergen Reactions    Aspirin Tightness in Throat    Penicillins HIVES    Other OTHER (SEE COMMENTS)     Pt states IV steroid allergy, states it makes her breathing worse    [3]   Past Medical History:   Asthma (HCC)    Esophageal reflux    History of blood transfusion    Visual impairment   [4]   Past Surgical History:  Procedure Laterality Date    Thyroidectomy      Total abdom hysterectomy

## 2025-05-12 NOTE — ED PROVIDER NOTES
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  The home health nurse was there today and sent her in because her heart rate was fast.  She said the chest pains been aching and constant since yesterday.  Despite the heart rate going down since triage she still feels the discomfort.          History of Present Illness               Objective:     No pertinent past medical history.            No pertinent past surgical history.              No pertinent social history.                              Physical Exam     ED Triage Vitals [05/12/25 1312]   /71   Pulse 108   Resp 25   Temp 98.7 °F (37.1 °C)   Temp src Oral   SpO2 99 %   O2 Device Nasal cannula       Current Vitals:   Vital Signs  BP: 111/74  Pulse: 93  Resp: 18  Temp: 98.5 °F (36.9 °C)  Temp src: Oral  MAP (mmHg): 86    Oxygen Therapy  SpO2: 100 %  O2 Device: Nasal cannula  O2 Flow Rate (L/min): 4 L/min        Physical Exam  HENT:      Head: Normocephalic.   Eyes:      Pupils: Pupils are equal, round, and reactive to light.   Cardiovascular:      Rate and Rhythm: Regular rhythm. Tachycardia present.   Pulmonary:      Comments: Diminished breath sounds at the bases bilaterally  Musculoskeletal:      Cervical back: Normal range of motion.      Right lower leg: Edema present.      Left lower leg: Edema present.   Skin:     General: Skin is warm.      Capillary Refill: Capillary refill takes less than 2 seconds.   Neurological:      General: No focal deficit present.      Mental Status: She is alert.            Physical Exam                ED Course     Labs Reviewed   CBC WITH DIFFERENTIAL WITH PLATELET - Abnormal; Notable for the following components:       Result Value    WBC 0.5 (*)     RBC 2.97 (*)     HGB 7.3 (*)     HCT 24.6 (*)     MCH 24.6 (*)     MCHC 29.7 (*)     RDW-SD 65.1 (*)     RDW 21.5 (*)     PLT 50.0 (*)     Immature Platelet Fraction 8.7 (*)     Neutrophil Absolute Prelim 0.11 (*)     Neutrophil Absolute 0.11 (*)     Lymphocyte Absolute 0.29 (*)     Monocyte Absolute 0.05 (*)     All other components within normal limits   HEPATIC FUNCTION PANEL (7) - Abnormal; Notable for the following components:    Bilirubin, Direct 0.4 (*)     All other components within normal limits   BASIC METABOLIC PANEL (8) - Abnormal; Notable for the following components:    Glucose 233 (*)     BUN/CREA Ratio 28.2 (*)     Calculated Osmolality 303 (*)     All other components within normal limits   RBC MORPHOLOGY SCAN - Abnormal; Notable for the following components:    RBC Morphology See morphology below (*)     Platelet Morphology See morphology below (*)     Microcytosis 2+ (*)     Hypochromia 2+ (*)     Giant platelets Few (*)     All other components within normal limits   LACTIC ACID, PLASMA - Abnormal; Notable for the following components:    Lactic Acid 2.1 (*)     All other components within normal limits   PRO BETA NATRIURETIC PEPTIDE - Abnormal; Notable for the following components:    Pro-Beta Natriuretic Peptide 1,110 (*)     All other components within normal limits   MAGNESIUM - Abnormal; Notable for the following components:    Magnesium 1.5 (*)     All other components within normal limits   HEMOGLOBIN A1C - Abnormal; Notable for the following components:    HgbA1C 6.6 (*)     Estimated Average Glucose 143 (*)     All other components within normal limits    Narrative:     An additional chromatographic peak which may represent the presence of an abnormal/variant hemoglobin is detected.    Recommend  clinical correlation and correlation with hemoglobin electrophoresis for identification and quantification of the abnormal/variant hemoglobin as clinically indicated.   POCT GLUCOSE - Abnormal; Notable for the following components:    POC Glucose  246 (*)     All other components within normal limits   POCT GLUCOSE - Abnormal; Notable for the following components:    POC Glucose  236 (*)     All other components within normal limits   TROPONIN I HIGH SENSITIVITY - Normal   TROPONIN I HIGH SENSITIVITY - Normal   LACTIC ACID REFLEX POST POSTIVE - Normal   SARS-COV-2/FLU A AND B/RSV BY PCR (GENEXPERT) - Normal    Narrative:     This test is intended for the qualitative detection and differentiation of SARS-CoV-2, influenza A, influenza B, and respiratory syncytial virus (RSV) viral RNA in nasopharyngeal or nares swabs from individuals suspected of respiratory viral infection consistent with COVID-19 by their healthcare provider. Signs and symptoms of respiratory viral infection due to SARS-CoV-2, influenza, and RSV can be similar.    Test performed using the Xpert Xpress SARS-CoV-2/FLU/RSV (real time RT-PCR)  assay on the [employer] instrument, [employer], [place] [address].   This test is being used under the Food and Drug Administration's Emergency Use Authorization.    The authorized Fact Sheet for Healthcare Providers for this assay is available upon request from the laboratory.   SCAN SLIDE   MD BLOOD SMEAR CONSULT   TYPE AND SCREEN    Narrative:     The following orders were created for panel order Type and screen.  Procedure                               Abnormality         Status                     ---------                               -----------         ------                     ABORH (Blood Type)[[id number]]                               Final result               Antibody Screen[[id number]]                                  Final result                 Please view results for these tests on the individual  orders.   ABORH (BLOOD TYPE)   ANTIBODY SCREEN   RAINBOW DRAW LAVENDER   RAINBOW DRAW LIGHT GREEN   RAINBOW DRAW BLUE   BLOOD CULTURE   BLOOD CULTURE     EKG    Rate, intervals and axes as noted on EKG Report.  Rate: 105  Rhythm: Sinus Rhythm  Reading: Low voltage, poor anterior R wave progression.  No ST elevation.  QTc 452.  Abnormal EKG.  A second EKG was done in triage at 1347 she had A-fib RVR once again with low voltage and no significant morphology change.  Abnormal EKG once again read by myself.  QTc was 406           ED Course as of 05/12/25 2155  ------------------------------------------------------------  Time: 05/12 1556  Comment: Labs independently interpreted by me.  Patient has neutropenia stable hemoglobin and thrombocytopenia.  Patient went into atrial fibrillation RVR again and was seen by cardiology who recommended amnio bolus and drip.  Seen by the hospitalist.  Discussed with Dr. Arreguin from gynecology oncology who will come see the patient.  Patient was also seen by the pulmonologist in the ER.  Antibiotics including cefepime and vancomycin ordered for the neutropenia.  Chest x-ray independently by me shows no significant change.  There is mass in the left lower and upper lung  ------------------------------------------------------------  Time: 05/12 1557  Comment: Labs also reveal a fairly normal CMP but she is hyperglycemic.  Troponin normal  ------------------------------------------------------------  Time: 05/12 1645  Comment: Patient appears fluid overloaded was not given a fluid bolus.  A-fib RVR does not seem to be from sepsis     Results                           [facility]      XR CHEST AP PORTABLE  (CPT=71045)  Result Date: 5/12/2025  CONCLUSION:  1. Suboptimal inspiration.  No acute finding or significant change. 2. Left mid lung/upper lobe mass compatible with known malignancy. 3. Stable mediastinal lymph node metastases. 4. Stable pericardial effusion.    Dictated by (CST): Jayson Fournier  MD DIONICIO on 5/12/2025 at 3:04 PM     Finalized by (CST): Jayson Fournier MD on 5/12/2025 at 3:08 PM              Admission disposition: 5/12/2025  3:58 PM           Medical Decision Making  Patient with known metastatic lung cancer and history of arrhythmias in the hospital here with chest pain, continuation of coughing up sputum which is frothy, shortness of breath which has been progressive, A-fib RVR but now back in sinus on the monitor.  No fever.  Could be CHF, PE, bleeding related to mass    Amount and/or Complexity of Data Reviewed  External Data Reviewed: labs and notes.     Details: Laboratory studies compared.  Discharge summary just reviewed from 6 days ago.  Confirm diagnosed  Labs: ordered. Decision-making details documented in ED Course.  Radiology: ordered and independent interpretation performed.  ECG/medicine tests: ordered and independent interpretation performed. Decision-making details documented in ED Course.  Discussion of management or test interpretation with external provider(s): ED course for treatment.  Cardizem was not desired by cardiology as she was having episodes of heart block from the Cardizem last week in the hospital.  Case discussed with GYN oncology who will see the patient.  Patient is in critical condition    Risk  Decision regarding hospitalization.  Risk Details: Metastatic cancer to lung, atrial fibrillation      I spent a total of 35 minutes of critical care time in obtaining history, performing a physical exam, bedside monitoring of interventions, collecting and interpreting tests and discussion with consultants but not including time spent performing procedures.   Disposition and Plan     Clinical Impression:  1. Acute respiratory failure with hypoxia (HCC)    2. Atrial fibrillation with RVR (HCC)    3. Pancytopenia (HCC)         Disposition:  Admit  5/12/2025  3:58 pm    Follow-up:  No follow-up provider specified.  We recommend that you schedule follow up care with a  primary care provider within the next three months to obtain basic health screening including reassessment of your blood pressure.      Medications Prescribed:  Current Discharge Medication List          Supplementary Documentation:         Hospital Problems       Present on Admission           ICD-10-CM Noted POA    * (Principal) Acute respiratory failure with hypoxia (HCC) J96.01 5/12/2025 Unknown    Atrial fibrillation with RVR (HCC) I48.91 5/12/2025 Unknown    Azotemia R79.89 5/12/2025 Yes    Hyperglycemia R73.9 5/12/2025 Yes    Pancytopenia (HCC) D61.818 5/12/2025 Unknown    Thrombocytopenia (HCC) D69.6 5/12/2025 Yes

## 2025-05-13 ENCOUNTER — APPOINTMENT (OUTPATIENT)
Dept: CT IMAGING | Facility: HOSPITAL | Age: 70
DRG: 270 | End: 2025-05-13
Attending: NURSE PRACTITIONER
Payer: MEDICARE

## 2025-05-13 ENCOUNTER — APPOINTMENT (OUTPATIENT)
Dept: CV DIAGNOSTICS | Facility: HOSPITAL | Age: 70
DRG: 270 | End: 2025-05-13
Attending: NURSE PRACTITIONER
Payer: MEDICARE

## 2025-05-13 LAB
ANION GAP SERPL CALC-SCNC: 7 MMOL/L (ref 0–18)
BASOPHILS # BLD: 0 X10(3) UL (ref 0–0.2)
BASOPHILS NFR BLD: 0 %
BUN BLD-MCNC: 22 MG/DL (ref 9–23)
BUN/CREAT SERPL: 28.9 (ref 10–20)
CALCIUM BLD-MCNC: 8.6 MG/DL (ref 8.7–10.4)
CHLORIDE SERPL-SCNC: 101 MMOL/L (ref 98–112)
CO2 SERPL-SCNC: 36 MMOL/L (ref 21–32)
CREAT BLD-MCNC: 0.76 MG/DL (ref 0.55–1.02)
D DIMER PPP FEU-MCNC: 3.05 UG/ML FEU (ref ?–0.69)
DEPRECATED RDW RBC AUTO: 64.7 FL (ref 35.1–46.3)
EGFRCR SERPLBLD CKD-EPI 2021: 85 ML/MIN/1.73M2 (ref 60–?)
EOSINOPHIL # BLD: 0 X10(3) UL (ref 0–0.7)
EOSINOPHIL NFR BLD: 0 %
ERYTHROCYTE [DISTWIDTH] IN BLOOD BY AUTOMATED COUNT: 21.5 % (ref 11–15)
GLUCOSE BLD-MCNC: 238 MG/DL (ref 70–99)
GLUCOSE BLDC GLUCOMTR-MCNC: 210 MG/DL (ref 70–99)
GLUCOSE BLDC GLUCOMTR-MCNC: 221 MG/DL (ref 70–99)
GLUCOSE BLDC GLUCOMTR-MCNC: 239 MG/DL (ref 70–99)
GLUCOSE BLDC GLUCOMTR-MCNC: 243 MG/DL (ref 70–99)
HCT VFR BLD AUTO: 21.3 % (ref 35–48)
HGB BLD-MCNC: 6.4 G/DL (ref 12–16)
HGB BLD-MCNC: 7.6 G/DL (ref 12–16)
LYMPHOCYTES NFR BLD: 0.27 X10(3) UL (ref 1–4)
LYMPHOCYTES NFR BLD: 66 %
MAGNESIUM SERPL-MCNC: 1.6 MG/DL (ref 1.6–2.6)
MCH RBC QN AUTO: 24.7 PG (ref 26–34)
MCHC RBC AUTO-ENTMCNC: 30 G/DL (ref 31–37)
MCV RBC AUTO: 82.2 FL (ref 80–100)
METAMYELOCYTES # BLD: 0.01 X10(3) UL (ref ?–0.01)
METAMYELOCYTES NFR BLD: 2 %
MONOCYTES # BLD: 0.09 X10(3) UL (ref 0.1–1)
MONOCYTES NFR BLD: 23 %
NEUTROPHILS # BLD AUTO: 0.02 X10 (3) UL (ref 1.5–7.7)
NEUTROPHILS NFR BLD: 3 %
NEUTS BAND NFR BLD: 5 %
NEUTS HYPERSEG # BLD: 0.03 X10(3) UL (ref 1.5–7.7)
OSMOLALITY SERPL CALC.SUM OF ELEC: 309 MOSM/KG (ref 275–295)
PLATELET # BLD AUTO: 36 10(3)UL (ref 150–450)
PLATELET MORPHOLOGY: NORMAL
PLATELETS.RETICULATED NFR BLD AUTO: 10 % (ref 0–7)
POTASSIUM SERPL-SCNC: 3.7 MMOL/L (ref 3.5–5.1)
RBC # BLD AUTO: 2.59 X10(6)UL (ref 3.8–5.3)
SODIUM SERPL-SCNC: 144 MMOL/L (ref 136–145)
TOTAL CELLS COUNTED BLD: 100
VARIANT LYMPHS NFR BLD MANUAL: 1 % (ref ?–4)
WBC # BLD AUTO: 0.4 X10(3) UL (ref 4–11)

## 2025-05-13 PROCEDURE — 93325 DOPPLER ECHO COLOR FLOW MAPG: CPT | Performed by: NURSE PRACTITIONER

## 2025-05-13 PROCEDURE — 93321 DOPPLER ECHO F-UP/LMTD STD: CPT | Performed by: NURSE PRACTITIONER

## 2025-05-13 PROCEDURE — 30233N1 TRANSFUSION OF NONAUTOLOGOUS RED BLOOD CELLS INTO PERIPHERAL VEIN, PERCUTANEOUS APPROACH: ICD-10-PCS | Performed by: FAMILY MEDICINE

## 2025-05-13 PROCEDURE — 93308 TTE F-UP OR LMTD: CPT | Performed by: NURSE PRACTITIONER

## 2025-05-13 PROCEDURE — 99233 SBSQ HOSP IP/OBS HIGH 50: CPT | Performed by: INTERNAL MEDICINE

## 2025-05-13 PROCEDURE — 99233 SBSQ HOSP IP/OBS HIGH 50: CPT | Performed by: HOSPITALIST

## 2025-05-13 PROCEDURE — 71260 CT THORAX DX C+: CPT | Performed by: NURSE PRACTITIONER

## 2025-05-13 RX ORDER — MAGNESIUM OXIDE 400 MG/1
400 TABLET ORAL ONCE
Status: COMPLETED | OUTPATIENT
Start: 2025-05-13 | End: 2025-05-13

## 2025-05-13 RX ORDER — SODIUM CHLORIDE 9 MG/ML
INJECTION, SOLUTION INTRAVENOUS ONCE
Status: COMPLETED | OUTPATIENT
Start: 2025-05-13 | End: 2025-05-13

## 2025-05-13 RX ORDER — FUROSEMIDE 10 MG/ML
60 INJECTION INTRAMUSCULAR; INTRAVENOUS
Status: DISCONTINUED | OUTPATIENT
Start: 2025-05-14 | End: 2025-05-15

## 2025-05-13 RX ORDER — FUROSEMIDE 10 MG/ML
20 INJECTION INTRAMUSCULAR; INTRAVENOUS ONCE
Status: COMPLETED | OUTPATIENT
Start: 2025-05-13 | End: 2025-05-13

## 2025-05-13 RX ORDER — DIPHENHYDRAMINE HYDROCHLORIDE AND LIDOCAINE HYDROCHLORIDE AND ALUMINUM HYDROXIDE AND MAGNESIUM HYDRO
10 KIT EVERY 6 HOURS PRN
Status: DISCONTINUED | OUTPATIENT
Start: 2025-05-13 | End: 2025-05-21

## 2025-05-13 RX ORDER — FUROSEMIDE 10 MG/ML
INJECTION INTRAMUSCULAR; INTRAVENOUS
Status: COMPLETED
Start: 2025-05-13 | End: 2025-05-13

## 2025-05-13 NOTE — PROGRESS NOTES
Progress Note  Kelli Fisher Patient Status:  Inpatient    1955 MRN [id number]   Location [facility] 3W/SW Attending Hina Bentley MD   Hosp Day # 1 PCP Taylor Gallardo MD     Subjective:  On 4L O2, baseline  Hgb 6.4 today, 7.3 on  on admission, dc hgb on  9.0  Denies sob, admits to abdominal discomfort and bloating    Objective:  /77   Pulse 85   Temp 98.1 °F (36.7 °C) (Oral)   Resp 20   Ht 5' (1.524 m)   Wt 167 lb 9.6 oz (76 kg)   SpO2 100%   BMI 32.73 kg/m²     Telemetry: SR 80s-90s    Intake/Output:    Intake/Output Summary (Last 24 hours) at 2025 1303  Last data filed at 2025 0906  Gross per 24 hour   Intake 1451.18 ml   Output 1500 ml   Net -48.82 ml     Last 3 Weights   25 0406 167 lb 9.6 oz (76 kg)   25 1736 165 lb 11.2 oz (75.2 kg)   25 1217 169 lb (76.7 kg)   25 0500 174 lb 9.6 oz (79.2 kg)   25 0549 175 lb 12.8 oz (79.7 kg)   25 1800 163 lb (73.9 kg)   25 2038 165 lb (74.8 kg)     Labs:  Recent Labs   Lab 25  1411 25  0625   * 238*   BUN 22 22   CREATSERUM 0.78 0.76   EGFRCR 82 85   CA 9.0 8.6*    144   K 4.2 3.7    101   CO2 32.0 36.0*     Recent Labs   Lab 25  1410 25  0625   RBC 2.97* 2.59*   HGB 7.3* 6.4*   HCT 24.6* 21.3*   MCV 82.8 82.2   MCH 24.6* 24.7*   MCHC 29.7* 30.0*   RDW 21.5* 21.5*   NEPRELIM 0.11* 0.02*   WBC 0.5* 0.4*   PLT 50.0* 36.0*     Recent Labs   Lab 25  1411 25  1619   TROPHS 5 5     No results found for: \"CHOLESTEROL\", \"HDL\", \"LDL\", \"TRIG\", \"NONHDL\", \"CHOLHDL\"  No results found for: \"DDIMER\"  Lab Results   Component Value Date    TSH 3.705 2025     Review of Systems:   Constitutional: No fevers, chills, fatigue or night sweats.  Respiratory: Denies cough, wheezing or shortness of breath.  CV: Denies chest pain, palpitations, orthopnea, PND or dizziness.  GI: No nausea, vomiting or diarrhea. No blood in stools.    Physical  Exam:   General: Alert, cooperative, ill-appearing appears older than stated age.  Neck: no JVD.  Lungs: expiratory wheezes bilaterally.  Chest wall: No tenderness or deformity.  Heart: Regular rate and rhythm, S1, S2 normal, no murmur, click, rub or gallop.  Abdomen: Soft, non-tender. Bowel sounds normal. No masses,  No organomegaly.  Extremities: Extremities normal, atraumatic, no cyanosis, 1+ BLE edema.  Pulses: 2+ and symmetric all extremities.  Neurologic: Grossly intact.    Medications:  Scheduled Medications[1]  Medication Infusions[2]    Assessment:    70 year old female with PMH of endometrial cancer with mets to lungs, anemia, PSVT, small pericardial effusion who presented to ER with dyspnea and palpitations. She was found to be in PAF/PAT and anemic with Hgb 7.3.     Paroxysmal atrial tachycardia/Atrial fibrillation   Hx PSVT during last admission with junctional rhythm noted while on BB  -transitioned to amiodarone  -now sinus on tele with rates 80s-90s  -no BB/CCB with previous junctional rhythm  -TSH unremarkable 3.705, s/p thyroidectomy  -not on DOAC due to pancytopenia  -LVEF preserved on echo last admission  -CHADS-VASc= 3 for age, gender, DM    Dyspnea  Remains on 4L O2 which is baseline, appears to be struggling to breathe, stable O2 sats  -last admission had small pericardial effusion   -will repeat limited echo to ensure it has not grown in size with lower Hgb and new dyspnea  -known lung mets  -worsening anemia (Hgb 9.0 on 5/5 discharge, admitted 5/12 7.3, today 6.4)  -CXR on admission showed stable effusion, lymph node mets, Left mid/upper lobe mass, suboptimal inspiration  -D-dimer pending  -IV lasix 40mg BID with minimal UOP, appears volume expanded on exam with LE edema  -I/Os net pos 286mL, 1.5L UOP past 24 hours with undocumented occurrences, weight up 2#    Small pericardial effusion   Echo on 5/5 revealed small to moderate pericardial effusion   -will recheck limited echo today to  ensure stable in size  -1g drop in Hgb overnight, appears to have difficulty breathing  -BP stable in 90s-100s    Endometrial cancer with mets to lungs/lymph nodes  Stage Ivb clear cell adenocarcinoma  -chemotherapy    Pancytopenia  Sudden drop in hgb, plts, WBC in one week  -recently started chemo, s/p 1 treatment  -gyne following  -1u PRBC transfusing, goal to keep Hgb >7  -no plans for plt transfusion unless spontaneously bleeding  -daily neupogen for neutropenia    Thrush  Sore throat  2/2 neutropenia  -gyne ordered nystatin swish    DM2-A1c 6.6      Plan:  -check D-dimer  -will check limited echo with recent known effusion on last admission to re-evaluate size and drop in Hgb overnight and since discharge on 5/5  -UOP minimal on BID lasix, increase to 60mg BID, monitor strict I/Os, daily weights, daily BMP, may need diamox tomorrow if CO2 elevated  -further recs per gyne/pulm/primary      Plan of care discussed with patient, RN.        Margarette Sexton, MSN, APN, FNP-BC, CCK  5/13/2025  1:03 PM  [phone] [place]  [phone] [place]           [1]    sodium chloride   Intravenous Once    insulin aspart  1-7 Units Subcutaneous TID CC    cefepime  1 g Intravenous Q12H    furosemide  40 mg Intravenous BID (Diuretic)    pantoprazole  40 mg Oral QAM AC    vancomycin  15 mg/kg Intravenous Q24H    levothyroxine  100 mcg Oral Before breakfast    filgrastim-aafi  300 mcg Subcutaneous Q24H    nystatin  5 mL Oral QID   [2]    amiodarone 1 mg/min (05/13/25 0948)

## 2025-05-13 NOTE — PROGRESS NOTES
[facility]  part of [facility]    Progress Note    Kelli Fisher Patient Status:  Inpatient    1955 MRN [id number]   Location [facility] 3W/SW Attending Hina Bentley MD   Hosp Day # 1 PCP Taylor Gallardo MD         Subjective:   Subjective:  Patient was seen and examined  Comfortable on 5 L of oxygen  Occasional cough with white sputum  Poor appetite  Lower extremity pitting edema  No high fever  Generalized weakness and fatigue  Objective:   Blood pressure 105/73, pulse 85, temperature 98.2 °F (36.8 °C), temperature source Oral, resp. rate 20, weight 167 lb 9.6 oz (76 kg), SpO2 100%.  Physical Exam  Vitals and nursing note reviewed.   Constitutional:       Appearance: She is ill-appearing.   HENT:      Head: Atraumatic.      Mouth/Throat:      Mouth: Mucous membranes are moist.   Eyes:      General: No scleral icterus.  Cardiovascular:      Rate and Rhythm: Normal rate.      Heart sounds:      No gallop.   Pulmonary:      Effort: No respiratory distress.      Breath sounds: No stridor. Rales present. No wheezing or rhonchi.   Abdominal:      General: Abdomen is flat. Bowel sounds are normal. There is no distension.      Palpations: Abdomen is soft.      Tenderness: There is no guarding.   Musculoskeletal:      Right lower leg: Edema present.      Left lower leg: Edema present.   Lymphadenopathy:      Cervical: No cervical adenopathy.   Neurological:      General: No focal deficit present.      Mental Status: She is oriented to person, place, and time.         Results:   Lab Results   Component Value Date    WBC 0.4 (LL) 2025    HGB 6.4 (LL) 2025    HCT 21.3 (L) 2025    PLT 36.0 (L) 2025    CREATSERUM 0.76 2025    BUN 22 2025     2025    K 3.7 2025     2025    CO2 36.0 (H) 2025     (H) 2025    CA 8.6 (L) 2025    ALB 3.2 2025    ALKPHO 142 2025    BILT 0.7 2025     TP 6.8 05/12/2025    AST 26 05/12/2025    ALT 10 05/12/2025    PTT 35.1 04/29/2025    INR 1.33 (H) 04/29/2025    TSH 3.705 05/02/2025    LIP 42 12/11/2023    MG 1.6 05/13/2025    TROPHS 5 05/12/2025    B12 >2,000 (H) 05/02/2025       XR CHEST AP PORTABLE  (CPT=71045)  Result Date: 5/12/2025  CONCLUSION:  1. Suboptimal inspiration.  No acute finding or significant change. 2. Left mid lung/upper lobe mass compatible with known malignancy. 3. Stable mediastinal lymph node metastases. 4. Stable pericardial effusion.    Dictated by (CST): Jayson Fournier MD on 5/12/2025 at 3:04 PM     Finalized by (CST): Jayson Fournier MD on 5/12/2025 at 3:08 PM          EKG  Result Date: 5/12/2025  Normal sinus rhythm converting to atrial tachycardia Low voltage QRS, consider pulmonary disease, pericardial effusion, or normal variant Abnormal ECG When compared with ECG of 12-MAY-2025 13:34, Atrial tachycardia now present Confirmed by TRINI ROSS CASH ([id number]) on 5/12/2025 2:52:07 PM    EKG  Result Date: 5/12/2025  Sinus tachycardia Low voltage QRS, consider pulmonary disease, pericardial effusion, or normal variant Cannot rule out Anterior infarct , age undetermined Abnormal ECG When compared with ECG of 02-MAY-2025 06:44, Nonspecific T wave abnormality now evident in Anterior leads Confirmed by TRINI ROSS CASH ([id number]) on 5/12/2025 2:50:26 PM      Assessment & Plan:      1-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        2- pancytopenia with severe neutropenia and anemia thrombocytopenia  Secondary to chemotherapy  S/p Neulasta on 5/7/2025 now on Nivestym  Transfuse PRBC for hemoglobin less than 7     3-immunocompromise host with severe neutropenia  Cough and fatigue and chills and tachycardia with possible sepsis     Check cultures  Empiric antibiotics with cefepime and vancomycin     4-chronic respiratory failure secondary to multiple lung mets with large NATO  mass   O2 therapy on 4-5 L NC      5-pericardial effusion seen recently on echo on 5/5/2025  Per cardiology      6-DVT prophylaxis  SCD only since low platelet     7-full code  Setting limit is appropriate  Recommend palliative care consult     High risk , complex , poor prognosis   D/w staff   Continue supportive care             Yamila Grover MD  5/13/2025

## 2025-05-13 NOTE — PLAN OF CARE
Problem: Diabetes/Glucose Control  Goal: Glucose maintained within prescribed range  Description: INTERVENTIONS:- Monitor Blood Glucose as ordered- Assess for signs and symptoms of hyperglycemia and hypoglycemia- Administer ordered medications to maintain glucose within target range- Assess barriers to adequate nutritional intake and initiate nutrition consult as needed- Instruct patient on self management of diabetes  Outcome: Progressing     Problem: RISK FOR INFECTION - ADULT  Goal: Absence of fever/infection during anticipated neutropenic period  Description: INTERVENTIONS- Monitor WBC- Administer growth factors as ordered- Implement neutropenic guidelines  Outcome: Progressing     Problem: CARDIOVASCULAR - ADULT  Goal: Maintains optimal cardiac output and hemodynamic stability  Description: INTERVENTIONS:- Monitor vital signs, rhythm, and trends- Monitor for bleeding, hypotension and signs of decreased cardiac output- Evaluate effectiveness of vasoactive medications to optimize hemodynamic stability- Monitor arterial and/or venous puncture sites for bleeding and/or hematoma- Assess quality of pulses, skin color and temperature- Assess for signs of decreased coronary artery perfusion - ex. Angina- Evaluate fluid balance, assess for edema, trend weights  Outcome: Progressing  Goal: Absence of cardiac arrhythmias or at baseline  Description: INTERVENTIONS:- Continuous cardiac monitoring, monitor vital signs, obtain 12 lead EKG if indicated- Evaluate effectiveness of antiarrhythmic and heart rate control medications as ordered- Initiate emergency measures for life threatening arrhythmias- Monitor electrolytes and administer replacement therapy as ordered  Outcome: Progressing     Problem: RESPIRATORY - ADULT  Goal: Achieves optimal ventilation and oxygenation  Description: INTERVENTIONS:- Assess for changes in respiratory status- Assess for changes in mentation and behavior- Position to facilitate oxygenation and  minimize respiratory effort- Oxygen supplementation based on oxygen saturation or ABGs- Provide Smoking Cessation handout, if applicable- Encourage broncho-pulmonary hygiene including cough, deep breathe, Incentive Spirometry- Assess the need for suctioning and perform as needed- Assess and instruct to report SOB or any respiratory difficulty- Respiratory Therapy support as indicated- Manage/alleviate anxiety- Monitor for signs/symptoms of CO2 retention  Outcome: Not Progressing    Received awake,oriented. Amiodarone drip/IV antibiotics /IV Lasix continued. No complaints of pain.

## 2025-05-13 NOTE — SLP NOTE
ADULT SWALLOWING EVALUATION    ASSESSMENT    ASSESSMENT/OVERALL IMPRESSION:  PPE REQUIRED. THIS THERAPIST WORE GLOVES AND MASK FOR DURATION OF EVALUATION. HANDS WASHED UPON ENTRANCE/EXIT.    Per MD H&P, \"The patient is a 70-year-old  female who was recently diagnosed with recurrent metastatic endometrial cancer, discharged from the hospital last week and initiate on chemotherapy. Receive her first chemotherapy 6 days ago. For the last few days been having progressive palpitations and dyspnea on exertion. Today came into the emergency department for evaluation.\"    SLP BSSE orders received and acknowledged. A swallow evaluation warranted per \"difficulty swallowing; thrush\". Pt afebrile with clear vocal quality, on 4L/Min, with oxygen saturation at 99%. Pt with hx of dysphagia at [facility], VFSS 5/3/25 with recommendations for minced & moist/thin liquids.   Pt positioned 90 degrees in bed, alert/cooperative. Pt with no complaints of pain. Oral motor examination revealed reduced strength, ROM, and rate of motion. Pt presented with trials of soft solids and thin liquids via cup sips. Pt with adequate oral acceptance and bilabial seal across all trials. Pt with intact bite, prolonged mastication of solids, and delayed A-P transit. Pt's swallow response appears delayed with reduced hyolaryngeal elevation/excursion. Intermittent, delayed throat clear observed. No other clinical signs of aspiration (e.g., immediate throat clear, immediate/delayed cough, wet vocal quality, increased O2 effort) observed across all trials. 5/12 CXR indicates \"1. Suboptimal inspiration.  No acute finding or significant change. 2. Left mid lung/upper lobe mass compatible with known malignancy. 3. Stable mediastinal lymph node metastases. 4. Stable pericardial effusion\". Oxygen status remained stable t/o the entire evaluation.     At this time, pt presents with mild oral dysphagia and probable pharyngeal dysfunction. Recommend a regular  (pick softer foods) diet and thin liquids with strict adherence to safe swallowing compensatory strategies. Results and recommendations reviewed with RN, pt. Pt v/u to all results/recommendations, no family present.     PLAN: SLP to f/u x1-2 meal assessments, monitor imaging, and VFSS if clinically indicated       RECOMMENDATIONS   Diet Recommendations - Solids: Regular (pick softer foods)  Diet Recommendations - Liquids: Thin Liquids                    Compensatory Strategies Recommended: Alternate consistencies, Multiple swallows  Aspiration Precautions: Upright position, Slow rate, Small bites, Small sips, No straw  Medication Administration Recommendations:  (as tolerated)  Treatment Plan/Recommendations: Aspiration precautions    HISTORY   MEDICAL HISTORY  Reason for Referral:  (difficulty swallowing;thrush)    Problem List  Principal Problem:    Acute respiratory failure with hypoxia (HCC)  Active Problems:    Thrombocytopenia (HCC)    Azotemia    Pancytopenia (HCC)    Hyperglycemia    Atrial fibrillation with RVR (HCC)      Past Medical History  Past Medical History[1]    Prior Living Situation: Home with support  Diet Prior to Admission: Unknown  Precautions: Aspiration    Patient/Family Goals: did not state    SWALLOWING HISTORY  Current Diet Consistency: Regular, Thin liquids  Dysphagia History: see above  Imaging Results: see above    SUBJECTIVE       OBJECTIVE   ORAL MOTOR EXAMINATION  Dentition: Functional  Symmetry: Within Functional Limits  Strength: Overall reduced     Range of Motion: Overall reduced  Rate of Motion: Reduced    Voice Quality: Clear  Respiratory Status: Supplemental O2, Nasal cannula  Consistencies Trialed: Soft solid, Thin liquids  Method of Presentation: Self presentation, Cup, Spoon, Staff/Clinician assistance  Patient Positioned: Upright, Midline    Oral Phase of Swallow: Impaired  Bolus Retrieval: Intact  Bilabial Seal: Intact  Bolus Formation: Impaired  Bolus Propulsion:  Impaired  Mastication: Impaired  Retention: Impaired    Pharyngeal Phase of Swallow: Appears Impaired  Laryngeal Elevation Timing: Appears impaired  Laryngeal Elevation Strength: Appears impaired  Laryngeal Elevation Coordination: Appears impaired  (Please note: Silent aspiration cannot be evaluated clinically. Videofluoroscopic Swallow Study is required to rule-out silent aspiration.)    Esophageal Phase of Swallow: No complaints consistent with possible esophageal involvement  Comments: NA          GOALS  Goal #1 The patient will tolerate regular (pick softer foods) consistency and thin liquids without overt signs or symptoms of aspiration with 100 % accuracy over 1-2 session(s).  In Progress   Goal #2 The patient/family/caregiver will demonstrate understanding and implementation of aspiration precautions and swallow strategies independently over 1-2 session(s).    In Progress   Goal #3 The patient will utilize compensatory strategies as outlined by  BSSE (clinical evaluation) including Slow rate, Small bites, Small sips, Multiple swallows, Alternate liquids/solids, No straws, Upright 90 degrees, Upright 90 degrees 30 mins after meal, Eliminate distractions with PRN assistance 100 % of the time across 1-2 sessions.  In Progress     FOLLOW UP  Treatment Plan/Recommendations: Aspiration precautions  Duration: 1 week  Follow Up Needed (Documentation Required): Yes  SLP Follow-up Date: 05/14/25    Thank you for your referral.   If you have any questions, please contact NI Mason M.S. CCC-SLP  Speech Language Pathologist  Phone Number Ext. [phone]         [1]   Past Medical History:   Asthma (HCC)    Esophageal reflux    History of blood transfusion    Visual impairment

## 2025-05-13 NOTE — PROGRESS NOTES
[facility]  part of [facility]    Gynecologic Oncology Progress Note    Kelli Fisher Patient Status:  Inpatient    1955 MRN [id number]   Location [facility] 3W/SW Attending Hina Bentley MD   Hosp Day # 1 PCP Taylor Gallardo MD       Date of Admission:  2025  Reason for Admission:  Dyspnea, metastatic endometrial cancer, pancytopenia, heart failure    SUBJECTIVE:  Patient is a 70 year old female.  No obstetric history on file.  No LMP recorded. Patient has had a hysterectomy.  Did ok overnight. Throat is sore but she is using ice chips and nystatin swish and spit .   Will also order magic mouthwash  She is agreeable to PRBC transfusion and states she has had to have these in the past    Medications:  Prescriptions Prior to Admission[1]    Allergies:  Allergies[2]       Review of Systems:  normal menses, no abnormal bleeding, pelvic pain or discharge, no breast pain or new or enlarging lumps on self exam, no side effects of hormonal medications, no vaginal bleeding, no discharge or pelvic pain, no hot flashes    OBJECTIVE:  Patient Vitals for the past 24 hrs:   BP Temp Temp src Pulse Resp SpO2 Weight   25 0906 105/73 98.2 °F (36.8 °C) Oral 85 20 100 % --   25 0406 109/78 97.9 °F (36.6 °C) Oral 82 18 97 % 167 lb 9.6 oz (76 kg)   25 2228 115/75 -- -- 89 20 100 % --   25 111/74 -- -- 93 -- -- --   25 96/67 98.5 °F (36.9 °C) Oral 92 18 100 % --   25 1832 117/77 99.2 °F (37.3 °C) Oral 95 20 -- --   25 1736 -- -- -- -- -- 99 % 165 lb 11.2 oz (75.2 kg)   25 1728 110/78 99.6 °F (37.6 °C) Oral 104 22 100 % --   25 1656 121/85 -- -- 102 (!) 27 100 % --   25 1622 128/88 -- -- 105 22 100 % --   25 1600 108/81 -- -- 115 20 100 % --   25 1543 100/74 -- -- (!) 154 19 98 % --   25 1515 113/77 -- -- (!) 148 24 98 % --   25 1400 112/71 -- -- 108 26 98 % --   25 1312 108/71 98.7 °F (37.1  °C) Oral 108 25 99 % --       Intake/Output Summary (Last 24 hours) at 5/13/2025 0950  Last data filed at 5/13/2025 0639  Gross per 24 hour   Intake 1391.18 ml   Output 1500 ml   Net -108.82 ml       Physical Exam:  General appearance: alert, appears stated age and cooperative  Head: Normocephalic, without obvious abnormality, atraumatic  Throat: white plaque in back of throat consistent with thrush. teeth and gums normal  Back: symmetric, no curvature. ROM normal. No CVA tenderness.  Abdomen: soft, non-tender; bowel sounds normal; no masses,  no organomegaly  Extremities: extremities normal, atraumatic, symmetric edema which patient states is improved on lasix  Skin: Skin color, texture, turgor normal. No rashes or lesions  Lymph nodes: Cervical, supraclavicular, and axillary nodes normal.  Neurologic: Grossly normal    Diagnostics:  XR CHEST AP PORTABLE  (CPT=71045)  Result Date: 5/12/2025  PROCEDURE: XR CHEST AP PORTABLE  (CPT=71045) TIME: 1443 hours  COMPARISON: [facility], XR CHEST AP PORTABLE (CPT=71045), 4/29/2025, 10:40 PM.  [facility], CT CHEST PE AORTA (IV ONLY) (CPT=71260), 4/29/2025, 11:50 PM.  [facility], XR CHEST AP PORTABLE (CPT=71045), 5/02/2025, 6:22 AM.  INDICATIONS: Low oxygen, shortness of breath.  History of lung cancer.  TECHNIQUE:   Single view.   FINDINGS:  CARDIAC/VASC: Enlarged cardiac silhouette is related to known pericardial effusion. Pulmonary vascularity normal. MEDIAST/SCOTT:   Stable marked mediastinal lymphadenopathy. LUNGS/PLEURA: Stable left upper lobe mass.  Suboptimal inspiration.  No significant changes BONES: No fracture or visible bony lesion. OTHER: Right Port-A-Cath tip in superior right atrium.         CONCLUSION:  1. Suboptimal inspiration.  No acute finding or significant change. 2. Left mid lung/upper lobe mass compatible with known malignancy. 3. Stable mediastinal lymph node metastases. 4. Stable pericardial effusion.     Dictated by (CST): Jayson Fournier MD on 5/12/2025 at 3:04 PM     Finalized by (CST): Jayson Fournier MD on 5/12/2025 at 3:08 PM          IR PORT A CATH INSERTION EXCHNGE CHECK  Result Date: 5/6/2025  PROCEDURE: IR PORT A CATH INSERTION  INDICATIONS: port insertion  COMPARISON: None.  (S):  Bee  ANESTHESIA/SEDATION: Level of anesthesia/sedation: Moderate sedation (conscious sedation) Anesthesia/sedation administered by: Nurse or other independent trained observer who has no other duties with continuous monitoring of the patient's level of consciousness and physiologic status, under the personal supervision of the attending physician. Total intra-service sedation time:  15 min  ESTIMATED BLOOD LOSS: Less than 5 mL  COMPLICATIONS: None  FINDINGS:  Informed consent was obtained. The right side of the neck and chest were sterilely prepped and draped.  The procedure was performed with the patient in a sitting semi upright position, as she could not tolerate lying flat due to dyspnea.  Ultrasound demonstrated the right internal jugular vein to be patent. Local Lidocaine was administered. Under ultrasound guidance, the vein was accessed with a micropuncture set; an image was saved.  Under fluoroscopic guidance, a 0.035 inch wire was advanced into the inferior vena cava. The access was dilated. A peel-away sheath was advanced over the Amplatz wire and secured.  Local Lidocaine was administered, and a horizontal skin incision was made in the chest several cm from the venotomy with a 15 blade. Blunt dissection of the soft tissues was then performed to create a pocket for the chest port device.  The port catheter was tunneled from the pocket to the venotomy. The catheter was advanced through the peel-away sheath to the superior vena cava-right atrial junction and cut to appropriate length. The port catheter was attached to the port device which was then placed into the pocket.  The port was accessed with a non-coring  needle. It aspirated and flushed well. The catheter was flushed with heparin and secured to the skin. The subcutaneous pocket was then closed with interrupted deep 4-0 absorbable sutures. Skin glue was applied over  the pocket incision and the venotomy.         CONCLUSION:  Ultrasound and fluoroscopic guided surgical placement of chest port    Dictated by (CST): Rony Hopkins MD on 2025 at 10:44 AM     Finalized by (CST): Rony Hopkins MD on 2025 at 10:45 AM          [employer] (CPT=93308/60820/90043)  Result Date: 2025  Transthoracic Echocardiogram Name:Kelli Fisher Date: 2025 :  1955 Ht:  (60in)  BP: 142 / 86 MRN:  [id number]    Age:  70years    Wt:  (174lb) HR: 106bpm Loc:  [facility]       Gndr: F          BSA: 1.76m^2 Sonographer: Chris DOMINGUEZ Ordering:    Stella Gomez Consulting:  Khadar Segura ---------------------------------------------------------------------------- History/Indications:   Pericardial effusion. Lung mass. ---------------------------------------------------------------------------- Procedure information:  A transthoracic echocardiogram, limited study was performed. Additional evaluation included M-mode and limited 2D.  Patient status:  Inpatient.  Location:  Bedside.    Comparison was made to the study of 2025.    This was a routine study. Transthoracic echocardiography for diagnosis and ventricular function evaluation. Image quality was adequate. ECG rhythm:   Sinus tachycardia  Study completion:  There were no complications. ---------------------------------------------------------------------------- Conclusions: 1. Left ventricle: The cavity size was normal. Wall thickness was normal.    Systolic function was vigorous. The estimated ejection fraction was    65-70%, by biplane method of disks. No diagnostic evidence for regional    wall motion abnormalities. Unable to assess LV diastolic function. 2. Pericardium, extracardiac: A small to moderate,  free-flowing pericardial    effusion was identified circumferential to the heart. Probably not    hemodynamically significant. Impressions:  This study is compared with previous dated 4/30/2025: No significant change since prior study. * ---------------------------------------------------------------------------- * Findings: Left ventricle:  The cavity size was normal. Wall thickness was normal. Systolic function was vigorous. The estimated ejection fraction was 65-70%, by biplane method of disks. No diagnostic evidence for regional wall motion abnormalities. Unable to assess LV diastolic function. Ventricular septum:   Thickness was normal. Left atrium:  The left atrial volume was normal. Right ventricle:  Well visualized. The cavity size was normal. Systolic function was normal. Right atrium:  Well visualized. The atrium was normal in size. Mitral valve:  Well visualized. The leaflets were mildly calcified. Aortic valve:  Well visualized.  The valve was trileaflet. The leaflets were mildly calcified. Tricuspid valve:  Well visualized. Pulmonic valve:   Well visualized. Pericardium:  A small to moderate, free-flowing pericardial effusion was identified circumferential to the heart. Probably not hemodynamically significant. Aorta: Aortic root: The aortic root was normal. Ascending aorta: The ascending aorta was normal. Pulmonary arteries: Systolic pressure could not be accurately estimated. ---------------------------------------------------------------------------- Measurements  Left ventricle         Value        Ref       04/30/2025  IVS thickness, ED, (H) 1.0   cm     0.6 - 0.9 1.0  PLAX  LV ID, ED, PLAX    (L) 3.6   cm     3.8 - 5.2 3.9  LV ID, ES, PLAX        2.4   cm     2.2 - 3.5 2.5  LV PW thickness,   (H) 1.0   cm     0.6 - 0.9 1.0  ED, PLAX  IVS/LV PW ratio,       1.00         --------- 1.00  ED, PLAX  LV PW/LV ID ratio,     0.28         --------- 0.26  ED, PLAX  LV ejection            63    %      54 -  74   66  fraction  LV end-diastolic       62    ml     48 - 140  ----------  volume, 1-p A4C  LV ejection            65    %      46 - 78   ----------  fraction, 1-p A4C  Stroke volume, 1-p     40    ml     --------- ----------  A4C  LV end-diastolic       35    ml/m^2 30 - 82   ----------  volume/bsa, 1-p  A4C  Stroke volume/bsa,     23    ml/m^2 --------- ----------  1-p A4C  LV end-diastolic       61    ml     46 - 106  ----------  volume, 2-p  LV end-systolic        21    ml     14 - 42   ----------  volume, 2-p  LV ejection            65    %      54 - 74   ----------  fraction, 2-p  Stroke volume, 2-p     40    ml     --------- ----------  LV end-diastolic       35    ml/m^2 29 - 61   ----------  volume/bsa, 2-p  LV end-systolic        12    ml/m^2 8 - 24    ----------  volume/bsa, 2-p  Stroke volume/bsa,     22.5  ml/m^2 --------- ----------  2-p  LVOT                   Value        Ref       04/30/2025  LVOT ID                2     cm     --------- ----------  Aortic root            Value        Ref       04/30/2025  Aortic root ID         3.2   cm     2.5 - 4.0 ----------  Ascending aorta        Value        Ref       04/30/2025  Ascending aorta ID (H) 3.7   cm     1.9 - 3.5 ----------  Left atrium            Value        Ref       04/30/2025  LA ID, A-P, ES         2.7   cm     2.7 - 3.8 ----------  LA volume, S           43    ml     22 - 52   ----------  LA volume/bsa, S       24    ml/m^2 16 - 34   ----------  LA volume, ES, 1-p     39    ml     22 - 52   ----------  A4C  LA volume, ES, 1-p     46    ml     22 - 52   ----------  A2C  LA volume, ES, A/L     46    ml     --------- ----------  LA volume/bsa, ES,     26    ml/m^2 16 - 34   ----------  A/L  LA/aortic root         0.84         --------- ----------  ratio  Right atrium           Value        Ref       04/30/2025  RA ID, S-I, ES,        4.8   cm     3.4 - 5.3 ----------  A4C  RA ID/bsa, S-I,        2.7   cm/m^2 1.9 - 3.1 ----------  ES, A4C  RA  area, ES, A4C       14    cm^2   10 - 18   ----------  RA volume, ES, 1-p     32    ml     --------- ----------  A4C  RA volume/bsa, ES,     18    ml/m^2 9 - 33    ----------  1-p A4C  Systemic veins         Value        Ref       04/30/2025  Estimated CVP          15    mm Hg  --------- ----------  Inferior vena cava     Value        Ref       04/30/2025  ID                 (H) 2.4   cm     <=2.1     2.3 Legend: (L)  and  (H)  corinna values outside specified reference range. ---------------------------------------------------------------------------- Prepared and electronically signed by Margareth Haas 05/05/2025 12:36     XR VIDEO SWALLOW (CPT=74230)  Result Date: 5/3/2025  PROCEDURE: XR VIDEO SWALLOW (CPT=74230)  COMPARISON: None available.  INDICATIONS: Clinical concern for aspiration.  TECHNIQUE: A swallowing evaluation was performed in the Radiology Department in conjunction with the Department of Speech and Hearing.  A separate detailed report will be issued by the speech pathologist who recorded the study. Fluoroscopy was provided by a radiologist who was present during the procedure.    Materials of various consistencies were given by mouth, and swallowing was evaluated fluoroscopically.   # of FLUOROGRAPHIC CINE FILES:  7 FLUOROSCOPY IMAGES OBTAINED:  1 FLUOROSCOPY TIME:  2.1 minutes RADIATION DOSE (Dose Area Product):  [id number]  FINDINGS:  ASPIRATION/PENETRATION:   None.  STRUCTURE: No visible obstruction, stricture, or dilatation.  OTHER: Negative.           CONCLUSION:  No penetration or aspiration.    Dictated by (CST): Harpreet Jade MD on 5/03/2025 at 12:08 PM     Finalized by (CST): Harpreet Jade MD on 5/03/2025 at 12:09 PM          XR CHEST AP PORTABLE  (CPT=71045)  Result Date: 5/2/2025  PROCEDURE: XR CHEST AP PORTABLE  (CPT=71045) TIME: 622 hours.   COMPARISON: [facility], XR CHEST AP PORTABLE (CPT=71045), 4/29/2025, 10:40 PM.  [facility], X CHEST PORTABLE,  2014, 8:51 PM.  INDICATIONS: Shortness of breath.  TECHNIQUE:   Single view.   FINDINGS:  CARDIAC/MEDIAST: Heart and mediastinum are unchanged. LUNGS/PLEURA:  Opacity in the left perihilar mid lung not significantly changed.  Low lung volumes.  There is mild opacity in the right lung base.  No significant pleural effusion or pneumothorax. OTHER:  Stable appearance of the osseous structures.          CONCLUSION:   Stable opacity left perihilar mid lung.  Mild opacity right lung base which may reflect atelectasis with or without superimposed pneumonia.    Dictated by (CST): Casey Diallo MD on 2025 at 7:18 AM     Finalized by (CST): Casey Diallo MD on 2025 at 7:19 AM          [employer] (CPT=93308/34237/10576)  Result Date: 2025  Transthoracic Echocardiogram Name:Kelli Fisher Date: 2025 :  1955 Ht:  (60in)  BP: 106 / 74 MRN:  [id number]    Age:  70years    Wt:  (165lb) HR: 108bpm Loc:  [facility]       Gndr: F          BSA: 1.72m^2 Sonographer: James PINTO RVT Ordering:    Javi Huerta Consulting:  Ector Byrnes ---------------------------------------------------------------------------- History/Indications:  Pericardial effusion. ---------------------------------------------------------------------------- Procedure information:  A transthoracic echocardiogram, limited study was performed. Additional evaluation included M-mode and limited 2D.  Patient status:  Inpatient.  Location:  Bedside.    Comparison was made to the study of 2025.    This was a routine study. Transthoracic echocardiography for ventricular function evaluation and assessment of pericardial effusion and hemodynamic status. Image quality was adequate. ECG rhythm:   Sinus tachycardia ---------------------------------------------------------------------------- Conclusions: 1. Left ventricle: The cavity size was normal. Wall thickness was mildly    increased. Systolic function was normal. The  estimated ejection fraction    was 60-65%, by visual assessment. No diagnostic evidence for regional    wall motion abnormalities. Unable to assess LV diastolic function. 2. Pericardium, extracardiac: A small to moderate, free-flowing pericardial    effusion was identified circumferential to the heart. Maximal diameter in    diastole is 1.1cm. Features were not consistent with tamponade    physiology. 3. Inferior vena cava: The IVC was dilated. Respirophasic diameter changes    are blunted (< 50%), consistent with elevated central venous pressure. * ---------------------------------------------------------------------------- * Findings: Left ventricle:  The cavity size was normal. Wall thickness was mildly increased. Systolic function was normal. The estimated ejection fraction was 60-65%, by visual assessment. No diagnostic evidence for regional wall motion abnormalities. Unable to assess LV diastolic function. Right ventricle:  The cavity size was normal. Mitral valve:  The valve was structurally normal. Aortic valve:  The valve was structurally normal. Tricuspid valve:  The valve is structurally normal. Pulmonic valve:   Not well visualized. Pericardium:  A small to moderate, free-flowing pericardial effusion was identified circumferential to the heart. Maximal diameter in diastole is 1.1cm.  Doppler:  Features were not consistent with tamponade physiology. Systemic veins: Inferior vena cava: The IVC was dilated.  Respirophasic diameter changes are blunted (< 50%), consistent with elevated central venous pressure. ---------------------------------------------------------------------------- Measurements  Left ventricle              Value    Ref      04/12/2025  IVS thickness, ED, PLAX (H) 1.0   cm 0.6 -    0.8                                       0.9  LV ID, ED, PLAX             3.9   cm 3.8 -    4.4                                       5.2  LV ID, ES, PLAX             2.5   cm 2.2 -    2.8                                        3.5  LV PW thickness, ED,    (H) 1.0   cm 0.6 -    0.7  PLAX                                 0.9  IVS/LV PW ratio, ED,        1.00     -------- 1.14  PLAX  LV PW/LV ID ratio, ED,      0.26     -------- 0.16  PLAX  LV ejection fraction        66    %  54 - 74  66  Inferior vena cava          Value    Ref      04/12/2025  ID                      (H) 2.3   cm <=2.1    2.7 Legend: (L)  and  (H)  corinna values outside specified reference range. ---------------------------------------------------------------------------- Prepared and electronically signed by Javi Huerta 04/30/2025 13:43     CT CHEST PE AORTA (IV ONLY) (CPT=71260)  Result Date: 4/30/2025  PROCEDURE: CT CHEST PE AORTA (IV ONLY) (CPT=71260)  COMPARISON: [facility], CT CHEST PE AORTA (IV ONLY) (CPT=71260), 4/11/2025, 9:30 PM.  INDICATIONS: dyspnea, lung cancer  TECHNIQUE: CT images of the chest were obtained with non-ionic intravenous contrast material.  Automated exposure control for dose reduction was used. Adjustment of the mA and/or kV was done based on the patient's size. Use of iterative reconstruction technique for dose reduction was used. Dose information is transmitted to the ACR (American College of Radiology) NRDR (National Radiology Data Registry) which includes the Dose Index Registry.  FINDINGS:  No PE  Normal heart size with stable small to moderate circumferential pericardial effusion  No short interval change in left upper lobe mass, diffuse intrathoracic adenopathy  Trace left pleural effusion similar to prior.  Trace right effusion has resolved   A few of the right-sided pulmonary nodules may have mildly increased in size.  Adenopathy causes severe narrowing of the lower right IJ with collaterals in the chest wall    CONCLUSION: No PE.   [employer] provided a prelim report for this exam. This final report has no significant discrepancies with the [facility] report. Finalized by (CST): Chris Lovell MD on  4/30/2025 at 9:39 AM          XR CHEST AP PORTABLE  (CPT=71045)  Result Date: 4/30/2025  PROCEDURE: XR CHEST AP PORTABLE  (CPT=71045)  COMPARISON: [facility], CT CHEST PE AORTA (IV ONLY) (CPT=71260), 4/29/2025, 11:50 PM.  INDICATIONS: Chest discomfort x1 day.  Findings:  Left lung mass with significant diffuse intrathoracic adenopathy seen on recent CT  Heart size likely normal for technique  No pneumothorax or large effusion  [facility] provided a prelim report for this exam. This final report has no significant discrepancies with the [facility] report. Finalized by (CST): Chris Lovell MD on 4/30/2025 at 6:45 AM          CT ABDOMEN+PELVIS(CONTRAST ONLY)(CPT=74177)  Result Date: 4/16/2025  PROCEDURE: CT ABDOMEN + PELVIS (CONTRAST ONLY) (CPT=74177)  COMPARISON: [facility], CT SOFT TISSUE OF NECK (CONTRAST ONLY) (CPT=70491), 4/11/2025, 9:30 PM.  [facility], CT CHEST PE AORTA (IV ONLY) (CPT=71260), 4/11/2025, 9:30 PM.  US PELVIS TRANSABDOMINAL AND TRANSVAGINAL (KEB=91958/04737), 12/11/2023, 3:54 PM.  INDICATIONS: recurrent endometrial cancer, assess disease status  TECHNIQUE: Multidetector CT images of the abdomen and pelvis were obtained with non-ionic intravenous contrast material. Automated exposure control for dose reduction was used. Adjustment of the mA and/or kV was done based on the patient's size. Iterative reconstruction technique for dose reduction was employed.  FINDINGS: LUNG BASES: The heart is normal in size. The small circumferential pericardial effusion.  Trace dependent right greater than pleural effusions.  Distributed nodules.  These include a reference 0.8 cm nodule in the right lower lobe (series 6, image 4). LIVER: Small indeterminate 1 cm low-attenuation lesion at the periphery of the right hepatic lobe (series 2, image 16). BILIARY: The gallbladder is present. PANCREAS: No lesion, fluid collection, ductal dilatation, or atrophy.  SPLEEN: No  enlargement.  ADRENALS:   No defined mass or abnormal enlargement.  KIDNEYS:   Symmetric enhancement is seen without evidence of hydronephrosis or underlying solid masses.  Small 0.7 cm indeterminate attenuation left lower pole renal cortical lesion (series 7, image 53). GI/MESENTERY:  There is no evidence of bowel obstruction.  Mild gastric distention noted; enteric contrast propagates to the mid-distal small bowel.  Please note that some segments of the colon are incompletely distended and suboptimally evaluated, mild scattered colonic diverticulosis with mild-to-moderate colonic fecal burden.  Normal appendix. URINARY BLADDER: Incompletely distended and suboptimally evaluated. PELVIC NODES: No lymphadenopathy.   PELVIC ORGANS: Hysterectomy and bilateral oophorectomy. VASCULATURE:   No aneurysm is detected.  Retroaortic left renal vein noted; mild atherosclerosis. RETROPERITONEUM: No mass or lymphadenopathy is apparent.  BONES:   Demineralization.  Osteitis pubis.  Transitional lumbosacral anatomy with sacralization of L5 on the left.  Mild levoscoliosis of the lumbar spine with multilevel lumbar spine degenerative changes. ABDOMINAL WALL: Small fat containing umbilical hernia.  Mild anasarca. OTHER: No free air or fluid is seen in the abdomen or pelvis.          CONCLUSION:  1. History of recurrent endometrial carcinoma on recent supraclavicular lymph node biopsy.  Partially imaged randomly distributed right greater than left basilar lung nodules, which are concerning for hematogenous pulmonary metastases.  These are better assessed on recent prior chest CT. 2. Small 1 cm low-attenuation right hepatic lobe lesion is indeterminate, but a small hepatic metastasis cannot be excluded.  Consider further evaluation with either PET-CT or a liver protocol MRI of the abdomen without and with contrast.  Correlation with any available outside institution imaging would also be helpful to assess stability. 3. Small 0.7 cm  indeterminate attenuation left lower pole renal cortical lesion.  Differential considerations include a small solid renal mass versus complex cyst.  Given small size, this should be reassessed on anticipated follow-up exam. 4. No definite additional metastatic disease within the abdomen. 5. Hysterectomy and bilateral salpingo oophorectomy. 6. Small circumferential pericardial effusion.  There are also trace right greater than left pleural effusions.  Malignant pleural and pericardial fluid cannot be excluded. 7. Mild scattered colonic diverticulosis. 8. Small retrocardiac hiatal hernia. 9. Circumferential urinary bladder wall thickening, which may relate to incomplete distention or cystitis.  If there are referable symptoms, urinalysis correlation is requested. 10. Anatomic variant retroaortic left renal vein. 11. Mild anasarca.   A preliminary report was issued by the [facility] teleradiology service. There are no major discrepancies.  elm-remote  Dictated by (CST): Lemuel Gallegos MD on 4/16/2025 at 10:56 AM     Finalized by (CST): Lemuel Gallegos MD on 4/16/2025 at 11:06 AM          IR BIOPSY  Result Date: 4/15/2025  PROCEDURE: IR BIOPSY  INDICATIONS: supraclavicular lymph node  COMPARISON:  CT chest 4/11/2025  (S): DARIEL Ragsdale MD  ANESTHESIA/SEDATION: Level of anesthesia/sedation:  Local Anesthetic  ESTIMATED BLOOD LOSS: Less than 5 mL  COMPLICATIONS: None  FINDINGS:  Informed consent was obtained. The patient was positioned supine. The left neck was sterilely prepped and draped.  Preliminary ultrasound demonstrated enlarged left supraclavicular lymphadenopathy. Local Lidocaine was administered. Under ultrasound guidance, a coaxial 18 gauge needle was advanced into the lesion. A total of 4  cores were obtained.  Samples were touch prepped and reviewed with cytology, confirming adequacy. No appreciable complication         CONCLUSION:  1. Successful left supraclavicular lymphadenopathy biopsy as  detailed above.     Dictated by (CST): Parviz Ragsdale MD on 4/15/2025 at 2:51 PM     Finalized by (CST): Parviz Ragsdale MD on 4/15/2025 at 2:54 PM            Data Review:   Recent Labs   Lab 05/12/25  1410 05/13/25  0625   RBC 2.97* 2.59*   HGB 7.3* 6.4*   HCT 24.6* 21.3*   WBC 0.5* 0.4*   PLT 50.0* 36.0*   NEUT  --  3   LYMPH  --  66       Recent Labs   Lab 05/12/25  1411 05/13/25  0625   * 238*   BUN 22 22   CREATSERUM 0.78 0.76   CA 9.0 8.6*   ALB 3.2  --     144   K 4.2 3.7    101   CO2 32.0 36.0*   ALKPHO 142  --    AST 26  --    ALT 10  --    BILT 0.7  --    TP 6.8  --        Lab Results   Component Value Date     443.0 (H) 05/04/2025     210.0 (H) 04/16/2025    CEA <0.5 04/16/2025     230.8 (H) 05/04/2025     156.3 (H) 04/16/2025       ASSESSMENT/PLAN:  Patient is a 70 year old female No obstetric history on file. She has stage IV recurrent endometrial cancer s/p cycle #1 carboplatin/taxol/keytruda/neulasta on 5/6/2025    1) neutropenia->s/p fulphila on 5/7. Need daily neupogen in the hospital until ANC 1500 or greater. First dose received last night and she will receive another tonight  Will need dose reduction of chemotherapy with cycle #2    If patient has any fevers she will need infectious disease consult given her neutropenia    2) thrombocytopenia->no active bleeding and plt 37 this am. No need for plt transfusion. Hold anticoagulation untl plt rising  Needs daily CBC with diff    3) anemia->hgb less than 7 this am. Given less than 7 and cardiac hx recommend transfusion of PRBCs. Patient is agreeable. Hospitalist to coordinate given her lasix and current fluid management.     4) thrush->nystatin ordered. Due to pancytopenia. I also ordered nutrition consult this am for help with supplements since liquids are probably going to be more comfortable for her.     Please call Dr Herring or his physician assistants with questions on this patient.   [phone] or via  perfect serve      All of the findings and plan were discussed with the patient.  She notes understanding and agrees with the plan of care.  All questions were answered to the best of my ability at this time.    Brissa Mcdaniel MD  5/13/2025  9:50 AM       [1]   Medications Prior to Admission   Medication Sig Dispense Refill Last Dose/Taking    levothyroxine (SYNTHROID) 100 MCG Oral Tab Take 1 tablet (100 mcg total) by mouth before breakfast.   5/12/2025 at  7:00 AM    Potassium Chloride ER 20 MEQ Oral Tab CR Take 1 tablet by mouth daily.   5/12/2025 at  7:00 AM    furosemide 20 MG Oral Tab Take 1 tablet (20 mg total) by mouth daily.   5/12/2025 at  7:00 AM    Levalbuterol HCl 1.25 MG/3ML Inhalation Nebu Soln Take 3 mL (1.25 mg total) by nebulization every 8 (eight) hours as needed for Wheezing or Shortness of Breath.   Past Week    pantoprazole 40 MG Oral Tab EC Take 1 tablet (40 mg total) by mouth daily. 30 tablet 0 5/12/2025 at  7:00 AM    amiodarone 200 MG Oral Tab Take 1 tablet (200 mg total) by mouth in the morning, at noon, and at bedtime for 1 day, THEN 1 tablet (200 mg total) in the morning, at noon, and at bedtime. 93 tablet 0 5/12/2025 at 12:00 PM    Fluticasone Propionate  HFA (FLOVENT HFA) 220 MCG/ACT Inhalation Aerosol Inhale 1 puff into the lungs every 12 (twelve) hours. Rinse mouth following use.   5/8/2025   [2]   Allergies  Allergen Reactions    Aspirin Tightness in Throat    Penicillins HIVES    Other OTHER (SEE COMMENTS)     Pt states IV steroid allergy, states it makes her breathing worse

## 2025-05-13 NOTE — PLAN OF CARE
Problem: Diabetes/Glucose Control  Goal: Glucose maintained within prescribed range  Description: INTERVENTIONS:- Monitor Blood Glucose as ordered- Assess for signs and symptoms of hyperglycemia and hypoglycemia- Administer ordered medications to maintain glucose within target range- Assess barriers to adequate nutritional intake and initiate nutrition consult as needed- Instruct patient on self management of diabetes  Outcome: Progressing     Problem: Patient Centered Care  Goal: Patient preferences are identified and integrated in the patient's plan of care  Description: Interventions:- What would you like us to know as we care for you?   Problem: RISK FOR INFECTION - ADULT  Goal: Absence of fever/infection during anticipated neutropenic period  Description: INTERVENTIONS- Monitor WBC- Administer growth factors as ordered- Implement neutropenic guidelines  Outcome: Progressing     Problem: CARDIOVASCULAR - ADULT  Goal: Maintains optimal cardiac output and hemodynamic stability  Description: INTERVENTIONS:- Monitor vital signs, rhythm, and trends- Monitor for bleeding, hypotension and signs of decreased cardiac output- Evaluate effectiveness of vasoactive medications to optimize hemodynamic stability- Monitor arterial and/or venous puncture sites for bleeding and/or hematoma- Assess quality of pulses, skin color and temperature- Assess for signs of decreased coronary artery perfusion - ex. Angina- Evaluate fluid balance, assess for edema, trend weights  Outcome: Progressing  Goal: Absence of cardiac arrhythmias or at baseline  Description: INTERVENTIONS:- Continuous cardiac monitoring, monitor vital signs, obtain 12 lead EKG if indicated- Evaluate effectiveness of antiarrhythmic and heart rate control medications as ordered- Initiate emergency measures for life threatening arrhythmias- Monitor electrolytes and administer replacement therapy as ordered  Outcome: Progressing     Problem: RESPIRATORY - ADULT  Goal:  Achieves optimal ventilation and oxygenation  Description: INTERVENTIONS:- Assess for changes in respiratory status- Assess for changes in mentation and behavior- Position to facilitate oxygenation and minimize respiratory effort- Oxygen supplementation based on oxygen saturation or ABGs- Provide Smoking Cessation handout, if applicable- Encourage broncho-pulmonary hygiene including cough, deep breathe, Incentive Spirometry- Assess the need for suctioning and perform as needed- Assess and instruct to report SOB or any respiratory difficulty- Respiratory Therapy support as indicated- Manage/alleviate anxiety- Monitor for signs/symptoms of CO2 retention  Outcome: Progressing   - Provide timely, complete, and accurate information to patient/family- Incorporate patient and family knowledge, values, beliefs, and cultural backgrounds into the planning and delivery of care- Encourage patient/family to participate in care and decision-making at the level they choose- Honor patient and family perspectives and choices  Outcome: Progressing

## 2025-05-13 NOTE — DISCHARGE INSTRUCTIONS
Please resume home health services with [facility] upon your discharge, [phone].    PALLIATIVE CARE INSTRUCTIONS 6/4/25    Referral to [facility] for follow-up at [facility] has been made from [place]     Dry mouth Recommendations:  Mouth care products for dry mouth are also very popular but [employer]ot[employer] makes a lot from rinses, paste, lozenges to gums. There are also many other out there. Anything that works without Alcohol in it is good. Some pts/ family prefer to make there own moth rinse . This needs to be made daily ( every 24 hrs) . Here is a common recipe:     Simple Mouth Rinse Options: Make a new batch every  day  Wish and  Salt and Baking Soda Rinse: Mix 1 teaspoon of salt and 1 teaspoon of baking soda in one quart (4 cups) of water.  Water Only: Plain water can also be used as a rinse.   Important Considerations:  Frequency: Rinse your mouth every 4 to 6 hours or as needed. ( Keep it in bathroom and every time go in- after washing your hands rinse your mouth . Use it.( Use 10 - 15 ml) . IF your taste is off it is good to rinse prior to each meal and at bedtime   Method: Swish the rinse in your mouth and gargle well for 15 to 30 seconds, then spit it out.  Temperature: Avoid using very hot or cold mouth rinses.  Alcohol and Sugar: Choose mouthwashes without alcohol or sugar. Examples include Biotene® PBF Oral Rinse or BetaCell™ Oral Rinse.  Hydrogen Peroxide: Do not use mouthwashes containing hydrogen peroxide unless directed by your doctor.    Recommendations for management for nasal dryness related to O2 use. Recommended not to use petroleum based products and  Saline spray does not stay long to moisturize membranes due to air flow of O2 . Recommend use nasal get spray. Her is one that other pts. have found useful . Can find in any [employer], other pharmacies or med supply stores or online.

## 2025-05-13 NOTE — PROGRESS NOTES
[facility]  part of [facility]    Progress Note    Kelli Fisher Patient Status:  Inpatient    1955 MRN [id number]   Location [facility] 3W/SW Attending Hina Bentley MD   Hosp Day # 1 PCP Taylor Gallardo MD     Chief Complaint: afib rvr    SUBJECTIVE:    No acute events overnight.  Patient denies chest pain, sob.  No fevers/chills.  No n/v/abd pain.      10 ROS completed and otherwise negative.    OBJECTIVE:  Vital signs in last 24 hours:  /77   Pulse 85   Temp 98.1 °F (36.7 °C) (Oral)   Resp 20   Ht 5' (1.524 m)   Wt 167 lb 9.6 oz (76 kg)   SpO2 100%   BMI 32.73 kg/m²     Intake/Output:    Intake/Output Summary (Last 24 hours) at 2025 1234  Last data filed at 2025 0906  Gross per 24 hour   Intake 1451.18 ml   Output 1500 ml   Net -48.82 ml       Wt Readings from Last 3 Encounters:   25 167 lb 9.6 oz (76 kg)   25 169 lb (76.7 kg)   25 174 lb 9.6 oz (79.2 kg)       Exam     Gen: No acute distress  Pulm: Lungs clear, normal respiratory effort  CV: Heart with regular rate and rhythm  Abd: Abdomen soft, nontender, bowel sounds present  Neuro: No acute focal deficits  MSK: moves extremities  Skin: Warm and dry  Psych: Normal affect  Ext: no c/c/e    Data Review:     Labs:   Lab Results   Component Value Date    WBC 0.4 2025    HGB 6.4 2025    HCT 21.3 2025    PLT 36.0 2025    CREATSERUM 0.76 2025    BUN 22 2025     2025    K 3.7 2025     2025    CO2 36.0 2025     2025    CA 8.6 2025    ALB 3.2 2025    ALKPHO 142 2025    BILT 0.7 2025    TP 6.8 2025    AST 26 2025    ALT 10 2025    MG 1.6 2025       Imaging: Reviewed    XR CHEST AP PORTABLE  (CPT=71045)  Result Date: 2025  CONCLUSION:  1. Suboptimal inspiration.  No acute finding or significant change. 2. Left mid lung/upper lobe mass compatible with  known malignancy. 3. Stable mediastinal lymph node metastases. 4. Stable pericardial effusion.    Dictated by (CST): Jayson Fournier MD on 5/12/2025 at 3:04 PM     Finalized by (CST): Jayson Fournier MD on 5/12/2025 at 3:08 PM            Meds:   Current Hospital Medications[1]      Assessment  Problem List[2]    Plan:     Afib RVR  Pericardial effusion  - started IV amio  - cardiology consulted  - monitor on tele  - anticoagulation per cards    Metastatic endometrial cancer    Pancytopenia  Acute on chronic anemia of chronic disease  - transfuse 1 unit prbc  - neutropenic precautions  - started neupogen until ANC 1500  - cefepime and vanco started for neutropenia and patient with cough/chills, elevated LA, possible early sepsis - pulm following for this as well    Chronic respiratory failure  - on 4-5L NC baseline  - due to 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    Thrush  - nystatin    Global A/P  - reviewed labs and vitals from today  - reviewed notes of the day  - cbc, bmp, mag ordered for tomorrow  - discussed need to stay in the hospital today due to above  - cont IV abx  - discussed with patient/RN and care team  - dispo pending clinical course      Hina Bentley MD  5/13/2025  12:34 PM    Supplementary Documentation:   DVT Mechanical Prophylaxis:   SCDs,    DVT Pharmacologic Prophylaxis   Medication   None                Code Status: Full Code  Rogel: External urinary catheter in place  Rogel Duration (in days):   Central line:    JOSE: 5/16/2025        **Certification      PHYSICIAN Certification of Need for Inpatient Hospitalization - Initial Certification    Patient will require inpatient services that will reasonably be expected to span two midnight's based on the clinical documentation in H+P.   Based on patients current state of illness, I anticipate that, after discharge, patient will require TBD.      Dietitian Malnutrition Assessment    Evaluation for Malnutrition: Criteria for non-severe  malnutrition diagnosis-         chronic illness related to   Wt loss 7.5% in 3 months., Body fat mild depletion., Muscle mass mild depletion.       RD Malnutrition Care Plan: Encouraged increased PO intake., Encouraged small frequent meals with emphasis on high calorie/high protein., Intiated ONS (oral nutritional supplements).    Body Fat/Muscle Mass: Mild depletion body fat., Mild depletion muscle mass. triceps region. dorsal dale region., calf region.    Physician Assessment     Patient has a diagnosis of moderate malnutrition              MDM: High complexity- severe exacerbation of chronic illness posing a threat to life. IV meds requiring close inpatient monitoring. I personally spent time on chart/note review, review of labs/imaging, discussion with patient, physical exam, discussion with staff, writing progress note, and discussion of plan of care.         [1]   Current Facility-Administered Medications   Medication Dose Route Frequency    maalox/diphenhydramine/lidocaine (First Mouthwash BLM) oral suspension 10 mL  10 mL Oral Q6H PRN    sodium chloride 0.9% infusion   Intravenous Once    acetaminophen (Tylenol Extra Strength) tab 500 mg  500 mg Oral Q4H PRN    ondansetron (Zofran) 4 MG/2ML injection 4 mg  4 mg Intravenous Q6H PRN    metoclopramide (Reglan) 5 mg/mL injection 5 mg  5 mg Intravenous Q8H PRN    glucose (Dex4) 15 GM/59ML oral liquid 15 g  15 g Oral Q15 Min PRN    Or    glucose (Glutose) 40% oral gel 15 g  15 g Oral Q15 Min PRN    Or    glucose-vitamin C (Dex-4) chewable tab 4 tablet  4 tablet Oral Q15 Min PRN    Or    dextrose 50% injection 50 mL  50 mL Intravenous Q15 Min PRN    Or    glucose (Dex4) 15 GM/59ML oral liquid 30 g  30 g Oral Q15 Min PRN    Or    glucose (Glutose) 40% oral gel 30 g  30 g Oral Q15 Min PRN    Or    glucose-vitamin C (Dex-4) chewable tab 8 tablet  8 tablet Oral Q15 Min PRN    insulin aspart (NovoLOG) 100 Units/mL FlexPen 1-7 Units  1-7 Units Subcutaneous TID CC     ceFEPIme (Maxipime) 1 g in sodium chloride 0.9% 100mL IVPB-YANA  1 g Intravenous Q12H    furosemide (Lasix) 10 mg/mL injection 40 mg  40 mg Intravenous BID (Diuretic)    pantoprazole (Protonix) DR tab 40 mg  40 mg Oral QAM AC    ipratropium-albuterol (Duoneb) 0.5-2.5 (3) MG/3ML inhalation solution 3 mL  3 mL Nebulization Q6H PRN    amiodarone in dextrose 4.14% (Cordarone) 360 mg/200mL infusion premix  1 mg/min Intravenous Continuous    vancomycin (Vancocin) 1.25 g in sodium chloride 0.9% 250mL IVPB premix  15 mg/kg Intravenous Q24H    levothyroxine (Synthroid) tab 100 mcg  100 mcg Oral Before breakfast    filgrastim-aafi (Nivestym) 300 MCG/0.5ML prefilled syringe 300 mcg  300 mcg Subcutaneous Q24H    nystatin (Mycostatin) 917334 UNIT/ML oral suspension 500,000 Units  5 mL Oral QID   [2]   Patient Active Problem List  Diagnosis    Shortness of breath    Acute hypoxic respiratory failure (HCC)    Lung mass    Supraclavicular adenopathy    Dysphagia, unspecified type    Pericardial effusion (HCC)    Endometrial cancer (HCC)    Microcytic anemia    Thrombocytopenia (HCC)    Azotemia    Pancytopenia (HCC)    Hyperglycemia    Acute respiratory failure with hypoxia (HCC)    Atrial fibrillation with RVR (HCC)

## 2025-05-13 NOTE — DIETARY NOTE
ADULT NUTRITION INITIAL ASSESSMENT    Pt is at moderate nutrition risk.  Pt meets moderate malnutrition criteria.     RECOMMENDATIONS TO MD: See nutrition intervention for ONS (oral nutrition supplements)    ADMITTING DIAGNOSIS:  Atrial fibrillation with RVR (HCC) [I48.91]  Acute respiratory failure with hypoxia (HCC) [J96.01]  Pancytopenia (HCC) [D61.818]  PERTINENT PAST MEDICAL HISTORY: Past Medical History[1]    PATIENT STATUS: Initial 05/13/25: Pt assessed due to screening at risk for decreased appetite and unintentional weight loss. RD also consulted for \"heart failure diet education\" - not appropriate given clinical status (cleared consult). Pt known to nutrition services from previous admissions, last seen 5/1/25 and met criteria for chronic moderate protein calorie malnutrition (Chronic MPCM) - pt discharged 5/6/25. Per past RD notes (5/1 and 4/14) - decreased po intake reported/noted. Chart reviewed, pt admitted by PCP for IRVIN and HTN x 3 days. Pt with increased swelling to legs. Pt with hx of metastatic endometrial cancer with lung mets (on chemotherapy). Pt also noted to have thrush - nystatin ordered. Discussion with RN, poor oral intake. Intakes reviewed, 25%  1 meal noted. Pt visited, reports decreased po intake over last 3 days. Prior to 3 days ago pt reports better intake/appetite. Typically eats small frequent meals as recommended by MD. Pt reports drinking protein shake but unsure which one (only drinks 1 daily). Pt noted coughing while drinking Ginger ale, spitting up - RN notified. Pt reports difficulties swallowing liquids and solids - last admission pt on chopped/soft  & bite sized with mildly thick liquids. Pt noted to have VFSS last admission recommending regular texture/thin liquids per RN. Pt reports some stomach pain r/t feeling like needing to have a bowel movement (last BM noted a few days ago). Pt unsure of weight loss, reports usual body weight (UBW) around 169#. Current weight 167#  9.6 oz. EMR weight review, last known weight # 10 oz on 5/5/25 - 7.0# or 4.0% weight loss x 1 week (significant). Per EMR weight review, pt noted weighing 162# 4 oz on 4/12/25 - stable, 175# 14.8 oz on 4/3/25 - 8.3# or 4.7% weight loss x 1 month (non-significant), 182# 11.2 oz on 2/20/25 - 15.1# or 8.3% weight loss x 3 months (significant), 183# on 12/19/24 - 15.4# or 8.4%  weight loss x 5 months (non-significant), 197# on 1/3/24 and 197# on 12/11/23. Nutrition focused physical exam (NFPE) completed, see below for details. Encouraged small frequent meals with emphasis on high calorie and high protein. Discussed oral nutritional supplements (ONS) - pt in agreement and provided flavor preferences. +ONS per discussion. .    FOOD/NUTRITION RELATED HISTORY:  Appetite:  decreased appetite PTA x 3 days per pt otherwise eating small frequent meals (amount consumed unsure at this time)  Intake: ~25% x1 meals documented since admit  Intake Meeting Needs: No, but oral nutrition supplements (ONS) to maximize  Percent Meals Eaten (last 6 days)       Date/Time Percent Meals Eaten (%)    05/13/25 0906 25 %           Food Allergies: No Known Food Allergies (NKFA)  Cultural/Ethnic/Baptist Preferences: Not Obtained    GASTROINTESTINAL: +BM 5/10/25, dysphagia, and thrush   Discussed possible SLP evaluation with RN    MEDICATIONS: reviewed Electrolyte replacement per protocol (non-cardiac)  IV Lasix BID noted  SSI (Medium dose) noted   sodium chloride   Intravenous Once    insulin aspart  1-7 Units Subcutaneous TID CC    cefepime  1 g Intravenous Q12H    furosemide  40 mg Intravenous BID (Diuretic)    pantoprazole  40 mg Oral QAM AC    vancomycin  15 mg/kg Intravenous Q24H    levothyroxine  100 mcg Oral Before breakfast    filgrastim-aafi  300 mcg Subcutaneous Q24H    nystatin  5 mL Oral QID      amiodarone 1 mg/min (05/13/25 0948)     LABS: reviewed A1c 6.6% on 5/12/25  POC Glucose: 239, 236, 246  Hypomagnesemia  resolved  Recent Labs     05/12/25  1411 05/12/25  1619 05/13/25  0625   *  --  238*   BUN 22  --  22   CREATSERUM 0.78  --  0.76   CA 9.0  --  8.6*   MG  --  1.5* 1.6     --  144   K 4.2  --  3.7     --  101   CO2 32.0  --  36.0*   OSMOCALC 303*  --  309*     WEIGHT HISTORY:  Patient Weight(s) for the past 336 hrs:   Weight   05/13/25 0406 76 kg (167 lb 9.6 oz)   05/12/25 1736 75.2 kg (165 lb 11.2 oz)     Wt Readings from Last 10 Encounters:   05/13/25 76 kg (167 lb 9.6 oz)   04/24/25 76.7 kg (169 lb)   05/05/25 79.2 kg (174 lb 9.6 oz)   04/14/25 76.7 kg (169 lb)   12/11/23 89.4 kg (197 lb)     ANTHROPOMETRICS:  HT:  152.4 cm (5')    Wt Readings from Last 1 Encounters:   05/13/25 76 kg (167 lb 9.6 oz)     Last weight: Fluid changes noted true weight likely lower given below noted edema (+2 to bilateral lower extremities)  BMI: Body mass index is 32.73 kg/m².  Dosing Weight: 76 kg - taken on 5/13/25 , utilized for anthropometric calculations  BMI CLASSIFICATION: 30-34.9 kg/m2 - obesity class I  IBW/lbs (Calculated) Female: 100 lbs             168% IBW  Usual Body Wt: 197 lbs January 2024 per EMR review      85% UBW    NUTRITION RELATED PHYSICAL FINDINGS:  - Nutrition Focused Physical Exam (NFPE): mild depletion body fat triceps region and mild depletion muscle mass dorsal dale region and calf region  - Fluid Accumulation: +2 Bilateral Lower extremity and Feet per flowsheet review -_> See RN documentation for details  - Skin Integrity: at risk and intact per flowsheet review --> See RN documentation for details    CRITERIA FOR MALNUTRITION DIAGNOSIS:  Criteria for non-severe malnutrition diagnosis: chronic illness related to wt loss 7.5% in 3 months, body fat mild depletion, and muscle mass mild depletion.    NUTRITION DIAGNOSIS/PROBLEM:   Moderate Malnutrition related to Chronic - Physiological causes resulting in anorexia or diminished intake as evidenced by wt loss 7.5% in 3 months, body fat  mild depletion, muscle mass mild depletion and variable intakes.    NUTRITION INTERVENTION:   NUTRITION PRESCRIPTION:   Estimated Nutrition needs: --dosing wt of 76 kg - wt taken on 5/13/25  Calories: 3525-3717 calories/day (20-25 calories per kg Dosing wt)  Protein: 55-68 g protein/day (1.2-1.5 g protein/kg Ideal body wt (IBW)45.5 kg)    - Diet:       Procedures    Cardiac diet Cardiac; Is Patient on Accuchecks? Yes      - Nutrition Care Plan: Encouraged increased PO intake, Encouraged small frequent meals with emphasis on high calorie/high protein, and Initiated ONS (oral nutritional supplements)  - ONS (Oral Nutrition Supplements)/Meals/Snacks: Ensure Max (150 calories and 30 g protein each) Daily Chocolate, Glucerna (220 calories/ 10 g protein each) Daily Strawberry, and Magic Cup (290 calories/ 9 g protein each) Daily Mixed berry   - Vitamin and mineral supplements: none  - Feeding assistance: meal set up  - Nutrition education: Discussed importance of adequate energy and protein intake    - Coordination of nutrition care: collaboration with other providers  - Discharge and transfer of nutrition care to new setting or provider: monitor plans    MONITOR AND EVALUATE/NUTRITION GOALS:  - Food and Nutrient Intake:      Monitor: adequacy of PO intake, tolerance of PO intake, and adequacy of supplement intake  - Food and Nutrient Administration:      Monitor: N/A  - Anthropometric Measurement:    Monitor weight  - Nutrition Goals:      allow wt loss due to fluid losses, PO and supplement greater than 75% of needs, good supplement intake, labs within acceptable limits, euglycemia, prevent skin breakdown, support body systems, halt true wt loss, and improved GI status    DIETITIAN FOLLOW UP: RD to follow and monitor nutrition status    Hailey Lowery MS, MELISSA, RDN, LDN  Clinical Dietitian  P: [phone]         [1]   Past Medical History:   Asthma (HCC)    Esophageal reflux    History of blood transfusion    Visual  impairment

## 2025-05-13 NOTE — PAYOR COMM NOTE
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  The home health nurse was there today and sent her in because her heart rate was fast.  She said the chest pains been aching and constant since yesterday.  Despite the heart rate going down since triage she still feels the discomfort.    ED Triage Vitals [05/12/25 1312]   /71   Pulse 108   Resp 25   Temp 98.7 °F (37.1 °C)   Temp src Oral   SpO2 99 %   O2 Device Nasal cannula   Oxygen Therapy  SpO2: 100 %  O2 Device: Nasal cannula  O2 Flow Rate (L/min): 4 L/min    Eyes:      Pupils: Pupils are equal, round, and reactive to light.   Cardiovascular:      Rate and Rhythm: Regular rhythm. Tachycardia present.   Pulmonary:      Comments: Diminished breath sounds at the bases bilaterally  Musculoskeletal:      Cervical back: Normal range of motion.      Right lower leg: Edema present.      Left lower leg: Edema present.   Skin:     General: Skin is warm.      Capillary Refill: Capillary refill takes less than 2 seconds.   Neurological:      General: No focal deficit present.      Mental Status: She is alert.     Labs Reviewed   CBC WITH DIFFERENTIAL WITH PLATELET - Abnormal; Notable for the following components:       Result Value    WBC 0.5 (*)     RBC 2.97 (*)     HGB 7.3 (*)     HCT 24.6 (*)     MCH 24.6 (*)     MCHC 29.7 (*)     RDW-SD 65.1 (*)     RDW 21.5 (*)     PLT 50.0 (*)     Immature Platelet Fraction 8.7 (*)     Neutrophil Absolute Prelim 0.11 (*)     Neutrophil Absolute 0.11 (*)      Lymphocyte Absolute 0.29 (*)     Monocyte Absolute 0.05 (*)     All other components within normal limits   HEPATIC FUNCTION PANEL (7) - Abnormal; Notable for the following components:    Bilirubin, Direct 0.4 (*)     All other components within normal limits   BASIC METABOLIC PANEL (8) - Abnormal; Notable for the following components:    Glucose 233 (*)     BUN/CREA Ratio 28.2 (*)     Calculated Osmolality 303 (*)     All other components within normal limits   RBC MORPHOLOGY SCAN - Abnormal; Notable for the following components:    RBC Morphology See morphology below (*)     Platelet Morphology See morphology below (*)     Microcytosis 2+ (*)     Hypochromia 2+ (*)     Giant platelets Few (*)     All other components within normal limits   LACTIC ACID, PLASMA - Abnormal; Notable for the following components:    Lactic Acid 2.1 (*)     All other components within normal limits   PRO BETA NATRIURETIC PEPTIDE - Abnormal; Notable for the following components:    Pro-Beta Natriuretic Peptide 1,110 (*)     All other components within normal limits   MAGNESIUM - Abnormal; Notable for the following components:    Magnesium 1.5 (*)     All other components within normal limits   HEMOGLOBIN A1C - Abnormal; Notable for the following components:    HgbA1C 6.6 (*)     Estimated Average Glucose 143 (*)    POCT GLUCOSE - Abnormal; Notable for the following components:    POC Glucose  246 (*)     All other components within normal limits   POCT GLUCOSE - Abnormal; Notable for the following components:    POC Glucose  236 (*)    EKG    Rate, intervals and axes as noted on EKG Report.  Rate: 105  Rhythm: Sinus Rhythm  Reading: Low voltage, poor anterior R wave progression.  No ST elevation.  QTc 452.  Abnormal EKG.  A second EKG was done in triage at 1347 she had A-fib RVR once again with low voltage and no significant morphology change.  Abnormal EKG once again read by myself.  QTc was 406    Admission disposition: 5/12/2025  3:58  PM    Disposition and Plan     Clinical Impression:  1. Acute respiratory failure with hypoxia (HCC)    2. Atrial fibrillation with RVR (HCC)    3. Pancytopenia (HCC)       Disposition:  Admit  5/12/2025  3:58 pm         HISTORY AND PHYSICAL EXAMINATION     CHIEF COMPLAINT:  Atrial fibrillation with rapid ventricular response, neutropenia, and possible heart failure.     HISTORY OF PRESENT ILLNESS:  The patient is a 70-year-old  female who was recently diagnosed with recurrent metastatic endometrial cancer, discharged from the hospital last week and initiate on chemotherapy.  Receive her first chemotherapy 6 days ago.  For the last few days been having progressive palpitations and dyspnea on exertion.  Today came into the emergency department for evaluation.  CBC showed white blood cell count of 0.5, absolute neutrophil count of 0.11, hemoglobin 7.3.  Chemistry was unremarkable except for glucose 233.  She had no history of diabetes.  Chest x-ray showed suboptimal inspiration; no acute finding or significant change from prior; left mid lung upper lobe mass compatible with known metastatic malignancy; stable mediastinal lymph node metastases.     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  She had declined paclitaxel and Keytruda at that time.  She had recent recurrent disease with mediastinal supraclavicular lung metastatic proved by supraclavicular lymph node biopsy.  Also, history of asthma, gastroesophageal reflux disease, and paroxysmal atrial tachycardia.      PAST SURGICAL HISTORY:  Thyroidectomy and total abdominal hysterectomy/bilateral salpingo-oophorectomy.  REVIEW OF SYSTEMS:  The patient has had palpitations and chest discomfort associated with dyspnea on exertion and leg edema  for the last few days.  She was discharged from the hospital recently after she was found to have paroxysmal atrial tachycardia, on amiodarone p.o.  Also, recent hospitalization showed negative aspiration on video swallow study.       PHYSICAL EXAMINATION:    GENERAL:  Alert, oriented to time, place, and person, moderate acute distress.   VITAL SIGNS:  Temperature 98.7.  Pulse 101.  Respiratory rate 26.  Blood pressure 112/71.  Pulse ox 98% on room air.  HEENT:  Atraumatic.  Oropharynx clear.  Dry mucous membranes.  Ears, nose normal.  Eyes:  Anicteric sclerae.  NECK:  Supple.  No lymphadenopathy.  Positive jugular venous distention.   LUNGS:  The patient is visibly tachypneic.  Diminished breathing sounds both lung fields.  HEART:  Irregularly irregular rhythm.  Tachycardic.  ABDOMEN:  Soft, nondistended.  No tenderness.  Positive bowel sounds.  EXTREMITIES:  +2 to 3 edema both legs.  No clubbing or cyanosis.  NEUROLOGIC:  Motor and sensory intact.     ASSESSMENT:    1.       Atrial fibrillation with rapid ventricular response.  2.       Possible heart failure.  3.       Neutropenia.  Recently started on chemotherapy.  4.       Recurrent endometrial adenocarcinoma.  5.       Hyperglycemia and possible underlying new-onset diabetes mellitus type 2.     PLAN:  The patient will be admitted to telemetry floor.  IV amiodarone drip.  IV Lasix.  Cardiology, Pulmonary, and Gyne Oncology consults.  Monitor her hemodynamic status.  Start her on IV cefepime.  Monitor temperature curve.             5/13/25         /77   Pulse 85   Temp 98.1 °F (36.7 °C) (Oral)   Resp 20   Ht 5' (1.524 m)   Wt 167 lb 9.6 oz (76 kg)   SpO2 100%   BMI 32.73 kg/m²    Pulm: Lungs clear, normal respiratory effort  CV: Heart with regular rate and rhythm  Abd: Abdomen soft, nontender, bowel sounds present  Neuro: No acute focal deficits  MSK: moves extremities  Skin: Warm and dry  Psych: Normal affect  Ext: no c/c/e     Lab Results    Component Value Date     WBC 0.4 05/13/2025     HGB 6.4 05/13/2025     HCT 21.3 05/13/2025     PLT 36.0 05/13/2025     CREATSERUM 0.76 05/13/2025     BUN 22 05/13/2025      05/13/2025     K 3.7 05/13/2025      05/13/2025     CO2 36.0 05/13/2025      05/13/2025     CA 8.6 05/13/2025     ALB 3.2 05/12/2025     ALKPHO 142 05/12/2025     BILT 0.7 05/12/2025     TP 6.8 05/12/2025     AST 26 05/12/2025     ALT 10 05/12/2025     MG 1.6 05/13/2025       XR CHEST AP PORTABLE  (CPT=71045)  Result Date: 5/12/2025  CONCLUSION:  1. Suboptimal inspiration.  No acute finding or significant change. 2. Left mid lung/upper lobe mass compatible with known malignancy. 3. Stable mediastinal lymph node metastases. 4. Stable pericardial effusion.    Dictated by (CST): Jayson Fournier MD on 5/12/2025 at 3:04 PM     Finalized by (CST): Jayson Fournier MD on 5/12/2025 at 3:08 PM           Current Facility-Administered Medications   Medication Dose Route Frequency    maalox/diphenhydramine/lidocaine (First Mouthwash BLM) oral suspension 10 mL  10 mL Oral Q6H PRN    sodium chloride 0.9% infusion   Intravenous Once    acetaminophen (Tylenol Extra Strength) tab 500 mg  500 mg Oral Q4H PRN    ondansetron (Zofran) 4 MG/2ML injection 4 mg  4 mg Intravenous Q6H PRN    metoclopramide (Reglan) 5 mg/mL injection 5 mg  5 mg Intravenous Q8H PRN    glucose (Dex4) 15 GM/59ML oral liquid 15 g  15 g Oral Q15 Min PRN     Or    glucose (Glutose) 40% oral gel 15 g  15 g Oral Q15 Min PRN     Or    glucose-vitamin C (Dex-4) chewable tab 4 tablet  4 tablet Oral Q15 Min PRN     Or    dextrose 50% injection 50 mL  50 mL Intravenous Q15 Min PRN     Or    glucose (Dex4) 15 GM/59ML oral liquid 30 g  30 g Oral Q15 Min PRN     Or    glucose (Glutose) 40% oral gel 30 g  30 g Oral Q15 Min PRN     Or    glucose-vitamin C (Dex-4) chewable tab 8 tablet  8 tablet Oral Q15 Min PRN    insulin aspart (NovoLOG) 100 Units/mL FlexPen 1-7 Units  1-7 Units  Subcutaneous TID CC    ceFEPIme (Maxipime) 1 g in sodium chloride 0.9% 100mL IVPB-YANA  1 g Intravenous Q12H    furosemide (Lasix) 10 mg/mL injection 40 mg  40 mg Intravenous BID (Diuretic)    pantoprazole (Protonix) DR tab 40 mg  40 mg Oral QAM AC    ipratropium-albuterol (Duoneb) 0.5-2.5 (3) MG/3ML inhalation solution 3 mL  3 mL Nebulization Q6H PRN    amiodarone in dextrose 4.14% (Cordarone) 360 mg/200mL infusion premix  1 mg/min Intravenous Continuous    vancomycin (Vancocin) 1.25 g in sodium chloride 0.9% 250mL IVPB premix  15 mg/kg Intravenous Q24H    levothyroxine (Synthroid) tab 100 mcg  100 mcg Oral Before breakfast    filgrastim-aafi (Nivestym) 300 MCG/0.5ML prefilled syringe 300 mcg  300 mcg Subcutaneous Q24H    nystatin (Mycostatin) 319156 UNIT/ML oral suspension 500,000 Units  5 mL Oral QID      Assessment/Plan:      Afib RVR  Pericardial effusion  - started IV amio  - cardiology consulted  - monitor on tele  - anticoagulation per cards     Metastatic endometrial cancer       Pancytopenia  Acute on chronic anemia of chronic disease  - transfuse 1 unit prbc  - neutropenic precautions  - started neupogen until ANC 1500  - cefepime and vanco started for neutropenia and patient with cough/chills, elevated LA, possible early sepsis - pulm following for this as well     Chronic respiratory failure  - on 4-5L NC baseline  - due to 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     Thrush  - nystatin           Dietitian Malnutrition Assessment     Evaluation for Malnutrition: Criteria for non-severe malnutrition diagnosis-         chronic illness related to   Wt loss 7.5% in 3 months., Body fat mild depletion., Muscle mass mild depletion.        RD Malnutrition Care Plan: Encouraged increased PO intake., Encouraged small frequent meals with emphasis on high calorie/high protein., Intiated ONS (oral nutritional supplements).     Body Fat/Muscle Mass: Mild depletion body fat., Mild depletion muscle mass. triceps region.  dorsal dale region., calf region.     Physician Assessment      Patient has a diagnosis of moderate malnutrition                       PULMONOLOGY  Assessment & Plan:      1-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      2- pancytopenia with severe neutropenia and anemia thrombocytopenia  Secondary to chemotherapy  S/p Neulasta on 5/7/2025 now on Nivestym  Transfuse PRBC for hemoglobin less than 7     3-immunocompromise host with severe neutropenia  Cough and fatigue and chills and tachycardia with possible sepsis     Check cultures  Empiric antibiotics with cefepime and vancomycin     4-chronic respiratory failure secondary to 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   O2 therapy on 4-5 L NC      5-pericardial effusion seen recently on echo on 5/5/2025  Per cardiology      6-DVT prophylaxis  SCD only since low platelet       MEDICATIONS ADMINISTERED IN LAST 1 DAY:  amiodarone in dextrose 4.14% (Cordarone) 360 mg/200mL infusion premix       Date Action Dose Route User    5/13/2025 0948 New Bag 1 mg/min Intravenous Marino Duggan RN    5/13/2025 0407 New Bag 1 mg/min Intravenous Candis Boland RN    5/12/2025 2228 New Bag 1 mg/min Intravenous Candis Boland RN    5/12/2025 1554 New Bag 1 mg/min Intravenous Denisse Ko RN          amiodarone (Cordarone) 150 mg in dextrose 5% 100 mL IV bolus       Date Action Dose Route User    5/12/2025 1539 New Bag 150 mg Intravenous Denisse Ko RN          ceFEPIme (Maxipime) 1 g in sodium chloride 0.9% 100mL IVPB-YANA       Date Action Dose Route User    5/13/2025 0614 New Bag 1 g Intravenous Candis Boland RN    5/12/2025 1813 New Bag 1 g Intravenous Abigail Augustine RN          filgrastim-aafi (Nivestym) 300 MCG/0.5ML prefilled syringe 300 mcg       Date Action Dose Route User    5/12/2025 1853 Given 300 mcg Subcutaneous (Right Upper Arm) Abigail Augustine RN           furosemide (Lasix) 10 mg/mL injection 40 mg       Date Action Dose Route User    5/12/2025 1413 Given 40 mg Intravenous Denisse Ko RN          furosemide (Lasix) 10 mg/mL injection 40 mg       Date Action Dose Route User    5/13/2025 0909 Given 40 mg Intravenous Marino Duggan RN    5/12/2025 1745 Given 40 mg Intravenous Abigail Augustine RN          insulin aspart (NovoLOG) 100 Units/mL FlexPen 1-7 Units       Date Action Dose Route User    5/13/2025 0907 Given 3 Units Subcutaneous (Right Upper Arm) Marino Duggan RN    5/12/2025 1840 Given 4 Units Subcutaneous (Right Upper Arm) Abigail Augustine RN          ipratropium-albuterol (Duoneb) 0.5-2.5 (3) MG/3ML inhalation solution 3 mL       Date Action Dose Route User    5/13/2025 0625 Given 3 mL Nebulization Kaci Fernandes RCP          levothyroxine (Synthroid) tab 100 mcg       Date Action Dose Route User    5/13/2025 0654 Given 100 mcg Oral Candis Boland RN          magnesium oxide (Mag-Ox) tab 800 mg       Date Action Dose Route User    5/12/2025 1840 Given 800 mg Oral Abigail Augustine RN          magnesium oxide (Mag-Ox) tab 400 mg       Date Action Dose Route User    5/13/2025 0909 Given 400 mg Oral Marino Duggan RN          nystatin (Mycostatin) [id number] UNIT/ML oral suspension 500,000 Units       Date Action Dose Route User    5/13/2025 0909 Given 500,000 Units Oral Marino Duggan RN    5/12/2025 2234 Given 500,000 Units Oral Candis Boland RN          pantoprazole (Protonix) DR tab 40 mg       Date Action Dose Route User    5/13/2025 0654 Given 40 mg Oral Candis Boland RN          sodium chloride 0.9 % IV bolus 250 mL       Date Action Dose Route User    5/12/2025 1657 New Bag 250 mL Intravenous Denisse Ko RN          vancomycin (Vancocin) 1.5 g in sodium chloride 0.9% 250mL IVPB premix       Date Action Dose Route User    5/12/2025 1526 New Bag 1,500 mg Intravenous Denisse Ko, RN             Vitals (last day)       Date/Time Temp Pulse Resp BP SpO2 Weight O2 Device O2 Flow Rate (L/min) Who    05/13/25 1200 98.1 °F (36.7 °C) -- 20 109/77 100 % -- -- -- FL    05/13/25 0906 98.2 °F (36.8 °C) 85 20 105/73 100 % -- Nasal cannula 5 L/min FL    05/13/25 0406 -- -- -- -- -- 167 lb 9.6 oz (76 kg) -- -- BP    05/13/25 0406 97.9 °F (36.6 °C) 82 18 109/78 97 % -- Nasal cannula 4 L/min MM    05/12/25 2019 98.5 °F (36.9 °C) 92 18 96/67 100 % -- Nasal cannula 4 L/min MM    05/12/25 1832 99.2 °F (37.3 °C) 95 20 117/77 -- -- Nasal cannula 4 L/min RP    05/12/25 1728 99.6 °F (37.6 °C) 104 22 110/78 100 % -- Nasal cannula 4 L/min RP    05/12/25 1543 -- 154 19 100/74 98 % -- Nasal cannula 4 L/min JS    05/12/25 1515 -- 148 24 113/77 98 % -- Nasal cannula 4 L/min JS    05/12/25 1400 -- 108 26 112/71 98 % -- Nasal cannula 4 L/min     05/12/25 1312 98.7 °F (37.1 °C) 108 25 108/71 99 % -- Nasal cannula 4 L/min      Blood Transfusion Record       Product Unit Status Volume Start End            Transfuse RBC       25  [id number]  [id number] Transfusing  05/13/25 1144

## 2025-05-14 ENCOUNTER — APPOINTMENT (OUTPATIENT)
Dept: ULTRASOUND IMAGING | Facility: HOSPITAL | Age: 70
DRG: 270 | End: 2025-05-14
Attending: NURSE PRACTITIONER
Payer: MEDICARE

## 2025-05-14 ENCOUNTER — APPOINTMENT (OUTPATIENT)
Dept: INTERVENTIONAL RADIOLOGY/VASCULAR | Facility: HOSPITAL | Age: 70
DRG: 270 | End: 2025-05-14
Attending: CLINICAL NURSE SPECIALIST
Payer: MEDICARE

## 2025-05-14 LAB
ANION GAP SERPL CALC-SCNC: 8 MMOL/L (ref 0–18)
ATRIAL RATE: 93 BPM
BASOPHILS # BLD AUTO: 0.04 X10(3) UL (ref 0–0.2)
BASOPHILS NFR BLD AUTO: 2.6 %
BLOOD TYPE BARCODE: 7300
BUN BLD-MCNC: 19 MG/DL (ref 9–23)
BUN/CREAT SERPL: 21.8 (ref 10–20)
CALCIUM BLD-MCNC: 8.6 MG/DL (ref 8.7–10.4)
CHLORIDE SERPL-SCNC: 94 MMOL/L (ref 98–112)
CO2 SERPL-SCNC: 38 MMOL/L (ref 21–32)
CREAT BLD-MCNC: 0.87 MG/DL (ref 0.55–1.02)
DEPRECATED RDW RBC AUTO: 60.5 FL (ref 35.1–46.3)
EGFRCR SERPLBLD CKD-EPI 2021: 72 ML/MIN/1.73M2 (ref 60–?)
EOSINOPHIL # BLD AUTO: 0.02 X10(3) UL (ref 0–0.7)
EOSINOPHIL NFR BLD AUTO: 1.3 %
ERYTHROCYTE [DISTWIDTH] IN BLOOD BY AUTOMATED COUNT: 20.2 % (ref 11–15)
GLUCOSE BLD-MCNC: 193 MG/DL (ref 70–99)
GLUCOSE BLDC GLUCOMTR-MCNC: 200 MG/DL (ref 70–99)
GLUCOSE BLDC GLUCOMTR-MCNC: 205 MG/DL (ref 70–99)
GLUCOSE BLDC GLUCOMTR-MCNC: 216 MG/DL (ref 70–99)
GLUCOSE BLDC GLUCOMTR-MCNC: 216 MG/DL (ref 70–99)
GLUCOSE BLDC GLUCOMTR-MCNC: 221 MG/DL (ref 70–99)
HCT VFR BLD AUTO: 27.2 % (ref 35–48)
HGB BLD-MCNC: 8.5 G/DL (ref 12–16)
IMM GRANULOCYTES # BLD AUTO: 0.05 X10(3) UL (ref 0–1)
IMM GRANULOCYTES NFR BLD: 3.2 %
LYMPHOCYTES # BLD AUTO: 0.46 X10(3) UL (ref 1–4)
LYMPHOCYTES NFR BLD AUTO: 29.9 %
MAGNESIUM SERPL-MCNC: 1.4 MG/DL (ref 1.6–2.6)
MCH RBC QN AUTO: 25.5 PG (ref 26–34)
MCHC RBC AUTO-ENTMCNC: 31.3 G/DL (ref 31–37)
MCV RBC AUTO: 81.7 FL (ref 80–100)
MONOCYTES # BLD AUTO: 0.24 X10(3) UL (ref 0.1–1)
MONOCYTES NFR BLD AUTO: 15.6 %
NEUTROPHILS # BLD AUTO: 0.73 X10 (3) UL (ref 1.5–7.7)
NEUTROPHILS # BLD AUTO: 0.73 X10(3) UL (ref 1.5–7.7)
NEUTROPHILS NFR BLD AUTO: 47.4 %
OSMOLALITY SERPL CALC.SUM OF ELEC: 298 MOSM/KG (ref 275–295)
P AXIS: 55 DEGREES
P-R INTERVAL: 142 MS
PLATELET # BLD AUTO: 25 10(3)UL (ref 150–450)
PLATELETS.RETICULATED NFR BLD AUTO: 13.9 % (ref 0–7)
POTASSIUM SERPL-SCNC: 3.2 MMOL/L (ref 3.5–5.1)
Q-T INTERVAL: 332 MS
QRS DURATION: 76 MS
QTC CALCULATION (BEZET): 412 MS
R AXIS: 65 DEGREES
RBC # BLD AUTO: 3.33 X10(6)UL (ref 3.8–5.3)
SODIUM SERPL-SCNC: 140 MMOL/L (ref 136–145)
T AXIS: 71 DEGREES
UNIT VOLUME: 350 ML
VENTRICULAR RATE: 93 BPM
WBC # BLD AUTO: 1.5 X10(3) UL (ref 4–11)

## 2025-05-14 PROCEDURE — 99232 SBSQ HOSP IP/OBS MODERATE 35: CPT | Performed by: INTERNAL MEDICINE

## 2025-05-14 PROCEDURE — B5191ZA FLUOROSCOPY OF INFERIOR VENA CAVA USING LOW OSMOLAR CONTRAST, GUIDANCE: ICD-10-PCS | Performed by: RADIOLOGY

## 2025-05-14 PROCEDURE — 06H03DZ INSERTION OF INTRALUMINAL DEVICE INTO INFERIOR VENA CAVA, PERCUTANEOUS APPROACH: ICD-10-PCS | Performed by: RADIOLOGY

## 2025-05-14 PROCEDURE — 99233 SBSQ HOSP IP/OBS HIGH 50: CPT | Performed by: INTERNAL MEDICINE

## 2025-05-14 PROCEDURE — 93970 EXTREMITY STUDY: CPT | Performed by: NURSE PRACTITIONER

## 2025-05-14 RX ORDER — ECHINACEA PURPUREA EXTRACT 125 MG
1 TABLET ORAL
Status: DISCONTINUED | OUTPATIENT
Start: 2025-05-14 | End: 2025-05-21

## 2025-05-14 RX ORDER — AMIODARONE HYDROCHLORIDE 200 MG/1
400 TABLET ORAL 2 TIMES DAILY WITH MEALS
Status: DISCONTINUED | OUTPATIENT
Start: 2025-05-14 | End: 2025-05-28

## 2025-05-14 RX ORDER — SODIUM CHLORIDE 9 MG/ML
INJECTION, SOLUTION INTRAVENOUS ONCE
Status: COMPLETED | OUTPATIENT
Start: 2025-05-14 | End: 2025-05-14

## 2025-05-14 RX ORDER — LIDOCAINE HYDROCHLORIDE 20 MG/ML
INJECTION, SOLUTION INFILTRATION; PERINEURAL
Status: COMPLETED
Start: 2025-05-14 | End: 2025-05-14

## 2025-05-14 RX ORDER — MIDAZOLAM HYDROCHLORIDE 1 MG/ML
INJECTION INTRAMUSCULAR; INTRAVENOUS
Status: COMPLETED
Start: 2025-05-14 | End: 2025-05-14

## 2025-05-14 NOTE — PAYOR COMM NOTE
--------------  CONTINUED STAY REVIEW    Payor: [employer] MEDICARE ADV PPO  Subscriber #:  [id number]  Authorization Number: [id number]    Admit date: 5/12/25  Admit time:  5:28 PM    5/15/25      SUBJECTIVE:  Appears uncomfortable shallow breathing.  On 7 L nasal cannula.  Denies any significant pain at this time understands plan is for IVC filter this afternoon after platelet transfusion which is running at this time     /74 (BP Location: Right arm)   Pulse 86   Temp 97.7 °F (36.5 °C) (Oral)   Resp 24   Ht 5' (1.524 m)   Wt 166 lb 9.6 oz (75.6 kg)   SpO2 95%   BMI 32.54 kg/m²       Gen:  chronically ill-appearing shallow breathing  Pulm: Lungs clear, normal respiratory effort  CV: Heart with regular rate and rhythm  Abd: Abdomen soft, nontender, bowel sounds present  Neuro: No acute focal deficits  MSK: moves extremities  Skin: Warm and dry  Psych: Normal affect  Ext: no c/c/e     Lab Results   Component Value Date     WBC 1.5 05/14/2025     HGB 8.5 05/14/2025     HCT 27.2 05/14/2025     PLT 25.0 05/14/2025     CREATSERUM 0.87 05/14/2025     BUN 19 05/14/2025      05/14/2025     K 3.2 05/14/2025     CL 94 05/14/2025     CO2 38.0 05/14/2025      05/14/2025     CA 8.6 05/14/2025     DDIMER 3.05 05/13/2025     MG 1.4 05/14/2025       CT CHEST PE AORTA (IV ONLY) (CPT=71260)  Result Date: 5/13/2025  CONCLUSION:   Positive for small volume bilateral segmental PE  Enlarging left upper lobe consolidation and few bilateral pulmonary nodules  Enlarging pericardial effusion now 1.7 centimeter thick.  Increasing left pleural effusion, new right pleural effusion  Discussed with UNRULY Brumfield at 5:40 p.m.   Dictated by (CST): Chris Lovell MD on 5/13/2025 at 5:34 PM     Finalized by (CST): Chris Lovell MD on 5/13/2025 at 5:42 PM           XR CHEST AP PORTABLE  (CPT=71045)  Result Date: 5/12/2025  CONCLUSION:  1. Suboptimal inspiration.  No acute finding or significant change. 2. Left mid lung/upper lobe mass  compatible with known malignancy. 3. Stable mediastinal lymph node metastases. 4. Stable pericardial effusion.    Dictated by (CST): Jayson Fournier MD on 5/12/2025 at 3:04 PM     Finalized by (CST): Jayson Fournier MD on 5/12/2025 at 3:08 PM           Current Facility-Administered Medications   Medication Dose Route Frequency    potassium chloride 40 mEq in 250mL sodium chloride 0.9% IVPB premix  40 mEq Intravenous Once    magnesium sulfate 4 g/100mL IVPB premix 4 g  4 g Intravenous Once    maalox/diphenhydramine/lidocaine (First Mouthwash BLM) oral suspension 10 mL  10 mL Oral Q6H PRN    furosemide (Lasix) 10 mg/mL injection 60 mg  60 mg Intravenous BID (Diuretic)    acetaminophen (Tylenol Extra Strength) tab 500 mg  500 mg Oral Q4H PRN    ondansetron (Zofran) 4 MG/2ML injection 4 mg  4 mg Intravenous Q6H PRN    metoclopramide (Reglan) 5 mg/mL injection 5 mg  5 mg Intravenous Q8H PRN    glucose (Dex4) 15 GM/59ML oral liquid 15 g  15 g Oral Q15 Min PRN     Or    glucose (Glutose) 40% oral gel 15 g  15 g Oral Q15 Min PRN     Or    glucose-vitamin C (Dex-4) chewable tab 4 tablet  4 tablet Oral Q15 Min PRN     Or    dextrose 50% injection 50 mL  50 mL Intravenous Q15 Min PRN     Or    glucose (Dex4) 15 GM/59ML oral liquid 30 g  30 g Oral Q15 Min PRN     Or    glucose (Glutose) 40% oral gel 30 g  30 g Oral Q15 Min PRN     Or    glucose-vitamin C (Dex-4) chewable tab 8 tablet  8 tablet Oral Q15 Min PRN    insulin aspart (NovoLOG) 100 Units/mL FlexPen 1-7 Units  1-7 Units Subcutaneous TID CC    ceFEPIme (Maxipime) 1 g in sodium chloride 0.9% 100mL IVPB-YANA  1 g Intravenous Q12H    pantoprazole (Protonix) DR tab 40 mg  40 mg Oral QAM AC    ipratropium-albuterol (Duoneb) 0.5-2.5 (3) MG/3ML inhalation solution 3 mL  3 mL Nebulization Q6H PRN    amiodarone in dextrose 4.14% (Cordarone) 360 mg/200mL infusion premix  1 mg/min Intravenous Continuous    vancomycin (Vancocin) 1.25 g in sodium chloride 0.9% 250mL IVPB premix  15  mg/kg Intravenous Q24H    levothyroxine (Synthroid) tab 100 mcg  100 mcg Oral Before breakfast    filgrastim-aafi (Nivestym) 300 MCG/0.5ML prefilled syringe 300 mcg  300 mcg Subcutaneous Q24H    nystatin (Mycostatin) [id number] UNIT/ML oral suspension 500,000 Units  5 mL Oral QID         Assessment/Plan:      Afib RVR  Pericardial effusion  - started IV amio  - cardiology consulted  - monitor on tele  - anticoagulation per cards  -5/14: Attempting to transition off IV amiodarone to p.o. today.     NICKO PE: small segmental/right lower extremity DVT acute  Not on a/c due to TCP  -5/14/2025: plan for IVC today with Dr Hopkins- PLT  25 giving 1 unit of platelets prior to procedure.     Metastatic endometrial cancer    Pancytopenia  Acute on chronic anemia of chronic disease  - transfuse 1 unit prbc  - neutropenic precautions  - started neupogen until ANC 1500  - cefepime and vanco started for neutropenia and patient with cough/chills, elevated LA, possible early sepsis - pulm following for this as well     Acute on chronic respiratory failure  - on 4-5L NC baseline  - due to 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  -5/14: O2 requirements up to 7 to 8 L at this time.  Nasal saline added for congestion, will attempt nonrebreather for better oxygenation      Thrush  - nystatin                   MEDICATIONS ADMINISTERED IN LAST 1 DAY:    acetaZOLAMIDE (Diamox) 500 mg in sterile water for injection (PF) 5 mL IV push       Date Action Dose Route User    5/14/2025 1353 Given 500 mg Intravenous Reyes Soto, Oscar, RN          amiodarone (Pacerone) tab 400 mg       Date Action Dose Route User    5/14/2025 1214 Given 400 mg Oral Reyes Soto, Oscar, RN          amiodarone in dextrose 4.14% (Cordarone) 360 mg/200mL infusion premix       Date Action Dose Route User    5/14/2025 0802 New Bag 1 mg/min Intravenous Reyes Soto, Oscar, RN    5/14/2025 0104 New Bag 1 mg/min Intravenous Dianna Bueno RN    5/13/2025 1858 New Bag 1 mg/min  Intravenous Marino Duggan RN          ceFEPIme (Maxipime) 1 g in sodium chloride 0.9% 100mL IVPB-YANA       Date Action Dose Route User    5/14/2025 0651 New Bag 1 g Intravenous Dianna Bueno RN    5/13/2025 1804 New Bag 1 g Intravenous Marino Duggan RN          filgrastim-aafi (Nivestym) 300 MCG/0.5ML prefilled syringe 300 mcg       Date Action Dose Route User    5/13/2025 1821 Given 300 mcg Subcutaneous (Right Upper Arm) Marino Duggan RN          furosemide (Lasix) 10 mg/mL injection 60 mg       Date Action Dose Route User    5/14/2025 1043 Given 60 mg Intravenous Reyes Soto, Oscar, RN          furosemide (Lasix) 10 mg/mL injection 20 mg       Date Action Dose Route User    5/13/2025 1813 Given 20 mg Intravenous Marino Duggan RN          furosemide (Lasix) 10 mg/mL injection       Date Action Dose Route User    5/13/2025 1900 Given 40 mg (none) Marino Duggan RN          insulin aspart (NovoLOG) 100 Units/mL FlexPen 1-7 Units       Date Action Dose Route User    5/13/2025 1818 Given 3 Units Subcutaneous (Left Upper Arm) Marino Duggan RN       ipratropium-albuterol (Duoneb) 0.5-2.5 (3) MG/3ML inhalation solution 3 mL       Date Action Dose Route User    5/14/2025 0024 Given 3 mL Nebulization Lion Villafana, FABIOLA          magnesium sulfate 4 g/100mL IVPB premix 4 g       Date Action Dose Route User    5/14/2025 1046 New Bag 4 g Intravenous Reyes Soto, Oscar, RN          nystatin (Mycostatin) [id number] UNIT/ML oral suspension 500,000 Units       Date Action Dose Route User    5/14/2025 1043 Given 500,000 Units Oral Reyes Soto, Oscar, RN    5/13/2025 2144 Given 500,000 Units Oral Dianna Bueno RN    5/13/2025 1815 Given 500,000 Units Oral Marino Duggan RN          potassium chloride 40 mEq in 250mL sodium chloride 0.9% IVPB premix       Date Action Dose Route User    5/14/2025 1331 New Bag 40 mEq Intravenous Reyes Soto, Oscar, RN          sodium chloride (Saline Mist) 0.65 %  nasal solution 1 spray       Date Action Dose Route User    5/14/2025 1353 Given 1 spray Each Nare Reyes Soto, Oscar, RN          sodium chloride 0.9% infusion       Date Action Dose Route User    5/14/2025 1045 New Bag (none) Intravenous Reyes Soto, Oscar, RN            Vitals (last day)       Date/Time Temp Pulse Resp BP SpO2 Weight O2 Device O2 Flow Rate (L/min) [facility]    05/14/25 1630 98 °F (36.7 °C) 88 20 117/77 100 % -- Nasal cannula 6 L/min OR    05/14/25 1316 98.1 °F (36.7 °C) 88 20 104/72 100 % -- -- -- OR    05/14/25 1216 98 °F (36.7 °C) 135 22 105/76 100 % -- -- -- OR    05/14/25 1131 97.6 °F (36.4 °C) 129 20 93/72 100 % -- -- -- OR    05/14/25 1116 97.6 °F (36.4 °C) -- 22 104/84 100 % -- -- -- OR    05/14/25 1042 -- 103 22 102/68 100 % -- Nasal cannula 6 L/min OR    05/14/25 0645 -- -- -- -- -- 166 lb 9.6 oz (75.6 kg) -- -- FS    05/14/25 0602 97.7 °F (36.5 °C) 86 24 103/74 95 % -- Nasal cannula 4 L/min KG    05/14/25 0104 -- 93 24 107/70 97 % -- Nasal cannula 4 L/min KG    05/13/25 1428 97.7 °F (36.5 °C) 85 18 112/70 99 % -- Nasal cannula 4 L/min SY    05/13/25 0906 98.2 °F (36.8 °C) 85 20 105/73 100 % -- Nasal cannula 5 L/min FL     Blood Transfusion Record       Product Unit Status Volume Start End            Transfuse RBC       25  [id number]  [id number] Completed 05/13/25 1431 335 mL 05/13/25 1144 05/13/25 1428                Transfuse platelets       25  [id number]  [id number] Completed 05/14/25 1319 200 mL 05/14/25 1116 05/14/25 1316

## 2025-05-14 NOTE — CM/SW NOTE
This patient was recently discharged on 05/05/2025.    The patient was set up with [facility].    The patient is current with [facility] and referral is sent in Aidin and BECKI is uploaded.     The patient's O2 order on the last admission was for 4 Liters.    If O2 needs increase new O2 sats, verbiage and Order will be needed.    SW/CM to remain available for support and/or discharge planning.     Suki Abdi MSW, LSW  Discharge Planner [id number]

## 2025-05-14 NOTE — PROGRESS NOTES
05/14/25 1129   VISIT TYPE   SLP Inpatient Visit Type (Documentation Required) Attempted Treatment   FOLLOW UP/PLAN   Follow Up Needed (Documentation Required) Yes   SLP Follow-up Date 05/15/25     SLP attempt this AM. Pt to remain NPO for procedure this PM. SLP to follow up as pt cleared for PO and/or as schedule allows.    Thank you.    Leeanne Ross M.S. CCC-SLP  Speech Language Pathologist  Phone Number Ext. [phone]

## 2025-05-14 NOTE — PROGRESS NOTES
Progress Note  Kelli Fisher Patient Status:  Inpatient    1955 MRN [id number]   Location [facility] 3W/SW Attending Hina Bentley MD   Hosp Day # 2 PCP Taylor Gallardo MD     Subjective:  On requirements increasing, now on 6L  Converted to AF this morning around 0920    Objective:  /74 (BP Location: Right arm)   Pulse 86   Temp 97.7 °F (36.5 °C) (Oral)   Resp 24   Ht 5' (1.524 m)   Wt 166 lb 9.6 oz (75.6 kg)   SpO2 95%   BMI 32.54 kg/m²     Telemetry: AF, rates 90s-120s    Intake/Output:    Intake/Output Summary (Last 24 hours) at 2025 0855  Last data filed at 2025 0700  Gross per 24 hour   Intake 944.6 ml   Output 1800 ml   Net -855.4 ml     Last 3 Weights   25 0645 166 lb 9.6 oz (75.6 kg)   25 0406 167 lb 9.6 oz (76 kg)   25 1736 165 lb 11.2 oz (75.2 kg)   25 1217 169 lb (76.7 kg)   25 0500 174 lb 9.6 oz (79.2 kg)   25 0549 175 lb 12.8 oz (79.7 kg)   25 1800 163 lb (73.9 kg)   25 2038 165 lb (74.8 kg)     Labs:  Recent Labs   Lab 25  1411 25  0625 25  0653   * 238* 193*   BUN 22 22 19   CREATSERUM 0.78 0.76 0.87   EGFRCR 82 85 72   CA 9.0 8.6* 8.6*    144 140   K 4.2 3.7 3.2*    101 94*   CO2 32.0 36.0* 38.0*     Recent Labs   Lab 25  1410 25  0625 25  1512 25  0653   RBC 2.97* 2.59*  --  3.33*   HGB 7.3* 6.4* 7.6* 8.5*   HCT 24.6* 21.3*  --  27.2*   MCV 82.8 82.2  --  81.7   MCH 24.6* 24.7*  --  25.5*   MCHC 29.7* 30.0*  --  31.3   RDW 21.5* 21.5*  --  20.2*   NEPRELIM 0.11* 0.02*  --  0.73*   WBC 0.5* 0.4*  --  1.5*   PLT 50.0* 36.0*  --  25.0*     Recent Labs   Lab 25  1411 25  1619   TROPHS 5 5     No results found for: \"CHOLESTEROL\", \"HDL\", \"LDL\", \"TRIG\", \"NONHDL\", \"CHOLHDL\"  Lab Results   Component Value Date    DDIMER 3.05 (H) 2025     Lab Results   Component Value Date    TSH 3.705 2025     Review of Systems:   Constitutional:  No fevers, chills, fatigue or night sweats.  Respiratory: Denies cough, wheezing or shortness of breath.  CV: Denies chest pain, palpitations, orthopnea, PND or dizziness.  GI: No nausea, vomiting or diarrhea. No blood in stools.    Physical Exam:   General: Alert, cooperative, ill-appearing appears older than stated age.  Neck: no JVD.  Lungs: expiratory wheezes bilaterally.  Chest wall: No tenderness or deformity.  Heart: Irregularly irregular rate and rhythm, S1, S2 normal, no murmur, click, rub or gallop.  Abdomen: Soft, non-tender. Bowel sounds normal. No masses,  No organomegaly.  Extremities: Extremities normal, atraumatic, no cyanosis, 1+ BLE edema.  Pulses: 2+ and symmetric all extremities.  Neurologic: Grossly intact.    Medications:  Scheduled Medications[1]  Medication Infusions[2]    Assessment:    70 year old female with PMH of endometrial cancer with mets to lungs, anemia, PSVT, small pericardial effusion who presented to ER with dyspnea and palpitations. She was found to be in PAF/PAT and anemic with Hgb 7.3.     Paroxysmal atrial tachycardia/Atrial fibrillation   Hx PSVT during last admission with junctional rhythm noted while on BB  -transitioned to amiodarone gtt, will start PO and stop gtt today  -maintained sinus until converted back to AF this morning  -no BB/CCB with previous junctional rhythm  -TSH unremarkable 3.705, s/p thyroidectomy  -not on DOAC due to pancytopenia  -LVEF preserved on echo last admission  -CHADS-VASc= 3 for age, gender, DM    Dyspnea  O2 sats increasing, baseline 4L, now on 6L, feels sob  -last admission had small pericardial effusion   -limited echo on 5/13 with moderate effusion  -known lung mets  -worsening anemia (Hgb 9.0 on 5/5 discharge, admitted 5/12 7.3, 6.4 on 5/13 s/p `1u PRBC  -CXR on admission showed stable effusion, lymph node mets, Left mid/upper lobe mass, suboptimal inspiration  -D-dimer elevated 3, CT chest PE positive for small bilateral segmental  PE  -IV lasix increased to 60mg BID due to low UOP   -I/Os net neg 1L, 1.8L UOP past 24 hours, weight down 1# overnight, up 1# from admission    Bilateral Pulmonary Embolism  D-dimer elevated 3  -CT chest PE positive for bilateral small PE  -b/l venous doppler pending  -IR consulted for IVC filter placement    Small pericardial effusion   Echo on 5/5 revealed small to moderate pericardial effusion   -repeat limited echo effusion moderate in size, no evidence of hemodynamic compromise  -s/p 1u PRBC  -BP stable in 90s-100s    Endometrial cancer with mets to lungs/lymph nodes  Stage Ivb clear cell adenocarcinoma  -chemotherapy    Pancytopenia  Sudden drop in hgb, plts, WBC in one week  -recently started chemo, s/p 1 treatment  -gyne following  -1u PRBC transfused 5/13, goal to keep Hgb >7  -no plans for plt transfusion unless spontaneously bleeding  -daily neupogen for neutropenia    Thrush  Sore throat  2/2 neutropenia  -gyne ordered nystatin swish    DM2-A1c 6.6    Hypokalemia  Hypomagnesemia  2/2 diuresis  -replace per cardiac electrolyte protocol      Plan:  -start PO amiodarone 400mg BID, stop gtt 1 hour after first PO dose given  -give one time dose IV diamox 500mg   -continue IV lasix 60mg BID, monitor strict I/Os, daily weights, daily BMP  -replete electrolytes per protocol  -further recs per gyne/pulm/primary  -IVC filter placement with IR      Plan of care discussed with patient, RN.        Margarette Sexton, MSN, APN, FNP-BC, CCK  05/14/25  9:05 AM        =======================================================  Note reviewed and labs reviewed.  Agree with above assessment and plan.  Increase O2 requirements.   Plan for IVC filter given PE and inability to anticoagulate given pancytopenia with plt 25K.   In regards to paroxysmal AT/AF, currently in AF, will attempt rate control with amiodarone, she did not tolerate CCB/BB in the past.   I have personally performed the medical decision making in its entirety.    Javi Huerta DO           [1]    potassium chloride  40 mEq Intravenous Once    magnesium sulfate  4 g Intravenous Once    furosemide  60 mg Intravenous BID (Diuretic)    insulin aspart  1-7 Units Subcutaneous TID CC    cefepime  1 g Intravenous Q12H    pantoprazole  40 mg Oral QAM AC    vancomycin  15 mg/kg Intravenous Q24H    levothyroxine  100 mcg Oral Before breakfast    filgrastim-aafi  300 mcg Subcutaneous Q24H    nystatin  5 mL Oral QID   [2]    amiodarone 1 mg/min (05/14/25 0802)

## 2025-05-14 NOTE — CONSULTS
[facility]  part of [facility]    Report of Consultation    Kelli Fisher Patient Status:  Inpatient    1955 MRN [id number]   Location [facility] 3W/SW Attending Ariela Tello MD   Hosp Day # 2 PCP Taylor Gallardo MD     Reason for Consultation:  DVT, PE    History of Present Illness:  Kelli Fisher is a a(n) 70 year old female with chronic respiratory failure, metastatic endometrial CA who was found to have bilateral subsegmental PEs and right lower extremity DVT.     History:  Past Medical History[1]  Past Surgical History[2]  Family History[3]   reports that she has never smoked. She has never used smokeless tobacco. She reports that she does not drink alcohol and does not use drugs.    Allergies:  Allergies[4]    Medications:  Current Hospital Medications[5]    Review of Systems:  A comprehensive review of systems was negative except for: Respiratory: positive for SOB    Physical Exam:  General: Alert, orientated x3.  Cooperative.  No apparent distress.  Vital Signs:  Blood pressure 93/72, pulse (!) 129, temperature 97.6 °F (36.4 °C), temperature source Oral, resp. rate 20, height 60\", weight 166 lb 9.6 oz (75.6 kg), SpO2 100%.  HEENT: Exam is unremarkable.  Neck: Supple.  Lungs Increased respiratory effort  Cardiac: Regular rate and rhythm.  Abdomen:  Bowel sounds present, normoactive.  Nontender.    Laboratory Data:  Lab Results   Component Value Date    WBC 1.5 2025    HGB 8.5 2025    HCT 27.2 2025    PLT 25.0 2025    CREATSERUM 0.87 2025    BUN 19 2025     2025    K 3.2 2025    CL 94 2025    CO2 38.0 2025     2025    CA 8.6 2025    DDIMER 3.05 2025    MG 1.4 2025       Imaging:  US VENOUS DOPPLER LEG BILAT - DIAG IMG (CPT=93970)  Result Date: 2025  CONCLUSION:   1. Acute appearing nonocclusive DVT involving the right superficial femoral, popliteal, and posterior tibial  veins.  No left lower extremity DVT is identified.  2. 8.2 x 1.5 x 3.8 cm left posterior popliteal/Baker's cyst.     Dictated by (CST): Kaleb Epps MD on 5/14/2025 at 10:46 AM     Finalized by (CST): Kaleb Epps MD on 5/14/2025 at 10:49 AM          CT CHEST PE AORTA (IV ONLY) (CPT=71260)  Result Date: 5/13/2025  CONCLUSION:   Positive for small volume bilateral segmental PE  Enlarging left upper lobe consolidation and few bilateral pulmonary nodules  Enlarging pericardial effusion now 1.7 centimeter thick.  Increasing left pleural effusion, new right pleural effusion  Discussed with UNRULY Brumfield at 5:40 p.m.   Dictated by (CST): Chris Lovell MD on 5/13/2025 at 5:34 PM     Finalized by (CST): Chris Lovell MD on 5/13/2025 at 5:42 PM          XR CHEST AP PORTABLE  (CPT=71045)  Result Date: 5/12/2025  CONCLUSION:  1. Suboptimal inspiration.  No acute finding or significant change. 2. Left mid lung/upper lobe mass compatible with known malignancy. 3. Stable mediastinal lymph node metastases. 4. Stable pericardial effusion.    Dictated by (CST): Jayson Fournier MD on 5/12/2025 at 3:04 PM     Finalized by (CST): Jayson Fournier MD on 5/12/2025 at 3:08 PM          Assessment/Plan:  PE, DVT - plan is for IVC filter placement.  Procedure including risks and benefits discussed with Kelli and her daughter, Theresa who consent to proceed.     Thank you for allowing me to participate in the care of your patient.    CHALO LOTT, APRN  5/14/2025  11:44 AM       [1]   Past Medical History:   Asthma (HCC)    Esophageal reflux    History of blood transfusion    Visual impairment   [2]   Past Surgical History:  Procedure Laterality Date    Thyroidectomy      Total abdom hysterectomy     [3] No family history on file.  [4]   Allergies  Allergen Reactions    Aspirin Tightness in Throat    Penicillins HIVES    Other OTHER (SEE COMMENTS)     Pt states IV steroid allergy, states it makes her breathing worse    [5]   Current  Facility-Administered Medications:     potassium chloride 40 mEq in 250mL sodium chloride 0.9% IVPB premix, 40 mEq, Intravenous, Once    magnesium sulfate 4 g/100mL IVPB premix 4 g, 4 g, Intravenous, Once    maalox/diphenhydramine/lidocaine (First Mouthwash BLM) oral suspension 10 mL, 10 mL, Oral, Q6H PRN    furosemide (Lasix) 10 mg/mL injection 60 mg, 60 mg, Intravenous, BID (Diuretic)    acetaminophen (Tylenol Extra Strength) tab 500 mg, 500 mg, Oral, Q4H PRN    ondansetron (Zofran) 4 MG/2ML injection 4 mg, 4 mg, Intravenous, Q6H PRN    metoclopramide (Reglan) 5 mg/mL injection 5 mg, 5 mg, Intravenous, Q8H PRN    glucose (Dex4) 15 GM/59ML oral liquid 15 g, 15 g, Oral, Q15 Min PRN **OR** glucose (Glutose) 40% oral gel 15 g, 15 g, Oral, Q15 Min PRN **OR** glucose-vitamin C (Dex-4) chewable tab 4 tablet, 4 tablet, Oral, Q15 Min PRN **OR** dextrose 50% injection 50 mL, 50 mL, Intravenous, Q15 Min PRN **OR** glucose (Dex4) 15 GM/59ML oral liquid 30 g, 30 g, Oral, Q15 Min PRN **OR** glucose (Glutose) 40% oral gel 30 g, 30 g, Oral, Q15 Min PRN **OR** glucose-vitamin C (Dex-4) chewable tab 8 tablet, 8 tablet, Oral, Q15 Min PRN    insulin aspart (NovoLOG) 100 Units/mL FlexPen 1-7 Units, 1-7 Units, Subcutaneous, TID CC    ceFEPIme (Maxipime) 1 g in sodium chloride 0.9% 100mL IVPB-YANA, 1 g, Intravenous, Q12H    pantoprazole (Protonix) DR tab 40 mg, 40 mg, Oral, QAM AC    ipratropium-albuterol (Duoneb) 0.5-2.5 (3) MG/3ML inhalation solution 3 mL, 3 mL, Nebulization, Q6H PRN    amiodarone in dextrose 4.14% (Cordarone) 360 mg/200mL infusion premix, 1 mg/min, Intravenous, Continuous    vancomycin (Vancocin) 1.25 g in sodium chloride 0.9% 250mL IVPB premix, 15 mg/kg, Intravenous, Q24H    levothyroxine (Synthroid) tab 100 mcg, 100 mcg, Oral, Before breakfast    filgrastim-aafi (Nivestym) 300 MCG/0.5ML prefilled syringe 300 mcg, 300 mcg, Subcutaneous, Q24H    nystatin (Mycostatin) [id number] UNIT/ML oral suspension 500,000 Units, 5 mL,  Oral, QID

## 2025-05-14 NOTE — PROGRESS NOTES
Pt off floor for LE doppler venous ultrasound.  Reviewed chart and notes. Discussed with nurse.  Pt with pancytopenia secondary to chemotherapy.  Noted to have PE. Plts are 25 today. Pt is candidate for IVC filter.  Consider procedure ASAP with periprocedure plt transfusion to keep plts >50.     May also initiate anticoagulation when plt ct recovers to >50 without transfusions.    Will see pt when she returns.

## 2025-05-14 NOTE — PLAN OF CARE
+PE makeda, plan for US venous. NPO p MN for [employer] consult IVC filter      Problem: Diabetes/Glucose Control  Goal: Glucose maintained within prescribed range  Description: INTERVENTIONS:- Monitor Blood Glucose as ordered- Assess for signs and symptoms of hyperglycemia and hypoglycemia- Administer ordered medications to maintain glucose within target range- Assess barriers to adequate nutritional intake and initiate nutrition consult as needed- Instruct patient on self management of diabetes  Outcome: Progressing     Problem: Patient Centered Care  Goal: Patient preferences are identified and integrated in the patient's plan of care  Description: Interventions:- What would you like us to know as we care for you? - Provide timely, complete, and accurate information to patient/family- Incorporate patient and family knowledge, values, beliefs, and cultural backgrounds into the planning and delivery of care- Encourage patient/family to participate in care and decision-making at the level they choose- Honor patient and family perspectives and choices  Outcome: Progressing     Problem: RISK FOR INFECTION - ADULT  Goal: Absence of fever/infection during anticipated neutropenic period  Description: INTERVENTIONS- Monitor WBC- Administer growth factors as ordered- Implement neutropenic guidelines  Outcome: Progressing     Problem: CARDIOVASCULAR - ADULT  Goal: Maintains optimal cardiac output and hemodynamic stability  Description: INTERVENTIONS:- Monitor vital signs, rhythm, and trends- Monitor for bleeding, hypotension and signs of decreased cardiac output- Evaluate effectiveness of vasoactive medications to optimize hemodynamic stability- Monitor arterial and/or venous puncture sites for bleeding and/or hematoma- Assess quality of pulses, skin color and temperature- Assess for signs of decreased coronary artery perfusion - ex. Angina- Evaluate fluid balance, assess for edema, trend weights  Outcome: Progressing  Goal: Absence  of cardiac arrhythmias or at baseline  Description: INTERVENTIONS:- Continuous cardiac monitoring, monitor vital signs, obtain 12 lead EKG if indicated- Evaluate effectiveness of antiarrhythmic and heart rate control medications as ordered- Initiate emergency measures for life threatening arrhythmias- Monitor electrolytes and administer replacement therapy as ordered  Outcome: Progressing     Problem: RESPIRATORY - ADULT  Goal: Achieves optimal ventilation and oxygenation  Description: INTERVENTIONS:- Assess for changes in respiratory status- Assess for changes in mentation and behavior- Position to facilitate oxygenation and minimize respiratory effort- Oxygen supplementation based on oxygen saturation or ABGs- Provide Smoking Cessation handout, if applicable- Encourage broncho-pulmonary hygiene including cough, deep breathe, Incentive Spirometry- Assess the need for suctioning and perform as needed- Assess and instruct to report SOB or any respiratory difficulty- Respiratory Therapy support as indicated- Manage/alleviate anxiety- Monitor for signs/symptoms of CO2 retention  Outcome: Progressing

## 2025-05-14 NOTE — PROGRESS NOTES
[facility]  part of [facility]    Progress Note      Assessment and Plan:   1.  Acute on chronic respiratory failure-multifactorial with significant burden of tumor in the chest but now recognized to have small volume of bilateral subsegmental PEs.  The preliminary report of the right lower extremity ultrasound demonstrates nonocclusive thrombus.  The patient has low platelets and is a poor candidate for full anticoagulation at this moment.  Will await the left lower extremity venous ultrasound.  I believe the patient is a good candidate for IVC filter.    Recommendations:  1.  IVC filter  2.  Follow-up platelets and patient may be a candidate for anticoagulation in the future.  3.  Await left lower extremity venous ultrasound    2.  Metastatic endometrial carcinoma-to follow-up gynecologic oncology.    3.  Pericardial effusion-impressive on the CAT scan but reported as only moderate on echo without hemodynamic compromise.  I suspect this is malignant and may need to be drained at some time during the course of her care.    4.  Recurrent episodes of atrial tachycardia-on amiodarone.      Subjective:   Kelli Fisher is a(n) 70 year old female who is mildly dyspneic    Objective:   Blood pressure 103/74, pulse 86, temperature 97.7 °F (36.5 °C), temperature source Oral, resp. rate 24, height 5' (1.524 m), weight 166 lb 9.6 oz (75.6 kg), SpO2 95%.    Physical Exam alert white female  HEENT examination is unremarkable with pupils equal round and reactive to light and accommodation.   Neck without adenopathy, thyromegaly, JVD nor bruit.   Lungs diminished bilaterally to auscultation and percussion.  Cardiac regular rate and rhythm no murmur.   Abdomen nontender, without hepatosplenomegaly and no mass appreciable.   Extremities without clubbing cyanosis nor edema.   Neurologic grossly intact with symmetric tone and strength and reflex.  Skin without gross abnormality     Results:     Lab Results    Component Value Date    WBC 1.5 05/14/2025    HGB 8.5 05/14/2025    HCT 27.2 05/14/2025    PLT 25.0 05/14/2025    CREATSERUM 0.87 05/14/2025    BUN 19 05/14/2025     05/14/2025    K 3.2 05/14/2025    CL 94 05/14/2025    CO2 38.0 05/14/2025     05/14/2025    CA 8.6 05/14/2025    DDIMER 3.05 05/13/2025    MG 1.4 05/14/2025      CT scan of the chest-Positive for small volume bilateral segmental PE      Enlarging left upper lobe consolidation and few bilateral pulmonary nodules      Enlarging pericardial effusion now 1.7 centimeter thick.  Increasing left pleural effusion, new right pleural effusion     Srinath Levy MD  Medical Director, Critical Care, [facility]  Medical Director, [facility]  Pager: [phone]

## 2025-05-14 NOTE — PROGRESS NOTES
[facility]  part of [facility]    Progress Note    Kelli Fisher Patient Status:  Inpatient    1955 MRN [id number]   Location [facility] 3W/SW Attending Hina Bentley MD   Hosp Day # 2 PCP Taylor Gallardo MD     Chief Complaint: afib rvr    SUBJECTIVE:  Appears uncomfortable shallow breathing.  On 7 L nasal cannula.  Can planing of nasal congestion.  Denies any significant pain at this time understands plan is for IVC filter this afternoon after platelet transfusion which is running at this time    No acute events overnight.  Patient denies chest pain. No fevers/chills.  No n/v/abd pain.      10 ROS completed and otherwise negative.    OBJECTIVE:  Vital signs in last 24 hours:  /74 (BP Location: Right arm)   Pulse 86   Temp 97.7 °F (36.5 °C) (Oral)   Resp 24   Ht 5' (1.524 m)   Wt 166 lb 9.6 oz (75.6 kg)   SpO2 95%   BMI 32.54 kg/m²     Intake/Output:    Intake/Output Summary (Last 24 hours) at 2025 0847  Last data filed at 2025 0700  Gross per 24 hour   Intake 944.6 ml   Output 1800 ml   Net -855.4 ml       Wt Readings from Last 3 Encounters:   25 166 lb 9.6 oz (75.6 kg)   25 169 lb (76.7 kg)   25 174 lb 9.6 oz (79.2 kg)       Exam     Gen: No acute distress, chronically ill-appearing shallow breathing  Pulm: Lungs clear, normal respiratory effort  CV: Heart with regular rate and rhythm  Abd: Abdomen soft, nontender, bowel sounds present  Neuro: No acute focal deficits  MSK: moves extremities  Skin: Warm and dry  Psych: Normal affect  Ext: no c/c/e    Data Review:     Labs:   Lab Results   Component Value Date    WBC 1.5 2025    HGB 8.5 2025    HCT 27.2 2025    PLT 25.0 2025    CREATSERUM 0.87 2025    BUN 19 2025     2025    K 3.2 2025    CL 94 2025    CO2 38.0 2025     2025    CA 8.6 2025    DDIMER 3.05 2025    MG 1.4 2025        Imaging: Reviewed    CT CHEST PE AORTA (IV ONLY) (CPT=71260)  Result Date: 5/13/2025  CONCLUSION:   Positive for small volume bilateral segmental PE  Enlarging left upper lobe consolidation and few bilateral pulmonary nodules  Enlarging pericardial effusion now 1.7 centimeter thick.  Increasing left pleural effusion, new right pleural effusion  Discussed with UNRULY Brumfield at 5:40 p.m.   Dictated by (CST): Chris Lovell MD on 5/13/2025 at 5:34 PM     Finalized by (CST): Chris Lovell MD on 5/13/2025 at 5:42 PM          XR CHEST AP PORTABLE  (CPT=71045)  Result Date: 5/12/2025  CONCLUSION:  1. Suboptimal inspiration.  No acute finding or significant change. 2. Left mid lung/upper lobe mass compatible with known malignancy. 3. Stable mediastinal lymph node metastases. 4. Stable pericardial effusion.    Dictated by (CST): Jayson Fournier MD on 5/12/2025 at 3:04 PM     Finalized by (CST): Jayson Fournier MD on 5/12/2025 at 3:08 PM            Meds:   Current Hospital Medications[1]      Assessment  Problem List[2]    Plan:     Afib RVR  Pericardial effusion  - started IV amio  - cardiology consulted  - monitor on tele  - anticoagulation per cards  -5/14: Attempting to transition off IV amiodarone to p.o. today.    BL PE: small segmental/right lower extremity DVT acute  Not on a/c due to TCP  -5/14/2025: plan for IVC today with Dr Hopkins- PLT  25 giving 1 unit of platelets prior to procedure.    Metastatic endometrial cancer    Pancytopenia  Acute on chronic anemia of chronic disease  - transfuse 1 unit prbc  - neutropenic precautions  - started neupogen until ANC 1500  - cefepime and vanco started for neutropenia and patient with cough/chills, elevated LA, possible early sepsis - pulm following for this as well    Acute on chronic respiratory failure  - on 4-5L NC baseline  - due to 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  -5/14: O2 requirements up to 7 to 8 L at this time.  Nasal saline added for congestion, will attempt  nonrebreather for better oxygenation        Thrush  - nystatin    Global A/P  - reviewed labs and vitals from today  - reviewed notes of the day  - cbc, bmp, mag ordered for tomorrow  - discussed need to stay in the hospital today due to above  - cont IV abx  - discussed with patient/RN and care team  - dispo pending clinical course      Ariela Tello MD      Supplementary Documentation:   DVT Mechanical Prophylaxis:   SCDs,    DVT Pharmacologic Prophylaxis   Medication   None                Code Status: Full Code  Rogel: External urinary catheter in place  Rogel Duration (in days):   Central line:    JOSE: 5/16/2025        **Certification      PHYSICIAN Certification of Need for Inpatient Hospitalization - Initial Certification    Patient will require inpatient services that will reasonably be expected to span two midnight's based on the clinical documentation in H+P.   Based on patients current state of illness, I anticipate that, after discharge, patient will require TBD.      Dietitian Malnutrition Assessment    Evaluation for Malnutrition: Criteria for non-severe malnutrition diagnosis-         chronic illness related to   Wt loss 7.5% in 3 months., Body fat mild depletion., Muscle mass mild depletion.       RD Malnutrition Care Plan: Encouraged increased PO intake., Encouraged small frequent meals with emphasis on high calorie/high protein., Intiated ONS (oral nutritional supplements).    Body Fat/Muscle Mass: Mild depletion body fat., Mild depletion muscle mass. triceps region. dorsal dale region., calf region.    Physician Assessment     Patient has a diagnosis of moderate malnutrition              MDM: High complexity- severe exacerbation of chronic illness posing a threat to life. IV meds requiring close inpatient monitoring. I personally spent time on chart/note review, review of labs/imaging, discussion with patient, physical exam, discussion with staff, writing progress note, and discussion of plan  [facility].           [1]   Current Facility-Administered Medications   Medication Dose Route Frequency    potassium chloride 40 mEq in 250mL sodium chloride 0.9% IVPB premix  40 mEq Intravenous Once    magnesium sulfate 4 g/100mL IVPB premix 4 g  4 g Intravenous Once    maalox/diphenhydramine/lidocaine (First Mouthwash BLM) oral suspension 10 mL  10 mL Oral Q6H PRN    furosemide (Lasix) 10 mg/mL injection 60 mg  60 mg Intravenous BID (Diuretic)    acetaminophen (Tylenol Extra Strength) tab 500 mg  500 mg Oral Q4H PRN    ondansetron (Zofran) 4 MG/2ML injection 4 mg  4 mg Intravenous Q6H PRN    metoclopramide (Reglan) 5 mg/mL injection 5 mg  5 mg Intravenous Q8H PRN    glucose (Dex4) 15 GM/59ML oral liquid 15 g  15 g Oral Q15 Min PRN    Or    glucose (Glutose) 40% oral gel 15 g  15 g Oral Q15 Min PRN    Or    glucose-vitamin C (Dex-4) chewable tab 4 tablet  4 tablet Oral Q15 Min PRN    Or    dextrose 50% injection 50 mL  50 mL Intravenous Q15 Min PRN    Or    glucose (Dex4) 15 GM/59ML oral liquid 30 g  30 g Oral Q15 Min PRN    Or    glucose (Glutose) 40% oral gel 30 g  30 g Oral Q15 Min PRN    Or    glucose-vitamin C (Dex-4) chewable tab 8 tablet  8 tablet Oral Q15 Min PRN    insulin aspart (NovoLOG) 100 Units/mL FlexPen 1-7 Units  1-7 Units Subcutaneous TID CC    ceFEPIme (Maxipime) 1 g in sodium chloride 0.9% 100mL IVPB-YANA  1 g Intravenous Q12H    pantoprazole (Protonix) DR tab 40 mg  40 mg Oral QAM AC    ipratropium-albuterol (Duoneb) 0.5-2.5 (3) MG/3ML inhalation solution 3 mL  3 mL Nebulization Q6H PRN    amiodarone in dextrose 4.14% (Cordarone) 360 mg/200mL infusion premix  1 mg/min Intravenous Continuous    vancomycin (Vancocin) 1.25 g in sodium chloride 0.9% 250mL IVPB premix  15 mg/kg Intravenous Q24H    levothyroxine (Synthroid) tab 100 mcg  100 mcg Oral Before breakfast    filgrastim-aafi (Nivestym) 300 MCG/0.5ML prefilled syringe 300 mcg  300 mcg Subcutaneous Q24H    nystatin (Mycostatin) 687416 UNIT/ML  oral suspension 500,000 Units  5 mL Oral QID   [2]   Patient Active Problem List  Diagnosis    Shortness of breath    Acute hypoxic respiratory failure (HCC)    Lung mass    Supraclavicular adenopathy    Dysphagia, unspecified type    Pericardial effusion (HCC)    Endometrial cancer (HCC)    Microcytic anemia    Thrombocytopenia (HCC)    Azotemia    Pancytopenia (HCC)    Hyperglycemia    Acute respiratory failure with hypoxia (HCC)    Atrial fibrillation with RVR (HCC)

## 2025-05-14 NOTE — BRIEF PROCEDURE NOTE
[facility]  part of Located [facility]  Procedure Note    Kelli Fisher Patient Status:  Inpatient    1955 MRN [id number]   Location [facility] INTERVENTIONAL SUITES Attending Ariela Tello MD   Hosp Day # 2 PCP Taylor Gallardo MD     Procedure: IVC filter    Pre-Procedure Diagnosis:  DVT/PE    Post-Procedure Diagnosis: DVT/PE    Anesthesia:  Local and Sedation    Findings:  no caval thrombus.  [employer] IVC filter placed    Specimens: 0    Blood Loss:  minimal      Complications:  None      Rony Hopkins MD  2025

## 2025-05-15 ENCOUNTER — APPOINTMENT (OUTPATIENT)
Dept: GENERAL RADIOLOGY | Facility: HOSPITAL | Age: 70
DRG: 270 | End: 2025-05-15
Attending: INTERNAL MEDICINE
Payer: MEDICARE

## 2025-05-15 LAB
ANION GAP SERPL CALC-SCNC: 6 MMOL/L (ref 0–18)
BASOPHILS # BLD: 0 X10(3) UL (ref 0–0.2)
BASOPHILS NFR BLD: 0 %
BLOOD TYPE BARCODE: 5100
BUN BLD-MCNC: 21 MG/DL (ref 9–23)
BUN/CREAT SERPL: 21 (ref 10–20)
CALCIUM BLD-MCNC: 8.9 MG/DL (ref 8.7–10.4)
CHLORIDE SERPL-SCNC: 96 MMOL/L (ref 98–112)
CO2 SERPL-SCNC: 38 MMOL/L (ref 21–32)
CREAT BLD-MCNC: 1 MG/DL (ref 0.55–1.02)
DEPRECATED RDW RBC AUTO: 62.5 FL (ref 35.1–46.3)
EGFRCR SERPLBLD CKD-EPI 2021: 61 ML/MIN/1.73M2 (ref 60–?)
EOSINOPHIL # BLD: 0 X10(3) UL (ref 0–0.7)
EOSINOPHIL NFR BLD: 0 %
ERYTHROCYTE [DISTWIDTH] IN BLOOD BY AUTOMATED COUNT: 20.7 % (ref 11–15)
GLUCOSE BLD-MCNC: 186 MG/DL (ref 70–99)
GLUCOSE BLDC GLUCOMTR-MCNC: 160 MG/DL (ref 70–99)
GLUCOSE BLDC GLUCOMTR-MCNC: 185 MG/DL (ref 70–99)
GLUCOSE BLDC GLUCOMTR-MCNC: 202 MG/DL (ref 70–99)
GLUCOSE BLDC GLUCOMTR-MCNC: 258 MG/DL (ref 70–99)
GLUCOSE BLDC GLUCOMTR-MCNC: 264 MG/DL (ref 70–99)
HCT VFR BLD AUTO: 30.1 % (ref 35–48)
HGB BLD-MCNC: 9.2 G/DL (ref 12–16)
LYMPHOCYTES NFR BLD: 0.86 X10(3) UL (ref 1–4)
LYMPHOCYTES NFR BLD: 9 %
MAGNESIUM SERPL-MCNC: 1.4 MG/DL (ref 1.6–2.6)
MCH RBC QN AUTO: 25.6 PG (ref 26–34)
MCHC RBC AUTO-ENTMCNC: 30.6 G/DL (ref 31–37)
MCV RBC AUTO: 83.6 FL (ref 80–100)
METAMYELOCYTES # BLD: 0.57 X10(3) UL (ref ?–0.01)
METAMYELOCYTES NFR BLD: 6 %
MONOCYTES # BLD: 0.57 X10(3) UL (ref 0.1–1)
MONOCYTES NFR BLD: 6 %
MYELOCYTES # BLD: 0.1 X10(3) UL (ref ?–0.01)
MYELOCYTES NFR BLD: 1 %
NEUTROPHILS # BLD AUTO: 7.01 X10 (3) UL (ref 1.5–7.7)
NEUTROPHILS NFR BLD: 65 %
NEUTS BAND NFR BLD: 13 %
NEUTS HYPERSEG # BLD: 7.41 X10(3) UL (ref 1.5–7.7)
NRBC BLD MANUAL-RTO: 1 % (ref ?–1)
OSMOLALITY SERPL CALC.SUM OF ELEC: 298 MOSM/KG (ref 275–295)
PLATELET # BLD AUTO: 52 10(3)UL (ref 150–450)
PLATELET MORPHOLOGY: NORMAL
PLATELETS.RETICULATED NFR BLD AUTO: 11.4 % (ref 0–7)
POTASSIUM SERPL-SCNC: 3.1 MMOL/L (ref 3.5–5.1)
POTASSIUM SERPL-SCNC: 3.4 MMOL/L (ref 3.5–5.1)
RBC # BLD AUTO: 3.6 X10(6)UL (ref 3.8–5.3)
SODIUM SERPL-SCNC: 140 MMOL/L (ref 136–145)
TOTAL CELLS COUNTED BLD: 100
UNIT VOLUME: 195 ML
WBC # BLD AUTO: 9.5 X10(3) UL (ref 4–11)

## 2025-05-15 PROCEDURE — 99233 SBSQ HOSP IP/OBS HIGH 50: CPT | Performed by: INTERNAL MEDICINE

## 2025-05-15 PROCEDURE — 99232 SBSQ HOSP IP/OBS MODERATE 35: CPT | Performed by: INTERNAL MEDICINE

## 2025-05-15 PROCEDURE — 71045 X-RAY EXAM CHEST 1 VIEW: CPT | Performed by: INTERNAL MEDICINE

## 2025-05-15 RX ORDER — BISACODYL 10 MG
10 SUPPOSITORY, RECTAL RECTAL
Status: DISCONTINUED | OUTPATIENT
Start: 2025-05-15 | End: 2025-05-21

## 2025-05-15 RX ORDER — POLYETHYLENE GLYCOL 3350 17 G/17G
17 POWDER, FOR SOLUTION ORAL DAILY PRN
Status: DISCONTINUED | OUTPATIENT
Start: 2025-05-15 | End: 2025-05-21

## 2025-05-15 RX ORDER — SODIUM PHOSPHATE, DIBASIC AND SODIUM PHOSPHATE, MONOBASIC 7; 19 G/230ML; G/230ML
1 ENEMA RECTAL ONCE AS NEEDED
Status: DISCONTINUED | OUTPATIENT
Start: 2025-05-15 | End: 2025-05-21

## 2025-05-15 RX ORDER — FUROSEMIDE 10 MG/ML
40 INJECTION INTRAMUSCULAR; INTRAVENOUS
Status: DISCONTINUED | OUTPATIENT
Start: 2025-05-15 | End: 2025-05-21

## 2025-05-15 RX ORDER — MAGNESIUM OXIDE 400 MG/1
800 TABLET ORAL ONCE
Status: COMPLETED | OUTPATIENT
Start: 2025-05-15 | End: 2025-05-15

## 2025-05-15 RX ORDER — DOCUSATE SODIUM 100 MG/1
100 CAPSULE, LIQUID FILLED ORAL DAILY PRN
Status: DISCONTINUED | OUTPATIENT
Start: 2025-05-15 | End: 2025-05-21

## 2025-05-15 RX ORDER — POTASSIUM CHLORIDE 1500 MG/1
40 TABLET, EXTENDED RELEASE ORAL EVERY 4 HOURS
Status: COMPLETED | OUTPATIENT
Start: 2025-05-15 | End: 2025-05-15

## 2025-05-15 NOTE — PROGRESS NOTES
[facility]  part of [facility]    Progress Note    Kelli Fisher Patient Status:  Inpatient    1955 MRN [id number]   Location [facility] 3W/SW Attending Hina Bentley MD   Hosp Day # 3 PCP Taylor Gallardo MD     Chief Complaint: afib rvr    SUBJECTIVE:  Appears comfortable resting, tachypneic.    Down form 7 to 6L nasal cannula.     Tolerated filter placement yesterday.    No acute events overnight.  Patient denies chest pain. No fevers/chills.  No n/v/abd pain.      10 ROS completed and otherwise negative.    OBJECTIVE:  Vital signs in last 24 hours:  BP 94/69 (BP Location: Right arm)   Pulse 90   Temp 98 °F (36.7 °C) (Oral)   Resp 18   Ht 5' (1.524 m)   Wt 177 lb 11.2 oz (80.6 kg)   SpO2 97%   BMI 34.70 kg/m²     Intake/Output:    Intake/Output Summary (Last 24 hours) at 5/15/2025 0854  Last data filed at 5/15/2025 0600  Gross per 24 hour   Intake 440 ml   Output 1600 ml   Net -1160 ml       Wt Readings from Last 3 Encounters:   05/15/25 177 lb 11.2 oz (80.6 kg)   25 169 lb (76.7 kg)   25 174 lb 9.6 oz (79.2 kg)       Exam     Gen: No acute distress, chronically ill-appearing shallow breathing  Pulm: Lungs clear, normal respiratory effort  CV: Heart with regular rate and rhythm  Abd: Abdomen soft, nontender, bowel sounds present  Neuro: No acute focal deficits  MSK: moves extremities  Skin: Warm and dry  Psych: Normal affect  Ext: no c/c/e    Data Review:     Labs:          Imaging: Reviewed    XR CHEST AP PORTABLE  (CPT=71045)  Result Date: 5/15/2025  CONCLUSION:  1. The cardiac silhouette remains enlarged, likely relating to the known pericardial effusion.  Small pleural effusions are also unchanged bilaterally suggesting mild CHF or fluid overload.  There is stable mild associated passive atelectasis at both lung bases. 2. There is a history of metastatic endometrial carcinoma.  Stable left upper lobe/lingular consolidation may relate to a  metastasis, atelectasis and/or pneumonia.  Unchanged prominence of the mediastinal and bilateral hilar contours is compatible with known underlying metastatic lymphadenopathy.     Dictated by (CST): Vincent Vazquez MD on 5/15/2025 at 7:25 AM     Finalized by (CST): Vnicent Vazquez MD on 5/15/2025 at 7:28 AM          US VENOUS DOPPLER LEG BILAT - DIAG IMG (CPT=93970)  Result Date: 5/14/2025  CONCLUSION:   1. Acute appearing nonocclusive DVT involving the right superficial femoral, popliteal, and posterior tibial veins.  No left lower extremity DVT is identified.  2. 8.2 x 1.5 x 3.8 cm left posterior popliteal/Baker's cyst.     Dictated by (CST): Kaleb Epps MD on 5/14/2025 at 10:46 AM     Finalized by (CST): Kaleb Epps MD on 5/14/2025 at 10:49 AM          CT CHEST PE AORTA (IV ONLY) (CPT=71260)  Result Date: 5/13/2025  CONCLUSION:   Positive for small volume bilateral segmental PE  Enlarging left upper lobe consolidation and few bilateral pulmonary nodules  Enlarging pericardial effusion now 1.7 centimeter thick.  Increasing left pleural effusion, new right pleural effusion  Discussed with UNRULY Brumfield at 5:40 p.m.   Dictated by (CST): Chris Lovell MD on 5/13/2025 at 5:34 PM     Finalized by (CST): Chris Lovell MD on 5/13/2025 at 5:42 PM          XR CHEST AP PORTABLE  (CPT=71045)  Result Date: 5/12/2025  CONCLUSION:  1. Suboptimal inspiration.  No acute finding or significant change. 2. Left mid lung/upper lobe mass compatible with known malignancy. 3. Stable mediastinal lymph node metastases. 4. Stable pericardial effusion.    Dictated by (CST): Jayson Fournier MD on 5/12/2025 at 3:04 PM     Finalized by (CST): Jayson Fournier MD on 5/12/2025 at 3:08 PM            Meds:   Current Hospital Medications[1]      Assessment  Problem List[2]    Plan:     Afib RVR: resolved rate controlled  Pericardial effusion  - started IV amio  - cardiology consulted  - monitor on tele  - anticoagulation per cards  -5/14:  Attempting to transition off IV amiodarone to p.o. today.  -5/15: doing well on po amiodarone    BL PE: small segmental/right lower extremity DVT acute  S/p IVC 5/14/2025  Not on a/c due to TCP  -5/15/2025: tolerated IVC well    Metastatic endometrial cancer    Pancytopenia  Acute on chronic anemia of chronic disease  - transfuse 1 unit prbc  - neutropenic precautions  - started neupogen until ANC 1500  -Plan is for further chemotherapy once recovered clinically per Dr. Herring.  - cefepime and vanco started for neutropenia and patient with cough/chills, elevated LA, possible early sepsis - pulm following for this as well  -5/15: Today's labs pending, transfusions as needed      Acute on chronic respiratory failure  - on 4-5L NC baseline  - due to 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  -5/15: O2 requirements down from 6L to 7 L at this time.  Nasal saline added for congestion, ? nonrebreather for better oxygenation   d/w Dr andrew and Dr olmstead yesterday- no need for therapeutic or diagnostic thoracentesis at this time.       Thrush  - nystatin    Global A/P  - reviewed labs and vitals from today  - reviewed notes of the day  - cbc, bmp, mag ordered for tomorrow  - discussed need to stay in the hospital today due to above  - cont IV abx  - discussed with patient/RN and care team  - dispo pending clinical course      Ariela Tello MD      Supplementary Documentation:   DVT Mechanical Prophylaxis:   SCDs,    DVT Pharmacologic Prophylaxis   Medication   None                Code Status: Full Code  Rogel: External urinary catheter in place  Rogel Duration (in days):   Central line:    JOSE: 5/16/2025        **Certification      PHYSICIAN Certification of Need for Inpatient Hospitalization - Initial Certification    Patient will require inpatient services that will reasonably be expected to span two midnight's based on the clinical documentation in H+P.   Based on patients current state of illness, I anticipate  that, after discharge, patient will require TBD.      Dietitian Malnutrition Assessment    Evaluation for Malnutrition: Criteria for non-severe malnutrition diagnosis-         chronic illness related to   Wt loss 7.5% in 3 months., Body fat mild depletion., Muscle mass mild depletion.       RD Malnutrition Care Plan: Encouraged increased PO intake., Encouraged small frequent meals with emphasis on high calorie/high protein., Intiated ONS (oral nutritional supplements).    Body Fat/Muscle Mass: Mild depletion body fat., Mild depletion muscle mass. triceps region. dorsal dale region., calf region.    Physician Assessment     Patient has a diagnosis of moderate malnutrition              MDM: High complexity- severe exacerbation of chronic illness posing a threat to life. IV meds requiring close inpatient monitoring. I personally spent time on chart/note review, review of labs/imaging, discussion with patient, physical exam, discussion with staff, writing progress note, and discussion of plan of care.           [1]   Current Facility-Administered Medications   Medication Dose Route Frequency    docusate sodium (Colace) cap 100 mg  100 mg Oral Daily PRN    polyethylene glycol (PEG 3350) (Miralax) 17 g oral packet 17 g  17 g Oral Daily PRN    magnesium hydroxide (Milk of Magnesia) 400 MG/5ML oral suspension 30 mL  30 mL Oral Daily PRN    bisacodyl (Dulcolax) 10 MG rectal suppository 10 mg  10 mg Rectal Daily PRN    fleet enema (Fleet) rectal enema 133 mL  1 enema Rectal Once PRN    amiodarone (Pacerone) tab 400 mg  400 mg Oral BID with meals    insulin aspart (NovoLOG) 100 Units/mL FlexPen 1-5 Units  1-5 Units Subcutaneous TID CC and HS    sodium chloride (Saline Mist) 0.65 % nasal solution 1 spray  1 spray Each Nare Q3H PRN    maalox/diphenhydramine/lidocaine (First Mouthwash BLM) oral suspension 10 mL  10 mL Oral Q6H PRN    furosemide (Lasix) 10 mg/mL injection 60 mg  60 mg Intravenous BID (Diuretic)    acetaminophen  (Tylenol Extra Strength) tab 500 mg  500 mg Oral Q4H PRN    ondansetron (Zofran) 4 MG/2ML injection 4 mg  4 mg Intravenous Q6H PRN    metoclopramide (Reglan) 5 mg/mL injection 5 mg  5 mg Intravenous Q8H PRN    glucose (Dex4) 15 GM/59ML oral liquid 15 g  15 g Oral Q15 Min PRN    Or    glucose (Glutose) 40% oral gel 15 g  15 g Oral Q15 Min PRN    Or    glucose-vitamin C (Dex-4) chewable tab 4 tablet  4 tablet Oral Q15 Min PRN    Or    dextrose 50% injection 50 mL  50 mL Intravenous Q15 Min PRN    Or    glucose (Dex4) 15 GM/59ML oral liquid 30 g  30 g Oral Q15 Min PRN    Or    glucose (Glutose) 40% oral gel 30 g  30 g Oral Q15 Min PRN    Or    glucose-vitamin C (Dex-4) chewable tab 8 tablet  8 tablet Oral Q15 Min PRN    ceFEPIme (Maxipime) 1 g in sodium chloride 0.9% 100mL IVPB-YANA  1 g Intravenous Q12H    pantoprazole (Protonix) DR tab 40 mg  40 mg Oral QAM AC    ipratropium-albuterol (Duoneb) 0.5-2.5 (3) MG/3ML inhalation solution 3 mL  3 mL Nebulization Q6H PRN    vancomycin (Vancocin) 1.25 g in sodium chloride 0.9% 250mL IVPB premix  15 mg/kg Intravenous Q24H    levothyroxine (Synthroid) tab 100 mcg  100 mcg Oral Before breakfast    filgrastim-aafi (Nivestym) 300 MCG/0.5ML prefilled syringe 300 mcg  300 mcg Subcutaneous Q24H    nystatin (Mycostatin) 651066 UNIT/ML oral suspension 500,000 Units  5 mL Oral QID   [2]   Patient Active Problem List  Diagnosis    Shortness of breath    Acute hypoxic respiratory failure (HCC)    Lung mass    Supraclavicular adenopathy    Dysphagia, unspecified type    Pericardial effusion (HCC)    Endometrial cancer (HCC)    Microcytic anemia    Thrombocytopenia (HCC)    Azotemia    Pancytopenia (HCC)    Hyperglycemia    Acute respiratory failure with hypoxia (HCC)    Atrial fibrillation with RVR (HCC)

## 2025-05-15 NOTE — OCCUPATIONAL THERAPY NOTE
OCCUPATIONAL THERAPY EVALUATION - INPATIENT     Room Number: 345/345-A  Evaluation Date: 5/15/2025  Type of Evaluation: Initial  Presenting Problem: bilateral PE, RLE DVT s/p IVC 5/14    Physician Order: IP Consult to Occupational Therapy  Reason for Therapy: ADL/IADL Dysfunction and Discharge Planning    OCCUPATIONAL THERAPY ASSESSMENT   Patient is a 70 year old female admitted 5/12/2025.  Prior to admission, patient's baseline is intermittent assist as needed.  Patient is currently functioning below baseline with ADLs/mobility.  Patient is requiring modified independent, moderate assist, and maximum assist as a result of the following impairments: decreased functional strength, decreased functional reach, decreased endurance, impaired standing balance, decreased muscular endurance, and increased O2 needs from baseline. Occupational Therapy will continue to follow for duration of hospitalization.    Patient will benefit from continued skilled OT Services to promote return to prior level of function and safety with continuous assistance and gradual rehabilitative therapy vs home with daytime assist and HHOT pending further mobility assessment     PLAN DURING HOSPITALIZATION  OT Device Recommendations: Shower chair  OT Treatment Plan: Balance activities, Energy conservation/work simplification techniques, ADL training, Functional transfer training, UE strengthening/ROM, Endurance training, Patient/Family education, Patient/Family training, Equipment eval/education, Compensatory technique education     OCCUPATIONAL THERAPY MEDICAL/SOCIAL HISTORY   Problem List  Principal Problem:    Acute respiratory failure with hypoxia (HCC)  Active Problems:    Thrombocytopenia (HCC)    Azotemia    Pancytopenia (HCC)    Hyperglycemia    Atrial fibrillation with RVR (HCC)    HOME SITUATION  Type of Home: House  Home Layout: One level  Lives With: Daughter; Other (Comment) (grand dtr)  Toilet and Equipment: Comfort height  toilet  Shower/Tub and Equipment: Tub-shower combo; Grab bar  Drives: Yes (has not driven in awhile)  Patient Regularly Uses: Home O2; Rolling walker; Wheelchair    Prior Level of [place]: mod I for ADLs except dtr assists with bathing; RW used for household ambulation, WC used when family assists with bumping pt up the outside stairs or 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    SUBJECTIVE  \"I am having another bowel movement\"    OCCUPATIONAL THERAPY EXAMINATION      OBJECTIVE  Precautions: Other (Comment) (6LO2)  Fall Risk: Standard fall risk      PAIN ASSESSMENT  Ratin            O2 SATURATIONS  Oxygen Therapy  SPO2% on Oxygen at Rest: 96  At rest oxygen flow (liters per minute): 6    COGNITION  Following all commands, A&Ox3      Communication: WFL    Behavioral/Emotional/Social: cooperative and pleasant     RANGE OF MOTION   Upper extremity ROM is within functional limits     STRENGTH ASSESSMENT  Upper extremity strength is within functional limits     COORDINATION  Gross Motor: WFL   Fine Motor: WFL     ACTIVITIES OF DAILY LIVING ASSESSMENT  AM-PAC ‘6-Clicks’ Inpatient Daily Activity Short Form  How much help from another person does the patient currently need…  -   Putting on and taking off regular lower body clothing?: A Lot  -   Bathing (including washing, rinsing, drying)?: A Lot  -   Toileting, which includes using toilet, bedpan or urinal? : Total  -   Putting on and taking off regular upper body clothing?: A Little  -   Taking care of personal grooming such as brushing teeth?: None  -   Eating meals?: None    AM-PAC Score:  Score: 16  Approx Degree of Impairment: 53.32%  Standardized Score (AM-PAC Scale): 35.96  CMS Modifier (G-Code): CK    FUNCTIONAL TRANSFER ASSESSMENT  Sit to Stand: Chair  Chair: Moderate Assist  Cues for UE placement   Able to stand for ~3 minutes for assist for toileting, CGA for balance in standing, cues for deep breathing techniques as pt reported feeling SOB    BALANCE  ASSESSMENT  Static Sitting: Supervision  Static Standing: Contact Guard Assist    FUNCTIONAL ADL ASSESSMENT  Eating: Modified Independent  Grooming Seated: Modified Independent  LB Dressing Seated: Moderate Assist  Toileting Standing: Dependent       Skilled Therapy Provided: RN approved session. Received patient in chair. Performed ADLs//transfers as stated above. Primarily limited by excessive diarrhea in standing during hygiene pt with continued  runny stool with fowl smell. Pt placed on bed pan in bedside chair and pt requesting increased time for bowel movement. At the end of session, patient left in chair with needs met, alarm on and RN aware of session. Pt's RN notified that pt with continued runny stool with fowl smell and pt with a small amount of stool on dressing on abdomen, RN aware.    EDUCATION PROVIDED  Patient Education : Role of Occupational Therapy; Plan of Care; Functional Transfer Techniques; Fall Prevention; Posture/Positioning; Energy Conservation; Other (ADL training, breathing techniques)  Patient's Response to Education: Verbalized Understanding; Returned Demonstration    The patient's Approx Degree of Impairment: 53.32% has been calculated based on documentation in the [facility] '6 clicks' Inpatient Daily Activity Short Form.  Research supports that patients with this level of impairment may benefit from rehab vs HHOT pending further mobility evaluation  .  Final disposition will be made by interdisciplinary medical team.     Patient End of Session: Up in chair, Needs met, Call light within reach, RN aware of session/findings, All patient questions and concerns addressed    OT Goals  Patients self stated goal is: increased activity tolerance for ADLs     Patient will complete functional transfer with SPV  Comment:     Patient will complete toileting with SPV  Comment:     Patient will tolerate standing for 5 minutes in prep for adls with SPV   Comment:    Patient will complete LB dressing CGA   Comment:          Goals  on: 25  Frequency: 3-5x/wk    Patient Evaluation Complexity Level:   Occupational Profile/Medical History MODERATE - Expanded review of history including review of medical or therapy record   Specific performance deficits impacting engagement in ADL/IADL MODERATE  3 - 5 performance deficits   Client Assessment/Performance Deficits MODERATE - Comorbidities and min to mod modifications of tasks    Clinical Decision Making MODERATE - Analysis of occupational profile, detailed assessments, several treatment options    Overall Complexity MODERATE     OT Session Time: 20 minutes  Self-Care Home Management: 10 minutes  Evaluation    Maria E Paiz OTR/L

## 2025-05-15 NOTE — PROGRESS NOTES
[facility]  part of [facility]    Progress Note      Assessment and Plan:   1.  Acute on chronic respiratory failure-multifactorial with significant burden of tumor in the chest but now recognized to have small volume of bilateral subsegmental PEs.  The preliminary report of the right lower extremity ultrasound demonstrates nonocclusive thrombus.  The patient has low platelets and is a poor candidate for full anticoagulation at this moment.    Lower extremity venous ultrasound demonstrated acute appearing nonocclusive DVT in the right superficial femoral popliteal and posterior tibial veins.  She got the IVC filter yesterday.    Recommendations:  1.  Conservative management.    2.  Metastatic endometrial carcinoma-to follow-up gynecologic oncology.    3.  Pericardial effusion-impressive on the CAT scan but reported as only moderate on echo without hemodynamic compromise.  I suspect this is malignant and may need to be drained at some time during the course of her care.    4.  Recurrent episodes of atrial tachycardia-on amiodarone.    5.  Very small left pleural effusion-conservative management without tap at this juncture.      Subjective:   Kelli Fisher is a(n) 70 year old female who is mildly dyspneic    Objective:   Blood pressure 94/69, pulse 90, temperature 98 °F (36.7 °C), temperature source Oral, resp. rate 18, height 5' (1.524 m), weight 154 lb 3.2 oz (69.9 kg), SpO2 97%.    Physical Exam alert white female  HEENT examination is unremarkable with pupils equal round and reactive to light and accommodation.   Neck without adenopathy, thyromegaly, JVD nor bruit.   Lungs diminished bilaterally to auscultation and percussion.  Cardiac regular rate and rhythm no murmur.   Abdomen nontender, without hepatosplenomegaly and no mass appreciable.   Extremities without clubbing cyanosis nor edema.   Neurologic grossly intact with symmetric tone and strength and reflex.  Skin without gross  abnormality     Results:     Lab Results   Component Value Date    WBC 9.5 05/15/2025    HGB 9.2 05/15/2025    HCT 30.1 05/15/2025    PLT 52.0 05/15/2025    CREATSERUM 1.00 05/15/2025    BUN 21 05/15/2025     05/15/2025    K 3.4 05/15/2025    CL 96 05/15/2025    CO2 38.0 05/15/2025     05/15/2025    CA 8.9 05/15/2025      CT scan of the chest-Positive for small volume bilateral segmental PE      Enlarging left upper lobe consolidation and few bilateral pulmonary nodules      Enlarging pericardial effusion now 1.7 centimeter thick.  Increasing left pleural effusion, new right pleural effusion     Srinath Levy MD  Medical Director, Critical Care, [facility]  Medical Director, [facility]  Pager: [phone]

## 2025-05-15 NOTE — PROGRESS NOTES
Progress Note  Kelli Fisher Patient Status:  Inpatient    1955 MRN [id number]   Location [facility] 3W/SW Attending Hina Bentley MD   Hosp Day # 3 PCP Taylor Gallardo MD     Subjective:  Remains on 6L O2  IVC filter placed yesterday  C/o dry mouth    Objective:  BP 94/69 (BP Location: Right arm)   Pulse 90   Temp 98 °F (36.7 °C) (Oral)   Resp 18   Ht 5' (1.524 m)   Wt 177 lb 11.2 oz (80.6 kg)   SpO2 97%   BMI 34.70 kg/m²     Telemetry: SR 90s    Intake/Output:    Intake/Output Summary (Last 24 hours) at 5/15/2025 1143  Last data filed at 5/15/2025 1021  Gross per 24 hour   Intake 540 ml   Output 2050 ml   Net -1510 ml     Last 3 Weights   05/15/25 0458 177 lb 11.2 oz (80.6 kg)   25 0645 166 lb 9.6 oz (75.6 kg)   25 0406 167 lb 9.6 oz (76 kg)   25 1736 165 lb 11.2 oz (75.2 kg)   25 1217 169 lb (76.7 kg)   25 0500 174 lb 9.6 oz (79.2 kg)   25 0549 175 lb 12.8 oz (79.7 kg)   25 1800 163 lb (73.9 kg)   25 2038 165 lb (74.8 kg)     Labs:  Recent Labs   Lab 25  1411 25  0625 25  0653   * 238* 193*   BUN 22 22 19   CREATSERUM 0.78 0.76 0.87   EGFRCR 82 85 72   CA 9.0 8.6* 8.6*    144 140   K 4.2 3.7 3.2*    101 94*   CO2 32.0 36.0* 38.0*     Recent Labs   Lab 25  1410 25  0625 25  1512 25  0653   RBC 2.97* 2.59*  --  3.33*   HGB 7.3* 6.4* 7.6* 8.5*   HCT 24.6* 21.3*  --  27.2*   MCV 82.8 82.2  --  81.7   MCH 24.6* 24.7*  --  25.5*   MCHC 29.7* 30.0*  --  31.3   RDW 21.5* 21.5*  --  20.2*   NEPRELIM 0.11* 0.02*  --  0.73*   WBC 0.5* 0.4*  --  1.5*   PLT 50.0* 36.0*  --  25.0*     Recent Labs   Lab 25  1411 25  1619   TROPHS 5 5     No results found for: \"CHOLESTEROL\", \"HDL\", \"LDL\", \"TRIG\", \"NONHDL\", \"CHOLHDL\"  Lab Results   Component Value Date    DDIMER 3.05 (H) 2025     Lab Results   Component Value Date    TSH 3.705 2025     Review of Systems:    Constitutional: No fevers, chills, fatigue or night sweats.  Respiratory: Denies cough, wheezing or shortness of breath.  CV: Denies chest pain, palpitations, orthopnea, PND or dizziness.  GI: No nausea, vomiting or diarrhea. No blood in stools.    Physical Exam:   General: Alert, cooperative, ill-appearing appears older than stated age.  Neck: no JVD.  Lungs: clear to auscultation bilaterally.  Chest wall: No tenderness or deformity.  Heart: regular rate and rhythm, S1, S2 normal, no murmur, click, rub or gallop.  Abdomen: Soft, non-tender. Bowel sounds normal. No masses,  No organomegaly.  Extremities: Extremities normal, atraumatic, no cyanosis, mild BLE edema.  Pulses: 2+ and symmetric all extremities.  Neurologic: Grossly intact.    Medications:  Scheduled Medications[1]  Medication Infusions[2]    Assessment:    70 year old female with PMH of endometrial cancer with mets to lungs, anemia, PSVT, small pericardial effusion who presented to ER with dyspnea and palpitations. She was found to be in PAF/PAT and anemic with Hgb 7.3.     Paroxysmal atrial tachycardia/Atrial fibrillation   Hx PSVT during last admission with junctional rhythm noted while on BB  -transitioned to amiodarone gtt, now on PO  -in and out of AF/SR  -no BB/CCB with previous junctional rhythm  -TSH unremarkable 3.705, s/p thyroidectomy  -not on DOAC due to pancytopenia  -LVEF preserved on echo last admission  -CHADS-VASc= 3 for age, gender, DM    Dyspnea  O2 sats increasing, baseline 4L, now on 6L  -last admission had small pericardial effusion   -limited echo on 5/13 with moderate effusion  -known lung mets  -worsening anemia (Hgb 9.0 on 5/5 discharge, admitted 5/12 7.3, 6.4 on 5/13 s/p 1u PRBC  -CXR on admission showed stable effusion, lymph node mets, Left mid/upper lobe mass, suboptimal inspiration  -D-dimer elevated 3, CT chest PE positive for small bilateral segmental PE  -IV lasix increased to 60mg BID due to low UOP   -I/Os net neg  3L, 1.6L UOP past 24 hours, weight down 11# from admit, will decrease lasix dose    Bilateral Pulmonary Embolism  D-dimer elevated 3  -CT chest PE positive for bilateral small PE  -b/l venous doppler revealed nonocclusive DVT in right superficial femoral, popliteal, and posterior tibial veins  -IVC filter placed 5/14    Small pericardial effusion   Echo on 5/5 revealed small to moderate pericardial effusion   -repeat limited echo effusion moderate in size, no evidence of hemodynamic compromise  -s/p 1u PRBC  -BP stable in 90s-100s    Endometrial cancer with mets to lungs/lymph nodes  Stage Ivb clear cell adenocarcinoma  -chemotherapy    Pancytopenia  Sudden drop in hgb, plts, WBC in one week  -recently started chemo, s/p 1 treatment  -gyne following  -1u PRBC transfused 5/13, goal to keep Hgb >7  -s/p 1u plts transfusion prior to IVC filter placement  -daily neupogen for neutropenia    Thrush  Sore throat  2/2 neutropenia  -gyne ordered nystatin swish    DM2-A1c 6.6    Hypokalemia  Hypomagnesemia  2/2 diuresis  -replace per cardiac electrolyte protocol      Plan:  -continue amiodarone  -decrease lasix to 40mg BID, monitor strict I/Os, daily weights, daily BMP  -replete electrolytes as needed per protocol  -further recs per gyne/pulm/primary      Plan of care discussed with patient, RN.        Margarette Sexton, MSN, APN, FNP-BC, CCK  05/15/25  11:43 AM      L3  Seen and examined bedside. Agree with the present management, will follow with you.    Remains on 6 L nasal cannula  Change Lasix to 40 mg IV twice daily strict ins and outs.  Reassess tomorrow.  Creatinine is stable  Status post IVC filter yesterday.  Now on oral amiodarone continue.  -I/Os net neg 3L, 1.6L UOP past 24 hours, weight down 11# from admit, will decrease lasix dose  Small to moderate pericardial effusion stable on echo  Thank you for allowing me to participate in the care of your patient, feel free to contact me if you have any  questions.    Vic Mckeon MD  [facility] [place]  Interventional Cardiology.             [1]    amiodarone  400 mg Oral BID with meals    insulin aspart  1-5 Units Subcutaneous TID CC and HS    furosemide  60 mg Intravenous BID (Diuretic)    cefepime  1 g Intravenous Q12H    pantoprazole  40 mg Oral QAM AC    vancomycin  15 mg/kg Intravenous Q24H    levothyroxine  100 mcg Oral Before breakfast    filgrastim-aafi  300 mcg Subcutaneous Q24H    nystatin  5 mL Oral QID   [2]

## 2025-05-15 NOTE — PLAN OF CARE
Problem: Patient Centered Care  Goal: Patient preferences are identified and integrated in the patient's plan of care  Description: Interventions:  - Provide timely, complete, and accurate information to patient/family- Incorporate patient and family knowledge, values, beliefs, and cultural backgrounds into the planning and delivery of care- Encourage patient/family to participate in care and decision-making at the level they choose- Honor patient and family perspectives and choices  Outcome: Progressing

## 2025-05-15 NOTE — PLAN OF CARE
O2 6l n/c. SOB on exertion, head of bed elevated.  Iv abx continues. S/p IVC filter- right groin dressing d/I, no bleeding, no hematoma. Neutropenic precaution maintained.    Problem: Diabetes/Glucose Control  Goal: Glucose maintained within prescribed range  Description: INTERVENTIONS:- Monitor Blood Glucose as ordered- Assess for signs and symptoms of hyperglycemia and hypoglycemia- Administer ordered medications to maintain glucose within target range- Assess barriers to adequate nutritional intake and initiate nutrition consult as needed- Instruct patient on self management of diabetes  Outcome: Progressing     Problem: Patient Centered Care  Goal: Patient preferences are identified and integrated in the patient's plan of care  Description: Interventions:- What would you like us to know as we care for you? Home alone.- Provide timely, complete, and accurate information to patient/family- Incorporate patient and family knowledge, values, beliefs, and cultural backgrounds into the planning and delivery of care- Encourage patient/family to participate in care and decision-making at the level they choose- Honor patient and family perspectives and choices  Outcome: Progressing     Problem: RISK FOR INFECTION - ADULT  Goal: Absence of fever/infection during anticipated neutropenic period  Description: INTERVENTIONS- Monitor WBC- Administer growth factors as ordered- Implement neutropenic guidelines  Outcome: Progressing     Problem: CARDIOVASCULAR - ADULT  Goal: Maintains optimal cardiac output and hemodynamic stability  Description: INTERVENTIONS:- Monitor vital signs, rhythm, and trends- Monitor for bleeding, hypotension and signs of decreased cardiac output- Evaluate effectiveness of vasoactive medications to optimize hemodynamic stability- Monitor arterial and/or venous puncture sites for bleeding and/or hematoma- Assess quality of pulses, skin color and temperature- Assess for signs of decreased coronary artery  perfusion - ex. Angina- Evaluate fluid balance, assess for edema, trend weights  Outcome: Progressing  Goal: Absence of cardiac arrhythmias or at baseline  Description: INTERVENTIONS:- Continuous cardiac monitoring, monitor vital signs, obtain 12 lead EKG if indicated- Evaluate effectiveness of antiarrhythmic and heart rate control medications as ordered- Initiate emergency measures for life threatening arrhythmias- Monitor electrolytes and administer replacement therapy as ordered  Outcome: Progressing     Problem: RESPIRATORY - ADULT  Goal: Achieves optimal ventilation and oxygenation  Description: INTERVENTIONS:- Assess for changes in respiratory status- Assess for changes in mentation and behavior- Position to facilitate oxygenation and minimize respiratory effort- Oxygen supplementation based on oxygen saturation or ABGs- Provide Smoking Cessation handout, if applicable- Encourage broncho-pulmonary hygiene including cough, deep breathe, Incentive Spirometry- Assess the need for suctioning and perform as needed- Assess and instruct to report SOB or any respiratory difficulty- Respiratory Therapy support as indicated- Manage/alleviate anxiety- Monitor for signs/symptoms of CO2 retention  Outcome: Progressing     Problem: SAFETY ADULT - FALL  Goal: Free from fall injury  Description: INTERVENTIONS:  - Assess pt frequently for physical needs  - Identify cognitive and physical deficits and behaviors that affect risk of falls.  - [place] fall precautions as indicated by assessment.  - Educate pt/family on patient safety including physical limitations  - Instruct pt to call for assistance with activity based on assessment  - Modify environment to reduce risk of injury  - Provide assistive devices as appropriate  - Consider OT/PT consult to assist with strengthening/mobility  - Encourage toileting schedule  Outcome: Progressing     Problem: DISCHARGE PLANNING  Goal: Discharge to home or other facility with  appropriate resources  Description: INTERVENTIONS:  - Identify barriers to discharge w/pt and caregiver  - Include patient/family/discharge partner in discharge planning  - Arrange for needed discharge resources and transportation as appropriate  - Identify discharge learning needs (meds, wound care, etc)  - Arrange for interpreters to assist at discharge as needed  - Consider post-discharge preferences of patient/family/discharge partner  - Complete POLST form as appropriate  - Assess patient's ability to be responsible for managing their own health  - Refer to Case Management Department for coordinating discharge planning if the patient needs post-hospital services based on physician/LIP order or complex needs related to functional status, cognitive ability or social support system  Outcome: Progressing

## 2025-05-15 NOTE — CONSULTS
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 MMR intact. Planned XRT tcompleted 3/29/24.  S/P brachytherapy 2024.  Discussed pembro and carbo/taxol but patient declined.  Agreeable to 4 cycles of carboplatin. She is s/p 4 cycles carboplatin completed 2024.  She was supposed to get a CT after this but was not completed. Pt transferred care to Dr. Herring after disease progression and development of lung mets. Received first cycle of carboplatin, Taxol, Keytruda 6 days ago on May 6, 2025 f/u fulphila. Pt was [facility] on 25 with weakness, shortness of breath, dyspnea, palpitations and failure to thrive.  Labs showed pancytopenia with plts <50. CT chest angiogram showed small volume acute bilateral nonocclusive segmental PE in the right upper lobe, right lower lobe, left lower lobe.   Consult requested for further evaluation and management.            Past Medical History  Past Medical History[1]    Past Surgical History  Past Surgical History[2]    Family History  Family History[3]    Social History  Short Social Hx on File[4]     Current Medications:  Current Hospital Medications[5]  Prescriptions Prior to Admission[6]    Allergies  Allergies[7]    Review of Systems:    REVIEW OF SYSTEMS    Constitutional Symptoms: Patient reports  No fever. No rigors. No weight loss. No night sweats.  HEENT: Patient reports No blurred vision. No double vision. No hearing loss. No mouth sores.  Respiratory: Patient reports No difficulty breathing reported. No wheezing. No cough. No hemoptysis.  Cardiovascular: Patient reports No chest pain. No palpitations. No edema.  Gastrointestinal: Patient reports No nausea. No vomiting. No dysphagia. No heartburn. No abdominal pain. No diarrhea. No constipation.  No melena. No hematochezia.  Genitourinary: Patient reports No hematuria. No dysuria. No urgency. No incontinence.  Musculoskeletal: Patient reports No bone pain. No myalgia. No back pain. No arthralgia. No joint swelling.  Integumentary: Patient reports No rash. No pruritis.  Neurological: Patient reports No headache. No paralysis. No paresthesias. No speech impairment.  Hematologic: Patient reports No excessive or spontaneous bleeding or bruising.  Mental Health: Patient reports No anxiety? no depression? no insomnia? no panic disorder.    Physical Exam:   Blood pressure 94/69, pulse 90, temperature 98 °F (36.7 °C), temperature source Oral, resp. rate 18, height 5' (1.524 m), weight 177 lb 11.2 oz (80.6 kg), SpO2 97%.    PHYSICAL EXAM  General: Well developed, well nourished, in no acute distress. Patient appears stated age.  Skin: No abnormal skin lesions noted.  Head: Normocephalic.  Eyes: Sclerae are anicteric.  Ears, Nose, Throat, and Mouth: Normal oral mucosa and oropharynx.  Neck: Neck is supple. No cervical masses present. Trachea is midline. No thyromegaly.  Lungs: clear to auscultation bilaterally.  Cardiac: normal rate? regular rhythm. S1/S2 without murmurs.  Abdomen: Abdomen is soft, nontender, nondistended, without masses. No hepatomegaly. No splenomegaly. No ascites present. Bowel sounds active.  Hematologic/Lymphatic: No petechiae. No purpura. No cervical SUKH.  Extremities: No edema. No cyanosis. No digital clubbing. No  discoloration.  Musculoskeletal: Normal range of motion. Strength and tone are normal. Back and Spine nontender to palpation and percussion.  Neurologic: No focal findings on screening exam.    Results:     Laboratory Data:  Lab Results   Component Value Date    WBC 1.5 (L) 05/14/2025    HGB 8.5 (L) 05/14/2025    HCT 27.2 (L) 05/14/2025    PLT 25.0 (L) 05/14/2025    CREATSERUM 0.87 05/14/2025    BUN 19 05/14/2025     05/14/2025    K 3.2 (L) 05/14/2025    CL 94 (L) 05/14/2025    CO2 38.0 (H) 05/14/2025     (H) 05/14/2025    CA 8.6 (L) 05/14/2025    ALB 3.2 05/12/2025    ALKPHO 142 05/12/2025    TP 6.8 05/12/2025    AST 26 05/12/2025    ALT 10 05/12/2025    PTT 35.1 04/29/2025    INR 1.33 (H) 04/29/2025    PTP 17.2 (H) 04/29/2025    TSH 3.705 05/02/2025    LIP 42 12/11/2023    DDIMER 3.05 (H) 05/13/2025    MG 1.4 (L) 05/14/2025    TROPHS 5 05/12/2025    B12 >2,000 (H) 05/02/2025         Imaging:  XR CHEST AP PORTABLE  (CPT=71045)  Result Date: 5/15/2025  CONCLUSION:  1. The cardiac silhouette remains enlarged, likely relating to the known pericardial effusion.  Small pleural effusions are also unchanged bilaterally suggesting mild CHF or fluid overload.  There is stable mild associated passive atelectasis at both lung bases. 2. There is a history of metastatic endometrial carcinoma.  Stable left upper lobe/lingular consolidation may relate to a metastasis, atelectasis and/or pneumonia.  Unchanged prominence of the mediastinal and bilateral hilar contours is compatible with known underlying metastatic lymphadenopathy.     Dictated by (CST): Vincent Vazquez MD on 5/15/2025 at 7:25 AM     Finalized by (CST): Vincent Vazquez MD on 5/15/2025 at 7:28 AM          US VENOUS DOPPLER LEG BILAT - DIAG IMG (CPT=93970)  Result Date: 5/14/2025  CONCLUSION:   1. Acute appearing nonocclusive DVT involving the right superficial femoral, popliteal, and posterior tibial veins.  No left lower extremity DVT is  identified.  2. 8.2 x 1.5 x 3.8 cm left posterior popliteal/Baker's cyst.     Dictated by (CST): Kaleb Epps MD on 5/14/2025 at 10:46 AM     Finalized by (CST): Kaleb Epps MD on 5/14/2025 at 10:49 AM          CT CHEST PE AORTA (IV ONLY) (CPT=71260)  Result Date: 5/13/2025  CONCLUSION:   Positive for small volume bilateral segmental PE  Enlarging left upper lobe consolidation and few bilateral pulmonary nodules  Enlarging pericardial effusion now 1.7 centimeter thick.  Increasing left pleural effusion, new right pleural effusion  Discussed with UNRULY Brumfield at 5:40 p.m.   Dictated by (CST): Chris Lovell MD on 5/13/2025 at 5:34 PM     Finalized by (CST): Chris Lovell MD on 5/13/2025 at 5:42 PM               Impression:       Metastatic endometrial carcinoma:  - Initiated carbo/taxol/keytruda on 5/6/25.    2. Pancytopenia:  - secondary to chemotherapy.  - S/P fulphila and neupogen initiated by Dr. Herring.  - Plan transfusions as needed to keep Hgb>7.0 and plts >20.    3. PE:  - Pt unable to receive full dose anticoagulation due to low plts.  - S/P LE doppler venous ultrasound. Shows acute appearing nonocclusive DVT involving the right superficial femoral, popliteal, and posterior tibial veins.   - Received plts yesterday and IVC filter placed.  - Consider initiating anticoagulation when plt ct recovers to greater than 50.      4. Afib with RVR:  - management per cardiology.    Thank you for allowing me to participate in the care of your patient.  Will follow peripherally. Please call with questions.    Trista Peña MD  5/15/2025         [1]   Past Medical History:   Asthma (HCC)    Esophageal reflux    History of blood transfusion    Visual impairment   [2]   Past Surgical History:  Procedure Laterality Date    Thyroidectomy      Total abdom hysterectomy     [3] No family history on file.  [4]   Social History  Socioeconomic History    Marital status: Single   Tobacco Use    Smoking status: Never     Smokeless tobacco: Never   Vaping Use    Vaping status: Never Used   Substance and Sexual Activity    Alcohol use: Never    Drug use: Never     Social Drivers of Health     Food Insecurity: No Food Insecurity (5/12/2025)    NCSS - Food Insecurity     Worried About Running Out of Food in the Last Year: No     Ran Out of Food in the Last Year: No   Transportation Needs: No Transportation Needs (5/12/2025)    NCSS - Transportation     Lack of Transportation: No   Housing Stability: Not At Risk (5/12/2025)    NCSS - Housing/Utilities     Has Housing: Yes     Worried About Losing Housing: No     Unable to Get Utilities: No   [5]   Current Facility-Administered Medications   Medication Dose Route Frequency    docusate sodium (Colace) cap 100 mg  100 mg Oral Daily PRN    polyethylene glycol (PEG 3350) (Miralax) 17 g oral packet 17 g  17 g Oral Daily PRN    magnesium hydroxide (Milk of Magnesia) 400 MG/5ML oral suspension 30 mL  30 mL Oral Daily PRN    bisacodyl (Dulcolax) 10 MG rectal suppository 10 mg  10 mg Rectal Daily PRN    fleet enema (Fleet) rectal enema 133 mL  1 enema Rectal Once PRN    amiodarone (Pacerone) tab 400 mg  400 mg Oral BID with meals    insulin aspart (NovoLOG) 100 Units/mL FlexPen 1-5 Units  1-5 Units Subcutaneous TID CC and HS    sodium chloride (Saline Mist) 0.65 % nasal solution 1 spray  1 spray Each Nare Q3H PRN    maalox/diphenhydramine/lidocaine (First Mouthwash BLM) oral suspension 10 mL  10 mL Oral Q6H PRN    furosemide (Lasix) 10 mg/mL injection 60 mg  60 mg Intravenous BID (Diuretic)    acetaminophen (Tylenol Extra Strength) tab 500 mg  500 mg Oral Q4H PRN    ondansetron (Zofran) 4 MG/2ML injection 4 mg  4 mg Intravenous Q6H PRN    metoclopramide (Reglan) 5 mg/mL injection 5 mg  5 mg Intravenous Q8H PRN    glucose (Dex4) 15 GM/59ML oral liquid 15 g  15 g Oral Q15 Min PRN    Or    glucose (Glutose) 40% oral gel 15 g  15 g Oral Q15 Min PRN    Or    glucose-vitamin C (Dex-4) chewable tab 4  tablet  4 tablet Oral Q15 Min PRN    Or    dextrose 50% injection 50 mL  50 mL Intravenous Q15 Min PRN    Or    glucose (Dex4) 15 GM/59ML oral liquid 30 g  30 g Oral Q15 Min PRN    Or    glucose (Glutose) 40% oral gel 30 g  30 g Oral Q15 Min PRN    Or    glucose-vitamin C (Dex-4) chewable tab 8 tablet  8 tablet Oral Q15 Min PRN    ceFEPIme (Maxipime) 1 g in sodium chloride 0.9% 100mL IVPB-YANA  1 g Intravenous Q12H    pantoprazole (Protonix) DR tab 40 mg  40 mg Oral QAM AC    ipratropium-albuterol (Duoneb) 0.5-2.5 (3) MG/3ML inhalation solution 3 mL  3 mL Nebulization Q6H PRN    vancomycin (Vancocin) 1.25 g in sodium chloride 0.9% 250mL IVPB premix  15 mg/kg Intravenous Q24H    levothyroxine (Synthroid) tab 100 mcg  100 mcg Oral Before breakfast    filgrastim-aafi (Nivestym) 300 MCG/0.5ML prefilled syringe 300 mcg  300 mcg Subcutaneous Q24H    nystatin (Mycostatin) 970634 UNIT/ML oral suspension 500,000 Units  5 mL Oral QID   [6]   Medications Prior to Admission   Medication Sig    levothyroxine (SYNTHROID) 100 MCG Oral Tab Take 1 tablet (100 mcg total) by mouth before breakfast.    Potassium Chloride ER 20 MEQ Oral Tab CR Take 1 tablet by mouth daily.    furosemide 20 MG Oral Tab Take 1 tablet (20 mg total) by mouth daily.    Levalbuterol HCl 1.25 MG/3ML Inhalation Nebu Soln Take 3 mL (1.25 mg total) by nebulization every 8 (eight) hours as needed for Wheezing or Shortness of Breath.    pantoprazole 40 MG Oral Tab EC Take 1 tablet (40 mg total) by mouth daily.    amiodarone 200 MG Oral Tab Take 1 tablet (200 mg total) by mouth in the morning, at noon, and at bedtime for 1 day, THEN 1 tablet (200 mg total) in the morning, at noon, and at bedtime.    Fluticasone Propionate  HFA (FLOVENT HFA) 220 MCG/ACT Inhalation Aerosol Inhale 1 puff into the lungs every 12 (twelve) hours. Rinse mouth following use.   [7]   Allergies  Allergen Reactions    Aspirin Tightness in Throat    Penicillins HIVES    Other OTHER (SEE  COMMENTS)     Pt states IV steroid allergy, states it makes her breathing worse

## 2025-05-16 LAB
ANION GAP SERPL CALC-SCNC: 9 MMOL/L (ref 0–18)
BASOPHILS # BLD: 0 X10(3) UL (ref 0–0.2)
BASOPHILS NFR BLD: 0 %
BUN BLD-MCNC: 26 MG/DL (ref 9–23)
BUN/CREAT SERPL: 24.5 (ref 10–20)
CALCIUM BLD-MCNC: 8.6 MG/DL (ref 8.7–10.4)
CHLORIDE SERPL-SCNC: 96 MMOL/L (ref 98–112)
CO2 SERPL-SCNC: 35 MMOL/L (ref 21–32)
CREAT BLD-MCNC: 1.06 MG/DL (ref 0.55–1.02)
DEPRECATED RDW RBC AUTO: 63.7 FL (ref 35.1–46.3)
EGFRCR SERPLBLD CKD-EPI 2021: 57 ML/MIN/1.73M2 (ref 60–?)
EOSINOPHIL # BLD: 0 X10(3) UL (ref 0–0.7)
EOSINOPHIL NFR BLD: 0 %
ERYTHROCYTE [DISTWIDTH] IN BLOOD BY AUTOMATED COUNT: 20.7 % (ref 11–15)
GLUCOSE BLD-MCNC: 185 MG/DL (ref 70–99)
GLUCOSE BLDC GLUCOMTR-MCNC: 167 MG/DL (ref 70–99)
GLUCOSE BLDC GLUCOMTR-MCNC: 171 MG/DL (ref 70–99)
GLUCOSE BLDC GLUCOMTR-MCNC: 185 MG/DL (ref 70–99)
GLUCOSE BLDC GLUCOMTR-MCNC: 203 MG/DL (ref 70–99)
HCT VFR BLD AUTO: 30.1 % (ref 35–48)
HGB BLD-MCNC: 9.4 G/DL (ref 12–16)
LYMPHOCYTES NFR BLD: 0.23 X10(3) UL (ref 1–4)
LYMPHOCYTES NFR BLD: 2 %
MAGNESIUM SERPL-MCNC: 1.5 MG/DL (ref 1.6–2.6)
MCH RBC QN AUTO: 26.2 PG (ref 26–34)
MCHC RBC AUTO-ENTMCNC: 31.2 G/DL (ref 31–37)
MCV RBC AUTO: 83.8 FL (ref 80–100)
MONOCYTES # BLD: 0.45 X10(3) UL (ref 0.1–1)
MONOCYTES NFR BLD: 4 %
NEUTROPHILS # BLD AUTO: 8.26 X10 (3) UL (ref 1.5–7.7)
NEUTROPHILS NFR BLD: 93 %
NEUTS BAND NFR BLD: 1 %
NEUTS HYPERSEG # BLD: 10.62 X10(3) UL (ref 1.5–7.7)
OSMOLALITY SERPL CALC.SUM OF ELEC: 300 MOSM/KG (ref 275–295)
PLATELET # BLD AUTO: 45 10(3)UL (ref 150–450)
PLATELET MORPHOLOGY: NORMAL
PLATELETS.RETICULATED NFR BLD AUTO: 11.7 % (ref 0–7)
POTASSIUM SERPL-SCNC: 3.7 MMOL/L (ref 3.5–5.1)
POTASSIUM SERPL-SCNC: 4.1 MMOL/L (ref 3.5–5.1)
RBC # BLD AUTO: 3.59 X10(6)UL (ref 3.8–5.3)
SODIUM SERPL-SCNC: 140 MMOL/L (ref 136–145)
TOTAL CELLS COUNTED BLD: 100
VANCOMYCIN TROUGH SERPL-MCNC: 19.4 UG/ML (ref 10–20)
WBC # BLD AUTO: 11.3 X10(3) UL (ref 4–11)

## 2025-05-16 PROCEDURE — 99233 SBSQ HOSP IP/OBS HIGH 50: CPT | Performed by: INTERNAL MEDICINE

## 2025-05-16 RX ORDER — MAGNESIUM OXIDE 400 MG/1
800 TABLET ORAL ONCE
Status: COMPLETED | OUTPATIENT
Start: 2025-05-16 | End: 2025-05-16

## 2025-05-16 RX ORDER — DIGOXIN 0.25 MG/ML
500 INJECTION INTRAMUSCULAR; INTRAVENOUS ONCE
Status: COMPLETED | OUTPATIENT
Start: 2025-05-16 | End: 2025-05-16

## 2025-05-16 RX ORDER — POTASSIUM CHLORIDE 14.9 MG/ML
20 INJECTION INTRAVENOUS ONCE
Status: COMPLETED | OUTPATIENT
Start: 2025-05-16 | End: 2025-05-16

## 2025-05-16 RX ORDER — POTASSIUM CHLORIDE 1500 MG/1
40 TABLET, EXTENDED RELEASE ORAL ONCE
Status: DISCONTINUED | OUTPATIENT
Start: 2025-05-16 | End: 2025-05-17

## 2025-05-16 NOTE — PLAN OF CARE
7L NC, potassium 3.1 so potassium replacement done IV, insulin 1 unit given due to blood sugar of 185    Problem: Diabetes/Glucose Control  Goal: Glucose maintained within prescribed range  Description: INTERVENTIONS:- Monitor Blood Glucose as ordered- Assess for signs and symptoms of hyperglycemia and hypoglycemia- Administer ordered medications to maintain glucose within target range- Assess barriers to adequate nutritional intake and initiate nutrition consult as needed- Instruct patient on self management of diabetes  Outcome: Progressing     Problem: Patient Centered Care  Goal: Patient preferences are identified and integrated in the patient's plan of care  Description: Interventions:- What would you like us to know as we care for you? - Provide timely, complete, and accurate information to patient/family- Incorporate patient and family knowledge, values, beliefs, and cultural backgrounds into the planning and delivery of care- Encourage patient/family to participate in care and decision-making at the level they choose- Honor patient and family perspectives and choices  Outcome: Progressing     Problem: RISK FOR INFECTION - ADULT  Goal: Absence of fever/infection during anticipated neutropenic period  Description: INTERVENTIONS- Monitor WBC- Administer growth factors as ordered- Implement neutropenic guidelines  Outcome: Progressing     Problem: SAFETY ADULT - FALL  Goal: Free from fall injury  Description: INTERVENTIONS:  - Assess pt frequently for physical needs  - Identify cognitive and physical deficits and behaviors that affect risk of falls.  - [place] fall precautions as indicated by assessment.  - Educate pt/family on patient safety including physical limitations  - Instruct pt to call for assistance with activity based on assessment  - Modify environment to reduce risk of injury  - Provide assistive devices as appropriate  - Consider OT/PT consult to assist with strengthening/mobility  - Encourage  toileting schedule  Outcome: Progressing     Problem: DISCHARGE PLANNING  Goal: Discharge to home or other facility with appropriate resources  Description: INTERVENTIONS:  - Identify barriers to discharge w/pt and caregiver  - Include patient/family/discharge partner in discharge planning  - Arrange for needed discharge resources and transportation as appropriate  - Identify discharge learning needs (meds, wound care, etc)  - Arrange for interpreters to assist at discharge as needed  - Consider post-discharge preferences of patient/family/discharge partner  - Complete POLST form as appropriate  - Assess patient's ability to be responsible for managing their own health  - Refer to Case Management Department for coordinating discharge planning if the patient needs post-hospital services based on physician/LIP order or complex needs related to functional status, cognitive ability or social support system  Outcome: Progressing     Problem: CARDIOVASCULAR - ADULT  Goal: Maintains optimal cardiac output and hemodynamic stability  Description: INTERVENTIONS:- Monitor vital signs, rhythm, and trends- Monitor for bleeding, hypotension and signs of decreased cardiac output- Evaluate effectiveness of vasoactive medications to optimize hemodynamic stability- Monitor arterial and/or venous puncture sites for bleeding and/or hematoma- Assess quality of pulses, skin color and temperature- Assess for signs of decreased coronary artery perfusion - ex. Angina- Evaluate fluid balance, assess for edema, trend weights  Outcome: Progressing  Goal: Absence of cardiac arrhythmias or at baseline  Description: INTERVENTIONS:- Continuous cardiac monitoring, monitor vital signs, obtain 12 lead EKG if indicated- Evaluate effectiveness of antiarrhythmic and heart rate control medications as ordered- Initiate emergency measures for life threatening arrhythmias- Monitor electrolytes and administer replacement therapy as ordered  Outcome:  Progressing     Problem: RESPIRATORY - ADULT  Goal: Achieves optimal ventilation and oxygenation  Description: INTERVENTIONS:- Assess for changes in respiratory status- Assess for changes in mentation and behavior- Position to facilitate oxygenation and minimize respiratory effort- Oxygen supplementation based on oxygen saturation or ABGs- Provide Smoking Cessation handout, if applicable- Encourage broncho-pulmonary hygiene including cough, deep breathe, Incentive Spirometry- Assess the need for suctioning and perform as needed- Assess and instruct to report SOB or any respiratory difficulty- Respiratory Therapy support as indicated- Manage/alleviate anxiety- Monitor for signs/symptoms of CO2 retention  Outcome: Progressing

## 2025-05-16 NOTE — PROGRESS NOTES
Pulmonary Medicine Inpatient Progress Note                 Subjective:  Afebrile  On 6 LHFNC  Went into afib with RVR. Given po amio and oral dig. Also given 250 ml bolus.   Returned to NSR, transferred to CCU for closer monitoring  Still feels short of breath       ALLERGIES:  Allergies[1]     MEDS:  Home Medications:  Medications Taking[2]  Scheduled Medication:  Scheduled Medications[3]  Continuous Infusing Medication:  Medication Infusions[4]  PRN Medications:  PRN Medications[5]       PHYSICAL EXAM:  BP 96/65 (BP Location: Right arm)   Pulse 95   Temp 97.8 °F (36.6 °C) (Axillary)   Resp 19   Ht 5' (1.524 m)   Wt 163 lb 8 oz (74.2 kg)   SpO2 97%   BMI 31.93 kg/m²   CONSTITUTIONAL: alert, oriented, no apparent distress  HEENT: atraumatic normocephalic  MOUTH: mucous membranes are moist. No OP exudates  NECK/THROAT: no JVD. Trachea midline. No obvious thyromegaly  LUNG: clear upper b/l no wheezing, crackles. Diminished bases. Chest symmetric with respiratory motion  HEART: regular rate and rhythm, no obvious murmers or gallops note  ABD: soft non tender. + bowel sounds. No organomegaly noted  EXT: no clubbing, cyanosis. 2+ b/l LE edema       IMAGES:  CXR 5/15/25  CONCLUSION:   1. The cardiac silhouette remains enlarged, likely relating to the known pericardial effusion.  Small pleural effusions are also unchanged bilaterally suggesting mild CHF or fluid overload.  There is stable mild associated passive atelectasis at both   lung bases.   2. There is a history of metastatic endometrial carcinoma.  Stable left upper lobe/lingular consolidation may relate to a metastasis, atelectasis and/or pneumonia.  Unchanged prominence of the mediastinal and bilateral hilar contours is compatible with known underlying metastatic lymphadenopathy.       LABS:  Recent Labs   Lab 05/14/25  0653 05/15/25  1246 05/16/25  0537   RBC 3.33* 3.60* 3.59*   HGB 8.5* 9.2* 9.4*   HCT 27.2* 30.1* 30.1*   MCV 81.7 83.6 83.8   MCH 25.5*  25.6* 26.2   MCHC 31.3 30.6* 31.2   RDW 20.2* 20.7* 20.7*   NEPRELIM 0.73* 7.01 8.26*   WBC 1.5* 9.5 11.3*   PLT 25.0* 52.0* 45.0*       Recent Labs   Lab 05/12/25  1411 05/13/25  0625 05/14/25  0653 05/15/25  1246 05/15/25  2233 05/16/25  0537 05/16/25  1054   *   < > 193* 186*  --  185*  --    BUN 22   < > 19 21  --  26*  --    CREATSERUM 0.78   < > 0.87 1.00  --  1.06*  --    EGFRCR 82   < > 72 61  --  57*  --    CA 9.0   < > 8.6* 8.9  --  8.6*  --    ALB 3.2  --   --   --   --   --   --       < > 140 140  --  140  --    K 4.2   < > 3.2* 3.4* 3.1* 3.7 4.1      < > 94* 96*  --  96*  --    CO2 32.0   < > 38.0* 38.0*  --  35.0*  --    ALKPHO 142  --   --   --   --   --   --    AST 26  --   --   --   --   --   --    ALT 10  --   --   --   --   --   --    BILT 0.7  --   --   --   --   --   --    TP 6.8  --   --   --   --   --   --     < > = values in this interval not displayed.       ASSESSMENT/PLAN:  Endometrial ca with lung mets  -oncology f/u    B/l pulmonary emboli and DVT  -unable to anticoagulate d/t thrombocytopenia  -oncology following  -s/p IVC filter placement by IR    Pericardial effusion  -moderate on TTE    Tachycardia-went into afib with RVR today and hypotension  -on amio. Given digoxin and small fluid bolus  -transferred to CCU for closer monitoring. Now back in NSR and /70  -cardiology following     Pleural effusion  -too small for thoracentesis      Proph  -DVT: SCDS given anemia    Dispo  -full code  -monitor in CCU overnight     Thank you for the opportunity to care for Kelli Fisher.     DARIEL Reaves DO, MPH  Pulmonary Critical Care Medicine  [facility]          [1]   Allergies  Allergen Reactions    Aspirin Tightness in Throat    Penicillins HIVES    Other OTHER (SEE COMMENTS)     Pt states IV steroid allergy, states it makes her breathing worse    [2]   Outpatient Medications Marked as Taking for the 5/12/25 encounter  (Hospital Encounter)   Medication Sig Dispense Refill    levothyroxine (SYNTHROID) 100 MCG Oral Tab Take 1 tablet (100 mcg total) by mouth before breakfast.      Potassium Chloride ER 20 MEQ Oral Tab CR Take 1 tablet by mouth daily.      furosemide 20 MG Oral Tab Take 1 tablet (20 mg total) by mouth daily.      Levalbuterol HCl 1.25 MG/3ML Inhalation Nebu Soln Take 3 mL (1.25 mg total) by nebulization every 8 (eight) hours as needed for Wheezing or Shortness of Breath.      pantoprazole 40 MG Oral Tab EC Take 1 tablet (40 mg total) by mouth daily. 30 tablet 0    amiodarone 200 MG Oral Tab Take 1 tablet (200 mg total) by mouth in the morning, at noon, and at bedtime for 1 day, THEN 1 tablet (200 mg total) in the morning, at noon, and at bedtime. 93 tablet 0    Fluticasone Propionate  HFA (FLOVENT HFA) 220 MCG/ACT Inhalation Aerosol Inhale 1 puff into the lungs every 12 (twelve) hours. Rinse mouth following use.     [3]    potassium chloride  40 mEq Oral Once    furosemide  40 mg Intravenous BID (Diuretic)    amiodarone  400 mg Oral BID with meals    insulin aspart  1-5 Units Subcutaneous TID CC and HS    cefepime  1 g Intravenous Q12H    pantoprazole  40 mg Oral QAM AC    vancomycin  15 mg/kg Intravenous Q24H    levothyroxine  100 mcg Oral Before breakfast    filgrastim-aafi  300 mcg Subcutaneous Q24H    nystatin  5 mL Oral QID   [4] [5]   docusate sodium    polyethylene glycol (PEG 3350)    magnesium hydroxide    bisacodyl    fleet enema    sodium chloride    maalox/diphenhydramine/lidocaine    acetaminophen    ondansetron    metoclopramide    glucose **OR** glucose **OR** glucose-vitamin C **OR** dextrose **OR** glucose **OR** glucose **OR** glucose-vitamin C    ipratropium-albuterol

## 2025-05-16 NOTE — PAYOR COMM NOTE
--------------  CONTINUED STAY REVIEW    Payor: [employer]  Subscriber #:  [id number]  Authorization Number: [id number]    Admit date: 5/12/25  Admit time:  5:28 PM    5/16/25   Subjective:  Back into AFRVR this morning around 0730, rates 140s, BP low, asymptomatic  Breathing appears more labored this morning  C/o that she is unable to void     BP (!) 81/52 (BP Location: Right arm)   Pulse (!) 148   Temp 97.3 °F (36.3 °C) (Axillary)   Resp 18   Ht 5' (1.524 m)   Wt 163 lb 8 oz (74.2 kg)   SpO2 94%   BMI 31.93 kg/m²      Telemetry: SR 90s>AFRVR 140s 0730    Lab 05/14/25  0653 05/15/25  1246 05/15/25  2233 05/16/25  0537   * 186*  --  185*   BUN 19 21  --  26*   CREATSERUM 0.87 1.00  --  1.06*   EGFRCR 72 61  --  57*   CA 8.6* 8.9  --  8.6*    140  --  140   K 3.2* 3.4* 3.1* 3.7   CL 94* 96*  --  96*   CO2 38.0* 38.0*  --  35.0*      Lab 05/14/25  0653 05/15/25  1246 05/16/25  0537   RBC 3.33* 3.60* 3.59*   HGB 8.5* 9.2* 9.4*   HCT 27.2* 30.1* 30.1*   MCV 81.7 83.6 83.8   MCH 25.5* 25.6* 26.2   MCHC 31.3 30.6* 31.2   RDW 20.2* 20.7* 20.7*   NEPRELIM 0.73* 7.01 8.26*   WBC 1.5* 9.5 11.3*   PLT 25.0* 52.0* 45.0*       Neck: no JVD.  Lungs: clear to auscultation bilaterally. Labored breathing  Chest wall: No tenderness or deformity.  Heart: Irregularly irregular rate and rhythm, S1, S2 normal, no murmur, click, rub or gallop.  Abdomen: Soft, non-tender. Bowel sounds normal. No masses,  No organomegaly.  Extremities: Extremities normal, atraumatic, no cyanosis, 1+ BLE edema.  Pulses: 2+ and symmetric all extremities.  Neurologic: Grossly intact.     [Scheduled Medications]   magnesium oxide  800 mg Oral Once    potassium chloride  40 mEq Oral Once    digoxin  500 mcg Intravenous Once    furosemide  40 mg Intravenous BID (Diuretic)    amiodarone  400 mg Oral BID with meals    insulin aspart  1-5 Units Subcutaneous TID CC and HS    cefepime  1 g Intravenous Q12H    pantoprazole  40 mg Oral  QAM AC    vancomycin  15 mg/kg Intravenous Q24H    levothyroxine  100 mcg Oral Before breakfast    filgrastim-aafi  300 mcg Subcutaneous Q24H    nystatin  5 mL Oral QID     Assessment:  70 year old female with PMH of endometrial cancer with mets to lungs, anemia, PSVT, small pericardial effusion who presented to ER with dyspnea and palpitations. She was found to be in PAF/PAT and anemic with Hgb 7.3.      Paroxysmal atrial tachycardia/Atrial fibrillation   Hx PSVT during last admission with junctional rhythm noted while on BB  -transitioned to amiodarone gtt, now on PO  -in and out of AF/SR  -AFRVR this morning up to 150s  -no BB/CCB with previous junctional rhythm  -TSH unremarkable 3.705, s/p thyroidectomy  -not on DOAC due to pancytopenia  -LVEF preserved on echo last admission  -CHADS-VASc= 3 for age, gender, DM     Dyspnea  O2 sats increasing, baseline 4L, now on 6L  -last admission had small pericardial effusion   -limited echo on 5/13 with moderate effusion  -known lung mets  -worsening anemia (Hgb 9.0 on 5/5 discharge, admitted 5/12 7.3, 6.4 on 5/13 s/p 1u PRBC  -CXR on admission showed stable effusion, lymph node mets, Left mid/upper lobe mass, suboptimal inspiration  -D-dimer elevated 3, CT chest PE positive for small bilateral segmental PE  -IV lasix BID  -I/Os net neg 3.8L, 2L UOP past 24 hours, weight up overnight, doubt accuracy     Bilateral Pulmonary Embolism  D-dimer elevated 3  -CT chest PE positive for bilateral small PE  -b/l venous doppler revealed nonocclusive DVT in right superficial femoral, popliteal, and posterior tibial veins  -IVC filter placed 5/14     Small pericardial effusion   Echo on 5/5 revealed small to moderate pericardial effusion   -repeat limited echo effusion moderate in size, no evidence of hemodynamic compromise  -s/p 1u PRBC  -BP stable in 90s-100s     Endometrial cancer with mets to lungs/lymph nodes  Stage Ivb clear cell adenocarcinoma  -chemotherapy per gyne      Pancytopenia  Sudden drop in hgb, plts, WBC in one week  -recently started chemo, s/p 1 treatment  -gyne following  -1u PRBC transfused 5/13, goal to keep Hgb >7  -s/p 1u plts transfusion prior to IVC filter placement  -daily neupogen for neutropenia     Thrush  Sore throat  2/2 neutropenia  -gyne ordered nystatin swish     DM2-A1c 6.6     Hypokalemia  Hypomagnesemia  2/2 diuresis  -replace per cardiac electrolyte protocol     Urinary retention  States she is unable to void this morning      Plan:  -give one time dose IV digoxin 500mcg for rate control to improve BP  -continue amiodarone 400mg BID for one week total, then decrease to 200mg daily  -Continue lasix 40mg BID, monitor strict I/Os, daily weights, daily BMP  -replete electrolytes as needed per protocol  -further recs per gyne/pulm/primary  -check bladder scan            MEDICATIONS ADMINISTERED IN LAST 1 DAY:  amiodarone (Pacerone) tab 400 mg       Date Action Dose Route User    5/16/2025 0802 Given 400 mg Oral Hayden Mendoza RN    5/15/2025 1847 Given 400 mg Oral Reyes Soto, Oscar, RN          ceFEPIme (Maxipime) 1 g in sodium chloride 0.9% 100mL IVPB-Lentner       Date Action Dose Route User    5/16/2025 0621 New Bag 1 g Intravenous Jemima Garcia RN    5/15/2025 1938 New Bag 1 g Intravenous Reyes Soto, Oscar, RN          digoxin (Lanoxin) 250 MCG/ML injection 500 mcg       Date Action Dose Route User    5/16/2025 0911 Given 500 mcg Intravenous Hayden Mendoza RN          furosemide (Lasix) 10 mg/mL injection 40 mg       Date Action Dose Route User    5/15/2025 1846 Given 40 mg Intravenous Reyes Soto, Oscar, RN          insulin aspart (NovoLOG) 100 Units/mL FlexPen 1-5 Units       Date Action Dose Route User    5/16/2025 0832 Given 2 Units Subcutaneous (Left Lower Arm) Hayden Mendoza RN    5/15/2025 2249 Given 1 Units Subcutaneous (Right Upper Arm) Jemima Garcia RN    5/15/2025 1858 Given 3 Units Subcutaneous (Left Upper Arm) Reyes Soto, Oscar, RN     5/15/2025 1200 Given 2 Units Subcutaneous (Left Lower Abdomen) Reyes Soto, Oscar, RN          levothyroxine (Synthroid) tab 100 mcg       Date Action Dose Route User    5/16/2025 0526 Given 100 mcg Oral Jemima Garcia RN          magnesium oxide (Mag-Ox) tab 800 mg       Date Action Dose Route User    5/15/2025 1532 Given 800 mg Oral Reyes Soto, Oscar, RN          magnesium oxide (Mag-Ox) tab 800 mg       Date Action Dose Route User    5/16/2025 0906 Given 800 mg Oral Hayden Mendoza RN          nystatin (Mycostatin) 178180 UNIT/ML oral suspension 500,000 Units       Date Action Dose Route User    5/16/2025 0906 Given 500,000 Units Oral Hayden Mendoza RN    5/15/2025 2249 Given 500,000 Units Oral Jemima Garcia RN    5/15/2025 1847 Given 500,000 Units Oral Reyes Soto, Oscar, RN          pantoprazole (Protonix) DR tab 40 mg       Date Action Dose Route User    5/16/2025 0526 Given 40 mg Oral Jemima Garcia RN          potassium chloride 20 mEq/100mL IVPB premix 20 mEq       Date Action Dose Route User    5/16/2025 0545 New Bag 20 mEq Intravenous Jemima Garcia RN          potassium chloride 40 mEq in 250mL sodium chloride 0.9% IVPB premix       Date Action Dose Route User    5/16/2025 0133 New Bag 40 mEq Intravenous Jemima Garcia RN          potassium chloride (Klor-Con M20) tab 40 mEq       Date Action Dose Route User    5/15/2025 1847 Given 40 mEq Oral Reyes Soto, Oscar, RN    5/15/2025 1533 Given 40 mEq Oral Reyes Soto, Oscar, RN          vancomycin (Vancocin) 1.25 g in sodium chloride 0.9% 250mL IVPB premix       Date Action Dose Route User    5/15/2025 1847 New Bag 1,250 mg Intravenous Reyes Soto, Oscar, RN            Vitals (last day)       Date/Time Temp Pulse Resp BP SpO2 Weight O2 Device O2 Flow Rate (L/min) [facility]    05/16/25 1110 -- 103 -- 82/62 -- -- -- -- RS    05/16/25 0911 -- 154 -- -- -- -- -- --     05/16/25 0859 97.8 °F (36.6 °C) 143 20 79/52 -- -- -- --     05/16/25 0854 -- -- -- -- 94 %  -- High flow nasal cannula 8 L/min SS    05/16/25 0833 -- 148 -- 81/52 -- -- -- -- RS    05/16/25 0830 -- 151 -- 89/67 -- -- -- -- RS    05/16/25 0828 -- 147 -- 76/49 -- -- -- -- RS    05/16/25 0824 -- 145 -- 75/52 -- -- -- -- RS    05/16/25 0820 -- 142 -- 76/57 -- -- -- -- RS    05/16/25 0806 -- 142 -- 91/66 -- -- -- -- RS    05/16/25 0756 -- 141 -- 94/82 -- -- -- -- RS    05/16/25 0749 -- 142 -- 93/67 96 % -- Nasal cannula 8 L/min RS    05/16/25 0747 -- 146 -- 88/70 -- -- -- -- RS    05/16/25 0500 97.3 °F (36.3 °C) 103 18 101/77 96 % -- Nasal cannula 7 L/min KO    05/16/25 0439 -- -- -- -- -- 163 lb 8 oz (74.2 kg) -- -- FS    05/15/25 2236 97.5 °F (36.4 °C) 95 20 104/72 99 % -- Nasal cannula 7 L/min KO    05/15/25 1840 98.3 °F (36.8 °C) 99 22 110/63 91 % -- Nasal cannula 8 L/min OR    05/15/25 0754 98 °F (36.7 °C) 90 18 94/69 97 % -- Nasal cannula 6 L/min OR     Blood Transfusion Record       Product Unit Status Volume Start End            Transfuse RBC       25  [id number]  [id number] Completed 05/13/25 1431 335 mL 05/13/25 1144 05/13/25 1428                Transfuse platelets       25  [id number]  [id number] Completed 05/14/25 1319 200 mL 05/14/25 1116 05/14/25 1316

## 2025-05-16 NOTE — PROGRESS NOTES
[facility]  part of [facility]    Gynecologic Oncology Progress Note    Kelli Fisher Patient Status:  Inpatient    1955 MRN [id number]   Location [facility] 2W/SW Attending Ariela Tello MD   Hosp Day # 4 PCP Taylor Gallardo MD       Date of Admission:  2025  Reason for Admission:  Stage IV endometrial cancer  Failure to thrive  Respiratory dysfunction  New PE/DVT  Cardiac arrhythmias    SUBJECTIVE:  Patient is a 70 year old female.  No obstetric history on file.  No LMP recorded. Patient has had a hysterectomy.  Overall the patient and her family feel that she has declined somewhat today requiring approximately 8 L of oxygen.  Patient states that she still having a small amount of regular diet and protein shakes and tolerates approximately half of her meal.  She continues to pass flatus and had a bowel movement approximately 24 hours ago that was a large amount per the patient.  She just had an IVC filter placed yesterday    Medications:  Prescriptions Prior to Admission[1]    Allergies:  Allergies[2]       Review of Systems:  normal menses, no abnormal bleeding, pelvic pain or discharge, no breast pain or new or enlarging lumps on self exam, no side effects of hormonal medications, no vaginal bleeding, no discharge or pelvic pain, no hot flashes    OBJECTIVE:  Patient Vitals for the past 24 hrs:   BP Temp Temp src Pulse Resp SpO2 Weight   25 1600 111/70 97.4 °F (36.3 °C) Temporal 97 (!) 27 96 % 156 lb 1.4 oz (70.8 kg)   25 1556 114/70 97.4 °F (36.3 °C) Temporal 93 (!) 28 96 % --   25 1441 96/65 -- -- 95 19 97 % --   25 1324 91/59 -- -- 96 -- 98 % --   25 1110 (!) 82/62 -- -- 103 -- -- --   25 0931 -- -- -- 104 -- -- --   25 0911 -- -- -- (!) 154 -- -- --   25 0859 (!) 79/52 97.8 °F (36.6 °C) Axillary (!) 143 20 -- --   25 0854 -- -- -- -- -- 94 % --   2533 (!) 81/52 -- -- (!) 148 -- -- --   25 (!)  89/67 -- -- (!) 151 -- -- --   05/16/25 0828 (!) 76/49 -- -- (!) 147 -- -- --   05/16/25 0824 (!) 75/52 -- -- (!) 145 -- -- --   05/16/25 0820 (!) 76/57 -- -- (!) 142 -- -- --   05/16/25 0806 91/66 -- -- (!) 142 -- -- --   05/16/25 0756 94/82 -- -- (!) 141 -- -- --   05/16/25 0749 93/67 -- -- (!) 142 -- 96 % --   05/16/25 0747 (!) 88/70 -- -- (!) 146 -- -- --   05/16/25 0500 101/77 97.3 °F (36.3 °C) Axillary 103 18 96 % --   05/16/25 0439 -- -- -- -- -- -- 163 lb 8 oz (74.2 kg)   05/15/25 2236 104/72 97.5 °F (36.4 °C) Axillary 95 20 99 % --   05/15/25 2034 -- -- -- 98 -- 97 % --   05/15/25 1840 110/63 98.3 °F (36.8 °C) Oral 99 22 91 % --       Intake/Output Summary (Last 24 hours) at 5/16/2025 1637  Last data filed at 5/16/2025 1500  Gross per 24 hour   Intake 830 ml   Output 700 ml   Net 130 ml       Physical Exam:  General appearance: alert, appears stated age and cooperative  Head: Normocephalic, without obvious abnormality, atraumatic  Eyes: conjunctivae/corneas clear. PERRL, EOM's intact.   Ears:  external ear canals both ears  Throat: Patient has what appears to be thrush in the back of her throat.  Somewhat improved  Extremities: extremities normal, atraumatic, no cyanosis or edema  Skin: Skin color, texture, turgor normal. No rashes or lesions  Lymph nodes: Cervical, supraclavicular, and axillary nodes normal.  Neurologic: Grossly normal    Diagnostics:  XR CHEST AP PORTABLE  (CPT=28890)  Result Date: 5/15/2025  PROCEDURE: XR CHEST AP PORTABLE  (CPT=71045) TIME: 6:34.   COMPARISON: [facility], CT CHEST PE AORTA (IV ONLY) (CPT=71260), 5/13/2025, 5:15 PM.  [facility], XR CHEST AP PORTABLE (CPT=71045), 5/12/2025, 2:43 PM.  INDICATIONS: Shortness of breath.  Acute on chronic respiratory failure, pulmonary emboli, history of metastatic endometrial carcinoma.  TECHNIQUE:   Single view.   FINDINGS:  CARDIAC/VASC: The cardiac silhouette remains enlarged.  Unremarkable pulmonary  vasculature.  MEDIAST/SCOTT:   Prominence of the mediastinal and bilateral hilar contours is unchanged consistent with known lymphadenopathy. LUNGS/PLEURA: There is stable consolidation in the lateral aspect of the lingula/left upper lobe.  The right hemidiaphragm remains mildly elevated.  Small pleural effusions and mild associated passive atelectasis are unchanged bilaterally.  No pneumothorax. BONES: The osseous structures are unchanged. OTHER: A right-sided subcutaneous chest port tip again projects over the cavoatrial junction, in customary positioning.         CONCLUSION:  1. The cardiac silhouette remains enlarged, likely relating to the known pericardial effusion.  Small pleural effusions are also unchanged bilaterally suggesting mild CHF or fluid overload.  There is stable mild associated passive atelectasis at both lung bases. 2. There is a history of metastatic endometrial carcinoma.  Stable left upper lobe/lingular consolidation may relate to a metastasis, atelectasis and/or pneumonia.  Unchanged prominence of the mediastinal and bilateral hilar contours is compatible with known underlying metastatic lymphadenopathy.     Dictated by (CST): Vincent Vazquez MD on 5/15/2025 at 7:25 AM     Finalized by (CST): Vincent Vazquez MD on 5/15/2025 at 7:28 AM          US VENOUS DOPPLER LEG BILAT - DIAG IMG (CPT=93970)  Result Date: 5/14/2025  PROCEDURE: US VENOUS DOPPLER LEG BILAT-DIAG IMG (CPT=93970)  COMPARISON: None.  INDICATIONS: b/l PE  TECHNIQUE: Color duplex Doppler venous ultrasound of both lower extremities was performed in the  usual manner.  FINDINGS:   There is acute appearing nonocclusive DVT involving the right superficial femoral (mid and distal segments), the right popliteal, and right posterior tibial veins.  There is no DVT identified within the right common femoral vein.  The deep veins of the left lower extremity demonstrate normal blood flow, compressibility, and augmentation.  There is a  8.2 x 1.5 x 3.8 cm left posterior popliteal/Baker's cyst.         CONCLUSION:   1. Acute appearing nonocclusive DVT involving the right superficial femoral, popliteal, and posterior tibial veins.  No left lower extremity DVT is identified.  2. 8.2 x 1.5 x 3.8 cm left posterior popliteal/Baker's cyst.     Dictated by (CST): Kaleb Epps MD on 5/14/2025 at 10:46 AM     Finalized by (CST): Kaleb Epps MD on 5/14/2025 at 10:49 AM          CT CHEST PE AORTA (IV ONLY) (CPT=71260)  Result Date: 5/13/2025  PROCEDURE: CT CHEST PE AORTA (IV ONLY) (CPT=71260)  COMPARISON: [facility], CT CHEST PE AORTA (IV ONLY) (CPT=71260), 4/29/2025, 11:50 PM.  INDICATIONS: Elevated d dimer.  TECHNIQUE: CT images of the chest were obtained with non-ionic intravenous contrast material.  Automated exposure control for dose reduction was used. Adjustment of the mA and/or kV was done based on the patient's size. Use of iterative reconstruction technique for dose reduction was used. Dose information is transmitted to the ACR (American College of Radiology) NRDR (National Radiology Data Registry) which includes the Dose Index Registry.  FINDINGS:  No acute aortic syndrome.  Positive for small volume acute bilateral nonocclusive segmental PE in the right upper lobe, right lower lobe, left lower lobe  Enlarging circumferential pericardial effusion now 1.7 centimeter thick over the lateral wall.   Increasing small left and new trace right pleural effusion  Stable heart size.  No significant coronary calcification.  Venous catheter in the lower SVC  Confluent neck and mediastinal mass is grossly unchanged  Enlarging left upper lobe consolidation  Stable right basilar atelectasis  Some of the bilateral pulmonary nodules have increased in size        CONCLUSION:   Positive for small volume bilateral segmental PE  Enlarging left upper lobe consolidation and few bilateral pulmonary nodules  Enlarging pericardial effusion now 1.7  centimeter thick.  Increasing left pleural effusion, new right pleural effusion  Discussed with UNRULY Brumfield at 5:40 p.m.   Dictated by (CST): Chris Lovell MD on 2025 at 5:34 PM     Finalized by (CST): Chris Lovell MD on 2025 at 5:42 PM          CARD ECHO [facility] (CPT=93308/99864/89367)  Result Date: 2025  Transthoracic Echocardiogram Name:Kelli Fisher Date: 2025 :  1955 Ht:  (60in)  BP: 106 / 75 MRN:  [id number]    Age:  70years    Wt:  (167lb) HR: 85bpm Loc:  [facility]       Gndr: F          BSA: 1.73m^2 Sonographer: Naty PINTO Ordering:    Margarette Sexton Consulting:  Dominick Herring ---------------------------------------------------------------------------- History/Indications:   SOB and known Pericardial Effusion. ---------------------------------------------------------------------------- Procedure information:  A transthoracic echocardiogram, limited study was performed. Additional evaluation included M-mode and limited 2D.  Patient status:  Inpatient.  Location:  Bedside.    Comparison was made to the study of 2025.    This was a routine study. Transthoracic echocardiography for diagnosis. Image quality was adequate. ---------------------------------------------------------------------------- Conclusions: 1. Left ventricle: The cavity size was normal. Wall thickness was mildly    increased. There was mild concentric hypertrophy. Systolic function was    normal. The estimated ejection fraction was 55-60%, by visual assessment.    Unable to assess LV diastolic function. 2. Right ventricle: The cavity size was normal. Systolic function was    normal. 3. Pericardium, extracardiac: A moderate, free-flowing pericardial effusion    was identified. There was no evidence of hemodynamic compromise. 4. Systemic veins: Central venous respirophasic diameter changes are blunted    (< 50%). 5. Inferior vena cava: The IVC was dilated. *  ---------------------------------------------------------------------------- * Findings: Left ventricle:  The cavity size was normal. Wall thickness was mildly increased. There was mild concentric hypertrophy. Systolic function was normal. The estimated ejection fraction was 55-60%, by visual assessment. Unable to assess LV diastolic function. Left atrium:  Well visualized. Right ventricle:  The cavity size was normal. Systolic function was normal. Right atrium:  Well visualized. Mitral valve:  Well visualized. Aortic valve:   The valve was trileaflet. Tricuspid valve:  The annulus is normal-sized. The leaflets are normal thickness. No echocardiographic evidence for tricuspid prolapse.  Doppler: Transvalvular velocity was within the normal range. There was no evidence for stenosis. There was mild regurgitation. Pulmonic valve:    Doppler:  There was trivial regurgitation. Pericardium:  A moderate, free-flowing pericardial effusion was identified. There was no evidence of hemodynamic compromise. Systemic veins:  Central venous respirophasic diameter changes are blunted (< 50%). Inferior vena cava: The IVC was dilated. ---------------------------------------------------------------------------- Measurements  Left ventricle         Value        Ref       05/05/2025  IVS thickness, ED, (H) 1.3   cm     0.6 - 0.9 1.0  PLAX  LV ID, ED, PLAX        4.0   cm     3.8 - 5.2 3.6  LV ID, ES, PLAX        2.4   cm     2.2 - 3.5 2.4  LV PW thickness,   (H) 1.1   cm     0.6 - 0.9 1.0  ED, PLAX  IVS/LV PW ratio,       1.18         --------- 1.00  ED, PLAX  LV PW/LV ID ratio,     0.28         --------- 0.28  ED, PLAX  LV ejection            71    %      54 - 74   63  fraction  Stroke volume/bsa,     35    ml/m^2 --------- ----------  2D  LVOT                   Value        Ref       05/05/2025  LVOT ID                2     cm     --------- 2  LVOT peak              1.21  m/sec  --------- ----------  velocity, S  LVOT VTI, S             19.4  cm     --------- ----------  LVOT peak              6     mm Hg  --------- ----------  gradient, S  LVOT mean              3     mm Hg  --------- ----------  gradient, S  Stroke volume          61    ml     --------- ----------  (SV), LVOT DP  Stroke index           35    ml/m^2 --------- ----------  (SV/bsa), LVOT DP  Pulmonary artery       Value        Ref       05/05/2025  PA pressure, S, DP     44    mm Hg  --------- ----------  Tricuspid valve        Value        Ref       05/05/2025  Tricuspid regurg       2.68  m/sec  <=2.8     ----------  peak velocity  Tricuspid peak         29    mm Hg  --------- ----------  RV-RA gradient  Systemic veins         Value        Ref       05/05/2025  Estimated CVP          15    mm Hg  --------- 15  Inferior vena cava     Value        Ref       05/05/2025  ID                 (H) 2.3   cm     <=2.1     2.4  Right ventricle        Value        Ref       05/05/2025  TAPSE, 2D              1.93  cm     >=1.70    ----------  TAPSE, MM              1.93  cm     >=1.70    ----------  RV pressure, S, DP     44    mm Hg  --------- ---------- Legend: (L)  and  (H)  corinna values outside specified reference range. ---------------------------------------------------------------------------- Prepared and electronically signed by Javi Huerta 05/13/2025 16:05     XR CHEST AP PORTABLE  (CPT=71045)  Result Date: 5/12/2025  PROCEDURE: XR CHEST AP PORTABLE  (CPT=71045) TIME: 1443 hours  COMPARISON: [facility], XR CHEST AP PORTABLE (CPT=71045), 4/29/2025, 10:40 PM.  [facility], CT CHEST PE AORTA (IV ONLY) (CPT=71260), 4/29/2025, 11:50 PM.  [facility], XR CHEST AP PORTABLE (CPT=71045), 5/02/2025, 6:22 AM.  INDICATIONS: Low oxygen, shortness of breath.  History of lung cancer.  TECHNIQUE:   Single view.   FINDINGS:  CARDIAC/VASC: Enlarged cardiac silhouette is related to known pericardial effusion. Pulmonary vascularity normal. MEDIAST/SCOTT:    Stable marked mediastinal lymphadenopathy. LUNGS/PLEURA: Stable left upper lobe mass.  Suboptimal inspiration.  No significant changes BONES: No fracture or visible bony lesion. OTHER: Right Port-A-Cath tip in superior right atrium.         CONCLUSION:  1. Suboptimal inspiration.  No acute finding or significant change. 2. Left mid lung/upper lobe mass compatible with known malignancy. 3. Stable mediastinal lymph node metastases. 4. Stable pericardial effusion.    Dictated by (CST): Jayson Fournier MD on 5/12/2025 at 3:04 PM     Finalized by (CST): Jayson Fournier MD on 5/12/2025 at 3:08 PM          IR PORT A CATH INSERTION EXCHNGE CHECK  Result Date: 5/6/2025  PROCEDURE: IR PORT A CATH INSERTION  INDICATIONS: port insertion  COMPARISON: None.  (S):  Kellie  ANESTHESIA/SEDATION: Level of anesthesia/sedation: Moderate sedation (conscious sedation) Anesthesia/sedation administered by: Nurse or other independent trained observer who has no other duties with continuous monitoring of the patient's level of consciousness and physiologic status, under the personal supervision of the attending physician. Total intra-service sedation time:  15 min  ESTIMATED BLOOD LOSS: Less than 5 mL  COMPLICATIONS: None  FINDINGS:  Informed consent was obtained. The right side of the neck and chest were sterilely prepped and draped.  The procedure was performed with the patient in a sitting semi upright position, as she could not tolerate lying flat due to dyspnea.  Ultrasound demonstrated the right internal jugular vein to be patent. Local Lidocaine was administered. Under ultrasound guidance, the vein was accessed with a micropuncture set; an image was saved.  Under fluoroscopic guidance, a 0.035 inch wire was advanced into the inferior vena cava. The access was dilated. A peel-away sheath was advanced over the Amplatz wire and secured.  Local Lidocaine was administered, and a horizontal skin incision was made in the chest several  cm from the venotomy with a 15 blade. Blunt dissection of the soft tissues was then performed to create a pocket for the chest port device.  The port catheter was tunneled from the pocket to the venotomy. The catheter was advanced through the peel-away sheath to the superior vena cava-right atrial junction and cut to appropriate length. The port catheter was attached to the port device which was then placed into the pocket.  The port was accessed with a non-coring needle. It aspirated and flushed well. The catheter was flushed with heparin and secured to the skin. The subcutaneous pocket was then closed with interrupted deep 4-0 absorbable sutures. Skin glue was applied over  the pocket incision and the venotomy.         CONCLUSION:  Ultrasound and fluoroscopic guided surgical placement of chest port    Dictated by (CST): Rony Hopkins MD on 2025 at 10:44 AM     Finalized by (CST): Rony Hopkins MD on 2025 at 10:45 AM          CARD ECHO [facility] (CPT=93308/90488/21397)  Result Date: 2025  Transthoracic Echocardiogram Name:Kelli Fisher Date: 2025 :  1955 Ht:  (60in)  BP: 142 / 86 MRN:  [id number]    Age:  70years    Wt:  (174lb) HR: 106bpm Loc:  [facility]       Gndr: F          BSA: 1.76m^2 Sonographer: Chris DOMINGUEZ Ordering:    Stella Gomez Consulting:  Khadar Segura ---------------------------------------------------------------------------- History/Indications:   Pericardial effusion. Lung mass. ---------------------------------------------------------------------------- Procedure information:  A transthoracic echocardiogram, limited study was performed. Additional evaluation included M-mode and limited 2D.  Patient status:  Inpatient.  Location:  Bedside.    Comparison was made to the study of 2025.    This was a routine study. Transthoracic echocardiography for diagnosis and ventricular function evaluation. Image quality was adequate. ECG rhythm:   Sinus tachycardia  Study  completion:  There were no complications. ---------------------------------------------------------------------------- Conclusions: 1. Left ventricle: The cavity size was normal. Wall thickness was normal.    Systolic function was vigorous. The estimated ejection fraction was    65-70%, by biplane method of disks. No diagnostic evidence for regional    wall motion abnormalities. Unable to assess LV diastolic function. 2. Pericardium, extracardiac: A small to moderate, free-flowing pericardial    effusion was identified circumferential to the heart. Probably not    hemodynamically significant. Impressions:  This study is compared with previous dated 4/30/2025: No significant change since prior study. * ---------------------------------------------------------------------------- * Findings: Left ventricle:  The cavity size was normal. Wall thickness was normal. Systolic function was vigorous. The estimated ejection fraction was 65-70%, by biplane method of disks. No diagnostic evidence for regional wall motion abnormalities. Unable to assess LV diastolic function. Ventricular septum:   Thickness was normal. Left atrium:  The left atrial volume was normal. Right ventricle:  Well visualized. The cavity size was normal. Systolic function was normal. Right atrium:  Well visualized. The atrium was normal in size. Mitral valve:  Well visualized. The leaflets were mildly calcified. Aortic valve:  Well visualized.  The valve was trileaflet. The leaflets were mildly calcified. Tricuspid valve:  Well visualized. Pulmonic valve:   Well visualized. Pericardium:  A small to moderate, free-flowing pericardial effusion was identified circumferential to the heart. Probably not hemodynamically significant. Aorta: Aortic root: The aortic root was normal. Ascending aorta: The ascending aorta was normal. Pulmonary arteries: Systolic pressure could not be accurately estimated.  ---------------------------------------------------------------------------- Measurements  Left ventricle         Value        Ref       04/30/2025  IVS thickness, ED, (H) 1.0   cm     0.6 - 0.9 1.0  PLAX  LV ID, ED, PLAX    (L) 3.6   cm     3.8 - 5.2 3.9  LV ID, ES, PLAX        2.4   cm     2.2 - 3.5 2.5  LV PW thickness,   (H) 1.0   cm     0.6 - 0.9 1.0  ED, PLAX  IVS/LV PW ratio,       1.00         --------- 1.00  ED, PLAX  LV PW/LV ID ratio,     0.28         --------- 0.26  ED, PLAX  LV ejection            63    %      54 - 74   66  fraction  LV end-diastolic       62    ml     48 - 140  ----------  volume, 1-p A4C  LV ejection            65    %      46 - 78   ----------  fraction, 1-p A4C  Stroke volume, 1-p     40    ml     --------- ----------  A4C  LV end-diastolic       35    ml/m^2 30 - 82   ----------  volume/bsa, 1-p  A4C  Stroke volume/bsa,     23    ml/m^2 --------- ----------  1-p A4C  LV end-diastolic       61    ml     46 - 106  ----------  volume, 2-p  LV end-systolic        21    ml     14 - 42   ----------  volume, 2-p  LV ejection            65    %      54 - 74   ----------  fraction, 2-p  Stroke volume, 2-p     40    ml     --------- ----------  LV end-diastolic       35    ml/m^2 29 - 61   ----------  volume/bsa, 2-p  LV end-systolic        12    ml/m^2 8 - 24    ----------  volume/bsa, 2-p  Stroke volume/bsa,     22.5  ml/m^2 --------- ----------  2-p  LVOT                   Value        Ref       04/30/2025  LVOT ID                2     cm     --------- ----------  Aortic root            Value        Ref       04/30/2025  Aortic root ID         3.2   cm     2.5 - 4.0 ----------  Ascending aorta        Value        Ref       04/30/2025  Ascending aorta ID (H) 3.7   cm     1.9 - 3.5 ----------  Left atrium            Value        Ref       04/30/2025  LA ID, A-P, ES         2.7   cm     2.7 - 3.8 ----------  LA volume, S           43    ml     22 - 52   ----------  LA volume/bsa, S       24     ml/m^2 16 - 34   ----------  LA volume, ES, 1-p     39    ml     22 - 52   ----------  A4C  LA volume, ES, 1-p     46    ml     22 - 52   ----------  A2C  LA volume, ES, A/L     46    ml     --------- ----------  LA volume/bsa, ES,     26    ml/m^2 16 - 34   ----------  A/L  LA/aortic root         0.84         --------- ----------  ratio  Right atrium           Value        Ref       04/30/2025  RA ID, S-I, ES,        4.8   cm     3.4 - 5.3 ----------  A4C  RA ID/bsa, S-I,        2.7   cm/m^2 1.9 - 3.1 ----------  ES, A4C  RA area, ES, A4C       14    cm^2   10 - 18   ----------  RA volume, ES, 1-p     32    ml     --------- ----------  A4C  RA volume/bsa, ES,     18    ml/m^2 9 - 33    ----------  1-p A4C  Systemic veins         Value        Ref       04/30/2025  Estimated CVP          15    mm Hg  --------- ----------  Inferior vena cava     Value        Ref       04/30/2025  ID                 (H) 2.4   cm     <=2.1     2.3 Legend: (L)  and  (H)  corinna values outside specified reference range. ---------------------------------------------------------------------------- Prepared and electronically signed by Margareth Haas 05/05/2025 12:36     XR VIDEO SWALLOW (CPT=74230)  Result Date: 5/3/2025  PROCEDURE: XR VIDEO SWALLOW (CPT=74230)  COMPARISON: None available.  INDICATIONS: Clinical concern for aspiration.  TECHNIQUE: A swallowing evaluation was performed in the Radiology Department in conjunction with the Department of Speech and Hearing.  A separate detailed report will be issued by the speech pathologist who recorded the study. Fluoroscopy was provided by a radiologist who was present during the procedure.    Materials of various consistencies were given by mouth, and swallowing was evaluated fluoroscopically.   # of FLUOROGRAPHIC CINE FILES:  7 FLUOROSCOPY IMAGES OBTAINED:  1 FLUOROSCOPY TIME:  2.1 minutes RADIATION DOSE (Dose Area Product):  [id number]  FINDINGS:  ASPIRATION/PENETRATION:   None.   STRUCTURE: No visible obstruction, stricture, or dilatation.  OTHER: Negative.           CONCLUSION:  No penetration or aspiration.    Dictated by (CST): Harpreet Jade MD on 2025 at 12:08 PM     Finalized by (CST): Harpreet Jade MD on 2025 at 12:09 PM          XR CHEST AP PORTABLE  (CPT=71045)  Result Date: 2025  PROCEDURE: XR CHEST AP PORTABLE  (CPT=71045) TIME: 622 hours.   COMPARISON: [facility], XR CHEST AP PORTABLE (CPT=71045), 2025, 10:40 PM.  [facility], X CHEST PORTABLE, 2014, 8:51 PM.  INDICATIONS: Shortness of breath.  TECHNIQUE:   Single view.   FINDINGS:  CARDIAC/MEDIAST: Heart and mediastinum are unchanged. LUNGS/PLEURA:  Opacity in the left perihilar mid lung not significantly changed.  Low lung volumes.  There is mild opacity in the right lung base.  No significant pleural effusion or pneumothorax. OTHER:  Stable appearance of the osseous structures.          CONCLUSION:   Stable opacity left perihilar mid lung.  Mild opacity right lung base which may reflect atelectasis with or without superimposed pneumonia.    Dictated by (CST): Casey Diallo MD on 2025 at 7:18 AM     Finalized by (CST): Casey Diallo MD on 2025 at 7:19 AM          CARD ECHO [employer] (CPT=93308/33934/81352)  Result Date: 2025  Transthoracic Echocardiogram Name:Kelli Fisher Date: 2025 :  1955 Ht:  (60in)  BP: 106 / 74 MRN:  [id number]    Age:  70years    Wt:  (165lb) HR: 108bpm Loc:  EM       Gndr: F          BSA: 1.72m^2 Sonographer: DENICE Ramirez RDCST Ordering:    Javi Huerta Consulting:  Ector Byrnes ---------------------------------------------------------------------------- History/Indications:  Pericardial effusion. ---------------------------------------------------------------------------- Procedure information:  A transthoracic echocardiogram, limited study was performed. Additional evaluation included M-mode and  limited 2D.  Patient status:  Inpatient.  Location:  Bedside.    Comparison was made to the study of 04/12/2025.    This was a routine study. Transthoracic echocardiography for ventricular function evaluation and assessment of pericardial effusion and hemodynamic status. Image quality was adequate. ECG rhythm:   Sinus tachycardia ---------------------------------------------------------------------------- Conclusions: 1. Left ventricle: The cavity size was normal. Wall thickness was mildly    increased. Systolic function was normal. The estimated ejection fraction    was 60-65%, by visual assessment. No diagnostic evidence for regional    wall motion abnormalities. Unable to assess LV diastolic function. 2. Pericardium, extracardiac: A small to moderate, free-flowing pericardial    effusion was identified circumferential to the heart. Maximal diameter in    diastole is 1.1cm. Features were not consistent with tamponade    physiology. 3. Inferior vena cava: The IVC was dilated. Respirophasic diameter changes    are blunted (< 50%), consistent with elevated central venous pressure. * ---------------------------------------------------------------------------- * Findings: Left ventricle:  The cavity size was normal. Wall thickness was mildly increased. Systolic function was normal. The estimated ejection fraction was 60-65%, by visual assessment. No diagnostic evidence for regional wall motion abnormalities. Unable to assess LV diastolic function. Right ventricle:  The cavity size was normal. Mitral valve:  The valve was structurally normal. Aortic valve:  The valve was structurally normal. Tricuspid valve:  The valve is structurally normal. Pulmonic valve:   Not well visualized. Pericardium:  A small to moderate, free-flowing pericardial effusion was identified circumferential to the heart. Maximal diameter in diastole is 1.1cm.  Doppler:  Features were not consistent with tamponade physiology. Systemic veins:  Inferior vena cava: The IVC was dilated.  Respirophasic diameter changes are blunted (< 50%), consistent with elevated central venous pressure. ---------------------------------------------------------------------------- Measurements  Left ventricle              Value    Ref      04/12/2025  IVS thickness, ED, PLAX (H) 1.0   cm 0.6 -    0.8                                       0.9  LV ID, ED, PLAX             3.9   cm 3.8 -    4.4                                       5.2  LV ID, ES, PLAX             2.5   cm 2.2 -    2.8                                       3.5  LV PW thickness, ED,    (H) 1.0   cm 0.6 -    0.7  PLAX                                 0.9  IVS/LV PW ratio, ED,        1.00     -------- 1.14  PLAX  LV PW/LV ID ratio, ED,      0.26     -------- 0.16  PLAX  LV ejection fraction        66    %  54 - 74  66  Inferior vena cava          Value    Ref      04/12/2025  ID                      (H) 2.3   cm <=2.1    2.7 Legend: (L)  and  (H)  corinna values outside specified reference range. ---------------------------------------------------------------------------- Prepared and electronically signed by Javi Huerta 04/30/2025 13:43     CT CHEST PE AORTA (IV ONLY) (CPT=71260)  Result Date: 4/30/2025  PROCEDURE: CT CHEST PE AORTA (IV ONLY) (CPT=71260)  COMPARISON: [facility], CT CHEST PE AORTA (IV ONLY) (CPT=71260), 4/11/2025, 9:30 PM.  INDICATIONS: dyspnea, lung cancer  TECHNIQUE: CT images of the chest were obtained with non-ionic intravenous contrast material.  Automated exposure control for dose reduction was used. Adjustment of the mA and/or kV was done based on the patient's size. Use of iterative reconstruction technique for dose reduction was used. Dose information is transmitted to the ACR (American College of Radiology) NRDR (National Radiology Data Registry) which includes the Dose Index Registry.  FINDINGS:  No PE  Normal heart size with stable small to moderate circumferential  pericardial effusion  No short interval change in left upper lobe mass, diffuse intrathoracic adenopathy  Trace left pleural effusion similar to prior.  Trace right effusion has resolved   A few of the right-sided pulmonary nodules may have mildly increased in size.  Adenopathy causes severe narrowing of the lower right IJ with collaterals in the chest wall    CONCLUSION: No PE.   [facility] provided a prelim report for this exam. This final report has no significant discrepancies with the [facility] report. Finalized by (CST): Chris Lovell MD on 4/30/2025 at 9:39 AM          XR CHEST AP PORTABLE  (CPT=71045)  Result Date: 4/30/2025  PROCEDURE: XR CHEST AP PORTABLE  (CPT=71045)  COMPARISON: [facility], CT CHEST PE AORTA (IV ONLY) (CPT=71260), 4/29/2025, 11:50 PM.  INDICATIONS: Chest discomfort x1 day.  Findings:  Left lung mass with significant diffuse intrathoracic adenopathy seen on recent CT  Heart size likely normal for technique  No pneumothorax or large effusion  [facility] provided a prelim report for this exam. This final report has no significant discrepancies with the [facility] report. Finalized by (CST): Chris Lovell MD on 4/30/2025 at 6:45 AM            Data Review:   Recent Labs   Lab 05/13/25  0625 05/13/25  1512 05/14/25  0653 05/15/25  1246 05/16/25  0537   RBC 2.59*  --  3.33* 3.60* 3.59*   HGB 6.4*   < > 8.5* 9.2* 9.4*   HCT 21.3*  --  27.2* 30.1* 30.1*   WBC 0.4*  --  1.5* 9.5 11.3*   PLT 36.0*  --  25.0* 52.0* 45.0*   NEUT 3  --   --  65 93   LYMPH 66  --   --  9 2    < > = values in this interval not displayed.       Recent Labs   Lab 05/12/25  1411 05/13/25  0625 05/14/25  0653 05/15/25  1246 05/15/25  2233 05/16/25  0537 05/16/25  1054   *   < > 193* 186*  --  185*  --    BUN 22   < > 19 21  --  26*  --    CREATSERUM 0.78   < > 0.87 1.00  --  1.06*  --    CA 9.0   < > 8.6* 8.9  --  8.6*  --    ALB 3.2  --   --   --   --   --   --       < > 140 140  --  140   --    K 4.2   < > 3.2* 3.4* 3.1* 3.7 4.1      < > 94* 96*  --  96*  --    CO2 32.0   < > 38.0* 38.0*  --  35.0*  --    ALKPHO 142  --   --   --   --   --   --    AST 26  --   --   --   --   --   --    ALT 10  --   --   --   --   --   --    BILT 0.7  --   --   --   --   --   --    TP 6.8  --   --   --   --   --   --     < > = values in this interval not displayed.       Lab Results   Component Value Date     443.0 (H) 05/04/2025     210.0 (H) 04/16/2025    CEA <0.5 04/16/2025     230.8 (H) 05/04/2025     156.3 (H) 04/16/2025       ASSESSMENT/PLAN:  Patient is a 70 year old female No obstetric history on file.   Stage IV recurrent endometrial adenocarcinoma:  She was previously being treated at [place] at which time was noted to have progression of disease and therefore the patient recently transferred her care to me approximately 2 weeks ago.  Her first cycle of carboplatin, Taxol, Keytruda 6 days ago on May 6, 2025.   Pancytopenia: Much improved with support .  Neutropenia: PT did get Fulphila which is a Neulasta equivalent on May 7.  I do highly suspect that the patient will have leukocytosis given the fact that she did get essentially Neulasta as well as multiple injections of Neupogen.  Unless the patient appears to be septic, this leukocytosis is usually short-lived approximately 7 days, and will resolve on its own.    Thrombocytopenia: Now status post 1 unit transfusion in order to place the IVC filter yesterday.  Overall I would say her platelets are stable and we will just keep following them daily and hold any future transfusions unless her platelets drop below 10,000 or patient has spontaneous bleeding or needs a procedure.    Anemia: Patient is status post 4 units of packed RBCs and appears that her hemoglobin is stable.  Thrush: Improved we will continue to follow.    Goals of care:  although the prognosis is very poor, patient still has multiple chemo regimens that can  potentially be highly effective with supportive care.  I have another long discussion with the family about goals of care and I explicitly stated that as long as she does not have end-organ failure, I am willing to continue to administer chemotherapy.  If the patient's symptoms do not improve, may consider consulting palliative care to help manage some of the symptoms.  After a very thoughtful discussion with Dr. Tello, I believe we are all in agreement to continue with supportive care during this time.  If her organs continue to have worsening dysfunction and/or failure, then we should readdress goals of care and consider hospice.  I explained this to the patient's son and daughter today in great detail and they are in full agreement.  Diet: Continue with protein shakes for now and a soft diet if she can tolerate it.    Cardiac arrhythmias: Patient is currently in the ICU      All of the findings and plan were discussed with the patient.  She notes understanding and agrees with the plan of care.  All questions were answered to the best of my ability at this time.    CASEY GRIJALVA, DO  5/16/2025  4:37 PM          [1]   Medications Prior to Admission   Medication Sig Dispense Refill Last Dose/Taking    levothyroxine (SYNTHROID) 100 MCG Oral Tab Take 1 tablet (100 mcg total) by mouth before breakfast.   5/12/2025 at  7:00 AM    Potassium Chloride ER 20 MEQ Oral Tab CR Take 1 tablet by mouth daily.   5/12/2025 at  7:00 AM    furosemide 20 MG Oral Tab Take 1 tablet (20 mg total) by mouth daily.   5/12/2025 at  7:00 AM    Levalbuterol HCl 1.25 MG/3ML Inhalation Nebu Soln Take 3 mL (1.25 mg total) by nebulization every 8 (eight) hours as needed for Wheezing or Shortness of Breath.   Past Week    pantoprazole 40 MG Oral Tab EC Take 1 tablet (40 mg total) by mouth daily. 30 tablet 0 5/12/2025 at  7:00 AM    amiodarone 200 MG Oral Tab Take 1 tablet (200 mg total) by mouth in the morning, at noon, and at bedtime for 1  day, THEN 1 tablet (200 mg total) in the morning, at noon, and at bedtime. 93 tablet 0 5/12/2025 at 12:00 PM    Fluticasone Propionate  HFA (FLOVENT HFA) 220 MCG/ACT Inhalation Aerosol Inhale 1 puff into the lungs every 12 (twelve) hours. Rinse mouth following use.   5/8/2025   [2]   Allergies  Allergen Reactions    Aspirin Tightness in Throat    Penicillins HIVES    Other OTHER (SEE COMMENTS)     Pt states IV steroid allergy, states it makes her breathing worse

## 2025-05-16 NOTE — PROGRESS NOTES
[facility]  part of [facility]    Progress Note    Kelli Fisher Patient Status:  Inpatient    1955 MRN [id number]   Location [facility] 3W/SW Attending Hina Bentley MD   Hosp Day # 4 PCP Taylor Gallardo MD     Chief Complaint: afib rvr    SUBJECTIVE:      Did not eat breakfast, poor po intake  Was in RVR and hypotensive earleir and given digoxin converted to NSR      Appears comfortable resting, tachypneic.    Down form 7 to 6L nasal cannula.     Tolerated filter placement yesterday.    No acute events overnight.  Patient denies chest pain. No fevers/chills.  No n/v/abd pain.      10 ROS completed and otherwise negative.    OBJECTIVE:  Vital signs in last 24 hours:  BP (!) 82/62 (BP Location: Right arm)   Pulse 103   Temp 97.8 °F (36.6 °C) (Axillary)   Resp 20   Ht 5' (1.524 m)   Wt 163 lb 8 oz (74.2 kg)   SpO2 94%   BMI 31.93 kg/m²     Intake/Output:    Intake/Output Summary (Last 24 hours) at 2025 1140  Last data filed at 2025 0916  Gross per 24 hour   Intake 220 ml   Output 1500 ml   Net -1280 ml       Wt Readings from Last 3 Encounters:   25 163 lb 8 oz (74.2 kg)   25 169 lb (76.7 kg)   25 174 lb 9.6 oz (79.2 kg)       Exam     Gen: No acute distress, chronically ill-appearing shallow breathing  Pulm: Lungs clear, normal respiratory effort  CV: Heart with regular rate and rhythm  Abd: Abdomen soft, nontender, bowel sounds present  Neuro: No acute focal deficits  MSK: moves extremities  Skin: Warm and dry  Psych: Normal affect  Ext: no c/c/e    Data Review:     Labs:   Lab Results   Component Value Date    WBC 11.3 2025    HGB 9.4 2025    HCT 30.1 2025    PLT 45.0 2025    CREATSERUM 1.06 2025    BUN 26 2025     2025    K 4.1 2025    CL 96 2025    CO2 35.0 2025     2025    CA 8.6 2025    MG 1.5 2025         Imaging: Reviewed    XR CHEST AP  PORTABLE  (CPT=71045)  Result Date: 5/15/2025  CONCLUSION:  1. The cardiac silhouette remains enlarged, likely relating to the known pericardial effusion.  Small pleural effusions are also unchanged bilaterally suggesting mild CHF or fluid overload.  There is stable mild associated passive atelectasis at both lung bases. 2. There is a history of metastatic endometrial carcinoma.  Stable left upper lobe/lingular consolidation may relate to a metastasis, atelectasis and/or pneumonia.  Unchanged prominence of the mediastinal and bilateral hilar contours is compatible with known underlying metastatic lymphadenopathy.     Dictated by (CST): Vincent Vazquez MD on 5/15/2025 at 7:25 AM     Finalized by (CST): Vincent Vazquez MD on 5/15/2025 at 7:28 AM          US VENOUS DOPPLER LEG BILAT - DIAG IMG (CPT=93970)  Result Date: 5/14/2025  CONCLUSION:   1. Acute appearing nonocclusive DVT involving the right superficial femoral, popliteal, and posterior tibial veins.  No left lower extremity DVT is identified.  2. 8.2 x 1.5 x 3.8 cm left posterior popliteal/Baker's cyst.     Dictated by (CST): Kaleb Epps MD on 5/14/2025 at 10:46 AM     Finalized by (CST): Kaleb Epps MD on 5/14/2025 at 10:49 AM          CT CHEST PE AORTA (IV ONLY) (CPT=71260)  Result Date: 5/13/2025  CONCLUSION:   Positive for small volume bilateral segmental PE  Enlarging left upper lobe consolidation and few bilateral pulmonary nodules  Enlarging pericardial effusion now 1.7 centimeter thick.  Increasing left pleural effusion, new right pleural effusion  Discussed with UNRULY Brumfield at 5:40 p.m.   Dictated by (CST): Chris Lovell MD on 5/13/2025 at 5:34 PM     Finalized by (CST): Chris Lovell MD on 5/13/2025 at 5:42 PM            Meds:   Current Hospital Medications[1]      Assessment  Problem List[2]    Plan:     Afib RVR:  Pericardial effusion  - started IV amio  - cardiology consulted  - monitor on tele  - anticoagulation per cards  -5/14:  Attempting to transition off IV amiodarone to p.o. today.  -5/15: doing well on po amiodarone    5/16: Patient was in RVR again today and hypotensive received digoxin RVR converted to normal sinus rhythm.  Remains mildly hypotensive in the 80s denies any symptoms at this time.  However due to lability of hemodynamics will transfer to stepdown unit.  Small bolus of 250 cc given.  IV Lasix held.  Discussed at length with nursing and cardiology services    BL PE: small segmental/right lower extremity DVT acute  S/p IVC 5/14/2025  Not on a/c due to TCP  -5/15/2025: tolerated IVC well    Metastatic endometrial cancer    Pancytopenia  Acute on chronic anemia of chronic disease  - transfuse 1 unit prbc  - neutropenic precautions  - started neupogen until ANC 1500  -Plan is for further chemotherapy once recovered clinically per Dr. Herring.  - cefepime and vanco started for neutropenia and patient with cough/chills, elevated LA, possible early sepsis - pulm following for this as well  -5/16: Pancytopenia slowly improving.      Acute on chronic respiratory failure  - on 4-5L NC baseline  - due to 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  -5/16: O2 requirements down from 7L to  8L at this time.   As discussed during this admission with pulmonary service and GYN oncology no need for therapeutic or diagnostic thoracentesis at this time.       Thrush  - nystatin    Global A/P  - reviewed labs and vitals from today  - reviewed notes of the day  - cbc, bmp, mag ordered for tomorrow  - discussed need to stay in the hospital today due to above  - cont IV abx  - discussed with patient/RN and care team  - dispo pending clinical course      Ariela Tello MD      Supplementary Documentation:   DVT Mechanical Prophylaxis:   SCDs,    DVT Pharmacologic Prophylaxis   Medication   None                Code Status: Full Code  Rogel: External urinary catheter in place  Rogle Duration (in days):   Central line:    JOSE:  5/17/2025        **Certification      PHYSICIAN Certification of Need for Inpatient Hospitalization - Initial Certification    Patient will require inpatient services that will reasonably be expected to span two midnight's based on the clinical documentation in H+P.   Based on patients current state of illness, I anticipate that, after discharge, patient will require TBD.      Dietitian Malnutrition Assessment    Evaluation for Malnutrition: Criteria for non-severe malnutrition diagnosis-         chronic illness related to   Wt loss 7.5% in 3 months., Body fat mild depletion., Muscle mass mild depletion.       RD Malnutrition Care Plan: Encouraged increased PO intake., Encouraged small frequent meals with emphasis on high calorie/high protein., Intiated ONS (oral nutritional supplements).    Body Fat/Muscle Mass: Mild depletion body fat., Mild depletion muscle mass. triceps region. dorsal dale region., calf region.    Physician Assessment     Patient has a diagnosis of moderate malnutrition              MDM: High complexity- severe exacerbation of chronic illness posing a threat to life. IV meds requiring close inpatient monitoring. I personally spent time on chart/note review, review of labs/imaging, discussion with patient, physical exam, discussion with staff, writing progress note, and discussion of plan of care.           [1]   Current Facility-Administered Medications   Medication Dose Route Frequency    potassium chloride (Klor-Con M20) tab 40 mEq  40 mEq Oral Once    docusate sodium (Colace) cap 100 mg  100 mg Oral Daily PRN    polyethylene glycol (PEG 3350) (Miralax) 17 g oral packet 17 g  17 g Oral Daily PRN    magnesium hydroxide (Milk of Magnesia) 400 MG/5ML oral suspension 30 mL  30 mL Oral Daily PRN    bisacodyl (Dulcolax) 10 MG rectal suppository 10 mg  10 mg Rectal Daily PRN    fleet enema (Fleet) rectal enema 133 mL  1 enema Rectal Once PRN    furosemide (Lasix) 10 mg/mL injection 40 mg  40 mg  Intravenous BID (Diuretic)    amiodarone (Pacerone) tab 400 mg  400 mg Oral BID with meals    insulin aspart (NovoLOG) 100 Units/mL FlexPen 1-5 Units  1-5 Units Subcutaneous TID CC and HS    sodium chloride (Saline Mist) 0.65 % nasal solution 1 spray  1 spray Each Nare Q3H PRN    maalox/diphenhydramine/lidocaine (First Mouthwash BLM) oral suspension 10 mL  10 mL Oral Q6H PRN    acetaminophen (Tylenol Extra Strength) tab 500 mg  500 mg Oral Q4H PRN    ondansetron (Zofran) 4 MG/2ML injection 4 mg  4 mg Intravenous Q6H PRN    metoclopramide (Reglan) 5 mg/mL injection 5 mg  5 mg Intravenous Q8H PRN    glucose (Dex4) 15 GM/59ML oral liquid 15 g  15 g Oral Q15 Min PRN    Or    glucose (Glutose) 40% oral gel 15 g  15 g Oral Q15 Min PRN    Or    glucose-vitamin C (Dex-4) chewable tab 4 tablet  4 tablet Oral Q15 Min PRN    Or    dextrose 50% injection 50 mL  50 mL Intravenous Q15 Min PRN    Or    glucose (Dex4) 15 GM/59ML oral liquid 30 g  30 g Oral Q15 Min PRN    Or    glucose (Glutose) 40% oral gel 30 g  30 g Oral Q15 Min PRN    Or    glucose-vitamin C (Dex-4) chewable tab 8 tablet  8 tablet Oral Q15 Min PRN    ceFEPIme (Maxipime) 1 g in sodium chloride 0.9% 100mL IVPB-YANA  1 g Intravenous Q12H    pantoprazole (Protonix) DR tab 40 mg  40 mg Oral QAM AC    ipratropium-albuterol (Duoneb) 0.5-2.5 (3) MG/3ML inhalation solution 3 mL  3 mL Nebulization Q6H PRN    vancomycin (Vancocin) 1.25 g in sodium chloride 0.9% 250mL IVPB premix  15 mg/kg Intravenous Q24H    levothyroxine (Synthroid) tab 100 mcg  100 mcg Oral Before breakfast    filgrastim-aafi (Nivestym) 300 MCG/0.5ML prefilled syringe 300 mcg  300 mcg Subcutaneous Q24H    nystatin (Mycostatin) 185469 UNIT/ML oral suspension 500,000 Units  5 mL Oral QID   [2]   Patient Active Problem List  Diagnosis    Shortness of breath    Acute hypoxic respiratory failure (HCC)    Lung mass    Supraclavicular adenopathy    Dysphagia, unspecified type    Pericardial effusion (HCC)     Endometrial cancer (HCC)    Microcytic anemia    Thrombocytopenia (HCC)    Azotemia    Pancytopenia (HCC)    Hyperglycemia    Acute respiratory failure with hypoxia (HCC)    Atrial fibrillation with RVR (HCC)

## 2025-05-16 NOTE — PROGRESS NOTES
Progress Note  Kelli Fisher Patient Status:  Inpatient    1955 MRN [id number]   Location [facility] 3W/SW Attending Hina Bentley MD   Hosp Day # 4 PCP Taylor Gallardo MD     Subjective:  Back into AFRVR this morning around 0730, rates 140s, BP low, asymptomatic  Breathing appears more labored this morning, denies sob  C/o that she is unable to void    Objective:  BP (!) 81/52 (BP Location: Right arm)   Pulse (!) 148   Temp 97.3 °F (36.3 °C) (Axillary)   Resp 18   Ht 5' (1.524 m)   Wt 163 lb 8 oz (74.2 kg)   SpO2 94%   BMI 31.93 kg/m²     Telemetry: SR 90s>AFRVR 140s 0730    Intake/Output:    Intake/Output Summary (Last 24 hours) at 2025 0858  Last data filed at 2025 0540  Gross per 24 hour   Intake 300 ml   Output 1950 ml   Net -1650 ml     Last 3 Weights   25 0439 163 lb 8 oz (74.2 kg)   05/15/25 1243 154 lb 3.2 oz (69.9 kg)   05/15/25 0458 177 lb 11.2 oz (80.6 kg)   25 0645 166 lb 9.6 oz (75.6 kg)   25 0406 167 lb 9.6 oz (76 kg)   25 1736 165 lb 11.2 oz (75.2 kg)   25 1217 169 lb (76.7 kg)   25 0500 174 lb 9.6 oz (79.2 kg)   25 0549 175 lb 12.8 oz (79.7 kg)   25 1800 163 lb (73.9 kg)   25 2038 165 lb (74.8 kg)     Labs:  Recent Labs   Lab 25  0653 05/15/25  1246 05/15/25  2233 25  0537   * 186*  --  185*   BUN 19 21  --  26*   CREATSERUM 0.87 1.00  --  1.06*   EGFRCR 72 61  --  57*   CA 8.6* 8.9  --  8.6*    140  --  140   K 3.2* 3.4* 3.1* 3.7   CL 94* 96*  --  96*   CO2 38.0* 38.0*  --  35.0*     Recent Labs   Lab 25  0653 05/15/25  1246 25  0537   RBC 3.33* 3.60* 3.59*   HGB 8.5* 9.2* 9.4*   HCT 27.2* 30.1* 30.1*   MCV 81.7 83.6 83.8   MCH 25.5* 25.6* 26.2   MCHC 31.3 30.6* 31.2   RDW 20.2* 20.7* 20.7*   NEPRELIM 0.73* 7.01 8.26*   WBC 1.5* 9.5 11.3*   PLT 25.0* 52.0* 45.0*     Recent Labs   Lab 25  1411 25  1619   TROPHS 5 5     No results found for: \"CHOLESTEROL\",  \"HDL\", \"LDL\", \"TRIG\", \"NONHDL\", \"CHOLHDL\"  Lab Results   Component Value Date    DDIMER 3.05 (H) 05/13/2025     Lab Results   Component Value Date    TSH 3.705 05/02/2025     Review of Systems:   Constitutional: No fevers, chills, fatigue or night sweats.  Respiratory: Denies cough, wheezing or shortness of breath.  CV: Denies chest pain, palpitations, orthopnea, PND or dizziness.  GI: No nausea, vomiting or diarrhea. No blood in stools.    Physical Exam:   General: Alert, cooperative, ill-appearing appears older than stated age.  Neck: no JVD.  Lungs: clear to auscultation bilaterally. Labored breathing  Chest wall: No tenderness or deformity.  Heart: Irregularly irregular rate and rhythm, S1, S2 normal, no murmur, click, rub or gallop.  Abdomen: Soft, non-tender. Bowel sounds normal. No masses,  No organomegaly.  Extremities: Extremities normal, atraumatic, no cyanosis, 1+ BLE edema.  Pulses: 2+ and symmetric all extremities.  Neurologic: Grossly intact.    Medications:  Scheduled Medications[1]  Medication Infusions[2]    Assessment:    70 year old female with PMH of endometrial cancer with mets to lungs, anemia, PSVT, small pericardial effusion who presented to ER with dyspnea and palpitations. She was found to be in PAF/PAT and anemic with Hgb 7.3.     Paroxysmal atrial tachycardia/Atrial fibrillation   Hx PSVT during last admission with junctional rhythm noted while on BB  -transitioned to amiodarone gtt, now on PO  -in and out of AF/SR  -AFRVR this morning up to 150s  -no BB/CCB with previous junctional rhythm  -TSH unremarkable 3.705, s/p thyroidectomy  -not on DOAC due to pancytopenia  -LVEF preserved on echo last admission  -CHADS-VASc= 3 for age, gender, DM    Dyspnea  O2 sats increasing, baseline 4L, now on 6L  -last admission had small pericardial effusion   -limited echo on 5/13 with moderate effusion  -known lung mets  -worsening anemia (Hgb 9.0 on 5/5 discharge, admitted 5/12 7.3, 6.4 on 5/13 s/p 1u  PRBC  -CXR on admission showed stable effusion, lymph node mets, Left mid/upper lobe mass, suboptimal inspiration  -D-dimer elevated 3, CT chest PE positive for small bilateral segmental PE  -IV lasix BID  -I/Os net neg 3.8L, 2L UOP past 24 hours, weight up overnight, doubt accuracy    Bilateral Pulmonary Embolism  D-dimer elevated 3  -CT chest PE positive for bilateral small PE  -b/l venous doppler revealed nonocclusive DVT in right superficial femoral, popliteal, and posterior tibial veins  -IVC filter placed 5/14    Small pericardial effusion   Echo on 5/5 revealed small to moderate pericardial effusion   -repeat limited echo effusion moderate in size, no evidence of hemodynamic compromise  -s/p 1u PRBC  -BP stable in 90s-100s    Endometrial cancer with mets to lungs/lymph nodes  Stage Ivb clear cell adenocarcinoma  -chemotherapy per gyne    Pancytopenia  Sudden drop in hgb, plts, WBC in one week  -recently started chemo, s/p 1 treatment  -gyne following  -1u PRBC transfused 5/13, goal to keep Hgb >7  -s/p 1u plts transfusion prior to IVC filter placement  -daily neupogen for neutropenia    Thrush  Sore throat  2/2 neutropenia  -gyne ordered nystatin swish    DM2-A1c 6.6    Hypokalemia  Hypomagnesemia  2/2 diuresis  -replace per cardiac electrolyte protocol    Urinary retention  States she is unable to void this morning      Plan:  -give one time dose IV digoxin 500mcg for rate control to improve BP  -continue amiodarone 400mg BID for one week total, then decrease to 200mg daily  -Continue lasix 40mg BID, monitor strict I/Os, daily weights, daily BMP  -replete electrolytes as needed per protocol  -further recs per gyne/pulm/primary  -check bladder scan      Plan of care discussed with patient, RN.        Margarette Sexton, MSN, APN, FNP-BC, CCK  05/16/25  9:04 AM        Addendum: converted back to sinus rhythm with tachycardia rates in 100s after IV digoxin, continue to monitor tele  9:40 AM      CARDIOLOGIST  ADDENDUM:    I agree with the findings and complexity of problems addressed and approve of the plan detailed above. I have personally performed the assessment and plan, and summarized the salient points of the medical decision making below:    Problems:  -Paroxysmal atrial tachycardia/Atrial fibrillation   -Occasional sinus leila and junctional rhythm previously due to medications  -Continues to have occasional PAF  -Severe bleeding risks due to pancytopenia    Plan and Risks:  -escalate therapy with increased amiodarone PO  load as detailed above, requires intensive monitoring for toxicity  -anticoag relatively contraindicated due to severe bleeding risks, monitor for possible reintroduction of anticoag if other medical issues allow    Pt is not yet meeting tx goals.    Tahira Carreno M.D.   Cardiology/Electrophysiology   5/16/2025  L3  -----------------------------------------         [1]    magnesium oxide  800 mg Oral Once    potassium chloride  40 mEq Oral Once    digoxin  500 mcg Intravenous Once    furosemide  40 mg Intravenous BID (Diuretic)    amiodarone  400 mg Oral BID with meals    insulin aspart  1-5 Units Subcutaneous TID CC and HS    cefepime  1 g Intravenous Q12H    pantoprazole  40 mg Oral QAM AC    vancomycin  15 mg/kg Intravenous Q24H    levothyroxine  100 mcg Oral Before breakfast    filgrastim-aafi  300 mcg Subcutaneous Q24H    nystatin  5 mL Oral QID   [2]

## 2025-05-16 NOTE — PROGRESS NOTES
PT orders received chart reviewed. PT spoke with RN who reports pt has converted into Afib and is recommending bed rest today. Back into AFRVR this morning around 0730, rates 140s, BP low, asymptomatic  Breathing appears more labored this morning, denies sob. PT will continue to follow and progress as medical progress allows.

## 2025-05-16 NOTE — PLAN OF CARE
Problem: Diabetes/Glucose Control  Goal: Glucose maintained within prescribed range  Description: INTERVENTIONS:- Monitor Blood Glucose as ordered- Assess for signs and symptoms of hyperglycemia and hypoglycemia- Administer ordered medications to maintain glucose within target range- Assess barriers to adequate nutritional intake and initiate nutrition consult as needed- Instruct patient on self management of diabetes  Outcome: Not Progressing     Problem: CARDIOVASCULAR - ADULT  Goal: Maintains optimal cardiac output and hemodynamic stability  Description: INTERVENTIONS:- Monitor vital signs, rhythm, and trends- Monitor for bleeding, hypotension and signs of decreased cardiac output- Evaluate effectiveness of vasoactive medications to optimize hemodynamic stability- Monitor arterial and/or venous puncture sites for bleeding and/or hematoma- Assess quality of pulses, skin color and temperature- Assess for signs of decreased coronary artery perfusion - ex. Angina- Evaluate fluid balance, assess for edema, trend weights  Outcome: Not Progressing     Problem: RESPIRATORY - ADULT  Goal: Achieves optimal ventilation and oxygenation  Description: INTERVENTIONS:- Assess for changes in respiratory status- Assess for changes in mentation and behavior- Position to facilitate oxygenation and minimize respiratory effort- Oxygen supplementation based on oxygen saturation or ABGs- Provide Smoking Cessation handout, if applicable- Encourage broncho-pulmonary hygiene including cough, deep breathe, Incentive Spirometry- Assess the need for suctioning and perform as needed- Assess and instruct to report SOB or any respiratory difficulty- Respiratory Therapy support as indicated- Manage/alleviate anxiety- Monitor for signs/symptoms of CO2 retention  Outcome: Not Progressing

## 2025-05-17 LAB
ANION GAP SERPL CALC-SCNC: 8 MMOL/L (ref 0–18)
BASOPHILS # BLD: 0 X10(3) UL (ref 0–0.2)
BASOPHILS NFR BLD: 0 %
BUN BLD-MCNC: 25 MG/DL (ref 9–23)
BUN/CREAT SERPL: 25.3 (ref 10–20)
CALCIUM BLD-MCNC: 8.8 MG/DL (ref 8.7–10.4)
CHLORIDE SERPL-SCNC: 101 MMOL/L (ref 98–112)
CO2 SERPL-SCNC: 34 MMOL/L (ref 21–32)
CREAT BLD-MCNC: 0.99 MG/DL (ref 0.55–1.02)
DEPRECATED RDW RBC AUTO: 62.2 FL (ref 35.1–46.3)
DOHLE BOD BLD QL SMEAR: PRESENT
EGFRCR SERPLBLD CKD-EPI 2021: 62 ML/MIN/1.73M2 (ref 60–?)
EOSINOPHIL # BLD: 0 X10(3) UL (ref 0–0.7)
EOSINOPHIL NFR BLD: 0 %
ERYTHROCYTE [DISTWIDTH] IN BLOOD BY AUTOMATED COUNT: 20.8 % (ref 11–15)
GLUCOSE BLD-MCNC: 163 MG/DL (ref 70–99)
GLUCOSE BLDC GLUCOMTR-MCNC: 184 MG/DL (ref 70–99)
GLUCOSE BLDC GLUCOMTR-MCNC: 194 MG/DL (ref 70–99)
GLUCOSE BLDC GLUCOMTR-MCNC: 200 MG/DL (ref 70–99)
GLUCOSE BLDC GLUCOMTR-MCNC: 215 MG/DL (ref 70–99)
HCT VFR BLD AUTO: 28.3 % (ref 35–48)
HGB BLD-MCNC: 8.5 G/DL (ref 12–16)
LYMPHOCYTES NFR BLD: 1.14 X10(3) UL (ref 1–4)
LYMPHOCYTES NFR BLD: 4 %
MAGNESIUM SERPL-MCNC: 1.6 MG/DL (ref 1.6–2.6)
MCH RBC QN AUTO: 24.7 PG (ref 26–34)
MCHC RBC AUTO-ENTMCNC: 30 G/DL (ref 31–37)
MCV RBC AUTO: 82.3 FL (ref 80–100)
METAMYELOCYTES # BLD: 0.57 X10(3) UL (ref ?–0.01)
METAMYELOCYTES NFR BLD: 2 %
MONOCYTES # BLD: 2 X10(3) UL (ref 0.1–1)
MONOCYTES NFR BLD: 7 %
NEUTROPHILS # BLD AUTO: 23.01 X10 (3) UL (ref 1.5–7.7)
NEUTROPHILS NFR BLD: 82 %
NEUTS BAND NFR BLD: 5 %
NEUTS HYPERSEG # BLD: 24.88 X10(3) UL (ref 1.5–7.7)
OSMOLALITY SERPL CALC.SUM OF ELEC: 304 MOSM/KG (ref 275–295)
PLATELET # BLD AUTO: 53 10(3)UL (ref 150–450)
PLATELET MORPHOLOGY: NORMAL
PLATELETS.RETICULATED NFR BLD AUTO: 14.2 % (ref 0–7)
POTASSIUM SERPL-SCNC: 3.7 MMOL/L (ref 3.5–5.1)
POTASSIUM SERPL-SCNC: 3.7 MMOL/L (ref 3.5–5.1)
RBC # BLD AUTO: 3.44 X10(6)UL (ref 3.8–5.3)
SODIUM SERPL-SCNC: 143 MMOL/L (ref 136–145)
TOTAL CELLS COUNTED BLD: 100
TROPONIN I SERPL HS-MCNC: 5 NG/L (ref ?–34)
TROPONIN I SERPL HS-MCNC: 5 NG/L (ref ?–34)
WBC # BLD AUTO: 28.6 X10(3) UL (ref 4–11)

## 2025-05-17 PROCEDURE — 99233 SBSQ HOSP IP/OBS HIGH 50: CPT | Performed by: INTERNAL MEDICINE

## 2025-05-17 RX ORDER — MAGNESIUM HYDROXIDE/ALUMINUM HYDROXICE/SIMETHICONE 120; 1200; 1200 MG/30ML; MG/30ML; MG/30ML
30 SUSPENSION ORAL 4 TIMES DAILY PRN
Status: DISCONTINUED | OUTPATIENT
Start: 2025-05-17 | End: 2025-05-21

## 2025-05-17 RX ORDER — MAGNESIUM OXIDE 400 MG/1
400 TABLET ORAL ONCE
Status: COMPLETED | OUTPATIENT
Start: 2025-05-17 | End: 2025-05-17

## 2025-05-17 RX ORDER — POTASSIUM CHLORIDE 1500 MG/1
40 TABLET, EXTENDED RELEASE ORAL ONCE
Status: COMPLETED | OUTPATIENT
Start: 2025-05-17 | End: 2025-05-17

## 2025-05-17 RX ORDER — PANTOPRAZOLE SODIUM 40 MG/1
40 TABLET, DELAYED RELEASE ORAL
Status: DISCONTINUED | OUTPATIENT
Start: 2025-05-18 | End: 2025-05-21

## 2025-05-17 NOTE — PLAN OF CARE
Problem: Diabetes/Glucose Control  Goal: Glucose maintained within prescribed range  Description: INTERVENTIONS:- Monitor Blood Glucose as ordered- Assess for signs and symptoms of hyperglycemia and hypoglycemia- Administer ordered medications to maintain glucose within target range- Assess barriers to adequate nutritional intake and initiate nutrition consult as needed- Instruct patient on self management of diabetes  Outcome: Progressing     Problem: Patient Centered Care  Goal: Patient preferences are identified and integrated in the patient's plan of care  Description: Interventions:- What would you like us to know as we care for you? - Provide timely, complete, and accurate information to patient/family- Incorporate patient and family knowledge, values, beliefs, and cultural backgrounds into the planning and delivery of care- Encourage patient/family to participate in care and decision-making at the level they choose- Honor patient and family perspectives and choices  Outcome: Progressing     Problem: RISK FOR INFECTION - ADULT  Goal: Absence of fever/infection during anticipated neutropenic period  Description: INTERVENTIONS- Monitor WBC- Administer growth factors as ordered- Implement neutropenic guidelines  Outcome: Progressing     Problem: CARDIOVASCULAR - ADULT  Goal: Maintains optimal cardiac output and hemodynamic stability  Description: INTERVENTIONS:- Monitor vital signs, rhythm, and trends- Monitor for bleeding, hypotension and signs of decreased cardiac output- Evaluate effectiveness of vasoactive medications to optimize hemodynamic stability- Monitor arterial and/or venous puncture sites for bleeding and/or hematoma- Assess quality of pulses, skin color and temperature- Assess for signs of decreased coronary artery perfusion - ex. Angina- Evaluate fluid balance, assess for edema, trend weights  Outcome: Progressing  Goal: Absence of cardiac arrhythmias or at baseline  Description: INTERVENTIONS:-  Continuous cardiac monitoring, monitor vital signs, obtain 12 lead EKG if indicated- Evaluate effectiveness of antiarrhythmic and heart rate control medications as ordered- Initiate emergency measures for life threatening arrhythmias- Monitor electrolytes and administer replacement therapy as ordered  Outcome: Progressing     Problem: RESPIRATORY - ADULT  Goal: Achieves optimal ventilation and oxygenation  Description: INTERVENTIONS:- Assess for changes in respiratory status- Assess for changes in mentation and behavior- Position to facilitate oxygenation and minimize respiratory effort- Oxygen supplementation based on oxygen saturation or ABGs- Provide Smoking Cessation handout, if applicable- Encourage broncho-pulmonary hygiene including cough, deep breathe, Incentive Spirometry- Assess the need for suctioning and perform as needed- Assess and instruct to report SOB or any respiratory difficulty- Respiratory Therapy support as indicated- Manage/alleviate anxiety- Monitor for signs/symptoms of CO2 retention  Outcome: Progressing     Problem: SAFETY ADULT - FALL  Goal: Free from fall injury  Description: INTERVENTIONS:  - Assess pt frequently for physical needs  - Identify cognitive and physical deficits and behaviors that affect risk of falls.  - [place] fall precautions as indicated by assessment.  - Educate pt/family on patient safety including physical limitations  - Instruct pt to call for assistance with activity based on assessment  - Modify environment to reduce risk of injury  - Provide assistive devices as appropriate  - Consider OT/PT consult to assist with strengthening/mobility  - Encourage toileting schedule  Outcome: Progressing     Problem: DISCHARGE PLANNING  Goal: Discharge to home or other facility with appropriate resources  Description: INTERVENTIONS:  - Identify barriers to discharge w/pt and caregiver  - Include patient/family/discharge partner in discharge planning  - Arrange for needed  discharge resources and transportation as appropriate  - Identify discharge learning needs (meds, wound care, etc)  - Arrange for interpreters to assist at discharge as needed  - Consider post-discharge preferences of patient/family/discharge partner  - Complete POLST form as appropriate  - Assess patient's ability to be responsible for managing their own health  - Refer to Case Management Department for coordinating discharge planning if the patient needs post-hospital services based on physician/LIP order or complex needs related to functional status, cognitive ability or social support system  Outcome: Progressing

## 2025-05-17 NOTE — PROGRESS NOTES
Progress Note  Kelli Fisher Patient Status:  Inpatient    1955 MRN [id number]   Location [facility] 3W/SW Attending Hina Bentley MD   Hosp Day # 5 PCP Taylor Gallardo MD     Subjective:  Patient seen and examined  Documented heart rates in the 90s overnight and this morning  Patient had chest discomfort this morning, she believes it is her acid reflux/GERD    Objective:  BP (!) 105/92 (BP Location: Left arm)   Pulse 98   Temp 97.4 °F (36.3 °C) (Temporal)   Resp 25   Ht 5' (1.524 m)   Wt 164 lb 10.9 oz (74.7 kg)   SpO2 97%   BMI 32.16 kg/m²     Telemetry: SR 90s>AFRVR 140s 0730    Intake/Output:    Intake/Output Summary (Last 24 hours) at 2025 0818  Last data filed at 2025 1500  Gross per 24 hour   Intake 750 ml   Output --   Net 750 ml     Last 3 Weights   25 0611 164 lb 10.9 oz (74.7 kg)   25 1600 156 lb 1.4 oz (70.8 kg)   25 0439 163 lb 8 oz (74.2 kg)   05/15/25 1243 154 lb 3.2 oz (69.9 kg)   05/15/25 0458 177 lb 11.2 oz (80.6 kg)   25 0645 166 lb 9.6 oz (75.6 kg)   25 0406 167 lb 9.6 oz (76 kg)   25 1736 165 lb 11.2 oz (75.2 kg)   25 1217 169 lb (76.7 kg)   25 0500 174 lb 9.6 oz (79.2 kg)   25 0549 175 lb 12.8 oz (79.7 kg)   25 1800 163 lb (73.9 kg)   25 2038 165 lb (74.8 kg)     Labs:  Recent Labs   Lab 05/15/25  1246 05/15/25  2233 25  0537 25  1054 25  0338   *  --  185*  --  163*   BUN 21  --  26*  --  25*   CREATSERUM 1.00  --  1.06*  --  0.99   EGFRCR 61  --  57*  --  62   CA 8.9  --  8.6*  --  8.8     --  140  --  143   K 3.4*   < > 3.7 4.1 3.7  3.7   CL 96*  --  96*  --  101   CO2 38.0*  --  35.0*  --  34.0*    < > = values in this interval not displayed.     Recent Labs   Lab 05/15/25  1246 25  0537 25  0338   RBC 3.60* 3.59* 3.44*   HGB 9.2* 9.4* 8.5*   HCT 30.1* 30.1* 28.3*   MCV 83.6 83.8 82.3   MCH 25.6* 26.2 24.7*   MCHC 30.6* 31.2 30.0*   RDW 20.7*  20.7* 20.8*   NEPRELIM 7.01 8.26* 23.01*   WBC 9.5 11.3* 28.6*   PLT 52.0* 45.0* 53.0*     Recent Labs   Lab 05/12/25  1411 05/12/25  1619   TROPHS 5 5     No results found for: \"CHOLESTEROL\", \"HDL\", \"LDL\", \"TRIG\", \"NONHDL\", \"CHOLHDL\"  Lab Results   Component Value Date    DDIMER 3.05 (H) 05/13/2025     Lab Results   Component Value Date    TSH 3.705 05/02/2025     Review of Systems:   Constitutional: No fevers, chills, fatigue or night sweats.  Respiratory: Denies cough, wheezing or shortness of breath.  CV: Denies chest pain, palpitations, orthopnea, PND or dizziness.  GI: No nausea, vomiting or diarrhea. No blood in stools.    Physical Exam:   General: Alert, cooperative, ill-appearing appears older than stated age.  Neck: no JVD.  Lungs: clear to auscultation bilaterally. Labored breathing  Chest wall: No tenderness or deformity.  Heart: Irregularly irregular rate and rhythm, S1, S2 normal, no murmur, click, rub or gallop.  Abdomen: Soft, non-tender. Bowel sounds normal. No masses,  No organomegaly.  Extremities: Extremities normal, atraumatic, no cyanosis, 1+ BLE edema.  Pulses: 2+ and symmetric all extremities.  Neurologic: Grossly intact.    Medications:  Scheduled Medications[1]  Medication Infusions[2]    Assessment:    70 year old female with PMH of endometrial cancer with mets to lungs, anemia, PSVT, small pericardial effusion who presented to ER with dyspnea and palpitations. She was found to be in PAF/PAT and anemic with Hgb 7.3.     Paroxysmal atrial tachycardia/Atrial fibrillation   Hx PSVT during last admission with junctional rhythm noted while on BB  -transitioned to amiodarone gtt, now on PO  -in and out of AF/SR  -AFRVR this morning up to 150s  -no BB/CCB with previous junctional rhythm  -TSH unremarkable 3.705, s/p thyroidectomy  -not on DOAC due to pancytopenia  -LVEF preserved on echo last admission  -CHADS-VASc= 3 for age, gender, DM    Dyspnea  O2 sats increasing, baseline 4L, now on  6L  -last admission had small pericardial effusion   -limited echo on 5/13 with moderate effusion  -known lung mets  -worsening anemia (Hgb 9.0 on 5/5 discharge, admitted 5/12 7.3, 6.4 on 5/13 s/p 1u PRBC  -CXR on admission showed stable effusion, lymph node mets, Left mid/upper lobe mass, suboptimal inspiration  -D-dimer elevated 3, CT chest PE positive for small bilateral segmental PE  -IV lasix BID  -I/Os net neg 3.8L, 2L UOP past 24 hours, weight up overnight, doubt accuracy    Bilateral Pulmonary Embolism  D-dimer elevated 3  -CT chest PE positive for bilateral small PE  -b/l venous doppler revealed nonocclusive DVT in right superficial femoral, popliteal, and posterior tibial veins  -IVC filter placed 5/14    Small pericardial effusion   Echo on 5/5 revealed small to moderate pericardial effusion   -repeat limited echo effusion moderate in size, no evidence of hemodynamic compromise  -s/p 1u PRBC  -BP stable in 90s-100s    Endometrial cancer with mets to lungs/lymph nodes  Stage Ivb clear cell adenocarcinoma  -chemotherapy per gyne    Pancytopenia  Sudden drop in hgb, plts, WBC in one week  -recently started chemo, s/p 1 treatment  -gyne following  -1u PRBC transfused 5/13, goal to keep Hgb >7  -s/p 1u plts transfusion prior to IVC filter placement  -daily neupogen for neutropenia    Thrush  Sore throat  2/2 neutropenia  -gyne ordered nystatin swish    DM2-A1c 6.6    Hypokalemia  Hypomagnesemia  2/2 diuresis  -replace per cardiac electrolyte protocol    Urinary retention  States she is unable to void this morning      Plan:  - Patient chest pain this morning-suspect GI etiology as patient states that patient describes it as being more GERD like symptoms, show patient with thrush and possible esophagitis  - Check EKG, trend troponins X2 every 6 hours    Thank you for allowing me to take part in the care of Kelli Fisher. Please call with any questions of concerns.    L3    Prema Conti, DO  [facility]   Interventional [facility]                 [1]    [START ON 5/18/2025] vancomycin  1,000 mg Intravenous Q24H    furosemide  40 mg Intravenous BID (Diuretic)    amiodarone  400 mg Oral BID with meals    insulin aspart  1-5 Units Subcutaneous TID CC and HS    cefepime  1 g Intravenous Q12H    pantoprazole  40 mg Oral QAM AC    levothyroxine  100 mcg Oral Before breakfast    nystatin  5 mL Oral QID   [2]

## 2025-05-17 NOTE — PHYSICAL THERAPY NOTE
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  Prior to admission, patient's baseline is from home, limited by medical status.  Patient is currently functioning below baseline with bed mobility, transfers, and gait.  Patient is requiring minimal assist and moderate assist as a result of the following impairments: decreased functional strength, decreased endurance/aerobic capacity, and medical status.  Physical Therapy will continue to follow for duration of hospitalization.    Patient will benefit from continued skilled PT Services to promote return to prior level of function and safety with continuous assistance and gradual rehabilitative therapy .    PLAN DURING HOSPITALIZATION  Nursing Mobility Recommendation : 1 Assist     PT Treatment Plan: Bed mobility, Body mechanics, Endurance, Energy conservation, Family education, Gait training, Range of motion, Stoop training, Transfer training, Balance training, Stair training  Rehab Potential : Guarded  Frequency (Obs): 3-5x/week     PHYSICAL THERAPY MEDICAL/SOCIAL HISTORY   History related to current admission: resp failure     Problem List  Principal Problem:    Acute respiratory failure with hypoxia (HCC)  Active Problems:    Thrombocytopenia (HCC)    Azotemia    Pancytopenia (HCC)    Hyperglycemia    Atrial fibrillation with RVR (HCC)      HOME SITUATION  Type of Home: Apartment  Home Layout: One level  Stairs to Enter : 12   Railing: Yes              Lives With: Daughter, Other (Comment) (grand dtr)    Drives: Yes (has not driven in awhile)    Patient Regularly Uses: Home O2, Rolling walker, Wheelchair     Prior Level of [place]: independent     SUBJECTIVE  \"I can't sit in that chair.\"     PHYSICAL THERAPY EXAMINATION   OBJECTIVE  Precautions: Bed/chair alarm  Fall Risk: High fall risk    WEIGHT BEARING RESTRICTION       PAIN ASSESSMENT      Body aches unrated        COGNITION  Overall Cognitive Status:  WFL - within functional limits    BALANCE  Static Sitting: Fair +  Dynamic Sitting: Fair  Static Standing: Fair -  Dynamic Standing: Fair -    AM-PAC '6-Clicks' INPATIENT SHORT FORM - BASIC MOBILITY  How much difficulty does the patient currently have...  Patient Difficulty: Turning over in bed (including adjusting bedclothes, sheets and blankets)?: A Little   Patient Difficulty: Sitting down on and standing up from a chair with arms (e.g., wheelchair, bedside commode, etc.): A Lot   Patient Difficulty: Moving from lying on back to sitting on the side of the bed?: A Lot   How much help from another person does the patient currently need...   Help from Another: Moving to and from a bed to a chair (including a wheelchair)?: A Little   Help from Another: Need to walk in hospital room?: A Little   Help from Another: Climbing 3-5 steps with a railing?: Total     AM-PAC Score:  Raw Score: 14   Approx Degree of Impairment: 61.29%   Standardized Score (AM-PAC Scale): 38.1   CMS Modifier (G-Code): CL    FUNCTIONAL ABILITY STATUS  Functional Mobility/Gait Assessment  Gait Assistance: Minimum assistance  Distance (ft): 15'  Assistive Device: Rolling walker  Pattern: Shuffle  Rolling: minimal assist, R and L rolling for bedpan   Supine to Sit: minimal assist  Sit to Supine: minimal assist  Sit to Stand: minimal assist from EOB And chair with arms     Exercise/Education Provided:  Bed mobility  Body mechanics  Functional activity tolerated  Gait training  Transfer training    The patient's Approx Degree of Impairment: 61.29% has been calculated based on  documentation in the [facility] '6 clicks' Inpatient Basic Mobility Short Form.  Research supports that patients with this level of impairment may benefit from EDUARDO.  Final disposition will be made by interdisciplinary medical team.    Patient End of Session: Needs met, Call light within reach, RN aware of session/findings, All patient questions and concerns addressed, Alarm set, Family present, In bed    CURRENT GOALS  Goals to be met by: 6/10  Patient Goal Patient's self-stated goal is: unstated    Goal #1 Patient is able to demonstrate supine - sit EOB @ level: supervision     Goal #1   Current Status    Goal #2 Patient is able to demonstrate transfers Sit to/from Stand at assistance level: supervision with walker - rolling     Goal #2  Current Status    Goal #3 Patient is able to ambulate 150 feet with assist device: walker - rolling at assistance level: supervision   Goal #3   Current Status    Goal #4 Patient will negotiate 12 stairs/one curb w/ assistive device and supervision   Goal #4   Current Status    Goal #5 Patient to demonstrate independence with home activity/exercise instructions provided to patient in preparation for discharge.   Goal #5   Current Status    Goal #6    Goal #6  Current Status      Patient Evaluation Complexity Level:  History Moderate - 1 or 2 personal factors and/or co-morbidities   Examination of body systems Moderate - addressing a total of 3 or more elements   Clinical Presentation  Moderate - Evolving   Clinical Decision Making  Moderate Complexity     Gait Trainin minutes  Therapeutic Activity:  13 minutes

## 2025-05-17 NOTE — PROGRESS NOTES
Pulmonary Medicine Inpatient Progress Note                 Subjective:  Afebrile  On 6 LHFNC  Feels ok but experiencing GERD       ALLERGIES:  Allergies[1]     MEDS:  Home Medications:  Medications Taking[2]  Scheduled Medication:  Scheduled Medications[3]  Continuous Infusing Medication:  Medication Infusions[4]  PRN Medications:  PRN Medications[5]       PHYSICAL EXAM:  BP (!) 105/92 (BP Location: Left arm)   Pulse 98   Temp 97.4 °F (36.3 °C) (Temporal)   Resp 25   Ht 5' (1.524 m)   Wt 164 lb 10.9 oz (74.7 kg)   SpO2 97%   BMI 32.16 kg/m²   CONSTITUTIONAL: alert, oriented, no apparent distress  HEENT: atraumatic normocephalic  MOUTH: mucous membranes are moist. No OP exudates  NECK/THROAT: no JVD. Trachea midline. No obvious thyromegaly  LUNG: clear upper b/l no wheezing,+ basilar crackles. Diminished bases. Chest symmetric with respiratory motion  HEART: regular rate and rhythm, no obvious murmers or gallops note  ABD: soft non tender. + bowel sounds. No organomegaly noted  EXT: no clubbing, cyanosis. 2+ b/l LE edema       IMAGES:  CXR 5/15/25  CONCLUSION:   1. The cardiac silhouette remains enlarged, likely relating to the known pericardial effusion.  Small pleural effusions are also unchanged bilaterally suggesting mild CHF or fluid overload.  There is stable mild associated passive atelectasis at both   lung bases.   2. There is a history of metastatic endometrial carcinoma.  Stable left upper lobe/lingular consolidation may relate to a metastasis, atelectasis and/or pneumonia.  Unchanged prominence of the mediastinal and bilateral hilar contours is compatible with known underlying metastatic lymphadenopathy.       LABS:  Recent Labs   Lab 05/15/25  1246 05/16/25  0537 05/17/25  0338   RBC 3.60* 3.59* 3.44*   HGB 9.2* 9.4* 8.5*   HCT 30.1* 30.1* 28.3*   MCV 83.6 83.8 82.3   MCH 25.6* 26.2 24.7*   MCHC 30.6* 31.2 30.0*   RDW 20.7* 20.7* 20.8*   NEPRELIM 7.01 8.26* 23.01*   WBC 9.5 11.3* 28.6*   PLT 52.0*  45.0* 53.0*       Recent Labs   Lab 05/12/25  1411 05/13/25  0625 05/15/25  1246 05/15/25  2233 05/16/25  0537 05/16/25  1054 05/17/25  0338   *   < > 186*  --  185*  --  163*   BUN 22   < > 21  --  26*  --  25*   CREATSERUM 0.78   < > 1.00  --  1.06*  --  0.99   EGFRCR 82   < > 61  --  57*  --  62   CA 9.0   < > 8.9  --  8.6*  --  8.8   ALB 3.2  --   --   --   --   --   --       < > 140  --  140  --  143   K 4.2   < > 3.4*   < > 3.7 4.1 3.7  3.7      < > 96*  --  96*  --  101   CO2 32.0   < > 38.0*  --  35.0*  --  34.0*   ALKPHO 142  --   --   --   --   --   --    AST 26  --   --   --   --   --   --    ALT 10  --   --   --   --   --   --    BILT 0.7  --   --   --   --   --   --    TP 6.8  --   --   --   --   --   --     < > = values in this interval not displayed.       ASSESSMENT/PLAN:  Endometrial ca with lung mets  -oncology f/u    B/l pulmonary emboli and DVT  -unable to anticoagulate d/t thrombocytopenia  -oncology following  -s/p IVC filter placement by IR    Pericardial effusion  -moderate on TTE    Tachycardia-went into afib with RVR on 5/16 and hypotension  -on amio. Given digoxin and small fluid bolus  -transferred to CCU for closer monitoring. Promptly returned back in NSR and /70  -cardiology following     Pleural effusion  -too small for thoracentesis     Leukocytosis  -reactive to fulphila (neulasta equiv) on 5/7/25    GERD  -start PPI     Proph  -DVT: SCDS given anemia    Dispo  -full code  -transfer out of ICU today      Thank you for the opportunity to care for Kelli Fisher.     DARIEL Reaves DO, MPH  Pulmonary Critical Care Medicine  [facility]          [1]   Allergies  Allergen Reactions    Aspirin Tightness in Throat    Penicillins HIVES    Other OTHER (SEE COMMENTS)     Pt states IV steroid allergy, states it makes her breathing worse    [2]   Outpatient Medications Marked as Taking for the 5/12/25 encounter (Hospital  Encounter)   Medication Sig Dispense Refill    levothyroxine (SYNTHROID) 100 MCG Oral Tab Take 1 tablet (100 mcg total) by mouth before breakfast.      Potassium Chloride ER 20 MEQ Oral Tab CR Take 1 tablet by mouth daily.      furosemide 20 MG Oral Tab Take 1 tablet (20 mg total) by mouth daily.      Levalbuterol HCl 1.25 MG/3ML Inhalation Nebu Soln Take 3 mL (1.25 mg total) by nebulization every 8 (eight) hours as needed for Wheezing or Shortness of Breath.      pantoprazole 40 MG Oral Tab EC Take 1 tablet (40 mg total) by mouth daily. 30 tablet 0    amiodarone 200 MG Oral Tab Take 1 tablet (200 mg total) by mouth in the morning, at noon, and at bedtime for 1 day, THEN 1 tablet (200 mg total) in the morning, at noon, and at bedtime. 93 tablet 0    Fluticasone Propionate  HFA (FLOVENT HFA) 220 MCG/ACT Inhalation Aerosol Inhale 1 puff into the lungs every 12 (twelve) hours. Rinse mouth following use.     [3]    [START ON 5/18/2025] vancomycin  1,000 mg Intravenous Q24H    furosemide  40 mg Intravenous BID (Diuretic)    amiodarone  400 mg Oral BID with meals    insulin aspart  1-5 Units Subcutaneous TID CC and HS    cefepime  1 g Intravenous Q12H    pantoprazole  40 mg Oral QAM AC    levothyroxine  100 mcg Oral Before breakfast    nystatin  5 mL Oral QID   [4] [5]   docusate sodium    polyethylene glycol (PEG 3350)    magnesium hydroxide    bisacodyl    fleet enema    sodium chloride    maalox/diphenhydramine/lidocaine    acetaminophen    ondansetron    metoclopramide    glucose **OR** glucose **OR** glucose-vitamin C **OR** dextrose **OR** glucose **OR** glucose **OR** glucose-vitamin C    ipratropium-albuterol

## 2025-05-17 NOTE — PROGRESS NOTES
Gave report to Klaudia TOLLIVER [id number], poc, reports of gerd/non-cardiac chest pain. Right chest port not drawing back blood but flushes well. No skin breakdown noted, only redness scratches. Family at bedside.

## 2025-05-17 NOTE — PROGRESS NOTES
[facility]  part of [facility]    Gynecologic Oncology Progress Note    Kelli Fisher Patient Status:  Inpatient    1955 MRN [id number]   Location [facility] 2W/SW Attending Ariela Tello MD   Hosp Day # 5 PCP Taylor Gallardo MD       Date of Admission:  2025  Reason for Admission:  Stage IV endometrial cancer  Failure to thrive  Respiratory dysfunction  New PE/DVT  Cardiac arrhythmias    SUBJECTIVE:  Patient is a 70 year old female.  No obstetric history on file.  No LMP recorded. Patient has had a hysterectomy.  SOB feels a little improved. On 6L HFNC. No longer in Afib w RVR, may transfer to floor today. Feels full quickly and had some heart burn this am, better after some protonix.    Medications:  Prescriptions Prior to Admission[1]    Allergies:  Allergies[2]       Review of Systems:  14pt ROS performed and otherwise negative aside from HPI above    OBJECTIVE:  /63 (BP Location: Left arm)   Pulse 95   Temp 97.5 °F (36.4 °C) (Temporal)   Resp 22   Ht 5' (1.524 m)   Wt 164 lb 10.9 oz (74.7 kg)   SpO2 99%   BMI 32.16 kg/m²         Intake/Output Summary (Last 24 hours) at 2025 1006  Last data filed at 2025 0835  Gross per 24 hour   Intake 830 ml   Output --   Net 830 ml       Physical Exam:  General appearance: alert, appears stated age and cooperative  Head: Normocephalic, without obvious abnormality, atraumatic  Eyes: conjunctivae/corneas clear. PERRL, EOM's intact.   Nose: NC in place  Chest: normal WOB  Extremities: extremities normal, atraumatic, no cyanosis or edema  Skin: Skin color, texture, turgor normal. No rashes or lesions  Neurologic: Grossly normal    Diagnostics:  XR CHEST AP PORTABLE  (CPT=71045)  Result Date: 5/15/2025  PROCEDURE: XR CHEST AP PORTABLE  (CPT=71045) TIME: 6:34.   COMPARISON: [facility], CT CHEST PE AORTA (IV ONLY) (CPT=71260), 2025, 5:15 PM.  [facility], XR CHEST AP PORTABLE  (CPT=71045), 5/12/2025, 2:43 PM.  INDICATIONS: Shortness of breath.  Acute on chronic respiratory failure, pulmonary emboli, history of metastatic endometrial carcinoma.  TECHNIQUE:   Single view.   FINDINGS:  CARDIAC/VASC: The cardiac silhouette remains enlarged.  Unremarkable pulmonary vasculature.  MEDIAST/SCOTT:   Prominence of the mediastinal and bilateral hilar contours is unchanged consistent with known lymphadenopathy. LUNGS/PLEURA: There is stable consolidation in the lateral aspect of the lingula/left upper lobe.  The right hemidiaphragm remains mildly elevated.  Small pleural effusions and mild associated passive atelectasis are unchanged bilaterally.  No pneumothorax. BONES: The osseous structures are unchanged. OTHER: A right-sided subcutaneous chest port tip again projects over the cavoatrial junction, in customary positioning.         CONCLUSION:  1. The cardiac silhouette remains enlarged, likely relating to the known pericardial effusion.  Small pleural effusions are also unchanged bilaterally suggesting mild CHF or fluid overload.  There is stable mild associated passive atelectasis at both lung bases. 2. There is a history of metastatic endometrial carcinoma.  Stable left upper lobe/lingular consolidation may relate to a metastasis, atelectasis and/or pneumonia.  Unchanged prominence of the mediastinal and bilateral hilar contours is compatible with known underlying metastatic lymphadenopathy.     Dictated by (CST): Vincent Vazquez MD on 5/15/2025 at 7:25 AM     Finalized by (CST): Vincent Vazquez MD on 5/15/2025 at 7:28 AM          US VENOUS DOPPLER LEG BILAT - DIAG IMG (CPT=93970)  Result Date: 5/14/2025  PROCEDURE: US VENOUS DOPPLER LEG BILAT-DIAG IMG (CPT=93970)  COMPARISON: None.  INDICATIONS: b/l PE  TECHNIQUE: Color duplex Doppler venous ultrasound of both lower extremities was performed in the  usual manner.  FINDINGS:   There is acute appearing nonocclusive DVT involving the right  superficial femoral (mid and distal segments), the right popliteal, and right posterior tibial veins.  There is no DVT identified within the right common femoral vein.  The deep veins of the left lower extremity demonstrate normal blood flow, compressibility, and augmentation.  There is a 8.2 x 1.5 x 3.8 cm left posterior popliteal/Baker's cyst.         CONCLUSION:   1. Acute appearing nonocclusive DVT involving the right superficial femoral, popliteal, and posterior tibial veins.  No left lower extremity DVT is identified.  2. 8.2 x 1.5 x 3.8 cm left posterior popliteal/Baker's cyst.     Dictated by (CST): Kaleb Epps MD on 5/14/2025 at 10:46 AM     Finalized by (CST): Kaleb Epps MD on 5/14/2025 at 10:49 AM          CT CHEST PE AORTA (IV ONLY) (CPT=71260)  Result Date: 5/13/2025  PROCEDURE: CT CHEST PE AORTA (IV ONLY) (CPT=71260)  COMPARISON: [facility], CT CHEST PE AORTA (IV ONLY) (CPT=71260), 4/29/2025, 11:50 PM.  INDICATIONS: Elevated d dimer.  TECHNIQUE: CT images of the chest were obtained with non-ionic intravenous contrast material.  Automated exposure control for dose reduction was used. Adjustment of the mA and/or kV was done based on the patient's size. Use of iterative reconstruction technique for dose reduction was used. Dose information is transmitted to the ACR (American College of Radiology) NRDR (National Radiology Data Registry) which includes the Dose Index Registry.  FINDINGS:  No acute aortic syndrome.  Positive for small volume acute bilateral nonocclusive segmental PE in the right upper lobe, right lower lobe, left lower lobe  Enlarging circumferential pericardial effusion now 1.7 centimeter thick over the lateral wall.   Increasing small left and new trace right pleural effusion  Stable heart size.  No significant coronary calcification.  Venous catheter in the lower SVC  Confluent neck and mediastinal mass is grossly unchanged  Enlarging left upper lobe  consolidation  Stable right basilar atelectasis  Some of the bilateral pulmonary nodules have increased in size        CONCLUSION:   Positive for small volume bilateral segmental PE  Enlarging left upper lobe consolidation and few bilateral pulmonary nodules  Enlarging pericardial effusion now 1.7 centimeter thick.  Increasing left pleural effusion, new right pleural effusion  Discussed with UNRULY Brumfield at 5:40 p.m.   Dictated by (CST): Chris Lovell MD on 2025 at 5:34 PM     Finalized by (CST): Chris Lovell MD on 2025 at 5:42 PM          [employer] (CPT=93308/26652/74747)  Result Date: 2025  Transthoracic Echocardiogram Name:Kelli Fisher Date: 2025 :  1955 Ht:  (60in)  BP: 106 / 75 MRN:  [id number]    Age:  70years    Wt:  (167lb) HR: 85bpm Loc:  [facility]       Gndr: F          BSA: 1.73m^2 Sonographer: Naty PINTO Ordering:    Margarette Sexton Consulting:  Dominick Herring ---------------------------------------------------------------------------- History/Indications:   SOB and known Pericardial Effusion. ---------------------------------------------------------------------------- Procedure information:  A transthoracic echocardiogram, limited study was performed. Additional evaluation included M-mode and limited 2D.  Patient status:  Inpatient.  Location:  Bedside.    Comparison was made to the study of 2025.    This was a routine study. Transthoracic echocardiography for diagnosis. Image quality was adequate. ---------------------------------------------------------------------------- Conclusions: 1. Left ventricle: The cavity size was normal. Wall thickness was mildly    increased. There was mild concentric hypertrophy. Systolic function was    normal. The estimated ejection fraction was 55-60%, by visual assessment.    Unable to assess LV diastolic function. 2. Right ventricle: The cavity size was normal. Systolic function was    normal. 3. Pericardium, extracardiac: A moderate,  free-flowing pericardial effusion    was identified. There was no evidence of hemodynamic compromise. 4. Systemic veins: Central venous respirophasic diameter changes are blunted    (< 50%). 5. Inferior vena cava: The IVC was dilated. * ---------------------------------------------------------------------------- * Findings: Left ventricle:  The cavity size was normal. Wall thickness was mildly increased. There was mild concentric hypertrophy. Systolic function was normal. The estimated ejection fraction was 55-60%, by visual assessment. Unable to assess LV diastolic function. Left atrium:  Well visualized. Right ventricle:  The cavity size was normal. Systolic function was normal. Right atrium:  Well visualized. Mitral valve:  Well visualized. Aortic valve:   The valve was trileaflet. Tricuspid valve:  The annulus is normal-sized. The leaflets are normal thickness. No echocardiographic evidence for tricuspid prolapse.  Doppler: Transvalvular velocity was within the normal range. There was no evidence for stenosis. There was mild regurgitation. Pulmonic valve:    Doppler:  There was trivial regurgitation. Pericardium:  A moderate, free-flowing pericardial effusion was identified. There was no evidence of hemodynamic compromise. Systemic veins:  Central venous respirophasic diameter changes are blunted (< 50%). Inferior vena cava: The IVC was dilated. ---------------------------------------------------------------------------- Measurements  Left ventricle         Value        Ref       05/05/2025  IVS thickness, ED, (H) 1.3   cm     0.6 - 0.9 1.0  PLAX  LV ID, ED, PLAX        4.0   cm     3.8 - 5.2 3.6  LV ID, ES, PLAX        2.4   cm     2.2 - 3.5 2.4  LV PW thickness,   (H) 1.1   cm     0.6 - 0.9 1.0  ED, PLAX  IVS/LV PW ratio,       1.18         --------- 1.00  ED, PLAX  LV PW/LV ID ratio,     0.28         --------- 0.28  ED, PLAX  LV ejection            71    %      54 - 74   63  fraction  Stroke volume/bsa,      35    ml/m^2 --------- ----------  2D  LVOT                   Value        Ref       05/05/2025  LVOT ID                2     cm     --------- 2  LVOT peak              1.21  m/sec  --------- ----------  velocity, S  LVOT VTI, S            19.4  cm     --------- ----------  LVOT peak              6     mm Hg  --------- ----------  gradient, S  LVOT mean              3     mm Hg  --------- ----------  gradient, S  Stroke volume          61    ml     --------- ----------  (SV), LVOT DP  Stroke index           35    ml/m^2 --------- ----------  (SV/bsa), LVOT DP  Pulmonary artery       Value        Ref       05/05/2025  PA pressure, S, DP     44    mm Hg  --------- ----------  Tricuspid valve        Value        Ref       05/05/2025  Tricuspid regurg       2.68  m/sec  <=2.8     ----------  peak velocity  Tricuspid peak         29    mm Hg  --------- ----------  RV-RA gradient  Systemic veins         Value        Ref       05/05/2025  Estimated CVP          15    mm Hg  --------- 15  Inferior vena cava     Value        Ref       05/05/2025  ID                 (H) 2.3   cm     <=2.1     2.4  Right ventricle        Value        Ref       05/05/2025  TAPSE, 2D              1.93  cm     >=1.70    ----------  TAPSE, MM              1.93  cm     >=1.70    ----------  RV pressure, S, DP     44    mm Hg  --------- ---------- Legend: (L)  and  (H)  corinna values outside specified reference range. ---------------------------------------------------------------------------- Prepared and electronically signed by Javi Huerta 05/13/2025 16:05     XR CHEST AP PORTABLE  (CPT=71045)  Result Date: 5/12/2025  PROCEDURE: XR CHEST AP PORTABLE  (CPT=71045) TIME: 1443 hours  COMPARISON: [facility], XR CHEST AP PORTABLE (CPT=71045), 4/29/2025, 10:40 PM.  [facility], CT CHEST PE AORTA (IV ONLY) (CPT=71260), 4/29/2025, 11:50 PM.  [facility], XR CHEST AP PORTABLE (CPT=71045), 5/02/2025, 6:22 AM.   INDICATIONS: Low oxygen, shortness of breath.  History of lung cancer.  TECHNIQUE:   Single view.   FINDINGS:  CARDIAC/VASC: Enlarged cardiac silhouette is related to known pericardial effusion. Pulmonary vascularity normal. MEDIAST/SCOTT:   Stable marked mediastinal lymphadenopathy. LUNGS/PLEURA: Stable left upper lobe mass.  Suboptimal inspiration.  No significant changes BONES: No fracture or visible bony lesion. OTHER: Right Port-A-Cath tip in superior right atrium.         CONCLUSION:  1. Suboptimal inspiration.  No acute finding or significant change. 2. Left mid lung/upper lobe mass compatible with known malignancy. 3. Stable mediastinal lymph node metastases. 4. Stable pericardial effusion.    Dictated by (CST): Jayson Fournier MD on 5/12/2025 at 3:04 PM     Finalized by (CST): Jayson Fournier MD on 5/12/2025 at 3:08 PM          IR PORT A CATH INSERTION EXCHNGE CHECK  Result Date: 5/6/2025  PROCEDURE: IR PORT A CATH INSERTION  INDICATIONS: port insertion  COMPARISON: None.  (S):  Kellie  ANESTHESIA/SEDATION: Level of anesthesia/sedation: Moderate sedation (conscious sedation) Anesthesia/sedation administered by: Nurse or other independent trained observer who has no other duties with continuous monitoring of the patient's level of consciousness and physiologic status, under the personal supervision of the attending physician. Total intra-service sedation time:  15 min  ESTIMATED BLOOD LOSS: Less than 5 mL  COMPLICATIONS: None  FINDINGS:  Informed consent was obtained. The right side of the neck and chest were sterilely prepped and draped.  The procedure was performed with the patient in a sitting semi upright position, as she could not tolerate lying flat due to dyspnea.  Ultrasound demonstrated the right internal jugular vein to be patent. Local Lidocaine was administered. Under ultrasound guidance, the vein was accessed with a micropuncture set; an image was saved.  Under fluoroscopic guidance, a 0.035  inch wire was advanced into the inferior vena cava. The access was dilated. A peel-away sheath was advanced over the Amplatz wire and secured.  Local Lidocaine was administered, and a horizontal skin incision was made in the chest several cm from the venotomy with a 15 blade. Blunt dissection of the soft tissues was then performed to create a pocket for the chest port device.  The port catheter was tunneled from the pocket to the venotomy. The catheter was advanced through the peel-away sheath to the superior vena cava-right atrial junction and cut to appropriate length. The port catheter was attached to the port device which was then placed into the pocket.  The port was accessed with a non-coring needle. It aspirated and flushed well. The catheter was flushed with heparin and secured to the skin. The subcutaneous pocket was then closed with interrupted deep 4-0 absorbable sutures. Skin glue was applied over  the pocket incision and the venotomy.         CONCLUSION:  Ultrasound and fluoroscopic guided surgical placement of chest port    Dictated by (CST): Rony Hopkins MD on 2025 at 10:44 AM     Finalized by (CST): Rony Hopkins MD on 2025 at 10:45 AM          Duroline (CPT=93308/09907/28891)  Result Date: 2025  Transthoracic Echocardiogram Name:Kelli Fisher Date: 2025 :  1955 Ht:  (60in)  BP: 142 / 86 MRN:  [id number]    Age:  70years    Wt:  (174lb) HR: 106bpm Loc:  [facility]       Gndr: F          BSA: 1.76m^2 Sonographer: Chris DOMINGUEZ Ordering:    Stella Gomez Consulting:  Khadar Segura ---------------------------------------------------------------------------- History/Indications:   Pericardial effusion. Lung mass. ---------------------------------------------------------------------------- Procedure information:  A transthoracic echocardiogram, limited study was performed. Additional evaluation included M-mode and limited 2D.  Patient status:  Inpatient.  Location:   Bedside.    Comparison was made to the study of 04/30/2025.    This was a routine study. Transthoracic echocardiography for diagnosis and ventricular function evaluation. Image quality was adequate. ECG rhythm:   Sinus tachycardia  Study completion:  There were no complications. ---------------------------------------------------------------------------- Conclusions: 1. Left ventricle: The cavity size was normal. Wall thickness was normal.    Systolic function was vigorous. The estimated ejection fraction was    65-70%, by biplane method of disks. No diagnostic evidence for regional    wall motion abnormalities. Unable to assess LV diastolic function. 2. Pericardium, extracardiac: A small to moderate, free-flowing pericardial    effusion was identified circumferential to the heart. Probably not    hemodynamically significant. Impressions:  This study is compared with previous dated 4/30/2025: No significant change since prior study. * ---------------------------------------------------------------------------- * Findings: Left ventricle:  The cavity size was normal. Wall thickness was normal. Systolic function was vigorous. The estimated ejection fraction was 65-70%, by biplane method of disks. No diagnostic evidence for regional wall motion abnormalities. Unable to assess LV diastolic function. Ventricular septum:   Thickness was normal. Left atrium:  The left atrial volume was normal. Right ventricle:  Well visualized. The cavity size was normal. Systolic function was normal. Right atrium:  Well visualized. The atrium was normal in size. Mitral valve:  Well visualized. The leaflets were mildly calcified. Aortic valve:  Well visualized.  The valve was trileaflet. The leaflets were mildly calcified. Tricuspid valve:  Well visualized. Pulmonic valve:   Well visualized. Pericardium:  A small to moderate, free-flowing pericardial effusion was identified circumferential to the heart. Probably not hemodynamically  significant. Aorta: Aortic root: The aortic root was normal. Ascending aorta: The ascending aorta was normal. Pulmonary arteries: Systolic pressure could not be accurately estimated. ---------------------------------------------------------------------------- Measurements  Left ventricle         Value        Ref       04/30/2025  IVS thickness, ED, (H) 1.0   cm     0.6 - 0.9 1.0  PLAX  LV ID, ED, PLAX    (L) 3.6   cm     3.8 - 5.2 3.9  LV ID, ES, PLAX        2.4   cm     2.2 - 3.5 2.5  LV PW thickness,   (H) 1.0   cm     0.6 - 0.9 1.0  ED, PLAX  IVS/LV PW ratio,       1.00         --------- 1.00  ED, PLAX  LV PW/LV ID ratio,     0.28         --------- 0.26  ED, PLAX  LV ejection            63    %      54 - 74   66  fraction  LV end-diastolic       62    ml     48 - 140  ----------  volume, 1-p A4C  LV ejection            65    %      46 - 78   ----------  fraction, 1-p A4C  Stroke volume, 1-p     40    ml     --------- ----------  A4C  LV end-diastolic       35    ml/m^2 30 - 82   ----------  volume/bsa, 1-p  A4C  Stroke volume/bsa,     23    ml/m^2 --------- ----------  1-p A4C  LV end-diastolic       61    ml     46 - 106  ----------  volume, 2-p  LV end-systolic        21    ml     14 - 42   ----------  volume, 2-p  LV ejection            65    %      54 - 74   ----------  fraction, 2-p  Stroke volume, 2-p     40    ml     --------- ----------  LV end-diastolic       35    ml/m^2 29 - 61   ----------  volume/bsa, 2-p  LV end-systolic        12    ml/m^2 8 - 24    ----------  volume/bsa, 2-p  Stroke volume/bsa,     22.5  ml/m^2 --------- ----------  2-p  LVOT                   Value        Ref       04/30/2025  LVOT ID                2     cm     --------- ----------  Aortic root            Value        Ref       04/30/2025  Aortic root ID         3.2   cm     2.5 - 4.0 ----------  Ascending aorta        Value        Ref       04/30/2025  Ascending aorta ID (H) 3.7   cm     1.9 - 3.5 ----------  Left atrium             Value        Ref       04/30/2025  LA ID, A-P, ES         2.7   cm     2.7 - 3.8 ----------  LA volume, S           43    ml     22 - 52   ----------  LA volume/bsa, S       24    ml/m^2 16 - 34   ----------  LA volume, ES, 1-p     39    ml     22 - 52   ----------  A4C  LA volume, ES, 1-p     46    ml     22 - 52   ----------  A2C  LA volume, ES, A/L     46    ml     --------- ----------  LA volume/bsa, ES,     26    ml/m^2 16 - 34   ----------  A/L  LA/aortic root         0.84         --------- ----------  ratio  Right atrium           Value        Ref       04/30/2025  RA ID, S-I, ES,        4.8   cm     3.4 - 5.3 ----------  A4C  RA ID/bsa, S-I,        2.7   cm/m^2 1.9 - 3.1 ----------  ES, A4C  RA area, ES, A4C       14    cm^2   10 - 18   ----------  RA volume, ES, 1-p     32    ml     --------- ----------  A4C  RA volume/bsa, ES,     18    ml/m^2 9 - 33    ----------  1-p A4C  Systemic veins         Value        Ref       04/30/2025  Estimated CVP          15    mm Hg  --------- ----------  Inferior vena cava     Value        Ref       04/30/2025  ID                 (H) 2.4   cm     <=2.1     2.3 Legend: (L)  and  (H)  corinna values outside specified reference range. ---------------------------------------------------------------------------- Prepared and electronically signed by Margareth Haas 05/05/2025 12:36     XR VIDEO SWALLOW (CPT=74230)  Result Date: 5/3/2025  PROCEDURE: XR VIDEO SWALLOW (CPT=74230)  COMPARISON: None available.  INDICATIONS: Clinical concern for aspiration.  TECHNIQUE: A swallowing evaluation was performed in the Radiology Department in conjunction with the Department of Speech and Hearing.  A separate detailed report will be issued by the speech pathologist who recorded the study. Fluoroscopy was provided by a radiologist who was present during the procedure.    Materials of various consistencies were given by mouth, and swallowing was evaluated fluoroscopically.   # of  FLUOROGRAPHIC CINE FILES:  7 FLUOROSCOPY IMAGES OBTAINED:  1 FLUOROSCOPY TIME:  2.1 minutes RADIATION DOSE (Dose Area Product):  [id number]  FINDINGS:  ASPIRATION/PENETRATION:   None.  STRUCTURE: No visible obstruction, stricture, or dilatation.  OTHER: Negative.           CONCLUSION:  No penetration or aspiration.    Dictated by (CST): Harpreet Jade MD on 2025 at 12:08 PM     Finalized by (CST): Harpreet Jade MD on 2025 at 12:09 PM          XR CHEST AP PORTABLE  (CPT=71045)  Result Date: 2025  PROCEDURE: XR CHEST AP PORTABLE  (CPT=71045) TIME: 622 hours.   COMPARISON: [facility], XR CHEST AP PORTABLE (CPT=71045), 2025, 10:40 PM.  [facility], X CHEST PORTABLE, 2014, 8:51 PM.  INDICATIONS: Shortness of breath.  TECHNIQUE:   Single view.   FINDINGS:  CARDIAC/MEDIAST: Heart and mediastinum are unchanged. LUNGS/PLEURA:  Opacity in the left perihilar mid lung not significantly changed.  Low lung volumes.  There is mild opacity in the right lung base.  No significant pleural effusion or pneumothorax. OTHER:  Stable appearance of the osseous structures.          CONCLUSION:   Stable opacity left perihilar mid lung.  Mild opacity right lung base which may reflect atelectasis with or without superimposed pneumonia.    Dictated by (CST): Casey Diallo MD on 2025 at 7:18 AM     Finalized by (CST): Casey Diallo MD on 2025 at 7:19 AM          CARD ECHO [employer] (CPT=93308/52371/29937)  Result Date: 2025  Transthoracic Echocardiogram Name:Kelli Fisher Date: 2025 :  1955 Ht:  (60in)  BP: 106 / 74 MRN:  [id number]    Age:  70years    Wt:  (165lb) HR: 108bpm Loc:  [facility]       Gndr: F          BSA: 1.72m^2 Sonographer: James PINTO, RVT Ordering:    Javi Huerta Consulting:  Ector Byrnes ---------------------------------------------------------------------------- History/Indications:  Pericardial effusion.  ---------------------------------------------------------------------------- Procedure information:  A transthoracic echocardiogram, limited study was performed. Additional evaluation included M-mode and limited 2D.  Patient status:  Inpatient.  Location:  Bedside.    Comparison was made to the study of 04/12/2025.    This was a routine study. Transthoracic echocardiography for ventricular function evaluation and assessment of pericardial effusion and hemodynamic status. Image quality was adequate. ECG rhythm:   Sinus tachycardia ---------------------------------------------------------------------------- Conclusions: 1. Left ventricle: The cavity size was normal. Wall thickness was mildly    increased. Systolic function was normal. The estimated ejection fraction    was 60-65%, by visual assessment. No diagnostic evidence for regional    wall motion abnormalities. Unable to assess LV diastolic function. 2. Pericardium, extracardiac: A small to moderate, free-flowing pericardial    effusion was identified circumferential to the heart. Maximal diameter in    diastole is 1.1cm. Features were not consistent with tamponade    physiology. 3. Inferior vena cava: The IVC was dilated. Respirophasic diameter changes    are blunted (< 50%), consistent with elevated central venous pressure. * ---------------------------------------------------------------------------- * Findings: Left ventricle:  The cavity size was normal. Wall thickness was mildly increased. Systolic function was normal. The estimated ejection fraction was 60-65%, by visual assessment. No diagnostic evidence for regional wall motion abnormalities. Unable to assess LV diastolic function. Right ventricle:  The cavity size was normal. Mitral valve:  The valve was structurally normal. Aortic valve:  The valve was structurally normal. Tricuspid valve:  The valve is structurally normal. Pulmonic valve:   Not well visualized. Pericardium:  A small to moderate,  free-flowing pericardial effusion was identified circumferential to the heart. Maximal diameter in diastole is 1.1cm.  Doppler:  Features were not consistent with tamponade physiology. Systemic veins: Inferior vena cava: The IVC was dilated.  Respirophasic diameter changes are blunted (< 50%), consistent with elevated central venous pressure. ---------------------------------------------------------------------------- Measurements  Left ventricle              Value    Ref      04/12/2025  IVS thickness, ED, PLAX (H) 1.0   cm 0.6 -    0.8                                       0.9  LV ID, ED, PLAX             3.9   cm 3.8 -    4.4                                       5.2  LV ID, ES, PLAX             2.5   cm 2.2 -    2.8                                       3.5  LV PW thickness, ED,    (H) 1.0   cm 0.6 -    0.7  PLAX                                 0.9  IVS/LV PW ratio, ED,        1.00     -------- 1.14  PLAX  LV PW/LV ID ratio, ED,      0.26     -------- 0.16  PLAX  LV ejection fraction        66    %  54 - 74  66  Inferior vena cava          Value    Ref      04/12/2025  ID                      (H) 2.3   cm <=2.1    2.7 Legend: (L)  and  (H)  corinna values outside specified reference range. ---------------------------------------------------------------------------- Prepared and electronically signed by Javi Huerta 04/30/2025 13:43     CT CHEST PE AORTA (IV ONLY) (CPT=71260)  Result Date: 4/30/2025  PROCEDURE: CT CHEST PE AORTA (IV ONLY) (CPT=71260)  COMPARISON: [facility], CT CHEST PE AORTA (IV ONLY) (CPT=71260), 4/11/2025, 9:30 PM.  INDICATIONS: dyspnea, lung cancer  TECHNIQUE: CT images of the chest were obtained with non-ionic intravenous contrast material.  Automated exposure control for dose reduction was used. Adjustment of the mA and/or kV was done based on the patient's size. Use of iterative reconstruction technique for dose reduction was used. Dose information is transmitted to the ACR  (American College of Radiology) NRDR (National Radiology Data Registry) which includes the Dose Index Registry.  FINDINGS:  No PE  Normal heart size with stable small to moderate circumferential pericardial effusion  No short interval change in left upper lobe mass, diffuse intrathoracic adenopathy  Trace left pleural effusion similar to prior.  Trace right effusion has resolved   A few of the right-sided pulmonary nodules may have mildly increased in size.  Adenopathy causes severe narrowing of the lower right IJ with collaterals in the chest wall    CONCLUSION: No PE.   [facility] provided a prelim report for this exam. This final report has no significant discrepancies with the [facility] report. Finalized by (CST): Chris Lovell MD on 4/30/2025 at 9:39 AM          XR CHEST AP PORTABLE  (CPT=71045)  Result Date: 4/30/2025  PROCEDURE: XR CHEST AP PORTABLE  (CPT=71045)  COMPARISON: [facility], CT CHEST PE AORTA (IV ONLY) (CPT=71260), 4/29/2025, 11:50 PM.  INDICATIONS: Chest discomfort x1 day.  Findings:  Left lung mass with significant diffuse intrathoracic adenopathy seen on recent CT  Heart size likely normal for technique  No pneumothorax or large effusion  [facility] provided a prelim report for this exam. This final report has no significant discrepancies with the [facility] report. Finalized by (CST): Chris Lovell MD on 4/30/2025 at 6:45 AM            Data Review:   Recent Labs   Lab 05/15/25  1246 05/16/25  0537 05/17/25  0338   RBC 3.60* 3.59* 3.44*   HGB 9.2* 9.4* 8.5*   HCT 30.1* 30.1* 28.3*   WBC 9.5 11.3* 28.6*   PLT 52.0* 45.0* 53.0*   NEUT 65 93 82   LYMPH 9 2 4       Recent Labs   Lab 05/12/25  1411 05/13/25  0625 05/15/25  1246 05/15/25  2233 05/16/25  0537 05/16/25  1054 05/17/25  0338   *   < > 186*  --  185*  --  163*   BUN 22   < > 21  --  26*  --  25*   CREATSERUM 0.78   < > 1.00  --  1.06*  --  0.99   CA 9.0   < > 8.9  --  8.6*  --  8.8   ALB 3.2  --   --   --   --    --   --       < > 140  --  140  --  143   K 4.2   < > 3.4*   < > 3.7 4.1 3.7  3.7      < > 96*  --  96*  --  101   CO2 32.0   < > 38.0*  --  35.0*  --  34.0*   ALKPHO 142  --   --   --   --   --   --    AST 26  --   --   --   --   --   --    ALT 10  --   --   --   --   --   --    BILT 0.7  --   --   --   --   --   --    TP 6.8  --   --   --   --   --   --     < > = values in this interval not displayed.       Lab Results   Component Value Date     443.0 (H) 05/04/2025     210.0 (H) 04/16/2025    CEA <0.5 04/16/2025     230.8 (H) 05/04/2025     156.3 (H) 04/16/2025       ASSESSMENT/PLAN:  Patient is a 70 year old female No obstetric history on file.   Stage IV recurrent endometrial adenocarcinoma: S/p Her first cycle of carboplatin, Taxol, Keytruda on May 6, 2025. See below goals of care.  Pancytopenia: Much improved with support .  Neutropenia: Improved. PT did get Fulphila which is a Neulasta equivalent on May 7. Leukocytosis likely given the fact that she did get essentially Neulasta as well as multiple injections of Neupogen.  Unless the patient appears to be septic, this leukocytosis is usually short-lived approximately 7 days, and will resolve on its own.    Thrombocytopenia: Improving. S/p IVC filter yesterday.  No bleeding, monitor. Anticipate continued recovery as time passes from chemotherapy  Anemia: Patient is status post 4 units of packed RBCs. Hb 8.5 from 9.5, trend tomorrow. Transfusion not indicated today  Thrush: Improved we will continue to follow.    Goals of care:  See Dr. Herring' note 5/16/25 for details. Patient and family desire to continue with chemotherapy. Will need to continue to reassess clinical status. If further signs of end organ failure, will again discuss hospice.   Diet: Continue with protein shakes for now and a soft diet if she can tolerate it.  Continue protonix for reflux  Cardiac arrhythmias: Patient is currently in the ICU, improving, plans  to transfer to floor today  DVT/PE: O2 weaned some yesterday. Stably on 6L HFNC. S/p IVC filter, anticoagulation currently on hold given thrombocytopenia. Heme consulted. Consider initiating anticoag when plt stably >50      All of the findings and plan were discussed with the patient.  She notes understanding and agrees with the plan of care.  All questions were answered to the best of my ability at this time.    Thu Prasad MD  Gyn Oncology         [1]   Medications Prior to Admission   Medication Sig Dispense Refill Last Dose/Taking    levothyroxine (SYNTHROID) 100 MCG Oral Tab Take 1 tablet (100 mcg total) by mouth before breakfast.   5/12/2025 at  7:00 AM    Potassium Chloride ER 20 MEQ Oral Tab CR Take 1 tablet by mouth daily.   5/12/2025 at  7:00 AM    furosemide 20 MG Oral Tab Take 1 tablet (20 mg total) by mouth daily.   5/12/2025 at  7:00 AM    Levalbuterol HCl 1.25 MG/3ML Inhalation Nebu Soln Take 3 mL (1.25 mg total) by nebulization every 8 (eight) hours as needed for Wheezing or Shortness of Breath.   Past Week    pantoprazole 40 MG Oral Tab EC Take 1 tablet (40 mg total) by mouth daily. 30 tablet 0 5/12/2025 at  7:00 AM    amiodarone 200 MG Oral Tab Take 1 tablet (200 mg total) by mouth in the morning, at noon, and at bedtime for 1 day, THEN 1 tablet (200 mg total) in the morning, at noon, and at bedtime. 93 tablet 0 5/12/2025 at 12:00 PM    Fluticasone Propionate  HFA (FLOVENT HFA) 220 MCG/ACT Inhalation Aerosol Inhale 1 puff into the lungs every 12 (twelve) hours. Rinse mouth following use.   5/8/2025   [2]   Allergies  Allergen Reactions    Aspirin Tightness in Throat    Penicillins HIVES    Other OTHER (SEE COMMENTS)     Pt states IV steroid allergy, states it makes her breathing worse

## 2025-05-17 NOTE — PLAN OF CARE
Transfer from CCU today, Low appetite, achs, 6LO2HF, tele, voiding via purewick, +bm, up with PT/OT to chair, monitoring blood work, cefepime. Family at bedside. Updated on care plan.    Problem: Diabetes/Glucose Control  Goal: Glucose maintained within prescribed range  Description: INTERVENTIONS:- Monitor Blood Glucose as ordered- Assess for signs and symptoms of hyperglycemia and hypoglycemia- Administer ordered medications to maintain glucose within target range- Assess barriers to adequate nutritional intake and initiate nutrition consult as needed- Instruct patient on self management of diabetes  Outcome: Progressing     Problem: Patient Centered Care  Goal: Patient preferences are identified and integrated in the patient's plan of care  Description: Interventions:- What would you like us to know as we care for you? -- Provide timely, complete, and accurate information to patient/family- Incorporate patient and family knowledge, values, beliefs, and cultural backgrounds into the planning and delivery of care- Encourage patient/family to participate in care and decision-making at the level they choose- Honor patient and family perspectives and choices  Outcome: Progressing     Problem: RISK FOR INFECTION - ADULT  Goal: Absence of fever/infection during anticipated neutropenic period  Description: INTERVENTIONS- Monitor WBC- Administer growth factors as ordered- Implement neutropenic guidelines  Outcome: Progressing     Problem: CARDIOVASCULAR - ADULT  Goal: Maintains optimal cardiac output and hemodynamic stability  Description: INTERVENTIONS:- Monitor vital signs, rhythm, and trends- Monitor for bleeding, hypotension and signs of decreased cardiac output- Evaluate effectiveness of vasoactive medications to optimize hemodynamic stability- Monitor arterial and/or venous puncture sites for bleeding and/or hematoma- Assess quality of pulses, skin color and temperature- Assess for signs of decreased coronary artery  perfusion - ex. Angina- Evaluate fluid balance, assess for edema, trend weights  Outcome: Progressing  Goal: Absence of cardiac arrhythmias or at baseline  Description: INTERVENTIONS:- Continuous cardiac monitoring, monitor vital signs, obtain 12 lead EKG if indicated- Evaluate effectiveness of antiarrhythmic and heart rate control medications as ordered- Initiate emergency measures for life threatening arrhythmias- Monitor electrolytes and administer replacement therapy as ordered  Outcome: Progressing     Problem: RESPIRATORY - ADULT  Goal: Achieves optimal ventilation and oxygenation  Description: INTERVENTIONS:- Assess for changes in respiratory status- Assess for changes in mentation and behavior- Position to facilitate oxygenation and minimize respiratory effort- Oxygen supplementation based on oxygen saturation or ABGs- Provide Smoking Cessation handout, if applicable- Encourage broncho-pulmonary hygiene including cough, deep breathe, Incentive Spirometry- Assess the need for suctioning and perform as needed- Assess and instruct to report SOB or any respiratory difficulty- Respiratory Therapy support as indicated- Manage/alleviate anxiety- Monitor for signs/symptoms of CO2 retention  Outcome: Progressing     Problem: SAFETY ADULT - FALL  Goal: Free from fall injury  Description: INTERVENTIONS:  - Assess pt frequently for physical needs  - Identify cognitive and physical deficits and behaviors that affect risk of falls.  - [place] fall precautions as indicated by assessment.  - Educate pt/family on patient safety including physical limitations  - Instruct pt to call for assistance with activity based on assessment  - Modify environment to reduce risk of injury  - Provide assistive devices as appropriate  - Consider OT/PT consult to assist with strengthening/mobility  - Encourage toileting schedule  Outcome: Progressing     Problem: DISCHARGE PLANNING  Goal: Discharge to home or other facility with  appropriate resources  Description: INTERVENTIONS:  - Identify barriers to discharge w/pt and caregiver  - Include patient/family/discharge partner in discharge planning  - Arrange for needed discharge resources and transportation as appropriate  - Identify discharge learning needs (meds, wound care, etc)  - Arrange for interpreters to assist at discharge as needed  - Consider post-discharge preferences of patient/family/discharge partner  - Complete POLST form as appropriate  - Assess patient's ability to be responsible for managing their own health  - Refer to Case Management Department for coordinating discharge planning if the patient needs post-hospital services based on physician/LIP order or complex needs related to functional status, cognitive ability or social support system  Outcome: Progressing

## 2025-05-17 NOTE — PROGRESS NOTES
[facility]  part of [facility]    Progress Note    Kelli Fisher Patient Status:  Inpatient    1955 MRN [id number]   Location [facility] 3W/SW Attending Hina Bentley MD   Hosp Day # 5 PCP Taylor Gallardo MD     Chief Complaint: afib rvr    SUBJECTIVE:      Patient did well overnight blood pressure better controlled, sinus rhythm in the 90s.    This morning complaining of chest pressure for the last \"3 hours\" retrosternal and epigastric.  She rates it a 9/10 however does not appear to be in significantly more distress than usual.  Did not sleep well overnight.        Down  to 6L nasal cannula.         No acute events overnight.  Patient denies chest pain. No fevers/chills.  No n/v/abd pain.      10 ROS completed and otherwise negative.    OBJECTIVE:  Vital signs in last 24 hours:  /73 (BP Location: Left arm)   Pulse 93   Temp 97.1 °F (36.2 °C) (Temporal)   Resp 25   Ht 5' (1.524 m)   Wt 164 lb 10.9 oz (74.7 kg)   SpO2 100%   BMI 32.16 kg/m²     Intake/Output:    Intake/Output Summary (Last 24 hours) at 2025 1324  Last data filed at 2025 1140  Gross per 24 hour   Intake 950 ml   Output --   Net 950 ml       Wt Readings from Last 3 Encounters:   25 164 lb 10.9 oz (74.7 kg)   25 169 lb (76.7 kg)   25 174 lb 9.6 oz (79.2 kg)       Exam     Gen: No acute distress, chronically ill-appearing, tachypneic  Pulm: Lungs clear, normal respiratory effort  CV: Heart with regular rate and rhythm  Abd: Abdomen soft, nontender, bowel sounds present  Neuro: No acute focal deficits  MSK: moves extremities  Skin: Warm and dry  Psych: Normal affect  Ext: no c/c/e    Data Review:     Labs:   Lab Results   Component Value Date    WBC 28.6 2025    HGB 8.5 2025    HCT 28.3 2025    PLT 53.0 2025    CREATSERUM 0.99 2025    BUN 25 2025     2025    K 3.7 2025    K 3.7 2025     2025    CO2  34.0 05/17/2025     05/17/2025    CA 8.8 05/17/2025    MG 1.6 05/17/2025         Imaging: Reviewed    XR CHEST AP PORTABLE  (CPT=71045)  Result Date: 5/15/2025  CONCLUSION:  1. The cardiac silhouette remains enlarged, likely relating to the known pericardial effusion.  Small pleural effusions are also unchanged bilaterally suggesting mild CHF or fluid overload.  There is stable mild associated passive atelectasis at both lung bases. 2. There is a history of metastatic endometrial carcinoma.  Stable left upper lobe/lingular consolidation may relate to a metastasis, atelectasis and/or pneumonia.  Unchanged prominence of the mediastinal and bilateral hilar contours is compatible with known underlying metastatic lymphadenopathy.     Dictated by (CST): Vincent Vazquez MD on 5/15/2025 at 7:25 AM     Finalized by (CST): Vincent Vazquez MD on 5/15/2025 at 7:28 AM            Meds:   Current Hospital Medications[1]      Assessment  Problem List[2]    Plan:     Afib RVR:  Pericardial effusion  - started IV amio  - cardiology consulted  - monitor on tele  - anticoagulation per cards  -5/14: Attempting to transition off IV amiodarone to p.o. today.  -5/15: doing well on po amiodarone    5/16: Patient was in RVR again today and hypotensive received digoxin RVR converted to normal sinus rhythm.  Remains mildly hypotensive in the 80s denies any symptoms at this time.  However due to lability of hemodynamics will transfer to stepdown unit.  Small bolus of 250 cc given.  IV Lasix held.  5/17: Now in sinus rhythm blood pressure well-controlled normotensive rates controlled.  Chest pain likely GERD however cardiology informed will get troponin and stat EKG.  Maalox as needed okay to resume Lasix today possible transfer back to cardiac floor pending progress    BL PE: small segmental/right lower extremity DVT acute  S/p IVC 5/14/2025  Not on a/c due to TCP  -5/15/2025: tolerated IVC well    Metastatic endometrial cancer     Pancytopenia  Acute on chronic anemia of chronic disease  - transfuse 1 unit prbc  - neutropenic precautions  - started neupogen until ANC 1500  -Plan is for further chemotherapy once recovered clinically per Dr. Herring.  - cefepime and vanco started for neutropenia and patient with cough/chills, elevated LA, possible early sepsis - pulm following for this as well  -Pancytopenia improving, monitor daily  Overall plan of care is to continue to treat medically over the next several days and monitor for improvement.  If patient declines may consider palliative conversations.  If she improves overall plan is to resume chemotherapy in the future as planned by Dr. Herring.    Acute on chronic respiratory failure  - on 4-5L NC baseline  - due to 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  -5/17: O2 requirements down 6L at this time.  Resuming Lasix today in view of hemodynamic stability.  As discussed during this admission with pulmonary service and GYN oncology no need for therapeutic or diagnostic thoracentesis at this time.       Thrush  - nystatin    Global A/P  - reviewed labs and vitals from today  - reviewed notes of the day  - cbc, bmp, mag ordered for tomorrow  - discussed need to stay in the hospital today due to above  - cont IV abx  - discussed with patient/RN and care team  - dispo pending clinical course      Ariela Tello MD      Supplementary Documentation:   DVT Mechanical Prophylaxis:   SCDs,    DVT Pharmacologic Prophylaxis   Medication   None                Code Status: Full Code  Rogel: External urinary catheter in place  Rogel Duration (in days):   Central line:    JOSE:         **Certification      PHYSICIAN Certification of Need for Inpatient Hospitalization - Initial Certification    Patient will require inpatient services that will reasonably be expected to span two midnight's based on the clinical documentation in H+P.   Based on patients current state of illness, I anticipate that, after  discharge, patient will require TBD.      Dietitian Malnutrition Assessment    Evaluation for Malnutrition: Criteria for non-severe malnutrition diagnosis-         chronic illness related to   Wt loss 7.5% in 3 months., Body fat mild depletion., Muscle mass mild depletion.       RD Malnutrition Care Plan: Encouraged increased PO intake., Encouraged small frequent meals with emphasis on high calorie/high protein., Intiated ONS (oral nutritional supplements).    Body Fat/Muscle Mass: Mild depletion body fat., Mild depletion muscle mass. triceps region. dorsal dale region., calf region.    Physician Assessment     Patient has a diagnosis of moderate malnutrition              MDM: High complexity- severe exacerbation of chronic illness posing a threat to life. IV meds requiring close inpatient monitoring. I personally spent time on chart/note review, review of labs/imaging, discussion with patient, physical exam, discussion with staff, writing progress note, and discussion of plan of care.           [1]   Current Facility-Administered Medications   Medication Dose Route Frequency    [START ON 5/18/2025] pantoprazole (Protonix) DR tab 40 mg  40 mg Oral QAM AC    [START ON 5/18/2025] vancomycin (Vancocin) 1,000 mg in sodium chloride 0.9% 250 mL IVPB-ADDV  1,000 mg Intravenous Q24H    docusate sodium (Colace) cap 100 mg  100 mg Oral Daily PRN    polyethylene glycol (PEG 3350) (Miralax) 17 g oral packet 17 g  17 g Oral Daily PRN    magnesium hydroxide (Milk of Magnesia) 400 MG/5ML oral suspension 30 mL  30 mL Oral Daily PRN    bisacodyl (Dulcolax) 10 MG rectal suppository 10 mg  10 mg Rectal Daily PRN    fleet enema (Fleet) rectal enema 133 mL  1 enema Rectal Once PRN    furosemide (Lasix) 10 mg/mL injection 40 mg  40 mg Intravenous BID (Diuretic)    amiodarone (Pacerone) tab 400 mg  400 mg Oral BID with meals    insulin aspart (NovoLOG) 100 Units/mL FlexPen 1-5 Units  1-5 Units Subcutaneous TID CC and HS    sodium  chloride (Saline Mist) 0.65 % nasal solution 1 spray  1 spray Each Nare Q3H PRN    maalox/diphenhydramine/lidocaine (First Mouthwash BLM) oral suspension 10 mL  10 mL Oral Q6H PRN    acetaminophen (Tylenol Extra Strength) tab 500 mg  500 mg Oral Q4H PRN    ondansetron (Zofran) 4 MG/2ML injection 4 mg  4 mg Intravenous Q6H PRN    metoclopramide (Reglan) 5 mg/mL injection 5 mg  5 mg Intravenous Q8H PRN    glucose (Dex4) 15 GM/59ML oral liquid 15 g  15 g Oral Q15 Min PRN    Or    glucose (Glutose) 40% oral gel 15 g  15 g Oral Q15 Min PRN    Or    glucose-vitamin C (Dex-4) chewable tab 4 tablet  4 tablet Oral Q15 Min PRN    Or    dextrose 50% injection 50 mL  50 mL Intravenous Q15 Min PRN    Or    glucose (Dex4) 15 GM/59ML oral liquid 30 g  30 g Oral Q15 Min PRN    Or    glucose (Glutose) 40% oral gel 30 g  30 g Oral Q15 Min PRN    Or    glucose-vitamin C (Dex-4) chewable tab 8 tablet  8 tablet Oral Q15 Min PRN    ceFEPIme (Maxipime) 1 g in sodium chloride 0.9% 100mL IVPB-YANA  1 g Intravenous Q12H    ipratropium-albuterol (Duoneb) 0.5-2.5 (3) MG/3ML inhalation solution 3 mL  3 mL Nebulization Q6H PRN    levothyroxine (Synthroid) tab 100 mcg  100 mcg Oral Before breakfast    nystatin (Mycostatin) 883872 UNIT/ML oral suspension 500,000 Units  5 mL Oral QID   [2]   Patient Active Problem List  Diagnosis    Shortness of breath    Acute hypoxic respiratory failure (HCC)    Lung mass    Supraclavicular adenopathy    Dysphagia, unspecified type    Pericardial effusion (HCC)    Endometrial cancer (HCC)    Microcytic anemia    Thrombocytopenia (HCC)    Azotemia    Pancytopenia (HCC)    Hyperglycemia    Acute respiratory failure with hypoxia (HCC)    Atrial fibrillation with RVR (HCC)

## 2025-05-17 NOTE — OCCUPATIONAL THERAPY NOTE
OCCUPATIONAL THERAPY TREATMENT NOTE - INPATIENT        Room Number: 216/216-A     Presenting Problem: bilateral PE, RLE DVT s/p IVC 5/14    Problem List  Principal Problem:    Acute respiratory failure with hypoxia ([facility])  Active Problems:    Thrombocytopenia (HCC)    Azotemia    Pancytopenia ([facility])    Hyperglycemia    Atrial fibrillation with RVR ([facility])      OCCUPATIONAL THERAPY ASSESSMENT   Patient demonstrates fair progress this session, goals remain in progress.    Patient is requiring minimal assist and maximum assist as a result of the following impairments: decreased functional strength, decreased endurance, impaired standing balance, and medical status.    Patient continues to function below baseline with toileting, bathing, upper body dressing, lower body dressing, grooming, eating, bed mobility, transfers, static standing balance, dynamic standing balance, functional standing tolerance, and energy conservation strategies.  Next session anticipate patient to progress toileting, bathing, upper body dressing, lower body dressing, grooming, eating, bed mobility, transfers, static standing balance, dynamic standing balance, functional standing tolerance, and energy conservation strategies.  Occupational Therapy will continue to follow patient for duration of hospitalization.    Patient continues to benefit from continued skilled OT services: to promote return to prior level of function and safety with continuous assistance and gradual rehabilitative therapy.     PLAN DURING HOSPITALIZATION  OT Device Recommendations: Shower chair  OT Treatment Plan: Balance activities, Energy conservation/work simplification techniques, ADL training, IADL training, Functional transfer training, UE strengthening/ROM, Endurance training, Patient/Family education, Patient/Family training, Equipment eval/education, Compensatory technique education     SUBJECTIVE  \"I have been sitting in the chair all  morning.\"    OBJECTIVE  Precautions: Other (Comment) (6LO2)    PAIN ASSESSMENT  Ratin    ACTIVITIES OF DAILY LIVING ASSESSMENT  AM-PAC ‘6-Clicks’ Inpatient Daily Activity Short Form  How much help from another person does the patient currently need…  -   Putting on and taking off regular lower body clothing?: A Lot  -   Bathing (including washing, rinsing, drying)?: A Lot  -   Toileting, which includes using toilet, bedpan or urinal? : A Lot  -   Putting on and taking off regular upper body clothing?: A Little  -   Taking care of personal grooming such as brushing teeth?: A Little  -   Eating meals?: A Little    AM-PAC Score:  Score: 15  Approx Degree of Impairment: 56.46%  Standardized Score (AM-PAC Scale): 34.69  CMS Modifier (G-Code): CK    FUNCTIONAL TRANSFER ASSESSMENT  Sit to Stand: Edge of Bed  Edge of Bed: Minimal Assist  Chair: Moderate Assist    BED MOBILITY  Supine to Sit : Moderate Assist  Sit to Supine (OT): Moderate Assist    BALANCE ASSESSMENT  Static Sitting: Supervision  Static Standing: Contact Guard Assist    FUNCTIONAL ADL ASSESSMENT  Eating: Supervision  Grooming Seated: Supervision  Bathing Seated: Moderate Assist  UB Dressing Seated: Minimal Assist  LB Dressing Seated: Maximum Assist  Toileting Seated: Maximum Assist  Toileting Standing: Maximum Assist    THERAPEUTIC EXERCISE     Skilled Therapy Provided: Chart reviewed and spoke to RN who cleared patient to participate with therapy. Patient received supine in bed. Patient reports 0 pain this time and agreeable to participate in OT session. Patient is AOx 4 and able to follow directions independently. Cognition is WFL. Patient performing ADLs and functional mobility at a min-max A level this session. Mod A required for supine< sit EOB with CGA for sitting balance. Patient able to complete functional mobility this session with min A with use of RW. Min A for standing balance to manage pants over hip for LB dressing and toileting. Patient  most limited by endurance. Education provided on energy conservation and how that manifests functionally while completing ADLs and functional mobility. Patient with good return demonstration on all education.  Patient left in bed at end of session with call light within reach. Outcomes of session communicated to RN.     EDUCATION PROVIDED  Patient Education : Role of Occupational Therapy; Plan of Care; Discharge Recommendations; DME Recommendations; Functional Transfer Techniques; Fall Prevention; Posture/Positioning; Edema Reduction; Energy Conservation; Proper Body Mechanics  Patient's Response to Education: Verbalized Understanding; Returned Demonstration  Family/Caregiver Education : Role of Occupational Therapy; Plan of Care; Discharge Recommendations; DME Recommendations; Functional Transfer Techniques; Fall Prevention; Posture/Positioning; Edema Reduction; Energy Conservation; Proper Body Mechanics  Family/Caregiver's Response to Education: Verbalized Understanding; Returned Demonstration    The patient's Approx Degree of Impairment: 56.46% has been calculated based on documentation in the [facility] '6 clicks' Inpatient Daily Activity Short Form.  Research supports that patients with this level of impairment may benefit from subacute rehab.  Final disposition will be made by interdisciplinary medical team.    Patient End of Session: In bed, Needs met, Call light within reach, RN aware of session/findings, All patient questions and concerns addressed, Hospital anti-slip socks, Family present    OT Goals:  Patients self stated goal is: increased activity tolerance for ADLs      Patient will complete functional transfer with SPV  Comment: ongoing    Patient will complete toileting with SPV  Comment: ongoing    Patient will tolerate standing for 5 minutes in prep for adls with SPV   Comment: ongoing    Patient will complete LB dressing CGA  Comment: ongoing            Goals  on: 25  Frequency:  3-5x/wk    Self-Care Home Management: 15 minutes  Therapeutic Activity: 10 minutes

## 2025-05-18 ENCOUNTER — APPOINTMENT (OUTPATIENT)
Dept: GENERAL RADIOLOGY | Facility: HOSPITAL | Age: 70
DRG: 270 | End: 2025-05-18
Payer: MEDICARE

## 2025-05-18 LAB
ANION GAP SERPL CALC-SCNC: 8 MMOL/L (ref 0–18)
ATRIAL RATE: 94 BPM
BASOPHILS # BLD: 0 X10(3) UL (ref 0–0.2)
BASOPHILS NFR BLD: 0 %
BUN BLD-MCNC: 29 MG/DL (ref 9–23)
BUN/CREAT SERPL: 25 (ref 10–20)
CALCIUM BLD-MCNC: 8.4 MG/DL (ref 8.7–10.4)
CHLORIDE SERPL-SCNC: 101 MMOL/L (ref 98–112)
CO2 SERPL-SCNC: 34 MMOL/L (ref 21–32)
CREAT BLD-MCNC: 1.16 MG/DL (ref 0.55–1.02)
DEPRECATED RDW RBC AUTO: 64.4 FL (ref 35.1–46.3)
EGFRCR SERPLBLD CKD-EPI 2021: 51 ML/MIN/1.73M2 (ref 60–?)
EOSINOPHIL # BLD: 0 X10(3) UL (ref 0–0.7)
EOSINOPHIL NFR BLD: 0 %
ERYTHROCYTE [DISTWIDTH] IN BLOOD BY AUTOMATED COUNT: 21.1 % (ref 11–15)
GLUCOSE BLD-MCNC: 173 MG/DL (ref 70–99)
GLUCOSE BLDC GLUCOMTR-MCNC: 159 MG/DL (ref 70–99)
GLUCOSE BLDC GLUCOMTR-MCNC: 185 MG/DL (ref 70–99)
GLUCOSE BLDC GLUCOMTR-MCNC: 194 MG/DL (ref 70–99)
HCT VFR BLD AUTO: 27 % (ref 35–48)
HGB BLD-MCNC: 8.1 G/DL (ref 12–16)
LYMPHOCYTES NFR BLD: 0.39 X10(3) UL (ref 1–4)
LYMPHOCYTES NFR BLD: 2 %
MAGNESIUM SERPL-MCNC: 1.7 MG/DL (ref 1.6–2.6)
MCH RBC QN AUTO: 25.2 PG (ref 26–34)
MCHC RBC AUTO-ENTMCNC: 30 G/DL (ref 31–37)
MCV RBC AUTO: 83.9 FL (ref 80–100)
METAMYELOCYTES # BLD: 0.19 X10(3) UL (ref ?–0.01)
METAMYELOCYTES NFR BLD: 1 %
MONOCYTES # BLD: 1.16 X10(3) UL (ref 0.1–1)
MONOCYTES NFR BLD: 6 %
NEUTROPHILS # BLD AUTO: 14.42 X10 (3) UL (ref 1.5–7.7)
NEUTROPHILS NFR BLD: 88 %
NEUTS BAND NFR BLD: 3 %
NEUTS HYPERSEG # BLD: 17.65 X10(3) UL (ref 1.5–7.7)
OSMOLALITY SERPL CALC.SUM OF ELEC: 306 MOSM/KG (ref 275–295)
P AXIS: 52 DEGREES
P-R INTERVAL: 146 MS
PLATELET # BLD AUTO: 60 10(3)UL (ref 150–450)
PLATELET MORPHOLOGY: NORMAL
PLATELETS.RETICULATED NFR BLD AUTO: 12.9 % (ref 0–7)
POTASSIUM SERPL-SCNC: 3.8 MMOL/L (ref 3.5–5.1)
POTASSIUM SERPL-SCNC: 3.8 MMOL/L (ref 3.5–5.1)
Q-T INTERVAL: 352 MS
QRS DURATION: 80 MS
QTC CALCULATION (BEZET): 440 MS
R AXIS: 40 DEGREES
RBC # BLD AUTO: 3.22 X10(6)UL (ref 3.8–5.3)
SODIUM SERPL-SCNC: 143 MMOL/L (ref 136–145)
T AXIS: 9 DEGREES
TOTAL CELLS COUNTED BLD: 100
VENTRICULAR RATE: 94 BPM
WBC # BLD AUTO: 19.4 X10(3) UL (ref 4–11)

## 2025-05-18 PROCEDURE — 99233 SBSQ HOSP IP/OBS HIGH 50: CPT | Performed by: INTERNAL MEDICINE

## 2025-05-18 PROCEDURE — 71045 X-RAY EXAM CHEST 1 VIEW: CPT

## 2025-05-18 RX ORDER — POTASSIUM CHLORIDE 14.9 MG/ML
20 INJECTION INTRAVENOUS ONCE
Status: COMPLETED | OUTPATIENT
Start: 2025-05-18 | End: 2025-05-18

## 2025-05-18 RX ORDER — MAGNESIUM SULFATE HEPTAHYDRATE 40 MG/ML
2 INJECTION, SOLUTION INTRAVENOUS ONCE
Status: COMPLETED | OUTPATIENT
Start: 2025-05-18 | End: 2025-05-18

## 2025-05-18 NOTE — PROGRESS NOTES
Progress Note  Kelli Fisher Patient Status:  Inpatient    1955 MRN [id number]   Location [facility] 4W/SW/SE Attending Ariela Tello MD   Hosp Day # 6 PCP Taylor Gallardo MD     Subjective   Resting in bed. Not in acute distress. On high flow oxygen. States her mouth is dry. Appears weak. Lower extremities with edema. Denies chest pain.       Objective:   BP 93/68 (BP Location: Right arm)   Pulse 92   Temp 97.6 °F (36.4 °C) (Temporal)   Resp 18   Ht 5' (1.524 m)   Wt 164 lb 10.9 oz (74.7 kg)   SpO2 100%   BMI 32.16 kg/m²     Telemetry: sinus rhythm     Intake/Output:    Intake/Output Summary (Last 24 hours) at 2025 0539  Last data filed at 2025 0325  Gross per 24 hour   Intake 1201 ml   Output 825 ml   Net 376 ml       Wt Readings from Last 3 Encounters:   25 164 lb 10.9 oz (74.7 kg)   25 169 lb (76.7 kg)   25 174 lb 9.6 oz (79.2 kg)         Labs:  Recent Labs   Lab 05/15/25  1246 05/15/25  2233 25  0537 25  1054 25  0338   *  --  185*  --  163*   BUN 21  --  26*  --  25*   CREATSERUM 1.00  --  1.06*  --  0.99   EGFRCR 61  --  57*  --  62   CA 8.9  --  8.6*  --  8.8     --  140  --  143   K 3.4*   < > 3.7 4.1 3.7  3.7   CL 96*  --  96*  --  101   CO2 38.0*  --  35.0*  --  34.0*    < > = values in this interval not displayed.     Recent Labs   Lab 05/15/25  1246 25  0537 25  0338   RBC 3.60* 3.59* 3.44*   HGB 9.2* 9.4* 8.5*   HCT 30.1* 30.1* 28.3*   MCV 83.6 83.8 82.3   MCH 25.6* 26.2 24.7*   MCHC 30.6* 31.2 30.0*   RDW 20.7* 20.7* 20.8*   NEPRELIM 7.01 8.26* 23.01*   WBC 9.5 11.3* 28.6*   PLT 52.0* 45.0* 53.0*         Recent Labs   Lab 25  1619 25  0949 25  1530   TROPHS 5 5 5         Review of Systems:     10 point review of systems completed and negative except as noted in HPI      Exam:     Physical Exam:  General: Alert and oriented x 3. No apparent distress.   HEENT: Normocephalic, neck supple,  no thyromegaly or adenopathy.  Neck: No JVD, carotids 2+, no bruits.  Cardiac: Regular rate and rhythm. S1, S2 normal. No murmur, pericardial rub, S3, or extra cardiac sounds.  Lungs: basilar crackles, mildly diminished bases   Abdomen: Soft, non-tender. BS-present.  Extremities: Without clubbing or cyanosis. +2 BLE edema.    Neurologic: Alert and oriented, normal affect. No focal defects  Skin: Warm and dry.       Diagnostic Studies:     Echo, Limited 5/13/25  Conclusions:     1. Left ventricle: The cavity size was normal. Wall thickness was mildly      increased. There was mild concentric hypertrophy. Systolic function was      normal. The estimated ejection fraction was 55-60%, by visual assessment.      Unable to assess LV diastolic function.   2. Right ventricle: The cavity size was normal. Systolic function was      normal.   3. Pericardium, extracardiac: A moderate, free-flowing pericardial effusion      was identified. There was no evidence of hemodynamic compromise.   4. Systemic veins: Central venous respirophasic diameter changes are blunted      (< 50%).   5. Inferior vena cava: The IVC was dilated.     Echo, Limited 5/5/25  Conclusions:     1. Left ventricle: The cavity size was normal. Wall thickness was normal.      Systolic function was vigorous. The estimated ejection fraction was      65-70%, by biplane method of disks. No diagnostic evidence for regional      wall motion abnormalities. Unable to assess LV diastolic function.   2. Pericardium, extracardiac: A small to moderate, free-flowing pericardial      effusion was identified circumferential to the heart. Probably not      hemodynamically significant.   Impressions:  This study is compared with previous dated 4/30/2025: No   significant change since prior study.     Echo 4/12/25  Conclusions:     1. Left ventricle: The cavity size was normal. Wall thickness was normal.      Systolic function was normal. The estimated ejection fraction was  50-55%,      by biplane method of disks. No diagnostic evidence for regional wall      motion abnormalities. Left ventricular diastolic function parameters were      normal.   2. Right ventricle: The cavity size was normal. Systolic function was      normal.   3. No significant valvular heart disease (stenosis or regurgitation)   4. Pericardium, extracardiac: A small pericardial effusion was identified      posterior to the heart. No echocardiogram signs of tamponade present (ie,      respiratory valve inflow variation)   5. Pulmonary arteries: Systolic pressure was moderately increased, estimated      to be 49mm Hg.     Assessment and Plan:     Assessment:  70 year old female with PMH of endometrial cancer with mets to lungs, anemia, PSVT, small pericardial effusion who presented to ER with dyspnea and palpitations. She was found to be in PAF/PAT and anemic with Hgb 7.3.     # Paroxysmal atrial tachycardia/Atrial fibrillation   Currently in SR On PO amiodarone   Avoid BB/CCB with previous junctional rhythm   Not on AC d/t pancytopenia   Ozp2fq2Icae ~ 3 (age, sex, DM)  # dyspnea/ respiratory failure - likely multifactorial including lung mets, anemia, pericardial effusion, and PE   Still requiring high flow oxygen   IV diuresis resumed   # chest pain, noncardiac > resolved   EKG w/o acute ischemic changes  Trop neg x2  Suspect GI etiology   # bilateral PE   b/l venous doppler revealed nonocclusive DVT in right superficial femoral, popliteal, and posterior tibial veins   Not a candidate for anticoagulation therapy, s/p IVC filter placed 5/14   # acute anemia - s/p 4 units of pRBC, continue to monitor   # Pancytopenia, new - improved   # Thrombocytopenia - improving   # pericardial effusion, small to moderate   No evidence of hemodynamic compromise   # Endometrial cancer with mets to lungs/lymph nodes   # Stage IV endometrial cancer   Chemotherapy per GYN/OB  # Thrush - nystatin swish   # failure to thrive        Plan:  Remains in sinus rhythm on amiodarone   Diuresis with IV lasix 40 mg bid, repeat CXR  Monitor electrolytes closely, replace to maintain K >=4 and Mag >=2   Further management per primary team and OB/GYN     Plan of care discussed with patient, RN.      Elina Santa, APRN  5/18/2025  Ph [phone] ([place])  Ph [phone] ([place])

## 2025-05-18 NOTE — PROGRESS NOTES
[facility]  part of [facility]    Progress Note    Kelli Fisher Patient Status:  Inpatient    1955 MRN [id number]   Location [facility] 3W/SW Attending Hina Bentley MD   Hosp Day # 6 PCP Taylor Gallardo MD     Chief Complaint: afib rvr    SUBJECTIVE:      Patient for first time in admission stating \"I feel better today\" states she can take deeper breaths.    Sitting up in bed appears to have more energy.      Epigastric pain improved.  Complains of belching and fullness.  No emesis or nausea  Remains on 6L nasal cannula.         No acute events overnight.  Patient denies chest pain. No fevers/chills.  No n/v/abd pain.      10 ROS completed and otherwise negative.    OBJECTIVE:  Vital signs in last 24 hours:  BP 99/70 (BP Location: Right arm)   Pulse 86   Temp 97.6 °F (36.4 °C) (Temporal)   Resp 18   Ht 5' (1.524 m)   Wt 164 lb 10.9 oz (74.7 kg)   SpO2 100%   BMI 32.16 kg/m²     Intake/Output:    Intake/Output Summary (Last 24 hours) at 2025 1006  Last data filed at 2025 0952  Gross per 24 hour   Intake 1351 ml   Output 1075 ml   Net 276 ml       Wt Readings from Last 3 Encounters:   25 164 lb 10.9 oz (74.7 kg)   25 169 lb (76.7 kg)   25 174 lb 9.6 oz (79.2 kg)       Exam     Gen: No acute distress, chronically ill-appearing, tachypneic  Pulm: Lungs clear, normal respiratory effort  CV: Heart with regular rate and rhythm  Abd: Abdomen soft, nontender, bowel sounds present  Neuro: No acute focal deficits  MSK: moves extremities  Skin: Warm and dry  Psych: Normal affect  Ext: no c/c/e    Data Review:     Labs:   Lab Results   Component Value Date    WBC 19.4 2025    HGB 8.1 2025    HCT 27.0 2025    PLT 60.0 2025    K 3.8 2025    MG 1.7 2025         Imaging: Reviewed    No results found.      Meds:   Current Hospital Medications[1]      Assessment  Problem List[2]    Plan:     Afib RVR:  Pericardial  effusion  - started IV amio  - cardiology consulted  - monitor on tele  - anticoagulation per cards  -5/14: Attempting to transition off IV amiodarone to p.o. today.  -5/15: doing well on po amiodarone    5/16: Patient was in RVR again today and hypotensive received digoxin RVR converted to normal sinus rhythm.  Remains mildly hypotensive in the 80s denies any symptoms at this time.  However due to lability of hemodynamics will transfer to stepdown unit.  Small bolus of 250 cc given.  IV Lasix held.  5/18: Now in sinus rhythm blood pressure well-controlled normotensive rates controlled.  Chest pain likely GERD, continue diuresis as tolerated.  Chest x-ray pending    BL PE: small segmental/right lower extremity DVT acute  S/p IVC 5/14/2025  Not on a/c due to TCP  -5/15/2025: tolerated IVC well    Metastatic endometrial cancer    Pancytopenia  Acute on chronic anemia of chronic disease  - transfuse 1 unit prbc  - neutropenic precautions  - started neupogen until ANC 1500  -Plan is for further chemotherapy once recovered clinically per Dr. Herring.  - cefepime and vanco started for neutropenia and patient with cough/chills, elevated LA, possible early sepsis - pulm following for this as well  -Pancytopenia improving, monitor daily  Overall plan of care is to continue to treat medically over the next several days and monitor for improvement.  If patient declines may consider palliative conversations.  If she improves overall plan is to resume chemotherapy in the future as planned by Dr. Herring.    Acute on chronic respiratory failure  - on 4-5L NC baseline  - due to 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  -5/18: O2 requirements down 6L at this time.  Resuming Lasix today in view of hemodynamic stability.  As discussed during this admission with pulmonary service and GYN oncology no need for therapeutic or diagnostic thoracentesis at this time.       Thrush  - nystatin    Global A/P  - reviewed labs and vitals from  today  - reviewed notes of the day  - cbc, bmp, mag ordered for tomorrow  - discussed need to stay in the hospital today due to above  - cont IV abx  - discussed with patient/RN and care team  - dispo pending clinical course      Ariela Tello MD      Supplementary Documentation:   DVT Mechanical Prophylaxis:   SCDs,    DVT Pharmacologic Prophylaxis   Medication   None                Code Status: Full Code  Rogel: External urinary catheter in place  Rogel Duration (in days):   Central line:    JOSE: 5/19/2025        **Certification      PHYSICIAN Certification of Need for Inpatient Hospitalization - Initial Certification    Patient will require inpatient services that will reasonably be expected to span two midnight's based on the clinical documentation in H+P.   Based on patients current state of illness, I anticipate that, after discharge, patient will require TBD.      Dietitian Malnutrition Assessment    Evaluation for Malnutrition: Criteria for non-severe malnutrition diagnosis-         chronic illness related to   Wt loss 7.5% in 3 months., Body fat mild depletion., Muscle mass mild depletion.       RD Malnutrition Care Plan: Encouraged increased PO intake., Encouraged small frequent meals with emphasis on high calorie/high protein., Intiated ONS (oral nutritional supplements).    Body Fat/Muscle Mass: Mild depletion body fat., Mild depletion muscle mass. triceps region. dorsal dale region., calf region.    Physician Assessment     Patient has a diagnosis of moderate malnutrition              MDM: High complexity- severe exacerbation of chronic illness posing a threat to life. IV meds requiring close inpatient monitoring. I personally spent time on chart/note review, review of labs/imaging, discussion with patient, physical exam, discussion with staff, writing progress note, and discussion of plan of care.           [1]   Current Facility-Administered Medications   Medication Dose Route Frequency     potassium chloride 20 mEq/100mL IVPB premix 20 mEq  20 mEq Intravenous Once    magnesium sulfate in sterile water for injection 2 g/50mL IVPB premix 2 g  2 g Intravenous Once    pantoprazole (Protonix) DR tab 40 mg  40 mg Oral QAM AC    alum-mag hydroxide-simethicone (Maalox) 200-200-20 MG/5ML oral suspension 30 mL  30 mL Oral QID PRN    vancomycin (Vancocin) 1,000 mg in sodium chloride 0.9% 250 mL IVPB-ADDV  1,000 mg Intravenous Q24H    docusate sodium (Colace) cap 100 mg  100 mg Oral Daily PRN    polyethylene glycol (PEG 3350) (Miralax) 17 g oral packet 17 g  17 g Oral Daily PRN    magnesium hydroxide (Milk of Magnesia) 400 MG/5ML oral suspension 30 mL  30 mL Oral Daily PRN    bisacodyl (Dulcolax) 10 MG rectal suppository 10 mg  10 mg Rectal Daily PRN    fleet enema (Fleet) rectal enema 133 mL  1 enema Rectal Once PRN    furosemide (Lasix) 10 mg/mL injection 40 mg  40 mg Intravenous BID (Diuretic)    amiodarone (Pacerone) tab 400 mg  400 mg Oral BID with meals    insulin aspart (NovoLOG) 100 Units/mL FlexPen 1-5 Units  1-5 Units Subcutaneous TID CC and HS    sodium chloride (Saline Mist) 0.65 % nasal solution 1 spray  1 spray Each Nare Q3H PRN    maalox/diphenhydramine/lidocaine (First Mouthwash BLM) oral suspension 10 mL  10 mL Oral Q6H PRN    acetaminophen (Tylenol Extra Strength) tab 500 mg  500 mg Oral Q4H PRN    ondansetron (Zofran) 4 MG/2ML injection 4 mg  4 mg Intravenous Q6H PRN    metoclopramide (Reglan) 5 mg/mL injection 5 mg  5 mg Intravenous Q8H PRN    glucose (Dex4) 15 GM/59ML oral liquid 15 g  15 g Oral Q15 Min PRN    Or    glucose (Glutose) 40% oral gel 15 g  15 g Oral Q15 Min PRN    Or    glucose-vitamin C (Dex-4) chewable tab 4 tablet  4 tablet Oral Q15 Min PRN    Or    dextrose 50% injection 50 mL  50 mL Intravenous Q15 Min PRN    Or    glucose (Dex4) 15 GM/59ML oral liquid 30 g  30 g Oral Q15 Min PRN    Or    glucose (Glutose) 40% oral gel 30 g  30 g Oral Q15 Min PRN    Or    glucose-vitamin C  (Dex-4) chewable tab 8 tablet  8 tablet Oral Q15 Min PRN    ceFEPIme (Maxipime) 1 g in sodium chloride 0.9% 100mL IVPB-YANA  1 g Intravenous Q12H    ipratropium-albuterol (Duoneb) 0.5-2.5 (3) MG/3ML inhalation solution 3 mL  3 mL Nebulization Q6H PRN    levothyroxine (Synthroid) tab 100 mcg  100 mcg Oral Before breakfast    nystatin (Mycostatin) 531925 UNIT/ML oral suspension 500,000 Units  5 mL Oral QID   [2]   Patient Active Problem List  Diagnosis    Shortness of breath    Acute hypoxic respiratory failure (HCC)    Lung mass    Supraclavicular adenopathy    Dysphagia, unspecified type    Pericardial effusion (HCC)    Endometrial cancer (HCC)    Microcytic anemia    Thrombocytopenia (HCC)    Azotemia    Pancytopenia (HCC)    Hyperglycemia    Acute respiratory failure with hypoxia (HCC)    Atrial fibrillation with RVR (HCC)

## 2025-05-18 NOTE — PLAN OF CARE
Alert and oriented x4, forgetful at times.  Drowsy, but easily arousable. Vitals stable.  6 L of O2.   Purewick in place.  Denies nausea and vomiting.  Denies pain. Corrective insulin given.  IV antibiotics continued. Port dressing and needle changed.  Unable to get blood return from port.  Call light and belongings at bedside.  Problem: Diabetes/Glucose Control  Goal: Glucose maintained within prescribed range  Description: INTERVENTIONS:- Monitor Blood Glucose as ordered- Assess for signs and symptoms of hyperglycemia and hypoglycemia- Administer ordered medications to maintain glucose within target range- Assess barriers to adequate nutritional intake and initiate nutrition consult as needed- Instruct patient on self management of diabetes  Outcome: Progressing     Problem: Patient Centered Care  Goal: Patient preferences are identified and integrated in the patient's plan of care  Description: Interventions:- What would you like us to know as we care for you? - Provide timely, complete, and accurate information to patient/family- Incorporate patient and family knowledge, values, beliefs, and cultural backgrounds into the planning and delivery of care- Encourage patient/family to participate in care and decision-making at the level they choose- Honor patient and family perspectives and choices  Outcome: Progressing     Problem: RISK FOR INFECTION - ADULT  Goal: Absence of fever/infection during anticipated neutropenic period  Description: INTERVENTIONS- Monitor WBC- Administer growth factors as ordered- Implement neutropenic guidelines  Outcome: Progressing     Problem: CARDIOVASCULAR - ADULT  Goal: Maintains optimal cardiac output and hemodynamic stability  Description: INTERVENTIONS:- Monitor vital signs, rhythm, and trends- Monitor for bleeding, hypotension and signs of decreased cardiac output- Evaluate effectiveness of vasoactive medications to optimize hemodynamic stability- Monitor arterial and/or venous  puncture sites for bleeding and/or hematoma- Assess quality of pulses, skin color and temperature- Assess for signs of decreased coronary artery perfusion - ex. Angina- Evaluate fluid balance, assess for edema, trend weights  Outcome: Progressing  Goal: Absence of cardiac arrhythmias or at baseline  Description: INTERVENTIONS:- Continuous cardiac monitoring, monitor vital signs, obtain 12 lead EKG if indicated- Evaluate effectiveness of antiarrhythmic and heart rate control medications as ordered- Initiate emergency measures for life threatening arrhythmias- Monitor electrolytes and administer replacement therapy as ordered  Outcome: Progressing     Problem: RESPIRATORY - ADULT  Goal: Achieves optimal ventilation and oxygenation  Description: INTERVENTIONS:- Assess for changes in respiratory status- Assess for changes in mentation and behavior- Position to facilitate oxygenation and minimize respiratory effort- Oxygen supplementation based on oxygen saturation or ABGs- Provide Smoking Cessation handout, if applicable- Encourage broncho-pulmonary hygiene including cough, deep breathe, Incentive Spirometry- Assess the need for suctioning and perform as needed- Assess and instruct to report SOB or any respiratory difficulty- Respiratory Therapy support as indicated- Manage/alleviate anxiety- Monitor for signs/symptoms of CO2 retention  Outcome: Progressing     Problem: SAFETY ADULT - FALL  Goal: Free from fall injury  Description: INTERVENTIONS:  - Assess pt frequently for physical needs  - Identify cognitive and physical deficits and behaviors that affect risk of falls.  - Sandy fall precautions as indicated by assessment.  - Educate pt/family on patient safety including physical limitations  - Instruct pt to call for assistance with activity based on assessment  - Modify environment to reduce risk of injury  - Provide assistive devices as appropriate  - Consider OT/PT consult to assist with  strengthening/mobility  - Encourage toileting schedule  Outcome: Progressing     Problem: DISCHARGE PLANNING  Goal: Discharge to home or other facility with appropriate resources  Description: INTERVENTIONS:  - Identify barriers to discharge w/pt and caregiver  - Include patient/family/discharge partner in discharge planning  - Arrange for needed discharge resources and transportation as appropriate  - Identify discharge learning needs (meds, wound care, etc)  - Arrange for interpreters to assist at discharge as needed  - Consider post-discharge preferences of patient/family/discharge partner  - Complete POLST form as appropriate  - Assess patient's ability to be responsible for managing their own health  - Refer to Case Management Department for coordinating discharge planning if the patient needs post-hospital services based on physician/LIP order or complex needs related to functional status, cognitive ability or social support system  Outcome: Progressing

## 2025-05-18 NOTE — PROGRESS NOTES
Pulmonary Medicine Inpatient Progress Note                 Subjective:  7 LHFNC  afebrile  Has some nosebleed       ALLERGIES:  Allergies[1]     MEDS:  Home Medications:  Medications Taking[2]  Scheduled Medication:  Scheduled Medications[3]  Continuous Infusing Medication:  Medication Infusions[4]  PRN Medications:  PRN Medications[5]       PHYSICAL EXAM:  BP 99/70 (BP Location: Right arm)   Pulse 86   Temp 97.6 °F (36.4 °C) (Temporal)   Resp 18   Ht 5' (1.524 m)   Wt 164 lb 10.9 oz (74.7 kg)   SpO2 100%   BMI 32.16 kg/m²   CONSTITUTIONAL: alert, oriented, no apparent distress  HEENT: atraumatic normocephalic  MOUTH: mucous membranes are moist. No OP exudates  NECK/THROAT: no JVD. Trachea midline. No obvious thyromegaly  LUNG: clear upper b/l no wheezing,+ basilar crackles. Diminished bases. Chest symmetric with respiratory motion  HEART: regular rate and rhythm, no obvious murmers or gallops note  ABD: soft non tender. + bowel sounds. No organomegaly noted  EXT: no clubbing, cyanosis. 2+ b/l LE edema       IMAGES:  CXR 5/18/25  CONCLUSION:   Unchanged mild cardiomegaly.   Interstitial pulmonary edema, appearing slightly increased from 05/15/2025.   Unchanged moderate left pleural effusion associated basilar opacity.   Unchanged trace right pleural effusion with minimal associated atelectasis   Unchanged metastatic mediastinal/hilar adenopathy, better seen on cross-sectional imaging.     CXR 5/15/25  CONCLUSION:   1. The cardiac silhouette remains enlarged, likely relating to the known pericardial effusion.  Small pleural effusions are also unchanged bilaterally suggesting mild CHF or fluid overload.  There is stable mild associated passive atelectasis at both   lung bases.   2. There is a history of metastatic endometrial carcinoma.  Stable left upper lobe/lingular consolidation may relate to a metastasis, atelectasis and/or pneumonia.  Unchanged prominence of the mediastinal and bilateral hilar contours is  compatible with known underlying metastatic lymphadenopathy.       LABS:  Recent Labs   Lab 05/16/25  0537 05/17/25  0338 05/18/25  0723   RBC 3.59* 3.44* 3.22*   HGB 9.4* 8.5* 8.1*   HCT 30.1* 28.3* 27.0*   MCV 83.8 82.3 83.9   MCH 26.2 24.7* 25.2*   MCHC 31.2 30.0* 30.0*   RDW 20.7* 20.8* 21.1*   NEPRELIM 8.26* 23.01* 14.42*   WBC 11.3* 28.6* 19.4*   PLT 45.0* 53.0* 60.0*       Recent Labs   Lab 05/12/25  1411 05/13/25  0625 05/15/25  1246 05/15/25  2233 05/16/25  0537 05/16/25  1054 05/17/25  0338 05/18/25  0723   *   < > 186*  --  185*  --  163*  --    BUN 22   < > 21  --  26*  --  25*  --    CREATSERUM 0.78   < > 1.00  --  1.06*  --  0.99  --    EGFRCR 82   < > 61  --  57*  --  62  --    CA 9.0   < > 8.9  --  8.6*  --  8.8  --    ALB 3.2  --   --   --   --   --   --   --       < > 140  --  140  --  143  --    K 4.2   < > 3.4*   < > 3.7 4.1 3.7  3.7 3.8      < > 96*  --  96*  --  101  --    CO2 32.0   < > 38.0*  --  35.0*  --  34.0*  --    ALKPHO 142  --   --   --   --   --   --   --    AST 26  --   --   --   --   --   --   --    ALT 10  --   --   --   --   --   --   --    BILT 0.7  --   --   --   --   --   --   --    TP 6.8  --   --   --   --   --   --   --     < > = values in this interval not displayed.       ASSESSMENT/PLAN:  Endometrial ca with lung mets  -oncology f/u    B/l pulmonary emboli and DVT  -unable to anticoagulate d/t thrombocytopenia  -oncology following  -s/p IVC filter placement by IR    Pericardial effusion  -moderate on TTE    Tachycardia-went into afib with RVR on 5/16 and hypotension  -on amio. Given digoxin and small fluid bolus  -transferred to CCU for closer monitoring. Promptly returned back in NSR and /70  -cardiology following   -transferred out of ICU    Pleural effusion  -too small for thoracentesis     Leukocytosis  -reactive to fulphila (neulasta equiv) on 5/7/25  -on broad spectrum abx    Epistaxis  -add humidification to supp 02    GERD  -start PPI      Proph  -DVT: SCDS given anemia    Dispo  -full code     Thank you for the opportunity to care for Kelli Fisher.     DAIREL Reaves DO, MPH  Pulmonary Critical Care Medicine  [facility]          [1]   Allergies  Allergen Reactions    Aspirin Tightness in Throat    Penicillins HIVES    Other OTHER (SEE COMMENTS)     Pt states IV steroid allergy, states it makes her breathing worse    [2]   Outpatient Medications Marked as Taking for the 5/12/25 encounter (Hospital Encounter)   Medication Sig Dispense Refill    levothyroxine (SYNTHROID) 100 MCG Oral Tab Take 1 tablet (100 mcg total) by mouth before breakfast.      Potassium Chloride ER 20 MEQ Oral Tab CR Take 1 tablet by mouth daily.      furosemide 20 MG Oral Tab Take 1 tablet (20 mg total) by mouth daily.      Levalbuterol HCl 1.25 MG/3ML Inhalation Nebu Soln Take 3 mL (1.25 mg total) by nebulization every 8 (eight) hours as needed for Wheezing or Shortness of Breath.      pantoprazole 40 MG Oral Tab EC Take 1 tablet (40 mg total) by mouth daily. 30 tablet 0    amiodarone 200 MG Oral Tab Take 1 tablet (200 mg total) by mouth in the morning, at noon, and at bedtime for 1 day, THEN 1 tablet (200 mg total) in the morning, at noon, and at bedtime. 93 tablet 0    Fluticasone Propionate  HFA (FLOVENT HFA) 220 MCG/ACT Inhalation Aerosol Inhale 1 puff into the lungs every 12 (twelve) hours. Rinse mouth following use.     [3]    potassium chloride  20 mEq Intravenous Once    alteplase (Activase) 2 mg in sterile water for injection (PF) 2.2 mL IV push to declot line  2 mg Intravenous Once    pantoprazole  40 mg Oral QAM AC    vancomycin  1,000 mg Intravenous Q24H    furosemide  40 mg Intravenous BID (Diuretic)    amiodarone  400 mg Oral BID with meals    insulin aspart  1-5 Units Subcutaneous TID CC and HS    cefepime  1 g Intravenous Q12H    levothyroxine  100 mcg Oral Before breakfast    nystatin  5 mL Oral QID   [4] [5]    alum-mag hydroxide-simethicone    docusate sodium    polyethylene glycol (PEG 3350)    magnesium hydroxide    bisacodyl    fleet enema    sodium chloride    maalox/diphenhydramine/lidocaine    acetaminophen    ondansetron    metoclopramide    glucose **OR** glucose **OR** glucose-vitamin C **OR** dextrose **OR** glucose **OR** glucose **OR** glucose-vitamin C    ipratropium-albuterol

## 2025-05-19 PROBLEM — E46 MALNUTRITION (HCC): Status: ACTIVE | Noted: 2025-01-01

## 2025-05-19 PROBLEM — E46 MALNUTRITION (HCC): Status: ACTIVE | Noted: 2025-05-19

## 2025-05-19 LAB
ANION GAP SERPL CALC-SCNC: 5 MMOL/L (ref 0–18)
BASOPHILS # BLD: 0 X10(3) UL (ref 0–0.2)
BASOPHILS NFR BLD: 0 %
BUN BLD-MCNC: 31 MG/DL (ref 9–23)
BUN/CREAT SERPL: 26.7 (ref 10–20)
CALCIUM BLD-MCNC: 8.9 MG/DL (ref 8.7–10.4)
CHLORIDE SERPL-SCNC: 100 MMOL/L (ref 98–112)
CO2 SERPL-SCNC: 36 MMOL/L (ref 21–32)
CREAT BLD-MCNC: 1.16 MG/DL (ref 0.55–1.02)
DEPRECATED RDW RBC AUTO: 63.9 FL (ref 35.1–46.3)
EGFRCR SERPLBLD CKD-EPI 2021: 51 ML/MIN/1.73M2 (ref 60–?)
EOSINOPHIL # BLD: 0 X10(3) UL (ref 0–0.7)
EOSINOPHIL NFR BLD: 0 %
ERYTHROCYTE [DISTWIDTH] IN BLOOD BY AUTOMATED COUNT: 21.1 % (ref 11–15)
GLUCOSE BLD-MCNC: 157 MG/DL (ref 70–99)
GLUCOSE BLDC GLUCOMTR-MCNC: 152 MG/DL (ref 70–99)
GLUCOSE BLDC GLUCOMTR-MCNC: 165 MG/DL (ref 70–99)
GLUCOSE BLDC GLUCOMTR-MCNC: 174 MG/DL (ref 70–99)
GLUCOSE BLDC GLUCOMTR-MCNC: 178 MG/DL (ref 70–99)
HCT VFR BLD AUTO: 25.2 % (ref 35–48)
HGB BLD-MCNC: 7.4 G/DL (ref 12–16)
LYMPHOCYTES NFR BLD: 0.89 X10(3) UL (ref 1–4)
LYMPHOCYTES NFR BLD: 7 %
MAGNESIUM SERPL-MCNC: 2.2 MG/DL (ref 1.6–2.6)
MCH RBC QN AUTO: 24.6 PG (ref 26–34)
MCHC RBC AUTO-ENTMCNC: 29.4 G/DL (ref 31–37)
MCV RBC AUTO: 83.7 FL (ref 80–100)
METAMYELOCYTES # BLD: 0.13 X10(3) UL (ref ?–0.01)
METAMYELOCYTES NFR BLD: 1 %
MONOCYTES # BLD: 0.38 X10(3) UL (ref 0.1–1)
MONOCYTES NFR BLD: 3 %
MYELOCYTES # BLD: 0.13 X10(3) UL (ref ?–0.01)
MYELOCYTES NFR BLD: 1 %
NEUTROPHILS # BLD AUTO: 8.3 X10 (3) UL (ref 1.5–7.7)
NEUTROPHILS NFR BLD: 88 %
NEUTS HYPERSEG # BLD: 11.18 X10(3) UL (ref 1.5–7.7)
OSMOLALITY SERPL CALC.SUM OF ELEC: 302 MOSM/KG (ref 275–295)
PLATELET # BLD AUTO: 65 10(3)UL (ref 150–450)
PLATELET MORPHOLOGY: NORMAL
PLATELETS.RETICULATED NFR BLD AUTO: 13 % (ref 0–7)
POTASSIUM SERPL-SCNC: 3.7 MMOL/L (ref 3.5–5.1)
POTASSIUM SERPL-SCNC: 3.7 MMOL/L (ref 3.5–5.1)
RBC # BLD AUTO: 3.01 X10(6)UL (ref 3.8–5.3)
SODIUM SERPL-SCNC: 141 MMOL/L (ref 136–145)
TOTAL CELLS COUNTED BLD: 100
WBC # BLD AUTO: 12.7 X10(3) UL (ref 4–11)

## 2025-05-19 PROCEDURE — 99232 SBSQ HOSP IP/OBS MODERATE 35: CPT | Performed by: INTERNAL MEDICINE

## 2025-05-19 PROCEDURE — 99233 SBSQ HOSP IP/OBS HIGH 50: CPT | Performed by: STUDENT IN AN ORGANIZED HEALTH CARE EDUCATION/TRAINING PROGRAM

## 2025-05-19 RX ORDER — POTASSIUM CHLORIDE 1.5 G/1.58G
40 POWDER, FOR SOLUTION ORAL ONCE
Status: DISCONTINUED | OUTPATIENT
Start: 2025-05-19 | End: 2025-05-21

## 2025-05-19 RX ORDER — POTASSIUM CHLORIDE 14.9 MG/ML
20 INJECTION INTRAVENOUS ONCE
Status: COMPLETED | OUTPATIENT
Start: 2025-05-19 | End: 2025-05-19

## 2025-05-19 RX ORDER — DIPHENHYDRAMINE HYDROCHLORIDE, ZINC ACETATE 2; .1 G/100G; G/100G
1 CREAM TOPICAL 3 TIMES DAILY PRN
Status: DISCONTINUED | OUTPATIENT
Start: 2025-05-19 | End: 2025-05-21

## 2025-05-19 NOTE — DIETARY NOTE
ADULT NUTRITION REASSESSMENT    Pt is at moderate nutrition risk.  Pt meets moderate malnutrition criteria.     RECOMMENDATIONS TO MD: See nutrition intervention for ONS (oral nutrition supplements). Liberalized diet to ATTILA vs cardiac due to limited po intake.      ADMITTING DIAGNOSIS:  Atrial fibrillation with RVR (HCC) [I48.91]  Acute respiratory failure with hypoxia (HCC) [J96.01]  Pancytopenia (HCC) [D61.818]  PERTINENT PAST MEDICAL HISTORY:   Past Medical History:    Asthma (HCC)    Esophageal reflux    History of blood transfusion    Visual impairment       PATIENT STATUS: Initial 05/13/25: Pt assessed due to screening at risk for decreased appetite and unintentional weight loss. RD also consulted for \"heart failure diet education\" - not appropriate given clinical status (cleared consult). Pt known to nutrition services from previous admissions, last seen 5/1/25 and met criteria for chronic moderate protein calorie malnutrition (Chronic MPCM) - pt discharged 5/6/25. Per past RD notes (5/1 and 4/14) - decreased po intake reported/noted. Chart reviewed, pt admitted by PCP for IRVIN and HTN x 3 days. Pt with increased swelling to legs. Pt with hx of metastatic endometrial cancer with lung mets (on chemotherapy). Pt also noted to have thrush - nystatin ordered. Discussion with RN, poor oral intake. Intakes reviewed, 25%  1 meal noted. Pt visited, reports decreased po intake over last 3 days. Prior to 3 days ago pt reports better intake/appetite. Typically eats small frequent meals as recommended by MD. Pt reports drinking protein shake but unsure which one (only drinks 1 daily). Pt noted coughing while drinking Ginger ale, spitting up - RN notified. Pt reports difficulties swallowing liquids and solids - last admission pt on chopped/soft  & bite sized with mildly thick liquids. Pt noted to have VFSS last admission recommending regular texture/thin liquids per RN. Pt reports some stomach pain r/t feeling like needing  to have a bowel movement (last BM noted a few days ago). Pt unsure of weight loss, reports usual body weight (UBW) around 169#. Current weight 167# 9.6 oz. EMR weight review, last known weight # 10 oz on 5/5/25 - 7.0# or 4.0% weight loss x 1 week (significant). Per EMR weight review, pt noted weighing 162# 4 oz on 4/12/25 - stable, 175# 14.8 oz on 4/3/25 - 8.3# or 4.7% weight loss x 1 month (non-significant), 182# 11.2 oz on 2/20/25 - 15.1# or 8.3% weight loss x 3 months (significant), 183# on 12/19/24 - 15.4# or 8.4%  weight loss x 5 months (non-significant), 197# on 1/3/24 and 197# on 12/11/23. Nutrition focused physical exam (NFPE) completed, see below for details. Encouraged small frequent meals with emphasis on high calorie and high protein. Discussed oral nutritional supplements (ONS) - pt in agreement and provided flavor preferences. +ONS per discussion. .  5/19/25 UPDATE: po remains small, but drinks the ensure max most days. She doesn't like the glucerna or magic cup, so will change to ensure max bid--mohsen, pt pref. Pt states thrush is still bothersome to her.  Urged pt to push po intake some. Pt says a dr told her not to push it because they dont want her to throw up. Told her not to push it that much, just a little.      FOOD/NUTRITION RELATED HISTORY:  Appetite: decreased appetite PTA x 3 days per pt otherwise eating small frequent meals (amount consumed unsure at this time)  Intake: less than 25% intake.    Intake Meeting Needs: No, but oral nutrition supplements (ONS) to maximize  Percent Meals Eaten (last 6 days)       Date/Time Percent Meals Eaten (%)    05/13/25 0906 25 %    05/15/25 1021 20 %    05/15/25 1416 10 %    05/16/25 1441 15 %    05/17/25 0835 25 %    05/17/25 1245 25 %    05/17/25 2254 0 %    05/18/25 2124 0 %           Food Allergies: No Known Food Allergies (NKFA)  Cultural/Ethnic/Sabianism Preferences: Not Obtained    GASTROINTESTINAL: +BM 5/18/25, dysphagia, and thrush   SLP  seeing pt.      MEDICATIONS: reviewed    potassium chloride  40 mEq Oral Once    potassium chloride  20 mEq Intravenous Once    pantoprazole  40 mg Oral QAM AC    vancomycin  1,000 mg Intravenous Q24H    furosemide  40 mg Intravenous BID (Diuretic)    amiodarone  400 mg Oral BID with meals    insulin aspart  1-5 Units Subcutaneous TID CC and HS    cefepime  1 g Intravenous Q12H    levothyroxine  100 mcg Oral Before breakfast    nystatin  5 mL Oral QID         LABS: reviewed A1c 6.6% on 5/12/25    Recent Labs     05/17/25  0338 05/18/25  0723 05/19/25  0536   * 173* 157*   BUN 25* 29* 31*   CREATSERUM 0.99 1.16* 1.16*   CA 8.8 8.4* 8.9   MG 1.6 1.7 2.2    143 141   K 3.7  3.7 3.8  3.8 3.7  3.7    101 100   CO2 34.0* 34.0* 36.0*   OSMOCALC 304* 306* 302*     WEIGHT HISTORY:  Patient Weight(s) for the past 336 hrs:   Weight   05/19/25 0341 75.8 kg (167 lb)   05/17/25 0611 74.7 kg (164 lb 10.9 oz)   05/16/25 1600 70.8 kg (156 lb 1.4 oz)   05/16/25 0439 74.2 kg (163 lb 8 oz)   05/15/25 1243 69.9 kg (154 lb 3.2 oz)   05/15/25 0458 80.6 kg (177 lb 11.2 oz)   05/14/25 0645 75.6 kg (166 lb 9.6 oz)   05/13/25 0406 76 kg (167 lb 9.6 oz)   05/12/25 1736 75.2 kg (165 lb 11.2 oz)     Wt Readings from Last 10 Encounters:   05/19/25 75.8 kg (167 lb)   04/24/25 76.7 kg (169 lb)   05/05/25 79.2 kg (174 lb 9.6 oz)   04/14/25 76.7 kg (169 lb)   12/11/23 89.4 kg (197 lb)     ANTHROPOMETRICS:  HT:  152.4 cm (5')    Wt Readings from Last 1 Encounters:   05/19/25 75.8 kg (167 lb)     Last weight: Fluid changes noted true weight likely lower given below noted edema (+2 to bilateral lower extremities)--current wt close to admission wt.    BMI: Body mass index is 32.61 kg/m².  Dosing Weight: 76 kg - taken on 5/13/25, utilized for anthropometric calculations  BMI CLASSIFICATION: 30-34.9 kg/m2 - obesity class I  IBW/lbs (Calculated) Female: 100 lbs             168% IBW  Usual Body Wt: 197 lbs January 2024 per EMR review       85% UBW    NUTRITION RELATED PHYSICAL FINDINGS:  - Nutrition Focused Physical Exam (NFPE): mild depletion body fat triceps region and mild depletion muscle mass dorsal dale region and calf region  - Fluid Accumulation: +2 Bilateral Lower extremity and Feet per flowsheet review -_> See RN documentation for details  - Skin Integrity: at risk and intact per flowsheet review --> See RN documentation for details    CRITERIA FOR MALNUTRITION DIAGNOSIS:  Criteria for non-severe malnutrition diagnosis: chronic illness related to wt loss 7.5% in 3 months, body fat mild depletion, and muscle mass mild depletion.    NUTRITION DIAGNOSIS/PROBLEM:   Moderate Malnutrition related to Chronic - Physiological causes resulting in anorexia or diminished intake as evidenced by wt loss 7.5% in 3 months, body fat mild depletion, muscle mass mild depletion and variable intakes.  NUTRITION DIAGNOSIS PROGRESS:  No Improvement (continue)--intake remains low.        NUTRITION INTERVENTION:   NUTRITION PRESCRIPTION:   Estimated Nutrition needs: --dosing wt of 76 kg - wt taken on 5/13/25  Calories: 2420-8214 calories/day (20-25 calories per kg Dosing wt)  Protein: 55-68 g protein/day (1.2-1.5 g protein/kg Ideal body wt (IBW)45.5 kg)    - Diet:       Procedures    Sodium restricted diet Sodium Restriction: 3-4 GM NA; Is Patient on Accuchecks? Yes      - Nutrition Care Plan: Encouraged increased PO intake, Encouraged small frequent meals with emphasis on high calorie/high protein, and Initiated ONS (oral nutritional supplements)  - ONS (Oral Nutrition Supplements)/Meals/Snacks: Ensure Max (150 calories and 30 g protein each) BID Chocolate   - Vitamin and mineral supplements: none  - Feeding assistance: meal set up  - Nutrition education: Discussed importance of adequate energy and protein intake    - Coordination of nutrition care: collaboration with other providers  - Discharge and transfer of nutrition care to new setting or provider:  monitor plans    MONITOR AND EVALUATE/NUTRITION GOALS:  - Food and Nutrient Intake:      Monitor: adequacy of PO intake, tolerance of PO intake, and adequacy of supplement intake  - Food and Nutrient Administration:      Monitor: N/A  - Anthropometric Measurement:    Monitor weight  - Nutrition Goals:      allow wt loss due to fluid losses, PO and supplement greater than 75% of needs, good supplement intake, labs within acceptable limits, euglycemia, prevent skin breakdown, support body systems, halt true wt loss, and improved GI status    DIETITIAN FOLLOW UP: RD to follow and monitor nutrition status    Mariangel Zavala RD, LDN   Clinical Dietitian [id number]

## 2025-05-19 NOTE — CONGREGATE LIVING REVIEW
[facility] Living Authorization    The [facility] Review Committee has reviewed this case and the patient IS APPROVED for discharge to a facility for Short Term Skilled once the following procedure is followed:     - The physician discharge instructions (contained within the [facility]LAYNE note for SNF) must inlcude the below appropriate and approved COVID instructions to the facility    For questions regarding CLRC approval process, please contact the CM assigned to the case.  For questions regarding RN discharge workflow, please contact the unit Clinical Leader.

## 2025-05-19 NOTE — PLAN OF CARE
Problem: Diabetes/Glucose Control  Goal: Glucose maintained within prescribed range  Description: INTERVENTIONS:- Monitor Blood Glucose as ordered- Assess for signs and symptoms of hyperglycemia and hypoglycemia- Administer ordered medications to maintain glucose within target range- Assess barriers to adequate nutritional intake and initiate nutrition consult as needed- Instruct patient on self management of diabetes  Outcome: Progressing     Problem: Patient Centered Care  Goal: Patient preferences are identified and integrated in the patient's plan of care  Description: Interventions:- What would you like us to know as we care for you? - Provide timely, complete, and accurate information to patient/family- Incorporate patient and family knowledge, values, beliefs, and cultural backgrounds into the planning and delivery of care- Encourage patient/family to participate in care and decision-making at the level they choose- Honor patient and family perspectives and choices  Outcome: Progressing     Problem: RISK FOR INFECTION - ADULT  Goal: Absence of fever/infection during anticipated neutropenic period  Description: INTERVENTIONS- Monitor WBC- Administer growth factors as ordered- Implement neutropenic guidelines  Outcome: Progressing     Problem: CARDIOVASCULAR - ADULT  Goal: Maintains optimal cardiac output and hemodynamic stability  Description: INTERVENTIONS:- Monitor vital signs, rhythm, and trends- Monitor for bleeding, hypotension and signs of decreased cardiac output- Evaluate effectiveness of vasoactive medications to optimize hemodynamic stability- Monitor arterial and/or venous puncture sites for bleeding and/or hematoma- Assess quality of pulses, skin color and temperature- Assess for signs of decreased coronary artery perfusion - ex. Angina- Evaluate fluid balance, assess for edema, trend weights  Outcome: Progressing  Goal: Absence of cardiac arrhythmias or at baseline  Description: INTERVENTIONS:-  Continuous cardiac monitoring, monitor vital signs, obtain 12 lead EKG if indicated- Evaluate effectiveness of antiarrhythmic and heart rate control medications as ordered- Initiate emergency measures for life threatening arrhythmias- Monitor electrolytes and administer replacement therapy as ordered  Outcome: Progressing     Problem: RESPIRATORY - ADULT  Goal: Achieves optimal ventilation and oxygenation  Description: INTERVENTIONS:- Assess for changes in respiratory status- Assess for changes in mentation and behavior- Position to facilitate oxygenation and minimize respiratory effort- Oxygen supplementation based on oxygen saturation or ABGs- Provide Smoking Cessation handout, if applicable- Encourage broncho-pulmonary hygiene including cough, deep breathe, Incentive Spirometry- Assess the need for suctioning and perform as needed- Assess and instruct to report SOB or any respiratory difficulty- Respiratory Therapy support as indicated- Manage/alleviate anxiety- Monitor for signs/symptoms of CO2 retention  Outcome: Progressing     Problem: SAFETY ADULT - FALL  Goal: Free from fall injury  Description: INTERVENTIONS:  - Assess pt frequently for physical needs  - Identify cognitive and physical deficits and behaviors that affect risk of falls.  - [place] fall precautions as indicated by assessment.  - Educate pt/family on patient safety including physical limitations  - Instruct pt to call for assistance with activity based on assessment  - Modify environment to reduce risk of injury  - Provide assistive devices as appropriate  - Consider OT/PT consult to assist with strengthening/mobility  - Encourage toileting schedule  Outcome: Progressing     Problem: DISCHARGE PLANNING  Goal: Discharge to home or other facility with appropriate resources  Description: INTERVENTIONS:  - Identify barriers to discharge w/pt and caregiver  - Include patient/family/discharge partner in discharge planning  - Arrange for needed  discharge resources and transportation as appropriate  - Identify discharge learning needs (meds, wound care, etc)  - Arrange for interpreters to assist at discharge as needed  - Consider post-discharge preferences of patient/family/discharge partner  - Complete POLST form as appropriate  - Assess patient's ability to be responsible for managing their own health  - Refer to Case Management Department for coordinating discharge planning if the patient needs post-hospital services based on physician/LIP order or complex needs related to functional status, cognitive ability or social support system  Outcome: Progressing     Problem: HEMATOLOGIC - ADULT  Goal: Free from bleeding injury  Description: (Example usage: patient with low platelets)  INTERVENTIONS:  - Avoid intramuscular injections, enemas and rectal medication administration  - Ensure safe mobilization of patient  - Hold pressure on venipuncture sites to achieve adequate hemostasis  - Assess for signs and symptoms of internal bleeding  - Monitor lab trends  - Patient is to report abnormal signs of bleeding to staff  - Avoid use of toothpicks and dental floss  - Use electric shaver for shaving  - Use soft bristle tooth brush  - Limit straining and forceful nose blowing  Outcome: Progressing     Pt alert and oriented X 4, forgetful at times. Pt on 4.5 L nasal cannula. Pt had complaints of itchiness, PRN medication given. Pt on IV abx and IV lasix. Safety precautions in place, call light within reach.

## 2025-05-19 NOTE — PLAN OF CARE
Alert and oriented x3-4, forgetful at times.   Vitals stable.  6 L of high flow O2.   Purewick in place.  Denies nausea and vomiting.  Denies pain. Corrective insulin given.  IV antibiotics continued.  Unable to get blood return from port. Alteplase administered. Blood return noted. Call light and belongings at bedside.   Problem: Diabetes/Glucose Control  Goal: Glucose maintained within prescribed range  Description: INTERVENTIONS:- Monitor Blood Glucose as ordered- Assess for signs and symptoms of hyperglycemia and hypoglycemia- Administer ordered medications to maintain glucose within target range- Assess barriers to adequate nutritional intake and initiate nutrition consult as needed- Instruct patient on self management of diabetes  Outcome: Progressing     Problem: Patient Centered Care  Goal: Patient preferences are identified and integrated in the patient's plan of care  Description: Interventions:- What would you like us to know as we care for you? - Provide timely, complete, and accurate information to patient/family- Incorporate patient and family knowledge, values, beliefs, and cultural backgrounds into the planning and delivery of care- Encourage patient/family to participate in care and decision-making at the level they choose- Honor patient and family perspectives and choices  Outcome: Progressing     Problem: RISK FOR INFECTION - ADULT  Goal: Absence of fever/infection during anticipated neutropenic period  Description: INTERVENTIONS- Monitor WBC- Administer growth factors as ordered- Implement neutropenic guidelines  Outcome: Progressing     Problem: CARDIOVASCULAR - ADULT  Goal: Maintains optimal cardiac output and hemodynamic stability  Description: INTERVENTIONS:- Monitor vital signs, rhythm, and trends- Monitor for bleeding, hypotension and signs of decreased cardiac output- Evaluate effectiveness of vasoactive medications to optimize hemodynamic stability- Monitor arterial and/or venous puncture  sites for bleeding and/or hematoma- Assess quality of pulses, skin color and temperature- Assess for signs of decreased coronary artery perfusion - ex. Angina- Evaluate fluid balance, assess for edema, trend weights  Outcome: Progressing  Goal: Absence of cardiac arrhythmias or at baseline  Description: INTERVENTIONS:- Continuous cardiac monitoring, monitor vital signs, obtain 12 lead EKG if indicated- Evaluate effectiveness of antiarrhythmic and heart rate control medications as ordered- Initiate emergency measures for life threatening arrhythmias- Monitor electrolytes and administer replacement therapy as ordered  Outcome: Progressing     Problem: RESPIRATORY - ADULT  Goal: Achieves optimal ventilation and oxygenation  Description: INTERVENTIONS:- Assess for changes in respiratory status- Assess for changes in mentation and behavior- Position to facilitate oxygenation and minimize respiratory effort- Oxygen supplementation based on oxygen saturation or ABGs- Provide Smoking Cessation handout, if applicable- Encourage broncho-pulmonary hygiene including cough, deep breathe, Incentive Spirometry- Assess the need for suctioning and perform as needed- Assess and instruct to report SOB or any respiratory difficulty- Respiratory Therapy support as indicated- Manage/alleviate anxiety- Monitor for signs/symptoms of CO2 retention  Outcome: Progressing     Problem: SAFETY ADULT - FALL  Goal: Free from fall injury  Description: INTERVENTIONS:  - Assess pt frequently for physical needs  - Identify cognitive and physical deficits and behaviors that affect risk of falls.  - [place] fall precautions as indicated by assessment.  - Educate pt/family on patient safety including physical limitations  - Instruct pt to call for assistance with activity based on assessment  - Modify environment to reduce risk of injury  - Provide assistive devices as appropriate  - Consider OT/PT consult to assist with strengthening/mobility  -  Encourage toileting schedule  Outcome: Progressing     Problem: DISCHARGE PLANNING  Goal: Discharge to home or other facility with appropriate resources  Description: INTERVENTIONS:  - Identify barriers to discharge w/pt and caregiver  - Include patient/family/discharge partner in discharge planning  - Arrange for needed discharge resources and transportation as appropriate  - Identify discharge learning needs (meds, wound care, etc)  - Arrange for interpreters to assist at discharge as needed  - Consider post-discharge preferences of patient/family/discharge partner  - Complete POLST form as appropriate  - Assess patient's ability to be responsible for managing their own health  - Refer to Case Management Department for coordinating discharge planning if the patient needs post-hospital services based on physician/LIP order or complex needs related to functional status, cognitive ability or social support system  Outcome: Progressing

## 2025-05-19 NOTE — PROGRESS NOTES
Progress Note     Kelli Fisher Patient Status:  Inpatient    1955 MRN [id number]   Location [facility] 4W/SW/SE Attending Gloria Sandoval MD   Hosp Day # 7 PCP Taylor Gallardo MD     Subjective:   S: Patient seen this morning, states feeling a little bit better.  Weaned down to 5 L NC.    Review of Systems:   10 point ROS completed and was negative, except for pertinent positive and negatives stated in subjective.    Objective:   Vital signs:  Temp:  [97.1 °F (36.2 °C)-98.3 °F (36.8 °C)] 98.3 °F (36.8 °C)  Pulse:  [79-90] 83  Resp:  [16-20] 18  BP: ()/(66-74) 100/66  SpO2:  [100 %] 100 %    Wt Readings from Last 6 Encounters:   25 167 lb (75.8 kg)   25 169 lb (76.7 kg)   25 174 lb 9.6 oz (79.2 kg)   25 169 lb (76.7 kg)   23 197 lb (89.4 kg)         Physical Exam:    General: No acute distress. Alert ,         Respiratory: Clear to auscultation bilaterally. No wheezes. No rhonchi.  Cardiovascular: S1, S2. Regular rate and rhythm. No murmurs, rubs or gallops.   Abdomen: Soft, nontender, nondistended.  Positive bowel sounds. No rebound or guarding.  Neurologic: No focal neurological deficits.   Musculoskeletal: Moves all extremities.  Extremities: No edema.    Results:   Diagnostic Data:      Labs:    Labs Last 24 Hours:   BMP     CBC    Other     Na 141 Cl 100 BUN 31 Glu 157   Hb 7.4   PTT - Procal -   K 3.7; 3.7 CO2 36.0 Cr 1.16   WBC 12.7 >< PLT 65.0  INR - CRP -   Renal Lytes Endo    Hct 25.2   Trop - D dim -   eGFR - Ca 8.9 POC Gluc  174    LFT   pBNP - Lactic -   eGFR AA - PO4 - A1c -   AST - APk - Prot -  LDL -     Mg 2.2 TSH -   ALT - T makeda - Alb -        COVID-19 Lab Results    COVID-19  Lab Results   Component Value Date    COVID19 Not Detected 2025       Pro-Calcitonin  No results for input(s): \"PCT\" in the last 168 hours.    Cardiac  Recent Labs   Lab 25  1619   PBNP 1,110*       Creatinine Kinase  No results for input(s): \"CK\" in the last 168  hours.    Inflammatory Markers  Recent Labs   Lab 05/13/25  1512   DDIMER 3.05*       Imaging: Imaging data reviewed in Epic.    Medications: Scheduled Medications[1]    Assessment & Plan:   ASSESSMENT / PLAN:     Afib RVR:  Pericardial effusion  - cardiology consulted  - IV amio --> now on oral. Avoiding BB, CCB with h/o junctional rhythm   - cont monitor on tele  - no anticoagulation for now due to pancytopenia   - on 5/16 experienced RVR again with hypotension, received digoxin converted to NSR. Remained  hypotensive so sent to stepdown unit. Responded to fluid bolus eventually.   - Transferred back to medical floor, normotensive rates controlled.  - CXR still with pulm edema, cont diuresis as tolerated.       BL PE: acute small segmental/right lower extremity DVT  S/p IVC 5/14/2025  Not on a/c due to thrombocytopenia     Metastatic endometrial cancer    Pancytopenia  Acute on chronic anemia of chronic disease  - s/p 1 unit prbc  - neutropenic precautions  - started neupogen until ANC 1500  - Plan is for further chemotherapy once recovered clinically per Dr. Herring.  - cefepime and vanco started for neutropenia and patient with cough/chills, elevated LA, possible early sepsis - pulm following for this as well  -Pancytopenia improving, monitor daily  - Overall plan of care is to continue to treat medically over the next several days and monitor for improvement.  If patient declines may consider palliative conversations.  If she improves overall plan is to resume chemotherapy in the future as planned by Dr. Herring.  - Anemia noted today, dropping hgb 8.1 --> 7.4. 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 Cont lasix.  - d/w pulmonary and GYN oncology no need for therapeutic or diagnostic thoracentesis at this time.      Thrush  -  nystatin    Deconditioning  PT OT        Dispo: PT recommending EDUARDO, patient to discuss with family. Have previously refused      Wheelchair:   Kelli Fisher requires a wheelchair to complete mobility related ADL's within the home. Kelli Fisher is unable to complete the majority of ADL's with a cane or walker.  Kelli Fisher has a caregiver to help propel a standard wheelchair. Patient has adequate space within their home to safely use the wheelchair. The patient has not expressed an unwillingness to use the wheelchair and patient can’t use cane or walker to resolve mobility limitation.The wheelchair can be used safely in the home for MRADL’s         MDM: High   I personally spent time on chart/note review, review of labs/imaging, discussion with patient, physical exam, discussion with staff, consultants, coordinating care, writing progress note, and discussion of plan of care.      Gloria Sandoval MD    Supplementary Documentation:                       [1]    potassium chloride  40 mEq Oral Once    potassium chloride  20 mEq Intravenous Once    pantoprazole  40 mg Oral QAM AC    vancomycin  1,000 mg Intravenous Q24H    furosemide  40 mg Intravenous BID (Diuretic)    amiodarone  400 mg Oral BID with meals    insulin aspart  1-5 Units Subcutaneous TID CC and HS    cefepime  1 g Intravenous Q12H    levothyroxine  100 mcg Oral Before breakfast    nystatin  5 mL Oral QID

## 2025-05-19 NOTE — PAYOR COMM NOTE
--------------  CONTINUED STAY REVIEW    Payor: [employer] MEDICARE ADV PPO  Subscriber #:  [id number]  Authorization Number: [id number]    Admit date: 5/12/25  Admit time:  5:28 PM    REVIEW DOCUMENTATION:  5/17      Date of Service: 5/17/2025  1:24 PM     Signed       Expand All Collapse All       [facility]  part of [facility]          Chief Complaint: afib rvr     SUBJECTIVE:        Patient did well overnight blood pressure better controlled, sinus rhythm in the 90s.     This morning complaining of chest pressure for the last \"3 hours\" retrosternal and epigastric.  She rates it a 9/10 however does not appear to be in significantly more distress than usual.  Did not sleep well overnight.          Down  to 6L nasal cannula.            No acute events overnight.  Patient denies chest pain. No fevers/chills.  No n/v/abd pain.       10 ROS completed and otherwise negative.     OBJECTIVE:  Vital signs in last 24 hours:  /73 (BP Location: Left arm)   Pulse 93   Temp 97.1 °F (36.2 °C) (Temporal)   Resp 25   Ht 5' (1.524 m)   Wt 164 lb 10.9 oz (74.7 kg)   SpO2 100%   BMI 32.16 kg/m²      Intake/Output:     Intake/Output Summary (Last 24 hours) at 5/17/2025 1324  Last data filed at 5/17/2025 1140      Gross per 24 hour   Intake 950 ml   Output --   Net 950 ml             Wt Readings from Last 3 Encounters:   05/17/25 164 lb 10.9 oz (74.7 kg)   04/24/25 169 lb (76.7 kg)   05/05/25 174 lb 9.6 oz (79.2 kg)         Exam      Gen: No acute distress, chronically ill-appearing, tachypneic  Pulm: Lungs clear, normal respiratory effort  CV: Heart with regular rate and rhythm  Abd: Abdomen soft, nontender, bowel sounds present  Neuro: No acute focal deficits  MSK: moves extremities  Skin: Warm and dry  Psych: Normal affect  Ext: no c/c/e     Data Review:      Labs:         Lab Results   Component Value Date     WBC 28.6 05/17/2025     HGB 8.5 05/17/2025     HCT 28.3 05/17/2025     PLT 53.0 05/17/2025      CREATSERUM 0.99 05/17/2025     BUN 25 05/17/2025      05/17/2025     K 3.7 05/17/2025     K 3.7 05/17/2025      05/17/2025     CO2 34.0 05/17/2025      05/17/2025     CA 8.8 05/17/2025     MG 1.6 05/17/2025            Imaging: Reviewed     XR CHEST AP PORTABLE  (CPT=71045)  Result Date: 5/15/2025  CONCLUSION:  1. The cardiac silhouette remains enlarged, likely relating to the known pericardial effusion.  Small pleural effusions are also unchanged bilaterally suggesting mild CHF or fluid overload.  There is stable mild associated passive atelectasis at both lung bases. 2. There is a history of metastatic endometrial carcinoma.  Stable left upper lobe/lingular consolidation may relate to a metastasis, atelectasis and/or pneumonia.  Unchanged prominence of the mediastinal and bilateral hilar contours is compatible with known underlying metastatic lymphadenopathy.     Dictated by (CST): Vincent Vazquez MD on 5/15/2025 at 7:25 AM     Finalized by (CST): Vincent Vazquez MD on 5/15/2025 at 7:28 AM              Meds:   [Current Hospital Medications]    [Current Hospital Medications]         Current Facility-Administered Medications   Medication Dose Route Frequency    [START ON 5/18/2025] pantoprazole (Protonix) DR tab 40 mg  40 mg Oral QAM AC    [START ON 5/18/2025] vancomycin (Vancocin) 1,000 mg in sodium chloride 0.9% 250 mL IVPB-ADDV  1,000 mg Intravenous Q24H    docusate sodium (Colace) cap 100 mg  100 mg Oral Daily PRN    polyethylene glycol (PEG 3350) (Miralax) 17 g oral packet 17 g  17 g Oral Daily PRN    magnesium hydroxide (Milk of Magnesia) 400 MG/5ML oral suspension 30 mL  30 mL Oral Daily PRN    bisacodyl (Dulcolax) 10 MG rectal suppository 10 mg  10 mg Rectal Daily PRN    fleet enema (Fleet) rectal enema 133 mL  1 enema Rectal Once PRN    furosemide (Lasix) 10 mg/mL injection 40 mg  40 mg Intravenous BID (Diuretic)    amiodarone (Pacerone) tab 400 mg  400 mg Oral BID with meals     insulin aspart (NovoLOG) 100 Units/mL FlexPen 1-5 Units  1-5 Units Subcutaneous TID CC and HS    sodium chloride (Saline Mist) 0.65 % nasal solution 1 spray  1 spray Each Nare Q3H PRN    maalox/diphenhydramine/lidocaine (First Mouthwash BLM) oral suspension 10 mL  10 mL Oral Q6H PRN    acetaminophen (Tylenol Extra Strength) tab 500 mg  500 mg Oral Q4H PRN    ondansetron (Zofran) 4 MG/2ML injection 4 mg  4 mg Intravenous Q6H PRN    metoclopramide (Reglan) 5 mg/mL injection 5 mg  5 mg Intravenous Q8H PRN    glucose (Dex4) 15 GM/59ML oral liquid 15 g  15 g Oral Q15 Min PRN     Or    glucose (Glutose) 40% oral gel 15 g  15 g Oral Q15 Min PRN     Or    glucose-vitamin C (Dex-4) chewable tab 4 tablet  4 tablet Oral Q15 Min PRN     Or    dextrose 50% injection 50 mL  50 mL Intravenous Q15 Min PRN     Or    glucose (Dex4) 15 GM/59ML oral liquid 30 g  30 g Oral Q15 Min PRN     Or    glucose (Glutose) 40% oral gel 30 g  30 g Oral Q15 Min PRN     Or    glucose-vitamin C (Dex-4) chewable tab 8 tablet  8 tablet Oral Q15 Min PRN    ceFEPIme (Maxipime) 1 g in sodium chloride 0.9% 100mL IVPB-YANA  1 g Intravenous Q12H    ipratropium-albuterol (Duoneb) 0.5-2.5 (3) MG/3ML inhalation solution 3 mL  3 mL Nebulization Q6H PRN    levothyroxine (Synthroid) tab 100 mcg  100 mcg Oral Before breakfast    nystatin (Mycostatin) [id number] UNIT/ML oral suspension 500,000 Units  5 mL Oral QID           Assessment  [Problem List]    [Problem List]  Patient Active Problem List      Diagnosis    Shortness of breath    Acute hypoxic respiratory failure (HCC)    Lung mass    Supraclavicular adenopathy    Dysphagia, unspecified type    Pericardial effusion (HCC)    Endometrial cancer (HCC)    Microcytic anemia    Thrombocytopenia (HCC)    Azotemia    Pancytopenia (HCC)    Hyperglycemia    Acute respiratory failure with hypoxia (HCC)    Atrial fibrillation with RVR (HCC)        Plan:      Afib RVR:  Pericardial effusion  - started IV amio  - cardiology  consulted  - monitor on tele  - anticoagulation per cards  -5/14: Attempting to transition off IV amiodarone to p.o. today.  -5/15: doing well on po amiodarone    5/16: Patient was in RVR again today and hypotensive received digoxin RVR converted to normal sinus rhythm.  Remains mildly hypotensive in the 80s denies any symptoms at this time.  However due to lability of hemodynamics will transfer to stepdown unit.  Small bolus of 250 cc given.  IV Lasix held.  5/17: Now in sinus rhythm blood pressure well-controlled normotensive rates controlled.  Chest pain likely GERD however cardiology informed will get troponin and stat EKG.  Maalox as needed okay to resume Lasix today possible transfer back to cardiac floor pending progress     BL PE: small segmental/right lower extremity DVT acute  S/p IVC 5/14/2025  Not on a/c due to TCP  -5/15/2025: tolerated IVC well     Metastatic endometrial cancer    Pancytopenia  Acute on chronic anemia of chronic disease  - transfuse 1 unit prbc  - neutropenic precautions  - started neupogen until ANC 1500  -Plan is for further chemotherapy once recovered clinically per Dr. Herring.  - cefepime and vanco started for neutropenia and patient with cough/chills, elevated LA, possible early sepsis - pulm following for this as well  -Pancytopenia improving, monitor daily  Overall plan of care is to continue to treat medically over the next several days and monitor for improvement.  If patient declines may consider palliative conversations.  If she improves overall plan is to resume chemotherapy in the future as planned by Dr. Herring.     Acute on chronic respiratory failure  - on 4-5L NC baseline  - due to 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  -5/17: O2 requirements down 6L at this time.  Resuming Lasix today in view of hemodynamic stability.  As discussed during this admission with pulmonary service and GYN oncology no need for therapeutic or diagnostic thoracentesis at this time.          Thrush  - nystatin     Global A/P  - reviewed labs and vitals from today  - reviewed notes of the day  - cbc, bmp, mag ordered for tomorrow  - discussed need to stay in the hospital today due to above  - cont IV abx  - discussed with patient/RN and care team  - dispo pending clinical course        Ariela Tello MD        Supplementary Documentation:  DVT Mechanical Prophylaxis:   SCDs,    DVT Pharmacologic Prophylaxis   Medication   None                 Code Status: Full Code  Rogel: External urinary catheter in place  Rogel Duration (in days):   Central line:    JOSE:            **Certification        PHYSICIAN Certification of Need for Inpatient Hospitalization - Initial Certification     Patient will require inpatient services that will reasonably be expected to span two midnight's based on the clinical documentation in H+P.   Based on patients current state of illness, I anticipate that, after discharge, patient will require TBD.        Dietitian Malnutrition Assessment     Evaluation for Malnutrition: Criteria for non-severe malnutrition diagnosis-         chronic illness related to   Wt loss 7.5% in 3 months., Body fat mild depletion., Muscle mass mild depletion.        RD Malnutrition Care Plan: Encouraged increased PO intake., Encouraged small frequent meals with emphasis on high calorie/high protein., Intiated ONS (oral nutritional supplements).     Body Fat/Muscle Mass: Mild depletion body fat., Mild depletion muscle mass. triceps region. dorsal dale region., calf region.     Physician Assessment      Patient has a diagnosis of moderate malnutrition                    MDM: High complexity- severe exacerbation of chronic illness posing a threat to life. IV meds requiring close inpatient monitoring. I personally spent time on chart/note review, review of labs/imaging, discussion with patient, physical exam, discussion with staff, writing progress note, and discussion of plan of care.                       5/18     Date of Service: 5/18/2025 10:06 AM     Signed       Expand All Collapse All       [facility]  part of [facility]          Chief Complaint: afib rvr     SUBJECTIVE:        Patient for first time in admission stating \"I feel better today\" states she can take deeper breaths.    Sitting up in bed appears to have more energy.       Epigastric pain improved.  Complains of belching and fullness.  No emesis or nausea  Remains on 6L nasal cannula.            No acute events overnight.  Patient denies chest pain. No fevers/chills.  No n/v/abd pain.       10 ROS completed and otherwise negative.     OBJECTIVE:  Vital signs in last 24 hours:  BP 99/70 (BP Location: Right arm)   Pulse 86   Temp 97.6 °F (36.4 °C) (Temporal)   Resp 18   Ht 5' (1.524 m)   Wt 164 lb 10.9 oz (74.7 kg)   SpO2 100%   BMI 32.16 kg/m²      Intake/Output:     Intake/Output Summary (Last 24 hours) at 5/18/2025 1006  Last data filed at 5/18/2025 0952      Gross per 24 hour   Intake 1351 ml   Output 1075 ml   Net 276 ml             Wt Readings from Last 3 Encounters:   05/17/25 164 lb 10.9 oz (74.7 kg)   04/24/25 169 lb (76.7 kg)   05/05/25 174 lb 9.6 oz (79.2 kg)         Exam      Gen: No acute distress, chronically ill-appearing, tachypneic  Pulm: Lungs clear, normal respiratory effort  CV: Heart with regular rate and rhythm  Abd: Abdomen soft, nontender, bowel sounds present  Neuro: No acute focal deficits  MSK: moves extremities  Skin: Warm and dry  Psych: Normal affect  Ext: no c/c/e     Data Review:      Labs:         Lab Results   Component Value Date     WBC 19.4 05/18/2025     HGB 8.1 05/18/2025     HCT 27.0 05/18/2025     PLT 60.0 05/18/2025     K 3.8 05/18/2025     MG 1.7 05/18/2025            Imaging: Reviewed     No results found.        Meds:   [Current Hospital Medications]    [Current Hospital Medications]         Current Facility-Administered Medications   Medication Dose Route Frequency     potassium chloride 20 mEq/100mL IVPB premix 20 mEq  20 mEq Intravenous Once    magnesium sulfate in sterile water for injection 2 g/50mL IVPB premix 2 g  2 g Intravenous Once    pantoprazole (Protonix) DR tab 40 mg  40 mg Oral QAM AC    alum-mag hydroxide-simethicone (Maalox) 200-200-20 MG/5ML oral suspension 30 mL  30 mL Oral QID PRN    vancomycin (Vancocin) 1,000 mg in sodium chloride 0.9% 250 mL IVPB-ADDV  1,000 mg Intravenous Q24H    docusate sodium (Colace) cap 100 mg  100 mg Oral Daily PRN    polyethylene glycol (PEG 3350) (Miralax) 17 g oral packet 17 g  17 g Oral Daily PRN    magnesium hydroxide (Milk of Magnesia) 400 MG/5ML oral suspension 30 mL  30 mL Oral Daily PRN    bisacodyl (Dulcolax) 10 MG rectal suppository 10 mg  10 mg Rectal Daily PRN    fleet enema (Fleet) rectal enema 133 mL  1 enema Rectal Once PRN    furosemide (Lasix) 10 mg/mL injection 40 mg  40 mg Intravenous BID (Diuretic)    amiodarone (Pacerone) tab 400 mg  400 mg Oral BID with meals    insulin aspart (NovoLOG) 100 Units/mL FlexPen 1-5 Units  1-5 Units Subcutaneous TID CC and HS    sodium chloride (Saline Mist) 0.65 % nasal solution 1 spray  1 spray Each Nare Q3H PRN    maalox/diphenhydramine/lidocaine (First Mouthwash BLM) oral suspension 10 mL  10 mL Oral Q6H PRN    acetaminophen (Tylenol Extra Strength) tab 500 mg  500 mg Oral Q4H PRN    ondansetron (Zofran) 4 MG/2ML injection 4 mg  4 mg Intravenous Q6H PRN    metoclopramide (Reglan) 5 mg/mL injection 5 mg  5 mg Intravenous Q8H PRN    glucose (Dex4) 15 GM/59ML oral liquid 15 g  15 g Oral Q15 Min PRN     Or    glucose (Glutose) 40% oral gel 15 g  15 g Oral Q15 Min PRN     Or    glucose-vitamin C (Dex-4) chewable tab 4 tablet  4 tablet Oral Q15 Min PRN     Or    dextrose 50% injection 50 mL  50 mL Intravenous Q15 Min PRN     Or    glucose (Dex4) 15 GM/59ML oral liquid 30 g  30 g Oral Q15 Min PRN     Or    glucose (Glutose) 40% oral gel 30 g  30 g Oral Q15 Min PRN     Or     glucose-vitamin C (Dex-4) chewable tab 8 tablet  8 tablet Oral Q15 Min PRN    ceFEPIme (Maxipime) 1 g in sodium chloride 0.9% 100mL IVPB-YANA  1 g Intravenous Q12H    ipratropium-albuterol (Duoneb) 0.5-2.5 (3) MG/3ML inhalation solution 3 mL  3 mL Nebulization Q6H PRN    levothyroxine (Synthroid) tab 100 mcg  100 mcg Oral Before breakfast    nystatin (Mycostatin) [id number] UNIT/ML oral suspension 500,000 Units  5 mL Oral QID           Assessment  [Problem List]    [Problem List]  Patient Active Problem List      Diagnosis    Shortness of breath    Acute hypoxic respiratory failure (HCC)    Lung mass    Supraclavicular adenopathy    Dysphagia, unspecified type    Pericardial effusion (HCC)    Endometrial cancer (HCC)    Microcytic anemia    Thrombocytopenia (HCC)    Azotemia    Pancytopenia (HCC)    Hyperglycemia    Acute respiratory failure with hypoxia (HCC)    Atrial fibrillation with RVR (HCC)        Plan:      Afib RVR:  Pericardial effusion  - started IV amio  - cardiology consulted  - monitor on tele  - anticoagulation per cards  -5/14: Attempting to transition off IV amiodarone to p.o. today.  -5/15: doing well on po amiodarone    5/16: Patient was in RVR again today and hypotensive received digoxin RVR converted to normal sinus rhythm.  Remains mildly hypotensive in the 80s denies any symptoms at this time.  However due to lability of hemodynamics will transfer to stepdown unit.  Small bolus of 250 cc given.  IV Lasix held.  5/18: Now in sinus rhythm blood pressure well-controlled normotensive rates controlled.  Chest pain likely GERD, continue diuresis as tolerated.  Chest x-ray pending     BL PE: small segmental/right lower extremity DVT acute  S/p IVC 5/14/2025  Not on a/c due to TCP  -5/15/2025: tolerated IVC well     Metastatic endometrial cancer    Pancytopenia  Acute on chronic anemia of chronic disease  - transfuse 1 unit prbc  - neutropenic precautions  - started neupogen until ANC 1500  -Plan is for  further chemotherapy once recovered clinically per Dr. Herring.  - cefepime and vanco started for neutropenia and patient with cough/chills, elevated LA, possible early sepsis - pulm following for this as well  -Pancytopenia improving, monitor daily  Overall plan of care is to continue to treat medically over the next several days and monitor for improvement.  If patient declines may consider palliative conversations.  If she improves overall plan is to resume chemotherapy in the future as planned by Dr. Herring.     Acute on chronic respiratory failure  - on 4-5L NC baseline  - due to 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  -5/18: O2 requirements down 6L at this time.  Resuming Lasix today in view of hemodynamic stability.  As discussed during this admission with pulmonary service and GYN oncology no need for therapeutic or diagnostic thoracentesis at this time.         Thrush  - nystatin     Global A/P  - reviewed labs and vitals from today  - reviewed notes of the day  - cbc, bmp, mag ordered for tomorrow  - discussed need to stay in the hospital today due to above  - cont IV abx  - discussed with patient/RN and care team  - dispo pending clinical course        Ariela Tello MD        Supplementary Documentation:  DVT Mechanical Prophylaxis:   SCDs,    DVT Pharmacologic Prophylaxis   Medication   None                 Code Status: Full Code  Rogel: External urinary catheter in place  Rogel Duration (in days):   Central line:    JOSE: 5/19/2025           **Certification        PHYSICIAN Certification of Need for Inpatient Hospitalization - Initial Certification     Patient will require inpatient services that will reasonably be expected to span two midnight's based on the clinical documentation in H+P.   Based on patients current state of illness, I anticipate that, after discharge, patient will require TBD.        Dietitian Malnutrition Assessment     Evaluation for Malnutrition: Criteria for non-severe  malnutrition diagnosis-         chronic illness related to   Wt loss 7.5% in 3 months., Body fat mild depletion., Muscle mass mild depletion.        RD Malnutrition Care Plan: Encouraged increased PO intake., Encouraged small frequent meals with emphasis on high calorie/high protein., Intiated ONS (oral nutritional supplements).     Body Fat/Muscle Mass: Mild depletion body fat., Mild depletion muscle mass. triceps region. dorsal dale region., calf region.     Physician Assessment      Patient has a diagnosis of moderate malnutrition                    MDM: High complexity- severe exacerbation of chronic illness posing a threat to life. IV meds requiring close inpatient monitoring. I personally spent time on chart/note review, review of labs/imaging, discussion with patient, physical exam, discussion with staff, writing progress note, and discussion of plan of care.                               Date of Service: 2025  1:13 PM     Signed       Expand All Collapse All       Progress Note            Kelli Fisher Patient Status:  Inpatient    1955 MRN [id number]   Location [facility] 4W/SW/SE Attending Gloria Sandoval MD   Hosp Day # 7 PCP Taylor Gallardo MD      Subjective:  S: Patient seen this morning, states feeling a little bit better.  Weaned down to 5 L NC.     Review of Systems:   10 point ROS completed and was negative, except for pertinent positive and negatives stated in subjective.     Objective:  Vital signs:  Temp:  [97.1 °F (36.2 °C)-98.3 °F (36.8 °C)] 98.3 °F (36.8 °C)  Pulse:  [79-90] 83  Resp:  [16-20] 18  BP: ()/(66-74) 100/66  SpO2:  [100 %] 100 %         Wt Readings from Last 6 Encounters:   25 167 lb (75.8 kg)   25 169 lb (76.7 kg)   25 174 lb 9.6 oz (79.2 kg)   25 169 lb (76.7 kg)   23 197 lb (89.4 kg)            Physical Exam:    General: No acute distress. Alert ,         Respiratory: Clear to auscultation bilaterally. No wheezes. No  rhonchi.  Cardiovascular: S1, S2. Regular rate and rhythm. No murmurs, rubs or gallops.   Abdomen: Soft, nontender, nondistended.  Positive bowel sounds. No rebound or guarding.  Neurologic: No focal neurological deficits.   Musculoskeletal: Moves all extremities.  Extremities: No edema.     Results:  Diagnostic Data:       Labs:     Labs Last 24 Hours:                      BMP         CBC       Other      Na 141 Cl 100 BUN 31 Glu 157     Hb 7.4     PTT - Procal -    K 3.7; 3.7 CO2 36.0 Cr 1.16     WBC 12.7 >< PLT 65.0   INR - CRP -    Renal Lytes Endo       Hct 25.2     Trop - D dim -    eGFR - Ca 8.9 POC Gluc  174       LFT     pBNP - Lactic -    eGFR AA - PO4 - A1c -     AST - APk - Prot -   LDL -        Mg 2.2 TSH -     ALT - T makeda - Alb -             COVID-19 Lab Results     COVID-19        Lab Results   Component Value Date     COVID19 Not Detected 05/12/2025         Pro-Calcitonin  No results for input(s): \"PCT\" in the last 168 hours.     Cardiac      Recent Labs   Lab 05/12/25  1619   PBNP 1,110*         Creatinine Kinase  No results for input(s): \"CK\" in the last 168 hours.     Inflammatory Markers      Recent Labs   Lab 05/13/25  1512   DDIMER 3.05*         Imaging: Imaging data reviewed in Epic.     Medications: [Scheduled Medications]    [Scheduled Medications]   potassium chloride  40 mEq Oral Once    potassium chloride  20 mEq Intravenous Once    pantoprazole  40 mg Oral QAM AC    vancomycin  1,000 mg Intravenous Q24H    furosemide  40 mg Intravenous BID (Diuretic)    amiodarone  400 mg Oral BID with meals    insulin aspart  1-5 Units Subcutaneous TID CC and HS    cefepime  1 g Intravenous Q12H    levothyroxine  100 mcg Oral Before breakfast    nystatin  5 mL Oral QID        Assessment & Plan:  ASSESSMENT / PLAN:     Afib RVR:  Pericardial effusion  - cardiology consulted  - IV amio --> now on oral. Avoiding BB, CCB with h/o junctional rhythm   - cont monitor on tele  - no anticoagulation for now due  to pancytopenia   - on 5/16 experienced RVR again with hypotension, received digoxin converted to NSR. Remained  hypotensive so sent to stepdown unit. Responded to fluid bolus eventually.   - Transferred back to medical floor, normotensive rates controlled.  - CXR still with pulm edema, cont diuresis as tolerated.       BL PE: acute small segmental/right lower extremity DVT  S/p IVC 5/14/2025  Not on a/c due to thrombocytopenia     Metastatic endometrial cancer    Pancytopenia  Acute on chronic anemia of chronic disease  - s/p 1 unit prbc  - neutropenic precautions  - started neupogen until ANC 1500  - Plan is for further chemotherapy once recovered clinically per Dr. Herring.  - cefepime and vanco started for neutropenia and patient with cough/chills, elevated LA, possible early sepsis - pulm following for this as well  -Pancytopenia improving, monitor daily  - Overall plan of care is to continue to treat medically over the next several days and monitor for improvement.  If patient declines may consider palliative conversations.  If she improves overall plan is to resume chemotherapy in the future as planned by Dr. Herring.  - Anemia noted today, dropping hgb 8.1 --> 7.4. 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 Cont lasix.  - d/w pulmonary and GYN oncology no need for therapeutic or diagnostic thoracentesis at this time.      Thrush  - nystatin     Deconditioning  PT OT         Dispo: PT recommending EDUARDO, patient to discuss with family. Have previously refused        Wheelchair:   Kelli Fisher requires a wheelchair to complete mobility related ADL's within the home. Kelli Fisher is unable to complete the majority of ADL's with a cane or walker.  Kelli Fisher has a caregiver to help propel a standard wheelchair. Patient has adequate space within  their home to safely use the wheelchair. The patient has not expressed an unwillingness to use the wheelchair and patient can’t use cane or walker to resolve mobility limitation.The wheelchair can be used safely in the home for MRADL’s           MDM: High   I personally spent time on chart/note review, review of labs/imaging, discussion with patient, physical exam, discussion with staff, consultants, coordinating care, writing progress note, and discussion of plan of care.      Gloria Sandoval MD     Supplementary Documentation:                     Kelli Fisher #[id number] (CSN: [id number]) (70 year old F) (Adm: 05/12/25)  [facility] 4TH [id number]  All medication doses during the admission are shown, including meds that are no longer on order.  Scheduled Meds Sorted by Name  for Kelli Fisher as of 5/17/25 through 5/19/25    1 Day 3 Days 7 Days 10 Days < Today >   Legend:       Medications 05/17/25 05/18/25 05/19/25         alteplase (Activase) 2 mg in sterile water for injection (PF) 2.2 mL IV push to declot line  Dose: 2 mg  Freq: Once Route: IV  Start: 05/18/25 1115 End: 05/18/25 2325   Admin Instructions:   Message pharmacy when dose is needed. For powder in vial product - reconstitute with 2.2mL SWFI for a conc of 1 mg/mL. For premade liquid in vial - do NOT add SWFI. If frozen, allow to thaw completely before use     2323 AA-Given                 amiodarone (Pacerone) tab 400 mg  Dose: 400 mg  Freq: 2 times daily with meals Route: OR  Start: 05/14/25 1200    0901 SH-Given   1805 JS-Given       0836 JS-Given   1712 JS-Given       0836 BP-Given   1800               ceFEPIme (Maxipime) 1 g in sodium chloride 0.9% 100mL IVPB-YANA  Dose: 1 g  Freq: Every 12 hours Route: IV  Last Dose: 1 g (05/19/25 0947)  Start: 05/12/25 1830   Order specific questions:       0611 KL-New Bag   1823 JS-New Bag       0652 AA-New Bag   1821 JS-New Bag       0641 AA-New Bag   0947 BP-New Bag [C]   1836                                                   furosemide (Lasix) 10 mg/mL injection 40 mg  Dose: 40 mg  Freq: 2 times daily (diuretic) Route: IV  Start: 05/15/25 1700    0902 SH-Given   1658 JS-Given       0836 JS-Given   1712 JS-Given       (0900 BP)-Not Given   0947 BP-Given [C]   1700                                insulin aspart (NovoLOG) 100 Units/mL FlexPen 1-5 Units  Dose: 1-5 Units  Freq: 3 times daily with meals and at bedtime Route: SC  Start: 05/14/25 1245   Admin Instructions:   CORRECTION FACTOR - LOW DOSE  Continue to give correction insulin even if NPO  DO NOT HOLD OR ALTER INSULIN DOSE WITHOUT A PHYSICIAN ORDER    Give 1 unit for blood glucose 160-200 mg/dL  Give 2 units for blood glucose 201-240 mg/dL  Give 3 units for blood glucose 241-280 mg/dL  Give 4 units for blood glucose 281-320 mg/dL  Give 5 units for blood glucose 321-360 mg/dL  Call physician if blood glucose is greater than 360 mg/dL with time and last dose of correction insulin given.    0917 SH-Given   1244 FC-Given   1816 JS-Given     2216 AA-Given        1021 JS-Given   (1200 JS)-Not Given [C]   (1700 JS)-Not Given     2117 AA-Given        1028 BP-Given   1422 BP-Given   1700     2100                levothyroxine (Synthroid) tab 100 mcg  Dose: 100 mcg  Freq: Before breakfast Route: OR  Start: 05/13/25 0700   Admin Instructions:   Give on an empty stomach. Hold tube feedings 1 hour before AND after.    0611 KL-Given        0536 AA-Given        0527 AA-Given          lidocaine (Xylocaine) 2 % injection  Start: 05/14/25 1526 End: 05/14/25 1526   Admin Instructions:   Chantel Johnson: cabinet override         magnesium oxide (Mag-Ox) tab 400 mg  Dose: 400 mg  Freq: Once Route: OR  Start: 05/17/25 0700 End: 05/17/25 0656    0656 KL-Given                                          magnesium sulfate in sterile water for injection 2 g/50mL IVPB premix 2 g  Dose: 2 g  Freq: Once Route: IV  Last Dose: 2 g (05/18/25 1026)  Start: 05/18/25 0900 End: 05/18/25 1126     1026 JS-New  Bag                 nystatin (Mycostatin) 887130 UNIT/ML oral suspension 500,000 Units  Dose: 5 mL  Freq: 4 times daily Route: OR  Start: 05/12/25 2100   Admin Instructions:   swish in the mouth and retain for as long as possible (several minutes) before swallowing.    0901 SH-Given   1339 SH-Given   1804 JS-Given     2216 AA-Given        0836 JS-Given   (1300 JS)-Not Given   1712 JS-Given     2117 AA-Given        0836 BP-Given   1317 BP-Given   1700     2100          pantoprazole (Protonix) DR tab 40 mg  Dose: 40 mg  Freq: Every morning before breakfast Route: OR  Start: 05/18/25 0700   Admin Instructions:   Do not crush     0536 AA-Given        0528 AA-Given                pantoprazole (Protonix) DR tab 40 mg  Dose: 40 mg  Freq: Every morning before breakfast Route: OR  Start: 05/13/25 0700 End: 05/17/25 0826   Admin Instructions:   Do not crush    0623 KL-Given   0826-D/C'd         potassium chloride (Klor-Con M20) tab 40 mEq  Dose: 40 mEq  Freq: Once Route: OR  Start: 05/17/25 0700 End: 05/17/25 0656   Admin Instructions:   Do not crush    0656 KL-Given                              potassium chloride 20 mEq/100mL IVPB premix 20 mEq  Dose: 20 mEq  Freq: Once Route: IV  Last Dose: 20 mEq (05/19/25 1354)  Start: 05/19/25 1130 End: 05/19/25 1554      1354 BP-New Bag          potassium chloride 20 mEq/100mL IVPB premix 20 mEq  Dose: 20 mEq  Freq: Once Route: IV  Last Dose: 20 mEq (05/18/25 1028)  Start: 05/18/25 0900 End: 05/18/25 1228     1028 JS-New Bag                                                       vancomycin (Vancocin) 1,000 mg in sodium chloride 0.9% 250 mL IVPB-ADDV  Dose: 1,000 mg  Freq: Every 24 hours Route: IV  Last Dose: 1,000 mg (05/19/25 0528)  Start: 05/18/25 0600   Admin Instructions:   Vancomycin concentrations >/= 5 mg/mL are incompatible with Zosyn and must be run separately   Order specific questions:        0535 AA-New Bag        0528 AA-New Bag                                                             Continuous Meds Sorted by Name  for Kelli Fisher as of 5/17/25 through 5/19/25    1 Day 3 Days 7 Days 10 Days < Today >   Legend:       Medications 05/17/25 05/18/25 05/19/25                                 PRN Meds Sorted by Name  for Kelli Fisher as of 5/17/25 through 5/19/25    1 Day 3 Days 7 Days 10 Days < Today >   Legend:       Medications 05/17/25 05/18/25 05/19/25         alum-mag hydroxide-simethicone (Maalox) 200-200-20 MG/5ML oral suspension 30 mL  Dose: 30 mL  Freq: 4 times daily PRN Route: OR  PRN Reason: Indigestion  Start: 05/17/25 1326    1339 SH-Given            bisacodyl (Dulcolax) 10 MG rectal suppository 10 mg  Dose: 10 mg  Freq: Daily as needed Route: RE  PRN Reason: constipation  Start: 05/15/25 0854   Admin Instructions:   Use after MiraLAX and Milk of Magnesia.  Use before Fleet's Enema.                                                          diphenhydrAMINE-zinc (Benadryl-Zinc) 2-0.1 % cream 1 Application  Dose: 1 Application  Freq: 3 times daily PRN Route: TOP  PRN Reason: Itching  Start: 05/19/25 1505   Order specific questions:         1552 BP-Given                                                                                                                                  vs    Date/Time Temp Pulse Resp BP SpO2 Weight O2 Device O2 Flow Rate (L/min) [facility]   05/19/25 1737 98.3 °F (36.8 °C) 84 20 105/70 100 % -- Nasal cannula 4.5 L/min BP   05/19/25 1315 -- 83 18 100/66 100 % -- Nasal cannula 5 L/min BP   05/19/25 1103 98.3 °F (36.8 °C) 85 16 88/69 Abnormal  100 % -- Nasal cannula 5 L/min    05/19/25 0945 -- -- -- 93/73 -- -- -- -- BP   05/19/25 0828 97.4 °F (36.3 °C) 83 20 97/71 100 % -- High flow nasal cannula 6 L/min BP   05/19/25 0358 97.5 °F (36.4 °C) -- -- -- -- -- -- -- VT   05/19/25 0341 -- -- -- -- -- -- High flow nasal cannula 6 L/min VT   05/19/25 0341 -- -- 18 103/69 100 % 167 lb (75.8 kg) -- -- SH   05/19/25 0300 -- 79 -- -- -- -- -- -- GT   05/18/25 9502  97.1 °F (36.2 °C) 87 -- -- -- -- High flow nasal cannula 6 L/min AA   05/18/25 2112 -- -- 18 93/71 100 % -- -- -- SH   05/18/25 1902 -- 83 -- -- -- -- -- -- GT   05/18/25 1732 -- 90 18 101/74 100 % -- High flow nasal cannula 6 L/min JS   05/18/25 1327 -- -- -- -- -- -- High flow nasal cannula 6 L/min NC   05/18/25 1240 97.9 °F (36.6 °C) 85 18 105/75 100 % -- Nasal cannula 6 L/min BS   05/18/25 0829 -- 86 18 99/70 100 % -- High flow nasal cannula 6 L/min JS   05/18/25 0325 97.6 °F (36.4 °C) -- 18 93/68 100 % -- High flow nasal cannula 6 L/min AS   05/18/25 0300 -- 92 -- -- -- -- -- -- GT   05/17/25 2014 97.4 °F (36.3 °C) -- 18 96/69 100 % -- High flow nasal cannula 6 L/min AS   05/17/25 1903 -- 92 -- -- -- -- -- -- GT   05/17/25 1700 -- 94 18 103/71 100 % -- High flow nasal cannula 6 L/min JS   05/17/25 1601 98.3 °F (36.8 °C) 95 19 103/73 93 % -- High flow nasal cannula 6 L/min LM   05/17/25 1415 -- 93 22 113/77 100 % -- High flow nasal cannula 6 L/min SHA   05/17/25 1200 97.1 °F (36.2 °C) 93 25 101/73 100 % -- High flow nasal cannula 6 L/min SHA   05/17/25 1000 -- 94 20 132/87 99 % -- High flow nasal cannula 6 L/min SHA   05/17/25 0900 -- 95 22 123/63 99 % -- High flow nasal cannula 6 L/min SHA   05/17/25 0800 97.5 °F (36.4 °C) 95 24 114/67 99 % -- High flow nasal cannula 6 L/min [facility]   05/17/25 0700 -- 98 25 105/92 Abnormal  97 % -- High flow nasal cannula 6 L/min [facility]   05/17/25 0611 -- -- -- -- -- 164 lb 10.9 oz (74.7 kg) -- -- KL   05/17/25 0600 -- 95 21 131/62 99 % -- High flow nasal cannula --    05/17/25 0500 -- 93 13 113/69 100 % -- High flow nasal cannula -- KL   05/17/25 0400 -- 94 16 109/73 100 % -- High flow nasal cannula -- KL   05/17/25 0300 -- 95 15 100/70 100 % -- -- -- KL   05/17/25 0200 -- 97 19 109/78 100 % -- -- -- KL   05/17/25 0100 -- 95 19 103/78 98 % -- -- -- KL   05/17/25 0000 -- 96 16 113/94 Abnormal  91 % -- -- --

## 2025-05-19 NOTE — CM/SW NOTE
Anticipated therapy need: Gradual Rehabilitative Therapy    Tent ref sent, CLRC/PASRR done  Will need list, choice and ins auth    Pt readmitted after recent dc 5/5/25. Previous note:       Pt is known to CM from recent admission.     Pt lives alone in a 3 flat building. Pt reports her dtrs live upstairs and downstairs. She has 3 flts of stairs to her apt. 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     Upon dc pt was sent home w/ [facility], pt to purchase nebulizer from her pharmacy and used [facility] w/ lift asst and stair chair due to narrow halls in apt.     Walk test was done, pt did not meet criteria for home 02 at that time.     Pt waited for over an hour for lift assit to arrive to the home. Will plan to use amb with stair chair at dc.     Readmitted now with SOB, anemia-hgb 6.8, possible PNA.     Currently on 2L 02 with sat 100%     5/2 1430  CM was notified by PT that pt has barriers to dc home with re to her limited mobility, she might need EDUARDO.     CM called dtr Theresa to discuss plan.     Kwtay sts there is no family in the building but there are tenants and pt lives in the middle level of a 3 flat.     Per Theresa they have initiated the process for senior services cg however, no one has started yet. CM offered a cg list to assist with snf care until cg starts.     Dtr confirms [facility] has started care and SW has initiated DME req for commode and shower chair however, they have not rec'd and PCP office claims they have no pending ref. CM lvm for [facility] liaison to investigate and expedite.     Pt has a walker >5 years, Dtr requested wheelchair.      Per dtr they do not want pt to go to a EDUARDO, the family will provide for whatever needs she has.        Dtr inquired about a wheelchair at last admit, Per HME no orders were paced at that time. CM placed new order.    MD will need to sign order and document:     Wheelchair:   Kelli Fisher requires a wheelchair to complete mobility related ADL's within the  home. Kelli Fisher is unable to complete the majority of ADL's with a cane or walker.  Kelli Fisher has a caregiver to help propel a standard wheelchair. Patient has adequate space within their home to safely use the wheelchair. The patient has not expressed an unwillingness to use the wheelchair and patient can’t use cane or walker to resolve mobility limitation.The wheelchair can be used safely in the home for MRADL’s     Dr Sandoval notified.    1355  CM met w/ pt at bedside. She confirms nothing has changed since last admit.    Pt unsure if she is agreeable to EDUARDO, she will discuss w/ her family and notify CM of decision.    Will need to know cancer tx plan if EDUARDO    Plan  Pending    / to remain available for support and/or discharge planning.     Ashley Muñoz, RN    Ext [phone]

## 2025-05-19 NOTE — PROGRESS NOTES
Progress Note  Kelli Fisher Patient Status:  Inpatient    1955 MRN [id number]   Location [facility] 4W/SW/SE Attending Gloria Sandoval MD   Hosp Day # 7 PCP Taylor Gallardo MD     Subjective:  Pt denies any chest pain. Has SOB still on O2 via NC    Objective:  BP 97/71 (BP Location: Right arm)   Pulse 83   Temp 97.4 °F (36.3 °C) (Oral)   Resp 20   Ht 5' (1.524 m)   Wt 167 lb (75.8 kg)   SpO2 100%   BMI 32.61 kg/m²     Telemetry: SR, PVC      Intake/Output:    Intake/Output Summary (Last 24 hours) at 2025 0905  Last data filed at 2025 0852  Gross per 24 hour   Intake 240 ml   Output 1140 ml   Net -900 ml       Last 3 Weights   25 0341 167 lb (75.8 kg)   25 0611 164 lb 10.9 oz (74.7 kg)   25 1600 156 lb 1.4 oz (70.8 kg)   25 0439 163 lb 8 oz (74.2 kg)   05/15/25 1243 154 lb 3.2 oz (69.9 kg)   05/15/25 0458 177 lb 11.2 oz (80.6 kg)   25 0645 166 lb 9.6 oz (75.6 kg)   25 0406 167 lb 9.6 oz (76 kg)   25 1736 165 lb 11.2 oz (75.2 kg)   25 1217 169 lb (76.7 kg)   25 0500 174 lb 9.6 oz (79.2 kg)   25 0549 175 lb 12.8 oz (79.7 kg)   25 1800 163 lb (73.9 kg)   25 2038 165 lb (74.8 kg)       Labs:  Recent Labs   Lab 25  0338 25  0723 25  0536   * 173* 157*   BUN 25* 29* 31*   CREATSERUM 0.99 1.16* 1.16*   EGFRCR 62 51* 51*   CA 8.8 8.4* 8.9    143 141   K 3.7  3.7 3.8  3.8 3.7  3.7    101 100   CO2 34.0* 34.0* 36.0*     Recent Labs   Lab 25  0338 25  0723 25  0536   RBC 3.44* 3.22* 3.01*   HGB 8.5* 8.1* 7.4*   HCT 28.3* 27.0* 25.2*   MCV 82.3 83.9 83.7   MCH 24.7* 25.2* 24.6*   MCHC 30.0* 30.0* 29.4*   RDW 20.8* 21.1* 21.1*   NEPRELIM 23.01* 14.42* 8.30*   WBC 28.6* 19.4* 12.7*   PLT 53.0* 60.0* 65.0*         Recent Labs   Lab 25  1619 25  0949 25  1530   TROPHS 5 5 5     Lab Results   Component Value Date    INR 1.33 (H) 2025    INR 1.28 (H)  04/11/2025       Diagnostics:       Review of Systems   Respiratory:  Positive for shortness of breath. Negative for cough, hemoptysis, sputum production and wheezing.    Cardiovascular:  Positive for leg swelling. Negative for chest pain, palpitations, orthopnea, claudication and PND.       Physical Exam:    Physical Exam  Vitals reviewed.   Constitutional:       General: She is not in acute distress.     Appearance: She is obese. She is not ill-appearing.   Neck:      Vascular: No JVD.   Cardiovascular:      Rate and Rhythm: Normal rate and regular rhythm.      Pulses: Normal pulses.      Heart sounds: No murmur heard.     No friction rub. No gallop.   Pulmonary:      Breath sounds: Decreased air movement present. Rhonchi and rales present.   Abdominal:      Palpations: Abdomen is soft.   Musculoskeletal:      Cervical back: Normal range of motion.      Right lower leg: Edema present.      Left lower leg: Edema present.   Neurological:      Mental Status: She is alert and oriented to person, place, and time.   Psychiatric:         Mood and Affect: Mood normal.         Medications:  Scheduled Medications[1]  Medication Infusions[2]    Assessment/Plan:      Paroxysmal atrial tachycardia/atrail fibrillation  - tele NSR   - on Po amiodarone   - avoid BB, CCB with h/o junctional rhythm n  - not on AC due to pancytopenia  - [id number] -3    Acute on chronic respiratory failure (multifactorial lung mets, anemia, pericardial effusion, PE)  - on 6L of O2 via high flow NC  - on Iv diuresis 40mg BID  - chest xray pulmonary edema slightly incrased, other wise unchanged cardiomegaly, pleural effusion  - net neg of 3L     Chest pain  - EKG without acute ischemic changes   - Trop neg x2  - suspects Gi etiology    Bilateral PE  - BLE venous doppler - nonocclusive DVT in the right superficial femoral, popliteal and posterior tibial veins  - not a candidate for AC with pancytopenia, s/p IVC filter 5/14    Acute anemia   - s/p prbc  4units,   - hgb of 7.4 today    Pancytopenia    Thrombocytopenia   - platelets improving     Pericardial effusion  - moderate  on echo from 5/13  - no evidence of hemodynamic compromise     Stage IV Endometrial cancer with mets to lung/lymph nodes    Thrush  - nystatin swish     Failure to thrive     Plan  - continue amiodarone  - chest xray from yesterday with pulmonary edema, continue with IV lasix 40mg BID  - electrolyte replacement per protocol  - further mgt per primary/obgyn/ hematology onc    Plan discussed with pt and RN     ABBIE Cruz  [facility]  5/19/2025      =======================================================  Note reviewed and labs reviewed.  Agree with above assessment and plan.  CXR with pulmonary edema and with ongoing O2 requirements (5-6L/min), continue IV diuresis, strict I/Os and closely monitor renal function/lytes.  Continue amiodarone for maintenance of SR.   I have personally performed the medical decision making in its entirety.   Javi Huerta,          [1]    potassium chloride  40 mEq Oral Once    pantoprazole  40 mg Oral QAM AC    vancomycin  1,000 mg Intravenous Q24H    furosemide  40 mg Intravenous BID (Diuretic)    amiodarone  400 mg Oral BID with meals    insulin aspart  1-5 Units Subcutaneous TID CC and HS    cefepime  1 g Intravenous Q12H    levothyroxine  100 mcg Oral Before breakfast    nystatin  5 mL Oral QID   [2]

## 2025-05-20 ENCOUNTER — APPOINTMENT (OUTPATIENT)
Dept: CV DIAGNOSTICS | Facility: HOSPITAL | Age: 70
DRG: 270 | End: 2025-05-20
Attending: INTERNAL MEDICINE
Payer: MEDICARE

## 2025-05-20 LAB
ANION GAP SERPL CALC-SCNC: 7 MMOL/L (ref 0–18)
BASOPHILS # BLD: 0 X10(3) UL (ref 0–0.2)
BASOPHILS NFR BLD: 0 %
BUN BLD-MCNC: 34 MG/DL (ref 9–23)
BUN/CREAT SERPL: 25.8 (ref 10–20)
CALCIUM BLD-MCNC: 8.5 MG/DL (ref 8.7–10.4)
CHLORIDE SERPL-SCNC: 99 MMOL/L (ref 98–112)
CO2 SERPL-SCNC: 35 MMOL/L (ref 21–32)
CREAT BLD-MCNC: 1.32 MG/DL (ref 0.55–1.02)
DEPRECATED RDW RBC AUTO: 63.5 FL (ref 35.1–46.3)
EGFRCR SERPLBLD CKD-EPI 2021: 44 ML/MIN/1.73M2 (ref 60–?)
EOSINOPHIL # BLD: 0 X10(3) UL (ref 0–0.7)
EOSINOPHIL NFR BLD: 0 %
ERYTHROCYTE [DISTWIDTH] IN BLOOD BY AUTOMATED COUNT: 21.2 % (ref 11–15)
GLUCOSE BLD-MCNC: 156 MG/DL (ref 70–99)
GLUCOSE BLDC GLUCOMTR-MCNC: 160 MG/DL (ref 70–99)
GLUCOSE BLDC GLUCOMTR-MCNC: 164 MG/DL (ref 70–99)
GLUCOSE BLDC GLUCOMTR-MCNC: 175 MG/DL (ref 70–99)
GLUCOSE BLDC GLUCOMTR-MCNC: 188 MG/DL (ref 70–99)
HCT VFR BLD AUTO: 24.3 % (ref 35–48)
HGB BLD-MCNC: 7.3 G/DL (ref 12–16)
LYMPHOCYTES NFR BLD: 0.77 X10(3) UL (ref 1–4)
LYMPHOCYTES NFR BLD: 7 %
MCH RBC QN AUTO: 24.8 PG (ref 26–34)
MCHC RBC AUTO-ENTMCNC: 30 G/DL (ref 31–37)
MCV RBC AUTO: 82.7 FL (ref 80–100)
METAMYELOCYTES # BLD: 0.44 X10(3) UL (ref ?–0.01)
METAMYELOCYTES NFR BLD: 4 %
MONOCYTES # BLD: 0.77 X10(3) UL (ref 0.1–1)
MONOCYTES NFR BLD: 7 %
MYELOCYTES # BLD: 0.11 X10(3) UL (ref ?–0.01)
MYELOCYTES NFR BLD: 1 %
NEUTROPHILS # BLD AUTO: 6.93 X10 (3) UL (ref 1.5–7.7)
NEUTROPHILS NFR BLD: 75 %
NEUTS BAND NFR BLD: 6 %
NEUTS HYPERSEG # BLD: 8.91 X10(3) UL (ref 1.5–7.7)
NRBC BLD MANUAL-RTO: 3 % (ref ?–1)
OSMOLALITY SERPL CALC.SUM OF ELEC: 303 MOSM/KG (ref 275–295)
PLATELET # BLD AUTO: 80 10(3)UL (ref 150–450)
PLATELET MORPHOLOGY: NORMAL
PLATELETS.RETICULATED NFR BLD AUTO: 11.3 % (ref 0–7)
POTASSIUM SERPL-SCNC: 3.7 MMOL/L (ref 3.5–5.1)
POTASSIUM SERPL-SCNC: 3.7 MMOL/L (ref 3.5–5.1)
RBC # BLD AUTO: 2.94 X10(6)UL (ref 3.8–5.3)
SODIUM SERPL-SCNC: 141 MMOL/L (ref 136–145)
TOTAL CELLS COUNTED BLD: 100
VANCOMYCIN TROUGH SERPL-MCNC: 15.9 UG/ML (ref 10–20)
WBC # BLD AUTO: 11 X10(3) UL (ref 4–11)

## 2025-05-20 PROCEDURE — 93325 DOPPLER ECHO COLOR FLOW MAPG: CPT | Performed by: INTERNAL MEDICINE

## 2025-05-20 PROCEDURE — 99233 SBSQ HOSP IP/OBS HIGH 50: CPT | Performed by: STUDENT IN AN ORGANIZED HEALTH CARE EDUCATION/TRAINING PROGRAM

## 2025-05-20 PROCEDURE — 99232 SBSQ HOSP IP/OBS MODERATE 35: CPT | Performed by: INTERNAL MEDICINE

## 2025-05-20 PROCEDURE — 93308 TTE F-UP OR LMTD: CPT | Performed by: INTERNAL MEDICINE

## 2025-05-20 PROCEDURE — 93321 DOPPLER ECHO F-UP/LMTD STD: CPT | Performed by: INTERNAL MEDICINE

## 2025-05-20 RX ORDER — POTASSIUM CHLORIDE 14.9 MG/ML
20 INJECTION INTRAVENOUS ONCE
Status: COMPLETED | OUTPATIENT
Start: 2025-05-20 | End: 2025-05-20

## 2025-05-20 NOTE — PLAN OF CARE
Kelli is A&Ox4. Denies pain. Vitals stable on 4-5L O2. Electrolytes replaced per protocol. Appetite low, although no complaints of nausea. Patient up to chair on shift, encouraged ambulation, but refused. S/P for transfers. Bmx1. Voiding freely. Daughter called and updated on plan of care from nursing standpoint, MD made aware to update family as well. Safety measures in place. Call light in reach.    Problem: Diabetes/Glucose Control  Goal: Glucose maintained within prescribed range  Description: INTERVENTIONS:- Monitor Blood Glucose as ordered- Assess for signs and symptoms of hyperglycemia and hypoglycemia- Administer ordered medications to maintain glucose within target range- Assess barriers to adequate nutritional intake and initiate nutrition consult as needed- Instruct patient on self management of diabetes  Outcome: Progressing     Problem: Patient Centered Care  Goal: Patient preferences are identified and integrated in the patient's plan of care  Description: Interventions:- What would you like us to know as we care for you?- Provide timely, complete, and accurate information to patient/family- Incorporate patient and family knowledge, values, beliefs, and cultural backgrounds into the planning and delivery of care- Encourage patient/family to participate in care and decision-making at the level they choose- Honor patient and family perspectives and choices  Outcome: Progressing     Problem: RISK FOR INFECTION - ADULT  Goal: Absence of fever/infection during anticipated neutropenic period  Description: INTERVENTIONS- Monitor WBC- Administer growth factors as ordered- Implement neutropenic guidelines  Outcome: Progressing     Problem: CARDIOVASCULAR - ADULT  Goal: Maintains optimal cardiac output and hemodynamic stability  Description: INTERVENTIONS:- Monitor vital signs, rhythm, and trends- Monitor for bleeding, hypotension and signs of decreased cardiac output- Evaluate effectiveness of vasoactive  medications to optimize hemodynamic stability- Monitor arterial and/or venous puncture sites for bleeding and/or hematoma- Assess quality of pulses, skin color and temperature- Assess for signs of decreased coronary artery perfusion - ex. Angina- Evaluate fluid balance, assess for edema, trend weights  Outcome: Progressing  Goal: Absence of cardiac arrhythmias or at baseline  Description: INTERVENTIONS:- Continuous cardiac monitoring, monitor vital signs, obtain 12 lead EKG if indicated- Evaluate effectiveness of antiarrhythmic and heart rate control medications as ordered- Initiate emergency measures for life threatening arrhythmias- Monitor electrolytes and administer replacement therapy as ordered  Outcome: Progressing     Problem: RESPIRATORY - ADULT  Goal: Achieves optimal ventilation and oxygenation  Description: INTERVENTIONS:- Assess for changes in respiratory status- Assess for changes in mentation and behavior- Position to facilitate oxygenation and minimize respiratory effort- Oxygen supplementation based on oxygen saturation or ABGs- Provide Smoking Cessation handout, if applicable- Encourage broncho-pulmonary hygiene including cough, deep breathe, Incentive Spirometry- Assess the need for suctioning and perform as needed- Assess and instruct to report SOB or any respiratory difficulty- Respiratory Therapy support as indicated- Manage/alleviate anxiety- Monitor for signs/symptoms of CO2 retention  Outcome: Progressing     Problem: SAFETY ADULT - FALL  Goal: Free from fall injury  Description: INTERVENTIONS:  - Assess pt frequently for physical needs  - Identify cognitive and physical deficits and behaviors that affect risk of falls.  - [place] fall precautions as indicated by assessment.  - Educate pt/family on patient safety including physical limitations  - Instruct pt to call for assistance with activity based on assessment  - Modify environment to reduce risk of injury  - Provide assistive devices  as appropriate  - Consider OT/PT consult to assist with strengthening/mobility  - Encourage toileting schedule  Outcome: Progressing     Problem: DISCHARGE PLANNING  Goal: Discharge to home or other facility with appropriate resources  Description: INTERVENTIONS:  - Identify barriers to discharge w/pt and caregiver  - Include patient/family/discharge partner in discharge planning  - Arrange for needed discharge resources and transportation as appropriate  - Identify discharge learning needs (meds, wound care, etc)  - Arrange for interpreters to assist at discharge as needed  - Consider post-discharge preferences of patient/family/discharge partner  - Complete POLST form as appropriate  - Assess patient's ability to be responsible for managing their own health  - Refer to Case Management Department for coordinating discharge planning if the patient needs post-hospital services based on physician/LIP order or complex needs related to functional status, cognitive ability or social support system  Outcome: Progressing     Problem: HEMATOLOGIC - ADULT  Goal: Free from bleeding injury  Description: (Example usage: patient with low platelets)  INTERVENTIONS:  - Avoid intramuscular injections, enemas and rectal medication administration  - Ensure safe mobilization of patient  - Hold pressure on venipuncture sites to achieve adequate hemostasis  - Assess for signs and symptoms of internal bleeding  - Monitor lab trends  - Patient is to report abnormal signs of bleeding to staff  - Avoid use of toothpicks and dental floss  - Use electric shaver for shaving  - Use soft bristle tooth brush  - Limit straining and forceful nose blowing  Outcome: Progressing

## 2025-05-20 NOTE — PLAN OF CARE
Patient is alert and oriented, can be forgetful at times. 4L NC. Nebulizer tx prn. Remote tele in place. ACHS accuchecks. IV abx continued via right port. Voiding via purewick. Denies pain. Call light and personal belongings within reach. Safety precautions in place.     Problem: Diabetes/Glucose Control  Goal: Glucose maintained within prescribed range  Description: INTERVENTIONS:- Monitor Blood Glucose as ordered- Assess for signs and symptoms of hyperglycemia and hypoglycemia- Administer ordered medications to maintain glucose within target range- Assess barriers to adequate nutritional intake and initiate nutrition consult as needed- Instruct patient on self management of diabetes  Outcome: Progressing     Problem: Patient Centered Care  Goal: Patient preferences are identified and integrated in the patient's plan of care  Description: Interventions: - Provide timely, complete, and accurate information to patient/family- Incorporate patient and family knowledge, values, beliefs, and cultural backgrounds into the planning and delivery of care- Encourage patient/family to participate in care and decision-making at the level they choose- Honor patient and family perspectives and choices  Outcome: Progressing     Problem: RISK FOR INFECTION - ADULT  Goal: Absence of fever/infection during anticipated neutropenic period  Description: INTERVENTIONS- Monitor WBC- Administer growth factors as ordered- Implement neutropenic guidelines  Outcome: Progressing     Problem: CARDIOVASCULAR - ADULT  Goal: Maintains optimal cardiac output and hemodynamic stability  Description: INTERVENTIONS:- Monitor vital signs, rhythm, and trends- Monitor for bleeding, hypotension and signs of decreased cardiac output- Evaluate effectiveness of vasoactive medications to optimize hemodynamic stability- Monitor arterial and/or venous puncture sites for bleeding and/or hematoma- Assess quality of pulses, skin color and temperature- Assess for  signs of decreased coronary artery perfusion - ex. Angina- Evaluate fluid balance, assess for edema, trend weights  Outcome: Progressing  Goal: Absence of cardiac arrhythmias or at baseline  Description: INTERVENTIONS:- Continuous cardiac monitoring, monitor vital signs, obtain 12 lead EKG if indicated- Evaluate effectiveness of antiarrhythmic and heart rate control medications as ordered- Initiate emergency measures for life threatening arrhythmias- Monitor electrolytes and administer replacement therapy as ordered  Outcome: Progressing     Problem: RESPIRATORY - ADULT  Goal: Achieves optimal ventilation and oxygenation  Description: INTERVENTIONS:- Assess for changes in respiratory status- Assess for changes in mentation and behavior- Position to facilitate oxygenation and minimize respiratory effort- Oxygen supplementation based on oxygen saturation or ABGs- Provide Smoking Cessation handout, if applicable- Encourage broncho-pulmonary hygiene including cough, deep breathe, Incentive Spirometry- Assess the need for suctioning and perform as needed- Assess and instruct to report SOB or any respiratory difficulty- Respiratory Therapy support as indicated- Manage/alleviate anxiety- Monitor for signs/symptoms of CO2 retention  Outcome: Progressing     Problem: SAFETY ADULT - FALL  Goal: Free from fall injury  Description: INTERVENTIONS:  - Assess pt frequently for physical needs  - Identify cognitive and physical deficits and behaviors that affect risk of falls.  - Purchase fall precautions as indicated by assessment.  - Educate pt/family on patient safety including physical limitations  - Instruct pt to call for assistance with activity based on assessment  - Modify environment to reduce risk of injury  - Provide assistive devices as appropriate  - Consider OT/PT consult to assist with strengthening/mobility  - Encourage toileting schedule  Outcome: Progressing     Problem: DISCHARGE PLANNING  Goal: Discharge to  home or other facility with appropriate resources  Description: INTERVENTIONS:  - Identify barriers to discharge w/pt and caregiver  - Include patient/family/discharge partner in discharge planning  - Arrange for needed discharge resources and transportation as appropriate  - Identify discharge learning needs (meds, wound care, etc)  - Arrange for interpreters to assist at discharge as needed  - Consider post-discharge preferences of patient/family/discharge partner  - Complete POLST form as appropriate  - Assess patient's ability to be responsible for managing their own health  - Refer to Case Management Department for coordinating discharge planning if the patient needs post-hospital services based on physician/LIP order or complex needs related to functional status, cognitive ability or social support system  Outcome: Progressing     Problem: HEMATOLOGIC - ADULT  Goal: Free from bleeding injury  Description: (Example usage: patient with low platelets)  INTERVENTIONS:  - Avoid intramuscular injections, enemas and rectal medication administration  - Ensure safe mobilization of patient  - Hold pressure on venipuncture sites to achieve adequate hemostasis  - Assess for signs and symptoms of internal bleeding  - Monitor lab trends  - Patient is to report abnormal signs of bleeding to staff  - Avoid use of toothpicks and dental floss  - Use electric shaver for shaving  - Use soft bristle tooth brush  - Limit straining and forceful nose blowing  Outcome: Progressing

## 2025-05-20 NOTE — OCCUPATIONAL THERAPY NOTE
OCCUPATIONAL THERAPY TREATMENT NOTE - INPATIENT        Room Number: 463/463-A     Presenting Problem: bilateral PE, RLE DVT s/p IVC     Problem List  Principal Problem:    Acute respiratory failure with hypoxia (HCC)  Active Problems:    Thrombocytopenia (HCC)    Azotemia    Pancytopenia (HCC)    Hyperglycemia    Atrial fibrillation with RVR (HCC)    Malnutrition (HCC)      OCCUPATIONAL THERAPY ASSESSMENT   Patient demonstrates limited progress this session, goals remain in progress.    Patient resistant to ambulation during session and only agreeable to engaging in seated exercises to promote functional reach. Pt is functioning below baseline as a result of the following impairments: decreased endurance, impaired sitting balance, decreased muscular endurance, and medical status.    Patient continues to function below baseline with toileting, bathing, upper body dressing, lower body dressing, grooming, eating, bed mobility, transfers, and dynamic sitting balance.  Next session anticipate patient to progress toileting, bathing, upper body dressing, lower body dressing, grooming, eating, bed mobility, transfers, and dynamic sitting balance.  Occupational Therapy will continue to follow patient for duration of hospitalization.    Patient continues to benefit from continued skilled OT services: to promote return to prior level of function and safety with continuous assistance and gradual rehabilitative therapy. Pending progress.    PLAN DURING HOSPITALIZATION  OT Device Recommendations: Shower chair  OT Treatment Plan: Balance activities, ADL training, IADL training, Functional transfer training, UE strengthening/ROM, Endurance training, Patient/Family training, Equipment eval/education, Compensatory technique education     SUBJECTIVE  \"I feel like I am choking when I do exercises\"    OBJECTIVE  Precautions: Bed/chair alarm    PAIN ASSESSMENT  Ratin    O2 SATURATIONS  Oxygen Therapy  SPO2% on Oxygen at Rest:  100  At rest oxygen flow (liters per minute): 6    ACTIVITIES OF DAILY LIVING ASSESSMENT  AM-PAC ‘6-Clicks’ Inpatient Daily Activity Short Form  How much help from another person does the patient currently need…  -   Putting on and taking off regular lower body clothing?: A Lot  -   Bathing (including washing, rinsing, drying)?: A Lot  -   Toileting, which includes using toilet, bedpan or urinal? : A Lot  -   Putting on and taking off regular upper body clothing?: A Little  -   Taking care of personal grooming such as brushing teeth?: A Little  -   Eating meals?: A Little    AM-PAC Score:  Score: 15  Approx Degree of Impairment: 56.46%  Standardized Score (AM-PAC Scale): 34.69  CMS Modifier (G-Code): CK    BALANCE ASSESSMENT  Dynamic Sitting: Supervision    Skilled Therapy Provided: Pt received in bedside chair resistant to ambulation agreeable to dynamic sitting balance exercises. Very lethargic and reports feeling tired after movement. Pt tolerated about 2 minutes of unsupported dynamic sitting exercises in prep for setup grooming ADLs before taking a break. Pt benefits from increased time and breaks in between tasks. O2 remains stable throughout session despite pt feeling increasingly lethargic (100 SpO2 on 6L of O2). ROM and strength WFL. Coordination tested; WFL. Pt left in bedside chair with needs met and call light within reach.    EDUCATION PROVIDED  Patient Education : Role of Occupational Therapy; Plan of Care; Discharge Recommendations; DME Recommendations; Functional Transfer Techniques; Fall Prevention; Posture/Positioning; Edema Reduction; Energy Conservation; Proper Body Mechanics  Patient's Response to Education: Verbalized Understanding; Returned Demonstration  Family/Caregiver Education : Role of Occupational Therapy; Plan of Care; Discharge Recommendations; DME Recommendations; Functional Transfer Techniques; Fall Prevention; Posture/Positioning; Edema Reduction; Energy Conservation; Proper Body  Mechanics  Family/Caregiver's Response to Education: Verbalized Understanding; Returned Demonstration    The patient's Approx Degree of Impairment: 56.46% has been calculated based on documentation in the [facility] '6 clicks' Inpatient Daily Activity Short Form.  Research supports that patients with this level of impairment may benefit from rehab.  Final disposition will be made by interdisciplinary medical team.    Patient End of Session: Up in chair, Needs met, Call light within reach, All patient questions and concerns addressed    OT Goals:    Patients self stated goal is: increased activity tolerance for ADLs      Patient will complete functional transfer with SPV  Comment: NT    Patient will complete toileting with SPV  Comment: NT    Patient will tolerate standing for 5 minutes in prep for adls with SPV   Comment: NT    Patient will complete LB dressing CGA  Comment: NT            Goals  on: 25  Frequency: 3-5x/wk    OT Session Time: 8 minutes  Therapeutic Activity: 8 minutes

## 2025-05-20 NOTE — SPIRITUAL CARE NOTE
Spiritual Care Visit Note    Patient Name: Kelli Fisher Date of Spiritual Care Visit: 25   : 1955 Primary Dx: Acute respiratory failure with hypoxia (HCC)       Referred By: Referral From:     Spiritual Care Taxonomy:    Intended Effects: Build relationship of care and support    Methods: Offer support    Interventions: Silent prayer    Visit Type/Summary:     - Spiritual Care: Attempted visit but patient was asleep in darkened room.  remains available as needed for follow up.    Spiritual Care support can be requested via an [facility] consult. For urgent/immediate needs, please contact the On Call  at: [place]: ext [phone]    Rev Yudi Gabriel MDiv

## 2025-05-20 NOTE — PROGRESS NOTES
[facility] Pharmacy Dosing Service      Follow Up Pharmacokinetic Consult for Vancomycin Dosing     Kelli Fisher is a 70 year old female who is receiving vancomycin therapy for neutropenic fever. Patient is on day 10 of vancomycin and is currently receiving 1000 mg IV every 24 hours. The current treatment and monitoring approach is non-AUC strategy.        Weight and Temperature:    Wt Readings from Last 1 Encounters:   25 75.8 kg (167 lb)         Temp Readings from Last 1 Encounters:   25 97.7 °F (36.5 °C) (Oral)      Labs:   Recent Labs   Lab 25  0338 25  0723 25  0536   CREATSERUM 0.99 1.16* 1.16*      Estimated Creatinine Clearance: 32.9 mL/min (A) (based on SCr of 1.16 mg/dL (H)).     Recent Labs   Lab 25  0723 25  0536 25  0548   WBC 19.4* 12.7* 11.0        Vancomycin Levels:  Lab Results   Component Value Date/Time    VANCT 15.9 2025 05:48 AM    VANCT 19.4 2025 06:16 PM       Corresponding 24 h-AUC: N/A     The Pharmacokinetic Target is:    Trough/random 10-15 mg/L    Renal Dosing Considerations:    MICHAEL/ARF     Assessment/Plan:   Maintenance Regimen: Adjust vancomycin to 1000 mg IP q36h    Monitorin) Plan for vancomycin trough to be obtained prior to 3rd dose    2) Pharmacy will order SCr as clinically indicated to assess renal function.    3) Pharmacy will monitor for toxicity and efficacy, adjust vancomycin dose and/or frequency, and order vancomycin levels as appropriate per the Pharmacy and Therapeutics Committee approved protocol until discontinuation of the medication.       We appreciate the opportunity to assist in the care of this patient.     Ok Packer, PharmD  2025  6:28 AM  [place]  Pharmacy Extension: [phone]

## 2025-05-20 NOTE — PROGRESS NOTES
Progress Note  Kelli Fisher Patient Status:  Inpatient    1955 MRN [id number]   Location [facility] 4W/SW/SE Attending Gloria Sandoval MD   Hosp Day # 8 PCP Taylor Gallardo MD     Subjective:  Pt sitting up in chair. Denies any palpations or SOB.     Objective:  BP 98/72 (BP Location: Right arm)   Pulse 92   Temp 97.8 °F (36.6 °C) (Oral)   Resp 20   Ht 5' (1.524 m)   Wt 167 lb (75.8 kg)   SpO2 100%   BMI 32.61 kg/m²     Telemetry: NSR      Intake/Output:    Intake/Output Summary (Last 24 hours) at 2025 1211  Last data filed at 2025 0536  Gross per 24 hour   Intake 100 ml   Output 1030 ml   Net -930 ml       Last 3 Weights   25 0341 167 lb (75.8 kg)   25 0611 164 lb 10.9 oz (74.7 kg)   25 1600 156 lb 1.4 oz (70.8 kg)   25 0439 163 lb 8 oz (74.2 kg)   05/15/25 1243 154 lb 3.2 oz (69.9 kg)   05/15/25 0458 177 lb 11.2 oz (80.6 kg)   25 0645 166 lb 9.6 oz (75.6 kg)   25 0406 167 lb 9.6 oz (76 kg)   25 1736 165 lb 11.2 oz (75.2 kg)   25 1217 169 lb (76.7 kg)   25 0500 174 lb 9.6 oz (79.2 kg)   25 0549 175 lb 12.8 oz (79.7 kg)   25 1800 163 lb (73.9 kg)   25 2038 165 lb (74.8 kg)       Labs:  Recent Labs   Lab 25  0723 25  0536 25  0548   * 157* 156*   BUN 29* 31* 34*   CREATSERUM 1.16* 1.16* 1.32*   EGFRCR 51* 51* 44*   CA 8.4* 8.9 8.5*    141 141   K 3.8  3.8 3.7  3.7 3.7  3.7    100 99   CO2 34.0* 36.0* 35.0*     Recent Labs   Lab 25  0723 25  0536 25  0548   RBC 3.22* 3.01* 2.94*   HGB 8.1* 7.4* 7.3*   HCT 27.0* 25.2* 24.3*   MCV 83.9 83.7 82.7   MCH 25.2* 24.6* 24.8*   MCHC 30.0* 29.4* 30.0*   RDW 21.1* 21.1* 21.2*   NEPRELIM 14.42* 8.30* 6.93   WBC 19.4* 12.7* 11.0   PLT 60.0* 65.0* 80.0*         Recent Labs   Lab 25  0949 25  1530   TROPHS 5 5     Lab Results   Component Value Date    INR 1.33 (H) 2025    INR 1.28 (H) 2025        Diagnostics:     Review of Systems   Respiratory: Negative.     Cardiovascular: Negative.          Physical Exam:    Physical Exam  Vitals reviewed.   Constitutional:       General: She is not in acute distress.     Appearance: She is obese. She is not ill-appearing.   Neck:      Vascular: No JVD.   Cardiovascular:      Rate and Rhythm: Normal rate and regular rhythm.      Pulses: Normal pulses.      Heart sounds: No murmur heard.     No friction rub. No gallop.   Pulmonary:      Breath sounds: Decreased air movement present. Rhonchi and rales present.   Abdominal:      Palpations: Abdomen is soft.   Musculoskeletal:      Cervical back: Normal range of motion.      Right lower leg: Edema present.      Left lower leg: Edema present.   Neurological:      Mental Status: She is alert and oriented to person, place, and time.   Psychiatric:         Mood and Affect: Mood normal.         Medications:  Scheduled Medications[1]  Medication Infusions[2]    Assessment/Plan:    Paroxysmal atrial tachycardia/atrail fibrillation  - tele NSR   - on Po amiodarone   - avoid BB, CCB with h/o junctional rhythm n  - not on AC due to pancytopenia  - [id number] -3     Acute on chronic respiratory failure (multifactorial lung mets, anemia, pericardial effusion, PE)  - on 6L of O2 via high flow NC  - on Iv diuresis 40mg BID  - chest xray pulmonary edema slightly incrased, other wise unchanged cardiomegaly, pleural effusion  - net neg of 3L      Chest pain  - EKG without acute ischemic changes   - Trop neg x2  - suspects Gi etiology     Bilateral PE  - BLE venous doppler - nonocclusive DVT in the right superficial femoral, popliteal and posterior tibial veins  - not a candidate for AC with pancytopenia, s/p IVC filter 5/14     Acute anemia   - s/p prbc 4units,   - hgb of 7.4 today     Pancytopenia     Thrombocytopenia   - platelets improving     Pericardial effusion  - moderate  on echo from 5/13  - no evidence of hemodynamic  compromise      Stage IV Endometrial cancer with mets to lung/lymph nodes     Thrush  - nystatin swish      Failure to thrive      Plan  - still on O2 5L via high flow NC, continue with IV diuresis, repeat echo to reevluate the pericardial effusion  - electrolyte replacement per protocol  - further recommendation pending echo result     Plan discussed with pt, daughter via phone and RN     ABBIE Cruz  [facility] [employer]  5/20/2025  12:11 PM    ===============================================  Agree with findings, assessment and plan as outlined above.   No Cp, ongoing O2 needs (5L/min)  Currently hemodynamically stable. Will follow-up on pericardial effusion and possible early tamponade. Discussed with pulmonology.   I have personally performed the medical decision making in its entirety.   Javi Huerta DO           [1]    vancomycin  1,000 mg Intravenous Q36H    [START ON 5/21/2025] cefepime  2 g Intravenous Q24H    potassium chloride  40 mEq Oral Once    pantoprazole  40 mg Oral QAM AC    furosemide  40 mg Intravenous BID (Diuretic)    amiodarone  400 mg Oral BID with meals    insulin aspart  1-5 Units Subcutaneous TID CC and HS    levothyroxine  100 mcg Oral Before breakfast    nystatin  5 mL Oral QID   [2]

## 2025-05-20 NOTE — PROGRESS NOTES
[facility]  part of [facility]    Progress Note      Assessment and Plan:   1.  Acute on chronic respiratory failure-multifactorial with significant burden of tumor in the chest but now recognized to have small volume of bilateral subsegmental PEs.  The patient has low platelets and is a poor candidate for full anticoagulation at this moment.    Lower extremity venous ultrasound demonstrated acute appearing nonocclusive DVT in the right superficial femoral popliteal and posterior tibial veins.  She got the IVC filter.  I again discussed issues with family at the bedside.    Recommendations:  1.  Conservative management.    2.  Metastatic endometrial carcinoma-to follow-up gynecologic oncology.    3.  Pericardial effusion-impressive on the CAT scan but reported as only moderate on echo without hemodynamic compromise.  I suspect this is malignant and may need to be drained at some time during the course of her care.    Recommendations: Will repeat the echocardiogram    4.  Recurrent episodes of atrial tachycardia-on amiodarone.    5.  Very small left pleural effusion-conservative management without tap at this juncture.  The chest x-ray has the appearance of a moderate left pleural effusion; however, this shadow largely represents extensive tumor involving the left midlung field and an associated large pericardial effusion.    Subjective:   Kelli Fisher is a(n) 70 year old female who is mildly dyspneic    Objective:   Blood pressure 105/70, pulse 84, temperature 98.3 °F (36.8 °C), temperature source Oral, resp. rate 20, height 5' (1.524 m), weight 167 lb (75.8 kg), SpO2 100%.    Physical Exam alert white female  HEENT examination is unremarkable with pupils equal round and reactive to light and accommodation.   Neck without adenopathy, thyromegaly, JVD nor bruit.   Lungs diminished bilaterally to auscultation and percussion.  Cardiac regular rate and rhythm no murmur.   Abdomen nontender, without  hepatosplenomegaly and no mass appreciable.   Extremities without clubbing cyanosis nor edema.   Neurologic grossly intact with symmetric tone and strength and reflex.  Skin without gross abnormality     Results:     Lab Results   Component Value Date    WBC 12.7 05/19/2025    HGB 7.4 05/19/2025    HCT 25.2 05/19/2025    PLT 65.0 05/19/2025    CREATSERUM 1.16 05/19/2025    BUN 31 05/19/2025     05/19/2025    K 3.7 05/19/2025    K 3.7 05/19/2025     05/19/2025    CO2 36.0 05/19/2025     05/19/2025    CA 8.9 05/19/2025    MG 2.2 05/19/2025      CT scan of the chest-Positive for small volume bilateral segmental PE      Enlarging left upper lobe consolidation and few bilateral pulmonary nodules      Enlarging pericardial effusion now 1.7 centimeter thick.  Increasing left pleural effusion, new right pleural effusion     Srinath Levy MD  Medical Director, Critical Care, [facility]  Medical Director, [facility]  Pager: [phone]

## 2025-05-20 NOTE — SLP NOTE
ADULT SWALLOWING RE EVALUATION    ASSESSMENT    ASSESSMENT/OVERALL IMPRESSION:  PPE REQUIRED. THIS THERAPIST WORE GLOVES AND MASK FOR DURATION OF EVALUATION. HANDS WASHED UPON ENTRANCE/EXIT.    SLP RE BSSE orders received and acknowledged. A swallow re evaluation warranted as pt \"reports she has trouble swallowing\". Pt afebrile with clear/weak vocal quality, on 5L/Min, with oxygen saturation at 99%. Pt with hx of dysphagia at [facility], VFSS 5/3/25 with recommendations for minced & moist/thin liquids.   Pt positioned 90 degrees in bedside chair, alert/cooperative/daughter on speaker phone. Pt with no complaints of pain. Oral motor examination revealed reduced strength, ROM, and rate of motion. Pt presented with trials of hard solids, puree, mildly thick and thin liquids via cup. Pt with adequate oral acceptance and bilabial seal across all trials. Pt with intact bite, prolonged/reduced mastication of solids, and delayed A-P transit. Pt's swallow response appears delayed with reduced hyolaryngeal elevation/excursion. No clinical signs of aspiration (e.g., immediate/delayed throat clear, immediate/delayed cough, wet vocal quality, increased O2 effort) observed across all trials. 5/18 CXR indicates \"Unchanged mild cardiomegaly. Interstitial pulmonary edema, appearing slightly increased from 05/15/2025. Unchanged moderate left pleural effusion associated basilar opacity. Unchanged trace right pleural effusion with minimal associated atelectasis Unchanged metastatic mediastinal/hilar adenopathy, better seen on cross-sectional imaging\". Oxygen status remained stable t/o the entire evaluation.     At this time, pt presents with mild oral dysphagia and probable pharyngeal dysfunction. Recommend a regular (pick softer foods) diet and thin liquids with strict adherence to safe swallowing compensatory strategies. Results and recommendations reviewed with RN, pt, and family. Pt/pt's family v/u to all results/recommendations.      PLAN: SLP to f/u x1-2 meal assessments, monitor imaging, and VFSS if clinically indicated       RECOMMENDATIONS   Diet Recommendations - Solids: Regular (pick softer foods)  Diet Recommendations - Liquids: Thin Liquids                    Compensatory Strategies Recommended: Alternate consistencies, Multiple swallows  Aspiration Precautions: Upright position, Slow rate, Small bites, Small sips, No straw  Medication Administration Recommendations:  (as tolerated)  Treatment Plan/Recommendations: Aspiration precautions    HISTORY   MEDICAL HISTORY  Reason for Referral:  (pt reports she has trouble swallowing)    Problem List  Principal Problem:    Acute respiratory failure with hypoxia (HCC)  Active Problems:    Thrombocytopenia (HCC)    Azotemia    Pancytopenia (HCC)    Hyperglycemia    Atrial fibrillation with RVR (HCC)    Malnutrition (HCC)      Past Medical History  Past Medical History[1]    Prior Living Situation: Home with support  Diet Prior to Admission: Unknown  Precautions: Aspiration    Patient/Family Goals: did not state    SWALLOWING HISTORY  Current Diet Consistency: Regular, Thin liquids  Dysphagia History: see above  Imaging Results: 5/13 CT CHEST  CONCLUSION:   Positive for small volume bilateral segmental PE   Enlarging left upper lobe consolidation and few bilateral pulmonary nodules   Enlarging pericardial effusion now 1.7 centimeter thick.  Increasing left pleural effusion, new right pleural effusion     SUBJECTIVE       OBJECTIVE   ORAL MOTOR EXAMINATION  Dentition: Functional  Symmetry: Within Functional Limits  Strength: Overall reduced     Range of Motion: Overall reduced  Rate of Motion: Reduced    Voice Quality: Clear  Respiratory Status: Supplemental O2, Nasal cannula  Consistencies Trialed: Thin liquids, Nectar thick liquids, Puree, Hard solid  Method of Presentation: Self presentation, Cup  Patient Positioned: Upright, Midline, Bedside chair    Oral Phase of Swallow: Impaired  Bolus  Retrieval: Intact  Bilabial Seal: Intact  Bolus Formation: Impaired  Bolus Propulsion: Impaired  Mastication: Impaired  Retention: Impaired    Pharyngeal Phase of Swallow: Appears Impaired  Laryngeal Elevation Timing: Appears impaired  Laryngeal Elevation Strength: Appears impaired  Laryngeal Elevation Coordination: Appears impaired  (Please note: Silent aspiration cannot be evaluated clinically. Videofluoroscopic Swallow Study is required to rule-out silent aspiration.)    Esophageal Phase of Swallow: No complaints consistent with possible esophageal involvement  Comments: NA          GOALS  Goal #1 The patient will tolerate regular (pick softer foods) consistency and thin liquids without overt signs or symptoms of aspiration with 100 % accuracy over 1-2 session(s).  In Progress   Goal #2 The patient/family/caregiver will demonstrate understanding and implementation of aspiration precautions and swallow strategies independently over 1-2 session(s).    In Progress   Goal #3 The patient will utilize compensatory strategies as outlined by  BSSE (clinical evaluation) including Slow rate, Small bites, Small sips, Multiple swallows, Alternate liquids/solids, No straws, Upright 90 degrees, Upright 90 degrees 30 mins after meal, Eliminate distractions with PRN assistance 100 % of the time across 1-2 sessions.  In Progress     FOLLOW UP  Treatment Plan/Recommendations: Aspiration precautions  Duration: 1 week  Follow Up Needed (Documentation Required): Yes  SLP Follow-up Date: 05/21/25    Thank you for your referral.   If you have any questions, please contact Leeanne Hay, SLP    Leeanne Hay M.S. CCC-SLP  Speech Language Pathologist  Phone Number Ext. [phone]         [1]   Past Medical History:   Asthma (HCC)    Esophageal reflux    History of blood transfusion    Visual impairment      details…

## 2025-05-20 NOTE — PROGRESS NOTES
[facility]  part of [facility]    Progress Note      Assessment and Plan:   1.  Acute on chronic respiratory failure-multifactorial with significant burden of tumor in the chest but now recognized to have small volume of bilateral subsegmental PEs.  The patient has low platelets and is a poor candidate for full anticoagulation at this moment.    Lower extremity venous ultrasound demonstrated acute appearing nonocclusive DVT in the right superficial femoral popliteal and posterior tibial veins.  She got the IVC filter.  Breathing about the same.    Recommendations:  1.  Conservative management.    2.  Metastatic endometrial carcinoma-to follow-up gynecologic oncology.    3.  Pericardial effusion-impressive on the CAT scan but reported as only moderate on echo without hemodynamic compromise.  I suspect this is malignant and may need to be drained at some time during the course of her care.  I discussed issues with cardiology and repeated the echo.    Recommendations: Await echo interpretation.    4.  Recurrent episodes of atrial tachycardia-on amiodarone.    5.  Very small left pleural effusion-conservative management without tap at this juncture.  The chest x-ray has the appearance of a moderate left pleural effusion; however, this shadow largely represents extensive tumor involving the left midlung field and an associated large pericardial effusion.    Subjective:   Kelli Fisher is a(n) 70 year old female who is mildly dyspneic    Objective:   Blood pressure 110/70, pulse 87, temperature 97.8 °F (36.6 °C), temperature source Oral, resp. rate 20, height 5' (1.524 m), weight 167 lb (75.8 kg), SpO2 99%.    Physical Exam alert white female  HEENT examination is unremarkable with pupils equal round and reactive to light and accommodation.   Neck without adenopathy, thyromegaly, JVD nor bruit.   Lungs diminished bilaterally to auscultation and percussion.  Cardiac regular rate and rhythm no murmur.    Abdomen nontender, without hepatosplenomegaly and no mass appreciable.   Extremities without clubbing cyanosis nor edema.   Neurologic grossly intact with symmetric tone and strength and reflex.  Skin without gross abnormality     Results:     Lab Results   Component Value Date    WBC 11.0 05/20/2025    HGB 7.3 05/20/2025    HCT 24.3 05/20/2025    PLT 80.0 05/20/2025    CREATSERUM 1.32 05/20/2025    BUN 34 05/20/2025     05/20/2025    K 3.7 05/20/2025    K 3.7 05/20/2025    CL 99 05/20/2025    CO2 35.0 05/20/2025     05/20/2025    CA 8.5 05/20/2025      CT scan of the chest-Positive for small volume bilateral segmental PE      Enlarging left upper lobe consolidation and few bilateral pulmonary nodules      Enlarging pericardial effusion now 1.7 centimeter thick.  Increasing left pleural effusion, new right pleural effusion     Srinath Levy MD  Medical Director, Critical Care, [facility]  Medical Director, [facility]  Pager: [phone]

## 2025-05-20 NOTE — PAYOR COMM NOTE
--------------  CONTINUED STAY REVIEW    Payor: [employer] MEDICARE ADV PPO  Subscriber #:  [id number]  Authorization Number: [id number]    Admit date: 5/12/25  Admit time:  5:28 PM    REVIEW DOCUMENTATION:  5/20        Date of Service: 5/20/2025  1:13 PM     Signed       Expand All Collapse All       Progress Note     Subjective:  S: Patient seen this morning, up to chair, still feeling weak with cough. At 5L NC.  Spoke with maurice Parra on phone in room.      Review of Systems:   10 point ROS completed and was negative, except for pertinent positive and negatives stated in subjective.     Objective:  Vital signs:  Temp:  [97.6 °F (36.4 °C)-98.3 °F (36.8 °C)] 97.8 °F (36.6 °C)  Pulse:  [83-92] 92  Resp:  [18-20] 20  BP: ()/(66-72) 98/72  SpO2:  [96 %-100 %] 100 %         Wt Readings from Last 6 Encounters:   05/19/25 167 lb (75.8 kg)   04/24/25 169 lb (76.7 kg)   05/05/25 174 lb 9.6 oz (79.2 kg)   04/14/25 169 lb (76.7 kg)   12/11/23 197 lb (89.4 kg)            Physical Exam:    General: No acute distress. Alert ,         Respiratory: Clear to auscultation bilaterally. No wheezes. No rhonchi.  Cardiovascular: S1, S2. Regular rate and rhythm. No murmurs, rubs or gallops.   Abdomen: Soft, nontender, nondistended.  Positive bowel sounds. No rebound or guarding.  Neurologic: No focal neurological deficits.   Musculoskeletal: Moves all extremities.  Extremities: No edema.     Results:  Diagnostic Data:       Labs:     Labs Last 24 Hours:                      BMP         CBC       Other      Na 141 Cl 99 BUN 34 Glu 156     Hb 7.3     PTT - Procal -    K 3.7; 3.7 CO2 35.0 Cr 1.32     WBC 11.0 >< PLT 80.0   INR - CRP -    Renal Lytes Endo       Hct 24.3     Trop - D dim -    eGFR - Ca 8.5 POC Gluc  164       LFT     pBNP - Lactic -    eGFR AA - PO4 - A1c -     AST - APk - Prot -   LDL -        Mg - TSH -     ALT - T makeda - Alb -             COVID-19 Lab Results     COVID-19        Lab Results   Component Value Date      COVID19 Not Detected 05/12/2025         Pro-Calcitonin  No results for input(s): \"PCT\" in the last 168 hours.     Cardiac  No results for input(s): \"TROP\", \"PBNP\" in the last 168 hours.        Creatinine Kinase  No results for input(s): \"CK\" in the last 168 hours.     Inflammatory Markers      Recent Labs   Lab 05/13/25  1512   DDIMER 3.05*         Imaging: Imaging data reviewed in Epic.     Medications: [Scheduled Medications]    [Scheduled Medications]   vancomycin  1,000 mg Intravenous Q36H    [START ON 5/21/2025] cefepime  2 g Intravenous Q24H    potassium chloride  40 mEq Oral Once    pantoprazole  40 mg Oral QAM AC    furosemide  40 mg Intravenous BID (Diuretic)    amiodarone  400 mg Oral BID with meals    insulin aspart  1-5 Units Subcutaneous TID CC and HS    levothyroxine  100 mcg Oral Before breakfast    nystatin  5 mL Oral QID        Assessment & Plan:  ASSESSMENT / PLAN:     Afib RVR  Pericardial effusion  - cardiology consulted  - IV amio --> now on oral. Avoiding BB, CCB with h/o junctional rhythm   - cont monitor on tele  - no anticoagulation for now due to pancytopenia   - on 5/16 experienced RVR again with hypotension, received digoxin converted to NSR. Remained  hypotensive so sent to stepdown unit. Responded to fluid bolus eventually.   - Transferred back to medical floor, normotensive rates controlled.  - CXR still with pulm edema, cont diuresis as tolerated.    - repeat ECHO ordered      BL PE: acute small segmental/right lower extremity DVT  S/p IVC 5/14/2025  Not on a/c due to thrombocytopenia     Metastatic endometrial cancer    Pancytopenia  Acute on chronic anemia of chronic disease  - s/p 1 unit prbc  - neutropenic precautions  - started neupogen until ANC 1500  - Plan is for further chemotherapy once recovered clinically per Dr. Herring.  - cefepime and vanco started for neutropenia and patient with cough/chills, elevated LA, possible early sepsis - pulm following for this as  well  -Pancytopenia improving, monitor daily  - Overall plan of care is to continue to treat medically over the next several days and monitor for improvement.  If patient declines may consider palliative conversations.  If she improves overall plan is to resume chemotherapy in the future as planned by Dr. Herring.  - Anemia noted , dropping hgb 8.1 --> 7.3 today. Transfuse for hgb <7, trend H/H. Will d/w gyn/onc Dr. 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 Cont lasix.        Thrush  - nystatin     Deconditioning  PT OT         Dispo: PT recommending EDUARDO, patient to discuss with family. Have previously refused     Spoke with maurice Parra on phone in room. Spoke extensively regarding different medical problems.  Offered palliative care to meet with patient and daughter, they said they would think about it.        MDM: High   I personally spent time on chart/note review, review of labs/imaging, discussion with patient, physical exam, discussion with staff, consultants, coordinating care, writing progress note, and discussion of plan of care.      Gloria Sandoval MD     Supplementary Documentation:                    MEDICATIONS ADMINISTERED IN LAST 1 DAY:  amiodarone (Pacerone) tab 400 mg       Date Action Dose Route User    5/20/2025 0815 Given 400 mg Oral Gertrudis Johnson RN    5/19/2025 1739 Given 400 mg Oral Lucrecia Jacques RN          ceFEPIme (Maxipime) 1 g in sodium chloride 0.9% 100mL IVPB-YANA       Date Action Dose Route User    5/20/2025 0536 New Bag 1 g Intravenous Argenis Bess RN    5/19/2025 1738 New Bag 1 g Intravenous Lucrecia Jacques RN          diphenhydrAMINE-zinc (Benadryl-Zinc) 2-0.1 % cream 1 Application       Date Action Dose Route User    5/19/2025 1552 Given 1 Application Topical Lucrecia Jacques, UNRULY          furosemide (Lasix) 10  mg/mL injection 40 mg       Date Action Dose Route User    5/20/2025 0815 Given 40 mg Intravenous Gertrudis Johnson RN    5/19/2025 1739 Given 40 mg Intravenous Lucrecia Jacques RN          insulin aspart (NovoLOG) 100 Units/mL FlexPen 1-5 Units       Date Action Dose Route User    5/20/2025 0956 Given 1 Units Subcutaneous (Right Upper Arm) Gertrudis Johnson RN    5/19/2025 1810 Given 1 Units Subcutaneous (Left Upper Arm) Lucrecia Jacques RN          ipratropium-albuterol (Duoneb) 0.5-2.5 (3) MG/3ML inhalation solution 3 mL       Date Action Dose Route User    5/20/2025 1049 Given 3 mL Nebulization Alungaparamibil Deacon Arroyo, [facility]    5/20/2025 0539 Given 3 mL Nebulization NatashaKatherine kwok, [facility]    5/19/2025 2228 Given 3 mL Nebulization CallCecilia cruz, [facility]          levothyroxine (Synthroid) tab 100 mcg       Date Action Dose Route User    5/20/2025 0534 Given 100 mcg Oral Argenis Bess RN          nystatin (Mycostatin) 034433 UNIT/ML oral suspension 500,000 Units       Date Action Dose Route User    5/20/2025 1439 Given 500,000 Units Oral Gertrudis Johnson RN    5/20/2025 0815 Given 500,000 Units Oral Gertrudis Johnson RN    5/19/2025 2156 Given 500,000 Units Oral Argenis Bess RN    5/19/2025 1738 Given 500,000 Units Oral Lucrecia Jacques RN          potassium chloride 20 mEq/100mL IVPB premix 20 mEq       Date Action Dose Route User    5/20/2025 0815 New Bag 20 mEq Intravenous Gertrudis Johnson RN            Vitals (last day)       Date/Time Temp Pulse Resp BP SpO2 Weight O2 Device O2 Flow Rate (L/min) Who    05/20/25 1204 97.8 °F (36.6 °C) -- 20 98/72 100 % -- Nasal cannula 5 L/min MJ    05/20/25 0815 97.6 °F (36.4 °C) 92 20 102/72 99 % -- Nasal cannula 5 L/min TJ    05/20/25 0508 97.7 °F (36.5 °C) 85 19 103/68 96 % -- Nasal cannula 4 L/min MM    05/19/25 2030 98.3 °F (36.8 °C) 86 20 103/71 98 % -- Nasal cannula 4 L/min MM    05/19/25 1737 98.3 °F (36.8 °C) 84 20 105/70 100 % -- Nasal cannula 4.5  L/min BP    05/19/25 1315 -- 83 18 100/66 100 % -- Nasal cannula 5 L/min BP    05/19/25 1103 98.3 °F (36.8 °C) 85 16 88/69 100 % -- Nasal cannula 5 L/min BS    05/19/25 0945 -- -- -- 93/73 -- -- -- -- BP    05/19/25 0828 97.4 °F (36.3 °C) 83 20 97/71 100 % -- High flow nasal cannula 6 L/min BP    05/19/25 0358 97.5 °F (36.4 °C) -- -- -- -- -- -- -- VT    05/19/25 0341 -- -- -- -- -- -- High flow nasal cannula 6 L/min VT    05/19/25 0341 -- -- 18 103/69 100 % 167 lb (75.8 kg) -- -- SH    05/19/25 0300 -- 79 -- -- -- -- -- -- GT          CIWA Scores (since admission)       None          Blood Transfusion Record       Product Unit Status Volume Start End            Transfuse RBC      25  [id number]  [id number] Completed 05/13/25 1431 335 mL 05/13/25 1144 05/13/25 1428                Transfuse platelets      25  [id number]  [id number] Completed 05/14/25 1319 200 mL 05/14/25 1116 05/14/25 1316

## 2025-05-20 NOTE — CM/SW NOTE
CM placed Medicaid wheelchair form on the chart. RN notified of need for hip width. MD notified of need to sign    Ref is pending w/ HME    Plan  pending    / to remain available for support and/or discharge planning.     Ashley Muñoz RN    Ext [phone]

## 2025-05-20 NOTE — PROGRESS NOTES
Progress Note     Kelli Fisher Patient Status:  Inpatient    1955 MRN [id number]   Location [facility] 4W/SW/SE Attending Golria Sandoval MD   Hosp Day # 8 PCP Taylor Gallardo MD     Subjective:   S: Patient seen this morning, up to chair, still feeling weak with cough. At 5L NC.  Spoke with dtr Fidel on phone in room.     Review of Systems:   10 point ROS completed and was negative, except for pertinent positive and negatives stated in subjective.    Objective:   Vital signs:  Temp:  [97.6 °F (36.4 °C)-98.3 °F (36.8 °C)] 97.8 °F (36.6 °C)  Pulse:  [83-92] 92  Resp:  [18-20] 20  BP: ()/(66-72) 98/72  SpO2:  [96 %-100 %] 100 %    Wt Readings from Last 6 Encounters:   25 167 lb (75.8 kg)   25 169 lb (76.7 kg)   25 174 lb 9.6 oz (79.2 kg)   25 169 lb (76.7 kg)   23 197 lb (89.4 kg)         Physical Exam:    General: No acute distress. Alert ,         Respiratory: Clear to auscultation bilaterally. No wheezes. No rhonchi.  Cardiovascular: S1, S2. Regular rate and rhythm. No murmurs, rubs or gallops.   Abdomen: Soft, nontender, nondistended.  Positive bowel sounds. No rebound or guarding.  Neurologic: No focal neurological deficits.   Musculoskeletal: Moves all extremities.  Extremities: No edema.    Results:   Diagnostic Data:      Labs:    Labs Last 24 Hours:   BMP     CBC    Other     Na 141 Cl 99 BUN 34 Glu 156   Hb 7.3   PTT - Procal -   K 3.7; 3.7 CO2 35.0 Cr 1.32   WBC 11.0 >< PLT 80.0  INR - CRP -   Renal Lytes Endo    Hct 24.3   Trop - D dim -   eGFR - Ca 8.5 POC Gluc  164    LFT   pBNP - Lactic -   eGFR AA - PO4 - A1c -   AST - APk - Prot -  LDL -     Mg - TSH -   ALT - T makeda - Alb -        COVID-19 Lab Results    COVID-19  Lab Results   Component Value Date    COVID19 Not Detected 2025       Pro-Calcitonin  No results for input(s): \"PCT\" in the last 168 hours.    Cardiac  No results for input(s): \"TROP\", \"PBNP\" in the last 168 hours.      Creatinine  Kinase  No results for input(s): \"CK\" in the last 168 hours.    Inflammatory Markers  Recent Labs   Lab 05/13/25  1512   DDIMER 3.05*       Imaging: Imaging data reviewed in Epic.    Medications: Scheduled Medications[1]    Assessment & Plan:   ASSESSMENT / PLAN:     Afib RVR  Pericardial effusion  - cardiology consulted  - IV amio --> now on oral. Avoiding BB, CCB with h/o junctional rhythm   - cont monitor on tele  - no anticoagulation for now due to pancytopenia   - on 5/16 experienced RVR again with hypotension, received digoxin converted to NSR. Remained  hypotensive so sent to stepdown unit. Responded to fluid bolus eventually.   - Transferred back to medical floor, normotensive rates controlled.  - CXR still with pulm edema, cont diuresis as tolerated.    - repeat ECHO ordered      BL PE: acute small segmental/right lower extremity DVT  S/p IVC 5/14/2025  Not on a/c due to thrombocytopenia     Metastatic endometrial cancer    Pancytopenia  Acute on chronic anemia of chronic disease  - s/p 1 unit prbc  - neutropenic precautions  - started neupogen until ANC 1500  - Plan is for further chemotherapy once recovered clinically per Dr. Herring.  - cefepime and vanco started for neutropenia and patient with cough/chills, elevated LA, possible early sepsis - pulm following for this as well  -Pancytopenia improving, monitor daily  - Overall plan of care is to continue to treat medically over the next several days and monitor for improvement.  If patient declines may consider palliative conversations.  If she improves overall plan is to resume chemotherapy in the future as planned by Dr. Herring.  - Anemia noted , dropping hgb 8.1 --> 7.3 today. Transfuse for hgb <7, trend H/H. Will d/w gyn/onc Dr. 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 Cont lasix.       Thrush  -  nystatin    Deconditioning  PT OT        Dispo: PT recommending EDUARDO, patient to discuss with family. Have previously refused    Spoke with maurice Parra on phone in room. Spoke extensively regarding different medical problems.  Offered palliative care to meet with patient and daughter, they said they would think about it.      MDM: High   I personally spent time on chart/note review, review of labs/imaging, discussion with patient, physical exam, discussion with staff, consultants, coordinating care, writing progress note, and discussion of plan of care.      Gloria Sandoval MD    Supplementary Documentation:                         [1]    vancomycin  1,000 mg Intravenous Q36H    [START ON 5/21/2025] cefepime  2 g Intravenous Q24H    potassium chloride  40 mEq Oral Once    pantoprazole  40 mg Oral QAM AC    furosemide  40 mg Intravenous BID (Diuretic)    amiodarone  400 mg Oral BID with meals    insulin aspart  1-5 Units Subcutaneous TID CC and HS    levothyroxine  100 mcg Oral Before breakfast    nystatin  5 mL Oral QID

## 2025-05-21 ENCOUNTER — ANESTHESIA (OUTPATIENT)
Dept: SURGERY | Facility: HOSPITAL | Age: 70
End: 2025-05-21
Payer: MEDICARE

## 2025-05-21 ENCOUNTER — APPOINTMENT (OUTPATIENT)
Dept: GENERAL RADIOLOGY | Facility: HOSPITAL | Age: 70
DRG: 270 | End: 2025-05-21
Attending: NURSE PRACTITIONER
Payer: MEDICARE

## 2025-05-21 ENCOUNTER — ANESTHESIA EVENT (OUTPATIENT)
Dept: SURGERY | Facility: HOSPITAL | Age: 70
End: 2025-05-21
Payer: MEDICARE

## 2025-05-21 LAB
ANTIBODY SCREEN: NEGATIVE
APTT PPP: 37 SECONDS (ref 23–36)
ATRIAL RATE: 82 BPM
BASE EXCESS BLD CALC-SCNC: 7.5 MMOL/L (ref ?–2)
BASE EXCESS BLD CALC-SCNC: 8.4 MMOL/L (ref ?–2)
BASE EXCESS BLDA CALC-SCNC: 7 MMOL/L (ref ?–30)
BASOPHILS # BLD: 0 X10(3) UL (ref 0–0.2)
BASOPHILS # BLD: 0.11 X10(3) UL (ref 0–0.2)
BASOPHILS NFR BLD: 0 %
BASOPHILS NFR BLD: 1 %
CA-I BLD-SCNC: 1.17 MMOL/L (ref 0.95–1.32)
CA-I BLD-SCNC: 1.2 MMOL/L (ref 0.95–1.32)
CA-I BLDA-SCNC: 1.22 MMOL/L (ref 1.12–1.32)
CO2 BLDA-SCNC: 35 MMOL/L (ref 22–32)
COHGB MFR BLD: 1.5 % (ref 0–3)
COHGB MFR BLD: 1.7 % (ref 0–3)
CREAT BLD-MCNC: 1.43 MG/DL (ref 0.55–1.02)
DEPRECATED RDW RBC AUTO: 62.2 FL (ref 35.1–46.3)
DEPRECATED RDW RBC AUTO: 63.6 FL (ref 35.1–46.3)
EGFRCR SERPLBLD CKD-EPI 2021: 40 ML/MIN/1.73M2 (ref 60–?)
EOSINOPHIL # BLD: 0 X10(3) UL (ref 0–0.7)
EOSINOPHIL # BLD: 0 X10(3) UL (ref 0–0.7)
EOSINOPHIL NFR BLD: 0 %
EOSINOPHIL NFR BLD: 0 %
ERYTHROCYTE [DISTWIDTH] IN BLOOD BY AUTOMATED COUNT: 20.4 % (ref 11–15)
ERYTHROCYTE [DISTWIDTH] IN BLOOD BY AUTOMATED COUNT: 21.2 % (ref 11–15)
GLUCOSE BLDA-MCNC: 145 MG/DL (ref 70–99)
GLUCOSE BLDC GLUCOMTR-MCNC: 128 MG/DL (ref 70–99)
GLUCOSE BLDC GLUCOMTR-MCNC: 131 MG/DL (ref 70–99)
GLUCOSE BLDC GLUCOMTR-MCNC: 140 MG/DL (ref 70–99)
HCO3 BLDA-SCNC: 30.8 MEQ/L (ref 21–27)
HCO3 BLDA-SCNC: 31.4 MEQ/L (ref 21–27)
HCO3 BLDA-SCNC: 32.7 MEQ/L (ref 22–26)
HCT VFR BLD AUTO: 23.5 % (ref 35–48)
HCT VFR BLD AUTO: 27.1 % (ref 35–48)
HCT VFR BLDA CALC: 27 % (ref 34–50)
HGB BLD-MCNC: 10.1 G/DL (ref 12–16)
HGB BLD-MCNC: 7.1 G/DL (ref 12–16)
HGB BLD-MCNC: 8.5 G/DL (ref 12–16)
HGB BLD-MCNC: 8.6 G/DL (ref 12–16)
HGB BLD-MCNC: 9.1 G/DL (ref 12–16)
INR BLD: 1.3 (ref 0.8–1.2)
LACTATE BLD-SCNC: 1 MMOL/L (ref 0.5–2)
LACTATE BLD-SCNC: 1.3 MMOL/L (ref 0.5–2)
LYMPHOCYTES NFR BLD: 0.76 X10(3) UL (ref 1–4)
LYMPHOCYTES NFR BLD: 1.64 X10(3) UL (ref 1–4)
LYMPHOCYTES NFR BLD: 13 %
LYMPHOCYTES NFR BLD: 7 %
MCH RBC QN AUTO: 24.9 PG (ref 26–34)
MCH RBC QN AUTO: 26.5 PG (ref 26–34)
MCHC RBC AUTO-ENTMCNC: 30.2 G/DL (ref 31–37)
MCHC RBC AUTO-ENTMCNC: 31.7 G/DL (ref 31–37)
MCV RBC AUTO: 82.5 FL (ref 80–100)
MCV RBC AUTO: 83.6 FL (ref 80–100)
METAMYELOCYTES # BLD: 0.25 X10(3) UL (ref ?–0.01)
METAMYELOCYTES # BLD: 0.65 X10(3) UL (ref ?–0.01)
METAMYELOCYTES NFR BLD: 2 %
METAMYELOCYTES NFR BLD: 6 %
METHGB MFR BLD: 1 % SAT (ref 0.4–1.5)
METHGB MFR BLD: 1 % SAT (ref 0.4–1.5)
MONOCYTES # BLD: 0.5 X10(3) UL (ref 0.1–1)
MONOCYTES # BLD: 0.65 X10(3) UL (ref 0.1–1)
MONOCYTES NFR BLD: 4 %
MONOCYTES NFR BLD: 6 %
MYELOCYTES # BLD: 0.88 X10(3) UL (ref ?–0.01)
MYELOCYTES NFR BLD: 7 %
NEUTROPHILS # BLD AUTO: 7.07 X10 (3) UL (ref 1.5–7.7)
NEUTROPHILS # BLD AUTO: 8.17 X10 (3) UL (ref 1.5–7.7)
NEUTROPHILS NFR BLD: 73 %
NEUTROPHILS NFR BLD: 75 %
NEUTS BAND NFR BLD: 1 %
NEUTS BAND NFR BLD: 5 %
NEUTS HYPERSEG # BLD: 8.72 X10(3) UL (ref 1.5–7.7)
NEUTS HYPERSEG # BLD: 9.32 X10(3) UL (ref 1.5–7.7)
NRBC BLD MANUAL-RTO: 1 % (ref ?–1)
O2 CT BLD-SCNC: 12 VOL% (ref 15–23)
O2 CT BLD-SCNC: 14 VOL% (ref 15–23)
O2/TOTAL GAS SETTING VFR VENT: 40 %
O2/TOTAL GAS SETTING VFR VENT: 50 %
P AXIS: 51 DEGREES
P-R INTERVAL: 146 MS
PCO2 BLDA: 44 MM HG (ref 35–45)
PCO2 BLDA: 48 MM HG (ref 35–45)
PCO2 BLDA: 60.5 MMHG (ref 35–45)
PEEP SETTING VENT: 5 CM H2O
PEEP SETTING VENT: 5 CM H2O
PH BLDA: 7.34 [PH] (ref 7.35–7.45)
PH BLDA: 7.45 [PH] (ref 7.35–7.45)
PH BLDA: 7.47 [PH] (ref 7.35–7.45)
PLATELET # BLD AUTO: 106 10(3)UL (ref 150–450)
PLATELET # BLD AUTO: 115 10(3)UL (ref 150–450)
PLATELET MORPHOLOGY: NORMAL
PLATELET MORPHOLOGY: NORMAL
PLATELETS.RETICULATED NFR BLD AUTO: 11 % (ref 0–7)
PLATELETS.RETICULATED NFR BLD AUTO: 11.3 % (ref 0–7)
PO2 BLDA: 129 MM HG (ref 80–100)
PO2 BLDA: 227 MMHG (ref 80–105)
PO2 BLDA: 67 MM HG (ref 80–100)
POTASSIUM BLD-SCNC: 3.4 MMOL/L (ref 3.6–5.1)
POTASSIUM BLD-SCNC: 3.6 MMOL/L (ref 3.6–5.1)
POTASSIUM SERPL-SCNC: 3.8 MMOL/L (ref 3.5–5.1)
PRESSURE SUPPORT SETTING VENT: 10 CM H2O
PROTHROMBIN TIME: 17 SECONDS (ref 11.6–14.8)
PUNCTURE CHARGE: NO
PUNCTURE CHARGE: NO
Q-T INTERVAL: 372 MS
Q-T INTERVAL: 392 MS
QRS DURATION: 82 MS
QRS DURATION: 84 MS
QTC CALCULATION (BEZET): 434 MS
QTC CALCULATION (BEZET): 523 MS
R AXIS: 45 DEGREES
R AXIS: 59 DEGREES
RBC # BLD AUTO: 2.85 X10(6)UL (ref 3.8–5.3)
RBC # BLD AUTO: 3.24 X10(6)UL (ref 3.8–5.3)
RESP RATE: 14 BPM
RH BLOOD TYPE: POSITIVE
SAO2 % BLDA: 100 % (ref 92–100)
SAO2 % BLDA: 95.8 % (ref 94–100)
SAO2 % BLDA: 98.9 % (ref 94–100)
SODIUM BLD-SCNC: 141 MMOL/L (ref 135–145)
SODIUM BLD-SCNC: 142 MMOL/L (ref 135–145)
SODIUM BLDA-SCNC: 140 MMOL/L (ref 136–145)
SODIUM BLDA-SCNC: 3.7 MMOL/L (ref 3.6–5.1)
SPECIMEN VOL 24H UR: 400 ML
T AXIS: -35 DEGREES
T AXIS: 5 DEGREES
TOTAL CELLS COUNTED BLD: 100
TOTAL CELLS COUNTED BLD: 100
VENTRICULAR RATE: 107 BPM
VENTRICULAR RATE: 82 BPM
WBC # BLD AUTO: 10.9 X10(3) UL (ref 4–11)
WBC # BLD AUTO: 12.6 X10(3) UL (ref 4–11)

## 2025-05-21 PROCEDURE — 99233 SBSQ HOSP IP/OBS HIGH 50: CPT | Performed by: INTERNAL MEDICINE

## 2025-05-21 PROCEDURE — 71045 X-RAY EXAM CHEST 1 VIEW: CPT | Performed by: NURSE PRACTITIONER

## 2025-05-21 PROCEDURE — 0W993ZZ DRAINAGE OF RIGHT PLEURAL CAVITY, PERCUTANEOUS APPROACH: ICD-10-PCS | Performed by: THORACIC SURGERY (CARDIOTHORACIC VASCULAR SURGERY)

## 2025-05-21 PROCEDURE — 0W9D0ZZ DRAINAGE OF PERICARDIAL CAVITY, OPEN APPROACH: ICD-10-PCS | Performed by: THORACIC SURGERY (CARDIOTHORACIC VASCULAR SURGERY)

## 2025-05-21 PROCEDURE — 99233 SBSQ HOSP IP/OBS HIGH 50: CPT | Performed by: STUDENT IN AN ORGANIZED HEALTH CARE EDUCATION/TRAINING PROGRAM

## 2025-05-21 RX ORDER — DEXMEDETOMIDINE HYDROCHLORIDE 4 UG/ML
INJECTION, SOLUTION INTRAVENOUS CONTINUOUS
Status: DISCONTINUED | OUTPATIENT
Start: 2025-05-21 | End: 2025-05-22

## 2025-05-21 RX ORDER — POLYETHYLENE GLYCOL 3350 17 G/17G
17 POWDER, FOR SOLUTION ORAL DAILY PRN
Status: DISCONTINUED | OUTPATIENT
Start: 2025-05-21 | End: 2025-06-05

## 2025-05-21 RX ORDER — MIDAZOLAM HYDROCHLORIDE 1 MG/ML
INJECTION INTRAMUSCULAR; INTRAVENOUS AS NEEDED
Status: DISCONTINUED | OUTPATIENT
Start: 2025-05-21 | End: 2025-05-21 | Stop reason: SURG

## 2025-05-21 RX ORDER — SODIUM CHLORIDE 9 MG/ML
INJECTION, SOLUTION INTRAVENOUS ONCE
Status: COMPLETED | OUTPATIENT
Start: 2025-05-21 | End: 2025-05-21

## 2025-05-21 RX ORDER — CHLORHEXIDINE GLUCONATE ORAL RINSE 1.2 MG/ML
15 SOLUTION DENTAL
Status: DISCONTINUED | OUTPATIENT
Start: 2025-05-21 | End: 2025-05-22

## 2025-05-21 RX ORDER — ACETAMINOPHEN 10 MG/ML
1000 INJECTION, SOLUTION INTRAVENOUS EVERY 6 HOURS
Status: DISPENSED | OUTPATIENT
Start: 2025-05-21 | End: 2025-05-22

## 2025-05-21 RX ORDER — MINERAL OIL AND PETROLATUM 150; 830 MG/G; MG/G
1 OINTMENT OPHTHALMIC NIGHTLY
Status: DISCONTINUED | OUTPATIENT
Start: 2025-05-21 | End: 2025-05-22

## 2025-05-21 RX ORDER — BUPIVACAINE HCL/EPINEPHRINE 0.25-.0005
VIAL (ML) INJECTION AS NEEDED
Status: DISCONTINUED | OUTPATIENT
Start: 2025-05-21 | End: 2025-05-21 | Stop reason: HOSPADM

## 2025-05-21 RX ORDER — CHLORHEXIDINE GLUCONATE 40 MG/ML
SOLUTION TOPICAL
Status: DISCONTINUED | OUTPATIENT
Start: 2025-05-21 | End: 2025-05-21

## 2025-05-21 RX ORDER — ROCURONIUM BROMIDE 10 MG/ML
INJECTION, SOLUTION INTRAVENOUS AS NEEDED
Status: DISCONTINUED | OUTPATIENT
Start: 2025-05-21 | End: 2025-05-21 | Stop reason: SURG

## 2025-05-21 RX ORDER — HYDROCODONE BITARTRATE AND ACETAMINOPHEN 5; 325 MG/1; MG/1
1 TABLET ORAL EVERY 6 HOURS PRN
Refills: 0 | Status: DISCONTINUED | OUTPATIENT
Start: 2025-05-21 | End: 2025-05-21

## 2025-05-21 RX ORDER — BISACODYL 10 MG
10 SUPPOSITORY, RECTAL RECTAL
Status: DISCONTINUED | OUTPATIENT
Start: 2025-05-21 | End: 2025-05-22

## 2025-05-21 RX ORDER — SODIUM CHLORIDE 9 MG/ML
INJECTION, SOLUTION INTRAVENOUS
Status: DISCONTINUED | OUTPATIENT
Start: 2025-05-21 | End: 2025-05-21 | Stop reason: HOSPADM

## 2025-05-21 RX ORDER — SENNOSIDES 8.8 MG/5ML
10 LIQUID ORAL 2 TIMES DAILY
Status: DISCONTINUED | OUTPATIENT
Start: 2025-05-21 | End: 2025-05-26

## 2025-05-21 RX ORDER — ACETAMINOPHEN 10 MG/ML
1000 INJECTION, SOLUTION INTRAVENOUS EVERY 6 HOURS PRN
Status: DISCONTINUED | OUTPATIENT
Start: 2025-05-21 | End: 2025-05-22

## 2025-05-21 RX ORDER — ECHINACEA PURPUREA EXTRACT 125 MG
1 TABLET ORAL
Status: DISCONTINUED | OUTPATIENT
Start: 2025-05-21 | End: 2025-05-21

## 2025-05-21 RX ORDER — EPHEDRINE SULFATE 50 MG/ML
INJECTION, SOLUTION INTRAVENOUS AS NEEDED
Status: DISCONTINUED | OUTPATIENT
Start: 2025-05-21 | End: 2025-05-21 | Stop reason: SURG

## 2025-05-21 RX ORDER — ACETAMINOPHEN 650 MG/1
650 SUPPOSITORY RECTAL EVERY 4 HOURS PRN
Status: DISCONTINUED | OUTPATIENT
Start: 2025-05-21 | End: 2025-05-30

## 2025-05-21 RX ORDER — TRAMADOL HYDROCHLORIDE 50 MG/1
50 TABLET ORAL EVERY 12 HOURS SCHEDULED
Status: DISCONTINUED | OUTPATIENT
Start: 2025-05-21 | End: 2025-05-26

## 2025-05-21 RX ORDER — BUDESONIDE 0.25 MG/2ML
INHALANT ORAL
Status: DISPENSED
Start: 2025-05-21 | End: 2025-05-22

## 2025-05-21 RX ORDER — ETOMIDATE 2 MG/ML
INJECTION INTRAVENOUS AS NEEDED
Status: DISCONTINUED | OUTPATIENT
Start: 2025-05-21 | End: 2025-05-21 | Stop reason: SURG

## 2025-05-21 RX ORDER — METOCLOPRAMIDE HYDROCHLORIDE 5 MG/ML
5 INJECTION INTRAMUSCULAR; INTRAVENOUS EVERY 8 HOURS PRN
Status: DISCONTINUED | OUTPATIENT
Start: 2025-05-21 | End: 2025-06-05

## 2025-05-21 RX ORDER — MORPHINE SULFATE 2 MG/ML
1 INJECTION, SOLUTION INTRAMUSCULAR; INTRAVENOUS EVERY 2 HOUR PRN
Status: DISCONTINUED | OUTPATIENT
Start: 2025-05-21 | End: 2025-06-05

## 2025-05-21 RX ORDER — MORPHINE SULFATE 2 MG/ML
2 INJECTION, SOLUTION INTRAMUSCULAR; INTRAVENOUS EVERY 2 HOUR PRN
Status: DISCONTINUED | OUTPATIENT
Start: 2025-05-21 | End: 2025-05-22

## 2025-05-21 RX ORDER — ACETAMINOPHEN 160 MG/5ML
650 SOLUTION ORAL EVERY 4 HOURS PRN
Status: DISCONTINUED | OUTPATIENT
Start: 2025-05-21 | End: 2025-05-30

## 2025-05-21 RX ORDER — POTASSIUM CHLORIDE 14.9 MG/ML
20 INJECTION INTRAVENOUS ONCE
Status: DISCONTINUED | OUTPATIENT
Start: 2025-05-21 | End: 2025-05-21

## 2025-05-21 RX ORDER — ONDANSETRON 2 MG/ML
4 INJECTION INTRAMUSCULAR; INTRAVENOUS EVERY 6 HOURS PRN
Status: DISCONTINUED | OUTPATIENT
Start: 2025-05-21 | End: 2025-06-05

## 2025-05-21 RX ORDER — ATROPINE SULFATE 1 MG/ML
INJECTION, SOLUTION INTRAMUSCULAR; INTRAVENOUS; SUBCUTANEOUS AS NEEDED
Status: DISCONTINUED | OUTPATIENT
Start: 2025-05-21 | End: 2025-05-21 | Stop reason: SURG

## 2025-05-21 RX ADMIN — MIDAZOLAM HYDROCHLORIDE 1 MG: 1 INJECTION INTRAMUSCULAR; INTRAVENOUS at 13:12:00

## 2025-05-21 RX ADMIN — EPHEDRINE SULFATE 10 MG: 50 INJECTION, SOLUTION INTRAVENOUS at 13:42:00

## 2025-05-21 RX ADMIN — ETOMIDATE 14 MG: 2 INJECTION INTRAVENOUS at 13:29:00

## 2025-05-21 RX ADMIN — EPHEDRINE SULFATE 10 MG: 50 INJECTION, SOLUTION INTRAVENOUS at 13:34:00

## 2025-05-21 RX ADMIN — ROCURONIUM BROMIDE 30 MG: 10 INJECTION, SOLUTION INTRAVENOUS at 13:38:00

## 2025-05-21 RX ADMIN — EPHEDRINE SULFATE 5 MG: 50 INJECTION, SOLUTION INTRAVENOUS at 13:26:00

## 2025-05-21 RX ADMIN — MIDAZOLAM HYDROCHLORIDE 1 MG: 1 INJECTION INTRAMUSCULAR; INTRAVENOUS at 13:04:00

## 2025-05-21 RX ADMIN — ATROPINE SULFATE 0.3 MG: 1 INJECTION, SOLUTION INTRAMUSCULAR; INTRAVENOUS; SUBCUTANEOUS at 13:41:00

## 2025-05-21 NOTE — PROGRESS NOTES
Progress Note  Kelli Fisher Patient Status:  Inpatient    1955 MRN [id number]   Location [facility] 4W/SW/SE Attending Gloria Sandoval MD   Hosp Day # 9 PCP Taylor Gallardo MD     Subjective:  Pt with SOB, stated she has some post nasal drips and feels like nose is clogged.     Objective:  BP 98/66 (BP Location: Right arm)   Pulse 86   Temp 97.7 °F (36.5 °C) (Oral)   Resp 22   Ht 5' (1.524 m)   Wt 167 lb (75.8 kg)   SpO2 100%   BMI 32.61 kg/m²     Telemetry: NSR       Intake/Output:    Intake/Output Summary (Last 24 hours) at 2025 0853  Last data filed at 2025 0542  Gross per 24 hour   Intake 100 ml   Output 1000 ml   Net -900 ml       Last 3 Weights   25 0341 167 lb (75.8 kg)   25 0611 164 lb 10.9 oz (74.7 kg)   25 1600 156 lb 1.4 oz (70.8 kg)   25 0439 163 lb 8 oz (74.2 kg)   05/15/25 1243 154 lb 3.2 oz (69.9 kg)   05/15/25 0458 177 lb 11.2 oz (80.6 kg)   25 0645 166 lb 9.6 oz (75.6 kg)   25 0406 167 lb 9.6 oz (76 kg)   25 1736 165 lb 11.2 oz (75.2 kg)   25 1217 169 lb (76.7 kg)   25 0500 174 lb 9.6 oz (79.2 kg)   25 0549 175 lb 12.8 oz (79.7 kg)   25 1800 163 lb (73.9 kg)   25 2038 165 lb (74.8 kg)       Labs:  Recent Labs   Lab 25  0723 25  0536 25  0548 25  0552   * 157* 156*  --    BUN 29* 31* 34*  --    CREATSERUM 1.16* 1.16* 1.32* 1.43*   EGFRCR 51* 51* 44* 40*   CA 8.4* 8.9 8.5*  --     141 141  --    K 3.8  3.8 3.7  3.7 3.7  3.7 3.8    100 99  --    CO2 34.0* 36.0* 35.0*  --      Recent Labs   Lab 25  0536 25  0548 25  0552   RBC 3.01* 2.94* 2.85*   HGB 7.4* 7.3* 7.1*   HCT 25.2* 24.3* 23.5*   MCV 83.7 82.7 82.5   MCH 24.6* 24.8* 24.9*   MCHC 29.4* 30.0* 30.2*   RDW 21.1* 21.2* 21.2*   NEPRELIM 8.30* 6.93 7.07   WBC 12.7* 11.0 10.9   PLT 65.0* 80.0* 106.0*         Recent Labs   Lab 25  0949 25  1530   TROPHS 5 5     Lab Results    Component Value Date    INR 1.33 (H) 04/29/2025    INR 1.28 (H) 04/11/2025       Diagnostics:     Echo from 5/20/25  1. Left ventricle: The cavity size was normal. Wall thickness was normal.      Systolic function was normal. The estimated ejection fraction was 55-60%,      by visual assessment. Unable to assess LV diastolic function.   2. Pericardium, extracardiac: A large, free-flowing pericardial effusion was      identified. There is RV collapse in early diastolic for less than 50% of      the cardiac cycle. There was evidence for mildly increased RV-LV      interaction demonstrated by respirophasic changes in transmitral      velocities (25% reduction in mitral valve e'wave velocity during      inspiration). There was a left pleural effusion.   3. Inferior vena cava: The IVC was dilated (23mm). Respirophasic diameter      changes are blunted (< 50%), consistent with elevated central venous      pressure.   Impressions:  This study is compared with previous dated 5/13/2025: The   pericardial effusion has slightly increased, now with maximal dimensions in   diastole of 20mm with evidence of early tamponade (25% reduction in mitral   valve inflow velocity and right ventricle free wall collapse in early   diastole).   Review of Systems   HENT:  Positive for congestion.    Respiratory:  Positive for shortness of breath. Negative for cough, hemoptysis, sputum production and wheezing.    Cardiovascular: Negative.  Negative for chest pain and palpitations.       Physical Exam:    Physical Exam  Vitals reviewed.   Constitutional:       General: She is not in acute distress.     Appearance: She is obese. She is not ill-appearing.   Neck:      Vascular: No JVD.   Cardiovascular:      Rate and Rhythm: Normal rate and regular rhythm.      Pulses: Decreased pulses.      Heart sounds: S1 normal and S2 normal. No murmur heard.     No friction rub. No gallop.   Pulmonary:      Effort: Accessory muscle usage present.       Breath sounds: Decreased air movement present.   Abdominal:      Palpations: Abdomen is soft.   Musculoskeletal:      Cervical back: Normal range of motion.      Right lower le+ Edema present.      Left lower le+ Edema present.   Neurological:      Mental Status: She is alert and oriented to person, place, and time.   Psychiatric:         Mood and Affect: Mood normal.         Medications:  Scheduled Medications[1]  Medication Infusions[2]    Assessment/Plan:    Paroxysmal atrial tachycardia/atrail fibrillation  - tele NSR   - on Po amiodarone   - avoid BB, CCB with h/o junctional rhythm n  - not on AC due to pancytopenia  - [id number] -3     Acute on chronic respiratory failure (multifactorial lung mets, anemia, pericardial effusion, PE)  - on 6L of O2 via high flow NC  -s/p Iv diuresis 40mg BID  - chest xray pulmonary edema slightly incrased, other wise unchanged cardiomegaly, pleural effusion  - net neg of 4.4L      Chest pain  - EKG without acute ischemic changes   - Trop neg x2  - suspects Gi etiology     Bilateral PE  - BLE venous doppler - nonocclusive DVT in the right superficial femoral, popliteal and posterior tibial veins  - not a candidate for AC with pancytopenia, s/p IVC filter      Acute anemia   - s/p prbc 4units,   - hgb of 7.1today     Pancytopenia     Thrombocytopenia   - platelets improving     Pericardial effusion  - moderate  on echo from   - repeat Echo from yesterday with large free flowing pericardial effusion, with RV collapse in early diastolic , 25% reduction in mitral valve e'wave velocity during inspiration, left pleural effusion.      Stage IV Endometrial cancer with mets to lung/lymph nodes     Thrush  - nystatin swish      Failure to thrive      Plan  - repeat echo from yesterday with increased pericardial effusion. Plan for possible pericardial window with drain placement   - NPO   - will transfer pt to PCU for close monitoring   - Hbg of 7.1 today, will transfuse  1unit of blood.     Plan discussed with Dr Huerta, cardiothoracic surgery, RN and family     Brittney ABBIE Schafer  [facility]  5/21/2025  8:53 AM    ===============================================  Seen/examined patient independently.  Agree with findings, assessment and plan as outlined above.   TTE reviewed closely, pt with clearly early signs of tamponade  Discussed with patient and family, agreeable to window given high likelihood of recurrence in the setting of metastatic pericardial/pleural effusions  Transfer to unit prior to window.   I have personally performed the medical decision making in its entirety.   Javi Huerta,          [1]    chlorhexidine   Topical On Call    sodium chloride   Intravenous On Call    vancomycin  1,000 mg Intravenous Q36H    cefepime  2 g Intravenous Q24H    potassium chloride  40 mEq Oral Once    pantoprazole  40 mg Oral QAM AC    amiodarone  400 mg Oral BID with meals    insulin aspart  1-5 Units Subcutaneous TID CC and HS    levothyroxine  100 mcg Oral Before breakfast    nystatin  5 mL Oral QID   [2]

## 2025-05-21 NOTE — PROGRESS NOTES
Progress Note     Kelli Fisher Patient Status:  Inpatient    1955 MRN [id number]   Location [facility] 4W/SW/SE Attending Gloria Sandoval MD   Hosp Day # 9 PCP Taylor Gallardo MD     Subjective:   S: Patient seen this morning, family at bedside, awaiting cardiac procedure.    Review of Systems:   10 point ROS completed and was negative, except for pertinent positive and negatives stated in subjective.    Objective:   Vital signs:  Temp:  [97.7 °F (36.5 °C)-98.4 °F (36.9 °C)] 98 °F (36.7 °C)  Pulse:  [] 85  Resp:  [18-22] 22  BP: ()/(64-75) 113/75  SpO2:  [97 %-100 %] 98 %  FiO2 (%):  [30 %-50 %] 50 %    Wt Readings from Last 6 Encounters:   25 167 lb (75.8 kg)   25 169 lb (76.7 kg)   25 174 lb 9.6 oz (79.2 kg)   25 169 lb (76.7 kg)   23 197 lb (89.4 kg)         Physical Exam:    General: No acute distress. Alert , Intubated and sedated,       Respiratory: Clear to auscultation bilaterally. No wheezes. No rhonchi.  Cardiovascular: S1, S2. Regular rate and rhythm. No murmurs, rubs or gallops.   Abdomen: Soft, nontender, nondistended.  Positive bowel sounds. No rebound or guarding.  Neurologic: No focal neurological deficits.   Musculoskeletal: Moves all extremities.  Extremities: No edema.    Results:   Diagnostic Data:      Labs:    Labs Last 24 Hours:   BMP     CBC    Other     Na - Cl - BUN - Glu -   Hb 8.5; 8.6   PTT 37.0 Procal -   K 3.8 CO2 - Cr 1.43   WBC 12.6 >< .0  INR 1.30 CRP -   Renal Lytes Endo    Hct 27.1   Trop - D dim -   eGFR - Ca - POC Gluc  131    LFT   pBNP - Lactic -   eGFR AA - PO4 - A1c -   AST - APk - Prot -  LDL -     Mg - TSH -   ALT - T makeda - Alb -        COVID-19 Lab Results    COVID-19  Lab Results   Component Value Date    COV Not Detected 2025       Pro-Calcitonin  No results for input(s): \"PCT\" in the last 168 hours.    Cardiac  No results for input(s): \"TROP\", \"PBNP\" in the last 168 hours.      Creatinine  Kinase  No results for input(s): \"CK\" in the last 168 hours.    Inflammatory Markers  No results for input(s): \"CRP\", \"ORVILLE\", \"LDH\", \"DDIMER\" in the last 168 hours.      Imaging: Imaging data reviewed in Epic.    Medications: Scheduled Medications[1]    Assessment & Plan:   ASSESSMENT / PLAN:     Afib RVR  Pericardial effusion  - cardiology consulted  - IV amio --> now on oral. Avoiding BB, CCB with h/o junctional rhythm   - cont monitor on tele  - no anticoagulation for now due to pancytopenia   - on 5/16 experienced RVR again with hypotension, received digoxin converted to NSR. Remained  hypotensive so sent to stepdown unit. Responded to fluid bolus eventually.   - Transferred back to medical floor, normotensive rates controlled.  - CXR still with pulm edema, cont diuresis as tolerated.    - repeat ECHO showing large pericardial effusion with concern for tamponade. Cardiology planning pericardial window today       BL PE: acute small segmental/right lower extremity DVT  S/p IVC 5/14/2025  Not on a/c due to thrombocytopenia     Metastatic endometrial cancer    Pancytopenia  Acute on chronic anemia of chronic disease  - s/p 1 unit prbc  - neutropenic precautions  - started neupogen until ANC 1500  - Plan is for further chemotherapy once recovered clinically per Dr. Herring.  - cefepime and vanco started for neutropenia and patient with cough/chills, elevated LA, possible early sepsis - pulm following for this as well  -Pancytopenia improving, monitor daily  - Overall plan of care is to continue to treat medically over the next several days and monitor for improvement.  If patient declines may consider palliative conversations.  If she improves overall plan is to resume chemotherapy in the future as planned by Dr. Herring.  - Anemia noted , dropping hgb 8.1 --> 7.1 today. Transfuse 1U prbc in preparation for cardiac procedure     Acute on chronic respiratory failure  - due to 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,  pulm edema   - on 4-5L NC baseline  - pulmonary following, weaning oxygen as tolerated. Cont lasix.       Thrush  - nystatin    Deconditioning  PT OT        Dispo: PT recommending EDUARDO, patient to discuss with family. Have previously refused        MDM: High   I personally spent time on chart/note review, review of labs/imaging, discussion with patient, physical exam, discussion with staff, consultants, coordinating care, writing progress note, and discussion of plan of care.      Gloria Sandoval MD    Supplementary Documentation:                         [1]    acetaminophen  1,000 mg Intravenous Q6H    traMADol  50 mg Oral 2 times per day    sodium chloride  250 mL Intravenous Once    senna  10 mL Per NG Tube BID    docusate  100 mg Per NG Tube BID    chlorhexidine gluconate  15 mL Mouth/Throat BID@0800,2000    mineral oil-white petrolatum  1 Application Both Eyes Nightly    [START ON 5/22/2025] pantoprazole  40 mg Intravenous Daily    vancomycin  1,000 mg Intravenous Q36H    amiodarone  400 mg Oral BID with meals    [Held by provider] insulin aspart  1-5 Units Subcutaneous TID CC and HS    levothyroxine  100 mcg Oral Before breakfast

## 2025-05-21 NOTE — PROGRESS NOTES
05/21/25 1542   Vent Information   Vent Mode VC/AC   Settings   FiO2 (%) 50 %   Resp Rate (Set) 14   Vt (Set, mL) 400 mL   Waveform Decelerating ramp   PEEP/CPAP (cm H2O) 5 cm H20     attempted on SBT PS5/peep5 FIO2 40%. However pt was pulling low VT. Increased pressure support to 10, with slight improvement in Vt 200-300ml however, pt with elevated RSBI 162. Dr. Perales notified and patient placed back on full vent support

## 2025-05-21 NOTE — PLAN OF CARE
Patient is alert and oriented, can be forgetful at times. 4.5L NC. Remote tele in place. Vital signs stable. Incontinent of urine and stool. X1 BM overnight. Voiding via purewick. Prn tylenol given for pain. IV abx continued via right chest port. Repositioned as tolerated. Call light and personal belongings within reach. Safety precautions in place.     Problem: Diabetes/Glucose Control  Goal: Glucose maintained within prescribed range  Description: INTERVENTIONS:- Monitor Blood Glucose as ordered- Assess for signs and symptoms of hyperglycemia and hypoglycemia- Administer ordered medications to maintain glucose within target range- Assess barriers to adequate nutritional intake and initiate nutrition consult as needed- Instruct patient on self management of diabetes  Outcome: Progressing     Problem: Patient Centered Care  Goal: Patient preferences are identified and integrated in the patient's plan of care  Description: Interventions: - Provide timely, complete, and accurate information to patient/family- Incorporate patient and family knowledge, values, beliefs, and cultural backgrounds into the planning and delivery of care- Encourage patient/family to participate in care and decision-making at the level they choose- Honor patient and family perspectives and choices  Outcome: Progressing     Problem: RISK FOR INFECTION - ADULT  Goal: Absence of fever/infection during anticipated neutropenic period  Description: INTERVENTIONS- Monitor WBC- Administer growth factors as ordered- Implement neutropenic guidelines  Outcome: Progressing     Problem: CARDIOVASCULAR - ADULT  Goal: Maintains optimal cardiac output and hemodynamic stability  Description: INTERVENTIONS:- Monitor vital signs, rhythm, and trends- Monitor for bleeding, hypotension and signs of decreased cardiac output- Evaluate effectiveness of vasoactive medications to optimize hemodynamic stability- Monitor arterial and/or venous puncture sites for bleeding  and/or hematoma- Assess quality of pulses, skin color and temperature- Assess for signs of decreased coronary artery perfusion - ex. Angina- Evaluate fluid balance, assess for edema, trend weights  Outcome: Progressing  Goal: Absence of cardiac arrhythmias or at baseline  Description: INTERVENTIONS:- Continuous cardiac monitoring, monitor vital signs, obtain 12 lead EKG if indicated- Evaluate effectiveness of antiarrhythmic and heart rate control medications as ordered- Initiate emergency measures for life threatening arrhythmias- Monitor electrolytes and administer replacement therapy as ordered  Outcome: Progressing     Problem: RESPIRATORY - ADULT  Goal: Achieves optimal ventilation and oxygenation  Description: INTERVENTIONS:- Assess for changes in respiratory status- Assess for changes in mentation and behavior- Position to facilitate oxygenation and minimize respiratory effort- Oxygen supplementation based on oxygen saturation or ABGs- Provide Smoking Cessation handout, if applicable- Encourage broncho-pulmonary hygiene including cough, deep breathe, Incentive Spirometry- Assess the need for suctioning and perform as needed- Assess and instruct to report SOB or any respiratory difficulty- Respiratory Therapy support as indicated- Manage/alleviate anxiety- Monitor for signs/symptoms of CO2 retention  Outcome: Progressing     Problem: SAFETY ADULT - FALL  Goal: Free from fall injury  Description: INTERVENTIONS:  - Assess pt frequently for physical needs  - Identify cognitive and physical deficits and behaviors that affect risk of falls.  - [place] fall precautions as indicated by assessment.  - Educate pt/family on patient safety including physical limitations  - Instruct pt to call for assistance with activity based on assessment  - Modify environment to reduce risk of injury  - Provide assistive devices as appropriate  - Consider OT/PT consult to assist with strengthening/mobility  - Encourage toileting  schedule  Outcome: Progressing     Problem: DISCHARGE PLANNING  Goal: Discharge to home or other facility with appropriate resources  Description: INTERVENTIONS:  - Identify barriers to discharge w/pt and caregiver  - Include patient/family/discharge partner in discharge planning  - Arrange for needed discharge resources and transportation as appropriate  - Identify discharge learning needs (meds, wound care, etc)  - Arrange for interpreters to assist at discharge as needed  - Consider post-discharge preferences of patient/family/discharge partner  - Complete POLST form as appropriate  - Assess patient's ability to be responsible for managing their own health  - Refer to Case Management Department for coordinating discharge planning if the patient needs post-hospital services based on physician/LIP order or complex needs related to functional status, cognitive ability or social support system  Outcome: Progressing     Problem: HEMATOLOGIC - ADULT  Goal: Free from bleeding injury  Description: (Example usage: patient with low platelets)  INTERVENTIONS:  - Avoid intramuscular injections, enemas and rectal medication administration  - Ensure safe mobilization of patient  - Hold pressure on venipuncture sites to achieve adequate hemostasis  - Assess for signs and symptoms of internal bleeding  - Monitor lab trends  - Patient is to report abnormal signs of bleeding to staff  - Avoid use of toothpicks and dental floss  - Use electric shaver for shaving  - Use soft bristle tooth brush  - Limit straining and forceful nose blowing  Outcome: Progressing

## 2025-05-21 NOTE — ANESTHESIA POSTPROCEDURE EVALUATION
Patient: Kelli Fisher    Procedure Summary       Date: 05/21/25 Room / Location: [facility] MAIN OR  / [facility] MAIN OR    Anesthesia Start: 1301 Anesthesia Stop: 1426    Procedure: SUBXIPHOID PERICARDIAL WINDOW AND DRAINAGE Diagnosis:       Pericardial effusion (HCC)      (Pericardial effusion (HCC) [I31.39])    Surgeons: Bebo Yu MD Anesthesiologist: Neema Lezama MD    Anesthesia Type: general ASA Status: 4 - Emergent            Anesthesia Type: general    Vitals Value Taken Time   /75 05/21/25 14:26   Temp 98 °F (36.7 °C) 05/21/25 14:26   Pulse 85 05/21/25 14:26   Resp 22 05/21/25 14:26   SpO2 98 % 05/21/25 14:26       EM AN Post Evaluation:   Patient Evaluated in ICU  Patient Participation: complete - patient participated  Pain Score: 0  Airway Patency:patent  Dental exam unchanged from preop  Yes    Nausea/Vomiting: none  Cardiovascular Status: stable  Respiratory Status: ETT  Postoperative Hydration acceptable  Comments: Signout to ICU RN    Patient intubated, awake on pressure support. Not pulling good enough tidal volumes for extubation ususanat      Neema Lezama MD  5/21/2025 2:26 PM

## 2025-05-21 NOTE — OPERATIVE REPORT
Operative Note    Patient Name: Kelli Fisher    Preoperative Diagnosis: Pericardial effusion (HCC) [I31.39]    Postoperative Diagnosis: Pericardial effusion (HCC) [I31.39]    Surgeons and Role:     * Bebo Yu MD - Primary     Assistant: PA: Lucrecia Edwards PA    Procedures:   Urgent subxiphoid pericardial window  Insertion of right pleural chest tube    Indication:  70-year-old female history of metastatic uterine cancer admitted to the hospital almost 10 days ago now with atrial fibrillation and acute respiratory failure found to have pulmonary embolisms and DVT.  She had a pericardial effusion upon presentation but since the last 10 days, it has enlarged and now there is concern for tamponade physiology.  Urgent window creation and fluid removal is indicated.  I discussed the patient and family alternatives, benefits and risks of surgery, they consents for surgery today.    Surgical Findings:   500 mL straw-colored pericardial fluid evacuated, no residual pericardial effusion on transesophageal echocardiogram  Insertion of right pleural chest tube    Description of procedure  Informed consent obtained.  Patient taken the operating room.  .  Lines were placed by anesthesia.  Patient was then positioned shaved prepped and draped in the usual sterile fashion.  Timeout performed verifying patient and procedure.  I was present for the induction with the patient fully prepped and draped..  Anesthesia was induced without hemodynamic compromise.  5 cm incision made centered over the tip of the xiphoid process.  Dissection carried down and linea alba was divided in the midline.  Xiphoid process was completely dissected out and removed.  Epicardial fat was cleared and the glistening pericardium was visualized. 15 blade was used to incise the pericardium.  Pericardium was thick.  Entry was gained to the pericardial space and thin straw-colored fluid was removed.  No intrapericardial adhesions seen.  Patient did become  a little bradycardic while suctioning of the fluid but responded nicely to atropine.  Fluid was sent for studies.  A large 3 x 3 cm [place] of pericardium was then excised and sent for pathology.  The pericardial fluid was suctioned out its entirety, 500 mL.  While creating a window, the right pleural space was entered, atraumatically to the lung as the lung was visualized and healthy with no evidence of injury.  In addition, while creating the window, there was some bulky mediastinal fat with some lymph nodes inside therefore I sent this for pathology as well.    There is no evidence of injury to the heart at the entry site. 24 [place] Nathaniel drain was then tunneled and placed in the pericardial space.  A 28 [place] right angle chest was then placed in the right pleural space.  Hemostasis ensured.  Linea alba was closed in the midline.  Local anesthetic was applied.  Remainder the incision was closed in layers.  Patient was then taken to critical care remaining intubated in critical condition but improved.  Sponge needle instrument counts were all correct at end of the case.    Anesthesia: General    Complications: None    Implants: None    Specimen:   1.  Pericardial fluid for cytology  2.  Pericardial fluid for culture  3.  Pericardium for pathology  4 mediastinal tissues for pathology    Drains:   24 [place] Nathaniel drain to the pericardial space  28 [place] chest tube to the right pleural space    Condition: Critical but improved to CVICU    Estimated Blood Loss: 5 mL    Bebo Yu MD

## 2025-05-21 NOTE — CONSULTS
[facility]  part of [facility]    Report of Consultation    Kelli Fisher Patient Status:  Inpatient    1955 MRN [id number]   Location [facility] OPERATING ROOM Attending Gloria Sandoval MD   Hosp Day # 9 PCP Taylor Gallardo MD     Date of Admission:  2025  Date of Consult:  2025     Reason for Consultation:  Pericardial effusion, concern for tamponade    History of Present Illness:  Kelli Fisher is a a(n) 70 year old female.  History of metastatic endometrial cancer admitted to the hospital almost 9 days ago now with shortness of breath.  She does not have nonocclusive DVT, some bilateral pulmonary embolisms but not a candidate for anticoagulation due to pancytopenia.  IVC filter was placed.  CT scan initially demonstrated some mild pericardial effusion echocardiogram demonstrated similarly mild pericardial effusion however this was repeated yesterday demonstrates enlarging pericardial effusion and concern for tamponade physiology.  We are asked to evaluate for subxiphoid pericardial window and surgical drainage given etiology of this effusion being likely metastatic in nature.  Patient seen her hospital room.  She is on 8 L oxygen.  Talking conversant sentences, family numbers at bedside.    History:  Past Medical History[1]  Past Surgical History[2]  Family History[3]   reports that she has never smoked. She has never used smokeless tobacco. She reports that she does not drink alcohol and does not use drugs.    Allergies:  Allergies[4]    Medications:  Current Hospital Medications[5]    Review of Systems:  Pertinent items are noted in HPI.    Physical Exam:  Blood pressure 99/70, pulse 84, temperature 97.7 °F (36.5 °C), temperature source Oral, resp. rate 18, height 5' (1.524 m), weight 167 lb (75.8 kg), SpO2 97%.    GENERAL: No acute distress, appears stated age, slightly frail but in good spirits  VITAL SIGNS: See above.  HEENT: Trachea midline.  No jugular venous  distention.  No carotid bruit.  CHEST: Coarse breath sounds at bases bilaterally  HEART: Regular rate and rhythm without murmurs, heart sounds are somewhat distant  LUNGS: Clear to auscultation bilaterally.  ABDOMEN: Soft, nontender nondistended.  VASCULAR: Palpable radial artery pulses 2+ bilateral.  SKIN: No rash, no excessive bruising, petechiae, or purpura.  NEUROLOGIC: Cranial nerves II-XII intact without motor/sensory deficit.    Laboratory Data:  Recent Labs   Lab 05/19/25  0536 05/20/25  0548 05/21/25  0552   RBC 3.01* 2.94* 2.85*   HGB 7.4* 7.3* 7.1*   HCT 25.2* 24.3* 23.5*   MCV 83.7 82.7 82.5   MCH 24.6* 24.8* 24.9*   MCHC 29.4* 30.0* 30.2*   RDW 21.1* 21.2* 21.2*   NEPRELIM 8.30* 6.93 7.07   WBC 12.7* 11.0 10.9   PLT 65.0* 80.0* 106.0*     Recent Labs   Lab 05/18/25  0723 05/19/25  0536 05/20/25  0548 05/21/25  0552   * 157* 156*  --    BUN 29* 31* 34*  --    CREATSERUM 1.16* 1.16* 1.32* 1.43*   EGFRCR 51* 51* 44* 40*   CA 8.4* 8.9 8.5*  --     141 141  --    K 3.8  3.8 3.7  3.7 3.7  3.7 3.8    100 99  --    CO2 34.0* 36.0* 35.0*  --      Echocardiogram is reviewed, circumferential pericardial effusion and early tamponade physiology    Impression and Plan:  Problem List[6]    70-year-old female with enlarging pericardial effusion, concern for tamponade physiology    Etiology likely secondary to her metastatic uterine cancer.  She will benefit from surgical drainage to minimize chance of recurrence and for treatment of her acute issue.  I long discussion with the patient family.  We discussed the indication for procedure we discussed alternatives including pericardicentesis and medical management.  Discussed the operation with a subxiphoid pericardial window with an incision, general anesthesia drainage of the fluid and creation of pericardial window.  We will send pericardium and fluid for cytology and cultures.  We discussed risk of the procedure including not limited to bleeding  infection transfusion damage surrounding structures including lung heart diaphragm liver stomach intra-abdominal organs, need for sternotomy, imponderables and death.  Patient understanding wished to proceed.  Will proceed with surgery once the operating room is available.    Bebo Yu MD  Cardiothoracic Surgery  [facility]     Time spent on counseling/coordination of care:  [id number]- 80 min    Total time spent with patient:  1 Hour    Bebo Yu MD  5/21/2025  1:08 PM       [1]   Past Medical History:   Asthma (HCC)    Esophageal reflux    History of blood transfusion    Visual impairment   [2]   Past Surgical History:  Procedure Laterality Date    Thyroidectomy      Total abdom hysterectomy     [3] No family history on file.  [4]   Allergies  Allergen Reactions    Aspirin Tightness in Throat    Penicillins HIVES    Other OTHER (SEE COMMENTS)     Pt states IV steroid allergy, states it makes her breathing worse    [5]   Current Facility-Administered Medications:     [Transfer Hold] chlorhexidine (Hibiclens) 4 % external liquid, , Topical, On Call    [Transfer Hold] sodium chloride 0.9% infusion, , Intravenous, On Call    vancomycin (Vancocin) 1,000 mg in sodium chloride 0.9% 250 mL IVPB-ADDV, 1,000 mg, Intravenous, Q36H    [Transfer Hold] potassium chloride (Klor-Con) 20 MEQ oral powder 40 mEq, 40 mEq, Oral, Once    [Transfer Hold] diphenhydrAMINE-zinc (Benadryl-Zinc) 2-0.1 % cream 1 Application, 1 Application, Topical, TID PRN    [Transfer Hold] pantoprazole (Protonix) DR tab 40 mg, 40 mg, Oral, QAM AC    [Transfer Hold] alum-mag hydroxide-simethicone (Maalox) 200-200-20 MG/5ML oral suspension 30 mL, 30 mL, Oral, QID PRN    [Transfer Hold] docusate sodium (Colace) cap 100 mg, 100 mg, Oral, Daily PRN    [Transfer Hold] polyethylene glycol (PEG 3350) (Miralax) 17 g oral packet 17 g, 17 g, Oral, Daily PRN    [Transfer Hold] magnesium hydroxide (Milk of Magnesia) 400 MG/5ML oral suspension 30 mL, 30 mL,  Oral, Daily PRN    [Transfer Hold] bisacodyl (Dulcolax) 10 MG rectal suppository 10 mg, 10 mg, Rectal, Daily PRN    [Transfer Hold] fleet enema (Fleet) rectal enema 133 mL, 1 enema, Rectal, Once PRN    [Transfer Hold] amiodarone (Pacerone) tab 400 mg, 400 mg, Oral, BID with meals    [Held by provider] insulin aspart (NovoLOG) 100 Units/mL FlexPen 1-5 Units, 1-5 Units, Subcutaneous, TID CC and HS    [Transfer Hold] sodium chloride (Saline Mist) 0.65 % nasal solution 1 spray, 1 spray, Each Nare, Q3H PRN    [Transfer Hold] maalox/diphenhydramine/lidocaine (First Mouthwash BLM) oral suspension 10 mL, 10 mL, Oral, Q6H PRN    [Transfer Hold] acetaminophen (Tylenol Extra Strength) tab 500 mg, 500 mg, Oral, Q4H PRN    [Transfer Hold] ondansetron (Zofran) 4 MG/2ML injection 4 mg, 4 mg, Intravenous, Q6H PRN    [Transfer Hold] metoclopramide (Reglan) 5 mg/mL injection 5 mg, 5 mg, Intravenous, Q8H PRN    [Transfer Hold] glucose (Dex4) 15 GM/59ML oral liquid 15 g, 15 g, Oral, Q15 Min PRN **OR** [Transfer Hold] glucose (Glutose) 40% oral gel 15 g, 15 g, Oral, Q15 Min PRN **OR** [Transfer Hold] glucose-vitamin C (Dex-4) chewable tab 4 tablet, 4 tablet, Oral, Q15 Min PRN **OR** [Transfer Hold] dextrose 50% injection 50 mL, 50 mL, Intravenous, Q15 Min PRN **OR** [Transfer Hold] glucose (Dex4) 15 GM/59ML oral liquid 30 g, 30 g, Oral, Q15 Min PRN **OR** [Transfer Hold] glucose (Glutose) 40% oral gel 30 g, 30 g, Oral, Q15 Min PRN **OR** [Transfer Hold] glucose-vitamin C (Dex-4) chewable tab 8 tablet, 8 tablet, Oral, Q15 Min PRN    [Transfer Hold] ipratropium-albuterol (Duoneb) 0.5-2.5 (3) MG/3ML inhalation solution 3 mL, 3 mL, Nebulization, Q6H PRN    [Transfer Hold] levothyroxine (Synthroid) tab 100 mcg, 100 mcg, Oral, Before breakfast    [Transfer Hold] nystatin (Mycostatin) 039857 UNIT/ML oral suspension 500,000 Units, 5 mL, Oral, QID  [6]   Patient Active Problem List  Diagnosis    Shortness of breath    Acute hypoxic respiratory  failure (HCC)    Lung mass    Supraclavicular adenopathy    Dysphagia, unspecified type    Pericardial effusion (HCC)    Endometrial cancer (HCC)    Microcytic anemia    Thrombocytopenia (HCC)    Azotemia    Pancytopenia (HCC)    Hyperglycemia    Acute respiratory failure with hypoxia (HCC)    Atrial fibrillation with RVR (HCC)    Malnutrition (HCC)

## 2025-05-21 NOTE — ANESTHESIA PROCEDURE NOTES
10/1/2019         RE: Javon Bianchi  [address]  [place] [address]        Dear Colleague,    Thank you for referring your patient, Javon Bianchi, to the [facility] CAN[facility]ER [facility]. Please see a copy of my visit note below.    Palliative Care Outpatient Clinic Progress Note    Patient Name:  Javon Bianchi  Primary Provider:  LIANNE Wellington    Chief Complaint/Identifying Information:  Metastatic Renal Cell Carcinoma on immunotherapy  History of L3 pathologic fracture found at diagnosis   Known epidural tumor burden    [facility] Outpatient Palliative Care Opioid Prescribing Safety Plan     Opioid Safety Education: Reviewed by Nan Gonzalez  on 11/8/2018  Opioid Risk Assessment: Performed by Nan Gonzalez  on 11/8/2018 .  ORT score of 0.   Mood Disorder Assessment: Performed by Carly Mills on 04/25/19.  No concerns.    reviewed with every prescription, last reviewed by Carly Mills on  10/01/19     Additional recommendations based on patient's prognosis and indication for opioids include the following:     Expected prognosis: shorter  Risk: Low or Medium (ORT 0-7)  No further recommendations.     Interim History:  Javon Bianchi 71 year old male returns to be seen by palliative care today, accompanied by his wife Suzie.  Javon Bianchi was last seen by me one month ago, at which time he had signs/symptoms concerning for return of PNA.  He was admitted to [facility] and stayed for four days.  Since he was last seen by me, Omega reports that he has taken radiation to L1, completed last week.  He has had fatigue since completing this.  Also has developed some nausea without vomiting. Having one bowel movement every 1-2 days. This is soft. Is taking Reglan 5mg BID (we cut this back to BID due to concern for sedation).     His pain has been better lately, back is 5/10, joints 3/10, hip 2/10. Taking MS Contin 30mg TID. He last used the hydromorphone 5 days ago. Wants to  Airway  Date/Time: 5/21/2025 1:32 PM  Reason: Elective    Airway not difficult    General Information and Staff   Patient location during procedure: OR  Anesthesiologist: Neema Lezama MD  Performed: anesthesiologist   Performed by: Neema Lezama MD  Authorized by: Neema Lezama MD        Indications and Patient Condition  Indications for airway management: anesthesia  Sedation level: deep      Preoxygenated: yesPatient position: sniffing    Mask difficulty assessment: 1 - vent by mask    Final Airway Details    Final airway type: endotracheal airway    Successful airway: ETT  Cuffed: yes   Successful intubation technique: Video laryngoscopy  Endotracheal tube insertion site: oral  Blade type: giron.  Blade size: #4  ETT size (mm): 7.0    Cormack-Lehane Classification: grade I - full view of glottis  Placement verified by: capnometry   Measured from: teeth  ETT to teeth (cm): 21  Number of attempts at approach: 1       continue to decrease his opioids.      Was off Celebrex 100mg BID, but this worsened his pain. He is now back on this.     Is planning to look into back surgery again.     Social History:  Pertinent changes to social history/social situation since last visit: Bought a condo to have available for when his health changes.   Social History     Tobacco Use     Smoking status: Never Smoker     Smokeless tobacco: Never Used   Substance Use Topics     Alcohol use: Not Currently     Drug use: Yes     Types: Marijuana     Comment: medical cannabis               Physical Exam:   Vitals were reviewed  /83   Pulse 70   Temp 98  F (36.7  C) (Oral)   Resp 14   Ht 1.829 m (6')   Wt 95.3 kg (210 lb)   SpO2 97%   BMI 28.48 kg/m     General: Alert, comfortable appearing male in no acute distress.   Eyes: Pupils equal and 2mm, sclera clear.   ENT: MMM.   Cardiac:  Normal rate, regular.    Resp: Resolution of cough.  Lungs are CTAB, unlabored on room air. Good air entry bilaterally.   Abd: Soft, non-distended, no TTP, active bowel sounds.   Neuro: No facial asymmetry.  Spontaneous movements grossly non-focal. Alert and with normal appearing sensorium.       Impression & Recommendations & Counseling:  Omega is a 72 year old man with metastatic renal cell carcinoma who has been off cancer treatments due to prolonged issues with PNA, now clinically improving on Bactrim.  His pain has improved as his infection has gotten under control.  He is interested in further opioid taper.  Given that his renal function has had a slight decline since his hospitalization (GFR now 70s), will need to monitor for indication to rotate morphine to fentanyl.  This rotation was offered to Omega today and he declines, electing to work on opioid taper next.     Will taper MS Contin to 30/15/30mg.  Continue PRN hydromorphone for breakthrough pain.   Nausea: Okay to increase Reglan back to 5mg every 6 hours PRN nausea. I am not sure why Omega is  experiencing this as he is having regular bowel movements and is off the cancer treatments.  Most likely side effect from Bactrim.      Disease Understanding:  Omega asked for extensive counseling on his scans today, which were reviewed with him.     Multiple refills provided on Omega's comfort focused medications.     Additional information reviewed today:   No Known Allergies  Current Outpatient Medications   Medication Sig Dispense Refill     acetaminophen (TYLENOL) 500 MG tablet Take 1,000 mg by mouth 3 times daily        calcium carbonate 500 mg, elemental, (OSCAL 500) 1250 (500 Ca) MG TABS tablet Take 1 tablet by mouth 2 times daily       celecoxib (CELEBREX) 100 MG capsule Take 1 capsule (100 mg) by mouth 2 times daily as needed for moderate pain 30 capsule 1     Dextromethorphan-guaiFENesin (MUCINEX DM)  MG 12 hr tablet Take 1 tablet by mouth every 12 hours       diazepam (VALIUM) 5 MG tablet Take 1 pill 30 min before MRI and a second pill 2 minutes prior. 10 tablet 0     DULoxetine (CYMBALTA) 60 MG capsule Take 1 capsule (60 mg) by mouth daily 90 capsule 3     fluticasone (FLONASE) 50 MCG/ACT nasal spray Spray 2 sprays in nostril       HYDROmorphone (DILAUDID) 2 MG tablet Take 1-2 tablets (2-4 mg) by mouth every 3 hours as needed for pain 60 tablet 0     lidocaine (LIDODERM) 5 % patch Place 1-3 patches onto the skin every 24 hours Apply to painful areas for 12 hours on, 12 hours off. 30 patch 3     loratadine (CLARITIN) 10 MG tablet Take 10 mg by mouth daily       medical cannabis (Patient's own supply.  Not a prescription) 1 Dose See Admin Instructions (This is NOT a prescription, and does not certify that the patient has a qualifying medical condition for medical cannabis.  The purpose of this order is  to document that the patient reports taking medical cannabis.)       melatonin 3 MG tablet Take 3 mg by mouth nightly as needed        metoclopramide (REGLAN) 5 MG tablet Take 1 tablet (5 mg) by mouth  4 times daily (before meals and nightly) (Patient taking differently: Take 5 mg by mouth 2 times daily ) 150 tablet 0     morphine (MS CONTIN) 15 MG CR tablet Take 15 mg by mouth daily as needed       morphine (MS CONTIN) 30 MG CR tablet Takes 45mg in the AM, 30 mg midday and 30mg at bedtime. 90 tablet 0     omeprazole (PRILOSEC) 40 MG DR capsule Take 1 capsule (40 mg) by mouth daily 90 capsule 3     polyethylene glycol (MIRALAX/GLYCOLAX) Packet Take 0.5 packets by mouth daily as needed       pregabalin (LYRICA) 50 MG capsule Take 1 capsule (50 mg) by mouth 2 times daily 60 capsule 1     senna-docusate (SENOKOT-S;PERICOLACE) 8.6-50 MG per tablet Take 2 tablets by mouth 2 times daily       sulfamethoxazole-trimethoprim (BACTRIM DS/SEPTRA DS) 800-160 MG tablet Take 2 tablets by mouth 2 times daily 120 tablet 0     Vitamin D, Cholecalciferol, 1000 units TABS Take 2,000 Units by mouth daily        everolimus (AFINITOR) 5 MG tablet CHEMO Take 1 tablet (5 mg) by mouth daily (Patient not taking: Reported on 10/1/2019) 30 tablet 0     furosemide (LASIX) 20 MG tablet        Lenvatinib 18 MG, 10 mg + 2 x 4 mg capsules, (LENVIMA) 18 mg combo capsule CHEMOTHERAPY Take 18 mg dose (10 mg plus two 4 mg capsules) by mouth daily. (Patient not taking: Reported on 10/1/2019) 90 capsule 0     Past Medical History:   Diagnosis Date     Anemia      Bone metastasis (H) 09/28/2018     Cough      Pulmonary nodule      Renal cell carcinoma, right (H) 09/28/2018     Past Surgical History:   Procedure Laterality Date     BRONCHOSCOPY (RIGID OR FLEXIBLE), DIAGNOSTIC N/A 9/4/2019    Procedure: Bronchoscopy, With Bronchoalveolar Lavage;  Surgeon: Burton Toure MD;  Location:  GI     CHOLECYSTECTOMY       HC REMOVAL HEEL SPUR, CALCANEUS  2004       Key Data Reviewed:  LABS:   Lab Results   Component Value Date    WBC 3.1 (L) 09/24/2019    HGB 7.4 (L) 09/24/2019    HCT 25.1 (L) 09/24/2019     09/24/2019     09/24/2019     "POTASSIUM 5.3 09/24/2019    CHLORIDE 109 09/24/2019    CO2 23 09/24/2019    BUN 8 09/24/2019    CR 1.04 09/24/2019    GLC 83 09/24/2019    SED 87 (H) 03/29/2019    NTBNPI 283 09/03/2019    TROPI <0.015 09/03/2019    AST 18 09/24/2019    ALT 12 09/24/2019    ALKPHOS 90 09/24/2019    BILITOTAL 0.2 09/24/2019    INR 1.25 (H) 09/04/2019     IMAGING:   MRI Lumbar Spine 9/24/19  \"Impression: Images are mildly degraded secondary to patient motion.     1. Mildly decreased size of epidural enhancement at L1, measuring up  to 4 mm, 6 mm previously. Similar appearance of paravertebral and  epidural from L2 through L5.  2. Unchanged diffuse heterogeneous osseous metastatic disease from L1  through the sacrum, most pronounced at L1 and L3.  3. Stable vertebral body height loss at L3, secondary to pathologic  vertebral body fracture. Findings contribute to moderate to severe  left and moderate right neural foraminal stenosis at this level.\"    MRI Pelvis same day  \"IMPRESSION: Since prior MRI 6/18/2019, there has been mild progression  of osseous metastatic disease and small soft tissue foci posterior to  the medial left ilium and along the posterior margin of the right  iliac wing. Metastatic foci involving the femoral necks, left greater  than right, not substantially changed but patient remains at elevated  risk of pathologic fracture.\"    CT Chest same day: Improving PNA.     [place]  - Use of controlled substances consistent with history.     Thank you for continuing to involve me in the care of this patient.     Carly Mills MD / Palliative Medicine / Pager [phone] / After-Hours Answering Service [phone] / Main Palliative Clinic - [facility] [phone] / [facility] Inpatient Team Consult Pager [phone] (answered 8am-430pm M-F)    Time spent: 36 minutes, >50% spent in counseling/coordination of care.      Oncology Rooming Note    October 1, 2019 3:11 PM   Javon Bianchi is a 72 year old male who " presents for:    Chief Complaint   Patient presents with     Palliative     Initial Vitals: /83   Pulse 70   Temp 98  F (36.7  C) (Oral)   Resp 14   Ht 1.829 m (6')   Wt 95.3 kg (210 lb)   SpO2 97%   BMI 28.48 kg/m    Estimated body mass index is 28.48 kg/m  as calculated from the following:    Height as of this encounter: 1.829 m (6').    Weight as of this encounter: 95.3 kg (210 lb). Body surface area is 2.2 meters squared.  Mild Pain (3) Comment: Data Unavailable   No LMP for male patient.  Allergies reviewed: Yes  Medications reviewed: Yes    Medications: MEDICATION REFILLS NEEDED TODAY. Provider was notified.  Celebrex and Lyrica  Pharmacy name entered into [facility]:    [facility] - [place], [place] - [facility] - [facility] [address] [facility]    Clinical concerns: no       Shari J. Schoenberger, CMA              Again, thank you for allowing me to participate in the care of your patient.        Sincerely,        Carly Mills MD

## 2025-05-21 NOTE — PAYOR COMM NOTE
--------------  CONTINUED STAY REVIEW  5/21  Payor: [employer] MEDICARE ADV PPO  Subscriber #:  [id number]  Authorization Number: [id number]    Admit date: 5/12/25  Admit time:  5:28 PM    REVIEW DOCUMENTATION:  Operative Note     Preoperative Diagnosis: Pericardial effusion (HCC) [I31.39]     Postoperative Diagnosis: Pericardial effusion (HCC) [I31.39]     Procedures:   Urgent subxiphoid pericardial window  Insertion of right pleural chest tube     Indication:  70-year-old female history of metastatic uterine cancer admitted to the hospital almost 10 days ago now with atrial fibrillation and acute respiratory failure found to have pulmonary embolisms and DVT.  She had a pericardial effusion upon presentation but since the last 10 days, it has enlarged and now there is concern for tamponade physiology.  Urgent window creation and fluid removal is indicated.  I discussed the patient and family alternatives, benefits and risks of surgery, they consents for surgery today.     Surgical Findings:   500 mL straw-colored pericardial fluid evacuated, no residual pericardial effusion on transesophageal echocardiogram  Insertion of right pleural chest tube        Anesthesia: General     Complications: None     Implants: None     Specimen:   1.  Pericardial fluid for cytology  2.  Pericardial fluid for culture  3.  Pericardium for pathology  4 mediastinal tissues for pathology     Drains:   24 [place] Nathaniel drain to the pericardial space  28 [place] chest tube to the right pleural space     Condition: Critical but improved to CVICU     Estimated Blood Loss: 5 mL        5/21 CT surgery    Reason for Consultation:  Pericardial effusion, concern for tamponade     History of Present Illness:  Kelli Fisher is a a(n) 70 year old female.  History of metastatic endometrial cancer admitted to the hospital almost 9 days ago now with shortness of breath.  She does not have nonocclusive DVT, some bilateral pulmonary embolisms but not a  candidate for anticoagulation due to pancytopenia.  IVC filter was placed.  CT scan initially demonstrated some mild pericardial effusion echocardiogram demonstrated similarly mild pericardial effusion however this was repeated yesterday demonstrates enlarging pericardial effusion and concern for tamponade physiology.  We are asked to evaluate for subxiphoid pericardial window and surgical drainage given etiology of this effusion being likely metastatic in nature.  Patient seen her hospital room.  She is on 8 L oxygen.  Talking conversant sentences, family numbers at bedside.      GENERAL: No acute distress, appears stated age, slightly frail but in good spirits  VITAL SIGNS: See above.  HEENT: Trachea midline.  No jugular venous distention.  No carotid bruit.  CHEST: Coarse breath sounds at bases bilaterally  HEART: Regular rate and rhythm without murmurs, heart sounds are somewhat distant  LUNGS: Clear to auscultation bilaterally.  ABDOMEN: Soft, nontender nondistended.  VASCULAR: Palpable radial artery pulses 2+ bilateral.  SKIN: No rash, no excessive bruising, petechiae, or purpura.  NEUROLOGIC: Cranial nerves II-XII intact without motor/sensory deficit.     Laboratory Data:        Recent Labs   Lab 05/19/25  0536 05/20/25  0548 05/21/25  0552   RBC 3.01* 2.94* 2.85*   HGB 7.4* 7.3* 7.1*   HCT 25.2* 24.3* 23.5*   MCV 83.7 82.7 82.5   MCH 24.6* 24.8* 24.9*   MCHC 29.4* 30.0* 30.2*   RDW 21.1* 21.2* 21.2*   NEPRELIM 8.30* 6.93 7.07   WBC 12.7* 11.0 10.9   PLT 65.0* 80.0* 106.0*             Recent Labs   Lab 05/18/25  0723 05/19/25  0536 05/20/25  0548 05/21/25  0552   * 157* 156*  --    BUN 29* 31* 34*  --    CREATSERUM 1.16* 1.16* 1.32* 1.43*   EGFRCR 51* 51* 44* 40*   CA 8.4* 8.9 8.5*  --     141 141  --    K 3.8  3.8 3.7  3.7 3.7  3.7 3.8    100 99  --    CO2 34.0* 36.0* 35.0*  --        70-year-old female with enlarging pericardial effusion, concern for tamponade physiology     Etiology  likely secondary to her metastatic uterine cancer.  She will benefit from surgical drainage to minimize chance of recurrence and for treatment of her acute issue.  I long discussion with the patient family.  We discussed the indication for procedure we discussed alternatives including pericardicentesis and medical management.  Discussed the operation with a subxiphoid pericardial window with an incision, general anesthesia drainage of the fluid and creation of pericardial window.  We will send pericardium and fluid for cytology and cultures.  We discussed risk of the procedure including not limited to bleeding infection transfusion damage surrounding structures including lung heart diaphragm liver stomach intra-abdominal organs, need for sternotomy, imponderables and death.  Patient understanding wished to proceed.  Will proceed with surgery once the operating room is available.    MEDICATIONS ADMINISTERED IN LAST 1 DAY:  Transfuse RBC       Date Action Dose Route User    5/21/2025 1223 Rate/Dose Change (none) Intravenous Gertrudis Johnson RN    5/21/2025 1208 New Bag (none) Intravenous Gertrudis Johnson RN          acetaminophen (Tylenol Extra Strength) tab 500 mg       Date Action Dose Route User    5/21/2025 0023 Given 500 mg Oral Argenis Bess RN          amiodarone (Pacerone) tab 400 mg       Date Action Dose Route User    5/21/2025 0956 Given 400 mg Oral Gertrudis Johnson RN    5/20/2025 1809 Given 400 mg Oral Gertrudis Johnson RN          atropine sulfate 1 MG/ML injection       Date Action Dose Route User    5/21/2025 1341 Given 0.3 mg Intravenous Neema Lezama MD          bupivacaine 0.25%-EPINEPHrine 1:200,000 (Marcaine-EPINEPHrine) injection       Date Action Dose Route User    5/21/2025 1405 Given 23 mL Subcutaneous (Chest) Bebo Yu MD          ceFEPIme (Maxipime) 2 g in sodium chloride 0.9% 100mL IVPB-YANA       Date Action Dose Route User    5/21/2025 0542 New Bag 2 g Intravenous Shahriar  UNRULY More          ePHEDrine sulfate 50 mg/mL injection       Date Action Dose Route User    5/21/2025 1342 Given 10 mg Intravenous Neema Lezama MD    5/21/2025 1334 Given 10 mg Intravenous Neema Lezama MD    5/21/2025 1326 Given 5 mg Intravenous Neema Lezama MD          etomidate (Amidate) 2 mg/mL injection       Date Action Dose Route User    5/21/2025 1329 Given 14 mg Intravenous Neema Lezama MD          fentaNYL (Sublimaze) 50 mcg/mL injection       Date Action Dose Route User    5/21/2025 1326 Given 50 mcg Intravenous Neema Lezama MD          furosemide (Lasix) 10 mg/mL injection 40 mg       Date Action Dose Route User    5/20/2025 1816 Given 40 mg Intravenous Gertrudis Johnson RN          insulin aspart (NovoLOG) 100 Units/mL FlexPen 1-5 Units       Date Action Dose Route User    5/20/2025 2143 Given 1 Units Subcutaneous (Right Upper Arm) Argenis Bess RN    5/20/2025 1818 Given 1 Units Subcutaneous (Right Upper Arm) Gertrudis Johnson RN          ipratropium-albuterol (Duoneb) 0.5-2.5 (3) MG/3ML inhalation solution 3 mL       Date Action Dose Route User    5/20/2025 1720 Given 3 mL Nebulization Alungaparamibil Deacon Arroyo, FABIOLA          levothyroxine (Synthroid) tab 100 mcg       Date Action Dose Route User    5/21/2025 0537 Given 100 mcg Oral Argenis Bess RN          midazolam (Versed) 2 MG/2ML injection       Date Action Dose Route User    5/21/2025 1312 Given 1 mg Intravenous Neema Lezama MD    5/21/2025 1304 Given 1 mg Intravenous Neema Lezama MD          nystatin (Mycostatin) [id number] UNIT/ML oral suspension 500,000 Units       Date Action Dose Route User    5/20/2025 2011 Given 500,000 Units Oral Argenis Bess RN          rocuronium (Zemuron) 50 mg/5mL injection       Date Action Dose Route User    5/21/2025 1338 Given 30 mg Intravenous Neema Lezama MD          sodium chloride (Saline Mist) 0.65 % nasal solution 1 spray       Date Action Dose Route User    5/21/2025 0908  Given 1 spray Each Nare Gertrudis Johnson RN    5/21/2025 0024 Given 1 spray Each Nare Argenis Bess, RN    5/20/2025 1930 Given 1 spray Each Argenis Cooper, RN          sodium chloride 0.9% infusion       Date Action Dose Route User    5/21/2025 1218 New Bag (none) Intravenous Gertrudis Johnson RN          sugammadex (Bridion) 200 MG/2ML injection       Date Action Dose Route User    5/21/2025 1353 Given 150 mg Intravenous Neema Lezama MD          vancomycin (Vancocin) 1,000 mg in sodium chloride 0.9% 250 mL IVPB-ADDV       Date Action Dose Route User    5/20/2025 1813 New Bag 1,000 mg Intravenous Gertrudis Johnson RN            Vitals (last day)       Date/Time Temp Pulse Resp BP SpO2 Weight O2 Device O2 Flow Rate (L/min) [facility]    05/21/25 1426 98 °F (36.7 °C) 85 22 113/75 98 % -- -- -- SG    05/21/25 1223 97.7 °F (36.5 °C) 84 18 99/70 97 % -- -- -- TJ    05/21/25 1204 97.7 °F (36.5 °C) 83 18 94/64 100 % -- -- -- TJ    05/21/25 1125 98.4 °F (36.9 °C) -- 20 97/72 100 % -- Nasal cannula 4 L/min MJ    05/21/25 0536 97.7 °F (36.5 °C) 86 22 98/66 100 % -- Nasal cannula 4 L/min CM    05/21/25 0300 -- 87 -- -- -- -- -- -- GT    05/20/25 2007 98.2 °F (36.8 °C) 98 20 96/70 100 % -- Nasal cannula 4.5 L/min CM    05/20/25 1903 -- 102 -- -- -- -- -- -- GT    05/20/25 1720 -- -- -- -- 99 % -- Nasal cannula 5 L/min AA    05/20/25 1400 -- 87 -- 110/70 -- -- -- -- KH    05/20/25 1204 97.8 °F (36.6 °C) -- 20 98/72 100 % -- Nasal cannula 5 L/min MJ    05/20/25 0815 97.6 °F (36.4 °C) 92 20 102/72 99 % -- Nasal cannula 5 L/min TJ    05/20/25 0508 97.7 °F (36.5 °C) 85 19 103/68 96 % -- Nasal cannula 4 L/min MM          CIWA Scores (since admission)       None          Blood Transfusion Record       Product Unit Status Volume Start End            Transfuse RBC       25  [id number]  [id number] Transfusing  05/21/25 1208        25  [id number]  [id number] Completed 05/13/25 1431 335 mL 05/13/25 1144 05/13/25 1428                 Transfuse platelets       25  [id number]  [id number] Completed 05/14/25 1319 200 mL 05/14/25 1116 05/14/25 1316

## 2025-05-21 NOTE — PROGRESS NOTES
[facility]  part of [facility]    Progress Note      Assessment and Plan:   1.  Acute on chronic respiratory failure-multifactorial with significant burden of tumor in the chest but now recognized to have small volume of bilateral subsegmental PEs.  The patient has low platelets and is a poor candidate for full anticoagulation at this moment.    Lower extremity venous ultrasound demonstrated acute appearing nonocclusive DVT in the right superficial femoral popliteal and posterior tibial veins.  She got the IVC filter.  The patient's breathing is a little bit more labored today.  Repeat echocardiography suggested early tamponade.  The patient is more short of breath today and feels like her nose is plugged.    Recommendations:  1.  Patient will go to the operating room for pericardial window.  2.  Nasal saline spray.    2.  Metastatic endometrial carcinoma-to follow-up gynecologic oncology.    3.  Pericardial effusion-early tamponade identified on repeat echo.    Recommendations: Pericardial window.    4.  Recurrent episodes of atrial tachycardia-on amiodarone.    5.  Very small left pleural effusion-conservative management without tap at this juncture.  The chest x-ray has the appearance of a moderate left pleural effusion; however, this shadow largely represents extensive tumor involving the left midlung field and an associated large pericardial effusion.    Subjective:   Kelli Fisher is a(n) 70 year old female who is dyspneic    Objective:   Blood pressure 98/66, pulse 86, temperature 97.7 °F (36.5 °C), temperature source Oral, resp. rate 22, height 5' (1.524 m), weight 167 lb (75.8 kg), SpO2 100%.    Physical Exam alert white female  HEENT examination is unremarkable with pupils equal round and reactive to light and accommodation.   Neck without adenopathy, thyromegaly, JVD nor bruit.   Lungs diminished bilaterally to auscultation and percussion.  Cardiac regular rate and rhythm no murmur.    Abdomen nontender, without hepatosplenomegaly and no mass appreciable.   Extremities without clubbing cyanosis nor edema.   Neurologic grossly intact with symmetric tone and strength and reflex.  Skin without gross abnormality     Results:     Lab Results   Component Value Date    WBC 10.9 05/21/2025    HGB 7.1 05/21/2025    HCT 23.5 05/21/2025    .0 05/21/2025    CREATSERUM 1.43 05/21/2025    K 3.8 05/21/2025      CT scan of the chest-Positive for small volume bilateral segmental PE      Enlarging left upper lobe consolidation and few bilateral pulmonary nodules      Enlarging pericardial effusion now 1.7 centimeter thick.  Increasing left pleural effusion, new right pleural effusion     Srinath Levy MD  Medical Director, Critical Care, [facility]  Medical Director, [facility]  Pager: [phone]

## 2025-05-21 NOTE — PLAN OF CARE
Problem: Diabetes/Glucose Control  Goal: Glucose maintained within prescribed range  Description: INTERVENTIONS:- Monitor Blood Glucose as ordered- Assess for signs and symptoms of hyperglycemia and hypoglycemia- Administer ordered medications to maintain glucose within target range- Assess barriers to adequate nutritional intake and initiate nutrition consult as needed- Instruct patient on self management of diabetes  Outcome: Progressing     Problem: RISK FOR INFECTION - ADULT  Goal: Absence of fever/infection during anticipated neutropenic period  Description: INTERVENTIONS- Monitor WBC- Administer growth factors as ordered- Implement neutropenic guidelines  Outcome: Progressing     Problem: CARDIOVASCULAR - ADULT  Goal: Maintains optimal cardiac output and hemodynamic stability  Description: INTERVENTIONS:- Monitor vital signs, rhythm, and trends- Monitor for bleeding, hypotension and signs of decreased cardiac output- Evaluate effectiveness of vasoactive medications to optimize hemodynamic stability- Monitor arterial and/or venous puncture sites for bleeding and/or hematoma- Assess quality of pulses, skin color and temperature- Assess for signs of decreased coronary artery perfusion - ex. Angina- Evaluate fluid balance, assess for edema, trend weights  Outcome: Not Progressing  Goal: Absence of cardiac arrhythmias or at baseline  Description: INTERVENTIONS:- Continuous cardiac monitoring, monitor vital signs, obtain 12 lead EKG if indicated- Evaluate effectiveness of antiarrhythmic and heart rate control medications as ordered- Initiate emergency measures for life threatening arrhythmias- Monitor electrolytes and administer replacement therapy as ordered  Outcome: Not Progressing     Problem: RESPIRATORY - ADULT  Goal: Achieves optimal ventilation and oxygenation  Description: INTERVENTIONS:- Assess for changes in respiratory status- Assess for changes in mentation and behavior- Position to facilitate  oxygenation and minimize respiratory effort- Oxygen supplementation based on oxygen saturation or ABGs- Provide Smoking Cessation handout, if applicable- Encourage broncho-pulmonary hygiene including cough, deep breathe, Incentive Spirometry- Assess the need for suctioning and perform as needed- Assess and instruct to report SOB or any respiratory difficulty- Respiratory Therapy support as indicated- Manage/alleviate anxiety- Monitor for signs/symptoms of CO2 retention  Outcome: Not Progressing     Problem: Safety Risk - Non-Violent Restraints  Goal: Patient will remain free from self-harm  Description: INTERVENTIONS:  - Apply the least restrictive restraint to prevent harm  - Notify patient and family of reasons restraints applied  - Assess for any contributing factors to confusion (electrolyte disturbances, delirium, medications)  - Discontinue any unnecessary medical devices as soon as possible  - Assess the patient's physical comfort, circulation, skin condition, hydration, nutrition and elimination needs   - Reorient and redirection as needed  - Assess for the need to continue restraints  Outcome: Not Progressing

## 2025-05-21 NOTE — PROGRESS NOTES
Patient evaluated after returning from urgent pericardial window.  Patient remained intubated upon arrival to ICU.  Low tidal volumes on presentation.  Chest x-ray reviewed with CV surgeon.  Spontaneous breathing trial conducted with RSBI in 150s range.  Discussed with respiratory therapist.  Will keep patient intubated overnight and attempt breathing trial and extubation in morning if stable.

## 2025-05-21 NOTE — PROGRESS NOTES
Discussed with family post op respiratory failure requiring ventilator support  Addressed multiple questions and concerns about length of time on ventilator support and reason(s) for its need     Pre-op: patient requiring 6 liters O2 support with ILD and significant tumor burden in the chest with bilateral effusions and early tamponade in setting of metastatic endometrial cancer     S/p pericardial window c/b hypoventilation,hypoxia 5/21    Post op patient is arousable and follows commands  While on SBT x 2 attempts, her tidal volumes remain low (possibly somewhat chronic hypoventilation)  NIF -7 RSBI near 130    Recent Labs   Lab 05/21/25  1508   ABGPHT 7.45   LOEGBA9K 48*   RIBGE5L 67*   ABGHCO3 31.4*   SITE Arterial Line   THGB 9.1*     recommendation is for continued ventilator support at this time  -could simply need longer post anesthesia recovery time in setting of ILD and tumor burden  Can repeat SBT and parameters this evening with papa and proceed to extubation if appropriate    If unable to be extubated later this evening family is agreeable to overnight ventilator support and resuming SBT in am     Discussed with Dr Perales and Dr Levy  Will endorse above to papa Watson NP  [facility]

## 2025-05-22 ENCOUNTER — APPOINTMENT (OUTPATIENT)
Dept: GENERAL RADIOLOGY | Facility: HOSPITAL | Age: 70
DRG: 270 | End: 2025-05-22
Attending: INTERNAL MEDICINE
Payer: MEDICARE

## 2025-05-22 LAB
ANION GAP SERPL CALC-SCNC: 6 MMOL/L (ref 0–18)
BASE EXCESS BLD CALC-SCNC: 4.9 MMOL/L (ref ?–2)
BASOPHILS # BLD: 0 X10(3) UL (ref 0–0.2)
BASOPHILS NFR BLD: 0 %
BUN BLD-MCNC: 30 MG/DL (ref 9–23)
BUN/CREAT SERPL: 23.8 (ref 10–20)
CALCIUM BLD-MCNC: 8 MG/DL (ref 8.7–10.4)
CHLORIDE SERPL-SCNC: 104 MMOL/L (ref 98–112)
CO2 SERPL-SCNC: 31 MMOL/L (ref 21–32)
CREAT BLD-MCNC: 1.26 MG/DL (ref 0.55–1.02)
DEPRECATED RDW RBC AUTO: 63.5 FL (ref 35.1–46.3)
EGFRCR SERPLBLD CKD-EPI 2021: 46 ML/MIN/1.73M2 (ref 60–?)
EOSINOPHIL # BLD: 0.15 X10(3) UL (ref 0–0.7)
EOSINOPHIL NFR BLD: 1 %
ERYTHROCYTE [DISTWIDTH] IN BLOOD BY AUTOMATED COUNT: 20.1 % (ref 11–15)
GLUCOSE BLD-MCNC: 129 MG/DL (ref 70–99)
GLUCOSE BLDC GLUCOMTR-MCNC: 120 MG/DL (ref 70–99)
GLUCOSE BLDC GLUCOMTR-MCNC: 140 MG/DL (ref 70–99)
GLUCOSE BLDC GLUCOMTR-MCNC: 144 MG/DL (ref 70–99)
GLUCOSE BLDC GLUCOMTR-MCNC: 166 MG/DL (ref 70–99)
HCO3 BLDA-SCNC: 28.7 MEQ/L (ref 21–27)
HCT VFR BLD AUTO: 30.9 % (ref 35–48)
HGB BLD-MCNC: 9.3 G/DL (ref 12–16)
INR BLD: 1.31 (ref 0.8–1.2)
LYMPHOCYTES NFR BLD: 0.58 X10(3) UL (ref 1–4)
LYMPHOCYTES NFR BLD: 4 %
MCH RBC QN AUTO: 25.8 PG (ref 26–34)
MCHC RBC AUTO-ENTMCNC: 30.1 G/DL (ref 31–37)
MCV RBC AUTO: 85.8 FL (ref 80–100)
METAMYELOCYTES # BLD: 0.15 X10(3) UL (ref ?–0.01)
METAMYELOCYTES NFR BLD: 1 %
MONOCYTES # BLD: 0.29 X10(3) UL (ref 0.1–1)
MONOCYTES NFR BLD: 2 %
MYELOCYTES # BLD: 0.88 X10(3) UL (ref ?–0.01)
MYELOCYTES NFR BLD: 6 %
NEUTROPHILS # BLD AUTO: 10.76 X10 (3) UL (ref 1.5–7.7)
NEUTROPHILS NFR BLD: 86 %
NEUTS HYPERSEG # BLD: 12.56 X10(3) UL (ref 1.5–7.7)
NRBC BLD MANUAL-RTO: 1 % (ref ?–1)
O2 CT BLD-SCNC: 16.7 VOL% (ref 15–23)
OSMOLALITY SERPL CALC.SUM OF ELEC: 300 MOSM/KG (ref 275–295)
OXYGEN LITERS/MINUTE: 4 L/MIN
PCO2 BLDA: 45 MM HG (ref 35–45)
PH BLDA: 7.43 [PH] (ref 7.35–7.45)
PLATELET # BLD AUTO: 145 10(3)UL (ref 150–450)
PLATELET MORPHOLOGY: NORMAL
PLATELETS.RETICULATED NFR BLD AUTO: 9.5 % (ref 0–7)
PO2 BLDA: 108 MM HG (ref 80–100)
POTASSIUM SERPL-SCNC: 3.6 MMOL/L (ref 3.5–5.1)
PROTHROMBIN TIME: 17 SECONDS (ref 11.6–14.8)
PUNCTURE CHARGE: NO
RBC # BLD AUTO: 3.6 X10(6)UL (ref 3.8–5.3)
SAO2 % BLDA: 98.8 % (ref 94–100)
SODIUM SERPL-SCNC: 141 MMOL/L (ref 136–145)
TOTAL CELLS COUNTED BLD: 100
WBC # BLD AUTO: 14.6 X10(3) UL (ref 4–11)

## 2025-05-22 PROCEDURE — 99233 SBSQ HOSP IP/OBS HIGH 50: CPT | Performed by: FAMILY MEDICINE

## 2025-05-22 PROCEDURE — 71045 X-RAY EXAM CHEST 1 VIEW: CPT | Performed by: INTERNAL MEDICINE

## 2025-05-22 PROCEDURE — 99233 SBSQ HOSP IP/OBS HIGH 50: CPT | Performed by: INTERNAL MEDICINE

## 2025-05-22 RX ORDER — TRAMADOL HYDROCHLORIDE 50 MG/1
50 TABLET ORAL EVERY 6 HOURS PRN
Status: DISCONTINUED | OUTPATIENT
Start: 2025-05-22 | End: 2025-06-05

## 2025-05-22 RX ORDER — MIDODRINE HYDROCHLORIDE 5 MG/1
10 TABLET ORAL 3 TIMES DAILY
Status: DISCONTINUED | OUTPATIENT
Start: 2025-05-22 | End: 2025-06-05

## 2025-05-22 NOTE — PLAN OF CARE
Pt extubated @2055 to 5L HFNC. AxO x4, weak cough. Low dose jewel for BP support. Mediastinal and pleural chest tubes in place, serous drainage w/ occasional clots noted. Voiding per purewick, pain well managed w/ IV ofirmev. All safety measures in place, frequent nursing rounds made.    Total CT Output  Mediastinal: 110 ml  Pleural: 40 ml    Problem: Diabetes/Glucose Control  Goal: Glucose maintained within prescribed range  Description: INTERVENTIONS:- Monitor Blood Glucose as ordered- Assess for signs and symptoms of hyperglycemia and hypoglycemia- Administer ordered medications to maintain glucose within target range- Assess barriers to adequate nutritional intake and initiate nutrition consult as needed- Instruct patient on self management of diabetes  Outcome: Progressing     Problem: Patient Centered Care  Goal: Patient preferences are identified and integrated in the patient's plan of care  Description: Interventions:- What would you like us to know as we care for you? - Provide timely, complete, and accurate information to patient/family- Incorporate patient and family knowledge, values, beliefs, and cultural backgrounds into the planning and delivery of care- Encourage patient/family to participate in care and decision-making at the level they choose- Honor patient and family perspectives and choices  Outcome: Progressing     Problem: RISK FOR INFECTION - ADULT  Goal: Absence of fever/infection during anticipated neutropenic period  Description: INTERVENTIONS- Monitor WBC- Administer growth factors as ordered- Implement neutropenic guidelines  Outcome: Progressing     Problem: CARDIOVASCULAR - ADULT  Goal: Maintains optimal cardiac output and hemodynamic stability  Description: INTERVENTIONS:- Monitor vital signs, rhythm, and trends- Monitor for bleeding, hypotension and signs of decreased cardiac output- Evaluate effectiveness of vasoactive medications to optimize hemodynamic stability- Monitor arterial  and/or venous puncture sites for bleeding and/or hematoma- Assess quality of pulses, skin color and temperature- Assess for signs of decreased coronary artery perfusion - ex. Angina- Evaluate fluid balance, assess for edema, trend weights  Outcome: Progressing  Goal: Absence of cardiac arrhythmias or at baseline  Description: INTERVENTIONS:- Continuous cardiac monitoring, monitor vital signs, obtain 12 lead EKG if indicated- Evaluate effectiveness of antiarrhythmic and heart rate control medications as ordered- Initiate emergency measures for life threatening arrhythmias- Monitor electrolytes and administer replacement therapy as ordered  Outcome: Progressing     Problem: RESPIRATORY - ADULT  Goal: Achieves optimal ventilation and oxygenation  Description: INTERVENTIONS:- Assess for changes in respiratory status- Assess for changes in mentation and behavior- Position to facilitate oxygenation and minimize respiratory effort- Oxygen supplementation based on oxygen saturation or ABGs- Provide Smoking Cessation handout, if applicable- Encourage broncho-pulmonary hygiene including cough, deep breathe, Incentive Spirometry- Assess the need for suctioning and perform as needed- Assess and instruct to report SOB or any respiratory difficulty- Respiratory Therapy support as indicated- Manage/alleviate anxiety- Monitor for signs/symptoms of CO2 retention  Outcome: Progressing     Problem: SAFETY ADULT - FALL  Goal: Free from fall injury  Description: INTERVENTIONS:  - Assess pt frequently for physical needs  - Identify cognitive and physical deficits and behaviors that affect risk of falls.  - [place] fall precautions as indicated by assessment.  - Educate pt/family on patient safety including physical limitations  - Instruct pt to call for assistance with activity based on assessment  - Modify environment to reduce risk of injury  - Provide assistive devices as appropriate  - Consider OT/PT consult to assist with  strengthening/mobility  - Encourage toileting schedule  Outcome: Progressing     Problem: DISCHARGE PLANNING  Goal: Discharge to home or other facility with appropriate resources  Description: INTERVENTIONS:  - Identify barriers to discharge w/pt and caregiver  - Include patient/family/discharge partner in discharge planning  - Arrange for needed discharge resources and transportation as appropriate  - Identify discharge learning needs (meds, wound care, etc)  - Arrange for interpreters to assist at discharge as needed  - Consider post-discharge preferences of patient/family/discharge partner  - Complete POLST form as appropriate  - Assess patient's ability to be responsible for managing their own health  - Refer to Case Management Department for coordinating discharge planning if the patient needs post-hospital services based on physician/LIP order or complex needs related to functional status, cognitive ability or social support system  Outcome: Progressing     Problem: HEMATOLOGIC - ADULT  Goal: Free from bleeding injury  Description: (Example usage: patient with low platelets)  INTERVENTIONS:  - Avoid intramuscular injections, enemas and rectal medication administration  - Ensure safe mobilization of patient  - Hold pressure on venipuncture sites to achieve adequate hemostasis  - Assess for signs and symptoms of internal bleeding  - Monitor lab trends  - Patient is to report abnormal signs of bleeding to staff  - Avoid use of toothpicks and dental floss  - Use electric shaver for shaving  - Use soft bristle tooth brush  - Limit straining and forceful nose blowing  Outcome: Progressing

## 2025-05-22 NOTE — PROGRESS NOTES
[facility]  part of [facility]    Progress Note    Kelli Fisher Patient Status:  Inpatient    1955 MRN [id number]   Location [facility] 2W/SW Attending Inna Bullock MD   Hosp Day # 10 PCP Taylor Gallardo MD     Subjective:  Pt resting in bed with her son and daughter at bedside. Pt is awake, alert & oriented x 3, calm and appropriate with no neuro deficits observed. She denies pain and SOB at this time. She is comfortable.    Objective:  BP 97/75 (BP Location: Right arm)   Pulse 71   Temp 97.9 °F (36.6 °C) (Temporal)   Resp 22   Ht 5' (1.524 m)   Wt 168 lb 14 oz (76.6 kg)   SpO2 99%   BMI 32.98 kg/m²     Temp (24hrs), Av °F (36.7 °C), Min:97.5 °F (36.4 °C), Max:98.6 °F (37 °C)      Intake/Output:    Intake/Output Summary (Last 24 hours) at 2025 0924  Last data filed at 2025 0807  Gross per 24 hour   Intake 1672.45 ml   Output 1605 ml   Net 67.45 ml       Wt Readings from Last 3 Encounters:   25 168 lb 14 oz (76.6 kg)   25 169 lb (76.7 kg)   25 174 lb 9.6 oz (79.2 kg)       Allergies:  Allergies[1]    Labs:  Lab Results   Component Value Date    WBC 14.6 2025    HGB 9.3 2025    HCT 30.9 2025    .0 2025    CREATSERUM 1.26 2025    BUN 30 2025     2025    K 3.6 2025     2025    CO2 31.0 2025     2025    CA 8.0 2025    PTT 37.0 2025    INR 1.31 2025       Physical Exam:  Blood pressure 97/75, pulse 71, temperature 97.9 °F (36.6 °C), temperature source Temporal, resp. rate 22, height 5' (1.524 m), weight 168 lb 14 oz (76.6 kg), SpO2 99%.  General: NAD  Neck: No JVD  Lungs: Clear and diminished bilaterally   Pericardial chest tube= 120cc/12 hours-no air leak  Right Pleural chest tube=40cc/12 hours-no air leak   Heart: RRR, S1, S2  Abdomen: Soft/Round, NT/ND, BS+x4/no Flatus/no BM   Extremities: Warm, dry, no LE edema bilat  Pulses: 2+  angel DP  Skin: Subxiphoid incision drsg: CDI  Neurological:  AAOx3, MAEW    Assessment/Plan:  S/P URGENT PERICARDIAL WINDOW/DRAINAGE OF EFFUSION/INSERTION OF RIGHT PLEURAL CHEST TUBE POD # 1  Encourage pulm toilet: IS. Currently on 4 liters N/C: wean as able. CXR reviewed  Pain meds as needed  Increase actvity-OOB to chair and ambulate. Explained importance as pt declined getting OOB this AM for breakfast. Will ask PT/OT to eval/treat  Hx: pre op DVT/PE w/IVC filter placed 5/14/25. Continue Scds. Consider initiating AC now that plts trending upward and now >100,000-discussed w/PCCU-APN who will further discuss w/Hospitalist.  Continue chest tubes today  Hx: metastatic endometrial carcinoma w/mgt per Hem/Onc  Hx: AF w/mgt per Cardiology  Hypotension on Iih-sxfyzjdukl-cclekrl- start Midodrine. Once Leon off- may remove A-line   Discharge planning: pt lives with family and has many supportive family members.     Plan of care discussed w/patient/son/daughter/RN and CV Surgeon: Dr. Gigi Shi, APRN  5/22/2025  9:24 AM         [1]   Allergies  Allergen Reactions    Aspirin Tightness in Throat    Penicillins HIVES    Other OTHER (SEE COMMENTS)     Pt states IV steroid allergy, states it makes her breathing worse

## 2025-05-22 NOTE — PLAN OF CARE
Problem: Safety Risk - Non-Violent Restraints  Goal: Patient will remain free from self-harm  Description: INTERVENTIONS:  - Apply the least restrictive restraint to prevent harm  - Notify patient and family of reasons restraints applied  - Assess for any contributing factors to confusion (electrolyte disturbances, delirium, medications)  - Discontinue any unnecessary medical devices as soon as possible  - Assess the patient's physical comfort, circulation, skin condition, hydration, nutrition and elimination needs   - Reorient and redirection as needed  - Assess for the need to continue restraints  Outcome: Completed

## 2025-05-22 NOTE — PAYOR COMM NOTE
--------------  CONTINUED STAY REVIEW    Payor: [employer] MEDICARE ADV PPO  Subscriber #:  [id number]  Authorization Number: [id number]    Admit date: 25  Admit time:  5:28 PM    REVIEW DOCUMENTATION:     CV Surgery    Pt resting in bed with her son and daughter at bedside. Pt is awake, alert & oriented x 3, calm and appropriate with no neuro deficits observed. She denies pain and SOB at this time. She is comfortable.     Objective:  BP 97/75 (BP Location: Right arm)   Pulse 71   Temp 97.9 °F (36.6 °C) (Temporal)   Resp 22   Ht 5' (1.524 m)   Wt 168 lb 14 oz (76.6 kg)   SpO2 99%   BMI 32.98 kg/m²      Temp (24hrs), Av °F (36.7 °C), Min:97.5 °F (36.4 °C), Max:98.6 °F (37 °C)        Intake/Output:     Intake/Output Summary (Last 24 hours) at 2025 0924  Last data filed at 2025 0807      Gross per 24 hour   Intake 1672.45 ml   Output 1605 ml   Net 67.45 ml         Component Value Date     WBC 14.6 2025     HGB 9.3 2025     HCT 30.9 2025     .0 2025     CREATSERUM 1.26 2025     BUN 30 2025      2025     K 3.6 2025      2025     CO2 31.0 2025      2025     CA 8.0 2025     PTT 37.0 2025     INR 1.31 2025         Physical Exam:  Blood pressure 97/75, pulse 71, temperature 97.9 °F (36.6 °C), temperature source Temporal, resp. rate 22, height 5' (1.524 m), weight 168 lb 14 oz (76.6 kg), SpO2 99%.  General: NAD  Neck: No JVD  Lungs: Clear and diminished bilaterally   Pericardial chest tube= 120cc/12 hours-no air leak  Right Pleural chest tube=40cc/12 hours-no air leak   Heart: RRR, S1, S2  Abdomen: Soft/Round, NT/ND, BS+x4/no Flatus/no BM   Extremities: Warm, dry, no LE edema bilat  Pulses: 2+ bilat DP  Skin: Subxiphoid incision drsg: CDI  Neurological:  AAOx3, MAEW     Assessment/Plan:  S/P URGENT PERICARDIAL WINDOW/DRAINAGE OF EFFUSION/INSERTION OF RIGHT PLEURAL CHEST TUBE POD #  PHYSICAL THERAPY TREATMENT NOTE - INPATIENT     Room Number: 225/225-A       Presenting Problem: fall, s/p L WENDY  Co-Morbidities : Anemia    Asthma ([facility])   Back problem   BPH (benign prostatic hyperplasia)   Calculus of kidney   Cataract   Coronary atherosclerosis   Diabetes ([facility])   ESRD (end stage renal disease) on dialysis ([facility])   Essential hypertension   High blood pressure   High cholesterol   History of blood transfusion   Hyperlipidemia   Neuropathy    hands and feet   KRAIG on CPAP   Renal disorder   Sleep apnea   Visual impairment    glasses   Vocal cord paralysis, unilateral partial    Problem List  Principal Problem:    Closed displaced midcervical fracture of left femur with delayed healing  Active Problems:    ESRD (end stage renal disease) ([facility])    Closed fracture of left hip ([facility])    Hyperphosphatemia    Respiratory failure ([facility])    Anemia      PHYSICAL THERAPY ASSESSMENT   Patient demonstrates fair progress this session, goals  remain in progress.      Patient is requiring dependent as a result of the following impairments: decreased functional strength, decreased endurance/aerobic capacity, impaired sitting/standing balance, decreased muscular endurance, and medical status.     Patient continues to function below baseline with bed mobility, transfers, gait, stair negotiation, maintaining seated position, standing prolonged periods, and performing household tasks.  Next session anticipate patient to progress bed mobility, transfers, gait, stair negotiation, maintaining seated position, standing prolonged periods, and performing household tasks.  Physical Therapy will continue to follow patient for duration of hospitalization.    Patient continues to benefit from continued skilled PT services: to promote return to prior level of function and safety with continuous assistance and gradual rehabilitative therapy .    PLAN DURING HOSPITALIZATION  Nursing Mobility Recommendation :  (2 person assist for  1  Encourage pulm toilet: IS. Currently on 4 liters N/C: wean as able. CXR reviewed  Pain meds as needed  Increase actvity-OOB to chair and ambulate. Explained importance as pt declined getting OOB this AM for breakfast. Will ask PT/OT to eval/treat  Hx: pre op DVT/PE w/IVC filter placed 5/14/25. Continue Scds. Consider initiating AC now that plts trending upward and now >100,000-discussed w/PCCU-APN who will further discuss w/Hospitalist.  Continue chest tubes today  Hx: metastatic endometrial carcinoma w/mgt per Hem/Onc  Hx: AF w/mgt per Cardiology  Hypotension on Cyz-pbrxopgbbv-difnssw- start Midodrine. Once Leon off- may remove A-line   Discharge planning: pt lives with family and has many supportive family members.      MEDICATIONS ADMINISTERED IN LAST 1 DAY:  Transfuse RBC       Date Action Dose Route User    5/21/2025 1223 Rate/Dose Change (none) Intravenous Gertrudis Johnson RN    5/21/2025 1208 New Bag (none) Intravenous Gertrudis Johnson RN          acetaminophen (Ofirmev) 10 mg/mL infusion premix 1,000 mg       Date Action Dose Route User    5/22/2025 0805 New Bag 1,000 mg Intravenous Sanaz Collier RN    5/22/2025 0222 New Bag 1,000 mg Intravenous Lyn Cramer RN    5/21/2025 2115 New Bag 1,000 mg Intravenous Lyn Cramer RN          amiodarone (Pacerone) tab 400 mg       Date Action Dose Route User    5/22/2025 0802 Given 400 mg Oral Sanaz Collier RN          atropine sulfate 1 MG/ML injection       Date Action Dose Route User    5/21/2025 1341 Given 0.3 mg Intravenous Neema Lezama MD          bupivacaine 0.25%-EPINEPHrine 1:200,000 (Marcaine-EPINEPHrine) injection       Date Action Dose Route User    5/21/2025 1405 Given 23 mL Subcutaneous (Chest) Bebo Yu MD          chlorhexidine gluconate (Peridex) 0.12 % oral solution 15 mL       Date Action Dose Route User    5/22/2025 0801 Given 15 mL Mouth/Throat Sanaz Collier RN          dexmedeTOMIDine in sodium chloride 0.9% (Precedex) 400  safety)     PT Treatment Plan: Bed mobility;Body mechanics;Coordination;Endurance;Energy conservation;Gait training;Strengthening;Stair training;Transfer training;Balance training  Frequency (Obs): 5x/week     SUBJECTIVE  \"They are not the same, don't compare the two!\" (Patient frustrated about missed therapy due to dialysis yesterday, intermittent agitation throughout, able to de-escalate and re-direct)    OBJECTIVE  Precautions: Limb alert - left;WENDY - anterior    WEIGHT BEARING RESTRICTION  L Lower Extremity: Weight Bearing as Tolerated      PAIN ASSESSMENT   Rating: Unable to rate  Location: LLE  Management Techniques: Activity promotion;Body mechanics;Breathing techniques;Relaxation;Repositioning    BALANCE  Static Sitting: Fair  Dynamic Sitting: Poor  Static Standing: Poor  Dynamic Standing: Dependent    ACTIVITY TOLERANCE  Pulse: 80  Heart Rate Source: Monitor     BP: 143/77 (supine at start, 168/82 seated at end)  BP Location: Left arm  BP Method: Automatic  Patient Position: Sitting     O2 WALK  Oxygen Therapy  SPO2% on Room Air at Rest: 98  SPO2% Ambulation on Room Air: 94    AM-PAC '6-Clicks' INPATIENT SHORT FORM - BASIC MOBILITY  How much difficulty does the patient currently have...  Patient Difficulty: Turning over in bed (including adjusting bedclothes, sheets and blankets)?: Unable   Patient Difficulty: Sitting down on and standing up from a chair with arms (e.g., wheelchair, bedside commode, etc.): Unable   Patient Difficulty: Moving from lying on back to sitting on the side of the bed?: Unable   How much help from another person does the patient currently need...   Help from Another: Moving to and from a bed to a chair (including a wheelchair)?: Total   Help from Another: Need to walk in hospital room?: Total   Help from Another: Climbing 3-5 steps with a railing?: Total     AM-PAC Score:  Raw Score: 6   Approx Degree of Impairment: 100%   Standardized Score (AM-PAC Scale): 23.55   CMS Modifier  (G-Code): CN    FUNCTIONAL ABILITY STATUS  Functional Mobility/Gait Assessment  Gait Assistance: Dependent assistance (moderate assistance x 2 with a close chair follow)  Distance (ft): ~8 feet  Assistive Device: Rolling walker  Pattern:  (step to pattern, decreased pushpa, decreased step length, forward flexed posture, narrow base of support, maximal verbal cues for sequencing and rolling walker management.)  Rolling: maximum assist  Supine to Sit: dependent (maximal assistance x 2)  Sit to Supine:  not tested  Sit to Stand: dependent (maximal assistance x 2)  Bed to chair: dependent (maximal assistance x 2)    Skilled Therapy Provided: Patient received supine in bed at initiation of session agreeable to participation in PT. Patient frustrated about missed therapy due to dialysis yesterday, intermittent agitation throughout, able to de-escalate and re-direct. Spouse at bedside during session. Patient tolerates treatment fairly, demonstrates improvement in activity tolerance in comparison to previous session, however continues to require extensive 2 person assistance for all out of bed mobility. Patient requires maximal assistance x 2 to perform supine to sit transfer. Tolerates edge of bed sitting for ~8 minutes, fluctuating between contact guard and moderate assistance to remain upright. Patient requires maximal assistance x 2 to perform bed to chair transfer. Once seated in bedside recliner able to perform sit to stand transfer with moderate assistance x 2. Ambulates ~8 feet with moderate assistance x 2 and a close chair follow. Patient left in bedside recliner. lines intact, needs in reach and handoff to RN.    The patient's Approx Degree of Impairment: 100% has been calculated based on documentation in the [facility] '6 clicks' Inpatient Daily Activity Short Form.  Research supports that patients with this level of impairment may benefit from LTAC, however given patient's previous level of function will recommend  mcg/100mL infusion premix       Date Action Dose Route User    5/21/2025 2015 Rate/Dose Change 0.2 mcg/kg/hr × 75.2 kg (Dosing Weight) Intravenous Lyn Cramer RN    5/21/2025 1945 Rate/Dose Change 0.3 mcg/kg/hr × 75.2 kg (Dosing Weight) Intravenous Lyn Cramer RN    5/21/2025 1912 Rate/Dose Change 0.4 mcg/kg/hr × 75.2 kg (Dosing Weight) Intravenous Stephie Cooper RN    5/21/2025 1830 Rate/Dose Change 0.6 mcg/kg/hr × 75.2 kg (Dosing Weight) Intravenous Stephie Cooper RN    5/21/2025 1730 Rate/Dose Change 0.5 mcg/kg/hr × 75.2 kg (Dosing Weight) Intravenous Stephie Cooper RN    5/21/2025 1625 Rate/Dose Change 0.4 mcg/kg/hr × 75.2 kg (Dosing Weight) Intravenous Stephie Cooper RN    5/21/2025 1619 New Bag 0.2 mcg/kg/hr × 75.2 kg (Dosing Weight) Intravenous Stephie Cooper RN          docusate (Colace) 50 MG/5ML oral solution 100 mg       Date Action Dose Route User    5/22/2025 0801 Given 100 mg Per NG Tube Sanaz Collier RN          ePHEDrine sulfate 50 mg/mL injection       Date Action Dose Route User    5/21/2025 1342 Given 10 mg Intravenous Neema Lezama MD    5/21/2025 1334 Given 10 mg Intravenous Neema Lezama MD    5/21/2025 1326 Given 5 mg Intravenous Neema Lezama MD          etomidate (Amidate) 2 mg/mL injection       Date Action Dose Route User    5/21/2025 1329 Given 14 mg Intravenous Neema Lezama MD          fentaNYL (Sublimaze) 50 mcg/mL injection       Date Action Dose Route User    5/21/2025 1326 Given 50 mcg Intravenous Neema Lezama MD          levothyroxine (Synthroid) tab 100 mcg       Date Action Dose Route User    5/22/2025 0553 Given 100 mcg Oral Lyn Cramer RN          midazolam (Versed) 2 MG/2ML injection       Date Action Dose Route User    5/21/2025 1312 Given 1 mg Intravenous Neema Lezama MD    5/21/2025 1304 Given 1 mg Intravenous Neema Lezama MD          pantoprazole (Protonix) 40 mg in sodium chloride 0.9% PF 10 mL IV push       Date Action Dose Route  User    5/22/2025 0802 Given 40 mg Intravenous Sanaz Collier RN          phenylephrine (Leon-Synephrine) 50 mg in sodium chloride 0.9% 250 mL infusion       Date Action Dose Route User    5/22/2025 0800 New Bag 75 mcg/min Intravenous Sanaz Collier RN    5/22/2025 0240 Rate/Dose Change 50 mcg/min Intravenous Lyn Cramer RN    5/22/2025 0002 Rate/Dose Change 40 mcg/min Intravenous Lyn Cramer RN    5/21/2025 2045 Rate/Dose Change 50 mcg/min Intravenous Lyn Cramer RN    5/21/2025 1625 Rate/Dose Change 30 mcg/min Intravenous Stephie Cooper RN    5/21/2025 1545 New Bag 50 mcg/min Intravenous Stephie Cooper RN          potassium chloride 40 mEq in 250mL sodium chloride 0.9% IVPB premix       Date Action Dose Route User    5/22/2025 0658 New Bag 40 mEq Intravenous Lyn Cramer RN          rocuronium (Zemuron) 50 mg/5mL injection       Date Action Dose Route User    5/21/2025 1338 Given 30 mg Intravenous Neema Lezama MD          sodium chloride 0.9% infusion       Date Action Dose Route User    5/21/2025 1218 New Bag (none) Intravenous Gertrudis Johnson RN          sodium chloride 0.9 % IV bolus 250 mL       Date Action Dose Route User    5/21/2025 1537 New Bag 250 mL Intravenous Stephie Cooper RN          sugammadex (Bridion) 200 MG/2ML injection       Date Action Dose Route User    5/21/2025 1353 Given 150 mg Intravenous Neema Lezama MD          traMADol (Ultram) tab 50 mg       Date Action Dose Route User    5/22/2025 0802 Given 50 mg Oral Sanaz Collier RN          vancomycin (Vancocin) 1,000 mg in sodium chloride 0.9% 250 mL IVPB-ADDV       Date Action Dose Route User    5/22/2025 0555 New Bag 1,000 mg Intravenous Lyn Cramer RN            Vitals (last day)       Date/Time Temp Pulse Resp BP SpO2 Weight O2 Device O2 Flow Rate (L/min) Who    05/22/25 0900 -- 76 19 91/71 99 % -- High flow nasal cannula 4 L/min     05/22/25 0807 -- 71 22 -- 99 % -- -- --     05/22/25 0800 97.9  subacute rehab.  Final disposition will be made by interdisciplinary medical team.    THERAPEUTIC EXERCISES  Lower Extremity Ankle pumps  Knee extension     Position Sitting       Patient End of Session: Up in chair;Needs met;Call light within reach;RN aware of session/findings;All patient questions and concerns addressed;Hospital anti-slip socks;Alarm set;Family present    CURRENT GOALS   Goals to be met by: 10/22/24  Patient Goal Patient's self-stated goal is: to return to PLOF   Goal #1 Patient is able to demonstrate supine - sit EOB @ level: moderate assistance      Goal #1   Current Status  max A x 2   Goal #2 Patient is able to demonstrate transfers EOB to/from Chair/Wheelchair at assistance level: moderate assistance with  least restrictive device      Goal #2  Current Status  max A x 2   Goal #3 Patient is able to ambulate >10 feet with assist device:  least restrictive device  at assistance level: moderate assistance   Goal #3   Current Status  8 feet Mod A x 2 with a close chair follow   Goal #4     Goal #4   Current Status     Goal #5 Patient to demonstrate independence with home activity/exercise instructions provided to patient in preparation for discharge.   Goal #5   Current Status  Progressing   Goal #6     Goal #6  Current Status       Gait Trainin minutes  Therapeutic Activity: 15 minutes    Amaris Martinez PT, DPT     °F (36.6 °C) 72 21 97/75 100 % -- High flow nasal cannula 4 L/min     05/22/25 0700 -- 72 18 100/71 100 % -- High flow nasal cannula 4 L/min     05/22/25 0600 -- 73 18 101/70 99 % 168 lb 14 oz (76.6 kg) High flow nasal cannula 4 L/min     05/22/25 0500 -- 72 17 82/61 98 % -- High flow nasal cannula 4 L/min     05/22/25 0400 98.2 °F (36.8 °C) 65 14 90/65 98 % -- High flow nasal cannula 4 L/min     05/22/25 0300 -- 64 14 94/65 98 % -- High flow nasal cannula 4 L/min     05/22/25 0240 -- 68 18 -- 100 % -- -- --     05/22/25 0200 -- 71 15 92/62 99 % -- High flow nasal cannula 4 L/min     05/22/25 0100 -- 71 15 90/63 100 % -- High flow nasal cannula 4 L/min     05/22/25 0000 98 °F (36.7 °C) 73 14 107/73 100 % -- High flow nasal cannula 5 L/min     05/21/25 2300 -- 84 19 90/57 96 % -- High flow nasal cannula 5 L/min     05/21/25 2200 -- 62 15 135/93 100 % -- High flow nasal cannula 5 L/min     05/21/25 2100 -- 83 15 98/72 96 % -- High flow nasal cannula 6 L/min     05/21/25 2050 -- 77 19 -- 99 % -- High flow nasal cannula 6 L/min     05/21/25 2000 98.6 °F (37 °C) 80 14 107/80 100 % -- Ventilator -- MJ    05/21/25 1900 -- 88 23 97/77 100 % -- Ventilator -- LC    05/21/25 1830 -- -- -- -- 100 % -- Ventilator -- LC    05/21/25 1800 -- 87 10 130/96 100 % -- Ventilator -- LC    05/21/25 1730 -- 115 16 -- 100 % -- Ventilator -- LC    05/21/25 1700 -- 107 25 109/72 99 % -- Ventilator -- LC    05/21/25 1645 -- 124 23 -- 100 % -- Ventilator -- LC    05/21/25 1630 -- 112 15 -- 100 % -- Ventilator -- LC    05/21/25 1600 97.5 °F (36.4 °C) 104 21 108/69 100 % -- Ventilator -- LC    05/21/25 1545 -- 116 22 -- 100 % -- Ventilator -- LC    05/21/25 1530 -- 103 19 70/56 100 % -- Ventilator -- LC    05/21/25 1515 -- 101 23 87/65 97 % -- Ventilator -- LC    05/21/25 1500 -- 112 26 89/64 98 % -- Ventilator -- LC    05/21/25 1426 98 °F (36.7 °C) 85 22 113/75 98 % -- -- -- SG    05/21/25 1223 97.7 °F (36.5 °C) 84 18  99/70 97 % -- -- -- TJ    05/21/25 1204 97.7 °F (36.5 °C) 83 18 94/64 100 % -- -- -- TJ    05/21/25 1125 98.4 °F (36.9 °C) -- 20 97/72 100 % -- Nasal cannula 4 L/min MJA    05/21/25 0536 97.7 °F (36.5 °C) 86 22 98/66 100 % -- Nasal cannula 4 L/min CM    05/21/25 0300 -- 87 -- -- -- -- -- -- GT          CIWA Scores (since admission)       None          Blood Transfusion Record       Product Unit Status Volume Start End            Transfuse RBC         [id number]  [id number] Completed 05/22/25 0708 411.25 mL 05/21/25 1208 05/21/25 1500     [address] [place] [address]  [id number]  [id number] Completed 05/13/25 1431 335 mL 05/13/25 1144 05/13/25 1428                Transfuse platelets         [id number]  [id number] Completed 05/14/25 1319 200 mL 05/14/25 1116 05/14/25 1316

## 2025-05-22 NOTE — PROGRESS NOTES
Progress Note     Kelli Fisher Patient Status:  Inpatient    1955 MRN [id number]   Location [facility], [place] 4W/SW/SE Attending Gloria Sandoval MD   Hosp Day # 10 PCP Taylor Gallardo MD     Subjective:   S: Patient had procedure yesterday and was intubated post procedure, now extubated.   No cp, no sob     Review of Systems:   10 point ROS completed and was negative, except for pertinent positive and negatives stated in subjective.    Objective:   Vital signs:  Temp:  [97.5 °F (36.4 °C)-98.6 °F (37 °C)] 97.9 °F (36.6 °C)  Pulse:  [] 76  Resp:  [10-26] 19  BP: ()/(56-96) 91/71  SpO2:  [96 %-100 %] 99 %  AO: ()/(45-76) 111/62  FiO2 (%):  [30 %-50 %] 50 %    Wt Readings from Last 6 Encounters:   25 168 lb 14 oz (76.6 kg)   25 169 lb (76.7 kg)   25 174 lb 9.6 oz (79.2 kg)   25 169 lb (76.7 kg)   23 197 lb (89.4 kg)         Physical Exam:    General: No acute distress. Alert , Extubated.        Respiratory: Clear to auscultation bilaterally. No wheezes. No rhonchi.  Cardiovascular: S1, S2. Regular rate and rhythm. No murmurs, rubs or gallops.   Abdomen: Soft, nontender, nondistended.  Positive bowel sounds. No rebound or guarding.  Neurologic: No focal neurological deficits.   Musculoskeletal: Moves all extremities.  Extremities: No edema.    Results:   Diagnostic Data:      Labs:    Labs Last 24 Hours:   BMP     CBC    Other     Na 141 Cl 104 BUN 30 Glu 129   Hb 9.3   PTT - Procal -   K 3.6 CO2 31.0 Cr 1.26   WBC 14.6 >< .0  INR 1.31 CRP -   Renal Lytes Endo    Hct 30.9   Trop - D dim -   eGFR - Ca 8.0 POC Gluc  120    LFT   pBNP - Lactic -   eGFR AA - PO4 - A1c -   AST - APk - Prot -  LDL -     Mg - TSH -   ALT - T makeda - Alb -        COVID-19 Lab Results    COVID-19  Lab Results   Component Value Date    COVID19 Not Detected 2025       Pro-Calcitonin  No results for input(s): \"PCT\" in the last 168 hours.    Cardiac  No results for input(s): \"TROP\",  \"PBNP\" in the last 168 hours.      Creatinine Kinase  No results for input(s): \"CK\" in the last 168 hours.    Inflammatory Markers  No results for input(s): \"CRP\", \"ORVILLE\", \"LDH\", \"DDIMER\" in the last 168 hours.      Imaging: Imaging data reviewed in Epic.    Medications: Scheduled Medications[1]    Assessment & Plan:   ASSESSMENT / PLAN:     Afib RVR  Pericardial effusion  - cardiology consulted  - IV amio --> now on oral. Avoiding BB, CCB with h/o junctional rhythm   - cont monitor on tele  - no anticoagulation for now due to pancytopenia   - on 5/16 experienced RVR again with hypotension, received digoxin converted to NSR. Remained  hypotensive so sent to stepdown unit. Responded to fluid bolus eventually.   - Transferred back to medical floor, normotensive rates controlled.  - CXR still with pulm edema, cont diuresis as tolerated.    - repeat ECHO showing large pericardial effusion   -s/p Urgent pericardial window on 5/21  -     BL PE: acute small segmental/right lower extremity DVT  S/p IVC 5/14/2025  Not on a/c due to thrombocytopenia  D/w Cardiology Dr Huerta, plans to resume once ok with hemeonc   Dr Peña -hemeonc paged -> Ok with Heme onc to resume Eliquis once Plt >50K        Metastatic endometrial cancer    Pancytopenia  Acute on chronic anemia of chronic disease  - s/p 1 unit prbc  - neutropenic precautions  - started neupogen until ANC 1500  - Plan is for further chemotherapy once recovered clinically per Dr. Herring.  - cefepime and vanco started for neutropenia and patient with cough/chills, elevated LA, possible early sepsis - pulm following for this as well  -Pancytopenia improving, monitor daily  - Overall plan of care is to continue to treat medically over the next several days and monitor for improvement.  If patient declines may consider palliative conversations.  If she improves overall plan is to resume chemotherapy in the future as planned by Dr. Herring.  - s/p 1 unit PRBC   -hb stable       Acute on chronic respiratory failure  - due to 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, pulm edema   - on 4-5L NC baseline  - pulmonary following, weaning oxygen as tolerated. Cont lasix.       Thrush  - nystatin    Deconditioning  PT OT        Dispo: PT recommending EDUARDO, patient to discuss with family. Have previously refused    Son at bedside , d/w him current treatment and plan.     MDM: High   I personally spent time on chart/note review, review of labs/imaging, discussion with patient, physical exam, discussion with staff, consultants, coordinating care, writing progress note, and discussion of plan of care.      Inna Bullock MD    Supplementary Documentation:                         [1]    potassium chloride  40 mEq Intravenous Once    midodrine  10 mg Oral TID    apixaban  5 mg Oral BID    acetaminophen  1,000 mg Intravenous Q6H    traMADol  50 mg Oral 2 times per day    senna  10 mL Per NG Tube BID    docusate  100 mg Per NG Tube BID    chlorhexidine gluconate  15 mL Mouth/Throat BID@0800,2000    mineral oil-white petrolatum  1 Application Both Eyes Nightly    pantoprazole  40 mg Intravenous Daily    vancomycin  1,000 mg Intravenous Q36H    amiodarone  400 mg Oral BID with meals    [Held by provider] insulin aspart  1-5 Units Subcutaneous TID CC and HS    levothyroxine  100 mcg Oral Before breakfast

## 2025-05-22 NOTE — PLAN OF CARE
Pt remains hemodynamically stable. Weaning down Phenylephrine infusion as tolerated, started PO Midodrine started today.  Chest tubes continued to -20 mm/Hg continuous suction as ordered, minimal output noted.  Pt continued to present with poor PO intake and generalized deconditioning. Physical therapy/ Occupational therapy evaluation ordered today.   Attempted to get the Pt out of bed & transfer to the bedside recliner. After 1 minute of dangling, she communicated with staff she was unable to transfer to the chair stating could not tolerate the discomfort of the chest tubes. Updated CV Surgery APN.    Problem: Patient Centered Care  Goal: Patient preferences are identified and integrated in the patient's plan of care  Description: Interventions:- What would you like us to know as we care for you?  - Provide timely, complete, and accurate information to patient/family- Incorporate patient and family knowledge, values, beliefs, and cultural backgrounds into the planning and delivery of care- Encourage patient/family to participate in care and decision-making at the level they choose- Honor patient and family perspectives and choices  Outcome: Progressing    Problem: SAFETY ADULT - FALL  Goal: Free from fall injury  Description: INTERVENTIONS:  - Assess pt frequently for physical needs  - Identify cognitive and physical deficits and behaviors that affect risk of falls.  - [place] fall precautions as indicated by assessment.  - Educate pt/family on patient safety including physical limitations  - Instruct pt to call for assistance with activity based on assessment  - Modify environment to reduce risk of injury  - Provide assistive devices as appropriate  - Consider OT/PT consult to assist with strengthening/mobility  - Encourage toileting schedule  Outcome: Progressing     Problem: RISK FOR INFECTION - ADULT  Goal: Absence of fever/infection during anticipated neutropenic period  Description: INTERVENTIONS- Monitor  WBC- Administer growth factors as ordered- Implement neutropenic guidelines  Outcome: Progressing     Problem: CARDIOVASCULAR - ADULT  Goal: Maintains optimal cardiac output and hemodynamic stability  Description: INTERVENTIONS:- Monitor vital signs, rhythm, and trends- Monitor for bleeding, hypotension and signs of decreased cardiac output- Evaluate effectiveness of vasoactive medications to optimize hemodynamic stability- Monitor arterial and/or venous puncture sites for bleeding and/or hematoma- Assess quality of pulses, skin color and temperature- Assess for signs of decreased coronary artery perfusion - ex. Angina- Evaluate fluid balance, assess for edema, trend weights  Outcome: Progressing  Goal: Absence of cardiac arrhythmias or at baseline  Description: INTERVENTIONS:- Continuous cardiac monitoring, monitor vital signs, obtain 12 lead EKG if indicated- Evaluate effectiveness of antiarrhythmic and heart rate control medications as ordered- Initiate emergency measures for life threatening arrhythmias- Monitor electrolytes and administer replacement therapy as ordered  Outcome: Progressing     Problem: RESPIRATORY - ADULT  Goal: Achieves optimal ventilation and oxygenation  Description: INTERVENTIONS:- Assess for changes in respiratory status- Assess for changes in mentation and behavior- Position to facilitate oxygenation and minimize respiratory effort- Oxygen supplementation based on oxygen saturation or ABGs- Provide Smoking Cessation handout, if applicable- Encourage broncho-pulmonary hygiene including cough, deep breathe, Incentive Spirometry- Assess the need for suctioning and perform as needed- Assess and instruct to report SOB or any respiratory difficulty- Respiratory Therapy support as indicated- Manage/alleviate anxiety- Monitor for signs/symptoms of CO2 retention  Outcome: Progressing     Problem: HEMATOLOGIC - ADULT  Goal: Free from bleeding injury  Description: (Example usage: patient with low  platelets)  INTERVENTIONS:  - Avoid intramuscular injections, enemas and rectal medication administration  - Ensure safe mobilization of patient  - Hold pressure on venipuncture sites to achieve adequate hemostasis  - Assess for signs and symptoms of internal bleeding  - Monitor lab trends  - Patient is to report abnormal signs of bleeding to staff  - Avoid use of toothpicks and dental floss  - Use electric shaver for shaving  - Use soft bristle tooth brush  - Limit straining and forceful nose blowing  Outcome: Progressing  Goal: Maintains hematologic stability  Description: INTERVENTIONS  - Assess for signs and symptoms of bleeding or hemorrhage  - Monitor labs and vital signs for trends  - Administer supportive blood products/factors, fluids and medications as ordered and appropriate  - Administer supportive blood products/factors as ordered and appropriate  Outcome: Progressing  Goal: Free from bleeding injury  Description: (Example usage: patient with low platelets)  INTERVENTIONS:  - Avoid intramuscular injections, enemas and rectal medication administration  - Ensure safe mobilization of patient  - Hold pressure on venipuncture sites to achieve adequate hemostasis  - Assess for signs and symptoms of internal bleeding  - Monitor lab trends  - Patient is to report abnormal signs of bleeding to staff  - Avoid use of toothpicks and dental floss  - Use electric shaver for shaving  - Use soft bristle tooth brush  - Limit straining and forceful nose blowing  Outcome: Progressing     Problem: GASTROINTESTINAL - ADULT  Goal: Minimal or absence of nausea and vomiting  Description: INTERVENTIONS:  - Maintain adequate hydration with IV or PO as ordered and tolerated  - Nasogastric tube to low intermittent suction as ordered  - Evaluate effectiveness of ordered antiemetic medications  - Provide nonpharmacologic comfort measures as appropriate  - Advance diet as tolerated, if ordered  - Obtain nutritional consult as  needed  - Evaluate fluid balance  Outcome: Progressing  Goal: Maintains or returns to baseline bowel function  Description: INTERVENTIONS:  - Assess bowel function  - Maintain adequate hydration with IV or PO as ordered and tolerated  - Evaluate effectiveness of GI medications  - Encourage mobilization and activity  - Obtain nutritional consult as needed  - Establish a toileting routine/schedule  - Consider collaborating with pharmacy to review patient's medication profile  Outcome: Progressing  Goal: Maintains adequate nutritional intake (undernourished)  Description: INTERVENTIONS:  - Monitor percentage of each meal consumed  - Identify factors contributing to decreased intake, treat as appropriate  - Assist with meals as needed  - Monitor I&O, WT and lab values  - Obtain nutritional consult as needed  - Optimize oral hygiene and moisture  - Encourage food from home; allow for food preferences  - Enhance eating environment  Outcome: Progressing     Problem: GENITOURINARY - ADULT  Goal: Absence of urinary retention  Description: INTERVENTIONS:  - Assess patient’s ability to void and empty bladder  - Monitor intake/output and perform bladder scan as needed  - Follow urinary retention protocol/standard of care  - Consider collaborating with pharmacy to review patient's medication profile  - Implement strategies to promote bladder emptying  Outcome: Progressing     Problem: METABOLIC/FLUID AND ELECTROLYTES - ADULT  Goal: Glucose maintained within prescribed range  Description: INTERVENTIONS:- Monitor Blood Glucose as ordered- Assess for signs and symptoms of hyperglycemia and hypoglycemia- Administer ordered medications to maintain glucose within target range- Assess barriers to adequate nutritional intake and initiate nutrition consult as needed- Instruct patient on self management of diabetes  Outcome: Progressing  Goal: Electrolytes maintained within normal limits  Description: INTERVENTIONS:  - Monitor labs and  rhythm and assess patient for signs and symptoms of electrolyte imbalances  - Administer electrolyte replacement as ordered  - Monitor response to electrolyte replacements, including rhythm and repeat lab results as appropriate  - Fluid restriction as ordered  - Instruct patient on fluid and nutrition restrictions as appropriate  Outcome: Progressing  Goal: Hemodynamic stability and optimal renal function maintained  Description: INTERVENTIONS:  - Monitor labs and assess for signs and symptoms of volume excess or deficit  - Monitor intake, output and patient weight  - Monitor urine specific gravity, serum osmolarity and serum sodium as indicated or ordered  - Monitor response to interventions for patient's volume status, including labs, urine output, blood pressure (other measures as available)  - Encourage oral intake as appropriate  - Instruct patient on fluid and nutrition restrictions as appropriate  Outcome: Progressing     Problem: SKIN/TISSUE INTEGRITY - ADULT  Goal: Skin integrity remains intact  Description: INTERVENTIONS  - Assess and document risk factors for pressure ulcer development  - Assess and document skin integrity  - Monitor for areas of redness and/or skin breakdown  - Initiate interventions, skin care algorithm/standards of care as needed  Outcome: Progressing  Goal: Incision(s), wounds(s) or drain site(s) healing without S/S of infection  Description: INTERVENTIONS:  - Assess and document risk factors for pressure ulcer development  - Assess and document skin integrity  - Assess and document dressing/incision, wound bed, drain sites and surrounding tissue  - Implement wound care per orders  - Initiate isolation precautions as appropriate  - Initiate Pressure Ulcer prevention bundle as indicated  Outcome: Progressing     Problem: MUSCULOSKELETAL - ADULT  Goal: Return mobility to safest level of function  Description: INTERVENTIONS:  - Assess patient stability and activity tolerance for  standing, transferring and ambulating w/ or w/o assistive devices  - Assist with transfers and ambulation using safe patient handling equipment as needed  - Ensure adequate protection for wounds/incisions during mobilization  - Obtain PT/OT consults as needed  - Advance activity as appropriate  - Communicate ordered activity level and limitations with patient/family  Outcome: Progressing  Goal: Maintain proper alignment of affected body part  Description: INTERVENTIONS:  - Support and protect limb and body alignment per provider's orders  - Instruct and reinforce with patient and family use of appropriate assistive device and precautions (e.g. spinal or hip dislocation precautions)  Outcome: Progressing     Problem: DISCHARGE PLANNING  Goal: Discharge to home or other facility with appropriate resources  Description: INTERVENTIONS:  - Identify barriers to discharge w/pt and caregiver  - Include patient/family/discharge partner in discharge planning  - Arrange for needed discharge resources and transportation as appropriate  - Identify discharge learning needs (meds, wound care, etc)  - Arrange for interpreters to assist at discharge as needed  - Consider post-discharge preferences of patient/family/discharge partner  - Complete POLST form as appropriate  - Assess patient's ability to be responsible for managing their own health  - Refer to Case Management Department for coordinating discharge planning if the patient needs post-hospital services based on physician/LIP order or complex needs related to functional status, cognitive ability or social support system  Outcome: Progressing    Sanaz Collier, RN, BSN, CCRN

## 2025-05-22 NOTE — PROGRESS NOTES
[facility]  part of [facility]    Cardiology Progress Note    Kelli Fisher Patient Status:  Inpatient    1955 MRN [id number]   Location [facility] 2W/SW Attending Inna Bullock MD   Hosp Day # 10 PCP Taylor Gallardo MD     Interval History   S/p pericardial window + R pleural chest tube placement  Reports breathing slightly improved  No chest pain    Assessment and Plan:   Pancytopenia, improving  Endometrial cancer with lung metastasis    Shock, likely multifactorial  -weaning phenylephrine, on midodrine  -no s/sx of decompensation from cardiac standpoint, especially post pericardial window for tamponade    Large pericardial effusion with tamponade  Pleural effusion s/p CT  -enlarging pericardial effusion with new signs c/w tamponade physiology on 25  -underwent pericardial window on 25  -etiology likely 2/t metastatic disease, will follow pathology  -CT management per CVS    Bilateral PE, s/p IVC filter  -initially not on AC due to severe anemia + thrombocytopenia, however counts have improved  -start AC with apixaban 5mg bid    Paroxysmal AF/AT  -developed rapid AF yesterday post pericardial window  -continue PO amiodarone, rate controlled  -starting AC, as above    Objective:     Patient Vitals for the past 24 hrs:   BP Temp Temp src Pulse Resp SpO2 Weight   25 1000 97/70 -- -- 76 19 100 % --   25 0900 91/71 -- -- 76 19 99 % --   25 0807 -- -- -- 71 22 99 % --   25 0800 97/75 97.9 °F (36.6 °C) Temporal 72 21 100 % --   25 0700 100/71 -- -- 72 18 100 % --   25 0600 101/70 -- -- 73 18 99 % 168 lb 14 oz (76.6 kg)   25 0500 (!) 82/61 -- -- 72 17 98 % --   25 0400 90/65 98.2 °F (36.8 °C) Temporal 65 14 98 % --   25 0300 94/65 -- -- 64 14 98 % --   25 0240 -- -- -- 68 18 100 % --   25 0200 92/62 -- --  15 99 % --   25 0100 90/63 -- -- 71 15 100 % --   25 0000 107/73 98 °F (36.7 °C) Temporal  73 14 100 % --   05/21/25 2300 90/57 -- -- 84 19 96 % --   05/21/25 2200 (!) 135/93 -- -- 62 15 100 % --   05/21/25 2100 98/72 -- -- 83 15 96 % --   05/21/25 2050 -- -- -- 77 19 99 % --   05/21/25 2000 107/80 98.6 °F (37 °C) Temporal 80 14 100 % --   05/21/25 1900 97/77 -- -- 88 23 100 % --   05/21/25 1830 -- -- -- -- -- 100 % --   05/21/25 1800 (!) 130/96 -- -- 87 10 100 % --   05/21/25 1730 -- -- -- 115 16 100 % --   05/21/25 1700 109/72 -- -- 107 25 99 % --   05/21/25 1645 -- -- -- (!) 124 23 100 % --   05/21/25 1630 -- -- -- 112 15 100 % --   05/21/25 1600 108/69 97.5 °F (36.4 °C) Temporal 104 21 100 % --   05/21/25 1545 -- -- -- 116 22 100 % --   05/21/25 1530 (!) 70/56 -- -- 103 19 100 % --   05/21/25 1515 (!) 87/65 -- -- 101 23 97 % --   05/21/25 1500 (!) 89/64 -- -- 112 26 98 % --   05/21/25 1426 113/75 98 °F (36.7 °C) -- 85 22 98 % --   05/21/25 1223 99/70 97.7 °F (36.5 °C) Oral 84 18 97 % --       Intake/Output:   Last 3 shifts:   Intake/Output                   05/20/25 0700 - 05/21/25 0659 05/21/25 0700 - 05/22/25 0659 05/22/25 0700 - 05/23/25 0659       Intake    P.O.  --  --  260    P.O. -- -- 260    I.V.  --  1071.2  75    I.V. -- 863 --    Volume (mL) Phenylephrine -- 177.6 75    Volume (mL) Dexmedetomidine -- 30.6 --    Blood  --  411.3  --    Volume (Transfuse RBC) -- 411.3 --    IV PIGGYBACK  100  --  100    Volume (mL) (ceFEPIme (Maxipime) 2 g in sodium chloride 0.9% 100mL IVPB-YANA) 100 -- --    Volume (mL) (acetaminophen (Ofirmev) 10 mg/mL infusion premix 1,000 mg) -- -- 100    Total Intake 100 1482.5 435       Output    Urine  1000  575  225    Incontinent Urine Occurrence 1 x -- --    Output (mL) (External Urinary Catheter) 1000 575 225    Emesis/NG output  --  --  0    Emesis -- -- 0    Other  --  500  --    Other -- 500 --    Stool  --  --  --    Stool Count Calculated for I/O 2 x -- --    Chest Tube  --  300  20    Output (mL) (Chest Tube 1 Anterior Pleural 28 Fr.) -- 50 10    Output (mL)  (Chest Tube 1 Anterior Mediastinal 24 Fr.) -- 250 10    Total Output 1000 1375 245       Net I/O     -900 107.5 190             Vent Settings: Vent Mode: PS;CPAP  FiO2 (%):  [30 %-50 %] 50 %  S RR:  [14] 14  S VT:  [400 mL] 400 mL  PEEP/CPAP (cm H2O):  [5 cm H20] 5 cm H20  MAP (cm H2O):  [6.6-13] 6.6    Hemodynamic parameters (last 24 hours):      Scheduled Meds: Scheduled Medications[1]    Continuous Infusions: Medication Infusions[2]    Results:     Lab Results   Component Value Date    WBC 14.6 (H) 05/22/2025    HGB 9.3 (L) 05/22/2025    HCT 30.9 (L) 05/22/2025    .0 (L) 05/22/2025    CREATSERUM 1.26 (H) 05/22/2025    BUN 30 (H) 05/22/2025     05/22/2025    K 3.6 05/22/2025     05/22/2025    CO2 31.0 05/22/2025     (H) 05/22/2025    CA 8.0 (L) 05/22/2025    ALB 3.2 05/12/2025    ALKPHO 142 05/12/2025    BILT 0.7 05/12/2025    TP 6.8 05/12/2025    AST 26 05/12/2025    ALT 10 05/12/2025    PTT 37.0 (H) 05/21/2025    INR 1.31 (H) 05/22/2025    TSH 3.705 05/02/2025    LIP 42 12/11/2023    DDIMER 3.05 (H) 05/13/2025    MG 2.2 05/19/2025    B12 >2,000 (H) 05/02/2025       Recent Labs   Lab 05/19/25  0536 05/20/25  0548 05/21/25  0552 05/22/25  0505   * 156*  --  129*   BUN 31* 34*  --  30*   CREATSERUM 1.16* 1.32* 1.43* 1.26*   CA 8.9 8.5*  --  8.0*    141  --  141   K 3.7  3.7 3.7  3.7 3.8 3.6    99  --  104   CO2 36.0* 35.0*  --  31.0     Recent Labs   Lab 05/21/25  0552 05/21/25  1522 05/22/25  0505   RBC 2.85* 3.24* 3.60*   HGB 7.1* 8.6*  8.5* 9.3*   HCT 23.5* 27.1* 30.9*   MCV 82.5 83.6 85.8   MCH 24.9* 26.5 25.8*   MCHC 30.2* 31.7 30.1*   RDW 21.2* 20.4* 20.1*   NEPRELIM 7.07 8.17* 10.76*   WBC 10.9 12.6* 14.6*   .0* 115.0* 145.0*       No results for input(s): \"BNPML\" in the last 168 hours.    No results for input(s): \"TROP\", \"CK\" in the last 168 hours.    XR CHEST AP PORTABLE  (CPT=71045)  Result Date: 5/22/2025  CONCLUSION: Stable left basal/perihilar  pulmonary opacity which which could represent pneumonia or neoplasm.  Stable small bilateral pleural effusions slightly larger on the left.  Mild interstitial edema. Interval extubation   Dictated by (CST): Sincere Canales MD on 5/22/2025 at 7:38 AM     Finalized by (CST): Sincere Canales MD on 5/22/2025 at 7:40 AM          XR CHEST AP PORTABLE  (CPT=71045)  Result Date: 5/21/2025  CONCLUSION:  1.  Post pericardial window.  Pericardial drain over the lower aspect of the cardiac silhouette. 2.  Interval intubation.  Endotracheal tube is present over the trachea in gross satisfactory position 33 mm above the christian. 3.  Small bilateral pleural effusions, with decrease in size of left effusion.  Nonspecific left perihilar pulmonary opacity which could represent atelectasis, pneumonia, or other inflammatory/neoplastic process.    Dictated by (CST): Sincere Canales MD on 5/21/2025 at 3:15 PM     Finalized by (CST): Sincere Canales MD on 5/21/2025 at 3:17 PM          XR CHEST AP PORTABLE  (CPT=71045)  Result Date: 5/21/2025  CONCLUSION:   1. Redemonstration of increased density within the left lung (mid and lower regions).  The findings are most compatible with a combination of infiltrate, atelectasis, and small to moderate effusion.   2. Cardiac enlargement.  There is widening of the mediastinum likely representing adenopathy.    Dictated by (CST): Kaleb Epps MD on 5/21/2025 at 9:56 AM     Finalized by (CST): Kaleb Epps MD on 5/21/2025 at 9:59 AM          EKG 12 Lead  Result Date: 5/21/2025  Atrial fibrillation with rapid ventricular response Nonspecific T wave abnormality Abnormal ECG When compared with ECG of 21-MAY-2025 10:59, Atrial fibrillation has replaced Sinus rhythm Confirmed by TRINI ROSS, JOSLYN ([id number]) on 5/21/2025 5:18:23 PM    EKG 12 Lead  Result Date: 5/21/2025  Normal sinus rhythm Low voltage QRS, consider pulmonary disease, pericardial effusion, or normal variant Nonspecific ST and T wave abnormality  Abnormal ECG When compared with ECG of 17-MAY-2025 09:44, No significant change was found Confirmed by CALIXTO MAGAÑA ([id number]) on 2025 2:09:04 PM    Wanny[facility] (CPT=93308/92148/96886)  Result Date: 2025  Transthoracic Echocardiogram Name:Kelli Fisher Date: 2025 :  1955 Ht:  (60in)  BP: 98 / 72 MRN:  [id number]    Age:  70years    Wt:  (167lb) HR: 89bpm Loc:  [facility]       Gndr: F          BSA: 1.73m^2 Sonographer: Chris DOMINGUEZ Ordering:    Srinath Levy Consulting:  Trista Peña ---------------------------------------------------------------------------- History/Indications:  Reevaluate enlarging pericardial effusion. Acute respiratory failure with hypoxia. Shortness of breath. ---------------------------------------------------------------------------- Procedure information:  A transthoracic echocardiogram, limited study was performed. Additional evaluation included M-mode and limited 2D.  Patient status:  Inpatient.  Location:  Bedside.    Comparison was made to the study of 2025.    This was a routine study. Transthoracic echocardiography for diagnosis and ventricular function evaluation. Image quality was adequate. ECG rhythm:   Normal sinus ---------------------------------------------------------------------------- Conclusions: 1. Left ventricle: The cavity size was normal. Wall thickness was normal.    Systolic function was normal. The estimated ejection fraction was 55-60%,    by visual assessment. Unable to assess LV diastolic function. 2. Pericardium, extracardiac: A large, free-flowing pericardial effusion was    identified. There is RV collapse in early diastolic for less than 50% of    the cardiac cycle. There was evidence for mildly increased RV-LV    interaction demonstrated by respirophasic changes in transmitral    velocities (25% reduction in mitral valve e'wave velocity during    inspiration). There was a left pleural effusion. 3. Inferior vena cava: The IVC was  dilated (23mm). Respirophasic diameter    changes are blunted (< 50%), consistent with elevated central venous    pressure. Impressions:  This study is compared with previous dated 5/13/2025: The pericardial effusion has slightly increased, now with maximal dimensions in diastole of 20mm with evidence of early tamponade (25% reduction in mitral valve inflow velocity and right ventricle free wall collapse in early diastole). * ---------------------------------------------------------------------------- * Findings: Left ventricle:  The cavity size was normal. Wall thickness was normal. Systolic function was normal. The estimated ejection fraction was 55-60%, by visual assessment. Unable to assess LV diastolic function. Right ventricle:  The cavity size was normal. Systolic function was normal. Systolic pressure was not estimated. Mitral valve:  The leaflets were mildly calcified. Aortic valve:   The valve was trileaflet. The leaflets were mildly calcified. Pericardium:  A large, free-flowing pericardial effusion was identified. Doppler:  There is RV collapse in early diastolic for less than 50% of the cardiac cycle. There was evidence for mildly increased RV-LV interaction demonstrated by respirophasic changes in transmitral velocities (25% reduction in mitral valve e'wave velocity during inspiration). Pleura:  There was a left pleural effusion. Aorta: Aortic root: The aortic root was normal. Ascending aorta: The ascending aorta was normal. Systemic veins: Inferior vena cava: The IVC was dilated (23mm).  Respirophasic diameter changes are blunted (< 50%), consistent with elevated central venous pressure. ---------------------------------------------------------------------------- Measurements  Left ventricle          Value       Ref       05/13/2025  IVS thickness, ED,  (H) 1.0   cm    0.6 - 0.9 1.3  PLAX  LV ID, ED, PLAX     (L) 3.6   cm    3.8 - 5.2 4.0  LV ID, ES, PLAX         2.5   cm    2.2 - 3.5 2.4  LV PW  thickness,    (H) 1.0   cm    0.6 - 0.9 1.1  ED, PLAX  IVS/LV PW ratio,        1.00        --------- 1.18  ED, PLAX  LV PW/LV ID ratio,      0.28        --------- 0.28  ED, PLAX  LV ejection             59    %     54 - 74   71  fraction  LVOT                    Value       Ref       2025  LVOT ID                 2     cm    --------- 2  Aortic root             Value       Ref       2025  Aortic root ID          3.3   cm    2.5 - 3.9 ----------  Ascending aorta         Value       Ref       2025  Ascending aorta ID  (H) 3.6   cm    1.9 - 3.5 ----------  Systemic veins          Value       Ref       2025  Estimated CVP           15    mm Hg --------- 15  Inferior vena cava      Value       Ref       2025  ID                  (H) 2.4   cm    <=2.1     2.3 Legend: (L)  and  (H)  corinna values outside specified reference range. ---------------------------------------------------------------------------- Prepared and electronically signed by Javi Huerta 2025 17:58     CARD ECHO LIMITED (CPT=93308/64208/44044)  Result Date: 2025  Transthoracic Echocardiogram Name:Kelli Fisher Date: 2025 :  1955 Ht:  (60in)  BP: 106 / 75 MRN:  [id number]    Age:  70years    Wt:  (167lb) HR: 85bpm Loc:  [facility]       Gndr: F          BSA: 1.73m^2 Sonographer: Naty PINTO Ordering:    Margarette Sexton Consulting:  Dominick Herring ---------------------------------------------------------------------------- History/Indications:   SOB and known Pericardial Effusion. ---------------------------------------------------------------------------- Procedure information:  A transthoracic echocardiogram, limited study was performed. Additional evaluation included M-mode and limited 2D.  Patient status:  Inpatient.  Location:  Bedside.    Comparison was made to the study of 2025.    This was a routine study. Transthoracic echocardiography for diagnosis. Image quality was adequate.  ---------------------------------------------------------------------------- Conclusions: 1. Left ventricle: The cavity size was normal. Wall thickness was mildly    increased. There was mild concentric hypertrophy. Systolic function was    normal. The estimated ejection fraction was 55-60%, by visual assessment.    Unable to assess LV diastolic function. 2. Right ventricle: The cavity size was normal. Systolic function was    normal. 3. Pericardium, extracardiac: A moderate, free-flowing pericardial effusion    was identified. There was no evidence of hemodynamic compromise. 4. Systemic veins: Central venous respirophasic diameter changes are blunted    (< 50%). 5. Inferior vena cava: The IVC was dilated. * ---------------------------------------------------------------------------- * Findings: Left ventricle:  The cavity size was normal. Wall thickness was mildly increased. There was mild concentric hypertrophy. Systolic function was normal. The estimated ejection fraction was 55-60%, by visual assessment. Unable to assess LV diastolic function. Left atrium:  Well visualized. Right ventricle:  The cavity size was normal. Systolic function was normal. Right atrium:  Well visualized. Mitral valve:  Well visualized. Aortic valve:   The valve was trileaflet. Tricuspid valve:  The annulus is normal-sized. The leaflets are normal thickness. No echocardiographic evidence for tricuspid prolapse.  Doppler: Transvalvular velocity was within the normal range. There was no evidence for stenosis. There was mild regurgitation. Pulmonic valve:    Doppler:  There was trivial regurgitation. Pericardium:  A moderate, free-flowing pericardial effusion was identified. There was no evidence of hemodynamic compromise. Systemic veins:  Central venous respirophasic diameter changes are blunted (< 50%). Inferior vena cava: The IVC was dilated. ---------------------------------------------------------------------------- Measurements  Left  ventricle         Value        Ref       05/05/2025  IVS thickness, ED, (H) 1.3   cm     0.6 - 0.9 1.0  PLAX  LV ID, ED, PLAX        4.0   cm     3.8 - 5.2 3.6  LV ID, ES, PLAX        2.4   cm     2.2 - 3.5 2.4  LV PW thickness,   (H) 1.1   cm     0.6 - 0.9 1.0  ED, PLAX  IVS/LV PW ratio,       1.18         --------- 1.00  ED, PLAX  LV PW/LV ID ratio,     0.28         --------- 0.28  ED, PLAX  LV ejection            71    %      54 - 74   63  fraction  Stroke volume/bsa,     35    ml/m^2 --------- ----------  2D  LVOT                   Value        Ref       05/05/2025  LVOT ID                2     cm     --------- 2  LVOT peak              1.21  m/sec  --------- ----------  velocity, S  LVOT VTI, S            19.4  cm     --------- ----------  LVOT peak              6     mm Hg  --------- ----------  gradient, S  LVOT mean              3     mm Hg  --------- ----------  gradient, S  Stroke volume          61    ml     --------- ----------  (SV), LVOT DP  Stroke index           35    ml/m^2 --------- ----------  (SV/bsa), LVOT DP  Pulmonary artery       Value        Ref       05/05/2025  PA pressure, S, DP     44    mm Hg  --------- ----------  Tricuspid valve        Value        Ref       05/05/2025  Tricuspid regurg       2.68  m/sec  <=2.8     ----------  peak velocity  Tricuspid peak         29    mm Hg  --------- ----------  RV-RA gradient  Systemic veins         Value        Ref       05/05/2025  Estimated CVP          15    mm Hg  --------- 15  Inferior vena cava     Value        Ref       05/05/2025  ID                 (H) 2.3   cm     <=2.1     2.4  Right ventricle        Value        Ref       05/05/2025  TAPSE, 2D              1.93  cm     >=1.70    ----------  TAPSE, MM              1.93  cm     >=1.70    ----------  RV pressure, S, DP     44    mm Hg  --------- ---------- Legend: (L)  and  (H)  corinna values outside specified reference range.  ---------------------------------------------------------------------------- Prepared and electronically signed by Javi Huerta 2025 16:05     [facility] (CPT=93308/98250/94452)  Result Date: 2025  Transthoracic Echocardiogram Name:Kelli Fisher Date: 2025 :  1955 Ht:  (60in)  BP: 142 / 86 MRN:  [id number]    Age:  70years    Wt:  (174lb) HR: 106bpm Loc:  [facility]       Gndr: F          BSA: 1.76m^2 Sonographer: Chris DOMINGUEZ Ordering:    Stella Gomez Consulting:  Khadar Segura ---------------------------------------------------------------------------- History/Indications:   Pericardial effusion. Lung mass. ---------------------------------------------------------------------------- Procedure information:  A transthoracic echocardiogram, limited study was performed. Additional evaluation included M-mode and limited 2D.  Patient status:  Inpatient.  Location:  Bedside.    Comparison was made to the study of 2025.    This was a routine study. Transthoracic echocardiography for diagnosis and ventricular function evaluation. Image quality was adequate. ECG rhythm:   Sinus tachycardia  Study completion:  There were no complications. ---------------------------------------------------------------------------- Conclusions: 1. Left ventricle: The cavity size was normal. Wall thickness was normal.    Systolic function was vigorous. The estimated ejection fraction was    65-70%, by biplane method of disks. No diagnostic evidence for regional    wall motion abnormalities. Unable to assess LV diastolic function. 2. Pericardium, extracardiac: A small to moderate, free-flowing pericardial    effusion was identified circumferential to the heart. Probably not    hemodynamically significant. Impressions:  This study is compared with previous dated 2025: No significant change since prior study. * ---------------------------------------------------------------------------- * Findings: Left  ventricle:  The cavity size was normal. Wall thickness was normal. Systolic function was vigorous. The estimated ejection fraction was 65-70%, by biplane method of disks. No diagnostic evidence for regional wall motion abnormalities. Unable to assess LV diastolic function. Ventricular septum:   Thickness was normal. Left atrium:  The left atrial volume was normal. Right ventricle:  Well visualized. The cavity size was normal. Systolic function was normal. Right atrium:  Well visualized. The atrium was normal in size. Mitral valve:  Well visualized. The leaflets were mildly calcified. Aortic valve:  Well visualized.  The valve was trileaflet. The leaflets were mildly calcified. Tricuspid valve:  Well visualized. Pulmonic valve:   Well visualized. Pericardium:  A small to moderate, free-flowing pericardial effusion was identified circumferential to the heart. Probably not hemodynamically significant. Aorta: Aortic root: The aortic root was normal. Ascending aorta: The ascending aorta was normal. Pulmonary arteries: Systolic pressure could not be accurately estimated. ---------------------------------------------------------------------------- Measurements  Left ventricle         Value        Ref       04/30/2025  IVS thickness, ED, (H) 1.0   cm     0.6 - 0.9 1.0  PLAX  LV ID, ED, PLAX    (L) 3.6   cm     3.8 - 5.2 3.9  LV ID, ES, PLAX        2.4   cm     2.2 - 3.5 2.5  LV PW thickness,   (H) 1.0   cm     0.6 - 0.9 1.0  ED, PLAX  IVS/LV PW ratio,       1.00         --------- 1.00  ED, PLAX  LV PW/LV ID ratio,     0.28         --------- 0.26  ED, PLAX  LV ejection            63    %      54 - 74   66  fraction  LV end-diastolic       62    ml     48 - 140  ----------  volume, 1-p A4C  LV ejection            65    %      46 - 78   ----------  fraction, 1-p A4C  Stroke volume, 1-p     40    ml     --------- ----------  A4C  LV end-diastolic       35    ml/m^2 30 - 82   ----------  volume/bsa, 1-p  A4C  Stroke volume/bsa,      23    ml/m^2 --------- ----------  1-p A4C  LV end-diastolic       61    ml     46 - 106  ----------  volume, 2-p  LV end-systolic        21    ml     14 - 42   ----------  volume, 2-p  LV ejection            65    %      54 - 74   ----------  fraction, 2-p  Stroke volume, 2-p     40    ml     --------- ----------  LV end-diastolic       35    ml/m^2 29 - 61   ----------  volume/bsa, 2-p  LV end-systolic        12    ml/m^2 8 - 24    ----------  volume/bsa, 2-p  Stroke volume/bsa,     22.5  ml/m^2 --------- ----------  2-p  LVOT                   Value        Ref       04/30/2025  LVOT ID                2     cm     --------- ----------  Aortic root            Value        Ref       04/30/2025  Aortic root ID         3.2   cm     2.5 - 4.0 ----------  Ascending aorta        Value        Ref       04/30/2025  Ascending aorta ID (H) 3.7   cm     1.9 - 3.5 ----------  Left atrium            Value        Ref       04/30/2025  LA ID, A-P, ES         2.7   cm     2.7 - 3.8 ----------  LA volume, S           43    ml     22 - 52   ----------  LA volume/bsa, S       24    ml/m^2 16 - 34   ----------  LA volume, ES, 1-p     39    ml     22 - 52   ----------  A4C  LA volume, ES, 1-p     46    ml     22 - 52   ----------  A2C  LA volume, ES, A/L     46    ml     --------- ----------  LA volume/bsa, ES,     26    ml/m^2 16 - 34   ----------  A/L  LA/aortic root         0.84         --------- ----------  ratio  Right atrium           Value        Ref       04/30/2025  RA ID, S-I, ES,        4.8   cm     3.4 - 5.3 ----------  A4C  RA ID/bsa, S-I,        2.7   cm/m^2 1.9 - 3.1 ----------  ES, A4C  RA area, ES, A4C       14    cm^2   10 - 18   ----------  RA volume, ES, 1-p     32    ml     --------- ----------  A4C  RA volume/bsa, ES,     18    ml/m^2 9 - 33    ----------  1-p A4C  Systemic veins         Value        Ref       04/30/2025  Estimated CVP          15    mm Hg  --------- ----------  Inferior vena cava     Value         Ref       2025  ID                 (H) 2.4   cm     <=2.1     2.3 Legend: (L)  and  (H)  corinna values outside specified reference range. ---------------------------------------------------------------------------- Prepared and electronically signed by Margareth Haas 2025 12:36     [employer] (CPT=93308/[id number])  Result Date: 2025  Transthoracic Echocardiogram Name:Kelli Fisher Date: 2025 :  1955 Ht:  (60in)  BP: 106 / 74 MRN:  [id number]    Age:  70years    Wt:  (165lb) HR: 108bpm Loc:  [facility]       Gndr: F          BSA: 1.72m^2 Sonographer: James PINTO RVT Ordering:    Javi Huerta Consulting:  Ector Byrnes ---------------------------------------------------------------------------- History/Indications:  Pericardial effusion. ---------------------------------------------------------------------------- Procedure information:  A transthoracic echocardiogram, limited study was performed. Additional evaluation included M-mode and limited 2D.  Patient status:  Inpatient.  Location:  Bedside.    Comparison was made to the study of 2025.    This was a routine study. Transthoracic echocardiography for ventricular function evaluation and assessment of pericardial effusion and hemodynamic status. Image quality was adequate. ECG rhythm:   Sinus tachycardia ---------------------------------------------------------------------------- Conclusions: 1. Left ventricle: The cavity size was normal. Wall thickness was mildly    increased. Systolic function was normal. The estimated ejection fraction    was 60-65%, by visual assessment. No diagnostic evidence for regional    wall motion abnormalities. Unable to assess LV diastolic function. 2. Pericardium, extracardiac: A small to moderate, free-flowing pericardial    effusion was identified circumferential to the heart. Maximal diameter in    diastole is 1.1cm. Features were not consistent with tamponade    physiology. 3.  Inferior vena cava: The IVC was dilated. Respirophasic diameter changes    are blunted (< 50%), consistent with elevated central venous pressure. * ---------------------------------------------------------------------------- * Findings: Left ventricle:  The cavity size was normal. Wall thickness was mildly increased. Systolic function was normal. The estimated ejection fraction was 60-65%, by visual assessment. No diagnostic evidence for regional wall motion abnormalities. Unable to assess LV diastolic function. Right ventricle:  The cavity size was normal. Mitral valve:  The valve was structurally normal. Aortic valve:  The valve was structurally normal. Tricuspid valve:  The valve is structurally normal. Pulmonic valve:   Not well visualized. Pericardium:  A small to moderate, free-flowing pericardial effusion was identified circumferential to the heart. Maximal diameter in diastole is 1.1cm.  Doppler:  Features were not consistent with tamponade physiology. Systemic veins: Inferior vena cava: The IVC was dilated.  Respirophasic diameter changes are blunted (< 50%), consistent with elevated central venous pressure. ---------------------------------------------------------------------------- Measurements  Left ventricle              Value    Ref      04/12/2025  IVS thickness, ED, PLAX (H) 1.0   cm 0.6 -    0.8                                       0.9  LV ID, ED, PLAX             3.9   cm 3.8 -    4.4                                       5.2  LV ID, ES, PLAX             2.5   cm 2.2 -    2.8                                       3.5  LV PW thickness, ED,    (H) 1.0   cm 0.6 -    0.7  PLAX                                 0.9  IVS/LV PW ratio, ED,        1.00     -------- 1.14  PLAX  LV PW/LV ID ratio, ED,      0.26     -------- 0.16  PLAX  LV ejection fraction        66    %  54 - 74  66  Inferior vena cava          Value    Ref      04/12/2025  ID                      (H) 2.3   cm <=2.1    2.7 Legend: (L)  and   (H)  corinna values outside specified reference range. ---------------------------------------------------------------------------- Prepared and electronically signed by Javi Huerta 04/30/2025 13:43        Exam:     Physical Exam:  General: No apparent distress.   HEENT: Normocephalic, anicteric sclera, neck supple, no thyromegaly or adenopathy.  Neck: No JVD, carotids 2+, no bruits.  Cardiac: Irreg irreg  Lungs: Diminished BS  Abdomen: Soft, non-tender. No organosplenomegally, mass or rebound, BS-present.  Extremities: Without clubbing or cyanosis. No edema.    Neurologic: Alert and oriented, normal affect. No focal defects  Skin: Warm and dry.     Javi Huerta, DO  [facility]       [1]    midodrine  10 mg Oral TID    apixaban  5 mg Oral BID    acetaminophen  1,000 mg Intravenous Q6H    traMADol  50 mg Oral 2 times per day    senna  10 mL Per NG Tube BID    docusate  100 mg Per NG Tube BID    chlorhexidine gluconate  15 mL Mouth/Throat BID@0800,2000    mineral oil-white petrolatum  1 Application Both Eyes Nightly    pantoprazole  40 mg Intravenous Daily    vancomycin  1,000 mg Intravenous Q36H    amiodarone  400 mg Oral BID with meals    [Held by provider] insulin aspart  1-5 Units Subcutaneous TID CC and HS    levothyroxine  100 mcg Oral Before breakfast   [2]    dexmedetomidine Stopped (05/21/25 2040)    phenylephrine 75 mcg/min (05/22/25 0800)

## 2025-05-22 NOTE — PROGRESS NOTES
[facility]  part of [facility]    Progress Note      Assessment and Plan:   1.  Acute on chronic respiratory failure-multifactorial with significant burden of tumor in the chest but now recognized to have small volume of bilateral subsegmental PEs.  The patient has low platelets and is a poor candidate for full anticoagulation at this moment.  Lower extremity venous ultrasound demonstrated acute appearing nonocclusive DVT in the right superficial femoral popliteal and posterior tibial veins.  She got the IVC filter.    The patient underwent pericardial window and has 2 chest tubes in place at present and the chest x-ray looks much better.  She was extubated yesterday evening and is breathing more comfortably today.    Recommendations:  1.  Patient will go to the operating room for pericardial window.  2.  Nasal saline spray.  3.  Tube management as per thoracic surgery  3.  Await cytology from the pericardial fluid analysis.    2.  Metastatic endometrial carcinoma-to follow-up gynecologic oncology.    3.  Pericardial effusion-early tamponade identified on repeat echo.    Recommendations: Pericardial window.    4.  Recurrent episodes of atrial tachycardia-on amiodarone.    5.  Very small left pleural effusion-evacuated now.  Await cytology.    Subjective:   Kelli Fisher is a(n) 70 year old female who is less dyspneic    Objective:   Blood pressure 106/67, pulse 84, temperature 97.9 °F (36.6 °C), temperature source Temporal, resp. rate 22, height 5' (1.524 m), weight 168 lb 14 oz (76.6 kg), SpO2 98%.    Physical Exam alert white female  HEENT examination is unremarkable with pupils equal round and reactive to light and accommodation.   Neck without adenopathy, thyromegaly, JVD nor bruit.   Lungs diminished bilaterally to auscultation and percussion.  Cardiac regular rate and rhythm no murmur.   Abdomen nontender, without hepatosplenomegaly and no mass appreciable.   Extremities without clubbing  cyanosis nor edema.   Neurologic grossly intact with symmetric tone and strength and reflex.  Skin without gross abnormality     Results:     Lab Results   Component Value Date    WBC 14.6 05/22/2025    HGB 9.3 05/22/2025    HCT 30.9 05/22/2025    .0 05/22/2025    CREATSERUM 1.26 05/22/2025    BUN 30 05/22/2025     05/22/2025    K 3.6 05/22/2025     05/22/2025    CO2 31.0 05/22/2025     05/22/2025    CA 8.0 05/22/2025    INR 1.31 05/22/2025      CT scan of the chest-Positive for small volume bilateral segmental PE      Enlarging left upper lobe consolidation and few bilateral pulmonary nodules      Enlarging pericardial effusion now 1.7 centimeter thick.  Increasing left pleural effusion, new right pleural effusion     Srinath Levy MD  Medical Director, Critical Care, [facility]  Medical Director, [facility]  Pager: [phone]

## 2025-05-22 NOTE — SLP NOTE
SPEECH DAILY NOTE - INPATIENT    ASSESSMENT & PLAN   ASSESSMENT  PPE REQUIRED. THIS THERAPIST WORE GLOVES AND MASK FOR DURATION OF SESSION. HANDS WASHED UPON ENTRANCE/EXIT.    SLP f/u for ongoing meal assessment per recommendations of regular (pick softer foods)/thin liquids per BSE. RN reports pt tolerates diet and medication well with no overt clinical s/s aspiration. Pt denies any swallowing challenges.     Pt positioned upright in bed, alert/cooperative/family member present. Pt afebrile, tolerating 4L/HF O2NC with oxygen status 99% prior to the start of oral trials. SLP reviewed aspiration precautions and safe swallowing compensatory strategies with the patient. Safe swallow guidelines remain written on the white board in purple. Patient and family v/u. Provided min assistance, pt tolerates soft solids and thin liquids via cup with no overt clinical signs/symptoms of aspiration. Oxygen status remained stable t/o the entire session. Recommend remain on current diet with adherence to aspiration precautions and swallow strategies. No further swallow services warranted at this time. Please re consult if needed. RN alerted with results and recommendations.     MOST RECENT CXR 5/22  CONCLUSION: Stable left basal/perihilar pulmonary opacity which which could represent pneumonia or neoplasm.  Stable small bilateral pleural effusions slightly larger on the left.  Mild interstitial edema. Interval extubation          Diet Recommendations - Solids: Regular (pick softer foods)  Diet Recommendations - Liquids: Thin Liquids    Compensatory Strategies Recommended: Alternate consistencies, Multiple swallows  Aspiration Precautions: Upright position, Slow rate, Small bites, Small sips, No straw  Medication Administration Recommendations:  (as tolerated)    Patient Experiencing Pain: No              Treatment Plan  Treatment Plan/Recommendations: No further inpatient SLP service warranted, Aspiration  precautions    Interdisciplinary Communication: Discussed with RN          GOALS  Goal #1 The patient will tolerate regular (pick softer foods) consistency and thin liquids without overt signs or symptoms of aspiration with 100 % accuracy over 1-2 session(s).  Met 5/22   Goal #2 The patient/family/caregiver will demonstrate understanding and implementation of aspiration precautions and swallow strategies independently over 1-2 session(s).    Met 5/22   Goal #3 The patient will utilize compensatory strategies as outlined by  BSSE (clinical evaluation) including Slow rate, Small bites, Small sips, Multiple swallows, Alternate liquids/solids, No straws, Upright 90 degrees, Upright 90 degrees 30 mins after meal, Eliminate distractions with PRN assistance 100 % of the time across 1-2 sessions.  Met 5/22     FOLLOW UP  Follow Up Needed (Documentation Required): No  SLP Follow-up Date: 05/22/25  Duration: 1 week    Session: 1    If you have any questions, please contact NI Mason M.S. CCC-SLP  Speech Language Pathologist  Phone Number Ext. [phone]

## 2025-05-22 NOTE — PHYSICAL THERAPY NOTE
PT order received, chart reviewed. Patient is s/p urgent subxiphoid pericardial window as well as right chest tube placement 5/21, extubated 5/21 as well. Spoke with RN Sanaz who reports she tried to get pt up about an hour ago and pt could not tolerate. RN again offered for PT to help pt to EOB and up to chair and pt reports she just doesn't think she can do it. Spoke with pt and discussed planning for pain control prior to session and will plan to see her tomorrow morning and she is agreeable to attempt. Reviewed AP, hand /opening and heel slides as well as hip abd/add in bed and she is able to demonstrate this. RN aware of discussion.

## 2025-05-22 NOTE — RESPIRATORY THERAPY NOTE
Received patient on full ventilator support, with settings of:      05/21/25 1717   Vent Information   Vent Mode VC/AC   Settings   FiO2 (%) 50 %   Resp Rate (Set) 14   Vt (Set, mL) 400 mL   Waveform Decelerating ramp   PEEP/CPAP (cm H2O) 5 cm H20     Endotracheal tube (ETT) size 7.0 remains secured at 22 at the lip. Bilateral breath sounds were auscultated, and suctioning was performed as needed.     SBT started at 2000, talked to Gigi Andrews during trial and he was ok to extubate if the parameters were at borderline. Parameters were obtained:     05/21/25 2000   Spontaneous Parameters   Spontaneous RR Rate 24   Spontaneous Minute Volume 5.9   Average Spontaneous Tidal Volume 265   $ Spontaneous Vital Capacity 565   Negative Inspiratory Force -19   Total RSBI 85     Abg was analyzed, ABG was within limits:    Component      Latest Ref Rng 5/21/2025   SAMPLE SITE Arterial Line    ABG PH      7.35 - 7.45  7.47 (H)    ABG PCO2      35 - 45 mm Hg 44    ABG PO2      80 - 100 mm Hg 129 (H)    ABG HCO3      21.0 - 27.0 mEq/L 30.8 (H)    Blood Gas Base Excess      -2.0 - 2.0 mmol/L 7.5 (H)    POTASSIUM BLOOD GAS      3.6 - 5.1 mmol/L 3.4 (L)    SODIUM BLOOD GAS      135 - 145 mmol/L 142    Lactic Acid (Blood Gas)      0.5 - 2.0 mmol/L 1.3    IONIZED CALCIUM      0.95 - 1.32 mmol/L 1.17    TOTAL HEMOGLOBIN      12.0 - 16.0 g/dL 10.1 (L)    ABG O2 SATURATION      94.0 - 100.0 % 98.9    CARBOXYHEMOGLOBIN      0.0 - 3.0 % 1.5    METHEMOGLOBIN      0.4 - 1.5 % SAT 1.0    Oxygen Content      15.0 - 23.0 Vol% 14.0 (L)    Oxygen Delivery Device Vent    Blood Gas Vent Mode A/C    Blood Gas Respiration Rate      BPM 14    TIDAL VOLUME      mL 400.0    FiO2 50.00    PEEP      cm H2O 5.0    Modified Allens Test Not Applicable    Puncture Charge No    Blood Gas Analyzer [id number] CCU      Parameters were given to Dr. Banks. Dr Banks was ok with extubation. Extubated the Patient at 2055. Pt was put on 5L HFNC, Patient tolerating and  saturating well.

## 2025-05-23 ENCOUNTER — APPOINTMENT (OUTPATIENT)
Dept: CV DIAGNOSTICS | Facility: HOSPITAL | Age: 70
DRG: 270 | End: 2025-05-23
Attending: CLINICAL NURSE SPECIALIST
Payer: MEDICARE

## 2025-05-23 LAB
ANION GAP SERPL CALC-SCNC: 7 MMOL/L (ref 0–18)
BASOPHILS # BLD: 0 X10(3) UL (ref 0–0.2)
BASOPHILS NFR BLD: 0 %
BLOOD TYPE BARCODE: 7300
BLOOD TYPE BARCODE: 7300
BUN BLD-MCNC: 26 MG/DL (ref 9–23)
BUN/CREAT SERPL: 23.4 (ref 10–20)
CALCIUM BLD-MCNC: 8 MG/DL (ref 8.7–10.4)
CHLORIDE SERPL-SCNC: 107 MMOL/L (ref 98–112)
CO2 SERPL-SCNC: 31 MMOL/L (ref 21–32)
CREAT BLD-MCNC: 1.11 MG/DL (ref 0.55–1.02)
DEPRECATED RDW RBC AUTO: 62 FL (ref 35.1–46.3)
EGFRCR SERPLBLD CKD-EPI 2021: 54 ML/MIN/1.73M2 (ref 60–?)
EOSINOPHIL # BLD: 0 X10(3) UL (ref 0–0.7)
EOSINOPHIL NFR BLD: 0 %
ERYTHROCYTE [DISTWIDTH] IN BLOOD BY AUTOMATED COUNT: 20.3 % (ref 11–15)
GLUCOSE BLD-MCNC: 140 MG/DL (ref 70–99)
GLUCOSE BLDC GLUCOMTR-MCNC: 134 MG/DL (ref 70–99)
GLUCOSE BLDC GLUCOMTR-MCNC: 135 MG/DL (ref 70–99)
GLUCOSE BLDC GLUCOMTR-MCNC: 148 MG/DL (ref 70–99)
HCT VFR BLD AUTO: 30.9 % (ref 35–48)
HGB BLD-MCNC: 9.6 G/DL (ref 12–16)
LYMPHOCYTES NFR BLD: 0.33 X10(3) UL (ref 1–4)
LYMPHOCYTES NFR BLD: 2 %
MCH RBC QN AUTO: 25.9 PG (ref 26–34)
MCHC RBC AUTO-ENTMCNC: 31.1 G/DL (ref 31–37)
MCV RBC AUTO: 83.5 FL (ref 80–100)
MONOCYTES # BLD: 0.83 X10(3) UL (ref 0.1–1)
MONOCYTES NFR BLD: 5 %
NEUTROPHILS # BLD AUTO: 13.3 X10 (3) UL (ref 1.5–7.7)
NEUTROPHILS NFR BLD: 93 %
NEUTS HYPERSEG # BLD: 15.44 X10(3) UL (ref 1.5–7.7)
NRBC BLD MANUAL-RTO: 1 % (ref ?–1)
OSMOLALITY SERPL CALC.SUM OF ELEC: 307 MOSM/KG (ref 275–295)
PLATELET # BLD AUTO: 177 10(3)UL (ref 150–450)
PLATELET MORPHOLOGY: NORMAL
PLATELETS.RETICULATED NFR BLD AUTO: 8.3 % (ref 0–7)
POTASSIUM SERPL-SCNC: 3.6 MMOL/L (ref 3.5–5.1)
POTASSIUM SERPL-SCNC: 3.6 MMOL/L (ref 3.5–5.1)
RBC # BLD AUTO: 3.7 X10(6)UL (ref 3.8–5.3)
SODIUM SERPL-SCNC: 145 MMOL/L (ref 136–145)
TOTAL CELLS COUNTED BLD: 100
UNIT VOLUME: 350 ML
UNIT VOLUME: 350 ML
WBC # BLD AUTO: 16.6 X10(3) UL (ref 4–11)

## 2025-05-23 PROCEDURE — 93321 DOPPLER ECHO F-UP/LMTD STD: CPT | Performed by: CLINICAL NURSE SPECIALIST

## 2025-05-23 PROCEDURE — 93325 DOPPLER ECHO COLOR FLOW MAPG: CPT | Performed by: CLINICAL NURSE SPECIALIST

## 2025-05-23 PROCEDURE — 93308 TTE F-UP OR LMTD: CPT | Performed by: CLINICAL NURSE SPECIALIST

## 2025-05-23 PROCEDURE — 99233 SBSQ HOSP IP/OBS HIGH 50: CPT | Performed by: INTERNAL MEDICINE

## 2025-05-23 PROCEDURE — 99233 SBSQ HOSP IP/OBS HIGH 50: CPT | Performed by: FAMILY MEDICINE

## 2025-05-23 RX ORDER — ACETAMINOPHEN 10 MG/ML
1000 INJECTION, SOLUTION INTRAVENOUS EVERY 6 HOURS PRN
Status: DISCONTINUED | OUTPATIENT
Start: 2025-05-23 | End: 2025-05-28

## 2025-05-23 NOTE — PROGRESS NOTES
Pulmonary Medicine Inpatient Progress Note                 Subjective:  Sitting in a chair. Appears comfortable  Pleural chest tube removed       ALLERGIES:  Allergies[1]     MEDS:  Home Medications:  Medications Taking[2]  Scheduled Medication:  Scheduled Medications[3]  Continuous Infusing Medication:  Medication Infusions[4]  PRN Medications:  PRN Medications[5]       PHYSICAL EXAM:  BP (!) 84/57 (BP Location: Right arm)   Pulse 89   Temp 98.6 °F (37 °C) (Temporal)   Resp 19   Ht 5' (1.524 m)   Wt 167 lb 8.8 oz (76 kg)   SpO2 100%   BMI 32.72 kg/m²   CONSTITUTIONAL: alert, oriented, no apparent distress  HEENT: atraumatic normocephalic  MOUTH: mucous membranes are moist. No OP exudates  NECK/THROAT: no JVD. Trachea midline. No obvious thyromegaly  LUNG: clear upper b/l no wheezing,+ basilar crackles. Diminished bases. Chest symmetric with respiratory motion  HEART: regular rate and rhythm, no obvious murmers or gallops note. + tube  ABD: soft non tender. + bowel sounds. No organomegaly noted  EXT: no clubbing, cyanosis. 2+ b/l LE edema       IMAGES:  CXR 5/22/25  CONCLUSION: Stable left basal/perihilar pulmonary opacity which which could represent pneumonia or neoplasm.  Stable small bilateral pleural effusions slightly larger on the left.  Mild interstitial edema. Interval extubation        CXR 5/18/25  CONCLUSION:   Unchanged mild cardiomegaly.   Interstitial pulmonary edema, appearing slightly increased from 05/15/2025.   Unchanged moderate left pleural effusion associated basilar opacity.   Unchanged trace right pleural effusion with minimal associated atelectasis   Unchanged metastatic mediastinal/hilar adenopathy, better seen on cross-sectional imaging.     CXR 5/15/25  CONCLUSION:   1. The cardiac silhouette remains enlarged, likely relating to the known pericardial effusion.  Small pleural effusions are also unchanged bilaterally suggesting mild CHF or fluid overload.  There is stable mild  associated passive atelectasis at both   lung bases.   2. There is a history of metastatic endometrial carcinoma.  Stable left upper lobe/lingular consolidation may relate to a metastasis, atelectasis and/or pneumonia.  Unchanged prominence of the mediastinal and bilateral hilar contours is compatible with known underlying metastatic lymphadenopathy.       LABS:  Recent Labs   Lab 05/21/25  1522 05/22/25  0505 05/23/25  0540   RBC 3.24* 3.60* 3.70*   HGB 8.6*  8.5* 9.3* 9.6*   HCT 27.1* 30.9* 30.9*   MCV 83.6 85.8 83.5   MCH 26.5 25.8* 25.9*   MCHC 31.7 30.1* 31.1   RDW 20.4* 20.1* 20.3*   NEPRELIM 8.17* 10.76* 13.30*   WBC 12.6* 14.6* 16.6*   .0* 145.0* 177.0       Recent Labs   Lab 05/20/25  0548 05/21/25  0552 05/22/25  0505 05/23/25  0540   *  --  129* 140*   BUN 34*  --  30* 26*   CREATSERUM 1.32* 1.43* 1.26* 1.11*   EGFRCR 44* 40* 46* 54*   CA 8.5*  --  8.0* 8.0*     --  141 145   K 3.7  3.7 3.8 3.6 3.6  3.6   CL 99  --  104 107   CO2 35.0*  --  31.0 31.0       ASSESSMENT/PLAN:  Endometrial ca with lung mets  -oncology f/u    B/l pulmonary emboli and DVT  -unable to anticoagulate d/t thrombocytopenia  -oncology following  -s/p IVC filter placement by IR    Pericardial effusion-large with tamponade  -s/p pericardial window on 5/21/25  -chest tube mgmt as per CV surgery   -f/u on fluid results  -on jewel for pressor support    Pleural effusion  -s/p pleural chest tube now removed    P-afib  -cards following   -on amio and eliquis    GERD  -start PPI     Proph  -DVT: eliquis    Dispo  -full code     Thank you for the opportunity to care for Kelli Navya Domingof DO, MPH  Pulmonary Critical Care Medicine  [facility]          [1]   Allergies  Allergen Reactions    Aspirin Tightness in Throat    Penicillins HIVES    Other OTHER (SEE COMMENTS)     Pt states IV steroid allergy, states it makes her breathing worse    [2]   Outpatient  Medications Marked as Taking for the 5/12/25 encounter (Hospital Encounter)   Medication Sig Dispense Refill    levothyroxine (SYNTHROID) 100 MCG Oral Tab Take 1 tablet (100 mcg total) by mouth before breakfast.      Potassium Chloride ER 20 MEQ Oral Tab CR Take 1 tablet by mouth daily.      furosemide 20 MG Oral Tab Take 1 tablet (20 mg total) by mouth daily.      Levalbuterol HCl 1.25 MG/3ML Inhalation Nebu Soln Take 3 mL (1.25 mg total) by nebulization every 8 (eight) hours as needed for Wheezing or Shortness of Breath.      pantoprazole 40 MG Oral Tab EC Take 1 tablet (40 mg total) by mouth daily. 30 tablet 0    amiodarone 200 MG Oral Tab Take 1 tablet (200 mg total) by mouth in the morning, at noon, and at bedtime for 1 day, THEN 1 tablet (200 mg total) in the morning, at noon, and at bedtime. 93 tablet 0    Fluticasone Propionate  HFA (FLOVENT HFA) 220 MCG/ACT Inhalation Aerosol Inhale 1 puff into the lungs every 12 (twelve) hours. Rinse mouth following use.     [3]    midodrine  10 mg Oral TID    apixaban  5 mg Oral BID    traMADol  50 mg Oral 2 times per day    senna  10 mL Per NG Tube BID    docusate  100 mg Per NG Tube BID    pantoprazole  40 mg Intravenous Daily    amiodarone  400 mg Oral BID with meals    insulin aspart  1-5 Units Subcutaneous TID CC and HS    levothyroxine  100 mcg Oral Before breakfast   [4]    phenylephrine 40 mcg/min (05/23/25 1016)   [5]   acetaminophen    traMADol    ondansetron    metoclopramide    morphINE    acetaminophen **OR** acetaminophen **OR** [DISCONTINUED] acetaminophen    polyethylene glycol (PEG 3350)    ondansetron    glucose **OR** glucose **OR** glucose-vitamin C **OR** dextrose **OR** glucose **OR** glucose **OR** glucose-vitamin C    ipratropium-albuterol

## 2025-05-23 NOTE — PROGRESS NOTES
[facility]  part of [facility]    Cardiology Progress Note    Kelli Fisher Patient Status:  Inpatient    1955 MRN [id number]   Location [facility] 2W/SW Attending Inna Bullock MD   Hosp Day # 11 PCP Taylor Gallardo MD     Interval History   Working with PT/OT  SOB improved,   No chest pain    Assessment and Plan:   Pancytopenia, improving  Endometrial cancer with lung metastasis    Shock, likely multifactorial  -weaning phenylephrine, on midodrine  -no s/sx of decompensation from cardiac standpoint, especially post pericardial window for tamponade    Large pericardial effusion with tamponade  Pleural effusion s/p CT  -enlarging pericardial effusion with new signs c/w tamponade physiology on 25  -underwent pericardial window on 25  -etiology likely 2/t metastatic disease, will follow pathology  -CT management per CVS    Bilateral PE, s/p IVC filter  -initially not on AC due to severe anemia + thrombocytopenia, however counts have improved  -started AC with apixaban 5mg bid on    -Hg has been stable (9.6 on )    Paroxysmal AF/AT  -developed rapid AF post pericardial window  -continue PO amiodarone, rate controlled  -AC, as above    Objective:     Patient Vitals for the past 24 hrs:   BP Temp Temp src Pulse Resp SpO2 Weight   25 1000 97/70 -- -- 76 19 100 % --   25 0900 91/71 -- -- 76 19 99 % --   25 0807 -- -- -- 71 22 99 % --   25 0800 97/75 97.9 °F (36.6 °C) Temporal 72 21 100 % --   25 0700 100/71 -- -- 72 18 100 % --   25 0600 101/70 -- -- 73 18 99 % 168 lb 14 oz (76.6 kg)   25 0500 (!) 82/61 -- -- 72 17 98 % --   25 0400 90/65 98.2 °F (36.8 °C) Temporal 65 14 98 % --   25 0300 94/65 -- -- 64 14 98 % --   25 0240 -- -- -- 68 18 100 % --   25 0200 92/62 -- -- 71 15 99 % --   25 0100 90/63 -- -- 71 15 100 % --   25 0000 107/73 98 °F (36.7 °C) Temporal 73 14 100 % --   25 2300  90/57 -- -- 84 19 96 % --   05/21/25 2200 (!) 135/93 -- -- 62 15 100 % --   05/21/25 2100 98/72 -- -- 83 15 96 % --   05/21/25 2050 -- -- -- 77 19 99 % --   05/21/25 2000 107/80 98.6 °F (37 °C) Temporal 80 14 100 % --   05/21/25 1900 97/77 -- -- 88 23 100 % --   05/21/25 1830 -- -- -- -- -- 100 % --   05/21/25 1800 (!) 130/96 -- -- 87 10 100 % --   05/21/25 1730 -- -- -- 115 16 100 % --   05/21/25 1700 109/72 -- -- 107 25 99 % --   05/21/25 1645 -- -- -- (!) 124 23 100 % --   05/21/25 1630 -- -- -- 112 15 100 % --   05/21/25 1600 108/69 97.5 °F (36.4 °C) Temporal 104 21 100 % --   05/21/25 1545 -- -- -- 116 22 100 % --   05/21/25 1530 (!) 70/56 -- -- 103 19 100 % --   05/21/25 1515 (!) 87/65 -- -- 101 23 97 % --   05/21/25 1500 (!) 89/64 -- -- 112 26 98 % --   05/21/25 1426 113/75 98 °F (36.7 °C) -- 85 22 98 % --   05/21/25 1223 99/70 97.7 °F (36.5 °C) Oral 84 18 97 % --       Intake/Output:   Last 3 shifts:   Intake/Output                   05/20/25 0700 - 05/21/25 0659 05/21/25 0700 - 05/22/25 0659 05/22/25 0700 - 05/23/25 0659       Intake    P.O.  --  --  260    P.O. -- -- 260    I.V.  --  1071.2  75    I.V. -- 863 --    Volume (mL) Phenylephrine -- 177.6 75    Volume (mL) Dexmedetomidine -- 30.6 --    Blood  --  411.3  --    Volume (Transfuse RBC) -- 411.3 --    IV PIGGYBACK  100  --  100    Volume (mL) (ceFEPIme (Maxipime) 2 g in sodium chloride 0.9% 100mL IVPB-YANA) 100 -- --    Volume (mL) (acetaminophen (Ofirmev) 10 mg/mL infusion premix 1,000 mg) -- -- 100    Total Intake 100 1482.5 435       Output    Urine  1000  575  225    Incontinent Urine Occurrence 1 x -- --    Output (mL) (External Urinary Catheter) 1000 575 225    Emesis/NG output  --  --  0    Emesis -- -- 0    Other  --  500  --    Other -- 500 --    Stool  --  --  --    Stool Count Calculated for I/O 2 x -- --    Chest Tube  --  300  20    Output (mL) (Chest Tube 1 Anterior Pleural 28 Fr.) -- 50 10    Output (mL) (Chest Tube 1 Anterior  Mediastinal 24 Fr.) -- 250 10    Total Output 1000 1375 245       Net I/O     -900 107.5 190             Vent Settings:      Hemodynamic parameters (last 24 hours):      Scheduled Meds: Scheduled Medications[1]    Continuous Infusions: Medication Infusions[2]    Results:     Lab Results   Component Value Date    WBC 16.6 (H) 05/23/2025    HGB 9.6 (L) 05/23/2025    HCT 30.9 (L) 05/23/2025    .0 05/23/2025    CREATSERUM 1.11 (H) 05/23/2025    BUN 26 (H) 05/23/2025     05/23/2025    K 3.6 05/23/2025    K 3.6 05/23/2025     05/23/2025    CO2 31.0 05/23/2025     (H) 05/23/2025    CA 8.0 (L) 05/23/2025    ALB 3.2 05/12/2025    ALKPHO 142 05/12/2025    BILT 0.7 05/12/2025    TP 6.8 05/12/2025    AST 26 05/12/2025    ALT 10 05/12/2025    PTT 37.0 (H) 05/21/2025    INR 1.31 (H) 05/22/2025    TSH 3.705 05/02/2025    LIP 42 12/11/2023    DDIMER 3.05 (H) 05/13/2025    MG 2.2 05/19/2025    B12 >2,000 (H) 05/02/2025       Recent Labs   Lab 05/20/25  0548 05/21/25  0552 05/22/25  0505 05/23/25  0540   *  --  129* 140*   BUN 34*  --  30* 26*   CREATSERUM 1.32* 1.43* 1.26* 1.11*   CA 8.5*  --  8.0* 8.0*     --  141 145   K 3.7  3.7 3.8 3.6 3.6  3.6   CL 99  --  104 107   CO2 35.0*  --  31.0 31.0     Recent Labs   Lab 05/21/25  1522 05/22/25  0505 05/23/25  0540   RBC 3.24* 3.60* 3.70*   HGB 8.6*  8.5* 9.3* 9.6*   HCT 27.1* 30.9* 30.9*   MCV 83.6 85.8 83.5   MCH 26.5 25.8* 25.9*   MCHC 31.7 30.1* 31.1   RDW 20.4* 20.1* 20.3*   NEPRELIM 8.17* 10.76* 13.30*   WBC 12.6* 14.6* 16.6*   .0* 145.0* 177.0       No results for input(s): \"BNPML\" in the last 168 hours.    No results for input(s): \"TROP\", \"CK\" in the last 168 hours.    XR CHEST AP PORTABLE  (CPT=71045)  Result Date: 5/22/2025  CONCLUSION: Stable left basal/perihilar pulmonary opacity which which could represent pneumonia or neoplasm.  Stable small bilateral pleural effusions slightly larger on the left.  Mild interstitial edema.  Interval extubation   Dictated by (CST): Sincere Canales MD on 2025 at 7:38 AM     Finalized by (CST): Sincere Canales MD on 2025 at 7:40 AM          XR CHEST AP PORTABLE  (CPT=71045)  Result Date: 2025  CONCLUSION:  1.  Post pericardial window.  Pericardial drain over the lower aspect of the cardiac silhouette. 2.  Interval intubation.  Endotracheal tube is present over the trachea in gross satisfactory position 33 mm above the christian. 3.  Small bilateral pleural effusions, with decrease in size of left effusion.  Nonspecific left perihilar pulmonary opacity which could represent atelectasis, pneumonia, or other inflammatory/neoplastic process.    Dictated by (CST): Sincere Canales MD on 2025 at 3:15 PM     Finalized by (CST): Sincere Canales MD on 2025 at 3:17 PM          XR CHEST AP PORTABLE  (CPT=71045)  Result Date: 2025  CONCLUSION:   1. Redemonstration of increased density within the left lung (mid and lower regions).  The findings are most compatible with a combination of infiltrate, atelectasis, and small to moderate effusion.   2. Cardiac enlargement.  There is widening of the mediastinum likely representing adenopathy.    Dictated by (CST): Kaleb Epsp MD on 2025 at 9:56 AM     Finalized by (CST): Kaleb Epps MD on 2025 at 9:59 AM          EKG 12 Lead  Result Date: 2025  Atrial fibrillation with rapid ventricular response Nonspecific T wave abnormality Abnormal ECG When compared with ECG of 21-MAY-2025 10:59, Atrial fibrillation has replaced Sinus rhythm Confirmed by TRINI ROSS, JORGENSEN ([id number]) on 2025 5:18:23 PM    CARD ECHO LIMITED (CPT=93308/69773/05693)  Result Date: 2025  Transthoracic Echocardiogram Name:Kelli Fisher Date: 2025 :  1955 Ht:  (60in)  BP: 98 / 72 MRN:  [id number]    Age:  70years    Wt:  (167lb) HR: 89bpm Loc:  [facility]       Gndr: F          BSA: 1.73m^2 Sonographer: Chris HOLLINGSWORTH RCS Ordering:    Srinath Levy Consulting:   Trista Peña ---------------------------------------------------------------------------- History/Indications:  Reevaluate enlarging pericardial effusion. Acute respiratory failure with hypoxia. Shortness of breath. ---------------------------------------------------------------------------- Procedure information:  A transthoracic echocardiogram, limited study was performed. Additional evaluation included M-mode and limited 2D.  Patient status:  Inpatient.  Location:  Bedside.    Comparison was made to the study of 05/13/2025.    This was a routine study. Transthoracic echocardiography for diagnosis and ventricular function evaluation. Image quality was adequate. ECG rhythm:   Normal sinus ---------------------------------------------------------------------------- Conclusions: 1. Left ventricle: The cavity size was normal. Wall thickness was normal.    Systolic function was normal. The estimated ejection fraction was 55-60%,    by visual assessment. Unable to assess LV diastolic function. 2. Pericardium, extracardiac: A large, free-flowing pericardial effusion was    identified. There is RV collapse in early diastolic for less than 50% of    the cardiac cycle. There was evidence for mildly increased RV-LV    interaction demonstrated by respirophasic changes in transmitral    velocities (25% reduction in mitral valve e'wave velocity during    inspiration). There was a left pleural effusion. 3. Inferior vena cava: The IVC was dilated (23mm). Respirophasic diameter    changes are blunted (< 50%), consistent with elevated central venous    pressure. Impressions:  This study is compared with previous dated 5/13/2025: The pericardial effusion has slightly increased, now with maximal dimensions in diastole of 20mm with evidence of early tamponade (25% reduction in mitral valve inflow velocity and right ventricle free wall collapse in early diastole). *  ---------------------------------------------------------------------------- * Findings: Left ventricle:  The cavity size was normal. Wall thickness was normal. Systolic function was normal. The estimated ejection fraction was 55-60%, by visual assessment. Unable to assess LV diastolic function. Right ventricle:  The cavity size was normal. Systolic function was normal. Systolic pressure was not estimated. Mitral valve:  The leaflets were mildly calcified. Aortic valve:   The valve was trileaflet. The leaflets were mildly calcified. Pericardium:  A large, free-flowing pericardial effusion was identified. Doppler:  There is RV collapse in early diastolic for less than 50% of the cardiac cycle. There was evidence for mildly increased RV-LV interaction demonstrated by respirophasic changes in transmitral velocities (25% reduction in mitral valve e'wave velocity during inspiration). Pleura:  There was a left pleural effusion. Aorta: Aortic root: The aortic root was normal. Ascending aorta: The ascending aorta was normal. Systemic veins: Inferior vena cava: The IVC was dilated (23mm).  Respirophasic diameter changes are blunted (< 50%), consistent with elevated central venous pressure. ---------------------------------------------------------------------------- Measurements  Left ventricle          Value       Ref       05/13/2025  IVS thickness, ED,  (H) 1.0   cm    0.6 - 0.9 1.3  PLAX  LV ID, ED, PLAX     (L) 3.6   cm    3.8 - 5.2 4.0  LV ID, ES, PLAX         2.5   cm    2.2 - 3.5 2.4  LV PW thickness,    (H) 1.0   cm    0.6 - 0.9 1.1  ED, PLAX  IVS/LV PW ratio,        1.00        --------- 1.18  ED, PLAX  LV PW/LV ID ratio,      0.28        --------- 0.28  ED, PLAX  LV ejection             59    %     54 - 74   71  fraction  LVOT                    Value       Ref       05/13/2025  LVOT ID                 2     cm    --------- 2  Aortic root             Value       Ref       05/13/2025  Aortic root ID          3.3    cm    2.5 - 3.9 ----------  Ascending aorta         Value       Ref       2025  Ascending aorta ID  (H) 3.6   cm    1.9 - 3.5 ----------  Systemic veins          Value       Ref       2025  Estimated CVP           15    mm Hg --------- 15  Inferior vena cava      Value       Ref       2025  ID                  (H) 2.4   cm    <=2.1     2.3 Legend: (L)  and  (H)  corinna values outside specified reference range. ---------------------------------------------------------------------------- Prepared and electronically signed by Javi Huerta 2025 17:58     [employer] (CPT=93308/81889/14659)  Result Date: 2025  Transthoracic Echocardiogram Name:Kelli Fisher Date: 2025 :  1955 Ht:  (60in)  BP: 106 / 75 MRN:  [id number]    Age:  70years    Wt:  (167lb) HR: 85bpm [facility]:  [facility]       Gndr: F          BSA: 1.73m^2 Sonographer: Naty PINTO Ordering:    Margarette Sexton Consulting:  Dominick Herring ---------------------------------------------------------------------------- History/Indications:   SOB and known Pericardial Effusion. ---------------------------------------------------------------------------- Procedure information:  A transthoracic echocardiogram, limited study was performed. Additional evaluation included M-mode and limited 2D.  Patient status:  Inpatient.  Location:  Bedside.    Comparison was made to the study of 2025.    This was a routine study. Transthoracic echocardiography for diagnosis. Image quality was adequate. ---------------------------------------------------------------------------- Conclusions: 1. Left ventricle: The cavity size was normal. Wall thickness was mildly    increased. There was mild concentric hypertrophy. Systolic function was    normal. The estimated ejection fraction was 55-60%, by visual assessment.    Unable to assess LV diastolic function. 2. Right ventricle: The cavity size was normal. Systolic function was    normal. 3. Pericardium,  extracardiac: A moderate, free-flowing pericardial effusion    was identified. There was no evidence of hemodynamic compromise. 4. Systemic veins: Central venous respirophasic diameter changes are blunted    (< 50%). 5. Inferior vena cava: The IVC was dilated. * ---------------------------------------------------------------------------- * Findings: Left ventricle:  The cavity size was normal. Wall thickness was mildly increased. There was mild concentric hypertrophy. Systolic function was normal. The estimated ejection fraction was 55-60%, by visual assessment. Unable to assess LV diastolic function. Left atrium:  Well visualized. Right ventricle:  The cavity size was normal. Systolic function was normal. Right atrium:  Well visualized. Mitral valve:  Well visualized. Aortic valve:   The valve was trileaflet. Tricuspid valve:  The annulus is normal-sized. The leaflets are normal thickness. No echocardiographic evidence for tricuspid prolapse.  Doppler: Transvalvular velocity was within the normal range. There was no evidence for stenosis. There was mild regurgitation. Pulmonic valve:    Doppler:  There was trivial regurgitation. Pericardium:  A moderate, free-flowing pericardial effusion was identified. There was no evidence of hemodynamic compromise. Systemic veins:  Central venous respirophasic diameter changes are blunted (< 50%). Inferior vena cava: The IVC was dilated. ---------------------------------------------------------------------------- Measurements  Left ventricle         Value        Ref       05/05/2025  IVS thickness, ED, (H) 1.3   cm     0.6 - 0.9 1.0  PLAX  LV ID, ED, PLAX        4.0   cm     3.8 - 5.2 3.6  LV ID, ES, PLAX        2.4   cm     2.2 - 3.5 2.4  LV PW thickness,   (H) 1.1   cm     0.6 - 0.9 1.0  ED, PLAX  IVS/LV PW ratio,       1.18         --------- 1.00  ED, PLAX  LV PW/LV ID ratio,     0.28         --------- 0.28  ED, PLAX  LV ejection            71    %      54 - 74   63  fraction   Stroke volume/bsa,     35    ml/m^2 --------- ----------  2D  LVOT                   Value        Ref       2025  LVOT ID                2     cm     --------- 2  LVOT peak              1.21  m/sec  --------- ----------  velocity, S  LVOT VTI, S            19.4  cm     --------- ----------  LVOT peak              6     mm Hg  --------- ----------  gradient, S  LVOT mean              3     mm Hg  --------- ----------  gradient, S  Stroke volume          61    ml     --------- ----------  (SV), LVOT DP  Stroke index           35    ml/m^2 --------- ----------  (SV/bsa), LVOT DP  Pulmonary artery       Value        Ref       2025  PA pressure, S, DP     44    mm Hg  --------- ----------  Tricuspid valve        Value        Ref       2025  Tricuspid regurg       2.68  m/sec  <=2.8     ----------  peak velocity  Tricuspid peak         29    mm Hg  --------- ----------  RV-RA gradient  Systemic veins         Value        Ref       2025  Estimated CVP          15    mm Hg  --------- 15  Inferior vena cava     Value        Ref       2025  ID                 (H) 2.3   cm     <=2.1     2.4  Right ventricle        Value        Ref       2025  TAPSE, 2D              1.93  cm     >=1.70    ----------  TAPSE, MM              1.93  cm     >=1.70    ----------  RV pressure, S, DP     44    mm Hg  --------- ---------- Legend: (L)  and  (H)  corinna values outside specified reference range. ---------------------------------------------------------------------------- Prepared and electronically signed by Javi Huerta 2025 16:05     [employer] (CPT=93308/48163/95514)  Result Date: 2025  Transthoracic Echocardiogram Name:Kelli Fisher Date: 2025 :  1955 Ht:  (60in)  BP: 142 / 86 MRN:  [id number]    Age:  70years    Wt:  (174lb) HR: 106bpm Loc:  EM       Gndr: F          BSA: 1.76m^2 Sonographer: Chris DOMINGUEZ Ordering:    Stella Gomez Consulting:  Khadar Segura  ---------------------------------------------------------------------------- History/Indications:   Pericardial effusion. Lung mass. ---------------------------------------------------------------------------- Procedure information:  A transthoracic echocardiogram, limited study was performed. Additional evaluation included M-mode and limited 2D.  Patient status:  Inpatient.  Location:  Bedside.    Comparison was made to the study of 04/30/2025.    This was a routine study. Transthoracic echocardiography for diagnosis and ventricular function evaluation. Image quality was adequate. ECG rhythm:   Sinus tachycardia  Study completion:  There were no complications. ---------------------------------------------------------------------------- Conclusions: 1. Left ventricle: The cavity size was normal. Wall thickness was normal.    Systolic function was vigorous. The estimated ejection fraction was    65-70%, by biplane method of disks. No diagnostic evidence for regional    wall motion abnormalities. Unable to assess LV diastolic function. 2. Pericardium, extracardiac: A small to moderate, free-flowing pericardial    effusion was identified circumferential to the heart. Probably not    hemodynamically significant. Impressions:  This study is compared with previous dated 4/30/2025: No significant change since prior study. * ---------------------------------------------------------------------------- * Findings: Left ventricle:  The cavity size was normal. Wall thickness was normal. Systolic function was vigorous. The estimated ejection fraction was 65-70%, by biplane method of disks. No diagnostic evidence for regional wall motion abnormalities. Unable to assess LV diastolic function. Ventricular septum:   Thickness was normal. Left atrium:  The left atrial volume was normal. Right ventricle:  Well visualized. The cavity size was normal. Systolic function was normal. Right atrium:  Well visualized. The atrium was normal in  size. Mitral valve:  Well visualized. The leaflets were mildly calcified. Aortic valve:  Well visualized.  The valve was trileaflet. The leaflets were mildly calcified. Tricuspid valve:  Well visualized. Pulmonic valve:   Well visualized. Pericardium:  A small to moderate, free-flowing pericardial effusion was identified circumferential to the heart. Probably not hemodynamically significant. Aorta: Aortic root: The aortic root was normal. Ascending aorta: The ascending aorta was normal. Pulmonary arteries: Systolic pressure could not be accurately estimated. ---------------------------------------------------------------------------- Measurements  Left ventricle         Value        Ref       04/30/2025  IVS thickness, ED, (H) 1.0   cm     0.6 - 0.9 1.0  PLAX  LV ID, ED, PLAX    (L) 3.6   cm     3.8 - 5.2 3.9  LV ID, ES, PLAX        2.4   cm     2.2 - 3.5 2.5  LV PW thickness,   (H) 1.0   cm     0.6 - 0.9 1.0  ED, PLAX  IVS/LV PW ratio,       1.00         --------- 1.00  ED, PLAX  LV PW/LV ID ratio,     0.28         --------- 0.26  ED, PLAX  LV ejection            63    %      54 - 74   66  fraction  LV end-diastolic       62    ml     48 - 140  ----------  volume, 1-p A4C  LV ejection            65    %      46 - 78   ----------  fraction, 1-p A4C  Stroke volume, 1-p     40    ml     --------- ----------  A4C  LV end-diastolic       35    ml/m^2 30 - 82   ----------  volume/bsa, 1-p  A4C  Stroke volume/bsa,     23    ml/m^2 --------- ----------  1-p A4C  LV end-diastolic       61    ml     46 - 106  ----------  volume, 2-p  LV end-systolic        21    ml     14 - 42   ----------  volume, 2-p  LV ejection            65    %      54 - 74   ----------  fraction, 2-p  Stroke volume, 2-p     40    ml     --------- ----------  LV end-diastolic       35    ml/m^2 29 - 61   ----------  volume/bsa, 2-p  LV end-systolic        12    ml/m^2 8 - 24    ----------  volume/bsa, 2-p  Stroke volume/bsa,     22.5  ml/m^2 ---------  ----------  2-p  LVOT                   Value        Ref       2025  LVOT ID                2     cm     --------- ----------  Aortic root            Value        Ref       2025  Aortic root ID         3.2   cm     2.5 - 4.0 ----------  Ascending aorta        Value        Ref       2025  Ascending aorta ID (H) 3.7   cm     1.9 - 3.5 ----------  Left atrium            Value        Ref       2025  LA ID, A-P, ES         2.7   cm     2.7 - 3.8 ----------  LA volume, S           43    ml      52   ----------  LA volume/bsa, S       24    ml/m^2    ----------  LA volume, ES, 1-p     39    ml      52   ----------  A4C  LA volume, ES, 1-p     46    ml      52   ----------  A2C  LA volume, ES, A/L     46    ml     --------- ----------  LA volume/bsa, ES,     26    ml/m^2    ----------  A/L  LA/aortic root         0.84         --------- ----------  ratio  Right atrium           Value        Ref       2025  RA ID, S-I, ES,        4.8   cm     3.4 - 5.3 ----------  A4C  RA ID/bsa, S-I,        2.7   cm/m^2 1.9 - 3.1 ----------  ES, A4C  RA area, ES, A4C       14    cm^2   10 - 18   ----------  RA volume, ES, 1-p     32    ml     --------- ----------  A4C  RA volume/bsa, ES,     18    ml/m^2 9 - 33    ----------  1-p A4C  Systemic veins         Value        Ref       2025  Estimated CVP          15    mm Hg  --------- ----------  Inferior vena cava     Value        Ref       2025  ID                 (H) 2.4   cm     <=2.1     2.3 Legend: (L)  and  (H)  corinna values outside specified reference range. ---------------------------------------------------------------------------- Prepared and electronically signed by Margareth Haas 2025 12:36     CARD ECHO LIMITED (CPT=93308/95042/97960)  Result Date: 2025  Transthoracic Echocardiogram Name:Kelli Fisher Date: 2025 :  1955 Ht:  (60in)  BP: 106 / 74 MRN:  [id number]    Age:  70years    Wt:   (165lb) HR: 108bpm Loc:  [facility]       Gndr: F          BSA: 1.72m^2 Sonographer: James PINTO RVT Ordering:    Javi Huerta Consulting:  Ector Byrnes ---------------------------------------------------------------------------- History/Indications:  Pericardial effusion. ---------------------------------------------------------------------------- Procedure information:  A transthoracic echocardiogram, limited study was performed. Additional evaluation included M-mode and limited 2D.  Patient status:  Inpatient.  Location:  Bedside.    Comparison was made to the study of 04/12/2025.    This was a routine study. Transthoracic echocardiography for ventricular function evaluation and assessment of pericardial effusion and hemodynamic status. Image quality was adequate. ECG rhythm:   Sinus tachycardia ---------------------------------------------------------------------------- Conclusions: 1. Left ventricle: The cavity size was normal. Wall thickness was mildly    increased. Systolic function was normal. The estimated ejection fraction    was 60-65%, by visual assessment. No diagnostic evidence for regional    wall motion abnormalities. Unable to assess LV diastolic function. 2. Pericardium, extracardiac: A small to moderate, free-flowing pericardial    effusion was identified circumferential to the heart. Maximal diameter in    diastole is 1.1cm. Features were not consistent with tamponade    physiology. 3. Inferior vena cava: The IVC was dilated. Respirophasic diameter changes    are blunted (< 50%), consistent with elevated central venous pressure. * ---------------------------------------------------------------------------- * Findings: Left ventricle:  The cavity size was normal. Wall thickness was mildly increased. Systolic function was normal. The estimated ejection fraction was 60-65%, by visual assessment. No diagnostic evidence for regional wall motion abnormalities. Unable to assess LV diastolic  function. Right ventricle:  The cavity size was normal. Mitral valve:  The valve was structurally normal. Aortic valve:  The valve was structurally normal. Tricuspid valve:  The valve is structurally normal. Pulmonic valve:   Not well visualized. Pericardium:  A small to moderate, free-flowing pericardial effusion was identified circumferential to the heart. Maximal diameter in diastole is 1.1cm.  Doppler:  Features were not consistent with tamponade physiology. Systemic veins: Inferior vena cava: The IVC was dilated.  Respirophasic diameter changes are blunted (< 50%), consistent with elevated central venous pressure. ---------------------------------------------------------------------------- Measurements  Left ventricle              Value    Ref      04/12/2025  IVS thickness, ED, PLAX (H) 1.0   cm 0.6 -    0.8                                       0.9  LV ID, ED, PLAX             3.9   cm 3.8 -    4.4                                       5.2  LV ID, ES, PLAX             2.5   cm 2.2 -    2.8                                       3.5  LV PW thickness, ED,    (H) 1.0   cm 0.6 -    0.7  PLAX                                 0.9  IVS/LV PW ratio, ED,        1.00     -------- 1.14  PLAX  LV PW/LV ID ratio, ED,      0.26     -------- 0.16  PLAX  LV ejection fraction        66    %  54 - 74  66  Inferior vena cava          Value    Ref      04/12/2025  ID                      (H) 2.3   cm <=2.1    2.7 Legend: (L)  and  (H)  corinna values outside specified reference range. ---------------------------------------------------------------------------- Prepared and electronically signed by Javi Huerta 04/30/2025 13:43        Exam:     Physical Exam:  General: No apparent distress.   HEENT: Normocephalic, anicteric sclera, neck supple, no thyromegaly or adenopathy.  Neck: No JVD, carotids 2+, no bruits.  Cardiac: Irreg irreg  Lungs: Diminished BS  Abdomen: Soft, non-tender. No organosplenomegally, mass or rebound,  BS-present.  Extremities: Without clubbing or cyanosis. No edema.    Neurologic: Alert and oriented, normal affect. No focal defects  Skin: Warm and dry.     Javi Huerta, [facility] [place]       [1]    midodrine  10 mg Oral TID    apixaban  5 mg Oral BID    traMADol  50 mg Oral 2 times per day    senna  10 mL Per NG Tube BID    docusate  100 mg Per NG Tube BID    pantoprazole  40 mg Intravenous Daily    amiodarone  400 mg Oral BID with meals    insulin aspart  1-5 Units Subcutaneous TID CC and HS    levothyroxine  100 mcg Oral Before breakfast   [2]    phenylephrine 40 mcg/min (05/23/25 1016)

## 2025-05-23 NOTE — DIETARY NOTE
ADULT NUTRITION REASSESSMENT    Pt is at moderate nutrition risk.  Pt meets moderate malnutrition criteria.     RECOMMENDATIONS TO MD: See nutrition intervention for ONS (oral nutrition supplements). Liberalized diet to ATTILA vs cardiac due to limited po intake.      ADMITTING DIAGNOSIS:  Atrial fibrillation with RVR (HCC) [I48.91]  Acute respiratory failure with hypoxia (HCC) [J96.01]  Pancytopenia (HCC) [D61.818]  PERTINENT PAST MEDICAL HISTORY:   Past Medical History:    Asthma (HCC)    Esophageal reflux    History of blood transfusion    Visual impairment       PATIENT STATUS: Initial 05/13/25: Pt assessed due to screening at risk for decreased appetite and unintentional weight loss. RD also consulted for \"heart failure diet education\" - not appropriate given clinical status (cleared consult). Pt known to nutrition services from previous admissions, last seen 5/1/25 and met criteria for chronic moderate protein calorie malnutrition (Chronic MPCM) - pt discharged 5/6/25. Per past RD notes (5/1 and 4/14) - decreased po intake reported/noted. Chart reviewed, pt admitted by PCP for IRVIN and HTN x 3 days. Pt with increased swelling to legs. Pt with hx of metastatic endometrial cancer with lung mets (on chemotherapy). Pt also noted to have thrush - nystatin ordered. Discussion with RN, poor oral intake. Intakes reviewed, 25%  1 meal noted. Pt visited, reports decreased po intake over last 3 days. Prior to 3 days ago pt reports better intake/appetite. Typically eats small frequent meals as recommended by MD. Pt reports drinking protein shake but unsure which one (only drinks 1 daily). Pt noted coughing while drinking Ginger ale, spitting up - RN notified. Pt reports difficulties swallowing liquids and solids - last admission pt on chopped/soft  & bite sized with mildly thick liquids. Pt noted to have VFSS last admission recommending regular texture/thin liquids per RN. Pt reports some stomach pain r/t feeling like needing  to have a bowel movement (last BM noted a few days ago). Pt unsure of weight loss, reports usual body weight (UBW) around 169#. Current weight 167# 9.6 oz. EMR weight review, last known weight # 10 oz on 5/5/25 - 7.0# or 4.0% weight loss x 1 week (significant). Per EMR weight review, pt noted weighing 162# 4 oz on 4/12/25 - stable, 175# 14.8 oz on 4/3/25 - 8.3# or 4.7% weight loss x 1 month (non-significant), 182# 11.2 oz on 2/20/25 - 15.1# or 8.3% weight loss x 3 months (significant), 183# on 12/19/24 - 15.4# or 8.4%  weight loss x 5 months (non-significant), 197# on 1/3/24 and 197# on 12/11/23. Nutrition focused physical exam (NFPE) completed, see below for details. Encouraged small frequent meals with emphasis on high calorie and high protein. Discussed oral nutritional supplements (ONS) - pt in agreement and provided flavor preferences. +ONS per discussion.    5/19/25 UPDATE: po remains small, but drinks the ensure max most days. She doesn't like the glucerna or magic cup, so will change to ensure max bid--mohsen, pt pref. Pt states thrush is still bothersome to her.  Urged pt to push po intake some. Pt says a dr told her not to push it because they dont want her to throw up. Told her not to push it that much, just a little.      5/21 Update: Pt reassessed early. Pt transferred to CCU on 5/21 for emergent pericardial window d/t hypoventilation and hypoxia. Pleural chest tube removed this AM. Continues with poor po, consuming 0-20% of meals since last RD visit. Pt stated she is drinking Ensure BID. Pt has not had breakfast yet this AM, waiting for delivery- ordered Italian ice and cream of chicken. Encouraged pt to continue increasing intake to help meet nutritional needs. Answered all questions at this time. Will continue to follow per protocol.    FOOD/NUTRITION RELATED HISTORY:  Appetite: decreased appetite PTA x 3 days per pt otherwise eating small frequent meals (amount consumed unsure at this  time)  Intake: ~0-20% x4 meals documented since last visit  Intake Meeting Needs: No, but oral nutrition supplements (ONS) to maximize  Percent Meals Eaten (last 6 days)       Date/Time Percent Meals Eaten (%)    05/17/25 0835 25 %    05/17/25 1245 25 %    05/17/25 2254 0 %    05/18/25 2124 0 %    05/19/25 1000 50 %    05/20/25 1148 0 %    05/22/25 0929 20 %    05/22/25 1436 10 %     Percent Meals Eaten (%): Very poor PO intake noted; at 05/22/25 1436    05/22/25 1847 15 %           Food Allergies: No Known Food Allergies (NKFA)  Cultural/Ethnic/Evangelical Preferences: Not Obtained    GASTROINTESTINAL: +BM 5/21/25, dysphagia, and thrush   SLP seeing pt.      MEDICATIONS: reviewed    midodrine  10 mg Oral TID    apixaban  5 mg Oral BID    traMADol  50 mg Oral 2 times per day    senna  10 mL Per NG Tube BID    docusate  100 mg Per NG Tube BID    pantoprazole  40 mg Intravenous Daily    amiodarone  400 mg Oral BID with meals    insulin aspart  1-5 Units Subcutaneous TID CC and HS    levothyroxine  100 mcg Oral Before breakfast      phenylephrine 40 mcg/min (05/23/25 1016)       LABS: reviewed A1c 6.6% on 5/12/25    Recent Labs     05/20/25  0548 05/21/25  0552 05/22/25  0505 05/23/25  0540   *  --  129* 140*   BUN 34*  --  30* 26*   CREATSERUM 1.32* 1.43* 1.26* 1.11*   CA 8.5*  --  8.0* 8.0*     --  141 145   K 3.7  3.7 3.8 3.6 3.6  3.6   CL 99  --  104 107   CO2 35.0*  --  31.0 31.0   OSMOCALC 303*  --  300* 307*     WEIGHT HISTORY:  Patient Weight(s) for the past 336 hrs:   Weight   05/23/25 0500 76 kg (167 lb 8.8 oz)   05/22/25 0600 76.6 kg (168 lb 14 oz)   05/19/25 0341 75.8 kg (167 lb)   05/17/25 0611 74.7 kg (164 lb 10.9 oz)   05/16/25 1600 70.8 kg (156 lb 1.4 oz)   05/16/25 0439 74.2 kg (163 lb 8 oz)   05/15/25 1243 69.9 kg (154 lb 3.2 oz)   05/15/25 0458 80.6 kg (177 lb 11.2 oz)   05/14/25 0645 75.6 kg (166 lb 9.6 oz)   05/13/25 0406 76 kg (167 lb 9.6 oz)   05/12/25 1736 75.2 kg (165 lb 11.2 oz)      Wt Readings from Last 10 Encounters:   05/23/25 76 kg (167 lb 8.8 oz)   04/24/25 76.7 kg (169 lb)   05/05/25 79.2 kg (174 lb 9.6 oz)   04/14/25 76.7 kg (169 lb)   12/11/23 89.4 kg (197 lb)     ANTHROPOMETRICS:  HT:  152.4 cm (5')    Wt Readings from Last 1 Encounters:   05/23/25 76 kg (167 lb 8.8 oz)     Last weight: Fluid changes noted true weight likely lower given below noted edema (+2 to bilateral lower extremities)--current wt close to admission wt.    BMI: Body mass index is 32.72 kg/m².  Dosing Weight: 76 kg - taken on 5/13/25, utilized for anthropometric calculations  BMI CLASSIFICATION: 30-34.9 kg/m2 - obesity class I  IBW/lbs (Calculated) Female: 100 lbs             168% IBW  Usual Body Wt: 197 lbs January 2024 per EMR review      85% UBW    NUTRITION RELATED PHYSICAL FINDINGS:  - Nutrition Focused Physical Exam (NFPE): mild depletion body fat triceps region and mild depletion muscle mass dorsal dale region and calf region  - Fluid Accumulation: +1 Bilateral Upper extremity and +2 Bilateral Lower extremity and Feet per flowsheet review -_> See RN documentation for details  - Skin Integrity: at risk and intact per flowsheet review --> See RN documentation for details    CRITERIA FOR MALNUTRITION DIAGNOSIS:  Criteria for non-severe malnutrition diagnosis: chronic illness related to wt loss 7.5% in 3 months, body fat mild depletion, and muscle mass mild depletion.    NUTRITION DIAGNOSIS/PROBLEM:   Moderate Malnutrition related to Chronic - Physiological causes resulting in anorexia or diminished intake as evidenced by wt loss 7.5% in 3 months, body fat mild depletion, muscle mass mild depletion and variable intakes.  NUTRITION DIAGNOSIS PROGRESS:  No Improvement (continue)--intake remains low.        NUTRITION INTERVENTION:   NUTRITION PRESCRIPTION:   Estimated Nutrition needs: --dosing wt of 76 kg - wt taken on 5/13/25  Calories: 3757-8611 calories/day (20-25 calories per kg Dosing wt)  Protein: 55-68  g protein/day (1.2-1.5 g protein/kg Ideal body wt (IBW)45.5 kg)    - Diet:       Procedures    Cardiac diet Cardiac; Sodium Restriction: 3-4 GM NA; Is Patient on Accuchecks? Yes      - Nutrition Care Plan: Encouraged increased PO intake, Encouraged small frequent meals with emphasis on high calorie/high protein, and Initiated ONS (oral nutritional supplements)  - ONS (Oral Nutrition Supplements)/Meals/Snacks: Ensure Max (150 calories and 30 g protein each) BID Chocolate   - Vitamin and mineral supplements: none  - Feeding assistance: meal set up  - Nutrition education: Discussed importance of adequate energy and protein intake    - Coordination of nutrition care: collaboration with other providers  - Discharge and transfer of nutrition care to new setting or provider: monitor plans    MONITOR AND EVALUATE/NUTRITION GOALS:  - Food and Nutrient Intake:      Monitor: adequacy of PO intake, tolerance of PO intake, and adequacy of supplement intake  - Food and Nutrient Administration:      Monitor: N/A  - Anthropometric Measurement:    Monitor weight  - Nutrition Goals:      allow wt loss due to fluid losses, PO and supplement greater than 75% of needs, good supplement intake, labs within acceptable limits, euglycemia, prevent skin breakdown, support body systems, halt true wt loss, and improved GI status    DIETITIAN FOLLOW UP: RD to follow and monitor nutrition status      Zayda Hernandez, MS, RD, LDN  Clinical Dietitian  [id number]

## 2025-05-23 NOTE — PAYOR COMM NOTE
--------------  CONTINUED STAY REVIEW  5/23  Payor: [employer]  Subscriber #:  [id number]  Authorization Number: [id number]    Admit date: 5/12/25  Admit time:  5:28 PM    REVIEW DOCUMENTATION:  5/23 Cardiology      Assessment and Plan:   Pancytopenia, improving  Endometrial cancer with lung metastasis     Shock, likely multifactorial  -weaning phenylephrine, on midodrine  -no s/sx of decompensation from cardiac standpoint, especially post pericardial window for tamponade     Large pericardial effusion with tamponade  Pleural effusion s/p CT  -enlarging pericardial effusion with new signs c/w tamponade physiology on 5/20/25  -underwent pericardial window on 5/21/25  -etiology likely 2/t metastatic disease, will follow pathology  -CT management per [facility]     Bilateral PE, s/p IVC filter  -initially not on AC due to severe anemia + thrombocytopenia, however counts have improved  -started AC with apixaban 5mg bid on 5/22                 -Hg has been stable (9.6 on 5/23)     Paroxysmal AF/AT  -developed rapid AF post pericardial window  -continue PO amiodarone, rate controlled  -AC, as above          5/23 Cardiothoracic Surgery   Feels mildly SOB.       Lungs: coarse bilaterally   Mediastinal Nathaniel drain=70cc/12 hours- no air leak  Pleural chest tube=20cc/12 hours- no air leak       Assessment/Plan:  S/P URGENT PERICARDIAL WINDOW/DRAINAGE OF EFFUSION/INSERTION OF RIGHT PLEURAL CHEST TUBE POD # 2  Encourage pulm toilet: IS. Currently on 4 liters N/C: wean as able. CXR pending.   Pain meds as needed  Increase actvity-OOB to chair and ambulate. Explained importance again today-planning for PT/OT to see this morning to assist with activity. Pt more agreeable today.   Hx: pre op DVT/PE w/IVC filter placed 5/14/25. Scds/Eliquis  Removed pleural chest tube this morning w/o difficulty: pt shannon procedure well. Keep Nathaniel drain. Obtain limited echo today. Anticipate removing Nathaniel tomorrow   Hx: metastatic endometrial  carcinoma w/mgt per Hem/Onc  Hx: AF w/mgt per Cardiology  Hypotension on [id number]- continue Midodrine. Once Leon off-remove A-line   Pericardial fluid/tissue pathology pending          MEDICATIONS ADMINISTERED IN LAST 1 DAY:  amiodarone (Pacerone) tab 400 mg       Date Action Dose Route User    5/23/2025 0838 Given 400 mg Oral Michael Heath RN    5/22/2025 1655 Given 400 mg Oral Sanaz Collier RN          apixaban (Eliquis) tab 5 mg       Date Action Dose Route User    5/23/2025 0839 Given 5 mg Oral Michael Heath RN    5/22/2025 2008 Given 5 mg Oral Ariela Glaser RN          docusate (Colace) 50 MG/5ML oral solution 100 mg       Date Action Dose Route User    5/23/2025 0838 Given 100 mg Per NG Tube Michael Heath RN    5/22/2025 2008 Given 100 mg Per NG Tube Ariela Glaser RN          levothyroxine (Synthroid) tab 100 mcg       Date Action Dose Route User    5/23/2025 0540 Given 100 mcg Oral Ariela Glaser RN          midodrine (ProAmatine) tab 10 mg       Date Action Dose Route User    5/23/2025 1324 Given 10 mg Oral Michael Hetah RN    5/23/2025 0540 Given 10 mg Oral Ariela Glaser RN    5/22/2025 1655 Given 10 mg Oral Sanaz Collier RN          pantoprazole (Protonix) 40 mg in sodium chloride 0.9% PF 10 mL IV push       Date Action Dose Route User    5/23/2025 0838 Given 40 mg Intravenous Michael Heath RN          phenylephrine (Leon-Synephrine) 50 mg in sodium chloride 0.9% 250 mL infusion       Date Action Dose Route User    5/23/2025 1016 Rate/Dose Change 40 mcg/min Intravenous Aisha Reyes RN    5/23/2025 1005 Restarted 20 mcg/min Intravenous Aisha Reyes RN    5/23/2025 0800 Rate/Dose Change 25 mcg/min Intravenous Michael Heath RN    5/23/2025 0700 Rate/Dose Verify 50 mcg/min Intravenous Ariela Glaser RN    5/23/2025 0600 Rate/Dose Verify 50 mcg/min Intravenous Ariela Glaser RN    5/23/2025 0400 Rate/Dose Verify 50 mcg/min Intravenous Ariela Glaser, RN     5/23/2025 0200 Rate/Dose Verify 50 mcg/min Intravenous Ariela Glaser RN    5/23/2025 0000 Rate/Dose Verify 50 mcg/min Intravenous Ariela Glaser RN    5/22/2025 2300 Rate/Dose Verify 50 mcg/min Intravenous Ariela Glaser RN    5/22/2025 2257 New Bag 50 mcg/min Intravenous Ariela Glaser RN    5/22/2025 2200 Rate/Dose Verify 50 mcg/min Intravenous Ariela Glaser RN    5/22/2025 2000 Rate/Dose Verify 50 mcg/min Intravenous Ariela Glaser RN          senna (Senokot) 8.8 MG/5ML oral syrup 17.6 mg       Date Action Dose Route User    5/23/2025 0838 Given 17.6 mg Per NG Tube Michael Heath RN    5/22/2025 2008 Given 17.6 mg Per NG Tube Ariela Glaser RN          traMADol (Ultram) tab 50 mg       Date Action Dose Route User    5/23/2025 0838 Given 50 mg Oral Michael Heath RN    5/22/2025 2007 Given 50 mg Oral Ariela Glaser RN          traMADol (Ultram) tab 50 mg       Date Action Dose Route User    5/22/2025 1507 Given 50 mg Oral Sanaz Collier RN            Vitals (last day)       Date/Time Temp Pulse Resp BP SpO2 Weight O2 Device O2 Flow Rate (L/min) [facility]    05/23/25 1100 -- 89 19 84/57 100 % -- High flow nasal cannula 4 L/min     05/23/25 1000 -- 102 18 92/64 -- -- -- --     05/23/25 1000 -- -- -- -- 97 % -- High flow nasal cannula 4 L/min     05/23/25 0923 -- 103 -- 92/64 -- -- -- --     05/23/25 0900 -- 94 19 90/61 98 % -- High flow nasal cannula 4 L/min     05/23/25 0800 98.6 °F (37 °C) 89 19 103/68 99 % -- High flow nasal cannula 4 L/min     05/23/25 0700 -- 87 18 87/62 100 % -- High flow nasal cannula 4 L/min     05/23/25 0600 -- 85 16 93/71 100 % -- High flow nasal cannula 4 L/min     05/23/25 0500 -- 86 19 90/67 99 % 167 lb 8.8 oz (76 kg) High flow nasal cannula 4 L/min     05/23/25 0400 98.4 °F (36.9 °C) 83 16 100/65 100 % -- High flow nasal cannula 4 L/min     05/23/25 0300 -- 85 18 101/66 98 % -- High flow nasal cannula 4 L/min     05/23/25 0200 -- 83 18 95/60 100 %  -- High flow nasal cannula 4 L/min     05/23/25 0100 -- 84 16 96/66 98 % -- High flow nasal cannula 4 L/min     05/23/25 0000 98 °F (36.7 °C) 83 18 95/63 98 % -- High flow nasal cannula 4 L/min     05/22/25 2300 -- 86 22 103/73 98 % -- High flow nasal cannula 4 L/min     05/22/25 2200 -- 83 18 101/62 100 % -- High flow nasal cannula 4 L/min     05/22/25 2122 -- 81 17 -- 100 % -- High flow nasal cannula 4 L/min     05/22/25 2100 -- 84 18 92/67 100 % -- High flow nasal cannula 4 L/min     05/22/25 2000 98.2 °F (36.8 °C) 86 18 100/71 97 % -- High flow nasal cannula 4 L/min     05/22/25 1900 -- 84 20 98/65 100 % -- High flow nasal cannula 4 L/min     05/22/25 1800 -- 83 19 95/68 99 % -- High flow nasal cannula 4 L/min     05/22/25 1700 -- 83 21 96/70 99 % -- High flow nasal cannula 4 L/min     05/22/25 1600 97.9 °F (36.6 °C) 84 22 106/67 98 % -- High flow nasal cannula 4 L/min     05/22/25 1500 -- 82 22 96/67 98 % -- High flow nasal cannula 4 L/min     05/22/25 1400 -- 77 18 92/63 100 % -- High flow nasal cannula 4 L/min     05/22/25 1348 -- -- -- -- -- -- High flow nasal cannula 4 L/min     05/22/25 1300 -- 80 20 95/67 99 % -- High flow nasal cannula 4 L/min     05/22/25 1200 97.9 °F (36.6 °C) 76 18 94/68 99 % -- High flow nasal cannula 4 L/min     05/22/25 1100 -- 75 22 97/72 100 % -- High flow nasal cannula 4 L/min     05/22/25 1000 -- 76 19 97/70 100 % -- High flow nasal cannula 4 L/min     05/22/25 0900 -- 76 19 91/71 99 % -- High flow nasal cannula 4 L/min     05/22/25 0807 -- 71 22 -- 99 % -- -- --     05/22/25 0800 97.9 °F (36.6 °C) 72 21 97/75 100 % -- High flow nasal cannula 4 L/min     05/22/25 0700 -- 72 18 100/71 100 % -- High flow nasal cannula 4 L/min     05/22/25 0600 -- 73 18 101/70 99 % 168 lb 14 oz (76.6 kg) High flow nasal cannula 4 L/min     05/22/25 0500 -- 72 17 82/61 98 % -- High flow nasal cannula 4 L/min     05/22/25 0400 98.2 °F (36.8 °C) 65 14  90/65 98 % -- High flow nasal cannula 4 L/min     05/22/25 0300 -- 64 14 94/65 98 % -- High flow nasal cannula 4 L/min     05/22/25 0240 -- 68 18 -- 100 % -- -- --     05/22/25 0200 -- 71 15 92/62 99 % -- High flow nasal cannula 4 L/min     05/22/25 0100 -- 71 15 90/63 100 % -- High flow nasal cannula 4 L/min     05/22/25 0000 98 °F (36.7 °C) 73 14 107/73 100 % -- High flow nasal cannula 5 L/min           CIWA Scores (since admission)       None          Blood Transfusion Record       Product Unit Status Volume Start End            Transfuse RBC       25  [id number]  [id number] Completed 05/22/25 0708 411.25 mL 05/21/25 1208 05/21/25 1500     W97  25  [id number]  [id number] Completed 05/13/25 1431 335 mL 05/13/25 1144 05/13/25 1428                Transfuse platelets       25  [id number]  [id number] Completed 05/14/25 1319 200 mL 05/14/25 1116 05/14/25 1316

## 2025-05-23 NOTE — PHYSICAL THERAPY NOTE
PHYSICAL THERAPY RE-EVALUATION - INPATIENT     Room Number: 231/231-A  Evaluation Date: 5/23/2025  Type of Evaluation: Re-evaluation   Physician Order: PT Eval and Treat    Presenting Problem: chest pain and respiratory failure with hypoxia, seen for re-evaluation as patient is now s/p pericardial window and drainage and placement of chest tube  Co-Morbidities : metastatic endometrial cancer with lung mets, anemia, paroxysmal SVT, small pericardial effusion  Reason for Therapy: Mobility Dysfunction and Discharge Planning    PHYSICAL THERAPY ASSESSMENT   Patient is a 70 year old female admitted 5/12/2025 for chest pain and respiratory failure with hypoxia, seen for re-evaluation as patient is now s/p pericardial window and drainage and placement of chest tube.     Prior to admission, patient's baseline is ambulatory with RW, utilizing a wheelchair for family to bump patient upon the stairs versus fire department assistance with stairs. Patient is currently functioning below baseline with bed mobility, transfers, gait, maintaining seated position, standing prolonged periods, and performing household tasks.  Patient is requiring moderate assist, maximum assist, and assist x 2 as a result of the following impairments: decreased functional strength, decreased endurance/aerobic capacity, pain, impaired sitting and standing balance, decreased muscular endurance, and medical status.  Physical Therapy will continue to follow for duration of hospitalization.    Patient will benefit from continued skilled PT Services to promote return to prior level of function and safety with continuous assistance and gradual rehabilitative therapy .    PLAN DURING HOSPITALIZATION  Nursing Mobility Recommendation : 1 Assist (with 2nd person present for safety)  PT Device Recommendation: Rolling walker  PT Treatment Plan: Bed mobility, Body mechanics, Endurance, Energy conservation, Patient education, Gait training, Strengthening, Transfer  training, Balance training  Rehab Potential : Good  Frequency (Obs): Daily     PHYSICAL THERAPY MEDICAL/SOCIAL HISTORY     Problem List  Principal Problem:    Acute respiratory failure with hypoxia (HCC)  Active Problems:    Thrombocytopenia (HCC)    Azotemia    Pancytopenia (HCC)    Hyperglycemia    Atrial fibrillation with RVR (HCC)    Malnutrition (HCC)    HOME SITUATION  Type of Home: Apartment  Home Layout: One level  Stairs to Enter : 12   Railing: Yes    Lives With: Daughter (granddaughter)    Drives: Yes (has not driven recently)   Patient Regularly Uses: Home O2, Rolling walker, Wheelchair     SUBJECTIVE  Agreeable     PHYSICAL THERAPY EXAMINATION   OBJECTIVE  Precautions: Chest tube to suction; Limb alert - left (RT Port)  Fall Risk: High fall risk    PAIN ASSESSMENT  Rating: Unable to rate  Location: chest tube insertion site  Management Techniques: Body mechanics, Repositioning, Breathing techniques    COGNITION  Following Commands:  follows one step commands with increased time and follows one step commands with repetition    RANGE OF MOTION AND STRENGTH ASSESSMENT  Lower extremity ROM is within functional limits   Lower extremity strength is impaired, approx 3/5 bilaterally     BALANCE  Static Sitting: Fair  Dynamic Sitting: Fair -  Static Standing: Poor +  Dynamic Standing: Poor    ACTIVITY TOLERANCE  Pulse: 103  Heart Rate Source: Monitor     BP: 92/64  BP Location: Right arm  BP Method: Automatic  Patient Position: Sitting    O2 WALK  Oxygen Therapy  SPO2% on Oxygen at Rest: 97  At rest oxygen flow (liters per minute): 4  SPO2% Ambulation on Oxygen: 95  Ambulation oxygen flow (liters per minute): 4    AM-PAC '6-Clicks' INPATIENT SHORT FORM - BASIC MOBILITY  How much difficulty does the patient currently have...  Patient Difficulty: Turning over in bed (including adjusting bedclothes, sheets and blankets)?: A Lot   Patient Difficulty: Sitting down on and standing up from a chair with arms (e.g.,  wheelchair, bedside commode, etc.): A Lot   Patient Difficulty: Moving from lying on back to sitting on the side of the bed?: A Lot   How much help from another person does the patient currently need...   Help from Another: Moving to and from a bed to a chair (including a wheelchair)?: A Lot   Help from Another: Need to walk in hospital room?: A Lot   Help from Another: Climbing 3-5 steps with a railing?: Total     AM-PAC Score:  Raw Score: 11   Approx Degree of Impairment: 72.57%   Standardized Score (AM-PAC Scale): 33.86   CMS Modifier (G-Code): CL    FUNCTIONAL ABILITY STATUS  Functional Mobility/Gait Assessment  Gait Assistance: Moderate assistance  Distance (ft): 2 ft bed>chair  Assistive Device:  (HHA)  Pattern: Shuffle (decreased ant speed, decreased step length, flexed posture, cues for step sequencing)  Supine to Sit: maximum assist x 2. Patient tolerated static sitting approx 10-15 minutes with intermittent UE support and Min A>CGA in order to maintain static sitting balance.  Sit to Stand: moderate assist with HHA    Exercise/Education Provided:  Bed mobility  Body mechanics  Energy conservation  Gait training  Posture  Transfer training    Skilled Therapy Provided: Patient in bed with family present in room upon arrival. RN approved activity. Educated patient on POC and benefits of mobilization. Agreeable to participate. Patient reporting chest tube insertion site pain, not quantified per the pain scale. Patient benefiting from increased time, cues, assistance, and encouragement in order to complete functional mobility tasks. Patient able to take a few steps towards beside chair with assistance following sitting EOB. Patient with increased coughing after taking steps, requiring use of suction. Educated patient on light BLE therex to perform throughout the day. Encouraged patient to sit in bedside chair 2-3x/day in order to increase tolerance for upright positioning in preparation for more out of bed  mobility.     The patient's Approx Degree of Impairment: 72.57% has been calculated based on documentation in the [facility] '6 clicks' Inpatient Basic Mobility Short Form.  Research supports that patients with this level of impairment may benefit from rehab.  Final disposition will be made by interdisciplinary medical team.    Patient End of Session: Up in chair, Needs met, Call light within reach, RN aware of session/findings, All patient questions and concerns addressed, Hospital anti-slip socks, Family present    CURRENT GOALS  Goals to be met by: 6/6/25  Patient Goal Patient's self-stated goal is: none stated   Goal #1 Patient is able to demonstrate supine - sit EOB @ level: minimum assistance     Goal #1   Current Status    Goal #2 Patient is able to demonstrate transfers Sit to/from Stand at assistance level: CGA with walker - rolling     Goal #2  Current Status    Goal #3 Patient is able to ambulate 50 feet with assist device: walker - rolling at assistance level: CGA   Goal #3   Current Status    Goal #4 Patient to demonstrate independence with home activity/exercise instructions provided to patient in preparation for discharge.   Goal #4   Current Status      Therapeutic Activity:  28 minutes

## 2025-05-23 NOTE — PROGRESS NOTES
[facility]  part of [facility]    Progress Note    Kelli Fisher Patient Status:  Inpatient    1955 MRN [id number]   Location [facility] 2W/SW Attending Inna Bullock MD   Hosp Day # 11 PCP Taylor Gallardo MD     Subjective:   Kelli Fisher is a(n) 70 year old female     S/P Urgent subxiphoid pericardial window and insertion of right pleural chest tube on 25. Fluid positive for metastatic carcinoma.  Eliquis initiated on 25.    Able to sit in chair today. Still with SOB but a little better. Pt with significant weakness. Pt on pressors but being weaned.    Objective:   Blood pressure (!) 87/62, pulse 87, temperature 98.4 °F (36.9 °C), temperature source Temporal, resp. rate 18, height 5' (1.524 m), weight 167 lb 8.8 oz (76 kg), SpO2 100%.  PHYSICAL EXAM  General: . Patient appears stated age.  Skin: No abnormal skin lesions noted.  Head: Normocephalic.  Eyes: Sclerae are anicteric.  Ears, Nose, Throat, and Mouth: Normal oral mucosa and oropharynx.  Neck: Neck is supple. No cervical masses present. Trachea is midline. No thyromegaly.  Lungs: decreased BS  Cardiac: normal rate? regular rhythm. S1/S2 without murmurs.  Abdomen: Abdomen is soft, nontender, nondistended, without masses. No hepatomegaly. No splenomegaly. No ascites present. Bowel sounds active.  Hematologic/Lymphatic: No petechiae. No purpura. No cervical SUKH.  Extremities: ++edema. No cyanosis. No digital clubbing. No discoloration.  Musculoskeletal: Normal range of motion. Strength and tone are normal. Back and Spine nontender to palpation and percussion.  Neurologic: No focal findings on screening exam.      Results:   Lab Results   Component Value Date    WBC 16.6 (H) 2025    HGB 9.6 (L) 2025    HCT 30.9 (L) 2025    .0 2025    CREATSERUM 1.11 (H) 2025    BUN 26 (H) 2025     2025    K 3.6 2025    K 3.6 2025     2025    CO2 31.0  05/23/2025     (H) 05/23/2025    CA 8.0 (L) 05/23/2025    ALB 3.2 05/12/2025    ALKPHO 142 05/12/2025    BILT 0.7 05/12/2025    TP 6.8 05/12/2025    AST 26 05/12/2025    ALT 10 05/12/2025    PTT 37.0 (H) 05/21/2025    INR 1.31 (H) 05/22/2025    TSH 3.705 05/02/2025    LIP 42 12/11/2023    DDIMER 3.05 (H) 05/13/2025    MG 2.2 05/19/2025    TROPHS 5 05/17/2025    B12 >2,000 (H) 05/02/2025       XR CHEST AP PORTABLE  (CPT=71045)  Result Date: 5/22/2025  CONCLUSION: Stable left basal/perihilar pulmonary opacity which which could represent pneumonia or neoplasm.  Stable small bilateral pleural effusions slightly larger on the left.  Mild interstitial edema. Interval extubation   Dictated by (CST): Sincere Canales MD on 5/22/2025 at 7:38 AM     Finalized by (CST): Sincere Canales MD on 5/22/2025 at 7:40 AM          XR CHEST AP PORTABLE  (CPT=71045)  Result Date: 5/21/2025  CONCLUSION:  1.  Post pericardial window.  Pericardial drain over the lower aspect of the cardiac silhouette. 2.  Interval intubation.  Endotracheal tube is present over the trachea in gross satisfactory position 33 mm above the christian. 3.  Small bilateral pleural effusions, with decrease in size of left effusion.  Nonspecific left perihilar pulmonary opacity which could represent atelectasis, pneumonia, or other inflammatory/neoplastic process.    Dictated by (CST): Sincere Canales MD on 5/21/2025 at 3:15 PM     Finalized by (CST): Sincere Canales MD on 5/21/2025 at 3:17 PM          XR CHEST AP PORTABLE  (CPT=71045)  Result Date: 5/21/2025  CONCLUSION:   1. Redemonstration of increased density within the left lung (mid and lower regions).  The findings are most compatible with a combination of infiltrate, atelectasis, and small to moderate effusion.   2. Cardiac enlargement.  There is widening of the mediastinum likely representing adenopathy.    Dictated by (CST): Kaleb Epps MD on 5/21/2025 at 9:56 AM     Finalized by (CST): Kaleb Epps MD on  5/21/2025 at 9:59 AM          EKG 12 Lead  Result Date: 5/21/2025  Atrial fibrillation with rapid ventricular response Nonspecific T wave abnormality Abnormal ECG When compared with ECG of 21-MAY-2025 10:59, Atrial fibrillation has replaced Sinus rhythm Confirmed by TRINI ROSS, JORGENSEN ([id number]) on 5/21/2025 5:18:23 PM    EKG 12 Lead  Result Date: 5/21/2025  Normal sinus rhythm Low voltage QRS, consider pulmonary disease, pericardial effusion, or normal variant Nonspecific ST and T wave abnormality Abnormal ECG When compared with ECG of 17-MAY-2025 09:44, No significant change was found Confirmed by CALIXTO MAGAÑA ([id number]) on 5/21/2025 2:09:04 PM      Assessment & Plan:       Metastatic endometrial carcinoma:  - Initiated carbo/taxol/keytruda on 5/6/25.  - Pt with malignant pericardial effusion with tamponade. S/P pericardial window.     2. Pancytopenia:  - secondary to chemotherapy.  - S/P fulphila and neupogen initiated by Dr. Herring.  - Now resolved.     3. PE:  - Pt unable to receive full dose anticoagulation due to low plts.  - S/P LE doppler venous ultrasound. Shows acute appearing nonocclusive DVT involving the right superficial femoral, popliteal, and posterior tibial veins.   - IVC filter placed.  - Eliquis initiated.        4. Afib with RVR:  - management per cardiology.    Goals of care discussed by Dr. Herring.    Will sign off. Please call with questions.    Trista Peña MD  5/23/2025

## 2025-05-23 NOTE — OCCUPATIONAL THERAPY NOTE
OCCUPATIONAL THERAPY EVALUATION - INPATIENT     Room Number: 231/231-A  Evaluation Date: 5/23/2025  Type of Evaluation: Re-evaluation  Presenting Problem: s/p pericardial window, CT placement    Physician Order: IP Consult to Occupational Therapy  Reason for Therapy: ADL/IADL Dysfunction and Discharge Planning    OCCUPATIONAL THERAPY ASSESSMENT   Patient is a 70 year old female admitted 5/12/2025 for SOB and HTN. Pt is seen this date for a re-evaluation following an urgent pericardial window, drainage of effusion, and insertion of RT pleural chest tube on 5/21.     Patient is currently functioning below baseline with ADls and functional mobility.  Patient is requiring moderate assist and maximum assist as a result of the following impairments: decreased functional strength, decreased functional reach, decreased endurance, pain, impaired sit/stand balance, decreased muscular endurance, medical status, and increased O2 needs from baseline. Occupational Therapy will continue to follow for duration of hospitalization.    Patient will benefit from continued skilled OT Services to promote return to prior level of function and safety with continuous assistance and gradual rehabilitative therapy.    PLAN DURING HOSPITALIZATION  OT Device Recommendations: Shower chair  OT Treatment Plan: Energy conservation/work simplification techniques, ADL training, Functional transfer training, UE strengthening/ROM, Endurance training, Patient/Family training, Patient/Family education, Compensatory technique education     OCCUPATIONAL THERAPY MEDICAL/SOCIAL HISTORY   Problem List  Principal Problem:    Acute respiratory failure with hypoxia (HCC)  Active Problems:    Thrombocytopenia (HCC)    Azotemia    Pancytopenia (HCC)    Hyperglycemia    Atrial fibrillation with RVR (HCC)    Malnutrition (HCC)    HOME SITUATION  Type of Home: Apartment  Home Layout: One level  Lives With: Daughter; Other (Comment) (grand dtr)  Toilet and Equipment:  Comfort height toilet  Shower/Tub and Equipment: Tub-shower combo; Grab bar  Drives: Yes (has not driven in awhile)  Patient Regularly Uses: Home O2; Rolling walker; Wheelchair    SUBJECTIVE  \"It hurts\" indicating CT site    OCCUPATIONAL THERAPY EXAMINATION      OBJECTIVE  Precautions: Chest tube to suction (RT Port)  Fall Risk: High fall risk      PAIN ASSESSMENT  Rating: -- (significant)  Location: CT site  Management Techniques: Relaxation; Repositioning      ACTIVITY TOLERANCE  Pulse: 102     Resp: 18  BP: 92/64        Patient Position: Sitting    O2 SATURATIONS  Oxygen Therapy  SPO2% on Oxygen at Rest: 97  At rest oxygen flow (liters per minute): 4    COGNITION  Able to follow commands    Communication: able to express her needs    Behavioral/Emotional/Social: lethargic, benefits from encouragement    RANGE OF MOTION   Upper extremity ROM is within functional limits indicates LT UE sore with attempts to perform shd flex     ACTIVITIES OF DAILY LIVING ASSESSMENT  AM-PAC ‘6-Clicks’ Inpatient Daily Activity Short Form  How much help from another person does the patient currently need…  -   Putting on and taking off regular lower body clothing?: A Lot  -   Bathing (including washing, rinsing, drying)?: A Lot  -   Toileting, which includes using toilet, bedpan or urinal? : Total  -   Putting on and taking off regular upper body clothing?: A Lot  -   Taking care of personal grooming such as brushing teeth?: A Little  -   Eating meals?: A Little    AM-PAC Score:  Score: 13  Approx Degree of Impairment: 63.03%  Standardized Score (AM-PAC Scale): 32.03  CMS Modifier (G-Code): CL    FUNCTIONAL TRANSFER ASSESSMENT  Sit to Stand: Edge of Bed  Edge of Bed: Moderate Assist  Chair: Moderate Assist    BED MOBILITY  Rolling: Maximum Assist  Supine to Sit : -- (Max Ax2)  Sit to Supine (OT): Moderate Assist    BALANCE ASSESSMENT  Static Sitting: Supervision  Static Standing: Contact Guard Assist    FUNCTIONAL ADL  ASSESSMENT  Eating: Supervision  Grooming Seated: Supervision  Bathing Seated: Moderate Assist  UB Dressing Seated: Minimal Assist  LB Dressing Seated: Maximum Assist  Toileting Seated: Maximum Assist  Toileting Standing: Maximum Assist    THERAPEUTIC EXERCISE  Encourage ankle pumps and pressure relief/frequent position change    Skilled Therapy Provided: Pt received resting in bed. Pt agreeable to OOB activity.   Pt remains lethargic with chief c/o of generalized weakness and pain at CT site.   Pt demo some difficulty manginsecretions, coughing and requiring use of suction.   Current recommendations are for EDUARDO however pending pt progress, home with family's 24 hour support and [facility] may be reasonable.     EDUCATION PROVIDED  Patient Education : Role of Occupational Therapy; Plan of Care; Functional Transfer Techniques; Posture/Positioning (activity promotion)  Patient's Response to Education: Requires Further Education  Family/Caregiver Education : Role of Occupational Therapy; Plan of Care; Discharge Recommendations; DME Recommendations; Functional Transfer Techniques; Fall Prevention; Posture/Positioning; Edema Reduction; Energy Conservation; Proper Body Mechanics  Family/Caregiver's Response to Education: Verbalized Understanding; Returned Demonstration    The patient's Approx Degree of Impairment: 63.03% has been calculated based on documentation in the [facility] '6 clicks' Inpatient Daily Activity Short Form.  Research supports that patients with this level of impairment may benefit from EDUARDO.  Final disposition will be made by interdisciplinary medical team.     Patient End of Session: Up in chair, Needs met, Call light within reach, RN aware of session/findings, All patient questions and concerns addressed, Family present    OT Goals  Patients self stated goal is: feel better     Patient will complete functional transfer with Min A  Comment:     Patient will complete toileting with MIn A  Comment:     Patient will  tolerate standing for 3-5 minutes in prep for adls with SBA   Comment:      Comment:          Goals  on: 6/3/25  Frequency: 3-5x/week    Self-Care Home Management: 15 minutes  Therapeutic Activity: 15 minutes

## 2025-05-23 NOTE — PLAN OF CARE
Problem: RESPIRATORY - ADULT  Goal: Achieves optimal ventilation and oxygenation  Description: INTERVENTIONS:- Assess for changes in respiratory status- Assess for changes in mentation and behavior- Position to facilitate oxygenation and minimize respiratory effort- Oxygen supplementation based on oxygen saturation or ABGs- Provide Smoking Cessation handout, if applicable- Encourage broncho-pulmonary hygiene including cough, deep breathe, Incentive Spirometry- Assess the need for suctioning and perform as needed- Assess and instruct to report SOB or any respiratory difficulty- Respiratory Therapy support as indicated- Manage/alleviate anxiety- Monitor for signs/symptoms of CO2 retention  Outcome: Progressing     Problem: CARDIOVASCULAR - ADULT  Goal: Maintains optimal cardiac output and hemodynamic stability  Description: INTERVENTIONS:- Monitor vital signs, rhythm, and trends- Monitor for bleeding, hypotension and signs of decreased cardiac output- Evaluate effectiveness of vasoactive medications to optimize hemodynamic stability- Monitor arterial and/or venous puncture sites for bleeding and/or hematoma- Assess quality of pulses, skin color and temperature- Assess for signs of decreased coronary artery perfusion - ex. Angina- Evaluate fluid balance, assess for edema, trend weights  Outcome: Not Progressing     Problem: SAFETY ADULT - FALL  Goal: Free from fall injury  Description: INTERVENTIONS:  - Assess pt frequently for physical needs  - Identify cognitive and physical deficits and behaviors that affect risk of falls.  - [place] fall precautions as indicated by assessment.  - Educate pt/family on patient safety including physical limitations  - Instruct pt to call for assistance with activity based on assessment  - Modify environment to reduce risk of injury  - Provide assistive devices as appropriate  - Consider OT/PT consult to assist with strengthening/mobility  - Encourage toileting schedule  Outcome:  Not Progressing     Problem: Diabetes/Glucose Control  Goal: Glucose maintained within prescribed range  Description: INTERVENTIONS:- Monitor Blood Glucose as ordered- Assess for signs and symptoms of hyperglycemia and hypoglycemia- Administer ordered medications to maintain glucose within target range- Assess barriers to adequate nutritional intake and initiate nutrition consult as needed- Instruct patient on self management of diabetes  Outcome: Progressing     Problem: HEMATOLOGIC - ADULT  Goal: Free from bleeding injury  Description: (Example usage: patient with low platelets)  INTERVENTIONS:  - Avoid intramuscular injections, enemas and rectal medication administration  - Ensure safe mobilization of patient  - Hold pressure on venipuncture sites to achieve adequate hemostasis  - Assess for signs and symptoms of internal bleeding  - Monitor lab trends  - Patient is to report abnormal signs of bleeding to staff  - Avoid use of toothpicks and dental floss  - Use electric shaver for shaving  - Use soft bristle tooth brush  - Limit straining and forceful nose blowing  Outcome: Progressing     Problem: GASTROINTESTINAL - ADULT  Goal: Minimal or absence of nausea and vomiting  Description: INTERVENTIONS:  - Maintain adequate hydration with IV or PO as ordered and tolerated  - Nasogastric tube to low intermittent suction as ordered  - Evaluate effectiveness of ordered antiemetic medications  - Provide nonpharmacologic comfort measures as appropriate  - Advance diet as tolerated, if ordered  - Obtain nutritional consult as needed  - Evaluate fluid balance  Outcome: Not Progressing     Problem: METABOLIC/FLUID AND ELECTROLYTES - ADULT  Goal: Glucose maintained within prescribed range  Description: INTERVENTIONS:- Monitor Blood Glucose as ordered- Assess for signs and symptoms of hyperglycemia and hypoglycemia- Administer ordered medications to maintain glucose within target range- Assess barriers to adequate nutritional  intake and initiate nutrition consult as needed- Instruct patient on self management of diabetes  Outcome: Progressing     Problem: SKIN/TISSUE INTEGRITY - ADULT  Goal: Incision(s), wounds(s) or drain site(s) healing without S/S of infection  Description: INTERVENTIONS:  - Assess and document risk factors for pressure ulcer development  - Assess and document skin integrity  - Assess and document dressing/incision, wound bed, drain sites and surrounding tissue  - Implement wound care per orders  - Initiate isolation precautions as appropriate  - Initiate Pressure Ulcer prevention bundle as indicated  Outcome: Progressing     Problem: MUSCULOSKELETAL - ADULT  Goal: Return mobility to safest level of function  Description: INTERVENTIONS:  - Assess patient stability and activity tolerance for standing, transferring and ambulating w/ or w/o assistive devices  - Assist with transfers and ambulation using safe patient handling equipment as needed  - Ensure adequate protection for wounds/incisions during mobilization  - Obtain PT/OT consults as needed  - Advance activity as appropriate  - Communicate ordered activity level and limitations with patient/family  Outcome: Progressing

## 2025-05-23 NOTE — PROGRESS NOTES
[facility]  part of [facility]    Progress Note    Kelli Fisher Patient Status:  Inpatient    1955 MRN [id number]   Location [facility] 2W/SW Attending Inna Bullock MD   Hosp Day # 11 PCP Taylor Gallardo MD     Subjective:  Pt resting in bed with oldest daughter at bedside. She denies pain at this time. Feels mildly SOB.     Objective:  BP (!) 87/62 (BP Location: Right arm)   Pulse 87   Temp 98.4 °F (36.9 °C) (Temporal)   Resp 18   Ht 5' (1.524 m)   Wt 167 lb 8.8 oz (76 kg)   SpO2 100%   BMI 32.72 kg/m²     Temp (24hrs), Av.1 °F (36.7 °C), Min:97.9 °F (36.6 °C), Max:98.4 °F (36.9 °C)      Intake/Output:    Intake/Output Summary (Last 24 hours) at 2025 0910  Last data filed at 2025 0500  Gross per 24 hour   Intake 1371.37 ml   Output 1385 ml   Net -13.63 ml       Wt Readings from Last 3 Encounters:   25 167 lb 8.8 oz (76 kg)   25 169 lb (76.7 kg)   25 174 lb 9.6 oz (79.2 kg)       Allergies:  Allergies[1]    Labs:  Lab Results   Component Value Date    WBC 16.6 2025    HGB 9.6 2025    HCT 30.9 2025    .0 2025    CREATSERUM 1.11 2025    BUN 26 2025     2025    K 3.6 2025    K 3.6 2025     2025    CO2 31.0 2025     2025    CA 8.0 2025       Physical Exam:  Blood pressure (!) 87/62, pulse 87, temperature 98.4 °F (36.9 °C), temperature source Temporal, resp. rate 18, height 5' (1.524 m), weight 167 lb 8.8 oz (76 kg), SpO2 100%.  General: NAD  Neck: No JVD  Lungs: coarse bilaterally   Mediastinal Nathaniel drain=70cc/12 hours- no air leak  Pleural chest tube=20cc/12 hours- no air leak   Heart: RRR, S1, S2  Abdomen: Soft/Round, NT/ND, BS+x4/+Flatus/no BM   Extremities: Warm, dry, 1+ UE/LE generalized edema bilat  Pulses: 2+ bilat DP  Skin: subxiphoid incision drsg: CDI  Neurological:  AAOx3, MAEW    Assessment/Plan:  S/P URGENT PERICARDIAL  WINDOW/DRAINAGE OF EFFUSION/INSERTION OF RIGHT PLEURAL CHEST TUBE POD # 2  Encourage pulm toilet: IS. Currently on 4 liters N/C: wean as able. CXR pending.   Pain meds as needed  Increase actvity-OOB to chair and ambulate. Explained importance again today-planning for PT/OT to see this morning to assist with activity. Pt more agreeable today.   Hx: pre op DVT/PE w/IVC filter placed 5/14/25. Scds/Eliquis  Removed pleural chest tube this morning w/o difficulty: pt shannon procedure well. Keep Nathaniel drain. Obtain limited echo today. Anticipate removing Nathaniel tomorrow   Hx: metastatic endometrial carcinoma w/mgt per Hem/Onc  Hx: AF w/mgt per Cardiology  Hypotension on Uim-hhymuwfjpu-yenpyod- continue Midodrine. Once Leon off-remove A-line   Pericardial fluid/tissue pathology pending  Discharge planning: pt lives with family and has many supportive family members. Will await PTs recs for discharge dispo     Plan of care discussed w/patient/daughter/RN and CV Surgeon: Dr. Gigi Shi, APRN  5/23/2025  9:10 AM         [1]   Allergies  Allergen Reactions    Aspirin Tightness in Throat    Penicillins HIVES    Other OTHER (SEE COMMENTS)     Pt states IV steroid allergy, states it makes her breathing worse

## 2025-05-23 NOTE — PROGRESS NOTES
Progress Note     Kelli Fisher Patient Status:  Inpatient    1955 MRN [id number]   Location [facility] 4W/SW/SE Attending Gloria Sandoval MD   Hosp Day # 11 PCP Taylor Gallardo MD     Subjective:   S: Patient sitting up , working with PT , Pleural chest tube removed this am   No cp, no sob     Review of Systems:   10 point ROS completed and was negative, except for pertinent positive and negatives stated in subjective.    Objective:   Vital signs:  Temp:  [97.9 °F (36.6 °C)-98.4 °F (36.9 °C)] 98.4 °F (36.9 °C)  Pulse:  [75-87] 87  Resp:  [16-22] 18  BP: ()/(60-73) 87/62  SpO2:  [97 %-100 %] 100 %  AO: ()/(51-63) 106/59    Wt Readings from Last 6 Encounters:   25 167 lb 8.8 oz (76 kg)   25 169 lb (76.7 kg)   25 174 lb 9.6 oz (79.2 kg)   25 169 lb (76.7 kg)   23 197 lb (89.4 kg)         Physical Exam:    General: No acute distress. Alert ,         Respiratory: Clear to auscultation bilaterally. No wheezes. No rhonchi.  Cardiovascular: S1, S2. Regular rate and rhythm. No murmurs, rubs or gallops.   Abdomen: Soft, nontender, nondistended.  Positive bowel sounds. No rebound or guarding.  Neurologic: No focal neurological deficits.   Musculoskeletal: Moves all extremities.  Extremities: No edema.    Results:   Diagnostic Data:      Labs:    Labs Last 24 Hours:   BMP     CBC    Other     Na 145 Cl 107 BUN 26 Glu 140   Hb 9.6   PTT - Procal -   K 3.6; 3.6 CO2 31.0 Cr 1.11   WBC 16.6 >< .0  INR - CRP -   Renal Lytes Endo    Hct 30.9   Trop - D dim -   eGFR - Ca 8.0 POC Gluc  140    LFT   pBNP - Lactic -   eGFR AA - PO4 - A1c -   AST - APk - Prot -  LDL -     Mg - TSH -   ALT - T makeda - Alb -        COVID-19 Lab Results    COVID-19  Lab Results   Component Value Date    COVID19 Not Detected 2025       Pro-Calcitonin  No results for input(s): \"PCT\" in the last 168 hours.    Cardiac  No results for input(s): \"TROP\", \"PBNP\" in the last 168  hours.      Creatinine Kinase  No results for input(s): \"CK\" in the last 168 hours.    Inflammatory Markers  No results for input(s): \"CRP\", \"ORVILLE\", \"LDH\", \"DDIMER\" in the last 168 hours.      Imaging: Imaging data reviewed in Epic.    Medications: Scheduled Medications[1]    Assessment & Plan:   ASSESSMENT / PLAN:     Afib RVR  Pericardial effusion  - cardiology consulted  - IV amio --> now on oral. Avoiding BB, CCB with h/o junctional rhythm   - cont monitor on tele  - no anticoagulation for now due to pancytopenia   - on 5/16 experienced RVR again with hypotension, received digoxin converted to NSR. Remained  hypotensive so sent to stepdown unit. Responded to fluid bolus eventually.   - Transferred back to medical floor, normotensive rates controlled.  - CXR still with pulm edema, cont diuresis as tolerated.    - repeat ECHO showing large pericardial effusion   -s/p Urgent pericardial window on 5/21  -plueral chest tube removed on 5/23. D/w CT surgery      BL PE: acute small segmental/right lower extremity DVT  S/p IVC 5/14/2025  Not on a/c due to thrombocytopenia  D/w Cardiology Dr Huerta, plans to resume once ok with hemeonc   Dr Peña -hemeonc paged -> Ok with Heme onc to resume Eliquis once Plt >50K        Metastatic endometrial cancer    Pancytopenia  Acute on chronic anemia of chronic disease  - s/p 1 unit prbc  - neutropenic precautions  - started neupogen until ANC 1500  - Plan is for further chemotherapy once recovered clinically per Dr. Herring.  - cefepime and vanco started for neutropenia and patient with cough/chills, elevated LA, possible early sepsis - pulm following for this as well  -Pancytopenia improving, monitor daily  - Overall plan of care is to continue to treat medically over the next several days and monitor for improvement.  If patient declines may consider palliative conversations.  If she improves overall plan is to resume chemotherapy in the future as planned by Dr. Herring.  - s/p  1 unit PRBC   -hb stable      Acute on chronic respiratory failure  - due to 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, pulm edema   - on 4-5L NC baseline  - pulmonary following, weaning oxygen as tolerated. Cont lasix.       Thrush  - nystatin    Deconditioning  PT OT        Dispo: PT recommending EDUARDO, patient to discuss with family. Have previously refused    Son at bedside , d/w him current treatment and plan.     MDM: High   I personally spent time on chart/note review, review of labs/imaging, discussion with patient, physical exam, discussion with staff, consultants, coordinating care, writing progress note, and discussion of plan of care.      Inna Bullock MD    Supplementary Documentation:                         [1]    midodrine  10 mg Oral TID    apixaban  5 mg Oral BID    traMADol  50 mg Oral 2 times per day    senna  10 mL Per NG Tube BID    docusate  100 mg Per NG Tube BID    pantoprazole  40 mg Intravenous Daily    amiodarone  400 mg Oral BID with meals    [Held by provider] insulin aspart  1-5 Units Subcutaneous TID CC and HS    levothyroxine  100 mcg Oral Before breakfast

## 2025-05-24 ENCOUNTER — APPOINTMENT (OUTPATIENT)
Dept: GENERAL RADIOLOGY | Facility: HOSPITAL | Age: 70
DRG: 270 | End: 2025-05-24
Attending: INTERNAL MEDICINE
Payer: MEDICARE

## 2025-05-24 LAB
ANION GAP SERPL CALC-SCNC: 10 MMOL/L (ref 0–18)
BASOPHILS # BLD: 0.41 X10(3) UL (ref 0–0.2)
BASOPHILS NFR BLD: 2 %
BUN BLD-MCNC: 27 MG/DL (ref 9–23)
BUN/CREAT SERPL: 23.5 (ref 10–20)
CALCIUM BLD-MCNC: 8.1 MG/DL (ref 8.7–10.4)
CHLORIDE SERPL-SCNC: 105 MMOL/L (ref 98–112)
CO2 SERPL-SCNC: 27 MMOL/L (ref 21–32)
CREAT BLD-MCNC: 1.15 MG/DL (ref 0.55–1.02)
DEPRECATED RDW RBC AUTO: 62.5 FL (ref 35.1–46.3)
EGFRCR SERPLBLD CKD-EPI 2021: 52 ML/MIN/1.73M2 (ref 60–?)
EOSINOPHIL # BLD: 0 X10(3) UL (ref 0–0.7)
EOSINOPHIL NFR BLD: 0 %
ERYTHROCYTE [DISTWIDTH] IN BLOOD BY AUTOMATED COUNT: 21.2 % (ref 11–15)
GLUCOSE BLD-MCNC: 126 MG/DL (ref 70–99)
GLUCOSE BLDC GLUCOMTR-MCNC: 113 MG/DL (ref 70–99)
GLUCOSE BLDC GLUCOMTR-MCNC: 115 MG/DL (ref 70–99)
GLUCOSE BLDC GLUCOMTR-MCNC: 140 MG/DL (ref 70–99)
GLUCOSE BLDC GLUCOMTR-MCNC: 146 MG/DL (ref 70–99)
HCT VFR BLD AUTO: 30.7 % (ref 35–48)
HGB BLD-MCNC: 9.8 G/DL (ref 12–16)
LYMPHOCYTES NFR BLD: 1.03 X10(3) UL (ref 1–4)
LYMPHOCYTES NFR BLD: 5 %
MCH RBC QN AUTO: 26.5 PG (ref 26–34)
MCHC RBC AUTO-ENTMCNC: 31.9 G/DL (ref 31–37)
MCV RBC AUTO: 83 FL (ref 80–100)
MONOCYTES # BLD: 0.41 X10(3) UL (ref 0.1–1)
MONOCYTES NFR BLD: 2 %
NEUTROPHILS # BLD AUTO: 16.88 X10 (3) UL (ref 1.5–7.7)
NEUTROPHILS NFR BLD: 91 %
NEUTS HYPERSEG # BLD: 18.75 X10(3) UL (ref 1.5–7.7)
OSMOLALITY SERPL CALC.SUM OF ELEC: 301 MOSM/KG (ref 275–295)
PLATELET # BLD AUTO: 227 10(3)UL (ref 150–450)
PLATELET MORPHOLOGY: NORMAL
PLATELETS.RETICULATED NFR BLD AUTO: 8.5 % (ref 0–7)
POTASSIUM SERPL-SCNC: 3.5 MMOL/L (ref 3.5–5.1)
RBC # BLD AUTO: 3.7 X10(6)UL (ref 3.8–5.3)
SODIUM SERPL-SCNC: 142 MMOL/L (ref 136–145)
TOTAL CELLS COUNTED BLD: 100
WBC # BLD AUTO: 20.6 X10(3) UL (ref 4–11)

## 2025-05-24 PROCEDURE — 71045 X-RAY EXAM CHEST 1 VIEW: CPT | Performed by: INTERNAL MEDICINE

## 2025-05-24 PROCEDURE — 99233 SBSQ HOSP IP/OBS HIGH 50: CPT | Performed by: FAMILY MEDICINE

## 2025-05-24 PROCEDURE — 99233 SBSQ HOSP IP/OBS HIGH 50: CPT | Performed by: INTERNAL MEDICINE

## 2025-05-24 RX ORDER — ALBUMIN HUMAN 50 G/1000ML
12.5 SOLUTION INTRAVENOUS ONCE
Status: COMPLETED | OUTPATIENT
Start: 2025-05-24 | End: 2025-05-24

## 2025-05-24 NOTE — PROGRESS NOTES
[facility]  part of Located within [facility]    Cardiology Progress Note    Kelli Fisher Patient Status:  Inpatient    1955 MRN [id number]   Location [facility] 2W/SW Attending Inna Bullock MD   Hosp Day # 12 PCP Taylor Gallardo MD     Interval History   Working with PT/OT  SOB improved,   No chest pain    Assessment and Plan:   Pancytopenia, improving  Endometrial cancer with lung metastasis    Shock, likely multifactorial  -weaning phenylephrine, on midodrine  -no s/sx of decompensation from cardiac standpoint, especially post pericardial window for tamponade    Large pericardial effusion with tamponade  Pleural effusion s/p CT  -enlarging pericardial effusion with new signs c/w tamponade physiology on 25  -underwent pericardial window on 25  -etiology likely 2/t metastatic disease, will follow pathology  -CT management per [facility], limited echo tomorrow if pericardial drain removed today.  Still 30 cc of fluid overnight.    Bilateral PE, s/p IVC filter  -initially not on AC due to severe anemia + thrombocytopenia, however counts have improved  -started AC with apixaban 5mg bid on    -Hg has been stable (9.6 on )    Paroxysmal AF/AT  -developed rapid AF post pericardial window  -continue PO amiodarone, rate controlled  -AC, as above    Objective:     Patient Vitals for the past 24 hrs:   BP Temp Temp src Pulse Resp SpO2 Weight   25 1000 97/70 -- -- 76 19 100 % --   25 0900 91/71 -- -- 76 19 99 % --   25 0807 -- -- -- 71 22 99 % --   25 0800 97/75 97.9 °F (36.6 °C) Temporal 72 21 100 % --   25 0700 100/71 -- -- 72 18 100 % --   25 0600 101/70 -- -- 73 18 99 % 168 lb 14 oz (76.6 kg)   25 0500 (!) 82/61 -- -- 72 17 98 % --   25 0400 90/65 98.2 °F (36.8 °C) Temporal 65 14 98 % --   25 0300 94/65 -- -- 64 14 98 % --   25 0240 -- -- -- 68 18 100 % --   25 0200 92/62 -- -- 71 15 99 % --   25 0100 /63 -- --  71 15 100 % --   05/22/25 0000 107/73 98 °F (36.7 °C) Temporal 73 14 100 % --   05/21/25 2300 90/57 -- -- 84 19 96 % --   05/21/25 2200 (!) 135/93 -- -- 62 15 100 % --   05/21/25 2100 98/72 -- -- 83 15 96 % --   05/21/25 2050 -- -- -- 77 19 99 % --   05/21/25 2000 107/80 98.6 °F (37 °C) Temporal 80 14 100 % --   05/21/25 1900 97/77 -- -- 88 23 100 % --   05/21/25 1830 -- -- -- -- -- 100 % --   05/21/25 1800 (!) 130/96 -- -- 87 10 100 % --   05/21/25 1730 -- -- -- 115 16 100 % --   05/21/25 1700 109/72 -- -- 107 25 99 % --   05/21/25 1645 -- -- -- (!) 124 23 100 % --   05/21/25 1630 -- -- -- 112 15 100 % --   05/21/25 1600 108/69 97.5 °F (36.4 °C) Temporal 104 21 100 % --   05/21/25 1545 -- -- -- 116 22 100 % --   05/21/25 1530 (!) 70/56 -- -- 103 19 100 % --   05/21/25 1515 (!) 87/65 -- -- 101 23 97 % --   05/21/25 1500 (!) 89/64 -- -- 112 26 98 % --   05/21/25 1426 113/75 98 °F (36.7 °C) -- 85 22 98 % --   05/21/25 1223 99/70 97.7 °F (36.5 °C) Oral 84 18 97 % --       Intake/Output:   Last 3 shifts:   Intake/Output                   05/20/25 0700 - 05/21/25 0659 05/21/25 0700 - 05/22/25 0659 05/22/25 0700 - 05/23/25 0659       Intake    P.O.  --  --  260    P.O. -- -- 260    I.V.  --  1071.2  75    I.V. -- 863 --    Volume (mL) Phenylephrine -- 177.6 75    Volume (mL) Dexmedetomidine -- 30.6 --    Blood  --  411.3  --    Volume (Transfuse RBC) -- 411.3 --    IV PIGGYBACK  100  --  100    Volume (mL) (ceFEPIme (Maxipime) 2 g in sodium chloride 0.9% 100mL IVPB-YANA) 100 -- --    Volume (mL) (acetaminophen (Ofirmev) 10 mg/mL infusion premix 1,000 mg) -- -- 100    Total Intake 100 1482.5 435       Output    Urine  1000  575  225    Incontinent Urine Occurrence 1 x -- --    Output (mL) (External Urinary Catheter) 1000 575 225    Emesis/NG output  --  --  0    Emesis -- -- 0    Other  --  500  --    Other -- 500 --    Stool  --  --  --    Stool Count Calculated for I/O 2 x -- --    Chest Tube  --  300  20    Output (mL)  (Chest Tube 1 Anterior Pleural 28 Fr.) -- 50 10    Output (mL) (Chest Tube 1 Anterior Mediastinal 24 Fr.) -- 250 10    Total Output 1000 1375 245       Net I/O     -900 107.5 190             Vent Settings:      Hemodynamic parameters (last 24 hours):      Scheduled Meds: Scheduled Medications[1]    Continuous Infusions: Medication Infusions[2]    Results:     Lab Results   Component Value Date    WBC 20.6 (H) 05/24/2025    HGB 9.8 (L) 05/24/2025    HCT 30.7 (L) 05/24/2025    .0 05/24/2025    CREATSERUM 1.15 (H) 05/24/2025    BUN 27 (H) 05/24/2025     05/24/2025    K 3.5 05/24/2025     05/24/2025    CO2 27.0 05/24/2025     (H) 05/24/2025    CA 8.1 (L) 05/24/2025    ALB 3.2 05/12/2025    ALKPHO 142 05/12/2025    BILT 0.7 05/12/2025    TP 6.8 05/12/2025    AST 26 05/12/2025    ALT 10 05/12/2025    PTT 37.0 (H) 05/21/2025    INR 1.31 (H) 05/22/2025    TSH 3.705 05/02/2025    LIP 42 12/11/2023    DDIMER 3.05 (H) 05/13/2025    MG 2.2 05/19/2025    B12 >2,000 (H) 05/02/2025       Recent Labs   Lab 05/22/25  0505 05/23/25  0540 05/24/25  0542   * 140* 126*   BUN 30* 26* 27*   CREATSERUM 1.26* 1.11* 1.15*   CA 8.0* 8.0* 8.1*    145 142   K 3.6 3.6  3.6 3.5    107 105   CO2 31.0 31.0 27.0     Recent Labs   Lab 05/22/25  0505 05/23/25  0540 05/24/25  0542   RBC 3.60* 3.70* 3.70*   HGB 9.3* 9.6* 9.8*   HCT 30.9* 30.9* 30.7*   MCV 85.8 83.5 83.0   MCH 25.8* 25.9* 26.5   MCHC 30.1* 31.1 31.9   RDW 20.1* 20.3* 21.2*   NEPRELIM 10.76* 13.30* 16.88*   WBC 14.6* 16.6* 20.6*   .0* 177.0 227.0       No results for input(s): \"BNPML\" in the last 168 hours.    No results for input(s): \"TROP\", \"CK\" in the last 168 hours.    XR CHEST AP PORTABLE  (CPT=71045)  Result Date: 5/22/2025  CONCLUSION: Stable left basal/perihilar pulmonary opacity which which could represent pneumonia or neoplasm.  Stable small bilateral pleural effusions slightly larger on the left.  Mild interstitial edema.  Interval extubation   Dictated by (CST): Sincere Canales MD on 2025 at 7:38 AM     Finalized by (CST): Sincere Canales MD on 2025 at 7:40 AM          XR CHEST AP PORTABLE  (CPT=71045)  Result Date: 2025  CONCLUSION:  1.  Post pericardial window.  Pericardial drain over the lower aspect of the cardiac silhouette. 2.  Interval intubation.  Endotracheal tube is present over the trachea in gross satisfactory position 33 mm above the christian. 3.  Small bilateral pleural effusions, with decrease in size of left effusion.  Nonspecific left perihilar pulmonary opacity which could represent atelectasis, pneumonia, or other inflammatory/neoplastic process.    Dictated by (CST): Sincere Canales MD on 2025 at 3:15 PM     Finalized by (CST): Sincere Canales MD on 2025 at 3:17 PM          XR CHEST AP PORTABLE  (CPT=71045)  Result Date: 2025  CONCLUSION:   1. Redemonstration of increased density within the left lung (mid and lower regions).  The findings are most compatible with a combination of infiltrate, atelectasis, and small to moderate effusion.   2. Cardiac enlargement.  There is widening of the mediastinum likely representing adenopathy.    Dictated by (CST): Kaleb Epps MD on 2025 at 9:56 AM     Finalized by (CST): Kaleb Epps MD on 2025 at 9:59 AM                CARD ECHO LIMITED (CPT=93308/47998/23059)  Result Date: 2025  Transthoracic Echocardiogram Name:Kelli Fisher Date: 2025 :  1955 Ht:  (60in)  BP: 98 / 72 MRN:  [id number]    Age:  70years    Wt:  (167lb) HR: 89bpm Loc:  [facility]       Gndr: F          BSA: 1.73m^2 Sonographer: Chris DOMINGUEZ Ordering:    Srinath Levy Consulting:  Trista Peña ---------------------------------------------------------------------------- History/Indications:  Reevaluate enlarging pericardial effusion. Acute respiratory failure with hypoxia. Shortness of breath.  ---------------------------------------------------------------------------- Procedure information:  A transthoracic echocardiogram, limited study was performed. Additional evaluation included M-mode and limited 2D.  Patient status:  Inpatient.  Location:  Bedside.    Comparison was made to the study of 05/13/2025.    This was a routine study. Transthoracic echocardiography for diagnosis and ventricular function evaluation. Image quality was adequate. ECG rhythm:   Normal sinus ---------------------------------------------------------------------------- Conclusions: 1. Left ventricle: The cavity size was normal. Wall thickness was normal.    Systolic function was normal. The estimated ejection fraction was 55-60%,    by visual assessment. Unable to assess LV diastolic function. 2. Pericardium, extracardiac: A large, free-flowing pericardial effusion was    identified. There is RV collapse in early diastolic for less than 50% of    the cardiac cycle. There was evidence for mildly increased RV-LV    interaction demonstrated by respirophasic changes in transmitral    velocities (25% reduction in mitral valve e'wave velocity during    inspiration). There was a left pleural effusion. 3. Inferior vena cava: The IVC was dilated (23mm). Respirophasic diameter    changes are blunted (< 50%), consistent with elevated central venous    pressure. Impressions:  This study is compared with previous dated 5/13/2025: The pericardial effusion has slightly increased, now with maximal dimensions in diastole of 20mm with evidence of early tamponade (25% reduction in mitral valve inflow velocity and right ventricle free wall collapse in early diastole). * ---------------------------------------------------------------------------- * Findings: Left ventricle:  The cavity size was normal. Wall thickness was normal. Systolic function was normal. The estimated ejection fraction was 55-60%, by visual assessment. Unable to assess LV diastolic  function. Right ventricle:  The cavity size was normal. Systolic function was normal. Systolic pressure was not estimated. Mitral valve:  The leaflets were mildly calcified. Aortic valve:   The valve was trileaflet. The leaflets were mildly calcified. Pericardium:  A large, free-flowing pericardial effusion was identified. Doppler:  There is RV collapse in early diastolic for less than 50% of the cardiac cycle. There was evidence for mildly increased RV-LV interaction demonstrated by respirophasic changes in transmitral velocities (25% reduction in mitral valve e'wave velocity during inspiration). Pleura:  There was a left pleural effusion. Aorta: Aortic root: The aortic root was normal. Ascending aorta: The ascending aorta was normal. Systemic veins: Inferior vena cava: The IVC was dilated (23mm).  Respirophasic diameter changes are blunted (< 50%), consistent with elevated central venous pressure. ---------------------------------------------------------------------------- Measurements  Left ventricle          Value       Ref       05/13/2025  IVS thickness, ED,  (H) 1.0   cm    0.6 - 0.9 1.3  PLAX  LV ID, ED, PLAX     (L) 3.6   cm    3.8 - 5.2 4.0  LV ID, ES, PLAX         2.5   cm    2.2 - 3.5 2.4  LV PW thickness,    (H) 1.0   cm    0.6 - 0.9 1.1  ED, PLAX  IVS/LV PW ratio,        1.00        --------- 1.18  ED, PLAX  LV PW/LV ID ratio,      0.28        --------- 0.28  ED, PLAX  LV ejection             59    %     54 - 74   71  fraction  LVOT                    Value       Ref       05/13/2025  LVOT ID                 2     cm    --------- 2  Aortic root             Value       Ref       05/13/2025  Aortic root ID          3.3   cm    2.5 - 3.9 ----------  Ascending aorta         Value       Ref       05/13/2025  Ascending aorta ID  (H) 3.6   cm    1.9 - 3.5 ----------  Systemic veins          Value       Ref       05/13/2025  Estimated CVP           15    mm Hg --------- 15  Inferior vena cava      Value        Ref       2025  ID                  (H) 2.4   cm    <=2.1     2.3 Legend: (L)  and  (H)  corinna values outside specified reference range. ---------------------------------------------------------------------------- Prepared and electronically signed by Javi Huerta 2025 17:58     CARD ECHO LIMITED (CPT=93308/35944/86896)  Result Date: 2025  Transthoracic Echocardiogram Name:Kelli Fisher Date: 2025 :  1955 Ht:  (60in)  BP: 106 / 75 MRN:  [id number]    Age:  70years    Wt:  (167lb) HR: 85bpm Loc:  [facility]       Gndr: F          BSA: 1.73m^2 Sonographer: Naty PINTO Ordering:    Margarette Sexton Consulting:  Dominick Herring ---------------------------------------------------------------------------- History/Indications:   SOB and known Pericardial Effusion. ---------------------------------------------------------------------------- Procedure information:  A transthoracic echocardiogram, limited study was performed. Additional evaluation included M-mode and limited 2D.  Patient status:  Inpatient.  Location:  Bedside.    Comparison was made to the study of 2025.    This was a routine study. Transthoracic echocardiography for diagnosis. Image quality was adequate. ---------------------------------------------------------------------------- Conclusions: 1. Left ventricle: The cavity size was normal. Wall thickness was mildly    increased. There was mild concentric hypertrophy. Systolic function was    normal. The estimated ejection fraction was 55-60%, by visual assessment.    Unable to assess LV diastolic function. 2. Right ventricle: The cavity size was normal. Systolic function was    normal. 3. Pericardium, extracardiac: A moderate, free-flowing pericardial effusion    was identified. There was no evidence of hemodynamic compromise. 4. Systemic veins: Central venous respirophasic diameter changes are blunted    (< 50%). 5. Inferior vena cava: The IVC was dilated. *  ---------------------------------------------------------------------------- * Findings: Left ventricle:  The cavity size was normal. Wall thickness was mildly increased. There was mild concentric hypertrophy. Systolic function was normal. The estimated ejection fraction was 55-60%, by visual assessment. Unable to assess LV diastolic function. Left atrium:  Well visualized. Right ventricle:  The cavity size was normal. Systolic function was normal. Right atrium:  Well visualized. Mitral valve:  Well visualized. Aortic valve:   The valve was trileaflet. Tricuspid valve:  The annulus is normal-sized. The leaflets are normal thickness. No echocardiographic evidence for tricuspid prolapse.  Doppler: Transvalvular velocity was within the normal range. There was no evidence for stenosis. There was mild regurgitation. Pulmonic valve:    Doppler:  There was trivial regurgitation. Pericardium:  A moderate, free-flowing pericardial effusion was identified. There was no evidence of hemodynamic compromise. Systemic veins:  Central venous respirophasic diameter changes are blunted (< 50%). Inferior vena cava: The IVC was dilated. ---------------------------------------------------------------------------- Measurements  Left ventricle         Value        Ref       05/05/2025  IVS thickness, ED, (H) 1.3   cm     0.6 - 0.9 1.0  PLAX  LV ID, ED, PLAX        4.0   cm     3.8 - 5.2 3.6  LV ID, ES, PLAX        2.4   cm     2.2 - 3.5 2.4  LV PW thickness,   (H) 1.1   cm     0.6 - 0.9 1.0  ED, PLAX  IVS/LV PW ratio,       1.18         --------- 1.00  ED, PLAX  LV PW/LV ID ratio,     0.28         --------- 0.28  ED, PLAX  LV ejection            71    %      54 - 74   63  fraction  Stroke volume/bsa,     35    ml/m^2 --------- ----------  2D  LVOT                   Value        Ref       05/05/2025  LVOT ID                2     cm     --------- 2  LVOT peak              1.21  m/sec  --------- ----------  velocity, S  LVOT VTI, S             19.4  cm     --------- ----------  LVOT peak              6     mm Hg  --------- ----------  gradient, S  LVOT mean              3     mm Hg  --------- ----------  gradient, S  Stroke volume          61    ml     --------- ----------  (SV), LVOT DP  Stroke index           35    ml/m^2 --------- ----------  (SV/bsa), LVOT DP  Pulmonary artery       Value        Ref       2025  PA pressure, S, DP     44    mm Hg  --------- ----------  Tricuspid valve        Value        Ref       2025  Tricuspid regurg       2.68  m/sec  <=2.8     ----------  peak velocity  Tricuspid peak         29    mm Hg  --------- ----------  RV-RA gradient  Systemic veins         Value        Ref       2025  Estimated CVP          15    mm Hg  --------- 15  Inferior vena cava     Value        Ref       2025  ID                 (H) 2.3   cm     <=2.1     2.4  Right ventricle        Value        Ref       2025  TAPSE, 2D              1.93  cm     >=1.70    ----------  TAPSE, MM              1.93  cm     >=1.70    ----------  RV pressure, S, DP     44    mm Hg  --------- ---------- Legend: (L)  and  (H)  corinna values outside specified reference range. ---------------------------------------------------------------------------- Prepared and electronically signed by Javi Huerta 2025 16:05     [employer] (CPT=93308/44048/11670)  Result Date: 2025  Transthoracic Echocardiogram Name:Kelli Fisher Date: 2025 :  1955 Ht:  (60in)  BP: 142 / 86 MRN:  [id number]    Age:  70years    Wt:  (174lb) HR: 106bpm Loc:  [facility]       Gndr: F          BSA: 1.76m^2 Sonographer: Chris HOLLINGSWORTH [facility] Ordering:    Stella Gomez Consulting:  Khadar Segura ---------------------------------------------------------------------------- History/Indications:   Pericardial effusion. Lung mass. ---------------------------------------------------------------------------- Procedure information:  A transthoracic echocardiogram,  limited study was performed. Additional evaluation included M-mode and limited 2D.  Patient status:  Inpatient.  Location:  Bedside.    Comparison was made to the study of 04/30/2025.    This was a routine study. Transthoracic echocardiography for diagnosis and ventricular function evaluation. Image quality was adequate. ECG rhythm:   Sinus tachycardia  Study completion:  There were no complications. ---------------------------------------------------------------------------- Conclusions: 1. Left ventricle: The cavity size was normal. Wall thickness was normal.    Systolic function was vigorous. The estimated ejection fraction was    65-70%, by biplane method of disks. No diagnostic evidence for regional    wall motion abnormalities. Unable to assess LV diastolic function. 2. Pericardium, extracardiac: A small to moderate, free-flowing pericardial    effusion was identified circumferential to the heart. Probably not    hemodynamically significant. Impressions:  This study is compared with previous dated 4/30/2025: No significant change since prior study. * ---------------------------------------------------------------------------- * Findings: Left ventricle:  The cavity size was normal. Wall thickness was normal. Systolic function was vigorous. The estimated ejection fraction was 65-70%, by biplane method of disks. No diagnostic evidence for regional wall motion abnormalities. Unable to assess LV diastolic function. Ventricular septum:   Thickness was normal. Left atrium:  The left atrial volume was normal. Right ventricle:  Well visualized. The cavity size was normal. Systolic function was normal. Right atrium:  Well visualized. The atrium was normal in size. Mitral valve:  Well visualized. The leaflets were mildly calcified. Aortic valve:  Well visualized.  The valve was trileaflet. The leaflets were mildly calcified. Tricuspid valve:  Well visualized. Pulmonic valve:   Well visualized. Pericardium:  A small to  moderate, free-flowing pericardial effusion was identified circumferential to the heart. Probably not hemodynamically significant. Aorta: Aortic root: The aortic root was normal. Ascending aorta: The ascending aorta was normal. Pulmonary arteries: Systolic pressure could not be accurately estimated. ---------------------------------------------------------------------------- Measurements  Left ventricle         Value        Ref       04/30/2025  IVS thickness, ED, (H) 1.0   cm     0.6 - 0.9 1.0  PLAX  LV ID, ED, PLAX    (L) 3.6   cm     3.8 - 5.2 3.9  LV ID, ES, PLAX        2.4   cm     2.2 - 3.5 2.5  LV PW thickness,   (H) 1.0   cm     0.6 - 0.9 1.0  ED, PLAX  IVS/LV PW ratio,       1.00         --------- 1.00  ED, PLAX  LV PW/LV ID ratio,     0.28         --------- 0.26  ED, PLAX  LV ejection            63    %      54 - 74   66  fraction  LV end-diastolic       62    ml     48 - 140  ----------  volume, 1-p A4C  LV ejection            65    %      46 - 78   ----------  fraction, 1-p A4C  Stroke volume, 1-p     40    ml     --------- ----------  A4C  LV end-diastolic       35    ml/m^2 30 - 82   ----------  volume/bsa, 1-p  A4C  Stroke volume/bsa,     23    ml/m^2 --------- ----------  1-p A4C  LV end-diastolic       61    ml     46 - 106  ----------  volume, 2-p  LV end-systolic        21    ml     14 - 42   ----------  volume, 2-p  LV ejection            65    %      54 - 74   ----------  fraction, 2-p  Stroke volume, 2-p     40    ml     --------- ----------  LV end-diastolic       35    ml/m^2 29 - 61   ----------  volume/bsa, 2-p  LV end-systolic        12    ml/m^2 8 - 24    ----------  volume/bsa, 2-p  Stroke volume/bsa,     22.5  ml/m^2 --------- ----------  2-p  LVOT                   Value        Ref       04/30/2025  LVOT ID                2     cm     --------- ----------  Aortic root            Value        Ref       04/30/2025  Aortic root ID         3.2   cm     2.5 - 4.0 ----------  Ascending aorta         Value        Ref       2025  Ascending aorta ID (H) 3.7   cm     1.9 - 3.5 ----------  Left atrium            Value        Ref       2025  LA ID, A-P, ES         2.7   cm     2.7 - 3.8 ----------  LA volume, S           43    ml      52   ----------  LA volume/bsa, S       24    ml/m^2    ----------  LA volume, ES, 1-p     39    ml      52   ----------  A4C  LA volume, ES, 1-p     46    ml      52   ----------  A2C  LA volume, ES, A/L     46    ml     --------- ----------  LA volume/bsa, ES,     26    ml/m^2    ----------  A/L  LA/aortic root         0.84         --------- ----------  ratio  Right atrium           Value        Ref       2025  RA ID, S-I, ES,        4.8   cm     3.4 - 5.3 ----------  A4C  RA ID/bsa, S-I,        2.7   cm/m^2 1.9 - 3.1 ----------  ES, A4C  RA area, ES, A4C       14    cm^2   10 - 18   ----------  RA volume, ES, 1-p     32    ml     --------- ----------  A4C  RA volume/bsa, ES,     18    ml/m^2 9 - 33    ----------  1-p A4C  Systemic veins         Value        Ref       2025  Estimated CVP          15    mm Hg  --------- ----------  Inferior vena cava     Value        Ref       2025  ID                 (H) 2.4   cm     <=2.1     2.3 Legend: (L)  and  (H)  corinna values outside specified reference range. ---------------------------------------------------------------------------- Prepared and electronically signed by Margareth Haas 2025 12:36     CARD ECHO LIMITED (CPT=93308/32046/22285)  Result Date: 2025  Transthoracic Echocardiogram Name:Kelli Fisher Date: 2025 :  1955 Ht:  (60in)  BP: 106 / 74 MRN:  [id number]    Age:  70years    Wt:  (165lb) HR: 108bpm Loc:  [facility]       Gndr: F          BSA: 1.72m^2 Sonographer: James PINTO RVT Ordering:    Javi Huerta Consulting:  Ector Byrnes ---------------------------------------------------------------------------- History/Indications:  Pericardial  effusion. ---------------------------------------------------------------------------- Procedure information:  A transthoracic echocardiogram, limited study was performed. Additional evaluation included M-mode and limited 2D.  Patient status:  Inpatient.  Location:  Bedside.    Comparison was made to the study of 04/12/2025.    This was a routine study. Transthoracic echocardiography for ventricular function evaluation and assessment of pericardial effusion and hemodynamic status. Image quality was adequate. ECG rhythm:   Sinus tachycardia ---------------------------------------------------------------------------- Conclusions: 1. Left ventricle: The cavity size was normal. Wall thickness was mildly    increased. Systolic function was normal. The estimated ejection fraction    was 60-65%, by visual assessment. No diagnostic evidence for regional    wall motion abnormalities. Unable to assess LV diastolic function. 2. Pericardium, extracardiac: A small to moderate, free-flowing pericardial    effusion was identified circumferential to the heart. Maximal diameter in    diastole is 1.1cm. Features were not consistent with tamponade    physiology. 3. Inferior vena cava: The IVC was dilated. Respirophasic diameter changes    are blunted (< 50%), consistent with elevated central venous pressure. * ---------------------------------------------------------------------------- * Findings: Left ventricle:  The cavity size was normal. Wall thickness was mildly increased. Systolic function was normal. The estimated ejection fraction was 60-65%, by visual assessment. No diagnostic evidence for regional wall motion abnormalities. Unable to assess LV diastolic function. Right ventricle:  The cavity size was normal. Mitral valve:  The valve was structurally normal. Aortic valve:  The valve was structurally normal. Tricuspid valve:  The valve is structurally normal. Pulmonic valve:   Not well visualized. Pericardium:  A small to  moderate, free-flowing pericardial effusion was identified circumferential to the heart. Maximal diameter in diastole is 1.1cm.  Doppler:  Features were not consistent with tamponade physiology. Systemic veins: Inferior vena cava: The IVC was dilated.  Respirophasic diameter changes are blunted (< 50%), consistent with elevated central venous pressure. ---------------------------------------------------------------------------- Measurements  Left ventricle              Value    Ref      04/12/2025  IVS thickness, ED, PLAX (H) 1.0   cm 0.6 -    0.8                                       0.9  LV ID, ED, PLAX             3.9   cm 3.8 -    4.4                                       5.2  LV ID, ES, PLAX             2.5   cm 2.2 -    2.8                                       3.5  LV PW thickness, ED,    (H) 1.0   cm 0.6 -    0.7  PLAX                                 0.9  IVS/LV PW ratio, ED,        1.00     -------- 1.14  PLAX  LV PW/LV ID ratio, ED,      0.26     -------- 0.16  PLAX  LV ejection fraction        66    %  54 - 74  66  Inferior vena cava          Value    Ref      04/12/2025  ID                      (H) 2.3   cm <=2.1    2.7 Legend: (L)  and  (H)  corinna values outside specified reference range. ---------------------------------------------------------------------------- Prepared and electronically signed by Javi Huerta 04/30/2025 13:43        Exam:     Physical Exam:  General: No apparent distress.   HEENT: Normocephalic, anicteric sclera, neck supple, no thyromegaly or adenopathy.  Neck: No JVD, carotids 2+, no bruits.  Cardiac: Irreg irreg  Lungs: Diminished BS  Abdomen: Soft, non-tender. No organosplenomegally, mass or rebound, BS-present.  Extremities: Without clubbing or cyanosis. No edema.    Neurologic: Alert and oriented, normal affect. No focal defects  Skin: Warm and dry.   Cheo Bird MD        3           [1]    midodrine  10 mg Oral TID    apixaban  5 mg Oral BID    traMADol  50 mg Oral 2  times per day    senna  10 mL Per NG Tube BID    docusate  100 mg Per NG Tube BID    pantoprazole  40 mg Intravenous Daily    amiodarone  400 mg Oral BID with meals    insulin aspart  1-5 Units Subcutaneous TID CC and HS    levothyroxine  100 mcg Oral Before breakfast   [2]    phenylephrine 30 mcg/min (05/24/25 0700)

## 2025-05-24 NOTE — PROGRESS NOTES
Progress Note     Kelli Fisher Patient Status:  Inpatient    1955 MRN [id number]   Location [facility] 4W/SW/SE Attending Gloria Sandoval MD   Hosp Day # 12 PCP Taylor Gallardo MD     Subjective:   S: Patient is now on Leon-Synephrine since SBP in s   Otherwsie doing ok    Review of Systems:   10 point ROS completed and was negative, except for pertinent positive and negatives stated in subjective.    Objective:   Vital signs:  Temp:  [98 °F (36.7 °C)-98.6 °F (37 °C)] 98.2 °F (36.8 °C)  Pulse:  [] 81  Resp:  [14-27] 16  BP: ()/(47-76) 88/72  SpO2:  [94 %-100 %] 100 %  AO: ()/(38-65) 97/50    Wt Readings from Last 6 Encounters:   25 171 lb 15.3 oz (78 kg)   25 169 lb (76.7 kg)   25 174 lb 9.6 oz (79.2 kg)   25 169 lb (76.7 kg)   23 197 lb (89.4 kg)         Physical Exam:    General: No acute distress. Alert ,         Respiratory: Clear to auscultation bilaterally. No wheezes. No rhonchi.  Cardiovascular: S1, S2. Regular rate and rhythm. No murmurs, rubs or gallops.   Abdomen: Soft, nontender, nondistended.  Positive bowel sounds. No rebound or guarding.  Neurologic: No focal neurological deficits.   Musculoskeletal: Moves all extremities.  Extremities: No edema.    Results:   Diagnostic Data:      Labs:    Labs Last 24 Hours:   BMP     CBC    Other     Na 142 Cl 105 BUN 27 Glu 126   Hb 9.8   PTT - Procal -   K 3.5 CO2 27.0 Cr 1.15   WBC 20.6 >< .0  INR - CRP -   Renal Lytes Endo    Hct 30.7   Trop - D dim -   eGFR - Ca 8.1 POC Gluc  134    LFT   pBNP - Lactic -   eGFR AA - PO4 - A1c -   AST - APk - Prot -  LDL -     Mg - TSH -   ALT - T makeda - Alb -        COVID-19 Lab Results    COVID-19  Lab Results   Component Value Date    COVID19 Not Detected 2025       Pro-Calcitonin  No results for input(s): \"PCT\" in the last 168 hours.    Cardiac  No results for input(s): \"TROP\", \"PBNP\" in the last 168 hours.      Creatinine Kinase  No results for  input(s): \"CK\" in the last 168 hours.    Inflammatory Markers  No results for input(s): \"CRP\", \"ORVILLE\", \"LDH\", \"DDIMER\" in the last 168 hours.      Imaging: Imaging data reviewed in Epic.    Medications: Scheduled Medications[1]    Assessment & Plan:   ASSESSMENT / PLAN:     Afib RVR  Pericardial effusion  - cardiology consulted  - IV amio --> now on oral. Avoiding BB, CCB with h/o junctional rhythm   - cont monitor on tele  - no anticoagulation for now due to pancytopenia   - on 5/16 experienced RVR again with hypotension, received digoxin converted to NSR. Remained  hypotensive so sent to stepdown unit. Responded to fluid bolus eventually.   - Transferred back to medical floor, normotensive rates controlled.  - CXR still with pulm edema, cont diuresis as tolerated.    - repeat ECHO showing large pericardial effusion   -s/p Urgent pericardial window on 5/21  -plueral chest tube removed on 5/23. D/w CT surgery   -per Cards , limited echo tomorrow if pericardial drain removed today. Still 30 cc of fluid overnight.      BL PE: acute small segmental/right lower extremity DVT  S/p IVC 5/14/2025  Not on a/c due to thrombocytopenia  D/w Cardiology Dr Huerta, plans to resume once ok with hemeonc   Dr Peña -hemeon paged -> Ok with Heme onc to resume Eliquis once Plt >50K        Metastatic endometrial cancer    Pancytopenia  Acute on chronic anemia of chronic disease  - s/p 1 unit prbc  - neutropenic precautions  - started neupogen until ANC 1500  - Plan is for further chemotherapy once recovered clinically per Dr. Herring.  - cefepime and vanco started for neutropenia and patient with cough/chills, elevated LA, possible early sepsis - pulm following for this as well  -Pancytopenia improving, monitor daily  - Overall plan of care is to continue to treat medically over the next several days and monitor for improvement.  If patient declines may consider palliative conversations.  If she improves overall plan is to resume  chemotherapy in the future as planned by Dr. Herring.  - s/p 1 unit PRBC   -hb stable      Acute on chronic respiratory failure  - due to 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, pulm edema   - on 4-5L NC baseline  - pulmonary following, weaning oxygen as tolerated. Cont lasix.       Thrush  - nystatin    Deconditioning  PT OT        Dispo: PT recommending EDUARDO, patient to discuss with family. Have previously refused    Son at bedside , d/w him current treatment and plan.     MDM: High   I personally spent time on chart/note review, review of labs/imaging, discussion with patient, physical exam, discussion with staff, consultants, coordinating care, writing progress note, and discussion of plan of care.      Inna Bullock MD    Supplementary Documentation:                         [1]    potassium chloride  40 mEq Intravenous Once    midodrine  10 mg Oral TID    apixaban  5 mg Oral BID    traMADol  50 mg Oral 2 times per day    senna  10 mL Per NG Tube BID    docusate  100 mg Per NG Tube BID    pantoprazole  40 mg Intravenous Daily    amiodarone  400 mg Oral BID with meals    insulin aspart  1-5 Units Subcutaneous TID CC and HS    levothyroxine  100 mcg Oral Before breakfast

## 2025-05-24 NOTE — CM/SW NOTE
Impression: Open angle with borderline findings, low risk, bilateral: H40.013. Plan: Discussed status of examination with patient. Recommend glaucoma workup in 1-2 months with pachymetry, VF 24-2, and ON OCT. Patient understands risk associated with condition and need for monitoring. Pt had subxiphoid pericardial window and drainage on 5/21. CM confirmed with patient's daughter Theresa that she is now considering EDUARDO at dc. EDUARDO list already provided to patient by previous CM. [facility] also approved EDUARDO at dc on 5/19. Updated uploaded in [employer]. [employer] auth will be needed prior to dc.    On 4L O2 which is baseline. Pt already current w/[facility] for home O2. Pt also current w/[facility] for RN and therapy services. [facility] has already accepted patient for new wheelchair at dc. Per CM note dated 5/20, Medicaid form for WC was placed in patient's chart. Medicaid form not found in patient's chart at this time.    Plan: SURJIT Friedman    [phone]

## 2025-05-24 NOTE — PROGRESS NOTES
[facility]  part of [facility]    Progress Note      Assessment and Plan:   1.  Acute on chronic respiratory failure-multifactorial with significant burden of tumor in the chest but now recognized to have small volume of bilateral subsegmental PEs.  The patient has low platelets and is a poor candidate for full anticoagulation at this moment.  Lower extremity venous ultrasound demonstrated acute appearing nonocclusive DVT in the right superficial femoral popliteal and posterior tibial veins.  She got the IVC filter.    The patient underwent pericardial window and yet had 1 chest tube in place this morning.  The pericardial fluid is consistent with malignancy.    Recommendations:  1.  Patient will go to the operating room for pericardial window.  2.  Nasal saline spray.  3.  Tube management as per thoracic surgery  3.  Await cytology from the pericardial fluid analysis.    2.  Metastatic endometrial carcinoma-to follow-up gynecologic oncology.    3.  Malignant pericardial nooyuyka-vwlkzh-od gynecologic oncology.  Now status post pericardial window.    Recommendations: Tube management as per thoracic surgery.    4.  Recurrent episodes of atrial tachycardia-on amiodarone.    5.  Very small left pleural effusion-evacuated now.      Subjective:   Kelli Fisher is a(n) 70 year old female who is less dyspneic    Objective:   Blood pressure 102/66, pulse 84, temperature 97.3 °F (36.3 °C), temperature source Temporal, resp. rate 17, height 5' (1.524 m), weight 171 lb 15.3 oz (78 kg), SpO2 100%.    Physical Exam alert white female  HEENT examination is unremarkable with pupils equal round and reactive to light and accommodation.   Neck without adenopathy, thyromegaly, JVD nor bruit.   Lungs diminished bilaterally to auscultation and percussion.  Cardiac regular rate and rhythm no murmur.   Abdomen nontender, without hepatosplenomegaly and no mass appreciable.   Extremities without clubbing cyanosis nor  edema.   Neurologic grossly intact with symmetric tone and strength and reflex.  Skin without gross abnormality     Results:     Lab Results   Component Value Date    WBC 20.6 05/24/2025    HGB 9.8 05/24/2025    HCT 30.7 05/24/2025    .0 05/24/2025    CREATSERUM 1.15 05/24/2025    BUN 27 05/24/2025     05/24/2025    K 3.5 05/24/2025     05/24/2025    CO2 27.0 05/24/2025     05/24/2025    CA 8.1 05/24/2025      CT scan of the chest-Positive for small volume bilateral segmental PE      Enlarging left upper lobe consolidation and few bilateral pulmonary nodules      Enlarging pericardial effusion now 1.7 centimeter thick.  Increasing left pleural effusion, new right pleural effusion     Srinath Levy MD  Medical Director, Critical Care, [facility]  Medical Director, [facility]  Pager: [phone]

## 2025-05-24 NOTE — PLAN OF CARE
Patient is awake/alert ox3;  slept well took 3 hour naps twice, no acute changes overnight.  Continues on 4 liters/nc, 02 sats 95% RR 16 to 22 easily gets short of breath with exertion or upon being turned/ adjusted.   Chest tube drained 30ml  of serous output, drsg cdi no subcutaneous emphysema.  Leon gtt at 30 mcgs and titrating, continues to have swelling to arms and legs.  Overall has poor intake, states not much of appetite is now taking pills whole.  Voiding freely external catheter in place, had 3 bms during this shift. Complete care chg bath done,  safety measures in place and PT/OT this am.   Problem: CARDIOVASCULAR - ADULT  Goal: Maintains optimal cardiac output and hemodynamic stability  Description: INTERVENTIONS:- Monitor vital signs, rhythm, and trends- Monitor for bleeding, hypotension and signs of decreased cardiac output- Evaluate effectiveness of vasoactive medications to optimize hemodynamic stability- Monitor arterial and/or venous puncture sites for bleeding and/or hematoma- Assess quality of pulses, skin color and temperature- Assess for signs of decreased coronary artery perfusion - ex. Angina- Evaluate fluid balance, assess for edema, trend weights  Outcome: Progressing  Goal: Absence of cardiac arrhythmias or at baseline  Description: INTERVENTIONS:- Continuous cardiac monitoring, monitor vital signs, obtain 12 lead EKG if indicated- Evaluate effectiveness of antiarrhythmic and heart rate control medications as ordered- Initiate emergency measures for life threatening arrhythmias- Monitor electrolytes and administer replacement therapy as ordered  Outcome: Not Progressing     Problem: DISCHARGE PLANNING  Goal: Discharge to home or other facility with appropriate resources  Description: INTERVENTIONS:  - Identify barriers to discharge w/pt and caregiver  - Include patient/family/discharge partner in discharge planning  - Arrange for needed discharge resources and transportation as  appropriate  - Identify discharge learning needs (meds, wound care, etc)  - Arrange for interpreters to assist at discharge as needed  - Consider post-discharge preferences of patient/family/discharge partner  - Complete POLST form as appropriate  - Assess patient's ability to be responsible for managing their own health  - Refer to Case Management Department for coordinating discharge planning if the patient needs post-hospital services based on physician/LIP order or complex needs related to functional status, cognitive ability or social support system  Outcome: Not Progressing     Problem: SAFETY ADULT - FALL  Goal: Free from fall injury  Description: INTERVENTIONS:  - Assess pt frequently for physical needs  - Identify cognitive and physical deficits and behaviors that affect risk of falls.  - [place] fall precautions as indicated by assessment.  - Educate pt/family on patient safety including physical limitations  - Instruct pt to call for assistance with activity based on assessment  - Modify environment to reduce risk of injury  - Provide assistive devices as appropriate  - Consider OT/PT consult to assist with strengthening/mobility  - Encourage toileting schedule  Outcome: Not Progressing     Problem: RISK FOR INFECTION - ADULT  Goal: Absence of fever/infection during anticipated neutropenic period  Description: INTERVENTIONS- Monitor WBC- Administer growth factors as ordered- Implement neutropenic guidelines  Outcome: Progressing     Problem: Diabetes/Glucose Control  Goal: Glucose maintained within prescribed range  Description: INTERVENTIONS:- Monitor Blood Glucose as ordered- Assess for signs and symptoms of hyperglycemia and hypoglycemia- Administer ordered medications to maintain glucose within target range- Assess barriers to adequate nutritional intake and initiate nutrition consult as needed- Instruct patient on self management of diabetes  Outcome: Progressing     Problem: RESPIRATORY -  ADULT  Goal: Achieves optimal ventilation and oxygenation  Description: INTERVENTIONS:- Assess for changes in respiratory status- Assess for changes in mentation and behavior- Position to facilitate oxygenation and minimize respiratory effort- Oxygen supplementation based on oxygen saturation or ABGs- Provide Smoking Cessation handout, if applicable- Encourage broncho-pulmonary hygiene including cough, deep breathe, Incentive Spirometry- Assess the need for suctioning and perform as needed- Assess and instruct to report SOB or any respiratory difficulty- Respiratory Therapy support as indicated- Manage/alleviate anxiety- Monitor for signs/symptoms of CO2 retention  Outcome: Progressing     Problem: HEMATOLOGIC - ADULT  Goal: Free from bleeding injury  Description: (Example usage: patient with low platelets)  INTERVENTIONS:  - Avoid intramuscular injections, enemas and rectal medication administration  - Ensure safe mobilization of patient  - Hold pressure on venipuncture sites to achieve adequate hemostasis  - Assess for signs and symptoms of internal bleeding  - Monitor lab trends  - Patient is to report abnormal signs of bleeding to staff  - Avoid use of toothpicks and dental floss  - Use electric shaver for shaving  - Use soft bristle tooth brush  - Limit straining and forceful nose blowing  Outcome: Not Progressing  Goal: Maintains hematologic stability  Description: INTERVENTIONS  - Assess for signs and symptoms of bleeding or hemorrhage  - Monitor labs and vital signs for trends  - Administer supportive blood products/factors, fluids and medications as ordered and appropriate  - Administer supportive blood products/factors as ordered and appropriate  Outcome: Progressing     Problem: GASTROINTESTINAL - ADULT  Goal: Minimal or absence of nausea and vomiting  Description: INTERVENTIONS:  - Maintain adequate hydration with IV or PO as ordered and tolerated  - Nasogastric tube to low intermittent suction as  ordered  - Evaluate effectiveness of ordered antiemetic medications  - Provide nonpharmacologic comfort measures as appropriate  - Advance diet as tolerated, if ordered  - Obtain nutritional consult as needed  - Evaluate fluid balance  Outcome: Progressing  Goal: Maintains or returns to baseline bowel function  Description: INTERVENTIONS:  - Assess bowel function  - Maintain adequate hydration with IV or PO as ordered and tolerated  - Evaluate effectiveness of GI medications  - Encourage mobilization and activity  - Obtain nutritional consult as needed  - Establish a toileting routine/schedule  - Consider collaborating with pharmacy to review patient's medication profile  Outcome: Not Progressing  Goal: Maintains adequate nutritional intake (undernourished)  Description: INTERVENTIONS:  - Monitor percentage of each meal consumed  - Identify factors contributing to decreased intake, treat as appropriate  - Assist with meals as needed  - Monitor I&O, WT and lab values  - Obtain nutritional consult as needed  - Optimize oral hygiene and moisture  - Encourage food from home; allow for food preferences  - Enhance eating environment  Outcome: Not Progressing     Problem: SKIN/TISSUE INTEGRITY - ADULT  Goal: Skin integrity remains intact  Description: INTERVENTIONS  - Assess and document risk factors for pressure ulcer development  - Assess and document skin integrity  - Monitor for areas of redness and/or skin breakdown  - Initiate interventions, skin care algorithm/standards of care as needed  Outcome: Progressing  Goal: Incision(s), wounds(s) or drain site(s) healing without S/S of infection  Description: INTERVENTIONS:  - Assess and document risk factors for pressure ulcer development  - Assess and document skin integrity  - Assess and document dressing/incision, wound bed, drain sites and surrounding tissue  - Implement wound care per orders  - Initiate isolation precautions as appropriate  - Initiate Pressure Ulcer  prevention bundle as indicated  Outcome: Progressing     Problem: MUSCULOSKELETAL - ADULT  Goal: Return mobility to safest level of function  Description: INTERVENTIONS:  - Assess patient stability and activity tolerance for standing, transferring and ambulating w/ or w/o assistive devices  - Assist with transfers and ambulation using safe patient handling equipment as needed  - Ensure adequate protection for wounds/incisions during mobilization  - Obtain PT/OT consults as needed  - Advance activity as appropriate  - Communicate ordered activity level and limitations with patient/family  Outcome: Not Progressing

## 2025-05-24 NOTE — PROGRESS NOTES
[facility]  part of [facility]    Gynecologic Oncology Progress Note    Kelli Fisher Patient Status:  Inpatient    1955 MRN [id number]   Location [facility] 2W/SW Attending Inna Bullock MD   Hosp Day # 11 PCP Taylor Gallardo MD       Date of Admission:  2025  Reason for Admission:  Stage IV uterine cancer  Malignant pleural effusions  Failure to thrive    SUBJECTIVE:  Patient is a 70 year old female.  No obstetric history on file.  No LMP recorded. Patient has had a hysterectomy.  Overall the patient states that she is doing okay with some minimal pain.  She does not have any nausea or vomiting and states that she is eating, passing flatus and having bowel movements almost daily.  She states that she was able to get out of bed today with the assistance of 2 people and that that she does have weakness.  Her lower extremities are swollen     Medications:  Prescriptions Prior to Admission[1]    Allergies:  Allergies[2]     Review of Systems:  See HPI    OBJECTIVE:  Patient Vitals for the past 24 hrs:   BP Temp Temp src Pulse Resp SpO2 Weight   25 1800 102/70 -- -- 108 20 95 % --   25 1700 92/61 -- -- 94 19 98 % --   25 1600 104/69 98.2 °F (36.8 °C) Temporal 100 24 97 % --   25 1500 (!) 83/53 -- -- 99 (!) 27 94 % --   25 1400 (!) 82/52 -- -- 97 20 100 % --   25 1300 95/53 -- -- 95 19 99 % --   25 1200 91/58 98 °F (36.7 °C) Temporal 96 17 98 % --   25 1100 (!) 84/57 -- -- 89 19 100 % --   25 1000 92/64 -- -- 102 18 97 % --   25 0923 92/64 -- -- 103 -- -- --   25 0900 90/61 -- -- 94 19 98 % --   25 0800 103/68 98.6 °F (37 °C) Temporal 89 19 99 % --   25 0700 (!) 87/ -- -- 87 18 100 % --   25 0600 / -- -- 85 16 100 % --   25 0500  -- -- 86 19 99 % 167 lb 8.8 oz (76 kg)   25 0400 100 98.4 °F (36.9 °C) Temporal 83 16 100 % --   25 0300  -- -- 85 18 98 % --    25 0200 95/60 -- -- 83 18 100 % --   25 0100 96/66 -- -- 84 16 98 % --   25 0000 95/63 98 °F (36.7 °C) Temporal 83 18 98 % --   25 2300 103/73 -- -- 86 22 98 % --   25 2200 101/62 -- -- 83 18 100 % --   25 2122 -- -- -- 81 17 100 % --   25 2100 92/67 -- -- 84 18 100 % --   25 2000 100/71 98.2 °F (36.8 °C) Temporal 86 18 97 % --       Intake/Output Summary (Last 24 hours) at 2025  Last data filed at 2025 1600  Gross per 24 hour   Intake 399.25 ml   Output 1320 ml   Net -920.75 ml       Physical Exam:  General appearance: alert, appears stated age and cooperative  Head: Normocephalic, without obvious abnormality, atraumatic  Eyes: conjunctivae/corneas clear. PERRL, EOM's intact. Fundi benign.  Ears: external ear canals both ears  Throat: lips, mucosa, and tongue normal; teeth and gums normal  Abdomen: soft, mildly distended  Extremities: +4 pitting edema  Skin: Skin color, texture, turgor normal. No rashes or lesions  Lymph nodes: Cervical, supraclavicular, and axillary nodes normal.  Neurologic: Grossly normal    Diagnostics:  CARD ECHO LIMITED (CPT=93308/45533/19946)  Result Date: 2025  Transthoracic Echocardiogram Name:Kelli Fisher Date:    2025    :     1955    Ht:     (60in)     BP: MRN:     [id number]       Age:     70years       Wt:     (167lb)    HR: Loc:     EM          Gndr:    F             BSA:    1.73m^2 Sonographer: Naty PINTO Ordering:    Leeanne Shi Consulting:  Bebo Yu ---------------------------------------------------------------------------- Procedure information:  A transthoracic echocardiogram, limited study was performed. Additional evaluation included M-mode and limited 2D.  Image quality was adequate. ---------------------------------------------------------------------------- Conclusions: 1. Left ventricle: The cavity size was normal. Wall thickness was normal.    Systolic function was normal. The  estimated ejection fraction was 55-60%,    by visual assessment. Unable to assess LV diastolic function. 2. Pericardium, extracardiac: There was a large left pleural effusion. Impressions:  This study is compared with previous dated 05/20/2025: TACHYCARDIC DURING THE EXAM 104 BPM. Pericardial effusion is no longer present. Compared to the previous study, these findings represent improvement. * ---------------------------------------------------------------------------- * Findings: Left ventricle:  The cavity size was normal. Wall thickness was normal. Systolic function was normal. The estimated ejection fraction was 55-60%, by visual assessment. Unable to assess LV diastolic function. Left atrium:  Well visualized. Right ventricle:  The cavity size was normal. Systolic function was normal. Right atrium:  Well visualized. Mitral valve:  Well visualized. Aortic valve:  Well visualized.  The valve was trileaflet. Tricuspid valve:  Well visualized. Pulmonic valve:   Well visualized. Pericardium:   There was no pericardial effusion. Pleura:  There was a large left pleural effusion. Systemic veins:  Central venous respirophasic diameter changes are in the normal range (>50%). Inferior vena cava: The IVC was normal-sized. ---------------------------------------------------------------------------- Measurements  Left ventricle          Value       Ref       05/20/2025  IVS thickness, ED,  (H) 1.1   cm    0.6 - 0.9 1.0  PLAX  LV ID, ED, PLAX     (L) 3.4   cm    3.8 - 5.2 3.6  LV ID, ES, PLAX         2.2   cm    2.2 - 3.5 2.5  LV PW thickness,        0.9   cm    0.6 - 0.9 1.0  ED, PLAX  IVS/LV PW ratio,        1.22        --------- 1.00  ED, PLAX  LV PW/LV ID ratio,      0.26        --------- 0.28  ED, PLAX  LV ejection             66    %     54 - 74   59  fraction  Systemic veins          Value       Ref       05/20/2025  Estimated CVP           3     mm Hg --------- 15  Inferior vena cava      Value       Ref        05/20/2025  ID                      1.3   cm    <=2.1     2.4 Legend: (L)  and  (H)  corinna values outside specified reference range. ---------------------------------------------------------------------------- Prepared and electronically signed by Vic Mckeon 05/23/2025 17:00     XR CHEST AP PORTABLE  (CPT=71045)  Result Date: 5/22/2025  PROCEDURE: XR CHEST AP PORTABLE  (CPT=71045) TIME: 6:56 a.m.   COMPARISON: [facility], CT CHEST PE AORTA (IV ONLY) (CPT=71260), 5/13/2025, 5:15 PM.  [facility], XR CHEST AP PORTABLE (CPT=71045), 5/21/2025, 2:47 PM.  INDICATIONS: Shortness of breath.  TECHNIQUE:   Single view.   FINDINGS:  CARDIAC/VASC: The cardiac silhouette is enlarged.  Unremarkable pulmonary vasculature.  MEDIAST/SCOTT:   Atherosclerotic aorta with no visible aneurysm.  LUNGS/PLEURA: Small bilateral pleural effusions.  Left basal pulmonary opacity.  Diffuse interstitial prominence. BONES: Mild degenerative disc disease and spondylosis without visible acute abnormalities.  OTHER: Right chest port with tip in the right atrium.  IVC filter is partially included in the field of imaging.  Endotracheal tube has been removed.         CONCLUSION: Stable left basal/perihilar pulmonary opacity which which could represent pneumonia or neoplasm.  Stable small bilateral pleural effusions slightly larger on the left.  Mild interstitial edema. Interval extubation   Dictated by (CST): Sincere Canales MD on 5/22/2025 at 7:38 AM     Finalized by (CST): Sincere Canales MD on 5/22/2025 at 7:40 AM          XR CHEST AP PORTABLE  (CPT=71045)  Result Date: 5/21/2025  PROCEDURE: XR CHEST AP PORTABLE  (CPT=71045) TIME: 2:50 p.m.   COMPARISON: [facility], CT CHEST PE AORTA (IV ONLY) (CPT=71260), 5/13/2025, 5:15 PM.  [facility], XR CHEST AP PORTABLE (CPT=71045), 5/21/2025, 9:37 AM.  INDICATIONS: Shortness of breath; post pericardial window.  TECHNIQUE:   Single view.   FINDINGS:   CARDIAC/VASC: Pericardial drain over the left heart with tip along the inferior margin of the right atrial shadow.  Cardiac silhouette is enlarged.  Mild vascular congestion. MEDIAST/SCOTT:   Atherosclerotic aorta with no visible aneurysm.  LUNGS/PLEURA: Small bilateral pleural effusions.  Left infrahilar pulmonary opacity. BONES: Scattered mild degenerative endplate changes in the visualized thoracolumbar spine. OTHER: IVC filter is partially included in the field of imaging.  Right chest port with tip at the cavoatrial junction.  Endotracheal tube overlying the trachea the level of the aortic knob, approximately 33 mm above the christian.         CONCLUSION:  1.  Post pericardial window.  Pericardial drain over the lower aspect of the cardiac silhouette. 2.  Interval intubation.  Endotracheal tube is present over the trachea in gross satisfactory position 33 mm above the christian. 3.  Small bilateral pleural effusions, with decrease in size of left effusion.  Nonspecific left perihilar pulmonary opacity which could represent atelectasis, pneumonia, or other inflammatory/neoplastic process.    Dictated by (CST): Sincere Canales MD on 5/21/2025 at 3:15 PM     Finalized by (CST): Sincere Canales MD on 5/21/2025 at 3:17 PM          XR CHEST AP PORTABLE  (CPT=71045)  Result Date: 5/21/2025  PROCEDURE: XR CHEST AP PORTABLE  (CPT=71045) TIME: 937.   COMPARISON: [facility], XR CHEST AP PORTABLE (CPT=71045), 5/18/2025, 9:23 AM.  INDICATIONS: Shortness of breath.  TECHNIQUE:   Single view.   FINDINGS:  CARDIAC/VASC: The heart mediastinal structures are significantly enlarged but unchanged since previous exam.  Pulmonary vascularity is within normal limits. MEDIAST/SCOTT:   There is widening of the mediastinum unchanged since previous exam. LUNGS/PLEURA: There is redemonstration of increased density with mid/lower left lung compatible with a combination of infiltrate, atelectasis, and a small to moderate pleural effusion.   BONES: No fracture or visible bony lesion. OTHER: Negative.          CONCLUSION:   1. Redemonstration of increased density within the left lung (mid and lower regions).  The findings are most compatible with a combination of infiltrate, atelectasis, and small to moderate effusion.   2. Cardiac enlargement.  There is widening of the mediastinum likely representing adenopathy.    Dictated by (CST): Kaleb Epps MD on 2025 at 9:56 AM     Finalized by (CST): Kaleb Epps MD on 2025 at 9:59 AM          [employer] (CPT=93308/52861/87699)  Result Date: 2025  Transthoracic Echocardiogram Name:Kelli Fisher Date: 2025 :  1955 Ht:  (60in)  BP: 98 / 72 MRN:  [id number]    Age:  70years    Wt:  (167lb) HR: 89bpm Loc:  [facility]       Gndr: F          BSA: 1.73m^2 Sonographer: Chris DOMINGUEZ Ordering:    Srinath Levy Consulting:  Trista Peña ---------------------------------------------------------------------------- History/Indications:  Reevaluate enlarging pericardial effusion. Acute respiratory failure with hypoxia. Shortness of breath. ---------------------------------------------------------------------------- Procedure information:  A transthoracic echocardiogram, limited study was performed. Additional evaluation included M-mode and limited 2D.  Patient status:  Inpatient.  Location:  Bedside.    Comparison was made to the study of 2025.    This was a routine study. Transthoracic echocardiography for diagnosis and ventricular function evaluation. Image quality was adequate. ECG rhythm:   Normal sinus ---------------------------------------------------------------------------- Conclusions: 1. Left ventricle: The cavity size was normal. Wall thickness was normal.    Systolic function was normal. The estimated ejection fraction was 55-60%,    by visual assessment. Unable to assess LV diastolic function. 2. Pericardium, extracardiac: A large, free-flowing pericardial  effusion was    identified. There is RV collapse in early diastolic for less than 50% of    the cardiac cycle. There was evidence for mildly increased RV-LV    interaction demonstrated by respirophasic changes in transmitral    velocities (25% reduction in mitral valve e'wave velocity during    inspiration). There was a left pleural effusion. 3. Inferior vena cava: The IVC was dilated (23mm). Respirophasic diameter    changes are blunted (< 50%), consistent with elevated central venous    pressure. Impressions:  This study is compared with previous dated 5/13/2025: The pericardial effusion has slightly increased, now with maximal dimensions in diastole of 20mm with evidence of early tamponade (25% reduction in mitral valve inflow velocity and right ventricle free wall collapse in early diastole). * ---------------------------------------------------------------------------- * Findings: Left ventricle:  The cavity size was normal. Wall thickness was normal. Systolic function was normal. The estimated ejection fraction was 55-60%, by visual assessment. Unable to assess LV diastolic function. Right ventricle:  The cavity size was normal. Systolic function was normal. Systolic pressure was not estimated. Mitral valve:  The leaflets were mildly calcified. Aortic valve:   The valve was trileaflet. The leaflets were mildly calcified. Pericardium:  A large, free-flowing pericardial effusion was identified. Doppler:  There is RV collapse in early diastolic for less than 50% of the cardiac cycle. There was evidence for mildly increased RV-LV interaction demonstrated by respirophasic changes in transmitral velocities (25% reduction in mitral valve e'wave velocity during inspiration). Pleura:  There was a left pleural effusion. Aorta: Aortic root: The aortic root was normal. Ascending aorta: The ascending aorta was normal. Systemic veins: Inferior vena cava: The IVC was dilated (23mm).  Respirophasic diameter changes are  blunted (< 50%), consistent with elevated central venous pressure. ---------------------------------------------------------------------------- Measurements  Left ventricle          Value       Ref       05/13/2025  IVS thickness, ED,  (H) 1.0   cm    0.6 - 0.9 1.3  PLAX  LV ID, ED, PLAX     (L) 3.6   cm    3.8 - 5.2 4.0  LV ID, ES, PLAX         2.5   cm    2.2 - 3.5 2.4  LV PW thickness,    (H) 1.0   cm    0.6 - 0.9 1.1  ED, PLAX  IVS/LV PW ratio,        1.00        --------- 1.18  ED, PLAX  LV PW/LV ID ratio,      0.28        --------- 0.28  ED, PLAX  LV ejection             59    %     54 - 74   71  fraction  LVOT                    Value       Ref       05/13/2025  LVOT ID                 2     cm    --------- 2  Aortic root             Value       Ref       05/13/2025  Aortic root ID          3.3   cm    2.5 - 3.9 ----------  Ascending aorta         Value       Ref       05/13/2025  Ascending aorta ID  (H) 3.6   cm    1.9 - 3.5 ----------  Systemic veins          Value       Ref       05/13/2025  Estimated CVP           15    mm Hg --------- 15  Inferior vena cava      Value       Ref       05/13/2025  ID                  (H) 2.4   cm    <=2.1     2.3 Legend: (L)  and  (H)  corinna values outside specified reference range. ---------------------------------------------------------------------------- Prepared and electronically signed by Javi Huerta 05/20/2025 17:58     IR IVC FILTER PLACEMENT  Result Date: 5/20/2025  PROCEDURE: Inferior vena cavogram with placement of an inferior vena cava filter PROCEDURE DATE: 5/14/2025 : MD Kellie CLINICAL INDICATION: DVT/PE.  History of metastatic endometrial cancer.  Anticoagulation contraindicated secondary to anemia/thrombocytopenia.  IVC filter placement indicated for prophylaxis against recurrent PE. SEDATION: Intravenous moderate sedation with continuous physiologic monitoring was provided for 16 minutes. FLUOROSCOPY TIME: 0.7 min FLUOROSCOPY DOSE: 34 mGy  CONTRAST: 40mL isovue TECHNIQUE AND FINDINGS: Following discussion of the indications, risks, benefits and alternatives to the procedure, written informed consent was obtained. The patient was transferred to the interventional radiology suite and placed in a supine position on the fluoroscopic imaging table. The right groin was prepped and draped in sterile fashion. 2% lidocaine was administered for local anesthesia. Under real-time sonographic guidance, the right  common femoral vein was accessed using a 5-Fr micropuncture kit. A 0.035\" guidewire was advanced through the sheath into the inferior vena cava under fluoroscopic guidance.  Exchange was made for a 5 [employer] Sos Omni catheter, which was advanced into the inferior vena cava under fluoroscopic guidance. Inferior vena cavography was performed, demonstrating a solitary, patent inferior vena cava of normal caliber. Exchange was made for the filter delivery sheath.  A Venatech permanent filter was successfully deployed in an infrarenal position without significant tilt. The sheath was removed.  Hemostasis was achieved with direct manual pressure, and a sterile dressing was applied. The patient tolerated the procedure without complication and left the department in satisfactory condition. IMPRESSION: Successful inferior vena cavography demonstrating a solitary, patent inferior vena cava of normal caliber. Successful placement of a Venatech inferior vena cava filter. Rony Hopkins MD     XR CHEST AP PORTABLE  (CPT=71045)  Result Date: 5/18/2025  PROCEDURE: XR CHEST AP PORTABLE  (CPT=71045) TIME: 928 hours.   COMPARISON: [facility], XR CHEST AP PORTABLE (CPT=71045), 5/15/2025, 6:31 AM.  [facility], XR CHEST AP PORTABLE (CPT=71045), 5/12/2025, 2:43 PM.  [facility], XR CHEST AP PORTABLE (CPT=71045), 5/02/2025, 6:22 AM.  INDICATIONS: Shortness of breath; IV diuresis.  TECHNIQUE:   Single view.   FINDINGS:   CARDIAC/VASC: The cardiac silhouette remains enlarged.  Mild prominence of the central pulmonary vasculature, mildly increased compared to prior  MEDIAST/SCOTT:   Prominence of the mediastinal and bilateral hilar contours is unchanged consistent with known lymphadenopathy. LUNGS/PLEURA: There is unchanged consolidation in the lateral aspect of the lingula/left upper lobe.  The right hemidiaphragm remains mildly elevated.  Moderate left and trace right pleural effusions and mild associated passive atelectasis are unchanged bilaterally.  No pneumothorax. BONES: The osseous structures are unchanged. OTHER: A right-sided subcutaneous chest port tip again projects over the cavoatrial junction, in customary positioning.         CONCLUSION:   Unchanged mild cardiomegaly.  Interstitial pulmonary edema, appearing slightly increased from 05/15/2025.  Unchanged moderate left pleural effusion associated basilar opacity.  Unchanged trace right pleural effusion with minimal associated atelectasis  Unchanged metastatic mediastinal/hilar adenopathy, better seen on cross-sectional imaging.      Dictated by (CST): Lisa Navas MD on 5/18/2025 at 10:14 AM     Finalized by (CST): Lisa Navas MD on 5/18/2025 at 10:16 AM          XR CHEST AP PORTABLE  (CPT=71045)  Result Date: 5/15/2025  PROCEDURE: XR CHEST AP PORTABLE  (CPT=71045) TIME: 6:34.   COMPARISON: [facility], CT CHEST PE AORTA (IV ONLY) (CPT=71260), 5/13/2025, 5:15 PM.  [facility], XR CHEST AP PORTABLE (CPT=71045), 5/12/2025, 2:43 PM.  INDICATIONS: Shortness of breath.  Acute on chronic respiratory failure, pulmonary emboli, history of metastatic endometrial carcinoma.  TECHNIQUE:   Single view.   FINDINGS:  CARDIAC/VASC: The cardiac silhouette remains enlarged.  Unremarkable pulmonary vasculature.  MEDIAST/SCOTT:   Prominence of the mediastinal and bilateral hilar contours is unchanged consistent with known lymphadenopathy.  LUNGS/PLEURA: There is stable consolidation in the lateral aspect of the lingula/left upper lobe.  The right hemidiaphragm remains mildly elevated.  Small pleural effusions and mild associated passive atelectasis are unchanged bilaterally.  No pneumothorax. BONES: The osseous structures are unchanged. OTHER: A right-sided subcutaneous chest port tip again projects over the cavoatrial junction, in customary positioning.         CONCLUSION:  1. The cardiac silhouette remains enlarged, likely relating to the known pericardial effusion.  Small pleural effusions are also unchanged bilaterally suggesting mild CHF or fluid overload.  There is stable mild associated passive atelectasis at both lung bases. 2. There is a history of metastatic endometrial carcinoma.  Stable left upper lobe/lingular consolidation may relate to a metastasis, atelectasis and/or pneumonia.  Unchanged prominence of the mediastinal and bilateral hilar contours is compatible with known underlying metastatic lymphadenopathy.     Dictated by (CST): Vincent Vazquez MD on 5/15/2025 at 7:25 AM     Finalized by (CST): Vincent Vazquez MD on 5/15/2025 at 7:28 AM          US VENOUS DOPPLER LEG BILAT - DIAG IMG (CPT=93970)  Result Date: 5/14/2025  PROCEDURE: US VENOUS DOPPLER LEG BILAT-DIAG IMG (CPT=93970)  COMPARISON: None.  INDICATIONS: b/l PE  TECHNIQUE: Color duplex Doppler venous ultrasound of both lower extremities was performed in the  usual manner.  FINDINGS:   There is acute appearing nonocclusive DVT involving the right superficial femoral (mid and distal segments), the right popliteal, and right posterior tibial veins.  There is no DVT identified within the right common femoral vein.  The deep veins of the left lower extremity demonstrate normal blood flow, compressibility, and augmentation.  There is a 8.2 x 1.5 x 3.8 cm left posterior popliteal/Baker's cyst.         CONCLUSION:   1. Acute appearing nonocclusive DVT involving the right  superficial femoral, popliteal, and posterior tibial veins.  No left lower extremity DVT is identified.  2. 8.2 x 1.5 x 3.8 cm left posterior popliteal/Baker's cyst.     Dictated by (CST): Kaleb Epps MD on 5/14/2025 at 10:46 AM     Finalized by (CST): Kaleb Epps MD on 5/14/2025 at 10:49 AM          CT CHEST PE AORTA (IV ONLY) (CPT=71260)  Result Date: 5/13/2025  PROCEDURE: CT CHEST PE AORTA (IV ONLY) (CPT=71260)  COMPARISON: [facility], CT CHEST PE AORTA (IV ONLY) (CPT=71260), 4/29/2025, 11:50 PM.  INDICATIONS: Elevated d dimer.  TECHNIQUE: CT images of the chest were obtained with non-ionic intravenous contrast material.  Automated exposure control for dose reduction was used. Adjustment of the mA and/or kV was done based on the patient's size. Use of iterative reconstruction technique for dose reduction was used. Dose information is transmitted to the ACR (American College of Radiology) NRDR (National Radiology Data Registry) which includes the Dose Index Registry.  FINDINGS:  No acute aortic syndrome.  Positive for small volume acute bilateral nonocclusive segmental PE in the right upper lobe, right lower lobe, left lower lobe  Enlarging circumferential pericardial effusion now 1.7 centimeter thick over the lateral wall.   Increasing small left and new trace right pleural effusion  Stable heart size.  No significant coronary calcification.  Venous catheter in the lower SVC  Confluent neck and mediastinal mass is grossly unchanged  Enlarging left upper lobe consolidation  Stable right basilar atelectasis  Some of the bilateral pulmonary nodules have increased in size        CONCLUSION:   Positive for small volume bilateral segmental PE  Enlarging left upper lobe consolidation and few bilateral pulmonary nodules  Enlarging pericardial effusion now 1.7 centimeter thick.  Increasing left pleural effusion, new right pleural effusion  Discussed with UNRULY Brumfield at 5:40 p.m.   Dictated by (CST):  Chris Lovell MD on 2025 at 5:34 PM     Finalized by (CST): Chris Lovell MD on 2025 at 5:42 PM          [employer] (CPT=93308/63837/69309)  Result Date: 2025  Transthoracic Echocardiogram Name:Kelli Fisher Date: 2025 :  1955 Ht:  (60in)  BP: 106 / 75 MRN:  [id number]    Age:  70years    Wt:  (167lb) HR: 85bpm Loc:  [facility]       Gndr: F          BSA: 1.73m^2 Sonographer: Naty PINTO Ordering:    Margarette Sexton Consulting:  Dominick Herring ---------------------------------------------------------------------------- History/Indications:   SOB and known Pericardial Effusion. ---------------------------------------------------------------------------- Procedure information:  A transthoracic echocardiogram, limited study was performed. Additional evaluation included M-mode and limited 2D.  Patient status:  Inpatient.  Location:  Bedside.    Comparison was made to the study of 2025.    This was a routine study. Transthoracic echocardiography for diagnosis. Image quality was adequate. ---------------------------------------------------------------------------- Conclusions: 1. Left ventricle: The cavity size was normal. Wall thickness was mildly    increased. There was mild concentric hypertrophy. Systolic function was    normal. The estimated ejection fraction was 55-60%, by visual assessment.    Unable to assess LV diastolic function. 2. Right ventricle: The cavity size was normal. Systolic function was    normal. 3. Pericardium, extracardiac: A moderate, free-flowing pericardial effusion    was identified. There was no evidence of hemodynamic compromise. 4. Systemic veins: Central venous respirophasic diameter changes are blunted    (< 50%). 5. Inferior vena cava: The IVC was dilated. * ---------------------------------------------------------------------------- * Findings: Left ventricle:  The cavity size was normal. Wall thickness was mildly increased. There was mild concentric  hypertrophy. Systolic function was normal. The estimated ejection fraction was 55-60%, by visual assessment. Unable to assess LV diastolic function. Left atrium:  Well visualized. Right ventricle:  The cavity size was normal. Systolic function was normal. Right atrium:  Well visualized. Mitral valve:  Well visualized. Aortic valve:   The valve was trileaflet. Tricuspid valve:  The annulus is normal-sized. The leaflets are normal thickness. No echocardiographic evidence for tricuspid prolapse.  Doppler: Transvalvular velocity was within the normal range. There was no evidence for stenosis. There was mild regurgitation. Pulmonic valve:    Doppler:  There was trivial regurgitation. Pericardium:  A moderate, free-flowing pericardial effusion was identified. There was no evidence of hemodynamic compromise. Systemic veins:  Central venous respirophasic diameter changes are blunted (< 50%). Inferior vena cava: The IVC was dilated. ---------------------------------------------------------------------------- Measurements  Left ventricle         Value        Ref       05/05/2025  IVS thickness, ED, (H) 1.3   cm     0.6 - 0.9 1.0  PLAX  LV ID, ED, PLAX        4.0   cm     3.8 - 5.2 3.6  LV ID, ES, PLAX        2.4   cm     2.2 - 3.5 2.4  LV PW thickness,   (H) 1.1   cm     0.6 - 0.9 1.0  ED, PLAX  IVS/LV PW ratio,       1.18         --------- 1.00  ED, PLAX  LV PW/LV ID ratio,     0.28         --------- 0.28  ED, PLAX  LV ejection            71    %      54 - 74   63  fraction  Stroke volume/bsa,     35    ml/m^2 --------- ----------  2D  LVOT                   Value        Ref       05/05/2025  LVOT ID                2     cm     --------- 2  LVOT peak              1.21  m/sec  --------- ----------  velocity, S  LVOT VTI, S            19.4  cm     --------- ----------  LVOT peak              6     mm Hg  --------- ----------  gradient, S  LVOT mean              3     mm Hg  --------- ----------  gradient, S  Stroke volume           61    ml     --------- ----------  (SV), LVOT DP  Stroke index           35    ml/m^2 --------- ----------  (SV/bsa), LVOT DP  Pulmonary artery       Value        Ref       05/05/2025  PA pressure, S, DP     44    mm Hg  --------- ----------  Tricuspid valve        Value        Ref       05/05/2025  Tricuspid regurg       2.68  m/sec  <=2.8     ----------  peak velocity  Tricuspid peak         29    mm Hg  --------- ----------  RV-RA gradient  Systemic veins         Value        Ref       05/05/2025  Estimated CVP          15    mm Hg  --------- 15  Inferior vena cava     Value        Ref       05/05/2025  ID                 (H) 2.3   cm     <=2.1     2.4  Right ventricle        Value        Ref       05/05/2025  TAPSE, 2D              1.93  cm     >=1.70    ----------  TAPSE, MM              1.93  cm     >=1.70    ----------  RV pressure, S, DP     44    mm Hg  --------- ---------- Legend: (L)  and  (H)  corinna values outside specified reference range. ---------------------------------------------------------------------------- Prepared and electronically signed by Javi Huerta 05/13/2025 16:05     XR CHEST AP PORTABLE  (CPT=71045)  Result Date: 5/12/2025  PROCEDURE: XR CHEST AP PORTABLE  (CPT=71045) TIME: 1443 hours  COMPARISON: [facility], XR CHEST AP PORTABLE (CPT=71045), 4/29/2025, 10:40 PM.  [facility], CT CHEST PE AORTA (IV ONLY) (CPT=71260), 4/29/2025, 11:50 PM.  [facility], XR CHEST AP PORTABLE (CPT=71045), 5/02/2025, 6:22 AM.  INDICATIONS: Low oxygen, shortness of breath.  History of lung cancer.  TECHNIQUE:   Single view.   FINDINGS:  CARDIAC/VASC: Enlarged cardiac silhouette is related to known pericardial effusion. Pulmonary vascularity normal. MEDIAST/SCOTT:   Stable marked mediastinal lymphadenopathy. LUNGS/PLEURA: Stable left upper lobe mass.  Suboptimal inspiration.  No significant changes BONES: No fracture or visible bony lesion. OTHER: Right  Port-A-Cath tip in superior right atrium.         CONCLUSION:  1. Suboptimal inspiration.  No acute finding or significant change. 2. Left mid lung/upper lobe mass compatible with known malignancy. 3. Stable mediastinal lymph node metastases. 4. Stable pericardial effusion.    Dictated by (CST): Jayson Fournier MD on 5/12/2025 at 3:04 PM     Finalized by (CST): Jayson Fournier MD on 5/12/2025 at 3:08 PM          IR PORT A CATH INSERTION EXCHNGE CHECK  Result Date: 5/6/2025  PROCEDURE: IR PORT A CATH INSERTION  INDICATIONS: port insertion  COMPARISON: None.  (S):  Kellie  ANESTHESIA/SEDATION: Level of anesthesia/sedation: Moderate sedation (conscious sedation) Anesthesia/sedation administered by: Nurse or other independent trained observer who has no other duties with continuous monitoring of the patient's level of consciousness and physiologic status, under the personal supervision of the attending physician. Total intra-service sedation time:  15 min  ESTIMATED BLOOD LOSS: Less than 5 mL  COMPLICATIONS: None  FINDINGS:  Informed consent was obtained. The right side of the neck and chest were sterilely prepped and draped.  The procedure was performed with the patient in a sitting semi upright position, as she could not tolerate lying flat due to dyspnea.  Ultrasound demonstrated the right internal jugular vein to be patent. Local Lidocaine was administered. Under ultrasound guidance, the vein was accessed with a micropuncture set; an image was saved.  Under fluoroscopic guidance, a 0.035 inch wire was advanced into the inferior vena cava. The access was dilated. A peel-away sheath was advanced over the Amplatz wire and secured.  Local Lidocaine was administered, and a horizontal skin incision was made in the chest several cm from the venotomy with a 15 blade. Blunt dissection of the soft tissues was then performed to create a pocket for the chest port device.  The port catheter was tunneled from the pocket to  the venotomy. The catheter was advanced through the peel-away sheath to the superior vena cava-right atrial junction and cut to appropriate length. The port catheter was attached to the port device which was then placed into the pocket.  The port was accessed with a non-coring needle. It aspirated and flushed well. The catheter was flushed with heparin and secured to the skin. The subcutaneous pocket was then closed with interrupted deep 4-0 absorbable sutures. Skin glue was applied over  the pocket incision and the venotomy.         CONCLUSION:  Ultrasound and fluoroscopic guided surgical placement of chest port    Dictated by (CST): Rony Hopkins MD on 2025 at 10:44 AM     Finalized by (CST): Rony Hopkins MD on 2025 at 10:45 AM          "[employer] (CPT=93308/34092/29130)  Result Date: 2025  Transthoracic Echocardiogram Name:Kelli Fisher Date: 2025 :  1955 Ht:  (60in)  BP: 142 / 86 MRN:  [id number]    Age:  70years    Wt:  (174lb) HR: 106bpm Loc:  [facility]       Gndr: F          BSA: 1.76m^2 Sonographer: Chris DOMINGUEZ Ordering:    Stella Gomez Consulting:  Khadar Segura ---------------------------------------------------------------------------- History/Indications:   Pericardial effusion. Lung mass. ---------------------------------------------------------------------------- Procedure information:  A transthoracic echocardiogram, limited study was performed. Additional evaluation included M-mode and limited 2D.  Patient status:  Inpatient.  Location:  Bedside.    Comparison was made to the study of 2025.    This was a routine study. Transthoracic echocardiography for diagnosis and ventricular function evaluation. Image quality was adequate. ECG rhythm:   Sinus tachycardia  Study completion:  There were no complications. ---------------------------------------------------------------------------- Conclusions: 1. Left ventricle: The cavity size was normal. Wall thickness  was normal.    Systolic function was vigorous. The estimated ejection fraction was    65-70%, by biplane method of disks. No diagnostic evidence for regional    wall motion abnormalities. Unable to assess LV diastolic function. 2. Pericardium, extracardiac: A small to moderate, free-flowing pericardial    effusion was identified circumferential to the heart. Probably not    hemodynamically significant. Impressions:  This study is compared with previous dated 4/30/2025: No significant change since prior study. * ---------------------------------------------------------------------------- * Findings: Left ventricle:  The cavity size was normal. Wall thickness was normal. Systolic function was vigorous. The estimated ejection fraction was 65-70%, by biplane method of disks. No diagnostic evidence for regional wall motion abnormalities. Unable to assess LV diastolic function. Ventricular septum:   Thickness was normal. Left atrium:  The left atrial volume was normal. Right ventricle:  Well visualized. The cavity size was normal. Systolic function was normal. Right atrium:  Well visualized. The atrium was normal in size. Mitral valve:  Well visualized. The leaflets were mildly calcified. Aortic valve:  Well visualized.  The valve was trileaflet. The leaflets were mildly calcified. Tricuspid valve:  Well visualized. Pulmonic valve:   Well visualized. Pericardium:  A small to moderate, free-flowing pericardial effusion was identified circumferential to the heart. Probably not hemodynamically significant. Aorta: Aortic root: The aortic root was normal. Ascending aorta: The ascending aorta was normal. Pulmonary arteries: Systolic pressure could not be accurately estimated. ---------------------------------------------------------------------------- Measurements  Left ventricle         Value        Ref       04/30/2025  IVS thickness, ED, (H) 1.0   cm     0.6 - 0.9 1.0  PLAX  LV ID, ED, PLAX    (L) 3.6   cm     3.8 - 5.2 3.9   LV ID, ES, PLAX        2.4   cm     2.2 - 3.5 2.5  LV PW thickness,   (H) 1.0   cm     0.6 - 0.9 1.0  ED, PLAX  IVS/LV PW ratio,       1.00         --------- 1.00  ED, PLAX  LV PW/LV ID ratio,     0.28         --------- 0.26  ED, PLAX  LV ejection            63    %      54 - 74   66  fraction  LV end-diastolic       62    ml     48 - 140  ----------  volume, 1-p A4C  LV ejection            65    %      46 - 78   ----------  fraction, 1-p A4C  Stroke volume, 1-p     40    ml     --------- ----------  A4C  LV end-diastolic       35    ml/m^2 30 - 82   ----------  volume/bsa, 1-p  A4C  Stroke volume/bsa,     23    ml/m^2 --------- ----------  1-p A4C  LV end-diastolic       61    ml     46 - 106  ----------  volume, 2-p  LV end-systolic        21    ml     14 - 42   ----------  volume, 2-p  LV ejection            65    %      54 - 74   ----------  fraction, 2-p  Stroke volume, 2-p     40    ml     --------- ----------  LV end-diastolic       35    ml/m^2 29 - 61   ----------  volume/bsa, 2-p  LV end-systolic        12    ml/m^2 8 - 24    ----------  volume/bsa, 2-p  Stroke volume/bsa,     22.5  ml/m^2 --------- ----------  2-p  LVOT                   Value        Ref       04/30/2025  LVOT ID                2     cm     --------- ----------  Aortic root            Value        Ref       04/30/2025  Aortic root ID         3.2   cm     2.5 - 4.0 ----------  Ascending aorta        Value        Ref       04/30/2025  Ascending aorta ID (H) 3.7   cm     1.9 - 3.5 ----------  Left atrium            Value        Ref       04/30/2025  LA ID, A-P, ES         2.7   cm     2.7 - 3.8 ----------  LA volume, S           43    ml     22 - 52   ----------  LA volume/bsa, S       24    ml/m^2 16 - 34   ----------  LA volume, ES, 1-p     39    ml     22 - 52   ----------  A4C  LA volume, ES, 1-p     46    ml     22 - 52   ----------  A2C  LA volume, ES, A/L     46    ml     --------- ----------  LA volume/bsa, ES,     26    ml/m^2 16 -  34   ----------  A/L  LA/aortic root         0.84         --------- ----------  ratio  Right atrium           Value        Ref       04/30/2025  RA ID, S-I, ES,        4.8   cm     3.4 - 5.3 ----------  A4C  RA ID/bsa, S-I,        2.7   cm/m^2 1.9 - 3.1 ----------  ES, A4C  RA area, ES, A4C       14    cm^2   10 - 18   ----------  RA volume, ES, 1-p     32    ml     --------- ----------  A4C  RA volume/bsa, ES,     18    ml/m^2 9 - 33    ----------  1-p A4C  Systemic veins         Value        Ref       04/30/2025  Estimated CVP          15    mm Hg  --------- ----------  Inferior vena cava     Value        Ref       04/30/2025  ID                 (H) 2.4   cm     <=2.1     2.3 Legend: (L)  and  (H)  corinna values outside specified reference range. ---------------------------------------------------------------------------- Prepared and electronically signed by Margareth Haas 05/05/2025 12:36     XR VIDEO SWALLOW (CPT=74230)  Result Date: 5/3/2025  PROCEDURE: XR VIDEO SWALLOW (CPT=74230)  COMPARISON: None available.  INDICATIONS: Clinical concern for aspiration.  TECHNIQUE: A swallowing evaluation was performed in the Radiology Department in conjunction with the Department of Speech and Hearing.  A separate detailed report will be issued by the speech pathologist who recorded the study. Fluoroscopy was provided by a radiologist who was present during the procedure.    Materials of various consistencies were given by mouth, and swallowing was evaluated fluoroscopically.   # of FLUOROGRAPHIC CINE FILES:  7 FLUOROSCOPY IMAGES OBTAINED:  1 FLUOROSCOPY TIME:  2.1 minutes RADIATION DOSE (Dose Area Product):  [id number]  FINDINGS:  ASPIRATION/PENETRATION:   None.  STRUCTURE: No visible obstruction, stricture, or dilatation.  OTHER: Negative.           CONCLUSION:  No penetration or aspiration.    Dictated by (CST): Harpreet Jade MD on 5/03/2025 at 12:08 PM     Finalized by (CST): Harpreet Jade MD on 5/03/2025 at 12:09  PM          XR CHEST AP PORTABLE  (CPT=71045)  Result Date: 2025  PROCEDURE: XR CHEST AP PORTABLE  (CPT=71045) TIME: 622 hours.   COMPARISON: [facility], XR CHEST AP PORTABLE (CPT=71045), 2025, 10:40 PM.  [facility], X CHEST PORTABLE, 2014, 8:51 PM.  INDICATIONS: Shortness of breath.  TECHNIQUE:   Single view.   FINDINGS:  CARDIAC/MEDIAST: Heart and mediastinum are unchanged. LUNGS/PLEURA:  Opacity in the left perihilar mid lung not significantly changed.  Low lung volumes.  There is mild opacity in the right lung base.  No significant pleural effusion or pneumothorax. OTHER:  Stable appearance of the osseous structures.          CONCLUSION:   Stable opacity left perihilar mid lung.  Mild opacity right lung base which may reflect atelectasis with or without superimposed pneumonia.    Dictated by (CST): Casey Diallo MD on 2025 at 7:18 AM     Finalized by (CST): Casey Diallo MD on 2025 at 7:19 AM          CARD ECHO [employer] (CPT=93308/04794/69960)  Result Date: 2025  Transthoracic Echocardiogram Name:Kelli Fisher Date: 2025 :  1955 Ht:  (60in)  BP: 106 / 74 MRN:  [id number]    Age:  70years    Wt:  (165lb) HR: 108bpm Loc:  [facility]       Gndr: F          BSA: 1.72m^2 Sonographer: James PINTO RVT Ordering:    Javi Huerta Consulting:  Ector Byrnes ---------------------------------------------------------------------------- History/Indications:  Pericardial effusion. ---------------------------------------------------------------------------- Procedure information:  A transthoracic echocardiogram, limited study was performed. Additional evaluation included M-mode and limited 2D.  Patient status:  Inpatient.  Location:  Bedside.    Comparison was made to the study of 2025.    This was a routine study. Transthoracic echocardiography for ventricular function evaluation and assessment of pericardial effusion and hemodynamic  status. Image quality was adequate. ECG rhythm:   Sinus tachycardia ---------------------------------------------------------------------------- Conclusions: 1. Left ventricle: The cavity size was normal. Wall thickness was mildly    increased. Systolic function was normal. The estimated ejection fraction    was 60-65%, by visual assessment. No diagnostic evidence for regional    wall motion abnormalities. Unable to assess LV diastolic function. 2. Pericardium, extracardiac: A small to moderate, free-flowing pericardial    effusion was identified circumferential to the heart. Maximal diameter in    diastole is 1.1cm. Features were not consistent with tamponade    physiology. 3. Inferior vena cava: The IVC was dilated. Respirophasic diameter changes    are blunted (< 50%), consistent with elevated central venous pressure. * ---------------------------------------------------------------------------- * Findings: Left ventricle:  The cavity size was normal. Wall thickness was mildly increased. Systolic function was normal. The estimated ejection fraction was 60-65%, by visual assessment. No diagnostic evidence for regional wall motion abnormalities. Unable to assess LV diastolic function. Right ventricle:  The cavity size was normal. Mitral valve:  The valve was structurally normal. Aortic valve:  The valve was structurally normal. Tricuspid valve:  The valve is structurally normal. Pulmonic valve:   Not well visualized. Pericardium:  A small to moderate, free-flowing pericardial effusion was identified circumferential to the heart. Maximal diameter in diastole is 1.1cm.  Doppler:  Features were not consistent with tamponade physiology. Systemic veins: Inferior vena cava: The IVC was dilated.  Respirophasic diameter changes are blunted (< 50%), consistent with elevated central venous pressure. ---------------------------------------------------------------------------- Measurements  Left ventricle              Value     Ref      04/12/2025  IVS thickness, ED, PLAX (H) 1.0   cm 0.6 -    0.8                                       0.9  LV ID, ED, PLAX             3.9   cm 3.8 -    4.4                                       5.2  LV ID, ES, PLAX             2.5   cm 2.2 -    2.8                                       3.5  LV PW thickness, ED,    (H) 1.0   cm 0.6 -    0.7  PLAX                                 0.9  IVS/LV PW ratio, ED,        1.00     -------- 1.14  PLAX  LV PW/LV ID ratio, ED,      0.26     -------- 0.16  PLAX  LV ejection fraction        66    %  54 - 74  66  Inferior vena cava          Value    Ref      04/12/2025  ID                      (H) 2.3   cm <=2.1    2.7 Legend: (L)  and  (H)  corinna values outside specified reference range. ---------------------------------------------------------------------------- Prepared and electronically signed by Javi Huerta 04/30/2025 13:43     CT CHEST PE AORTA (IV ONLY) (CPT=71260)  Result Date: 4/30/2025  PROCEDURE: CT CHEST PE AORTA (IV ONLY) (CPT=71260)  COMPARISON: [facility], CT CHEST PE AORTA (IV ONLY) (CPT=71260), 4/11/2025, 9:30 PM.  INDICATIONS: dyspnea, lung cancer  TECHNIQUE: CT images of the chest were obtained with non-ionic intravenous contrast material.  Automated exposure control for dose reduction was used. Adjustment of the mA and/or kV was done based on the patient's size. Use of iterative reconstruction technique for dose reduction was used. Dose information is transmitted to the ACR (American College of Radiology) NRDR (National Radiology Data Registry) which includes the Dose Index Registry.  FINDINGS:  No PE  Normal heart size with stable small to moderate circumferential pericardial effusion  No short interval change in left upper lobe mass, diffuse intrathoracic adenopathy  Trace left pleural effusion similar to prior.  Trace right effusion has resolved   A few of the right-sided pulmonary nodules may have mildly increased in size.  Adenopathy  causes severe narrowing of the lower right IJ with collaterals in the chest wall    CONCLUSION: No PE.   [facility] provided a prelim report for this exam. This final report has no significant discrepancies with the [facility] report. Finalized by (CST): Chris Lovell MD on 4/30/2025 at 9:39 AM          XR CHEST AP PORTABLE  (CPT=71045)  Result Date: 4/30/2025  PROCEDURE: XR CHEST AP PORTABLE  (CPT=71045)  COMPARISON: [facility], CT CHEST PE AORTA (IV ONLY) (CPT=71260), 4/29/2025, 11:50 PM.  INDICATIONS: Chest discomfort x1 day.  Findings:  Left lung mass with significant diffuse intrathoracic adenopathy seen on recent CT  Heart size likely normal for technique  No pneumothorax or large effusion  [facility] provided a prelim report for this exam. This final report has no significant discrepancies with the [facility] report. Finalized by (CST): Chris Lovell MD on 4/30/2025 at 6:45 AM            Data Review:   Recent Labs   Lab 05/21/25  1522 05/22/25  0505 05/23/25  0540   RBC 3.24* 3.60* 3.70*   HGB 8.6*  8.5* 9.3* 9.6*   HCT 27.1* 30.9* 30.9*   WBC 12.6* 14.6* 16.6*   .0* 145.0* 177.0   NEUT 73 86 93   LYMPH 13 4 2       Recent Labs   Lab 05/20/25  0548 05/21/25  0552 05/22/25  0505 05/23/25  0540   *  --  129* 140*   BUN 34*  --  30* 26*   CREATSERUM 1.32* 1.43* 1.26* 1.11*   CA 8.5*  --  8.0* 8.0*     --  141 145   K 3.7  3.7 3.8 3.6 3.6  3.6   CL 99  --  104 107   CO2 35.0*  --  31.0 31.0       Lab Results   Component Value Date     443.0 (H) 05/04/2025     210.0 (H) 04/16/2025    CEA <0.5 04/16/2025     230.8 (H) 05/04/2025     156.3 (H) 04/16/2025       ASSESSMENT/PLAN:    Patient is a 70 year old female No obstetric history on file.   Stage IV recurrent endometrial adenocarcinoma:  She was previously being treated at [place] at which time was noted to have progression of disease and therefore the patient recently transferred her care to me  approximately 2 weeks ago.  Her first cycle of carboplatin, Taxol, Keytruda 6 days ago on May 6, 2025.   Pancytopenia: resolved  Leukocytosis: Most probably secondary to the Neulasta and Neupogen.  Patient is afebrile I do not appear to be septic  Thrombocytopenia: Resolved  Anemia: Hemoglobin 9.6 and stable  Goals of care: I did have a long discussion with the patient and her daughter regarding goals of care.  I have made it very clear that in order to get chemotherapy, she must not have severe organ dysfunction or organ failure.  She currently is on pressors for her blood pressure and had an emergent cardiac window with a chest tube showing malignant cytology.  I explained to the family that if cardiology and thoracic surgery cannot wean her off of the pressors and cannot improve both cardiac and pulmonary lung function, then our focus should change toward palliative care and hospice.  PE/DVT: Patient does have an IVC filter and was unable to get full dose anticoagulation due to her pancytopenia.  Since this has resolved, she can be started on anticoagulation whenever cleared by thoracic surgery and the ICU intensivist.  Diet: Continue with protein shakes for now and a soft diet if she can tolerate it.  She denies any nausea and vomiting and does continue to have bowel movements and pass flatus.                   All of the findings and plan were discussed with the patient.  She notes understanding and agrees with the plan of care.  All questions were answered to the best of my ability at this time.    CASEY GRIJALVA, DO  5/23/2025  7:47 PM        [1]   Medications Prior to Admission   Medication Sig Dispense Refill Last Dose/Taking    levothyroxine (SYNTHROID) 100 MCG Oral Tab Take 1 tablet (100 mcg total) by mouth before breakfast.   5/12/2025 at  7:00 AM    Potassium Chloride ER 20 MEQ Oral Tab CR Take 1 tablet by mouth daily.   5/12/2025 at  7:00 AM    furosemide 20 MG Oral Tab Take 1 tablet (20 mg total) by  mouth daily.   5/12/2025 at  7:00 AM    Levalbuterol HCl 1.25 MG/3ML Inhalation Nebu Soln Take 3 mL (1.25 mg total) by nebulization every 8 (eight) hours as needed for Wheezing or Shortness of Breath.   Past Week    pantoprazole 40 MG Oral Tab EC Take 1 tablet (40 mg total) by mouth daily. 30 tablet 0 5/12/2025 at  7:00 AM    amiodarone 200 MG Oral Tab Take 1 tablet (200 mg total) by mouth in the morning, at noon, and at bedtime for 1 day, THEN 1 tablet (200 mg total) in the morning, at noon, and at bedtime. 93 tablet 0 5/12/2025 at 12:00 PM    Fluticasone Propionate  HFA (FLOVENT HFA) 220 MCG/ACT Inhalation Aerosol Inhale 1 puff into the lungs every 12 (twelve) hours. Rinse mouth following use.   5/8/2025   [2]   Allergies  Allergen Reactions    Aspirin Tightness in Throat    Penicillins HIVES    Other OTHER (SEE COMMENTS)     Pt states IV steroid allergy, states it makes her breathing worse

## 2025-05-24 NOTE — PROGRESS NOTES
[facility]  part of [facility]    Progress Note    Kelli Fisher Patient Status:  Inpatient    1955 MRN [id number]   Location [facility] 2W/SW Attending Inna Bullock MD   Hosp Day # 12 PCP Taylor Gallardo MD     Subjective:  Pt resting in bed with oldest daughter at bedside. She denies pain at this time. No SOB.     Objective:  BP 98/66   Pulse 91   Temp 96.9 °F (36.1 °C) (Temporal)   Resp 17   Ht 5' (1.524 m)   Wt 171 lb 15.3 oz (78 kg)   SpO2 96%   BMI 33.58 kg/m²     Temp (24hrs), Av.1 °F (36.7 °C), Min:96.9 °F (36.1 °C), Max:98.6 °F (37 °C)      Intake/Output:    Intake/Output Summary (Last 24 hours) at 2025 1115  Last data filed at 2025 0500  Gross per 24 hour   Intake 385 ml   Output 1605 ml   Net -1220 ml       Wt Readings from Last 3 Encounters:   25 171 lb 15.3 oz (78 kg)   25 169 lb (76.7 kg)   25 174 lb 9.6 oz (79.2 kg)       Allergies:  Allergies[1]    Labs:  Lab Results   Component Value Date    WBC 20.6 2025    HGB 9.8 2025    HCT 30.7 2025    .0 2025    CREATSERUM 1.15 2025    BUN 27 2025     2025    K 3.5 2025     2025    CO2 27.0 2025     2025    CA 8.1 2025       Physical Exam:  Blood pressure 98/66, pulse 91, temperature 96.9 °F (36.1 °C), temperature source Temporal, resp. rate 17, height 5' (1.524 m), weight 171 lb 15.3 oz (78 kg), SpO2 96%.  General: NAD  Neck: No JVD  Lungs: coarse bilaterally   Mediastinal Nathaniel drain=30cc/12 hrs - no air leak  Heart: RRR, S1, S2  Abdomen: Soft/Round, NT/ND, BS+x4/+Flatus/no BM   Extremities: Warm, dry, 1+ UE/LE generalized edema bilat  Pulses: 2+ bilat DP  Skin: subxiphoid incision drsg: CDI  Neurological:  AAOx3, MAEW    Assessment/Plan:  S/P URGENT PERICARDIAL WINDOW/DRAINAGE OF EFFUSION/INSERTION OF RIGHT PLEURAL CHEST TUBE POD # 3  Encourage pulm toilet:   Pain meds as  needed  Remove Nathaniel this AM.   Okay for discharge from surgery.         James Bella MD, FACS  Cardiothoracic Surgeon  [facility]  [phone]           [1]   Allergies  Allergen Reactions    Aspirin Tightness in Throat    Penicillins HIVES    Other OTHER (SEE COMMENTS)     Pt states IV steroid allergy, states it makes her breathing worse

## 2025-05-25 ENCOUNTER — APPOINTMENT (OUTPATIENT)
Dept: CV DIAGNOSTICS | Facility: HOSPITAL | Age: 70
DRG: 270 | End: 2025-05-25
Attending: INTERNAL MEDICINE
Payer: MEDICARE

## 2025-05-25 LAB
BASOPHILS # BLD: 0 X10(3) UL (ref 0–0.2)
BASOPHILS NFR BLD: 0 %
DEPRECATED RDW RBC AUTO: 64.2 FL (ref 35.1–46.3)
EOSINOPHIL # BLD: 0 X10(3) UL (ref 0–0.7)
EOSINOPHIL NFR BLD: 0 %
ERYTHROCYTE [DISTWIDTH] IN BLOOD BY AUTOMATED COUNT: 20.8 % (ref 11–15)
GLUCOSE BLDC GLUCOMTR-MCNC: 128 MG/DL (ref 70–99)
GLUCOSE BLDC GLUCOMTR-MCNC: 134 MG/DL (ref 70–99)
GLUCOSE BLDC GLUCOMTR-MCNC: 151 MG/DL (ref 70–99)
HCT VFR BLD AUTO: 29.1 % (ref 35–48)
HGB BLD-MCNC: 9.1 G/DL (ref 12–16)
LYMPHOCYTES NFR BLD: 0.17 X10(3) UL (ref 1–4)
LYMPHOCYTES NFR BLD: 1 %
MCH RBC QN AUTO: 26.9 PG (ref 26–34)
MCHC RBC AUTO-ENTMCNC: 31.3 G/DL (ref 31–37)
MCV RBC AUTO: 86.1 FL (ref 80–100)
MONOCYTES # BLD: 0.86 X10(3) UL (ref 0.1–1)
MONOCYTES NFR BLD: 5 %
MYELOCYTES # BLD: 0.17 X10(3) UL (ref ?–0.01)
MYELOCYTES NFR BLD: 1 %
NEUTROPHILS # BLD AUTO: 14.17 X10 (3) UL (ref 1.5–7.7)
NEUTROPHILS NFR BLD: 91 %
NEUTS BAND NFR BLD: 2 %
NEUTS HYPERSEG # BLD: 15.9 X10(3) UL (ref 1.5–7.7)
PLATELET # BLD AUTO: 224 10(3)UL (ref 150–450)
PLATELET MORPHOLOGY: NORMAL
POTASSIUM SERPL-SCNC: 3.9 MMOL/L (ref 3.5–5.1)
RBC # BLD AUTO: 3.38 X10(6)UL (ref 3.8–5.3)
TOTAL CELLS COUNTED BLD: 100
WBC # BLD AUTO: 17.1 X10(3) UL (ref 4–11)

## 2025-05-25 PROCEDURE — 93321 DOPPLER ECHO F-UP/LMTD STD: CPT | Performed by: INTERNAL MEDICINE

## 2025-05-25 PROCEDURE — 93308 TTE F-UP OR LMTD: CPT | Performed by: INTERNAL MEDICINE

## 2025-05-25 PROCEDURE — 99233 SBSQ HOSP IP/OBS HIGH 50: CPT | Performed by: FAMILY MEDICINE

## 2025-05-25 PROCEDURE — 93325 DOPPLER ECHO COLOR FLOW MAPG: CPT | Performed by: INTERNAL MEDICINE

## 2025-05-25 PROCEDURE — 99233 SBSQ HOSP IP/OBS HIGH 50: CPT | Performed by: INTERNAL MEDICINE

## 2025-05-25 NOTE — PROGRESS NOTES
[facility]  part of [facility]    Cardiology Progress Note    Kelli Fisher Patient Status:  Inpatient    1955 MRN [id number]   Location [facility] 2W/SW Attending Inna Bullock MD   Hosp Day # 13 PCP Taylor Gallardo MD     Interval History   Shortness of breath improved, denies any symptoms    Assessment and Plan:   Pancytopenia, improving  Endometrial cancer with lung metastasis    Shock, likely multifactorial  -weaning phenylephrine, on midodrine  -no s/sx of decompensation from cardiac standpoint, especially post pericardial window for tamponade    Large pericardial effusion with tamponade  Pleural effusion s/p CT  -enlarging pericardial effusion with new signs c/w tamponade physiology on 25  Bedside echo today showed no pericardial effusion, bilateral pleural effusions noted      Bilateral PE, s/p IVC filter  -initially not on AC due to severe anemia + thrombocytopenia, however counts have improved  -started AC with apixaban 5mg bid on    -Hg has been stable (9.6 on )    Paroxysmal AF/AT  -developed rapid AF post pericardial window  -continue PO amiodarone, rate controlled  -AC, as above    Objective:     Patient Vitals for the past 24 hrs:   BP Temp Temp src Pulse Resp SpO2 Weight   25 1000 97/70 -- -- 76 19 100 % --   25 0900 91/71 -- -- 76 19 99 % --   25 0807 -- -- -- 71 22 99 % --   25 0800 97/75 97.9 °F (36.6 °C) Temporal 72 21 100 % --   25 0700 100/71 -- -- 72 18 100 % --   25 0600 101/70 -- -- 73 18 99 % 168 lb 14 oz (76.6 kg)   25 0500 (!) 82/61 -- -- 72 17 98 % --   25 0400 90/65 98.2 °F (36.8 °C) Temporal 65 14 98 % --   25 0300 94/65 -- -- 64 14 98 % --   25 0240 -- -- -- 68 18 100 % --   25 0200 92/62 -- -- 71 15 99 % --   25 0100 90/63 -- -- 71 15 100 % --   25 0000 107/73 98 °F (36.7 °C) Temporal 73 14 100 % --   25 2300 90/57 -- -- 84 19 96 % --   25 2200  (!) 135/93 -- -- 62 15 100 % --   05/21/25 2100 98/72 -- -- 83 15 96 % --   05/21/25 2050 -- -- -- 77 19 99 % --   05/21/25 2000 107/80 98.6 °F (37 °C) Temporal 80 14 100 % --   05/21/25 1900 97/77 -- -- 88 23 100 % --   05/21/25 1830 -- -- -- -- -- 100 % --   05/21/25 1800 (!) 130/96 -- -- 87 10 100 % --   05/21/25 1730 -- -- -- 115 16 100 % --   05/21/25 1700 109/72 -- -- 107 25 99 % --   05/21/25 1645 -- -- -- (!) 124 23 100 % --   05/21/25 1630 -- -- -- 112 15 100 % --   05/21/25 1600 108/69 97.5 °F (36.4 °C) Temporal 104 21 100 % --   05/21/25 1545 -- -- -- 116 22 100 % --   05/21/25 1530 (!) 70/56 -- -- 103 19 100 % --   05/21/25 1515 (!) 87/65 -- -- 101 23 97 % --   05/21/25 1500 (!) 89/64 -- -- 112 26 98 % --   05/21/25 1426 113/75 98 °F (36.7 °C) -- 85 22 98 % --   05/21/25 1223 99/70 97.7 °F (36.5 °C) Oral 84 18 97 % --       Intake/Output:   Last 3 shifts:   Intake/Output                   05/20/25 0700 - 05/21/25 0659 05/21/25 0700 - 05/22/25 0659 05/22/25 0700 - 05/23/25 0659       Intake    P.O.  --  --  260    P.O. -- -- 260    I.V.  --  1071.2  75    I.V. -- 863 --    Volume (mL) Phenylephrine -- 177.6 75    Volume (mL) Dexmedetomidine -- 30.6 --    Blood  --  411.3  --    Volume (Transfuse RBC) -- 411.3 --    IV PIGGYBACK  100  --  100    Volume (mL) (ceFEPIme (Maxipime) 2 g in sodium chloride 0.9% 100mL IVPB-YANA) 100 -- --    Volume (mL) (acetaminophen (Ofirmev) 10 mg/mL infusion premix 1,000 mg) -- -- 100    Total Intake 100 1482.5 435       Output    Urine  1000  575  225    Incontinent Urine Occurrence 1 x -- --    Output (mL) (External Urinary Catheter) 1000 575 225    Emesis/NG output  --  --  0    Emesis -- -- 0    Other  --  500  --    Other -- 500 --    Stool  --  --  --    Stool Count Calculated for I/O 2 x -- --    Chest Tube  --  300  20    Output (mL) (Chest Tube 1 Anterior Pleural 28 Fr.) -- 50 10    Output (mL) (Chest Tube 1 Anterior Mediastinal 24 Fr.) -- 250 10    Total Output 1000  1375 245       Net I/O     -900 107.5 190             Vent Settings:      Hemodynamic parameters (last 24 hours):      Scheduled Meds: Scheduled Medications[1]    Continuous Infusions: Medication Infusions[2]    Results:     Lab Results   Component Value Date    WBC 17.1 (H) 2025    HGB 9.1 (L) 2025    HCT 29.1 (L) 2025    .0 2025    CREATSERUM 1.15 (H) 2025    BUN 27 (H) 2025     2025    K 3.9 2025     2025    CO2 27.0 2025     (H) 2025    CA 8.1 (L) 2025    ALB 3.2 2025    ALKPHO 142 2025    BILT 0.7 2025    TP 6.8 2025    AST 26 2025    ALT 10 2025    PTT 37.0 (H) 2025    INR 1.31 (H) 2025    TSH 3.705 2025    LIP 42 2023    DDIMER 3.05 (H) 2025    MG 2.2 2025    B12 >2,000 (H) 2025       Recent Labs   Lab 25  0505 25  0540 25  0542 25  0609   * 140* 126*  --    BUN 30* 26* 27*  --    CREATSERUM 1.26* 1.11* 1.15*  --    CA 8.0* 8.0* 8.1*  --     145 142  --    K 3.6 3.6  3.6 3.5 3.9    107 105  --    CO2 31.0 31.0 27.0  --      Recent Labs   Lab 25  0540 25  0542 25  0609   RBC 3.70* 3.70* 3.38*   HGB 9.6* 9.8* 9.1*   HCT 30.9* 30.7* 29.1*   MCV 83.5 83.0 86.1   MCH 25.9* 26.5 26.9   MCHC 31.1 31.9 31.3   RDW 20.3* 21.2* 20.8*   NEPRELIM 13.30* 16.88* 14.17*   WBC 16.6* 20.6* 17.1*   .0 227.0 224.0       No results for input(s): \"BNPML\" in the last 168 hours.    No results for input(s): \"TROP\", \"CK\" in the last 168 hours.    No results found.          CARD ECHO LIMITED (CPT=93308/85141/76007)  Result Date: 2025  Transthoracic Echocardiogram Name:Kelli Fisher Date: 2025 :  1955 Ht:  (60in)  BP: 98 / 72 MRN:  [id number]    Age:  70years    Wt:  (167lb) HR: 89bpm Loc:  [facility]       Gndr: F          BSA: 1.73m^2 Sonographer: Chris HOLLINGSWORTH RCS Ordering:    Mildred  Srinath Consulting:  Trista Peña ---------------------------------------------------------------------------- History/Indications:  Reevaluate enlarging pericardial effusion. Acute respiratory failure with hypoxia. Shortness of breath. ---------------------------------------------------------------------------- Procedure information:  A transthoracic echocardiogram, limited study was performed. Additional evaluation included M-mode and limited 2D.  Patient status:  Inpatient.  Location:  Bedside.    Comparison was made to the study of 05/13/2025.    This was a routine study. Transthoracic echocardiography for diagnosis and ventricular function evaluation. Image quality was adequate. ECG rhythm:   Normal sinus ---------------------------------------------------------------------------- Conclusions: 1. Left ventricle: The cavity size was normal. Wall thickness was normal.    Systolic function was normal. The estimated ejection fraction was 55-60%,    by visual assessment. Unable to assess LV diastolic function. 2. Pericardium, extracardiac: A large, free-flowing pericardial effusion was    identified. There is RV collapse in early diastolic for less than 50% of    the cardiac cycle. There was evidence for mildly increased RV-LV    interaction demonstrated by respirophasic changes in transmitral    velocities (25% reduction in mitral valve e'wave velocity during    inspiration). There was a left pleural effusion. 3. Inferior vena cava: The IVC was dilated (23mm). Respirophasic diameter    changes are blunted (< 50%), consistent with elevated central venous    pressure. Impressions:  This study is compared with previous dated 5/13/2025: The pericardial effusion has slightly increased, now with maximal dimensions in diastole of 20mm with evidence of early tamponade (25% reduction in mitral valve inflow velocity and right ventricle free wall collapse in early diastole). *  ---------------------------------------------------------------------------- * Findings: Left ventricle:  The cavity size was normal. Wall thickness was normal. Systolic function was normal. The estimated ejection fraction was 55-60%, by visual assessment. Unable to assess LV diastolic function. Right ventricle:  The cavity size was normal. Systolic function was normal. Systolic pressure was not estimated. Mitral valve:  The leaflets were mildly calcified. Aortic valve:   The valve was trileaflet. The leaflets were mildly calcified. Pericardium:  A large, free-flowing pericardial effusion was identified. Doppler:  There is RV collapse in early diastolic for less than 50% of the cardiac cycle. There was evidence for mildly increased RV-LV interaction demonstrated by respirophasic changes in transmitral velocities (25% reduction in mitral valve e'wave velocity during inspiration). Pleura:  There was a left pleural effusion. Aorta: Aortic root: The aortic root was normal. Ascending aorta: The ascending aorta was normal. Systemic veins: Inferior vena cava: The IVC was dilated (23mm).  Respirophasic diameter changes are blunted (< 50%), consistent with elevated central venous pressure. ---------------------------------------------------------------------------- Measurements  Left ventricle          Value       Ref       05/13/2025  IVS thickness, ED,  (H) 1.0   cm    0.6 - 0.9 1.3  PLAX  LV ID, ED, PLAX     (L) 3.6   cm    3.8 - 5.2 4.0  LV ID, ES, PLAX         2.5   cm    2.2 - 3.5 2.4  LV PW thickness,    (H) 1.0   cm    0.6 - 0.9 1.1  ED, PLAX  IVS/LV PW ratio,        1.00        --------- 1.18  ED, PLAX  LV PW/LV ID ratio,      0.28        --------- 0.28  ED, PLAX  LV ejection             59    %     54 - 74   71  fraction  LVOT                    Value       Ref       05/13/2025  LVOT ID                 2     cm    --------- 2  Aortic root             Value       Ref       05/13/2025  Aortic root ID          3.3    cm    2.5 - 3.9 ----------  Ascending aorta         Value       Ref       2025  Ascending aorta ID  (H) 3.6   cm    1.9 - 3.5 ----------  Systemic veins          Value       Ref       2025  Estimated CVP           15    mm Hg --------- 15  Inferior vena cava      Value       Ref       2025  ID                  (H) 2.4   cm    <=2.1     2.3 Legend: (L)  and  (H)  corinna values outside specified reference range. ---------------------------------------------------------------------------- Prepared and electronically signed by Javi Huerta 2025 17:58     [employer] (CPT=93308/72835/79403)  Result Date: 2025  Transthoracic Echocardiogram Name:Kelli Fisher Date: 2025 :  1955 Ht:  (60in)  BP: 106 / 75 MRN:  [id number]    Age:  70years    Wt:  (167lb) HR: 85bpm Loc:  [facility]       Gndr: F          BSA: 1.73m^2 Sonographer: Naty PINTO Ordering:    Margarette Sexton Consulting:  Dominick Herring ---------------------------------------------------------------------------- History/Indications:   SOB and known Pericardial Effusion. ---------------------------------------------------------------------------- Procedure information:  A transthoracic echocardiogram, limited study was performed. Additional evaluation included M-mode and limited 2D.  Patient status:  Inpatient.  Location:  Bedside.    Comparison was made to the study of 2025.    This was a routine study. Transthoracic echocardiography for diagnosis. Image quality was adequate. ---------------------------------------------------------------------------- Conclusions: 1. Left ventricle: The cavity size was normal. Wall thickness was mildly    increased. There was mild concentric hypertrophy. Systolic function was    normal. The estimated ejection fraction was 55-60%, by visual assessment.    Unable to assess LV diastolic function. 2. Right ventricle: The cavity size was normal. Systolic function was    normal. 3. Pericardium,  extracardiac: A moderate, free-flowing pericardial effusion    was identified. There was no evidence of hemodynamic compromise. 4. Systemic veins: Central venous respirophasic diameter changes are blunted    (< 50%). 5. Inferior vena cava: The IVC was dilated. * ---------------------------------------------------------------------------- * Findings: Left ventricle:  The cavity size was normal. Wall thickness was mildly increased. There was mild concentric hypertrophy. Systolic function was normal. The estimated ejection fraction was 55-60%, by visual assessment. Unable to assess LV diastolic function. Left atrium:  Well visualized. Right ventricle:  The cavity size was normal. Systolic function was normal. Right atrium:  Well visualized. Mitral valve:  Well visualized. Aortic valve:   The valve was trileaflet. Tricuspid valve:  The annulus is normal-sized. The leaflets are normal thickness. No echocardiographic evidence for tricuspid prolapse.  Doppler: Transvalvular velocity was within the normal range. There was no evidence for stenosis. There was mild regurgitation. Pulmonic valve:    Doppler:  There was trivial regurgitation. Pericardium:  A moderate, free-flowing pericardial effusion was identified. There was no evidence of hemodynamic compromise. Systemic veins:  Central venous respirophasic diameter changes are blunted (< 50%). Inferior vena cava: The IVC was dilated. ---------------------------------------------------------------------------- Measurements  Left ventricle         Value        Ref       05/05/2025  IVS thickness, ED, (H) 1.3   cm     0.6 - 0.9 1.0  PLAX  LV ID, ED, PLAX        4.0   cm     3.8 - 5.2 3.6  LV ID, ES, PLAX        2.4   cm     2.2 - 3.5 2.4  LV PW thickness,   (H) 1.1   cm     0.6 - 0.9 1.0  ED, PLAX  IVS/LV PW ratio,       1.18         --------- 1.00  ED, PLAX  LV PW/LV ID ratio,     0.28         --------- 0.28  ED, PLAX  LV ejection            71    %      54 - 74   63  fraction   Stroke volume/bsa,     35    ml/m^2 --------- ----------  2D  LVOT                   Value        Ref       2025  LVOT ID                2     cm     --------- 2  LVOT peak              1.21  m/sec  --------- ----------  velocity, S  LVOT VTI, S            19.4  cm     --------- ----------  LVOT peak              6     mm Hg  --------- ----------  gradient, S  LVOT mean              3     mm Hg  --------- ----------  gradient, S  Stroke volume          61    ml     --------- ----------  (SV), LVOT DP  Stroke index           35    ml/m^2 --------- ----------  (SV/bsa), LVOT DP  Pulmonary artery       Value        Ref       2025  PA pressure, S, DP     44    mm Hg  --------- ----------  Tricuspid valve        Value        Ref       2025  Tricuspid regurg       2.68  m/sec  <=2.8     ----------  peak velocity  Tricuspid peak         29    mm Hg  --------- ----------  RV-RA gradient  Systemic veins         Value        Ref       2025  Estimated CVP          15    mm Hg  --------- 15  Inferior vena cava     Value        Ref       2025  ID                 (H) 2.3   cm     <=2.1     2.4  Right ventricle        Value        Ref       2025  TAPSE, 2D              1.93  cm     >=1.70    ----------  TAPSE, MM              1.93  cm     >=1.70    ----------  RV pressure, S, DP     44    mm Hg  --------- ---------- Legend: (L)  and  (H)  corinna values outside specified reference range. ---------------------------------------------------------------------------- Prepared and electronically signed by Javi Huerta 2025 16:05     [employer] (CPT=93308/[id number]/[id number])  Result Date: 2025  Transthoracic Echocardiogram Name:Kelli Fisher Date: 2025 :  1955 Ht:  (60in)  BP: 142 / 86 MRN:  [id number]    Age:  70years    Wt:  (174lb) HR: 106bpm Loc:  EM       Gndr: F          BSA: 1.76m^2 Sonographer: Chris DOMINGUEZ Ordering:    Stella Gomez Consulting:  Khadar Segura  ---------------------------------------------------------------------------- History/Indications:   Pericardial effusion. Lung mass. ---------------------------------------------------------------------------- Procedure information:  A transthoracic echocardiogram, limited study was performed. Additional evaluation included M-mode and limited 2D.  Patient status:  Inpatient.  Location:  Bedside.    Comparison was made to the study of 04/30/2025.    This was a routine study. Transthoracic echocardiography for diagnosis and ventricular function evaluation. Image quality was adequate. ECG rhythm:   Sinus tachycardia  Study completion:  There were no complications. ---------------------------------------------------------------------------- Conclusions: 1. Left ventricle: The cavity size was normal. Wall thickness was normal.    Systolic function was vigorous. The estimated ejection fraction was    65-70%, by biplane method of disks. No diagnostic evidence for regional    wall motion abnormalities. Unable to assess LV diastolic function. 2. Pericardium, extracardiac: A small to moderate, free-flowing pericardial    effusion was identified circumferential to the heart. Probably not    hemodynamically significant. Impressions:  This study is compared with previous dated 4/30/2025: No significant change since prior study. * ---------------------------------------------------------------------------- * Findings: Left ventricle:  The cavity size was normal. Wall thickness was normal. Systolic function was vigorous. The estimated ejection fraction was 65-70%, by biplane method of disks. No diagnostic evidence for regional wall motion abnormalities. Unable to assess LV diastolic function. Ventricular septum:   Thickness was normal. Left atrium:  The left atrial volume was normal. Right ventricle:  Well visualized. The cavity size was normal. Systolic function was normal. Right atrium:  Well visualized. The atrium was normal in  size. Mitral valve:  Well visualized. The leaflets were mildly calcified. Aortic valve:  Well visualized.  The valve was trileaflet. The leaflets were mildly calcified. Tricuspid valve:  Well visualized. Pulmonic valve:   Well visualized. Pericardium:  A small to moderate, free-flowing pericardial effusion was identified circumferential to the heart. Probably not hemodynamically significant. Aorta: Aortic root: The aortic root was normal. Ascending aorta: The ascending aorta was normal. Pulmonary arteries: Systolic pressure could not be accurately estimated. ---------------------------------------------------------------------------- Measurements  Left ventricle         Value        Ref       04/30/2025  IVS thickness, ED, (H) 1.0   cm     0.6 - 0.9 1.0  PLAX  LV ID, ED, PLAX    (L) 3.6   cm     3.8 - 5.2 3.9  LV ID, ES, PLAX        2.4   cm     2.2 - 3.5 2.5  LV PW thickness,   (H) 1.0   cm     0.6 - 0.9 1.0  ED, PLAX  IVS/LV PW ratio,       1.00         --------- 1.00  ED, PLAX  LV PW/LV ID ratio,     0.28         --------- 0.26  ED, PLAX  LV ejection            63    %      54 - 74   66  fraction  LV end-diastolic       62    ml     48 - 140  ----------  volume, 1-p A4C  LV ejection            65    %      46 - 78   ----------  fraction, 1-p A4C  Stroke volume, 1-p     40    ml     --------- ----------  A4C  LV end-diastolic       35    ml/m^2 30 - 82   ----------  volume/bsa, 1-p  A4C  Stroke volume/bsa,     23    ml/m^2 --------- ----------  1-p A4C  LV end-diastolic       61    ml     46 - 106  ----------  volume, 2-p  LV end-systolic        21    ml     14 - 42   ----------  volume, 2-p  LV ejection            65    %      54 - 74   ----------  fraction, 2-p  Stroke volume, 2-p     40    ml     --------- ----------  LV end-diastolic       35    ml/m^2 29 - 61   ----------  volume/bsa, 2-p  LV end-systolic        12    ml/m^2 8 - 24    ----------  volume/bsa, 2-p  Stroke volume/bsa,     22.5  ml/m^2 ---------  ----------  2-p  LVOT                   Value        Ref       2025  LVOT ID                2     cm     --------- ----------  Aortic root            Value        Ref       2025  Aortic root ID         3.2   cm     2.5 - 4.0 ----------  Ascending aorta        Value        Ref       2025  Ascending aorta ID (H) 3.7   cm     1.9 - 3.5 ----------  Left atrium            Value        Ref       2025  LA ID, A-P, ES         2.7   cm     2.7 - 3.8 ----------  LA volume, S           43    ml      52   ----------  LA volume/bsa, S       24    ml/m^2    ----------  LA volume, ES, 1-p     39    ml      52   ----------  A4C  LA volume, ES, 1-p     46    ml      52   ----------  A2C  LA volume, ES, A/L     46    ml     --------- ----------  LA volume/bsa, ES,     26    ml/m^2    ----------  A/L  LA/aortic root         0.84         --------- ----------  ratio  Right atrium           Value        Ref       2025  RA ID, S-I, ES,        4.8   cm     3.4 - 5.3 ----------  A4C  RA ID/bsa, S-I,        2.7   cm/m^2 1.9 - 3.1 ----------  ES, A4C  RA area, ES, A4C       14    cm^2   10 - 18   ----------  RA volume, ES, 1-p     32    ml     --------- ----------  A4C  RA volume/bsa, ES,     18    ml/m^2 9 - 33    ----------  1-p A4C  Systemic veins         Value        Ref       2025  Estimated CVP          15    mm Hg  --------- ----------  Inferior vena cava     Value        Ref       2025  ID                 (H) 2.4   cm     <=2.1     2.3 Legend: (L)  and  (H)  corinna values outside specified reference range. ---------------------------------------------------------------------------- Prepared and electronically signed by Margareth Haas 2025 12:36     CARD ECHO LIMITED (CPT=93308/[id number]/[id number])  Result Date: 2025  Transthoracic Echocardiogram Name:Kelli Fisher Date: 2025 :  1955 Ht:  (60in)  BP: 106 / 74 MRN:  [id number]    Age:  70years    Wt:   (165lb) HR: 108bpm Loc:  [facility]       Gndr: F          BSA: 1.72m^2 Sonographer: James PINTO RVT Ordering:    Javi Huerta Consulting:  Ector Byrnes ---------------------------------------------------------------------------- History/Indications:  Pericardial effusion. ---------------------------------------------------------------------------- Procedure information:  A transthoracic echocardiogram, limited study was performed. Additional evaluation included M-mode and limited 2D.  Patient status:  Inpatient.  Location:  Bedside.    Comparison was made to the study of 04/12/2025.    This was a routine study. Transthoracic echocardiography for ventricular function evaluation and assessment of pericardial effusion and hemodynamic status. Image quality was adequate. ECG rhythm:   Sinus tachycardia ---------------------------------------------------------------------------- Conclusions: 1. Left ventricle: The cavity size was normal. Wall thickness was mildly    increased. Systolic function was normal. The estimated ejection fraction    was 60-65%, by visual assessment. No diagnostic evidence for regional    wall motion abnormalities. Unable to assess LV diastolic function. 2. Pericardium, extracardiac: A small to moderate, free-flowing pericardial    effusion was identified circumferential to the heart. Maximal diameter in    diastole is 1.1cm. Features were not consistent with tamponade    physiology. 3. Inferior vena cava: The IVC was dilated. Respirophasic diameter changes    are blunted (< 50%), consistent with elevated central venous pressure. * ---------------------------------------------------------------------------- * Findings: Left ventricle:  The cavity size was normal. Wall thickness was mildly increased. Systolic function was normal. The estimated ejection fraction was 60-65%, by visual assessment. No diagnostic evidence for regional wall motion abnormalities. Unable to assess LV diastolic  function. Right ventricle:  The cavity size was normal. Mitral valve:  The valve was structurally normal. Aortic valve:  The valve was structurally normal. Tricuspid valve:  The valve is structurally normal. Pulmonic valve:   Not well visualized. Pericardium:  A small to moderate, free-flowing pericardial effusion was identified circumferential to the heart. Maximal diameter in diastole is 1.1cm.  Doppler:  Features were not consistent with tamponade physiology. Systemic veins: Inferior vena cava: The IVC was dilated.  Respirophasic diameter changes are blunted (< 50%), consistent with elevated central venous pressure. ---------------------------------------------------------------------------- Measurements  Left ventricle              Value    Ref      04/12/2025  IVS thickness, ED, PLAX (H) 1.0   cm 0.6 -    0.8                                       0.9  LV ID, ED, PLAX             3.9   cm 3.8 -    4.4                                       5.2  LV ID, ES, PLAX             2.5   cm 2.2 -    2.8                                       3.5  LV PW thickness, ED,    (H) 1.0   cm 0.6 -    0.7  PLAX                                 0.9  IVS/LV PW ratio, ED,        1.00     -------- 1.14  PLAX  LV PW/LV ID ratio, ED,      0.26     -------- 0.16  PLAX  LV ejection fraction        66    %  54 - 74  66  Inferior vena cava          Value    Ref      04/12/2025  ID                      (H) 2.3   cm <=2.1    2.7 Legend: (L)  and  (H)  corinna values outside specified reference range. ---------------------------------------------------------------------------- Prepared and electronically signed by Javi Huerta 04/30/2025 13:43        Exam:     Physical Exam:  General: No apparent distress.   HEENT: Normocephalic, anicteric sclera, neck supple, no thyromegaly or adenopathy.  Neck: No JVD, carotids 2+, no bruits.  Cardiac: Irreg irreg  Lungs: Diminished BS  Abdomen: Soft, non-tender. No organosplenomegally, mass or rebound,  BS-present.  Extremities: Without clubbing or cyanosis. No edema.    Neurologic: Alert and oriented, normal affect. No focal defects  Skin: Warm and dry.   Cheo Bird MD        3           [1]    midodrine  10 mg Oral TID    apixaban  5 mg Oral BID    traMADol  50 mg Oral 2 times per day    senna  10 mL Per NG Tube BID    docusate  100 mg Per NG Tube BID    pantoprazole  40 mg Intravenous Daily    amiodarone  400 mg Oral BID with meals    insulin aspart  1-5 Units Subcutaneous TID CC and HS    levothyroxine  100 mcg Oral Before breakfast   [2]    phenylephrine 25 mcg/min (05/25/25 6991)

## 2025-05-25 NOTE — PROGRESS NOTES
[facility]  part of [facility]    Progress Note      Assessment and Plan:   1.  Acute on chronic respiratory failure-multifactorial with significant burden of tumor in the chest but now recognized to have small volume of bilateral subsegmental PEs.  The patient has low platelets and is a poor candidate for full anticoagulation at this moment.  Lower extremity venous ultrasound demonstrated acute appearing nonocclusive DVT in the right superficial femoral popliteal and posterior tibial veins.  She got the IVC filter.    The patient underwent pericardial window and both chest tubes are removed.  The pericardial fluid is consistent with malignancy.    Recommendations:  1.  Okay to floor.  2.  Nasal saline spray.    2.  Metastatic endometrial carcinoma-to follow-up gynecologic oncology.    3.  Malignant pericardial [id number] gynecologic oncology.  Now status post pericardial window.    Recommendations: Okay to floor.    4.  Recurrent episodes of atrial tachycardia-on amiodarone.    5.  Very small left pleural effusion-evacuated now.      Subjective:   Kelli Fisher is a(n) 70 year old female who is less dyspneic.    Objective:   Blood pressure 97/67, pulse 82, temperature 98.4 °F (36.9 °C), temperature source Temporal, resp. rate 15, height 152.4 cm (5'), weight 169 lb 12.1 oz (77 kg), SpO2 100%.    Physical Exam alert white female  HEENT examination is unremarkable with pupils equal round and reactive to light and accommodation.   Neck without adenopathy, thyromegaly, JVD nor bruit.   Lungs diminished bilaterally to auscultation and percussion.  Cardiac regular rate and rhythm no murmur.   Abdomen nontender, without hepatosplenomegaly and no mass appreciable.   Extremities without clubbing cyanosis nor edema.   Neurologic grossly intact with symmetric tone and strength and reflex.  Skin without gross abnormality     Results:     Lab Results   Component Value Date    WBC 17.1 05/25/2025     HGB 9.1 05/25/2025    HCT 29.1 05/25/2025    .0 05/25/2025    K 3.9 05/25/2025      CT scan of the chest-Positive for small volume bilateral segmental PE      Enlarging left upper lobe consolidation and few bilateral pulmonary nodules      Enlarging pericardial effusion now 1.7 centimeter thick.  Increasing left pleural effusion, new right pleural effusion     Srinath Levy MD  Medical Director, Critical Care, [facility]  Medical Director, [facility]  Pager: [phone]

## 2025-05-25 NOTE — PROGRESS NOTES
Progress Note     Kelli Fisher Patient Status:  Inpatient    1955 MRN [id number]   Location [facility] 4W/SW/SE Attending Gloria Sandoval MD   Hosp Day # 13 PCP Taylor Gallardo MD     Subjective:   S: Patient is now on Leon-Synephrine since SBP in s   Otherwsie doing ok    Review of Systems:   10 point ROS completed and was negative, except for pertinent positive and negatives stated in subjective.    Objective:   Vital signs:  Temp:  [98 °F (36.7 °C)-98.4 °F (36.9 °C)] 98.4 °F (36.9 °C)  Pulse:  [74-85] 80  Resp:  [10-21] 16  BP: ()/(60-77) 106/74  SpO2:  [89 %-100 %] 92 %  AO: ()/(53-65) 114/60    Wt Readings from Last 6 Encounters:   25 169 lb 12.1 oz (77 kg)   25 169 lb (76.7 kg)   25 174 lb 9.6 oz (79.2 kg)   25 169 lb (76.7 kg)   23 197 lb (89.4 kg)         Physical Exam:    General: No acute distress. Alert ,         Respiratory: Clear to auscultation bilaterally. No wheezes. No rhonchi.  Cardiovascular: S1, S2. Regular rate and rhythm. No murmurs, rubs or gallops.   Abdomen: Soft, nontender, nondistended.  Positive bowel sounds. No rebound or guarding.  Neurologic: No focal neurological deficits.   Musculoskeletal: Moves all extremities.  Extremities: No edema.    Results:   Diagnostic Data:      Labs:    Labs Last 24 Hours:   BMP     CBC    Other     Na - Cl - BUN - Glu -   Hb 9.1   PTT - Procal -   K 3.9 CO2 - Cr -   WBC 17.1 >< .0  INR - CRP -   Renal Lytes Endo    Hct 29.1   Trop - D dim -   eGFR - Ca - POC Gluc  128    LFT   pBNP - Lactic -   eGFR AA - PO4 - A1c -   AST - APk - Prot -  LDL -     Mg - TSH -   ALT - T makeda - Alb -        COVID-19 Lab Results    COVID-19  Lab Results   Component Value Date    COVID19 Not Detected 2025       Pro-Calcitonin  No results for input(s): \"PCT\" in the last 168 hours.    Cardiac  No results for input(s): \"TROP\", \"PBNP\" in the last 168 hours.      Creatinine Kinase  No results for input(s): \"CK\"  in the last 168 hours.    Inflammatory Markers  No results for input(s): \"CRP\", \"ORVILLE\", \"LDH\", \"DDIMER\" in the last 168 hours.      Imaging: Imaging data reviewed in Epic.    Medications: Scheduled Medications[1]    Assessment & Plan:   ASSESSMENT / PLAN:     Afib RVR  Pericardial effusion  - cardiology consulted  - IV amio --> now on oral. Avoiding BB, CCB with h/o junctional rhythm   - cont monitor on tele  - no anticoagulation for now due to pancytopenia   - on 5/16 experienced RVR again with hypotension, received digoxin converted to NSR. Remained  hypotensive so sent to stepdown unit. Responded to fluid bolus eventually.   - Transferred back to medical floor, normotensive rates controlled.  - CXR still with pulm edema, cont diuresis as tolerated.    - repeat ECHO showing large pericardial effusion   -s/p Urgent pericardial window on 5/21  -plueral chest tube removed on 5/23. D/w CT surgery   -per Cards , limited echo tomorrow if pericardial drain removed today. Still 30 cc of fluid overnight.      BL PE: acute small segmental/right lower extremity DVT  S/p IVC 5/14/2025  Not on a/c due to thrombocytopenia  D/w Cardiology Dr Huerta, plans to resume once ok with hemeonc   Dr Peña -hemeonc paged -> Ok with Heme onc to resume Eliquis once Plt >50K        Metastatic endometrial cancer    Pancytopenia  Acute on chronic anemia of chronic disease  - s/p 1 unit prbc  - neutropenic precautions  - started neupogen until ANC 1500  - Plan is for further chemotherapy once recovered clinically per Dr. Herring.  - cefepime and vanco started for neutropenia and patient with cough/chills, elevated LA, possible early sepsis - pulm following for this as well  -Pancytopenia improving, monitor daily  - Overall plan of care is to continue to treat medically over the next several days and monitor for improvement.  If patient declines may consider palliative conversations.  If she improves overall plan is to resume chemotherapy in  the future as planned by Dr. Herring.  - s/p 1 unit PRBC   -hb stable      Acute on chronic respiratory failure  - due to 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, pulm edema   - on 4-5L NC baseline  - pulmonary following, weaning oxygen as tolerated. Cont lasix.       Thrush  - nystatin    Deconditioning  PT OT        Dispo: PT recommending EDUARDO, patient to discuss with family. Have previously refused    Son at bedside , d/w him current treatment and plan.     MDM: High   I personally spent time on chart/note review, review of labs/imaging, discussion with patient, physical exam, discussion with staff, consultants, coordinating care, writing progress note, and discussion of plan of care.      Inna Bullock MD    Supplementary Documentation:                         [1]    midodrine  10 mg Oral TID    apixaban  5 mg Oral BID    traMADol  50 mg Oral 2 times per day    senna  10 mL Per NG Tube BID    docusate  100 mg Per NG Tube BID    pantoprazole  40 mg Intravenous Daily    amiodarone  400 mg Oral BID with meals    insulin aspart  1-5 Units Subcutaneous TID CC and HS    levothyroxine  100 mcg Oral Before breakfast

## 2025-05-25 NOTE — PHYSICAL THERAPY NOTE
PHYSICAL THERAPY TREATMENT NOTE - INPATIENT     Room Number: 231/231-A       Presenting Problem: chest pain and respiratory failure with hypoxia, seen for re-evaluation as patient is now s/p pericardial window and drainage and placement of chest tube  Co-Morbidities : metastatic endometrial cancer with lung mets, anemia, paroxysmal SVT, small pericardial effusion    Problem List  Principal Problem:    Acute respiratory failure with hypoxia (HCC)  Active Problems:    Thrombocytopenia (HCC)    Azotemia    Pancytopenia (HCC)    Hyperglycemia    Atrial fibrillation with RVR (HCC)    Malnutrition (HCC)    PHYSICAL THERAPY ASSESSMENT   Patient demonstrates fair progress this session, goals  remain in progress.      Patient is requiring dependent as a result of the following impairments: decreased functional strength, decreased endurance/aerobic capacity, pain, impaired sitting/standing balance, decreased muscular endurance, and medical status.     Patient continues to function below baseline with bed mobility, transfers, gait, stair negotiation, maintaining seated position, standing prolonged periods, and performing household tasks.  Next session anticipate patient to progress bed mobility, transfers, gait, stair negotiation, maintaining seated position, standing prolonged periods, and performing household tasks.  Physical Therapy will continue to follow patient for duration of hospitalization.    Patient continues to benefit from continued skilled PT services: to promote return to prior level of function and safety with continuous assistance and gradual rehabilitative therapy .    PLAN DURING HOSPITALIZATION  Nursing Mobility Recommendation :  (1-2 person assist for safety)  PT Device Recommendation: Rolling walker  PT Treatment Plan: Bed mobility, Body mechanics, Coordination, Endurance, Energy conservation, Gait training, Strengthening, Transfer training, Balance training  Frequency (Obs): 3-5x/week     SUBJECTIVE  \"Do  I have to sit in the chair until 7pm like I did yesterday\"    OBJECTIVE  Precautions: Chest tube to suction (RT Port)    PAIN ASSESSMENT   Ratin  Location: chest tube insertion site  Management Techniques: Body mechanics, Repositioning, Breathing techniques    BALANCE  Static Sitting: Good  Dynamic Sitting: Fair  Static Standing: Fair  Dynamic Standing: Poor    AM-PAC '6-Clicks' INPATIENT SHORT FORM - BASIC MOBILITY  How much difficulty does the patient currently have...  Patient Difficulty: Turning over in bed (including adjusting bedclothes, sheets and blankets)?: A Little   Patient Difficulty: Sitting down on and standing up from a chair with arms (e.g., wheelchair, bedside commode, etc.): A Little   Patient Difficulty: Moving from lying on back to sitting on the side of the bed?: A Lot   How much help from another person does the patient currently need...   Help from Another: Moving to and from a bed to a chair (including a wheelchair)?: A Little   Help from Another: Need to walk in hospital room?: A Lot   Help from Another: Climbing 3-5 steps with a railing?: A Lot     AM-PAC Score:  Raw Score: 15   Approx Degree of Impairment: 57.7%   Standardized Score (AM-PAC Scale): 39.45   CMS Modifier (G-Code): CK    FUNCTIONAL ABILITY STATUS  Functional Mobility/Gait Assessment  Gait Assistance: Dependent assistance (minimal assistance x 2)  Distance (ft): ~8 feet  Assistive Device:  (B HHA)  Pattern:  (decreased ant, decreased step length, forward flexed posture, narrow base of support, verbal cues for sequencing and rolling walker management.)  Rolling: moderate assist  Supine to Sit: maximum assist  Sit to Supine: not tested  Sit to Stand: moderate assist    Skilled Therapy Provided: Patient received supine in bed at initiation of session agreeable to participation in PT. Patient tolerates treatment fairly, demonstrates improvement in activity tolerance, however continues to require extensive 1-2 person  assistance for out of bed mobility. Patient soiled initially during session, RN requesting to stand patient and perform hygiene tasks in standing. Requires maximal assistance to perform supine to sit transfer. Able to perform sit to stand transfer with moderate assistance. Patient standing for ~5 minutes in order to assist RN with hygiene/gayatri care. Patient ambulates ~8 feet with BUE support and minimal assistance x 2. Patient left in bedside chair, lines intact, needs in reach and handoff to RN.    The patient's Approx Degree of Impairment: 57.7% has been calculated based on documentation in the [facility] '6 clicks' Inpatient Daily Activity Short Form.  Research supports that patients with this level of impairment may benefit from gradual rehab.  Final disposition will be made by interdisciplinary medical team.    Patient End of Session: Up in chair, Needs met, Call light within reach, RN aware of session/findings, All patient questions and concerns addressed, Hospital anti-slip socks, With  staff, Alarm set    CURRENT GOALS   Goals to be met by: 6/6/25  Patient Goal Patient's self-stated goal is: none stated   Goal #1 Patient is able to demonstrate supine - sit EOB @ level: minimum assistance      Goal #1   Current Status  Progressing   Goal #2 Patient is able to demonstrate transfers Sit to/from Stand at assistance level: CGA with walker - rolling      Goal #2  Current Status  Progressing   Goal #3 Patient is able to ambulate 50 feet with assist device: walker - rolling at assistance level: CGA   Goal #3   Current Status  Progressing   Goal #4 Patient to demonstrate independence with home activity/exercise instructions provided to patient in preparation for discharge.   Goal #4   Current Status  Progressing     Therapeutic Activity: 23 minutes    Lacey Botello, PT, DPT

## 2025-05-25 NOTE — PLAN OF CARE
Patient is awake/alert ox4; at ease, no acute changes overnight. Easily gets short of breath with turning or boosting. Surgical drsg cdi, turning every 2 hours and zinc applied to sacral area has redness/blanchable. Pain is well controlled only gave tramadol that was scheduled and took naps throughout the night. This morning is stable, and call light within reach. Encouraged to select meals high in protein and consume  her shakes.   Problem: RESPIRATORY - ADULT  Goal: Achieves optimal ventilation and oxygenation  Description: INTERVENTIONS:- Assess for changes in respiratory status- Assess for changes in mentation and behavior- Position to facilitate oxygenation and minimize respiratory effort- Oxygen supplementation based on oxygen saturation or ABGs- Provide Smoking Cessation handout, if applicable- Encourage broncho-pulmonary hygiene including cough, deep breathe, Incentive Spirometry- Assess the need for suctioning and perform as needed- Assess and instruct to report SOB or any respiratory difficulty- Respiratory Therapy support as indicated- Manage/alleviate anxiety- Monitor for signs/symptoms of CO2 retention  Outcome: Not Progressing     Problem: SAFETY ADULT - FALL  Goal: Free from fall injury  Description: INTERVENTIONS:  - Assess pt frequently for physical needs  - Identify cognitive and physical deficits and behaviors that affect risk of falls.  - [place] fall precautions as indicated by assessment.  - Educate pt/family on patient safety including physical limitations  - Instruct pt to call for assistance with activity based on assessment  - Modify environment to reduce risk of injury  - Provide assistive devices as appropriate  - Consider OT/PT consult to assist with strengthening/mobility  - Encourage toileting schedule  Outcome: Not Progressing     Problem: HEMATOLOGIC - ADULT  Goal: Free from bleeding injury  Description: (Example usage: patient with low platelets)  INTERVENTIONS:  - Avoid  intramuscular injections, enemas and rectal medication administration  - Ensure safe mobilization of patient  - Hold pressure on venipuncture sites to achieve adequate hemostasis  - Assess for signs and symptoms of internal bleeding  - Monitor lab trends  - Patient is to report abnormal signs of bleeding to staff  - Avoid use of toothpicks and dental floss  - Use electric shaver for shaving  - Use soft bristle tooth brush  - Limit straining and forceful nose blowing  Outcome: Not Progressing     Problem: GASTROINTESTINAL - ADULT  Goal: Maintains or returns to baseline bowel function  Description: INTERVENTIONS:  - Assess bowel function  - Maintain adequate hydration with IV or PO as ordered and tolerated  - Evaluate effectiveness of GI medications  - Encourage mobilization and activity  - Obtain nutritional consult as needed  - Establish a toileting routine/schedule  - Consider collaborating with pharmacy to review patient's medication profile  Outcome: Not Progressing  Goal: Maintains adequate nutritional intake (undernourished)  Description: INTERVENTIONS:  - Monitor percentage of each meal consumed  - Identify factors contributing to decreased intake, treat as appropriate  - Assist with meals as needed  - Monitor I&O, WT and lab values  - Obtain nutritional consult as needed  - Optimize oral hygiene and moisture  - Encourage food from home; allow for food preferences  - Enhance eating environment  Outcome: Not Progressing     Problem: SKIN/TISSUE INTEGRITY - ADULT  Goal: Skin integrity remains intact  Description: INTERVENTIONS  - Assess and document risk factors for pressure ulcer development  - Assess and document skin integrity  - Monitor for areas of redness and/or skin breakdown  - Initiate interventions, skin care algorithm/standards of care as needed  Outcome: Not Progressing  Goal: Incision(s), wounds(s) or drain site(s) healing without S/S of infection  Description: INTERVENTIONS:  - Assess and document  risk factors for pressure ulcer development  - Assess and document skin integrity  - Assess and document dressing/incision, wound bed, drain sites and surrounding tissue  - Implement wound care per orders  - Initiate isolation precautions as appropriate  - Initiate Pressure Ulcer prevention bundle as indicated  Outcome: Progressing     Problem: MUSCULOSKELETAL - ADULT  Goal: Return mobility to safest level of function  Description: INTERVENTIONS:  - Assess patient stability and activity tolerance for standing, transferring and ambulating w/ or w/o assistive devices  - Assist with transfers and ambulation using safe patient handling equipment as needed  - Ensure adequate protection for wounds/incisions during mobilization  - Obtain PT/OT consults as needed  - Advance activity as appropriate  - Communicate ordered activity level and limitations with patient/family  Outcome: Not Progressing

## 2025-05-26 LAB
BASOPHILS # BLD AUTO: 0.05 X10(3) UL (ref 0–0.2)
BASOPHILS NFR BLD AUTO: 0.4 %
DEPRECATED RDW RBC AUTO: 67 FL (ref 35.1–46.3)
EOSINOPHIL # BLD AUTO: 0.1 X10(3) UL (ref 0–0.7)
EOSINOPHIL NFR BLD AUTO: 0.7 %
ERYTHROCYTE [DISTWIDTH] IN BLOOD BY AUTOMATED COUNT: 20.9 % (ref 11–15)
GLUCOSE BLDC GLUCOMTR-MCNC: 109 MG/DL (ref 70–99)
GLUCOSE BLDC GLUCOMTR-MCNC: 114 MG/DL (ref 70–99)
GLUCOSE BLDC GLUCOMTR-MCNC: 124 MG/DL (ref 70–99)
GLUCOSE BLDC GLUCOMTR-MCNC: 137 MG/DL (ref 70–99)
HCT VFR BLD AUTO: 26 % (ref 35–48)
HGB BLD-MCNC: 7.9 G/DL (ref 12–16)
IMM GRANULOCYTES # BLD AUTO: 0.28 X10(3) UL (ref 0–1)
IMM GRANULOCYTES NFR BLD: 2 %
LYMPHOCYTES # BLD AUTO: 0.96 X10(3) UL (ref 1–4)
LYMPHOCYTES NFR BLD AUTO: 6.7 %
MCH RBC QN AUTO: 26.6 PG (ref 26–34)
MCHC RBC AUTO-ENTMCNC: 30.4 G/DL (ref 31–37)
MCV RBC AUTO: 87.5 FL (ref 80–100)
MONOCYTES # BLD AUTO: 1.17 X10(3) UL (ref 0.1–1)
MONOCYTES NFR BLD AUTO: 8.2 %
NEUTROPHILS # BLD AUTO: 11.68 X10 (3) UL (ref 1.5–7.7)
NEUTROPHILS # BLD AUTO: 11.68 X10(3) UL (ref 1.5–7.7)
NEUTROPHILS NFR BLD AUTO: 82 %
PLATELET # BLD AUTO: 194 10(3)UL (ref 150–450)
RBC # BLD AUTO: 2.97 X10(6)UL (ref 3.8–5.3)
WBC # BLD AUTO: 14.2 X10(3) UL (ref 4–11)

## 2025-05-26 PROCEDURE — 99233 SBSQ HOSP IP/OBS HIGH 50: CPT | Performed by: FAMILY MEDICINE

## 2025-05-26 PROCEDURE — 99233 SBSQ HOSP IP/OBS HIGH 50: CPT | Performed by: INTERNAL MEDICINE

## 2025-05-26 RX ORDER — TRAMADOL HYDROCHLORIDE 50 MG/1
50 TABLET ORAL EVERY 12 HOURS PRN
Status: DISCONTINUED | OUTPATIENT
Start: 2025-05-26 | End: 2025-05-26

## 2025-05-26 RX ORDER — SENNOSIDES 8.8 MG/5ML
10 LIQUID ORAL DAILY PRN
Status: DISCONTINUED | OUTPATIENT
Start: 2025-05-26 | End: 2025-06-05

## 2025-05-26 NOTE — PROGRESS NOTES
[facility]  part of [facility]    Cardiology Progress Note    Kelli Fisher Patient Status:  Inpatient    1955 MRN [id number]   Location [facility] 2W/SW Attending Inna Bullock MD   Hosp Day # 14 PCP Taylor Gallardo MD     Interval History   Shortness of breath improved, denies any symptoms    No events overnight.      Assessment and Plan:   Pancytopenia, improving  Endometrial cancer with lung metastasis management as per oncology    Shock, likely multifactorial  - Now resolved no longer on pressors  -no s/sx of decompensation from cardiac standpoint, especially post pericardial window for tamponade    Large pericardial effusion with tamponade  Pleural effusion s/p CT  -enlarging pericardial effusion with new signs c/w tamponade physiology on 25  Repeat echo on Friday and again yesterday  showed no pericardial effusion, bilateral pleural effusions noted    Bilateral PE, s/p IVC filter  -initially not on AC due to severe anemia + thrombocytopenia, however counts have improved  -started AC with apixaban 5mg bid on    Hemoglobin however has dropped overnight 7.9 down from 9.1 no obvious sources of bleeding    Paroxysmal AF/AT  -developed rapid AF post pericardial window  -continue PO amiodarone, rate controlled  Given drop in hemoglobin would hold Eliquis for now      Remains critically ill  Critical care time 31 minutes      Vic Mckeon MD    Objective:     Patient Vitals for the past 24 hrs:   BP Temp Temp src Pulse Resp SpO2 Weight   25 1000 97/70 -- -- 76 19 100 % --   25 0900 91/71 -- -- 76 19 99 % --   25 0807 -- -- -- 71 22 99 % --   25 0800 97/75 97.9 °F (36.6 °C) Temporal 72 21 100 % --   25 0700 100/71 -- -- 72 18 100 % --   25 0600 101/70 -- -- 73 18 99 % 168 lb 14 oz (76.6 kg)   25 0500 (!) 82/61 -- -- 72 17 98 % --   25 0400 90/65 98.2 °F (36.8 °C) Temporal 65 14 98 % --   25 0300 94/65 -- -- 64  14 98 % --   05/22/25 0240 -- -- -- 68 18 100 % --   05/22/25 0200 92/62 -- -- 71 15 99 % --   05/22/25 0100 90/63 -- -- 71 15 100 % --   05/22/25 0000 107/73 98 °F (36.7 °C) Temporal 73 14 100 % --   05/21/25 2300 90/57 -- -- 84 19 96 % --   05/21/25 2200 (!) 135/93 -- -- 62 15 100 % --   05/21/25 2100 98/72 -- -- 83 15 96 % --   05/21/25 2050 -- -- -- 77 19 99 % --   05/21/25 2000 107/80 98.6 °F (37 °C) Temporal 80 14 100 % --   05/21/25 1900 97/77 -- -- 88 23 100 % --   05/21/25 1830 -- -- -- -- -- 100 % --   05/21/25 1800 (!) 130/96 -- -- 87 10 100 % --   05/21/25 1730 -- -- -- 115 16 100 % --   05/21/25 1700 109/72 -- -- 107 25 99 % --   05/21/25 1645 -- -- -- (!) 124 23 100 % --   05/21/25 1630 -- -- -- 112 15 100 % --   05/21/25 1600 108/69 97.5 °F (36.4 °C) Temporal 104 21 100 % --   05/21/25 1545 -- -- -- 116 22 100 % --   05/21/25 1530 (!) 70/56 -- -- 103 19 100 % --   05/21/25 1515 (!) 87/65 -- -- 101 23 97 % --   05/21/25 1500 (!) 89/64 -- -- 112 26 98 % --   05/21/25 1426 113/75 98 °F (36.7 °C) -- 85 22 98 % --   05/21/25 1223 99/70 97.7 °F (36.5 °C) Oral 84 18 97 % --       Intake/Output:   Last 3 shifts:   Intake/Output                   05/20/25 0700 - 05/21/25 0659 05/21/25 0700 - 05/22/25 0659 05/22/25 0700 - 05/23/25 0659       Intake    P.O.  --  --  260    P.O. -- -- 260    I.V.  --  1071.2  75    I.V. -- 863 --    Volume (mL) Phenylephrine -- 177.6 75    Volume (mL) Dexmedetomidine -- 30.6 --    Blood  --  411.3  --    Volume (Transfuse RBC) -- 411.3 --    IV PIGGYBACK  100  --  100    Volume (mL) (ceFEPIme (Maxipime) 2 g in sodium chloride 0.9% 100mL IVPB-[place]) 100 -- --    Volume (mL) (acetaminophen (Ofirmev) 10 mg/mL infusion premix 1,000 mg) -- -- 100    Total Intake 100 1482.5 435       Output    Urine  1000  575  225    Incontinent Urine Occurrence 1 x -- --    Output (mL) (External Urinary Catheter) 1000 575 225    Emesis/NG output  --  --  0    Emesis -- -- 0    Other  --  500  --     Other -- 500 --    Stool  --  --  --    Stool Count Calculated for I/O 2 x -- --    Chest Tube  --  300  20    Output (mL) (Chest Tube 1 Anterior Pleural 28 Fr.) -- 50 10    Output (mL) (Chest Tube 1 Anterior Mediastinal 24 Fr.) -- 250 10    Total Output 1000 1375 245       Net I/O     -900 107.5 190             Vent Settings:      Hemodynamic parameters (last 24 hours):      Scheduled Meds: Scheduled Medications[1]    Continuous Infusions: Medication Infusions[2]    Results:     Lab Results   Component Value Date    WBC 14.2 (H) 05/26/2025    HGB 7.9 (L) 05/26/2025    HCT 26.0 (L) 05/26/2025    .0 05/26/2025    CREATSERUM 1.15 (H) 05/24/2025    BUN 27 (H) 05/24/2025     05/24/2025    K 3.9 05/25/2025     05/24/2025    CO2 27.0 05/24/2025     (H) 05/24/2025    CA 8.1 (L) 05/24/2025    ALB 3.2 05/12/2025    ALKPHO 142 05/12/2025    BILT 0.7 05/12/2025    TP 6.8 05/12/2025    AST 26 05/12/2025    ALT 10 05/12/2025    PTT 37.0 (H) 05/21/2025    INR 1.31 (H) 05/22/2025    TSH 3.705 05/02/2025    LIP 42 12/11/2023    DDIMER 3.05 (H) 05/13/2025    MG 2.2 05/19/2025    B12 >2,000 (H) 05/02/2025       Recent Labs   Lab 05/22/25  0505 05/23/25  0540 05/24/25  0542 05/25/25  0609   * 140* 126*  --    BUN 30* 26* 27*  --    CREATSERUM 1.26* 1.11* 1.15*  --    CA 8.0* 8.0* 8.1*  --     145 142  --    K 3.6 3.6  3.6 3.5 3.9    107 105  --    CO2 31.0 31.0 27.0  --      Recent Labs   Lab 05/24/25  0542 05/25/25  0609 05/26/25  0435   RBC 3.70* 3.38* 2.97*   HGB 9.8* 9.1* 7.9*   HCT 30.7* 29.1* 26.0*   MCV 83.0 86.1 87.5   MCH 26.5 26.9 26.6   MCHC 31.9 31.3 30.4*   RDW 21.2* 20.8* 20.9*   NEPRELIM 16.88* 14.17* 11.68*   WBC 20.6* 17.1* 14.2*   .0 224.0 194.0       No results for input(s): \"BNPML\" in the last 168 hours.    No results for input(s): \"TROP\", \"CK\" in the last 168 hours.    No results found.          CARD ECHO LIMITED (CPT=93308/59557/43496)  Result Date:  2025  Transthoracic Echocardiogram Name:Kelli Fisher Date: 2025 :  1955 Ht:  (60in)  BP: 98 / 72 MRN:  [id number]    Age:  70years    Wt:  (167lb) HR: 89bpm Loc:  [facility]       Gndr: F          BSA: 1.73m^2 Sonographer: Chris DOMINGUEZ Ordering:    Srinath Levy Consulting:  Trista Peña ---------------------------------------------------------------------------- History/Indications:  Reevaluate enlarging pericardial effusion. Acute respiratory failure with hypoxia. Shortness of breath. ---------------------------------------------------------------------------- Procedure information:  A transthoracic echocardiogram, limited study was performed. Additional evaluation included M-mode and limited 2D.  Patient status:  Inpatient.  Location:  Bedside.    Comparison was made to the study of 2025.    This was a routine study. Transthoracic echocardiography for diagnosis and ventricular function evaluation. Image quality was adequate. ECG rhythm:   Normal sinus ---------------------------------------------------------------------------- Conclusions: 1. Left ventricle: The cavity size was normal. Wall thickness was normal.    Systolic function was normal. The estimated ejection fraction was 55-60%,    by visual assessment. Unable to assess LV diastolic function. 2. Pericardium, extracardiac: A large, free-flowing pericardial effusion was    identified. There is RV collapse in early diastolic for less than 50% of    the cardiac cycle. There was evidence for mildly increased RV-LV    interaction demonstrated by respirophasic changes in transmitral    velocities (25% reduction in mitral valve e'wave velocity during    inspiration). There was a left pleural effusion. 3. Inferior vena cava: The IVC was dilated (23mm). Respirophasic diameter    changes are blunted (< 50%), consistent with elevated central venous    pressure. Impressions:  This study is compared with previous dated 2025: The  pericardial effusion has slightly increased, now with maximal dimensions in diastole of 20mm with evidence of early tamponade (25% reduction in mitral valve inflow velocity and right ventricle free wall collapse in early diastole). * ---------------------------------------------------------------------------- * Findings: Left ventricle:  The cavity size was normal. Wall thickness was normal. Systolic function was normal. The estimated ejection fraction was 55-60%, by visual assessment. Unable to assess LV diastolic function. Right ventricle:  The cavity size was normal. Systolic function was normal. Systolic pressure was not estimated. Mitral valve:  The leaflets were mildly calcified. Aortic valve:   The valve was trileaflet. The leaflets were mildly calcified. Pericardium:  A large, free-flowing pericardial effusion was identified. Doppler:  There is RV collapse in early diastolic for less than 50% of the cardiac cycle. There was evidence for mildly increased RV-LV interaction demonstrated by respirophasic changes in transmitral velocities (25% reduction in mitral valve e'wave velocity during inspiration). Pleura:  There was a left pleural effusion. Aorta: Aortic root: The aortic root was normal. Ascending aorta: The ascending aorta was normal. Systemic veins: Inferior vena cava: The IVC was dilated (23mm).  Respirophasic diameter changes are blunted (< 50%), consistent with elevated central venous pressure. ---------------------------------------------------------------------------- Measurements  Left ventricle          Value       Ref       05/13/2025  IVS thickness, ED,  (H) 1.0   cm    0.6 - 0.9 1.3  PLAX  LV ID, ED, PLAX     (L) 3.6   cm    3.8 - 5.2 4.0  LV ID, ES, PLAX         2.5   cm    2.2 - 3.5 2.4  LV PW thickness,    (H) 1.0   cm    0.6 - 0.9 1.1  ED, PLAX  IVS/LV PW ratio,        1.00        --------- 1.18  ED, PLAX  LV PW/LV ID ratio,      0.28        --------- 0.28  ED, PLAX  LV ejection              59    %     54 - 74   71  fraction  LVOT                    Value       Ref       2025  LVOT ID                 2     cm    --------- 2  Aortic root             Value       Ref       2025  Aortic root ID          3.3   cm    2.5 - 3.9 ----------  Ascending aorta         Value       Ref       2025  Ascending aorta ID  (H) 3.6   cm    1.9 - 3.5 ----------  Systemic veins          Value       Ref       2025  Estimated CVP           15    mm Hg --------- 15  Inferior vena cava      Value       Ref       2025  ID                  (H) 2.4   cm    <=2.1     2.3 Legend: (L)  and  (H)  corinna values outside specified reference range. ---------------------------------------------------------------------------- Prepared and electronically signed by Javi Huerta 2025 17:58     CARD ECHO LIMITED (CPT=93308/52499/75861)  Result Date: 2025  Transthoracic Echocardiogram Name:Kelli Fisher Date: 2025 :  1955 Ht:  (60in)  BP: 106 / 75 MRN:  [id number]    Age:  70years    Wt:  (167lb) HR: 85bpm Loc:  [facility]       Gndr: F          BSA: 1.73m^2 Sonographer: Naty PINTO Ordering:    Margarette Sexton Consulting:  Dominick Herring ---------------------------------------------------------------------------- History/Indications:   SOB and known Pericardial Effusion. ---------------------------------------------------------------------------- Procedure information:  A transthoracic echocardiogram, limited study was performed. Additional evaluation included M-mode and limited 2D.  Patient status:  Inpatient.  Location:  Bedside.    Comparison was made to the study of 2025.    This was a routine study. Transthoracic echocardiography for diagnosis. Image quality was adequate. ---------------------------------------------------------------------------- Conclusions: 1. Left ventricle: The cavity size was normal. Wall thickness was mildly    increased. There was mild concentric hypertrophy.  Systolic function was    normal. The estimated ejection fraction was 55-60%, by visual assessment.    Unable to assess LV diastolic function. 2. Right ventricle: The cavity size was normal. Systolic function was    normal. 3. Pericardium, extracardiac: A moderate, free-flowing pericardial effusion    was identified. There was no evidence of hemodynamic compromise. 4. Systemic veins: Central venous respirophasic diameter changes are blunted    (< 50%). 5. Inferior vena cava: The IVC was dilated. * ---------------------------------------------------------------------------- * Findings: Left ventricle:  The cavity size was normal. Wall thickness was mildly increased. There was mild concentric hypertrophy. Systolic function was normal. The estimated ejection fraction was 55-60%, by visual assessment. Unable to assess LV diastolic function. Left atrium:  Well visualized. Right ventricle:  The cavity size was normal. Systolic function was normal. Right atrium:  Well visualized. Mitral valve:  Well visualized. Aortic valve:   The valve was trileaflet. Tricuspid valve:  The annulus is normal-sized. The leaflets are normal thickness. No echocardiographic evidence for tricuspid prolapse.  Doppler: Transvalvular velocity was within the normal range. There was no evidence for stenosis. There was mild regurgitation. Pulmonic valve:    Doppler:  There was trivial regurgitation. Pericardium:  A moderate, free-flowing pericardial effusion was identified. There was no evidence of hemodynamic compromise. Systemic veins:  Central venous respirophasic diameter changes are blunted (< 50%). Inferior vena cava: The IVC was dilated. ---------------------------------------------------------------------------- Measurements  Left ventricle         Value        Ref       05/05/2025  IVS thickness, ED, (H) 1.3   cm     0.6 - 0.9 1.0  PLAX  LV ID, ED, PLAX        4.0   cm     3.8 - 5.2 3.6  LV ID, ES, PLAX        2.4   cm     2.2 - 3.5 2.4  LV  PW thickness,   (H) 1.1   cm     0.6 - 0.9 1.0  ED, PLAX  IVS/LV PW ratio,       1.18         --------- 1.00  ED, PLAX  LV PW/LV ID ratio,     0.28         --------- 0.28  ED, PLAX  LV ejection            71    %      54 - 74   63  fraction  Stroke volume/bsa,     35    ml/m^2 --------- ----------  2D  LVOT                   Value        Ref       05/05/2025  LVOT ID                2     cm     --------- 2  LVOT peak              1.21  m/sec  --------- ----------  velocity, S  LVOT VTI, S            19.4  cm     --------- ----------  LVOT peak              6     mm Hg  --------- ----------  gradient, S  LVOT mean              3     mm Hg  --------- ----------  gradient, S  Stroke volume          61    ml     --------- ----------  (SV), LVOT DP  Stroke index           35    ml/m^2 --------- ----------  (SV/bsa), LVOT DP  Pulmonary artery       Value        Ref       05/05/2025  PA pressure, S, DP     44    mm Hg  --------- ----------  Tricuspid valve        Value        Ref       05/05/2025  Tricuspid regurg       2.68  m/sec  <=2.8     ----------  peak velocity  Tricuspid peak         29    mm Hg  --------- ----------  RV-RA gradient  Systemic veins         Value        Ref       05/05/2025  Estimated CVP          15    mm Hg  --------- 15  Inferior vena cava     Value        Ref       05/05/2025  ID                 (H) 2.3   cm     <=2.1     2.4  Right ventricle        Value        Ref       05/05/2025  TAPSE, 2D              1.93  cm     >=1.70    ----------  TAPSE, MM              1.93  cm     >=1.70    ----------  RV pressure, S, DP     44    mm Hg  --------- ---------- Legend: (L)  and  (H)  corinna values outside specified reference range. ---------------------------------------------------------------------------- Prepared and electronically signed by Javi Huerta 05/13/2025 16:05     [employer] (CPT=93308/30931/73458)  Result Date: 5/5/2025  Transthoracic Echocardiogram Name:Kelli Fisher Date:  2025 :  1955 Ht:  (60in)  BP: 142 / 86 MRN:  [id number]    Age:  70years    Wt:  (174lb) HR: 106bpm Loc:  [facility]       Gndr: F          BSA: 1.76m^2 Sonographer: Chris DOMINGUEZ Ordering:    Stella Gomez Consulting:  Khadar Segura ---------------------------------------------------------------------------- History/Indications:   Pericardial effusion. Lung mass. ---------------------------------------------------------------------------- Procedure information:  A transthoracic echocardiogram, limited study was performed. Additional evaluation included M-mode and limited 2D.  Patient status:  Inpatient.  Location:  Bedside.    Comparison was made to the study of 2025.    This was a routine study. Transthoracic echocardiography for diagnosis and ventricular function evaluation. Image quality was adequate. ECG rhythm:   Sinus tachycardia  Study completion:  There were no complications. ---------------------------------------------------------------------------- Conclusions: 1. Left ventricle: The cavity size was normal. Wall thickness was normal.    Systolic function was vigorous. The estimated ejection fraction was    65-70%, by biplane method of disks. No diagnostic evidence for regional    wall motion abnormalities. Unable to assess LV diastolic function. 2. Pericardium, extracardiac: A small to moderate, free-flowing pericardial    effusion was identified circumferential to the heart. Probably not    hemodynamically significant. Impressions:  This study is compared with previous dated 2025: No significant change since prior study. * ---------------------------------------------------------------------------- * Findings: Left ventricle:  The cavity size was normal. Wall thickness was normal. Systolic function was vigorous. The estimated ejection fraction was 65-70%, by biplane method of disks. No diagnostic evidence for regional wall motion abnormalities. Unable to assess LV diastolic function.  Ventricular septum:   Thickness was normal. Left atrium:  The left atrial volume was normal. Right ventricle:  Well visualized. The cavity size was normal. Systolic function was normal. Right atrium:  Well visualized. The atrium was normal in size. Mitral valve:  Well visualized. The leaflets were mildly calcified. Aortic valve:  Well visualized.  The valve was trileaflet. The leaflets were mildly calcified. Tricuspid valve:  Well visualized. Pulmonic valve:   Well visualized. Pericardium:  A small to moderate, free-flowing pericardial effusion was identified circumferential to the heart. Probably not hemodynamically significant. Aorta: Aortic root: The aortic root was normal. Ascending aorta: The ascending aorta was normal. Pulmonary arteries: Systolic pressure could not be accurately estimated. ---------------------------------------------------------------------------- Measurements  Left ventricle         Value        Ref       04/30/2025  IVS thickness, ED, (H) 1.0   cm     0.6 - 0.9 1.0  PLAX  LV ID, ED, PLAX    (L) 3.6   cm     3.8 - 5.2 3.9  LV ID, ES, PLAX        2.4   cm     2.2 - 3.5 2.5  LV PW thickness,   (H) 1.0   cm     0.6 - 0.9 1.0  ED, PLAX  IVS/LV PW ratio,       1.00         --------- 1.00  ED, PLAX  LV PW/LV ID ratio,     0.28         --------- 0.26  ED, PLAX  LV ejection            63    %      54 - 74   66  fraction  LV end-diastolic       62    ml     48 - 140  ----------  volume, 1-p A4C  LV ejection            65    %      46 - 78   ----------  fraction, 1-p A4C  Stroke volume, 1-p     40    ml     --------- ----------  A4C  LV end-diastolic       35    ml/m^2 30 - 82   ----------  volume/bsa, 1-p  A4C  Stroke volume/bsa,     23    ml/m^2 --------- ----------  1-p A4C  LV end-diastolic       61    ml     46 - 106  ----------  volume, 2-p  LV end-systolic        21    ml     14 - 42   ----------  volume, 2-p  LV ejection            65    %      54 - 74   ----------  fraction, 2-p  Stroke  volume, 2-p     40    ml     --------- ----------  LV end-diastolic       35    ml/m^2 29 - 61   ----------  volume/bsa, 2-p  LV end-systolic        12    ml/m^2 8 - 24    ----------  volume/bsa, 2-p  Stroke volume/bsa,     22.5  ml/m^2 --------- ----------  2-p  LVOT                   Value        Ref       04/30/2025  LVOT ID                2     cm     --------- ----------  Aortic root            Value        Ref       04/30/2025  Aortic root ID         3.2   cm     2.5 - 4.0 ----------  Ascending aorta        Value        Ref       04/30/2025  Ascending aorta ID (H) 3.7   cm     1.9 - 3.5 ----------  Left atrium            Value        Ref       04/30/2025  LA ID, A-P, ES         2.7   cm     2.7 - 3.8 ----------  LA volume, S           43    ml     22 - 52   ----------  LA volume/bsa, S       24    ml/m^2 16 - 34   ----------  LA volume, ES, 1-p     39    ml     22 - 52   ----------  A4C  LA volume, ES, 1-p     46    ml     22 - 52   ----------  A2C  LA volume, ES, A/L     46    ml     --------- ----------  LA volume/bsa, ES,     26    ml/m^2 16 - 34   ----------  A/L  LA/aortic root         0.84         --------- ----------  ratio  Right atrium           Value        Ref       04/30/2025  RA ID, S-I, ES,        4.8   cm     3.4 - 5.3 ----------  A4C  RA ID/bsa, S-I,        2.7   cm/m^2 1.9 - 3.1 ----------  ES, A4C  RA area, ES, A4C       14    cm^2   10 - 18   ----------  RA volume, ES, 1-p     32    ml     --------- ----------  A4C  RA volume/bsa, ES,     18    ml/m^2 9 - 33    ----------  1-p A4C  Systemic veins         Value        Ref       04/30/2025  Estimated CVP          15    mm Hg  --------- ----------  Inferior vena cava     Value        Ref       04/30/2025  ID                 (H) 2.4   cm     <=2.1     2.3 Legend: (L)  and  (H)  corinna values outside specified reference range. ---------------------------------------------------------------------------- Prepared and electronically signed by  Waldo Ehab 2025 12:36     CARD ECHO LIMITED (CPT=93308/92607/86925)  Result Date: 2025  Transthoracic Echocardiogram Name:Kelli Fisher Date: 2025 :  1955 Ht:  (60in)  BP: 106 / 74 MRN:  [id number]    Age:  70years    Wt:  (165lb) HR: 108bpm Loc:  [facility]       Gndr: F          BSA: 1.72m^2 Sonographer: James PINTO RVT Ordering:    Javi Huerta Consulting:  Ector Byrnes ---------------------------------------------------------------------------- History/Indications:  Pericardial effusion. ---------------------------------------------------------------------------- Procedure information:  A transthoracic echocardiogram, limited study was performed. Additional evaluation included M-mode and limited 2D.  Patient status:  Inpatient.  Location:  Bedside.    Comparison was made to the study of 2025.    This was a routine study. Transthoracic echocardiography for ventricular function evaluation and assessment of pericardial effusion and hemodynamic status. Image quality was adequate. ECG rhythm:   Sinus tachycardia ---------------------------------------------------------------------------- Conclusions: 1. Left ventricle: The cavity size was normal. Wall thickness was mildly    increased. Systolic function was normal. The estimated ejection fraction    was 60-65%, by visual assessment. No diagnostic evidence for regional    wall motion abnormalities. Unable to assess LV diastolic function. 2. Pericardium, extracardiac: A small to moderate, free-flowing pericardial    effusion was identified circumferential to the heart. Maximal diameter in    diastole is 1.1cm. Features were not consistent with tamponade    physiology. 3. Inferior vena cava: The IVC was dilated. Respirophasic diameter changes    are blunted (< 50%), consistent with elevated central venous pressure. * ---------------------------------------------------------------------------- * Findings: Left ventricle:  The  cavity size was normal. Wall thickness was mildly increased. Systolic function was normal. The estimated ejection fraction was 60-65%, by visual assessment. No diagnostic evidence for regional wall motion abnormalities. Unable to assess LV diastolic function. Right ventricle:  The cavity size was normal. Mitral valve:  The valve was structurally normal. Aortic valve:  The valve was structurally normal. Tricuspid valve:  The valve is structurally normal. Pulmonic valve:   Not well visualized. Pericardium:  A small to moderate, free-flowing pericardial effusion was identified circumferential to the heart. Maximal diameter in diastole is 1.1cm.  Doppler:  Features were not consistent with tamponade physiology. Systemic veins: Inferior vena cava: The IVC was dilated.  Respirophasic diameter changes are blunted (< 50%), consistent with elevated central venous pressure. ---------------------------------------------------------------------------- Measurements  Left ventricle              Value    Ref      04/12/2025  IVS thickness, ED, PLAX (H) 1.0   cm 0.6 -    0.8                                       0.9  LV ID, ED, PLAX             3.9   cm 3.8 -    4.4                                       5.2  LV ID, ES, PLAX             2.5   cm 2.2 -    2.8                                       3.5  LV PW thickness, ED,    (H) 1.0   cm 0.6 -    0.7  PLAX                                 0.9  IVS/LV PW ratio, ED,        1.00     -------- 1.14  PLAX  LV PW/LV ID ratio, ED,      0.26     -------- 0.16  PLAX  LV ejection fraction        66    %  54 - 74  66  Inferior vena cava          Value    Ref      04/12/2025  ID                      (H) 2.3   cm <=2.1    2.7 Legend: (L)  and  (H)  corinna values outside specified reference range. ---------------------------------------------------------------------------- Prepared and electronically signed by Javi Huerta 04/30/2025 13:43        Exam:     Physical Exam:  General: No apparent  distress.   HEENT: Normocephalic, anicteric sclera, neck supple, no thyromegaly or adenopathy.  Neck: No JVD, carotids 2+, no bruits.  Cardiac: Irreg irreg  Lungs: Diminished BS  Abdomen: Soft, non-tender. No organosplenomegally, mass or rebound, BS-present.  Extremities: Without clubbing or cyanosis. No edema.    Neurologic: Alert and oriented, normal affect. No focal defects  Skin: Warm and dry.   Cheo Bird MD                   [1]    midodrine  10 mg Oral TID    apixaban  5 mg Oral BID    traMADol  50 mg Oral 2 times per day    senna  10 mL Per NG Tube BID    docusate  100 mg Per NG Tube BID    pantoprazole  40 mg Intravenous Daily    amiodarone  400 mg Oral BID with meals    insulin aspart  1-5 Units Subcutaneous TID CC and HS    levothyroxine  100 mcg Oral Before breakfast   [2]    phenylephrine Stopped (05/25/25 1430)

## 2025-05-26 NOTE — PROGRESS NOTES
Progress Note     Kelli Fisher Patient Status:  Inpatient    1955 MRN [id number]   Location [facility] 4W/SW/SE Attending Gloria Sandoval MD   Hosp Day # 14 PCP Taylor Gallardo MD     Subjective:   S: Patient is off pressors, doing well today, walked with PT, eating fine,   Otherwsie doing ok    Review of Systems:   10 point ROS completed and was negative, except for pertinent positive and negatives stated in subjective.    Objective:   Vital signs:  Temp:  [97 °F (36.1 °C)-97.3 °F (36.3 °C)] 97.2 °F (36.2 °C)  Pulse:  [] 72  Resp:  [11-25] 11  BP: ()/(59-81) 108/67  SpO2:  [84 %-100 %] 100 %  AO: (84-95)/(42-51) 93/42    Wt Readings from Last 6 Encounters:   25 169 lb 12.1 oz (77 kg)   25 169 lb (76.7 kg)   25 174 lb 9.6 oz (79.2 kg)   25 169 lb (76.7 kg)   23 197 lb (89.4 kg)         Physical Exam:    General: No acute distress. Alert ,         Respiratory: Clear to auscultation bilaterally. No wheezes. No rhonchi.  Cardiovascular: S1, S2. Regular rate and rhythm. No murmurs, rubs or gallops.   Abdomen: Soft, nontender, nondistended.  Positive bowel sounds. No rebound or guarding.  Neurologic: No focal neurological deficits.   Musculoskeletal: Moves all extremities.  Extremities: No edema.    Results:   Diagnostic Data:      Labs:    Labs Last 24 Hours:   BMP     CBC    Other     Na - Cl - BUN - Glu -   Hb 7.9   PTT - Procal -   K - CO2 - Cr -   WBC 14.2 >< .0  INR - CRP -   Renal Lytes Endo    Hct 26.0   Trop - D dim -   eGFR - Ca - POC Gluc  109    LFT   pBNP - Lactic -   eGFR AA - PO4 - A1c -   AST - APk - Prot -  LDL -     Mg - TSH -   ALT - T makeda - Alb -        COVID-19 Lab Results    COVID-19  Lab Results   Component Value Date    COVID19 Not Detected 2025       Pro-Calcitonin  No results for input(s): \"PCT\" in the last 168 hours.    Cardiac  No results for input(s): \"TROP\", \"PBNP\" in the last 168 hours.      Creatinine Kinase  No  results for input(s): \"CK\" in the last 168 hours.    Inflammatory Markers  No results for input(s): \"CRP\", \"ORVILLE\", \"LDH\", \"DDIMER\" in the last 168 hours.      Imaging: Imaging data reviewed in Epic.    Medications: Scheduled Medications[1]    Assessment & Plan:   ASSESSMENT / PLAN:     Afib RVR  Pericardial effusion  - cardiology consulted  - IV amio --> now on oral. Avoiding BB, CCB with h/o junctional rhythm   - cont monitor on tele  - no anticoagulation for now due to pancytopenia   - on 5/16 experienced RVR again with hypotension, received digoxin converted to NSR. Remained  hypotensive so sent to stepdown unit. Responded to fluid bolus eventually.   - Transferred back to medical floor, normotensive rates controlled.  - CXR still with pulm edema, cont diuresis as tolerated.    - repeat ECHO showing large pericardial effusion   -s/p Urgent pericardial window on 5/21  -plueral chest tube removed on 5/23. D/w CT surgery   -per Cards , limited echo tomorrow if pericardial drain removed today. Still 30 cc of fluid overnight.      BL PE: acute small segmental/right lower extremity DVT  S/p IVC 5/14/2025  Not on a/c due to thrombocytopenia  D/w Cardiology Dr Huerta, plans to resume once ok with hemeonc   Dr Peña -hemeon paged -> Ok with Heme onc to resume Eliquis once Plt >50K        Metastatic endometrial cancer    Pancytopenia  Acute on chronic anemia of chronic disease  - s/p 1 unit prbc  - neutropenic precautions  - started neupogen until ANC 1500  - Plan is for further chemotherapy once recovered clinically per Dr. Herring.  - cefepime and vanco started for neutropenia and patient with cough/chills, elevated LA, possible early sepsis - pulm following for this as well  -Pancytopenia improving, monitor daily  - Overall plan of care is to continue to treat medically over the next several days and monitor for improvement.  If patient declines may consider palliative conversations.  If she improves overall plan is  to resume chemotherapy in the future as planned by Dr. Herring.  - s/p 1 unit PRBC   -hb stable      Acute on chronic respiratory failure  - due to 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, pulm edema   - on 4-5L NC baseline  - pulmonary following, weaning oxygen as tolerated. Cont lasix.       Thrush  - nystatin    Deconditioning  PT OT      Trabnsfer to medsur floor today   Dispo: PT recommending EDUARDO, patient to discuss with family. Have previously refused    Son at bedside , d/w him current treatment and plan.     MDM: High   I personally spent time on chart/note review, review of labs/imaging, discussion with patient, physical exam, discussion with staff, consultants, coordinating care, writing progress note, and discussion of plan of care.      Inna Bullock MD    Supplementary Documentation:                         [1]    midodrine  10 mg Oral TID    [Held by provider] apixaban  5 mg Oral BID    pantoprazole  40 mg Intravenous Daily    amiodarone  400 mg Oral BID with meals    insulin aspart  1-5 Units Subcutaneous TID CC and HS    levothyroxine  100 mcg Oral Before breakfast

## 2025-05-26 NOTE — PROGRESS NOTES
[facility]  part of [facility]    Progress Note      Assessment and Plan:   1.  Acute on chronic respiratory failure-multifactorial with significant burden of tumor in the chest but now recognized to have small volume of bilateral subsegmental PEs.  The patient has low platelets and is a poor candidate for full anticoagulation at this moment.  Lower extremity venous ultrasound demonstrated acute appearing nonocclusive DVT in the right superficial femoral popliteal and posterior tibial veins.  She got the IVC filter.    The patient underwent pericardial window and both chest tubes are removed.  The pericardial fluid is consistent with malignancy.  She feels like she is breathing a little bit better.  Little change clinically otherwise.  Would begin working on discharge planning so that she can get the follow-up chemotherapeutic in the outpatient setting.    Recommendations:  1.  Okay to floor.  2.  Nasal saline spray.    2.  Metastatic endometrial carcinoma-to follow-up gynecologic oncology.    3.  Malignant pericardial [id number] gynecologic oncology.  Now status post pericardial window.    Recommendations: Okay to floor.    4.  Recurrent episodes of atrial tachycardia-on amiodarone.    5.  Very small left pleural effusion-evacuated now.      Subjective:   Kelli Fisher is a(n) 70 year old female who is less dyspneic.    Objective:   Blood pressure 108/67, pulse 72, temperature 97.2 °F (36.2 °C), temperature source Temporal, resp. rate 11, height 152.4 cm (5'), weight 169 lb 12.1 oz (77 kg), SpO2 100%.    Physical Exam alert white female  HEENT examination is unremarkable with pupils equal round and reactive to light and accommodation.   Neck without adenopathy, thyromegaly, JVD nor bruit.   Lungs diminished bilaterally to auscultation and percussion.  Cardiac regular rate and rhythm no murmur.   Abdomen nontender, without hepatosplenomegaly and no mass appreciable.   Extremities without  clubbing cyanosis nor edema.   Neurologic grossly intact with symmetric tone and strength and reflex.  Skin without gross abnormality     Results:     Lab Results   Component Value Date    WBC 14.2 05/26/2025    HGB 7.9 05/26/2025    HCT 26.0 05/26/2025    .0 05/26/2025      CT scan of the chest-Positive for small volume bilateral segmental PE      Enlarging left upper lobe consolidation and few bilateral pulmonary nodules      Enlarging pericardial effusion now 1.7 centimeter thick.  Increasing left pleural effusion, new right pleural effusion     Srinath Levy MD  Medical Director, Critical Care, [facility]  Medical Director, [facility]  Pager: [phone]

## 2025-05-26 NOTE — PLAN OF CARE
Pt remains alert and oriented overnight. Pt declined accuchecks and getting to the chair in the morning. Pt updated on importance of getting out of bed, and plan of care, all questions answered at this time.     Problem: Diabetes/Glucose Control  Goal: Glucose maintained within prescribed range  Description: INTERVENTIONS:- Monitor Blood Glucose as ordered- Assess for signs and symptoms of hyperglycemia and hypoglycemia- Administer ordered medications to maintain glucose within target range- Assess barriers to adequate nutritional intake and initiate nutrition consult as needed- Instruct patient on self management of diabetes  Outcome: Progressing     Problem: RISK FOR INFECTION - ADULT  Goal: Absence of fever/infection during anticipated neutropenic period  Description: INTERVENTIONS- Monitor WBC- Administer growth factors as ordered- Implement neutropenic guidelines  Outcome: Progressing     Problem: RESPIRATORY - ADULT  Goal: Achieves optimal ventilation and oxygenation  Description: INTERVENTIONS:- Assess for changes in respiratory status- Assess for changes in mentation and behavior- Position to facilitate oxygenation and minimize respiratory effort- Oxygen supplementation based on oxygen saturation or ABGs- Provide Smoking Cessation handout, if applicable- Encourage broncho-pulmonary hygiene including cough, deep breathe, Incentive Spirometry- Assess the need for suctioning and perform as needed- Assess and instruct to report SOB or any respiratory difficulty- Respiratory Therapy support as indicated- Manage/alleviate anxiety- Monitor for signs/symptoms of CO2 retention  Outcome: Progressing     Problem: SAFETY ADULT - FALL  Goal: Free from fall injury  Description: INTERVENTIONS:  - Assess pt frequently for physical needs  - Identify cognitive and physical deficits and behaviors that affect risk of falls.  - [place] fall precautions as indicated by assessment.  - Educate pt/family on patient safety  including physical limitations  - Instruct pt to call for assistance with activity based on assessment  - Modify environment to reduce risk of injury  - Provide assistive devices as appropriate  - Consider OT/PT consult to assist with strengthening/mobility  - Encourage toileting schedule  Outcome: Progressing     Problem: HEMATOLOGIC - ADULT  Goal: Maintains hematologic stability  Description: INTERVENTIONS  - Assess for signs and symptoms of bleeding or hemorrhage  - Monitor labs and vital signs for trends  - Administer supportive blood products/factors, fluids and medications as ordered and appropriate  - Administer supportive blood products/factors as ordered and appropriate  Outcome: Progressing     Problem: GASTROINTESTINAL - ADULT  Goal: Minimal or absence of nausea and vomiting  Description: INTERVENTIONS:  - Maintain adequate hydration with IV or PO as ordered and tolerated  - Nasogastric tube to low intermittent suction as ordered  - Evaluate effectiveness of ordered antiemetic medications  - Provide nonpharmacologic comfort measures as appropriate  - Advance diet as tolerated, if ordered  - Obtain nutritional consult as needed  - Evaluate fluid balance  Outcome: Progressing  Goal: Maintains or returns to baseline bowel function  Description: INTERVENTIONS:  - Assess bowel function  - Maintain adequate hydration with IV or PO as ordered and tolerated  - Evaluate effectiveness of GI medications  - Encourage mobilization and activity  - Obtain nutritional consult as needed  - Establish a toileting routine/schedule  - Consider collaborating with pharmacy to review patient's medication profile  Outcome: Progressing     Problem: GENITOURINARY - ADULT  Goal: Absence of urinary retention  Description: INTERVENTIONS:  - Assess patient’s ability to void and empty bladder  - Monitor intake/output and perform bladder scan as needed  - Follow urinary retention protocol/standard of care  - Consider collaborating with  pharmacy to review patient's medication profile  - Implement strategies to promote bladder emptying  Outcome: Progressing     Problem: METABOLIC/FLUID AND ELECTROLYTES - ADULT  Goal: Glucose maintained within prescribed range  Description: INTERVENTIONS:- Monitor Blood Glucose as ordered- Assess for signs and symptoms of hyperglycemia and hypoglycemia- Administer ordered medications to maintain glucose within target range- Assess barriers to adequate nutritional intake and initiate nutrition consult as needed- Instruct patient on self management of diabetes  Outcome: Progressing     Problem: SKIN/TISSUE INTEGRITY - ADULT  Goal: Skin integrity remains intact  Description: INTERVENTIONS  - Assess and document risk factors for pressure ulcer development  - Assess and document skin integrity  - Monitor for areas of redness and/or skin breakdown  - Initiate interventions, skin care algorithm/standards of care as needed  Outcome: Progressing  Goal: Incision(s), wounds(s) or drain site(s) healing without S/S of infection  Description: INTERVENTIONS:  - Assess and document risk factors for pressure ulcer development  - Assess and document skin integrity  - Assess and document dressing/incision, wound bed, drain sites and surrounding tissue  - Implement wound care per orders  - Initiate isolation precautions as appropriate  - Initiate Pressure Ulcer prevention bundle as indicated  Outcome: Progressing     Problem: MUSCULOSKELETAL - ADULT  Goal: Return mobility to safest level of function  Description: INTERVENTIONS:  - Assess patient stability and activity tolerance for standing, transferring and ambulating w/ or w/o assistive devices  - Assist with transfers and ambulation using safe patient handling equipment as needed  - Ensure adequate protection for wounds/incisions during mobilization  - Obtain PT/OT consults as needed  - Advance activity as appropriate  - Communicate ordered activity level and limitations with  patient/family  Outcome: Progressing

## 2025-05-27 ENCOUNTER — APPOINTMENT (OUTPATIENT)
Dept: GENERAL RADIOLOGY | Facility: HOSPITAL | Age: 70
DRG: 270 | End: 2025-05-27
Attending: INTERNAL MEDICINE
Payer: MEDICARE

## 2025-05-27 LAB
ANION GAP SERPL CALC-SCNC: 9 MMOL/L (ref 0–18)
BASOPHILS # BLD AUTO: 0.07 X10(3) UL (ref 0–0.2)
BASOPHILS NFR BLD AUTO: 0.5 %
BUN BLD-MCNC: 19 MG/DL (ref 9–23)
BUN/CREAT SERPL: 19.4 (ref 10–20)
CALCIUM BLD-MCNC: 8.3 MG/DL (ref 8.7–10.4)
CHLORIDE SERPL-SCNC: 107 MMOL/L (ref 98–112)
CO2 SERPL-SCNC: 29 MMOL/L (ref 21–32)
CREAT BLD-MCNC: 0.98 MG/DL (ref 0.55–1.02)
DEPRECATED RDW RBC AUTO: 66.5 FL (ref 35.1–46.3)
EGFRCR SERPLBLD CKD-EPI 2021: 62 ML/MIN/1.73M2 (ref 60–?)
EOSINOPHIL # BLD AUTO: 0.16 X10(3) UL (ref 0–0.7)
EOSINOPHIL NFR BLD AUTO: 1.2 %
ERYTHROCYTE [DISTWIDTH] IN BLOOD BY AUTOMATED COUNT: 21.3 % (ref 11–15)
GLUCOSE BLD-MCNC: 124 MG/DL (ref 70–99)
GLUCOSE BLDC GLUCOMTR-MCNC: 120 MG/DL (ref 70–99)
GLUCOSE BLDC GLUCOMTR-MCNC: 121 MG/DL (ref 70–99)
GLUCOSE BLDC GLUCOMTR-MCNC: 159 MG/DL (ref 70–99)
GLUCOSE BLDC GLUCOMTR-MCNC: 172 MG/DL (ref 70–99)
HCT VFR BLD AUTO: 25.8 % (ref 35–48)
HGB BLD-MCNC: 7.8 G/DL (ref 12–16)
IMM GRANULOCYTES # BLD AUTO: 0.29 X10(3) UL (ref 0–1)
IMM GRANULOCYTES NFR BLD: 2.1 %
LYMPHOCYTES # BLD AUTO: 0.89 X10(3) UL (ref 1–4)
LYMPHOCYTES NFR BLD AUTO: 6.5 %
MCH RBC QN AUTO: 26.1 PG (ref 26–34)
MCHC RBC AUTO-ENTMCNC: 30.2 G/DL (ref 31–37)
MCV RBC AUTO: 86.3 FL (ref 80–100)
MONOCYTES # BLD AUTO: 1.13 X10(3) UL (ref 0.1–1)
MONOCYTES NFR BLD AUTO: 8.3 %
NEUTROPHILS # BLD AUTO: 11.1 X10 (3) UL (ref 1.5–7.7)
NEUTROPHILS # BLD AUTO: 11.1 X10(3) UL (ref 1.5–7.7)
NEUTROPHILS NFR BLD AUTO: 81.4 %
OSMOLALITY SERPL CALC.SUM OF ELEC: 304 MOSM/KG (ref 275–295)
PLATELET # BLD AUTO: 237 10(3)UL (ref 150–450)
POTASSIUM SERPL-SCNC: 3.4 MMOL/L (ref 3.5–5.1)
POTASSIUM SERPL-SCNC: 4.8 MMOL/L (ref 3.5–5.1)
RBC # BLD AUTO: 2.99 X10(6)UL (ref 3.8–5.3)
SODIUM SERPL-SCNC: 145 MMOL/L (ref 136–145)
WBC # BLD AUTO: 13.6 X10(3) UL (ref 4–11)

## 2025-05-27 PROCEDURE — 71045 X-RAY EXAM CHEST 1 VIEW: CPT | Performed by: INTERNAL MEDICINE

## 2025-05-27 PROCEDURE — 99233 SBSQ HOSP IP/OBS HIGH 50: CPT | Performed by: INTERNAL MEDICINE

## 2025-05-27 PROCEDURE — 99233 SBSQ HOSP IP/OBS HIGH 50: CPT | Performed by: FAMILY MEDICINE

## 2025-05-27 RX ORDER — FUROSEMIDE 10 MG/ML
40 INJECTION INTRAMUSCULAR; INTRAVENOUS
Status: COMPLETED | OUTPATIENT
Start: 2025-05-27 | End: 2025-05-27

## 2025-05-27 RX ORDER — POTASSIUM CHLORIDE 1.5 G/1.58G
40 POWDER, FOR SOLUTION ORAL EVERY 4 HOURS
Status: COMPLETED | OUTPATIENT
Start: 2025-05-27 | End: 2025-05-27

## 2025-05-27 NOTE — PROGRESS NOTES
[facility]  part of [facility]    Progress Note      Assessment and Plan:   1.  Acute on chronic respiratory failure-multifactorial with significant burden of tumor in the chest but now recognized to have small volume of bilateral subsegmental PEs.  The patient has low platelets and is a poor candidate for full anticoagulation at this moment.  Lower extremity venous ultrasound demonstrated acute appearing nonocclusive DVT in the right superficial femoral popliteal and posterior tibial veins.  She got the IVC filter.    The patient underwent pericardial window and both chest tubes are removed.  The pericardial fluid is consistent with malignancy.  Would begin working on discharge planning so that she can get the follow-up chemotherapeutic in the outpatient setting.  However, both her shortness of breath and chest x-ray are worse today.  There could be a component of edema, but no stronger suspicion is that the cancer is simply moving along quickly.  Will repeat x-ray in the morning.  Will give a dose of Lasix now.  May require thoracentesis.    Recommendations:  1.  Okay to floor.  2.  Lasix  3.  Morning chest x-ray  4.  May need thoracentesis.    2.  Metastatic endometrial carcinoma-to follow-up gynecologic oncology.    3.  Malignant pericardial [id number]-gkrwrg-vf gynecologic oncology.  Now status post pericardial window.    Recommendations: Okay to floor.    4.  Recurrent episodes of atrial tachycardia-on amiodarone.         Subjective:   Kelli Fisher is a(n) 70 year old female who is breathing worse..    Objective:   Blood pressure 126/79, pulse 93, temperature 97.5 °F (36.4 °C), temperature source Temporal, resp. rate 24, height 152.4 cm (5'), weight 171 lb 11.8 oz (77.9 kg), SpO2 100%.    Physical Exam alert white female  HEENT examination is unremarkable with pupils equal round and reactive to light and accommodation.   Neck without adenopathy, thyromegaly, JVD nor bruit.   Lungs diminished  bilaterally to auscultation and percussion.  Cardiac regular rate and rhythm no murmur.   Abdomen nontender, without hepatosplenomegaly and no mass appreciable.   Extremities without clubbing cyanosis nor edema.   Neurologic grossly intact with symmetric tone and strength and reflex.  Skin without gross abnormality     Results:     Lab Results   Component Value Date    WBC 13.6 05/27/2025    HGB 7.8 05/27/2025    HCT 25.8 05/27/2025    .0 05/27/2025    CREATSERUM 0.98 05/27/2025    BUN 19 05/27/2025     05/27/2025    K 3.4 05/27/2025     05/27/2025    CO2 29.0 05/27/2025     05/27/2025    CA 8.3 05/27/2025      CT scan of the chest-Positive for small volume bilateral segmental PE      Enlarging left upper lobe consolidation and few bilateral pulmonary nodules      Enlarging pericardial effusion now 1.7 centimeter thick.  Increasing left pleural effusion, new right pleural effusion     Srinath Levy MD  Medical Director, Critical Care, [facility]  Medical Director, [facility]  Pager: [phone]

## 2025-05-27 NOTE — PLAN OF CARE
Patient w/ poor appetite, minimal intake for lunch and dinner. Low temperature, md aware, improved after warming blankets applied. Complains of shortness of breath, o2 saturations in the 90's- attempted high humidity nasal cannula-pt. Refused, placed on high flow.       Problem: Diabetes/Glucose Control  Goal: Glucose maintained within prescribed range  Description: INTERVENTIONS:- Monitor Blood Glucose as ordered- Assess for signs and symptoms of hyperglycemia and hypoglycemia- Administer ordered medications to maintain glucose within target range- Assess barriers to adequate nutritional intake and initiate nutrition consult as needed- Instruct patient on self management of diabetes  Outcome: Not Progressing     Problem: Patient Centered Care  Goal: Patient preferences are identified and integrated in the patient's plan of care  Description: Interventions:- What would you like us to know as we care for you?   - Provide timely, complete, and accurate information to patient/family- Incorporate patient and family knowledge, values, beliefs, and cultural backgrounds into the planning and delivery of care- Encourage patient/family to participate in care and decision-making at the level they choose- Honor patient and family perspectives and choices  Outcome: Not Progressing     Problem: RISK FOR INFECTION - ADULT  Goal: Absence of fever/infection during anticipated neutropenic period  Description: INTERVENTIONS- Monitor WBC- Administer growth factors as ordered- Implement neutropenic guidelines  Outcome: Not Progressing     Problem: CARDIOVASCULAR - ADULT  Goal: Maintains optimal cardiac output and hemodynamic stability  Description: INTERVENTIONS:- Monitor vital signs, rhythm, and trends- Monitor for bleeding, hypotension and signs of decreased cardiac output- Evaluate effectiveness of vasoactive medications to optimize hemodynamic stability- Monitor arterial and/or venous puncture sites for bleeding and/or hematoma-  Assess quality of pulses, skin color and temperature- Assess for signs of decreased coronary artery perfusion - ex. Angina- Evaluate fluid balance, assess for edema, trend weights  Outcome: Not Progressing  Goal: Absence of cardiac arrhythmias or at baseline  Description: INTERVENTIONS:- Continuous cardiac monitoring, monitor vital signs, obtain 12 lead EKG if indicated- Evaluate effectiveness of antiarrhythmic and heart rate control medications as ordered- Initiate emergency measures for life threatening arrhythmias- Monitor electrolytes and administer replacement therapy as ordered  Outcome: Not Progressing     Problem: RESPIRATORY - ADULT  Goal: Achieves optimal ventilation and oxygenation  Description: INTERVENTIONS:- Assess for changes in respiratory status- Assess for changes in mentation and behavior- Position to facilitate oxygenation and minimize respiratory effort- Oxygen supplementation based on oxygen saturation or ABGs- Provide Smoking Cessation handout, if applicable- Encourage broncho-pulmonary hygiene including cough, deep breathe, Incentive Spirometry- Assess the need for suctioning and perform as needed- Assess and instruct to report SOB or any respiratory difficulty- Respiratory Therapy support as indicated- Manage/alleviate anxiety- Monitor for signs/symptoms of CO2 retention  Outcome: Not Progressing     Problem: SAFETY ADULT - FALL  Goal: Free from fall injury  Description: INTERVENTIONS:  - Assess pt frequently for physical needs  - Identify cognitive and physical deficits and behaviors that affect risk of falls.  - [place] fall precautions as indicated by assessment.  - Educate pt/family on patient safety including physical limitations  - Instruct pt to call for assistance with activity based on assessment  - Modify environment to reduce risk of injury  - Provide assistive devices as appropriate  - Consider OT/PT consult to assist with strengthening/mobility  - Encourage toileting  schedule  Outcome: Not Progressing     Problem: DISCHARGE PLANNING  Goal: Discharge to home or other facility with appropriate resources  Description: INTERVENTIONS:  - Identify barriers to discharge w/pt and caregiver  - Include patient/family/discharge partner in discharge planning  - Arrange for needed discharge resources and transportation as appropriate  - Identify discharge learning needs (meds, wound care, etc)  - Arrange for interpreters to assist at discharge as needed  - Consider post-discharge preferences of patient/family/discharge partner  - Complete POLST form as appropriate  - Assess patient's ability to be responsible for managing their own health  - Refer to Case Management Department for coordinating discharge planning if the patient needs post-hospital services based on physician/LIP order or complex needs related to functional status, cognitive ability or social support system  Outcome: Not Progressing     Problem: HEMATOLOGIC - ADULT  Goal: Free from bleeding injury  Description: (Example usage: patient with low platelets)  INTERVENTIONS:  - Avoid intramuscular injections, enemas and rectal medication administration  - Ensure safe mobilization of patient  - Hold pressure on venipuncture sites to achieve adequate hemostasis  - Assess for signs and symptoms of internal bleeding  - Monitor lab trends  - Patient is to report abnormal signs of bleeding to staff  - Avoid use of toothpicks and dental floss  - Use electric shaver for shaving  - Use soft bristle tooth brush  - Limit straining and forceful nose blowing  Outcome: Not Progressing  Goal: Maintains hematologic stability  Description: INTERVENTIONS  - Assess for signs and symptoms of bleeding or hemorrhage  - Monitor labs and vital signs for trends  - Administer supportive blood products/factors, fluids and medications as ordered and appropriate  - Administer supportive blood products/factors as ordered and appropriate  Outcome: Not  Progressing  Goal: Free from bleeding injury  Description: (Example usage: patient with low platelets)  INTERVENTIONS:  - Avoid intramuscular injections, enemas and rectal medication administration  - Ensure safe mobilization of patient  - Hold pressure on venipuncture sites to achieve adequate hemostasis  - Assess for signs and symptoms of internal bleeding  - Monitor lab trends  - Patient is to report abnormal signs of bleeding to staff  - Avoid use of toothpicks and dental floss  - Use electric shaver for shaving  - Use soft bristle tooth brush  - Limit straining and forceful nose blowing  Outcome: Not Progressing     Problem: GASTROINTESTINAL - ADULT  Goal: Minimal or absence of nausea and vomiting  Description: INTERVENTIONS:  - Maintain adequate hydration with IV or PO as ordered and tolerated  - Nasogastric tube to low intermittent suction as ordered  - Evaluate effectiveness of ordered antiemetic medications  - Provide nonpharmacologic comfort measures as appropriate  - Advance diet as tolerated, if ordered  - Obtain nutritional consult as needed  - Evaluate fluid balance  Outcome: Not Progressing  Goal: Maintains or returns to baseline bowel function  Description: INTERVENTIONS:  - Assess bowel function  - Maintain adequate hydration with IV or PO as ordered and tolerated  - Evaluate effectiveness of GI medications  - Encourage mobilization and activity  - Obtain nutritional consult as needed  - Establish a toileting routine/schedule  - Consider collaborating with pharmacy to review patient's medication profile  Outcome: Not Progressing  Goal: Maintains adequate nutritional intake (undernourished)  Description: INTERVENTIONS:  - Monitor percentage of each meal consumed  - Identify factors contributing to decreased intake, treat as appropriate  - Assist with meals as needed  - Monitor I&O, WT and lab values  - Obtain nutritional consult as needed  - Optimize oral hygiene and moisture  - Encourage food from  home; allow for food preferences  - Enhance eating environment  Outcome: Not Progressing     Problem: GENITOURINARY - ADULT  Goal: Absence of urinary retention  Description: INTERVENTIONS:  - Assess patient’s ability to void and empty bladder  - Monitor intake/output and perform bladder scan as needed  - Follow urinary retention protocol/standard of care  - Consider collaborating with pharmacy to review patient's medication profile  - Implement strategies to promote bladder emptying  Outcome: Not Progressing     Problem: METABOLIC/FLUID AND ELECTROLYTES - ADULT  Goal: Glucose maintained within prescribed range  Description: INTERVENTIONS:- Monitor Blood Glucose as ordered- Assess for signs and symptoms of hyperglycemia and hypoglycemia- Administer ordered medications to maintain glucose within target range- Assess barriers to adequate nutritional intake and initiate nutrition consult as needed- Instruct patient on self management of diabetes  Outcome: Not Progressing  Goal: Electrolytes maintained within normal limits  Description: INTERVENTIONS:  - Monitor labs and rhythm and assess patient for signs and symptoms of electrolyte imbalances  - Administer electrolyte replacement as ordered  - Monitor response to electrolyte replacements, including rhythm and repeat lab results as appropriate  - Fluid restriction as ordered  - Instruct patient on fluid and nutrition restrictions as appropriate  Outcome: Not Progressing  Goal: Hemodynamic stability and optimal renal function maintained  Description: INTERVENTIONS:  - Monitor labs and assess for signs and symptoms of volume excess or deficit  - Monitor intake, output and patient weight  - Monitor urine specific gravity, serum osmolarity and serum sodium as indicated or ordered  - Monitor response to interventions for patient's volume status, including labs, urine output, blood pressure (other measures as available)  - Encourage oral intake as appropriate  - Instruct  patient on fluid and nutrition restrictions as appropriate  Outcome: Not Progressing     Problem: SKIN/TISSUE INTEGRITY - ADULT  Goal: Skin integrity remains intact  Description: INTERVENTIONS  - Assess and document risk factors for pressure ulcer development  - Assess and document skin integrity  - Monitor for areas of redness and/or skin breakdown  - Initiate interventions, skin care algorithm/standards of care as needed  Outcome: Not Progressing  Goal: Incision(s), wounds(s) or drain site(s) healing without S/S of infection  Description: INTERVENTIONS:  - Assess and document risk factors for pressure ulcer development  - Assess and document skin integrity  - Assess and document dressing/incision, wound bed, drain sites and surrounding tissue  - Implement wound care per orders  - Initiate isolation precautions as appropriate  - Initiate Pressure Ulcer prevention bundle as indicated  Outcome: Not Progressing     Problem: MUSCULOSKELETAL - ADULT  Goal: Return mobility to safest level of function  Description: INTERVENTIONS:  - Assess patient stability and activity tolerance for standing, transferring and ambulating w/ or w/o assistive devices  - Assist with transfers and ambulation using safe patient handling equipment as needed  - Ensure adequate protection for wounds/incisions during mobilization  - Obtain PT/OT consults as needed  - Advance activity as appropriate  - Communicate ordered activity level and limitations with patient/family  Outcome: Not Progressing  Goal: Maintain proper alignment of affected body part  Description: INTERVENTIONS:  - Support and protect limb and body alignment per provider's orders  - Instruct and reinforce with patient and family use of appropriate assistive device and precautions (e.g. spinal or hip dislocation precautions)  Outcome: Not Progressing

## 2025-05-27 NOTE — OCCUPATIONAL THERAPY NOTE
Chart reviewed. Per nurse pt is SOB with chest xray pending. Will follow up at a later time as appropriate.    Chief complaint:   Chief Complaint   Patient presents with   • Follow-up     2 Month Visit       Vitals:  Visit Vitals  /84   Pulse 90   Resp 16   Ht 5' 6\" (1.676 m)   Wt 110 kg (242 lb 8.1 oz)   LMP  (LMP Unknown)   SpO2 98%   BMI 39.14 kg/m²       HISTORY OF PRESENT ILLNESS         Referring Provider: Dr Eliazar Mejia  Primary Provider:   No Pcp    Subjective:  Courtney Goldstein is a 61 year old female whom presents today for follow up examination. This 60-year-old who is a referral from Dr. Eliazar Mejia was found to have an abnormal Pap smear, ASCUS HPV positive and subsequent ECC and cervical biopsies which were read out as SOLIS 3 and CIS.  The ECC notably  Was positive.    She was originally evaluated on 11/1/2021 and chose to wait to have the LEEP procedure which is currently scheduled on 1/15/2022. She then returned on 01/03/2022 and was asking for a repeat Pap smear.  She has been delaying her surgery due to doing holistic methods such as intermittent fasting as well as a remedy which she inserted vaginally, the specifics of which were unclear to us to help with her dysplasia.    She then put off her scheduled LEEP in January. Eventually on 4/16/22 she proceeded with an exam under anesthesia Loop electrosurgical excision procedure followed by Top Hat followed by ECC equivalent of a cone.    Final Pathology revealed:  Pathologic Diagnosis  A.   Endocervical tissue, curettings:  -Scant benign cervical epithelium.  -No convincing dysplasia seen.     B.   Uterine cervical mucosa, LEEP cone biopsy:  -High-grade squamous intraepithelial lesion (SOLIS III) with endocervical glandular extension and multifocal microinvasion (<0.5 mm depth).  -Margins free of microinvasive carcinoma, SOLIS III focally extends to endocervical margin.  -No angiolymphatic invasion identified.     C.   Uterine cervical mucosa, Top-Hat portion, LEEP cone biopsy:  -Benign endocervix, negative for dysplasia or malignancy.  -Scant detached  free-floating fragment of dysplastic squamous epithelium.    She was then discussed at gyn case conference on 4/27/22 with the following recommendations:  Recommendations to be presented to patient:   -Observation versus simple hysterectomy    She chose to proceed with observation.     She was last seen on 6/15/22. At that time pap/HPV testing was performed which revealed:     ASCUS, -HR HPV.     She presents today for colposcopy and ECC.     She presents today  in compliance with her follow-up visit.  Patient denies any changes in her bladder or bowel habits, increase abdominal girth, early satiety, vaginal bleeding, vaginal discharge, melena, hematochezia, unexpected weight change or any other complaints at this time.  Patient has no other complaints at today's visit.      History of present illness as well as subsequent gynecologic care identified below in problem list.  Additional subjective affirmative responses noted in ROS.        Other significant problems:  Patient Active Problem List    Diagnosis Date Noted   • Malignant hyperthermia      Priority: Low       PAST MEDICAL, FAMILY AND SOCIAL HISTORY     Medications:  Current Outpatient Medications   Medication Sig Dispense Refill   • Pediatric Multiple Vit-Vit C (VITAMIN DAILY PO) Place under the tongue daily. Vitamin D3 - K2- A    Vitamin D - 2500 International units, Vit K - 50 mcg, Vitamin A - 900 mcg     • Multiple Vitamins-Minerals (MULTIVITAMIN WOMEN PO) Take 1 tablet by mouth daily. Plant sourced     • TART CHERRY PO Take 1,000 mg by mouth daily.     • ASHWAGANDHA PO Take 1 capsule by mouth daily. ashwagandha - 600 mg  Saffron 28 mg     • Ascorbic Acid (VITAMIN C PO) Take 240 mg by mouth daily.        No current facility-administered medications for this visit.       Allergies:  ALLERGIES:   Allergen Reactions   • Penicillins Other (See Comments)     Breathing , may cause death  Malignant Hyperthermia   • Codeine Other (See Comments)     Pt states  she's hypersensitive -  At full strength, caused lightheadedness, fainted, caused extreme constipation       Past Medical  History/Surgeries:  Past Medical History:   Diagnosis Date   • COVID-19 virus detected 01/12/2022    asymptomatic   • Deviated septum    • Elevated liver enzymes 01/2022   • Fracture     left thumb   • Hemorrhoids    • Malignant hyperthermia     Family Hx - cousin on mom's side - Son of Patient's uncle   • Medication intolerance     \"hypersensitive\"   • Pap smear of cervix with ASCUS, cannot exclude HGSIL    • Urinary incontinence        Past Surgical History:   Procedure Laterality Date   • Colposcopy bx cervix endocerv curr  10/19/2021   • Dental surgery  2021    Front tooth implant   • Nasal septum surgery  @ age 29    deviated septum       Family History:  Family History   Problem Relation Age of Onset   • Malignant Hyperthermia Maternal Cousin    • Diabetes Mother    • Heart disease Father    • Patient is unaware of any medical problems Sister    • Patient is unaware of any medical problems Brother    • Cancer Brother         2 brothers w/ prostate cancer       Social History:  Social History     Tobacco Use   • Smoking status: Never Smoker   • Smokeless tobacco: Never Used   Substance Use Topics   • Alcohol use: Not Currently     Alcohol/week: 0.0 - 1.0 standard drinks       REVIEW OF SYSTEMS     Review of Systems   Constitutional: Negative for chills, fatigue, fever and unexpected weight change.   HENT: Negative for congestion, mouth sores, postnasal drip, rhinorrhea and sneezing.    Eyes: Negative for visual disturbance.   Respiratory: Negative for cough, shortness of breath and wheezing.    Cardiovascular: Negative for chest pain and leg swelling.   Gastrointestinal: Negative for abdominal distention, abdominal pain, blood in stool, constipation, diarrhea, nausea and vomiting.   Genitourinary: Negative for decreased urine volume, difficulty urinating, dyspareunia, dysuria, frequency,  hematuria, pelvic pain, urgency, vaginal bleeding, vaginal discharge and vaginal pain.   Musculoskeletal: Negative for arthralgias and myalgias.   Skin: Negative for color change and rash.   Neurological: Negative for weakness, light-headedness, numbness and headaches.   Hematological: Negative for adenopathy. Does not bruise/bleed easily.   Psychiatric/Behavioral: Negative for dysphoric mood and sleep disturbance. The patient is not nervous/anxious.        PHYSICAL EXAM     Physical Exam  Vitals and nursing note reviewed.   Constitutional:       General: She is not in acute distress.     Appearance: She is well-developed.   HENT:      Head: Normocephalic and atraumatic.      Neck: Normal range of motion and neck supple.   Eyes:      Pupils: Pupils are equal, round, and reactive to light.   Neck:      Thyroid: No thyromegaly.   Cardiovascular:      Rate and Rhythm: Normal rate and regular rhythm.      Pulses: Normal pulses.      Heart sounds: Normal heart sounds.   Pulmonary:      Effort: Pulmonary effort is normal.      Breath sounds: Normal breath sounds.   Abdominal:      General: Bowel sounds are normal. There is no distension.      Palpations: Abdomen is soft.      Hernia: No hernia is present.   Genitourinary:     Exam position: Supine.      Labia:         Right: No rash, tenderness or lesion.         Left: No rash, tenderness or lesion.       Vagina: Normal. No vaginal discharge, erythema, tenderness or bleeding.      Cervix: No cervical motion tenderness, discharge or friability.      Uterus: Not tender.       Adnexa:         Right: No mass, tenderness or fullness.          Left: No mass, tenderness or fullness.        Rectum: Normal.         Musculoskeletal:         General: Normal range of motion.   Lymphadenopathy:      Cervical: No cervical adenopathy.      Upper Body:      Right upper body: No supraclavicular adenopathy.      Left upper body: No supraclavicular adenopathy.   Skin:     General: Skin is  warm and dry.      Findings: No erythema or rash.   Neurological:      Mental Status: She is alert and oriented to person, place, and time.   Psychiatric:         Speech: Speech normal.         Behavior: Behavior normal.         Thought Content: Thought content normal.         Judgment: Judgment normal.       Data Reviewed:  -OP note  -Pathology 04/16/2022  -case conference recommendations from 4/27/22  -Pap 6/15/22    ASSESSMENT/PLAN     Prabha Goldstein is a 61 year old female who was diagnosed with  biopsy proven SOLIS III and CIS.  She was originally I would by our service in November of 2021.  A LEEP procedure was recommended at that time however the patient chose to proceed with holistic methods including some sort of holistic medication she inserted vaginally as well as intermittent fasting in the hopes of regressing her dysplasia.  She did put off the recommended procedure for some time.  She is now status post recent procedure.    Data reviewed:  Surgical Pathology with results as identified above in the problem list  as well as operative and procedure note.  Surgical Date: 4/16/2022  Surgical Procedure:  exam under anesthesia Loop electrosurgical excision procedure followed by Top Hat followed by ECC equivalent of a cone.  Pathology:   High-grade squamous intraepithelial lesion (SOLIS III) with endocervical glandular extension and multifocal microinvasion (<0.5 mm depth).      Stage IA1 cervical cancer:  -pathology discussed in detail with the patient.  She is aware that her recent procedure does show multifocal microinvasion with a depth of <0.5mm  -A lengthy discussion was had with the patient explaining the recommendations from all disciplines at the GYN-Oncology Case Conference. I was present at the time of the case conference and recommendations at that time were as follows: observation vs simple hysterectomy.   -I have informed her that my recommendation is that she proceeds with simple hysterectomy but  if she chooses conservative options I would do q 3 month ECC, colpo and pap until 4 normal  approximately 30 min was spent discussing treatment options and recommendations   -patient again refuses hysterectomy at this time  - she did not want to proceed with ECC at her last visit  -Pap revealed ASCUS.    She presented today for pap, colposcopy and ECC.         All of the patients questions have been answered. She verbalizes an understanding and agreement of the above mentioned plan of care. She states she has no further questions at this time. She is certainly encouraged to contact our clinic should there be any additional questions, concerns or issues arise.    She will return to the clinic in 6  Month(s), sooner as needed,  She will need pap, colpo, ECC at that time    HEBERT Corbett    Patient seen and evaluated along with the mid-level provider. Participated in the care of the patient. Agree with the above assessment and plan.    Cervix cancer  Stage 1A1  Refuses ecc  She needs hyst  Understands risks but desires holistic approach  2 months    Abel Person MD

## 2025-05-27 NOTE — CM/SW NOTE
Spoke with daughter Theresa regarding discharge plan. She confirms she has the list of SNF options, she plans on calling a few today. She also needs to speak with patient regarding the discharge plan. She is still considering returning home w/ home health.    Theresa with updated team tomorrow.    Saray Pabon, MAIK [id number]

## 2025-05-27 NOTE — PROGRESS NOTES
Progress Note     Kelli Fisher Patient Status:  Inpatient    1955 MRN [id number]   Location [facility] 4W/SW/SE Attending Gloria Sandoval MD   Hosp Day # 15 PCP Taylor Gallardo MD     Subjective:   S: Patient got really SOB after sitting in chair yesterday, again this am SOB, CXR with congestion, will get IV Lasix, on 7 Litres of oxygen, at home on 5 Litres, she lives by herself and no one to help her.   Review of Systems:   10 point ROS completed and was negative, except for pertinent positive and negatives stated in subjective.    Objective:   Vital signs:  Temp:  [97.4 °F (36.3 °C)-98.1 °F (36.7 °C)] 97.8 °F (36.6 °C)  Pulse:  [70-83] 82  Resp:  [11-22] 15  BP: ()/(64-83) 118/80  SpO2:  [95 %-100 %] 100 %    Wt Readings from Last 6 Encounters:   25 171 lb 11.8 oz (77.9 kg)   25 169 lb (76.7 kg)   25 174 lb 9.6 oz (79.2 kg)   25 169 lb (76.7 kg)   23 197 lb (89.4 kg)         Physical Exam:    General: No acute distress. Alert ,         Respiratory: Clear to auscultation bilaterally. No wheezes. No rhonchi.  Cardiovascular: S1, S2. Regular rate and rhythm. No murmurs, rubs or gallops.   Abdomen: Soft, nontender, nondistended.  Positive bowel sounds. No rebound or guarding.  Neurologic: No focal neurological deficits.   Musculoskeletal: Moves all extremities.  Extremities: No edema.    Results:   Diagnostic Data:      Labs:    Labs Last 24 Hours:   BMP     CBC    Other     Na 145 Cl 107 BUN 19 Glu 124   Hb 7.8   PTT - Procal -   K 3.4 CO2 29.0 Cr 0.98   WBC 13.6 >< .0  INR - CRP -   Renal Lytes Endo    Hct 25.8   Trop - D dim -   eGFR - Ca 8.3 POC Gluc  120    LFT   pBNP - Lactic -   eGFR AA - PO4 - A1c -   AST - APk - Prot -  LDL -     Mg - TSH -   ALT - T makeda - Alb -        COVID-19 Lab Results    COVID-19  Lab Results   Component Value Date    COVID19 Not Detected 2025       Pro-Calcitonin  No results for input(s): \"PCT\" in the last 168  hours.    Cardiac  No results for input(s): \"TROP\", \"PBNP\" in the last 168 hours.      Creatinine Kinase  No results for input(s): \"CK\" in the last 168 hours.    Inflammatory Markers  No results for input(s): \"CRP\", \"ORVILLE\", \"LDH\", \"DDIMER\" in the last 168 hours.      Imaging: Imaging data reviewed in Epic.    Medications: Scheduled Medications[1]    Assessment & Plan:   ASSESSMENT / PLAN:     Afib RVR  Pericardial effusion  - cardiology consulted  - IV amio --> now on oral. Avoiding BB, CCB with h/o junctional rhythm   - cont monitor on tele  - no anticoagulation for now due to pancytopenia   - on 5/16 experienced RVR again with hypotension, received digoxin converted to NSR. Remained  hypotensive so sent to stepdown unit. Responded to fluid bolus eventually.   - Transferred back to medical floor, normotensive rates controlled.  - CXR still with pulm edema, cont diuresis as tolerated.    - repeat ECHO showing large pericardial effusion   -s/p Urgent pericardial window on 5/21  -plueral chest tube removed on 5/23. D/w CT surgery   -per Cards , limited echo tomorrow if pericardial drain removed today. Still 30 cc of fluid overnight.   Limited ECHO 5/25: normal left ventricle, EF 65-70%  -SOB and CXR with Bilateral Pleural effusion -> IV lasix today      BL PE: acute small segmental/right lower extremity DVT  S/p IVC 5/14/2025  Not on a/c due to thrombocytopenia  D/w Cardiology Dr Huerta, plans to resume once ok with hemeonc   Dr Peña -yessionc paged -> Ok with Heme onc to resume Eliquis once Plt >50K        Metastatic endometrial cancer    Pancytopenia  Acute on chronic anemia of chronic disease  - s/p 1 unit prbc  - neutropenic precautions  - started neupogen until ANC 1500  - Plan is for further chemotherapy once recovered clinically per Dr. Herring.  - cefepime and vanco started for neutropenia and patient with cough/chills, elevated LA, possible early sepsis - pulm following for this as well  -Pancytopenia  improving, monitor daily  - Overall plan of care is to continue to treat medically over the next several days and monitor for improvement.  If patient declines may consider palliative conversations.  If she improves overall plan is to resume chemotherapy in the future as planned by Dr. Herring.  - s/p 1 unit PRBC   -hb stable      Acute on chronic respiratory failure  - due to 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, pulm edema   - on 4-5L NC baseline  - pulmonary following, weaning oxygen as tolerated. Cont lasix.       Thrush  - nystatin    Deconditioning  PT OT      Trabnsfer to medsur floor today   Dispo: PT recommending EDUARDO, patient to discuss with family. Have previously refused  Pt lives by herself, continue sto be SOB with min exertion, now fluid overload, will benefit from EDUARDO    Son at bedside , d/w him current treatment and plan.     MDM: High   I personally spent time on chart/note review, review of labs/imaging, discussion with patient, physical exam, discussion with staff, consultants, coordinating care, writing progress note, and discussion of plan of care.      Inna Bullock MD    Supplementary Documentation:                         [1]    furosemide  40 mg Intravenous BID (Diuretic)    midodrine  10 mg Oral TID    [Held by provider] apixaban  5 mg Oral BID    pantoprazole  40 mg Intravenous Daily    amiodarone  400 mg Oral BID with meals    insulin aspart  1-5 Units Subcutaneous TID CC and HS    levothyroxine  100 mcg Oral Before breakfast

## 2025-05-27 NOTE — PHYSICAL THERAPY NOTE
Chart reviewed for PT treatment. Spoke with UNRULY Echavarria who reports pt is feeling SOB this morning and requesting we hold for now and check back this afternoon. Will check back.

## 2025-05-27 NOTE — PROGRESS NOTES
[facility]  part of [facility]    Cardiology Progress Note    Kelli Fisher Patient Status:  Inpatient    1955 MRN [id number]   Location [facility] 2W/SW Attending Inna Bullock MD   Hosp Day # 15 PCP Taylor Gallardo MD     Interval History   No CP    Assessment and Plan:   Pancytopenia, improving  Endometrial cancer with lung metastasis  Shock, resolved    Large pericardial effusion with tamponade  Pleural effusion s/p CT  -enlarging pericardial effusion with new signs c/w tamponade physiology on 25  -underwent pericardial window on 25  -etiology metastatic, cytology + adenoCa  -CT removed by CV surgery    Bilateral PE, s/p IVC filter  -initially not on AC due to severe anemia + thrombocytopenia, however counts have improved  -AC with apixaban 5mg bid on     Paroxysmal AF/AT  -developed rapid AF post pericardial window  -continue PO amiodarone, rate controlled  -AC, as above    Objective:     Patient Vitals for the past 24 hrs:   BP Temp Temp src Pulse Resp SpO2 Weight   25 1000 97/70 -- -- 76 19 100 % --   25 0900 91/71 -- -- 76 19 99 % --   25 0807 -- -- -- 71 22 99 % --   25 0800 97/75 97.9 °F (36.6 °C) Temporal 72 21 100 % --   25 0700 100/71 -- -- 72 18 100 % --   25 0600 101/70 -- -- 73 18 99 % 168 lb 14 oz (76.6 kg)   25 0500 (!) 82/61 -- -- 72 17 98 % --   25 0400 90/65 98.2 °F (36.8 °C) Temporal 65 14 98 % --   25 0300 94/65 -- -- 64 14 98 % --   25 0240 -- -- -- 68 18 100 % --   25 0200 92/62 -- -- 71 15 99 % --   25 0100 90/63 -- -- 71 15 100 % --   25 0000 107/73 98 °F (36.7 °C) Temporal 73 14 100 % --   25 2300 90/57 -- -- 84 19 96 % --   25 2200 (!) 135/93 -- -- 62 15 100 % --   25 98/72 -- -- 83 15 96 % --   25 -- -- -- 77 19 99 % --   25 10780 98.6 °F (37 °C) Temporal 80 14 100 % --   25 190 97/77 -- -- 88 23 100 % --    05/21/25 1830 -- -- -- -- -- 100 % --   05/21/25 1800 (!) 130/96 -- -- 87 10 100 % --   05/21/25 1730 -- -- -- 115 16 100 % --   05/21/25 1700 109/72 -- -- 107 25 99 % --   05/21/25 1645 -- -- -- (!) 124 23 100 % --   05/21/25 1630 -- -- -- 112 15 100 % --   05/21/25 1600 108/69 97.5 °F (36.4 °C) Temporal 104 21 100 % --   05/21/25 1545 -- -- -- 116 22 100 % --   05/21/25 1530 (!) 70/56 -- -- 103 19 100 % --   05/21/25 1515 (!) 87/65 -- -- 101 23 97 % --   05/21/25 1500 (!) 89/64 -- -- 112 26 98 % --   05/21/25 1426 113/75 98 °F (36.7 °C) -- 85 22 98 % --   05/21/25 1223 99/70 97.7 °F (36.5 °C) Oral 84 18 97 % --       Intake/Output:   Last 3 shifts:   Intake/Output                   05/20/25 0700 - 05/21/25 0659 05/21/25 0700 - 05/22/25 0659 05/22/25 0700 - 05/23/25 0659       Intake    P.O.  --  --  260    P.O. -- -- 260    I.V.  --  1071.2  75    I.V. -- 863 --    Volume (mL) Phenylephrine -- 177.6 75    Volume (mL) Dexmedetomidine -- 30.6 --    Blood  --  411.3  --    Volume (Transfuse RBC) -- 411.3 --    IV PIGGYBACK  100  --  100    Volume (mL) (ceFEPIme (Maxipime) 2 g in sodium chloride 0.9% 100mL IVPB-YANA) 100 -- --    Volume (mL) (acetaminophen (Ofirmev) 10 mg/mL infusion premix 1,000 mg) -- -- 100    Total Intake 100 1482.5 435       Output    Urine  1000  575  225    Incontinent Urine Occurrence 1 x -- --    Output (mL) (External Urinary Catheter) 1000 575 225    Emesis/NG output  --  --  0    Emesis -- -- 0    Other  --  500  --    Other -- 500 --    Stool  --  --  --    Stool Count Calculated for I/O 2 x -- --    Chest Tube  --  300  20    Output (mL) (Chest Tube 1 Anterior Pleural 28 Fr.) -- 50 10    Output (mL) (Chest Tube 1 Anterior Mediastinal 24 Fr.) -- 250 10    Total Output 1000 1375 245       Net I/O     -900 107.5 190             Vent Settings:      Hemodynamic parameters (last 24 hours):      Scheduled Meds: Scheduled Medications[1]    Continuous Infusions: Medication  Infusions[2]    Results:     Lab Results   Component Value Date    WBC 13.6 (H) 05/27/2025    HGB 7.8 (L) 05/27/2025    HCT 25.8 (L) 05/27/2025    .0 05/27/2025    CREATSERUM 0.98 05/27/2025    BUN 19 05/27/2025     05/27/2025    K 3.4 (L) 05/27/2025     05/27/2025    CO2 29.0 05/27/2025     (H) 05/27/2025    CA 8.3 (L) 05/27/2025    ALB 3.2 05/12/2025    ALKPHO 142 05/12/2025    BILT 0.7 05/12/2025    TP 6.8 05/12/2025    AST 26 05/12/2025    ALT 10 05/12/2025    PTT 37.0 (H) 05/21/2025    INR 1.31 (H) 05/22/2025    TSH 3.705 05/02/2025    LIP 42 12/11/2023    DDIMER 3.05 (H) 05/13/2025    MG 2.2 05/19/2025    B12 >2,000 (H) 05/02/2025       Recent Labs   Lab 05/23/25  0540 05/24/25  0542 05/25/25  0609 05/27/25  0440   * 126*  --  124*   BUN 26* 27*  --  19   CREATSERUM 1.11* 1.15*  --  0.98   CA 8.0* 8.1*  --  8.3*    142  --  145   K 3.6  3.6 3.5 3.9 3.4*    105  --  107   CO2 31.0 27.0  --  29.0     Recent Labs   Lab 05/25/25  0609 05/26/25  0435 05/27/25  0440   RBC 3.38* 2.97* 2.99*   HGB 9.1* 7.9* 7.8*   HCT 29.1* 26.0* 25.8*   MCV 86.1 87.5 86.3   MCH 26.9 26.6 26.1   MCHC 31.3 30.4* 30.2*   RDW 20.8* 20.9* 21.3*   NEPRELIM 14.17* 11.68* 11.10*   WBC 17.1* 14.2* 13.6*   .0 194.0 237.0       No results for input(s): \"BNPML\" in the last 168 hours.    No results for input(s): \"TROP\", \"CK\" in the last 168 hours.    XR CHEST AP PORTABLE  (CPT=71045)  Result Date: 5/27/2025  CONCLUSION:  1. Small bilateral pleural effusions have increased in size suggesting worsening mild CHF/fluid overload.  There is also worsening underlying passive atelectasis at both lung bases. 2. Stable left upper lobe consolidation, which may relate to a metastasis, atelectasis and/or superimposed pneumonia.     Dictated by (CST): Vincent Vazquez MD on 5/27/2025 at 9:59 AM     Finalized by (CST): Vincent Vazquez MD on 5/27/2025 at 10:02 AM                CARD ECHO LIMITED  (ZTC=[id number])  Result Date: 2025  Transthoracic Echocardiogram Name:Kelli Fisher Date: 2025 :  1955 Ht:  (60in)  BP: 98 / 72 MRN:  [id number]    Age:  70years    Wt:  (167lb) HR: 89bpm Loc:  [facility]       Gndr: F          BSA: 1.73m^2 Sonographer: Chris DOMINGUEZ Ordering:    Srinath Levy Consulting:  Trista Peña ---------------------------------------------------------------------------- History/Indications:  Reevaluate enlarging pericardial effusion. Acute respiratory failure with hypoxia. Shortness of breath. ---------------------------------------------------------------------------- Procedure information:  A transthoracic echocardiogram, limited study was performed. Additional evaluation included M-mode and limited 2D.  Patient status:  Inpatient.  Location:  Bedside.    Comparison was made to the study of 2025.    This was a routine study. Transthoracic echocardiography for diagnosis and ventricular function evaluation. Image quality was adequate. ECG rhythm:   Normal sinus ---------------------------------------------------------------------------- Conclusions: 1. Left ventricle: The cavity size was normal. Wall thickness was normal.    Systolic function was normal. The estimated ejection fraction was 55-60%,    by visual assessment. Unable to assess LV diastolic function. 2. Pericardium, extracardiac: A large, free-flowing pericardial effusion was    identified. There is RV collapse in early diastolic for less than 50% of    the cardiac cycle. There was evidence for mildly increased RV-LV    interaction demonstrated by respirophasic changes in transmitral    velocities (25% reduction in mitral valve e'wave velocity during    inspiration). There was a left pleural effusion. 3. Inferior vena cava: The IVC was dilated (23mm). Respirophasic diameter    changes are blunted (< 50%), consistent with elevated central venous    pressure. Impressions:  This study is compared with  previous dated 5/13/2025: The pericardial effusion has slightly increased, now with maximal dimensions in diastole of 20mm with evidence of early tamponade (25% reduction in mitral valve inflow velocity and right ventricle free wall collapse in early diastole). * ---------------------------------------------------------------------------- * Findings: Left ventricle:  The cavity size was normal. Wall thickness was normal. Systolic function was normal. The estimated ejection fraction was 55-60%, by visual assessment. Unable to assess LV diastolic function. Right ventricle:  The cavity size was normal. Systolic function was normal. Systolic pressure was not estimated. Mitral valve:  The leaflets were mildly calcified. Aortic valve:   The valve was trileaflet. The leaflets were mildly calcified. Pericardium:  A large, free-flowing pericardial effusion was identified. Doppler:  There is RV collapse in early diastolic for less than 50% of the cardiac cycle. There was evidence for mildly increased RV-LV interaction demonstrated by respirophasic changes in transmitral velocities (25% reduction in mitral valve e'wave velocity during inspiration). Pleura:  There was a left pleural effusion. Aorta: Aortic root: The aortic root was normal. Ascending aorta: The ascending aorta was normal. Systemic veins: Inferior vena cava: The IVC was dilated (23mm).  Respirophasic diameter changes are blunted (< 50%), consistent with elevated central venous pressure. ---------------------------------------------------------------------------- Measurements  Left ventricle          Value       Ref       05/13/2025  IVS thickness, ED,  (H) 1.0   cm    0.6 - 0.9 1.3  PLAX  LV ID, ED, PLAX     (L) 3.6   cm    3.8 - 5.2 4.0  LV ID, ES, PLAX         2.5   cm    2.2 - 3.5 2.4  LV PW thickness,    (H) 1.0   cm    0.6 - 0.9 1.1  ED, PLAX  IVS/LV PW ratio,        1.00        --------- 1.18  ED, PLAX  LV PW/LV ID ratio,      0.28        --------- 0.28   ED, PLAX  LV ejection             59    %     54 - 74   71  fraction  LVOT                    Value       Ref       2025  LVOT ID                 2     cm    --------- 2  Aortic root             Value       Ref       2025  Aortic root ID          3.3   cm    2.5 - 3.9 ----------  Ascending aorta         Value       Ref       2025  Ascending aorta ID  (H) 3.6   cm    1.9 - 3.5 ----------  Systemic veins          Value       Ref       2025  Estimated CVP           15    mm Hg --------- 15  Inferior vena cava      Value       Ref       2025  ID                  (H) 2.4   cm    <=2.1     2.3 Legend: (L)  and  (H)  corinna values outside specified reference range. ---------------------------------------------------------------------------- Prepared and electronically signed by Javi Huerta 2025 17:58     [employer] (CPT=93308/[id number])  Result Date: 2025  Transthoracic Echocardiogram Name:Kelli Fisher Date: 2025 :  1955 Ht:  (60in)  BP: 106 / 75 MRN:  [id number]    Age:  70years    Wt:  (167lb) HR: 85bpm Loc:  [facility]       Gndr: F          BSA: 1.73m^2 Sonographer: Naty [facility] [place] Ordering:    Margarette Sexton Consulting:  Dominick Herring ---------------------------------------------------------------------------- History/Indications:   SOB and known Pericardial Effusion. ---------------------------------------------------------------------------- Procedure information:  A transthoracic echocardiogram, limited study was performed. Additional evaluation included M-mode and limited 2D.  Patient status:  Inpatient.  Location:  Bedside.    Comparison was made to the study of 2025.    This was a routine study. Transthoracic echocardiography for diagnosis. Image quality was adequate. ---------------------------------------------------------------------------- Conclusions: 1. Left ventricle: The cavity size was normal. Wall thickness was mildly    increased. There was  mild concentric hypertrophy. Systolic function was    normal. The estimated ejection fraction was 55-60%, by visual assessment.    Unable to assess LV diastolic function. 2. Right ventricle: The cavity size was normal. Systolic function was    normal. 3. Pericardium, extracardiac: A moderate, free-flowing pericardial effusion    was identified. There was no evidence of hemodynamic compromise. 4. Systemic veins: Central venous respirophasic diameter changes are blunted    (< 50%). 5. Inferior vena cava: The IVC was dilated. * ---------------------------------------------------------------------------- * Findings: Left ventricle:  The cavity size was normal. Wall thickness was mildly increased. There was mild concentric hypertrophy. Systolic function was normal. The estimated ejection fraction was 55-60%, by visual assessment. Unable to assess LV diastolic function. Left atrium:  Well visualized. Right ventricle:  The cavity size was normal. Systolic function was normal. Right atrium:  Well visualized. Mitral valve:  Well visualized. Aortic valve:   The valve was trileaflet. Tricuspid valve:  The annulus is normal-sized. The leaflets are normal thickness. No echocardiographic evidence for tricuspid prolapse.  Doppler: Transvalvular velocity was within the normal range. There was no evidence for stenosis. There was mild regurgitation. Pulmonic valve:    Doppler:  There was trivial regurgitation. Pericardium:  A moderate, free-flowing pericardial effusion was identified. There was no evidence of hemodynamic compromise. Systemic veins:  Central venous respirophasic diameter changes are blunted (< 50%). Inferior vena cava: The IVC was dilated. ---------------------------------------------------------------------------- Measurements  Left ventricle         Value        Ref       05/05/2025  IVS thickness, ED, (H) 1.3   cm     0.6 - 0.9 1.0  PLAX  LV ID, ED, PLAX        4.0   cm     3.8 - 5.2 3.6  LV ID, ES, PLAX        2.4    cm     2.2 - 3.5 2.4  LV PW thickness,   (H) 1.1   cm     0.6 - 0.9 1.0  ED, PLAX  IVS/LV PW ratio,       1.18         --------- 1.00  ED, PLAX  LV PW/LV ID ratio,     0.28         --------- 0.28  ED, PLAX  LV ejection            71    %      54 - 74   63  fraction  Stroke volume/bsa,     35    ml/m^2 --------- ----------  2D  LVOT                   Value        Ref       05/05/2025  LVOT ID                2     cm     --------- 2  LVOT peak              1.21  m/sec  --------- ----------  velocity, S  LVOT VTI, S            19.4  cm     --------- ----------  LVOT peak              6     mm Hg  --------- ----------  gradient, S  LVOT mean              3     mm Hg  --------- ----------  gradient, S  Stroke volume          61    ml     --------- ----------  (SV), LVOT DP  Stroke index           35    ml/m^2 --------- ----------  (SV/bsa), LVOT DP  Pulmonary artery       Value        Ref       05/05/2025  PA pressure, S, DP     44    mm Hg  --------- ----------  Tricuspid valve        Value        Ref       05/05/2025  Tricuspid regurg       2.68  m/sec  <=2.8     ----------  peak velocity  Tricuspid peak         29    mm Hg  --------- ----------  RV-RA gradient  Systemic veins         Value        Ref       05/05/2025  Estimated CVP          15    mm Hg  --------- 15  Inferior vena cava     Value        Ref       05/05/2025  ID                 (H) 2.3   cm     <=2.1     2.4  Right ventricle        Value        Ref       05/05/2025  TAPSE, 2D              1.93  cm     >=1.70    ----------  TAPSE, MM              1.93  cm     >=1.70    ----------  RV pressure, S, DP     44    mm Hg  --------- ---------- Legend: (L)  and  (H)  corinna values outside specified reference range. ---------------------------------------------------------------------------- Prepared and electronically signed by Javi Huerta 05/13/2025 16:05     [employer] (CPT=93308/[id number])  Result Date: 5/5/2025  Transthoracic Echocardiogram  Name:Kelli Fisher Date: 2025 :  1955 Ht:  (60in)  BP: 142 / 86 MRN:  [id number]    Age:  70years    Wt:  (174lb) HR: 106bpm Loc:  [facility]       Gndr: F          BSA: 1.76m^2 Sonographer: Chris DOMINGUEZ Ordering:    Stella Gomez Consulting:  Khadar Segura ---------------------------------------------------------------------------- History/Indications:   Pericardial effusion. Lung mass. ---------------------------------------------------------------------------- Procedure information:  A transthoracic echocardiogram, limited study was performed. Additional evaluation included M-mode and limited 2D.  Patient status:  Inpatient.  Location:  Bedside.    Comparison was made to the study of 2025.    This was a routine study. Transthoracic echocardiography for diagnosis and ventricular function evaluation. Image quality was adequate. ECG rhythm:   Sinus tachycardia  Study completion:  There were no complications. ---------------------------------------------------------------------------- Conclusions: 1. Left ventricle: The cavity size was normal. Wall thickness was normal.    Systolic function was vigorous. The estimated ejection fraction was    65-70%, by biplane method of disks. No diagnostic evidence for regional    wall motion abnormalities. Unable to assess LV diastolic function. 2. Pericardium, extracardiac: A small to moderate, free-flowing pericardial    effusion was identified circumferential to the heart. Probably not    hemodynamically significant. Impressions:  This study is compared with previous dated 2025: No significant change since prior study. * ---------------------------------------------------------------------------- * Findings: Left ventricle:  The cavity size was normal. Wall thickness was normal. Systolic function was vigorous. The estimated ejection fraction was 65-70%, by biplane method of disks. No diagnostic evidence for regional wall motion abnormalities. Unable to assess  LV diastolic function. Ventricular septum:   Thickness was normal. Left atrium:  The left atrial volume was normal. Right ventricle:  Well visualized. The cavity size was normal. Systolic function was normal. Right atrium:  Well visualized. The atrium was normal in size. Mitral valve:  Well visualized. The leaflets were mildly calcified. Aortic valve:  Well visualized.  The valve was trileaflet. The leaflets were mildly calcified. Tricuspid valve:  Well visualized. Pulmonic valve:   Well visualized. Pericardium:  A small to moderate, free-flowing pericardial effusion was identified circumferential to the heart. Probably not hemodynamically significant. Aorta: Aortic root: The aortic root was normal. Ascending aorta: The ascending aorta was normal. Pulmonary arteries: Systolic pressure could not be accurately estimated. ---------------------------------------------------------------------------- Measurements  Left ventricle         Value        Ref       04/30/2025  IVS thickness, ED, (H) 1.0   cm     0.6 - 0.9 1.0  PLAX  LV ID, ED, PLAX    (L) 3.6   cm     3.8 - 5.2 3.9  LV ID, ES, PLAX        2.4   cm     2.2 - 3.5 2.5  LV PW thickness,   (H) 1.0   cm     0.6 - 0.9 1.0  ED, PLAX  IVS/LV PW ratio,       1.00         --------- 1.00  ED, PLAX  LV PW/LV ID ratio,     0.28         --------- 0.26  ED, PLAX  LV ejection            63    %      54 - 74   66  fraction  LV end-diastolic       62    ml     48 - 140  ----------  volume, 1-p A4C  LV ejection            65    %      46 - 78   ----------  fraction, 1-p A4C  Stroke volume, 1-p     40    ml     --------- ----------  A4C  LV end-diastolic       35    ml/m^2 30 - 82   ----------  volume/bsa, 1-p  A4C  Stroke volume/bsa,     23    ml/m^2 --------- ----------  1-p A4C  LV end-diastolic       61    ml     46 - 106  ----------  volume, 2-p  LV end-systolic        21    ml     14 - 42   ----------  volume, 2-p  LV ejection            65    %      54 - 74   ----------   fraction, 2-p  Stroke volume, 2-p     40    ml     --------- ----------  LV end-diastolic       35    ml/m^2 29 - 61   ----------  volume/bsa, 2-p  LV end-systolic        12    ml/m^2 8 - 24    ----------  volume/bsa, 2-p  Stroke volume/bsa,     22.5  ml/m^2 --------- ----------  2-p  LVOT                   Value        Ref       04/30/2025  LVOT ID                2     cm     --------- ----------  Aortic root            Value        Ref       04/30/2025  Aortic root ID         3.2   cm     2.5 - 4.0 ----------  Ascending aorta        Value        Ref       04/30/2025  Ascending aorta ID (H) 3.7   cm     1.9 - 3.5 ----------  Left atrium            Value        Ref       04/30/2025  LA ID, A-P, ES         2.7   cm     2.7 - 3.8 ----------  LA volume, S           43    ml     22 - 52   ----------  LA volume/bsa, S       24    ml/m^2 16 - 34   ----------  LA volume, ES, 1-p     39    ml     22 - 52   ----------  A4C  LA volume, ES, 1-p     46    ml     22 - 52   ----------  A2C  LA volume, ES, A/L     46    ml     --------- ----------  LA volume/bsa, ES,     26    ml/m^2 16 - 34   ----------  A/L  LA/aortic root         0.84         --------- ----------  ratio  Right atrium           Value        Ref       04/30/2025  RA ID, S-I, ES,        4.8   cm     3.4 - 5.3 ----------  A4C  RA ID/bsa, S-I,        2.7   cm/m^2 1.9 - 3.1 ----------  ES, A4C  RA area, ES, A4C       14    cm^2   10 - 18   ----------  RA volume, ES, 1-p     32    ml     --------- ----------  A4C  RA volume/bsa, ES,     18    ml/m^2 9 - 33    ----------  1-p A4C  Systemic veins         Value        Ref       04/30/2025  Estimated CVP          15    mm Hg  --------- ----------  Inferior vena cava     Value        Ref       04/30/2025  ID                 (H) 2.4   cm     <=2.1     2.3 Legend: (L)  and  (H)  corinna values outside specified reference range. ---------------------------------------------------------------------------- Prepared and  electronically signed by Margareth Haas 2025 12:36     CARD ECHO LIMITED (CPT=93308/[id number])  Result Date: 2025  Transthoracic Echocardiogram Name:Kelli Fisher Date: 2025 :  1955 Ht:  (60in)  BP: 106 / 74 MRN:  [id number]    Age:  70years    Wt:  (165lb) HR: 108bpm Loc:  [facility]       Gndr: F          BSA: 1.72m^2 Sonographer: James PINTO, RVT Ordering:    Javi Huerta Consulting:  Ector Byrnes ---------------------------------------------------------------------------- History/Indications:  Pericardial effusion. ---------------------------------------------------------------------------- Procedure information:  A transthoracic echocardiogram, limited study was performed. Additional evaluation included M-mode and limited 2D.  Patient status:  Inpatient.  Location:  Bedside.    Comparison was made to the study of 2025.    This was a routine study. Transthoracic echocardiography for ventricular function evaluation and assessment of pericardial effusion and hemodynamic status. Image quality was adequate. ECG rhythm:   Sinus tachycardia ---------------------------------------------------------------------------- Conclusions: 1. Left ventricle: The cavity size was normal. Wall thickness was mildly    increased. Systolic function was normal. The estimated ejection fraction    was 60-65%, by visual assessment. No diagnostic evidence for regional    wall motion abnormalities. Unable to assess LV diastolic function. 2. Pericardium, extracardiac: A small to moderate, free-flowing pericardial    effusion was identified circumferential to the heart. Maximal diameter in    diastole is 1.1cm. Features were not consistent with tamponade    physiology. 3. Inferior vena cava: The IVC was dilated. Respirophasic diameter changes    are blunted (< 50%), consistent with elevated central venous pressure. * ---------------------------------------------------------------------------- * Findings:  Left ventricle:  The cavity size was normal. Wall thickness was mildly increased. Systolic function was normal. The estimated ejection fraction was 60-65%, by visual assessment. No diagnostic evidence for regional wall motion abnormalities. Unable to assess LV diastolic function. Right ventricle:  The cavity size was normal. Mitral valve:  The valve was structurally normal. Aortic valve:  The valve was structurally normal. Tricuspid valve:  The valve is structurally normal. Pulmonic valve:   Not well visualized. Pericardium:  A small to moderate, free-flowing pericardial effusion was identified circumferential to the heart. Maximal diameter in diastole is 1.1cm.  Doppler:  Features were not consistent with tamponade physiology. Systemic veins: Inferior vena cava: The IVC was dilated.  Respirophasic diameter changes are blunted (< 50%), consistent with elevated central venous pressure. ---------------------------------------------------------------------------- Measurements  Left ventricle              Value    Ref      04/12/2025  IVS thickness, ED, PLAX (H) 1.0   cm 0.6 -    0.8                                       0.9  LV ID, ED, PLAX             3.9   cm 3.8 -    4.4                                       5.2  LV ID, ES, PLAX             2.5   cm 2.2 -    2.8                                       3.5  LV PW thickness, ED,    (H) 1.0   cm 0.6 -    0.7  PLAX                                 0.9  IVS/LV PW ratio, ED,        1.00     -------- 1.14  PLAX  LV PW/LV ID ratio, ED,      0.26     -------- 0.16  PLAX  LV ejection fraction        66    %  54 - 74  66  Inferior vena cava          Value    Ref      04/12/2025  ID                      (H) 2.3   cm <=2.1    2.7 Legend: (L)  and  (H)  corinna values outside specified reference range. ---------------------------------------------------------------------------- Prepared and electronically signed by Javi Huerta 04/30/2025 13:43        Exam:     Physical  Exam:  General: No apparent distress.   HEENT: Normocephalic, anicteric sclera, neck supple, no thyromegaly or adenopathy.  Neck: No JVD, carotids 2+, no bruits.  Cardiac: Irreg irreg  Lungs: Diminished BS  Abdomen: Soft, non-tender. No organosplenomegally, mass or rebound, BS-present.  Extremities: Without clubbing or cyanosis. No edema.    Neurologic: Alert and oriented, normal affect. No focal defects  Skin: Warm and dry.     Javi Huerta, [facility] [place]       [1]    furosemide  40 mg Intravenous BID (Diuretic)    midodrine  10 mg Oral TID    [Held by provider] apixaban  5 mg Oral BID    pantoprazole  40 mg Intravenous Daily    amiodarone  400 mg Oral BID with meals    insulin aspart  1-5 Units Subcutaneous TID CC and HS    levothyroxine  100 mcg Oral Before breakfast   [2]

## 2025-05-27 NOTE — PLAN OF CARE
Problem: Diabetes/Glucose Control  Goal: Glucose maintained within prescribed range  Description: INTERVENTIONS:- Monitor Blood Glucose as ordered- Assess for signs and symptoms of hyperglycemia and hypoglycemia- Administer ordered medications to maintain glucose within target range- Assess barriers to adequate nutritional intake and initiate nutrition consult as needed- Instruct patient on self management of diabetes  Outcome: Progressing     Problem: RISK FOR INFECTION - ADULT  Goal: Absence of fever/infection during anticipated neutropenic period  Description: INTERVENTIONS- Monitor WBC- Administer growth factors as ordered- Implement neutropenic guidelines  Outcome: Progressing     Problem: CARDIOVASCULAR - ADULT  Goal: Maintains optimal cardiac output and hemodynamic stability  Description: INTERVENTIONS:- Monitor vital signs, rhythm, and trends- Monitor for bleeding, hypotension and signs of decreased cardiac output- Evaluate effectiveness of vasoactive medications to optimize hemodynamic stability- Monitor arterial and/or venous puncture sites for bleeding and/or hematoma- Assess quality of pulses, skin color and temperature- Assess for signs of decreased coronary artery perfusion - ex. Angina- Evaluate fluid balance, assess for edema, trend weights  Outcome: Progressing  Goal: Absence of cardiac arrhythmias or at baseline  Description: INTERVENTIONS:- Continuous cardiac monitoring, monitor vital signs, obtain 12 lead EKG if indicated- Evaluate effectiveness of antiarrhythmic and heart rate control medications as ordered- Initiate emergency measures for life threatening arrhythmias- Monitor electrolytes and administer replacement therapy as ordered  Outcome: Progressing     Problem: SAFETY ADULT - FALL  Goal: Free from fall injury  Description: INTERVENTIONS:  - Assess pt frequently for physical needs  - Identify cognitive and physical deficits and behaviors that affect risk of falls.  - [place] fall  precautions as indicated by assessment.  - Educate pt/family on patient safety including physical limitations  - Instruct pt to call for assistance with activity based on assessment  - Modify environment to reduce risk of injury  - Provide assistive devices as appropriate  - Consider OT/PT consult to assist with strengthening/mobility  - Encourage toileting schedule  Outcome: Progressing     Problem: METABOLIC/FLUID AND ELECTROLYTES - ADULT  Goal: Glucose maintained within prescribed range  Description: INTERVENTIONS:- Monitor Blood Glucose as ordered- Assess for signs and symptoms of hyperglycemia and hypoglycemia- Administer ordered medications to maintain glucose within target range- Assess barriers to adequate nutritional intake and initiate nutrition consult as needed- Instruct patient on self management of diabetes  Outcome: Progressing

## 2025-05-28 ENCOUNTER — APPOINTMENT (OUTPATIENT)
Dept: GENERAL RADIOLOGY | Facility: HOSPITAL | Age: 70
DRG: 270 | End: 2025-05-28
Attending: HOSPITALIST
Payer: MEDICARE

## 2025-05-28 ENCOUNTER — APPOINTMENT (OUTPATIENT)
Dept: GENERAL RADIOLOGY | Facility: HOSPITAL | Age: 70
DRG: 270 | End: 2025-05-28
Attending: INTERNAL MEDICINE
Payer: MEDICARE

## 2025-05-28 LAB
ANION GAP SERPL CALC-SCNC: 7 MMOL/L (ref 0–18)
BASOPHILS # BLD AUTO: 0.05 X10(3) UL (ref 0–0.2)
BASOPHILS NFR BLD AUTO: 0.3 %
BUN BLD-MCNC: 18 MG/DL (ref 9–23)
BUN/CREAT SERPL: 17.3 (ref 10–20)
CALCIUM BLD-MCNC: 8.7 MG/DL (ref 8.7–10.4)
CHLORIDE SERPL-SCNC: 109 MMOL/L (ref 98–112)
CO2 SERPL-SCNC: 31 MMOL/L (ref 21–32)
CREAT BLD-MCNC: 1.04 MG/DL (ref 0.55–1.02)
DEPRECATED RDW RBC AUTO: 66.6 FL (ref 35.1–46.3)
EGFRCR SERPLBLD CKD-EPI 2021: 58 ML/MIN/1.73M2 (ref 60–?)
EOSINOPHIL # BLD AUTO: 0.13 X10(3) UL (ref 0–0.7)
EOSINOPHIL NFR BLD AUTO: 0.8 %
ERYTHROCYTE [DISTWIDTH] IN BLOOD BY AUTOMATED COUNT: 21.4 % (ref 11–15)
GLUCOSE BLD-MCNC: 127 MG/DL (ref 70–99)
GLUCOSE BLDC GLUCOMTR-MCNC: 136 MG/DL (ref 70–99)
GLUCOSE BLDC GLUCOMTR-MCNC: 154 MG/DL (ref 70–99)
GLUCOSE BLDC GLUCOMTR-MCNC: 160 MG/DL (ref 70–99)
HCT VFR BLD AUTO: 23.1 % (ref 35–48)
HGB BLD-MCNC: 7.1 G/DL (ref 12–16)
IMM GRANULOCYTES # BLD AUTO: 0.33 X10(3) UL (ref 0–1)
IMM GRANULOCYTES NFR BLD: 2.1 %
LACTATE SERPL-SCNC: 1 MMOL/L (ref 0.5–2)
LYMPHOCYTES # BLD AUTO: 1.03 X10(3) UL (ref 1–4)
LYMPHOCYTES NFR BLD AUTO: 6.7 %
MCH RBC QN AUTO: 26.5 PG (ref 26–34)
MCHC RBC AUTO-ENTMCNC: 30.7 G/DL (ref 31–37)
MCV RBC AUTO: 86.2 FL (ref 80–100)
MONOCYTES # BLD AUTO: 1.73 X10(3) UL (ref 0.1–1)
MONOCYTES NFR BLD AUTO: 11.2 %
NEUTROPHILS # BLD AUTO: 12.13 X10 (3) UL (ref 1.5–7.7)
NEUTROPHILS # BLD AUTO: 12.13 X10(3) UL (ref 1.5–7.7)
NEUTROPHILS NFR BLD AUTO: 78.9 %
OSMOLALITY SERPL CALC.SUM OF ELEC: 307 MOSM/KG (ref 275–295)
PLATELET # BLD AUTO: 240 10(3)UL (ref 150–450)
POTASSIUM SERPL-SCNC: 4.6 MMOL/L (ref 3.5–5.1)
RBC # BLD AUTO: 2.68 X10(6)UL (ref 3.8–5.3)
SODIUM SERPL-SCNC: 147 MMOL/L (ref 136–145)
WBC # BLD AUTO: 15.4 X10(3) UL (ref 4–11)

## 2025-05-28 PROCEDURE — 71045 X-RAY EXAM CHEST 1 VIEW: CPT | Performed by: INTERNAL MEDICINE

## 2025-05-28 PROCEDURE — 99233 SBSQ HOSP IP/OBS HIGH 50: CPT | Performed by: INTERNAL MEDICINE

## 2025-05-28 PROCEDURE — 71045 X-RAY EXAM CHEST 1 VIEW: CPT | Performed by: HOSPITALIST

## 2025-05-28 PROCEDURE — 99233 SBSQ HOSP IP/OBS HIGH 50: CPT | Performed by: HOSPITALIST

## 2025-05-28 RX ORDER — AMIODARONE HYDROCHLORIDE 200 MG/1
200 TABLET ORAL DAILY
Status: DISCONTINUED | OUTPATIENT
Start: 2025-05-29 | End: 2025-06-05

## 2025-05-28 RX ORDER — ACETAMINOPHEN 500 MG
1000 TABLET ORAL EVERY 6 HOURS PRN
Status: DISCONTINUED | OUTPATIENT
Start: 2025-05-28 | End: 2025-06-05

## 2025-05-28 RX ORDER — ECHINACEA PURPUREA EXTRACT 125 MG
1 TABLET ORAL
Status: DISCONTINUED | OUTPATIENT
Start: 2025-05-28 | End: 2025-06-05

## 2025-05-28 RX ORDER — FUROSEMIDE 10 MG/ML
40 INJECTION INTRAMUSCULAR; INTRAVENOUS
Status: DISCONTINUED | OUTPATIENT
Start: 2025-05-28 | End: 2025-05-30

## 2025-05-28 RX ORDER — PANTOPRAZOLE SODIUM 40 MG/1
40 TABLET, DELAYED RELEASE ORAL
Status: DISCONTINUED | OUTPATIENT
Start: 2025-05-29 | End: 2025-06-02

## 2025-05-28 RX ORDER — FUROSEMIDE 10 MG/ML
40 INJECTION INTRAMUSCULAR; INTRAVENOUS ONCE
Status: COMPLETED | OUTPATIENT
Start: 2025-05-28 | End: 2025-05-28

## 2025-05-28 NOTE — PROGRESS NOTES
Pulmonary Medicine Inpatient Progress Note                 Subjective:  Sitting in a chair. Appears comfortable but on supp 02  Breathing is ok       ALLERGIES:  Allergies[1]     MEDS:  Home Medications:  Medications Taking[2]  Scheduled Medication:  Scheduled Medications[3]  Continuous Infusing Medication:  Medication Infusions[4]  PRN Medications:  PRN Medications[5]       PHYSICAL EXAM:  /73 (BP Location: Right arm)   Pulse 94   Temp 97.4 °F (36.3 °C) (Oral)   Resp 20   Ht 5' (1.524 m)   Wt 171 lb 11.8 oz (77.9 kg)   SpO2 98%   BMI 33.54 kg/m²   CONSTITUTIONAL: alert, oriented, no apparent distress  HEENT: atraumatic normocephalic  MOUTH: mucous membranes are moist. No OP exudates  NECK/THROAT: no JVD. Trachea midline. No obvious thyromegaly  LUNG: clear upper b/l no wheezing,+ basilar crackles. Diminished bases. Chest symmetric with respiratory motion  HEART: regular rate and rhythm, no obvious murmers or gallops note. + tube  ABD: soft non tender. + bowel sounds. No organomegaly noted  EXT: no clubbing, cyanosis. 2+ b/l LE edema       IMAGES:  CXR 5/28/25  No significant overall change since the preceding examination.  Right greater than left bilateral pleural effusions with bilateral mid to lower lung opacities.     CXR 5/22/25  CONCLUSION: Stable left basal/perihilar pulmonary opacity which which could represent pneumonia or neoplasm.  Stable small bilateral pleural effusions slightly larger on the left.  Mild interstitial edema. Interval extubation        CXR 5/18/25  CONCLUSION:   Unchanged mild cardiomegaly.   Interstitial pulmonary edema, appearing slightly increased from 05/15/2025.   Unchanged moderate left pleural effusion associated basilar opacity.   Unchanged trace right pleural effusion with minimal associated atelectasis   Unchanged metastatic mediastinal/hilar adenopathy, better seen on cross-sectional imaging.     CXR 5/15/25  CONCLUSION:   1. The cardiac silhouette remains  enlarged, likely relating to the known pericardial effusion.  Small pleural effusions are also unchanged bilaterally suggesting mild CHF or fluid overload.  There is stable mild associated passive atelectasis at both   lung bases.   2. There is a history of metastatic endometrial carcinoma.  Stable left upper lobe/lingular consolidation may relate to a metastasis, atelectasis and/or pneumonia.  Unchanged prominence of the mediastinal and bilateral hilar contours is compatible with known underlying metastatic lymphadenopathy.         LABS:  Recent Labs   Lab 05/26/25  0435 05/27/25  0440 05/28/25  0600   RBC 2.97* 2.99* 2.68*   HGB 7.9* 7.8* 7.1*   HCT 26.0* 25.8* 23.1*   MCV 87.5 86.3 86.2   MCH 26.6 26.1 26.5   MCHC 30.4* 30.2* 30.7*   RDW 20.9* 21.3* 21.4*   NEPRELIM 11.68* 11.10* 12.13*   WBC 14.2* 13.6* 15.4*   .0 237.0 240.0       Recent Labs   Lab 05/24/25  0542 05/25/25  0609 05/27/25  0440 05/27/25  1706 05/28/25  0600   *  --  124*  --  127*   BUN 27*  --  19  --  18   CREATSERUM 1.15*  --  0.98  --  1.04*   EGFRCR 52*  --  62  --  58*   CA 8.1*  --  8.3*  --  8.7     --  145  --  147*   K 3.5   < > 3.4* 4.8 4.6     --  107  --  109   CO2 27.0  --  29.0  --  31.0    < > = values in this interval not displayed.       ASSESSMENT/PLAN:  Endometrial ca with lung mets  -oncology f/u    B/l pulmonary emboli and DVT  -unable to anticoagulate d/t thrombocytopenia  -oncology following  -s/p IVC filter placement by IR    Pericardial effusion-large with tamponade  -s/p pericardial window on 5/21/25  -chest tubes removed as per CV surgery   -f/u on fluid results-malignant    Pleural effusion  -try diuretics  -possible thoracentesis tomorrow if not improving. Hold eliquis for 48 hrs   -continue supp 02    P-afib  -cards following   -on amio and eliquis    GERD  -PPI     Proph  -DVT: eliquis-being held    Dispo  -full code     Thank you for the opportunity to care for Kelli Fisher.     DARIEL Banks  Jean Paul ANGELES, MPH  Pulmonary Critical Care Medicine  [facility]          [1]   Allergies  Allergen Reactions    Aspirin Tightness in Throat    Penicillins HIVES    Other OTHER (SEE COMMENTS)     Pt states IV steroid allergy, states it makes her breathing worse    [2]   Outpatient Medications Marked as Taking for the 5/12/25 encounter (Hospital Encounter)   Medication Sig Dispense Refill    levothyroxine (SYNTHROID) 100 MCG Oral Tab Take 1 tablet (100 mcg total) by mouth before breakfast.      Potassium Chloride ER 20 MEQ Oral Tab CR Take 1 tablet by mouth daily.      furosemide 20 MG Oral Tab Take 1 tablet (20 mg total) by mouth daily.      Levalbuterol HCl 1.25 MG/3ML Inhalation Nebu Soln Take 3 mL (1.25 mg total) by nebulization every 8 (eight) hours as needed for Wheezing or Shortness of Breath.      pantoprazole 40 MG Oral Tab EC Take 1 tablet (40 mg total) by mouth daily. 30 tablet 0    amiodarone 200 MG Oral Tab Take 1 tablet (200 mg total) by mouth in the morning, at noon, and at bedtime for 1 day, THEN 1 tablet (200 mg total) in the morning, at noon, and at bedtime. 93 tablet 0    Fluticasone Propionate  HFA (FLOVENT HFA) 220 MCG/ACT Inhalation Aerosol Inhale 1 puff into the lungs every 12 (twelve) hours. Rinse mouth following use.     [3]    midodrine  10 mg Oral TID    [Held by provider] apixaban  5 mg Oral BID    pantoprazole  40 mg Intravenous Daily    amiodarone  400 mg Oral BID with meals    insulin aspart  1-5 Units Subcutaneous TID CC and HS    levothyroxine  100 mcg Oral Before breakfast   [4] [5]   senna    docusate    acetaminophen    traMADol    ondansetron    metoclopramide    morphINE    acetaminophen **OR** acetaminophen **OR** [DISCONTINUED] acetaminophen    polyethylene glycol (PEG 3350)    ondansetron    glucose **OR** glucose **OR** glucose-vitamin C **OR** dextrose **OR** glucose **OR** glucose **OR** glucose-vitamin C    ipratropium-albuterol

## 2025-05-28 NOTE — PHYSICAL THERAPY NOTE
PHYSICAL THERAPY TREATMENT NOTE - INPATIENT     Room Number: 522/522-A       Presenting Problem: chest pain and respiratory failure with hypoxia, seen for re-evaluation as patient is now s/p pericardial window and drainage and placement of chest tube  Co-Morbidities : metastatic endometrial cancer with lung mets, anemia, paroxysmal SVT, small pericardial effusion    Problem List  Principal Problem:    Acute respiratory failure with hypoxia (HCC)  Active Problems:    Thrombocytopenia (HCC)    Azotemia    Pancytopenia (HCC)    Hyperglycemia    Atrial fibrillation with RVR (HCC)    Malnutrition (HCC)      PHYSICAL THERAPY ASSESSMENT   Patient demonstrates limited progress this session, goals  remain in progress.      Patient is requiring maximum assist and dependent as a result of the following impairments: decreased functional strength, decreased endurance/aerobic capacity, pain, impaired coordination, impaired motor planning, and medical status.     Patient continues to function below baseline with transfers, maintaining seated position, and standing prolonged periods.  Next session anticipate patient to progress bed mobility, transfers, and gait.  Physical Therapy will continue to follow patient for duration of hospitalization.    Patient continues to benefit from continued skilled PT services: to promote return to prior level of function and safety with continuous assistance and gradual rehabilitative therapy .    PLAN DURING HOSPITALIZATION  Nursing Mobility Recommendation : Lift Equipment  PT Device Recommendation: Rolling walker  PT Treatment Plan: Body mechanics, Energy conservation, Patient education, Gait training, Strengthening, Transfer training, Balance training  Frequency (Obs): 3-5x/week     SUBJECTIVE  I am cold    OBJECTIVE  Precautions: Chest tube to suction (RT Port)    WEIGHT BEARING RESTRICTION       PAIN ASSESSMENT   Ratin  Location: incison area  Management Techniques: Activity promotion,  Body mechanics, Breathing techniques, Repositioning    BALANCE  Static Sitting: Fair +  Dynamic Sitting: Fair -  Static Standing: Poor +  Dynamic Standing: Poor -    ACTIVITY TOLERANCE                          O2 WALK  Oxygen Therapy  SPO2% on Oxygen at Rest: 95  At rest oxygen flow (liters per minute): 5  SPO2% Ambulation on Oxygen: 97  Ambulation oxygen flow (liters per minute): 5    AM-PAC '6-Clicks' INPATIENT SHORT FORM - BASIC MOBILITY  How much difficulty does the patient currently have...  Patient Difficulty: Turning over in bed (including adjusting bedclothes, sheets and blankets)?: A Little   Patient Difficulty: Sitting down on and standing up from a chair with arms (e.g., wheelchair, bedside commode, etc.): A Lot   Patient Difficulty: Moving from lying on back to sitting on the side of the bed?: A Lot   How much help from another person does the patient currently need...   Help from Another: Moving to and from a bed to a chair (including a wheelchair)?: A Lot   Help from Another: Need to walk in hospital room?: Total   Help from Another: Climbing 3-5 steps with a railing?: A Lot     AM-PAC Score:  Raw Score: 12   Approx Degree of Impairment: 68.66%   Standardized Score (AM-PAC Scale): 35.33   CMS Modifier (G-Code): CL    FUNCTIONAL ABILITY STATUS  Not amb today only standing shannon max a x2 HHA   Rolling: NT  Supine to Sit: NT  Sit to Supine: NT  Sit to Stand: maximum assist    Skilled Therapy Provided: RN approved PT session. Pt sitting in chair, transfers by nursing staff by overhead lift. Instructed pt on seated thera exs with ble's to promote functional ability. Focus on correct deep breathing due pt on 5L02. Trail sit to stand, pt not able to stand up with RW, trail with b HHA with 2 people and pt able to stand up from chair. Working on standing shannon with leaning on RW. Pt standing ~2 min but declined to take steps. Pt position in chair with all needs in reach.     The patient's Approx Degree of  Impairment: 68.66% has been calculated based on documentation in the [facility] '6 clicks' Inpatient Daily Activity Short Form.  Research supports that patients with this level of impairment may benefit from gradual rehabilitative therapy.  Final disposition will be made by interdisciplinary medical team.    THERAPEUTIC EXERCISES  Lower Extremity Alternating marching  Ankle pumps  Knee extension     Position Sitting       Patient End of Session: Up in chair, Needs met, Call light within reach, RN aware of session/findings, All patient questions and concerns addressed    CURRENT GOALS   Goals to be met by: 6/6/25  Patient Goal Patient's self-stated goal is: none stated   Goal #1 Patient is able to demonstrate supine - sit EOB @ level: minimum assistance      Goal #1   Current Status  Progressing   Goal #2 Patient is able to demonstrate transfers Sit to/from Stand at assistance level: CGA with walker - rolling      Goal #2  Current Status  Progressing   Goal #3 Patient is able to ambulate 50 feet with assist device: walker - rolling at assistance level: CGA   Goal #3   Current Status  Progressing   Goal #4 Patient to demonstrate independence with home activity/exercise instructions provided to patient in preparation for discharge.   Goal #4   Current Status  Progressing       Therapeutic Activity: 14 minutes  Neuromuscular Re-education: 10 minutes

## 2025-05-28 NOTE — PLAN OF CARE
Problem: Diabetes/Glucose Control  Goal: Glucose maintained within prescribed range  Description: INTERVENTIONS:- Monitor Blood Glucose as ordered- Assess for signs and symptoms of hyperglycemia and hypoglycemia- Administer ordered medications to maintain glucose within target range- Assess barriers to adequate nutritional intake and initiate nutrition consult as needed- Instruct patient on self management of diabetes  Outcome: Progressing     Problem: Patient Centered Care  Goal: Patient preferences are identified and integrated in the patient's plan of care  Description: Interventions:- What would you like us to know as we care for you? From home alone. - Provide timely, complete, and accurate information to patient/family- Incorporate patient and family knowledge, values, beliefs, and cultural backgrounds into the planning and delivery of care- Encourage patient/family to participate in care and decision-making at the level they choose- Honor patient and family perspectives and choices  Outcome: Progressing     Problem: RISK FOR INFECTION - ADULT  Goal: Absence of fever/infection during anticipated neutropenic period  Description: INTERVENTIONS- Monitor WBC- Administer growth factors as ordered- Implement neutropenic guidelines  Outcome: Progressing     Problem: CARDIOVASCULAR - ADULT  Goal: Maintains optimal cardiac output and hemodynamic stability  Description: INTERVENTIONS:- Monitor vital signs, rhythm, and trends- Monitor for bleeding, hypotension and signs of decreased cardiac output- Evaluate effectiveness of vasoactive medications to optimize hemodynamic stability- Monitor arterial and/or venous puncture sites for bleeding and/or hematoma- Assess quality of pulses, skin color and temperature- Assess for signs of decreased coronary artery perfusion - ex. Angina- Evaluate fluid balance, assess for edema, trend weights  Outcome: Progressing  Goal: Absence of cardiac arrhythmias or at baseline  Description:  INTERVENTIONS:- Continuous cardiac monitoring, monitor vital signs, obtain 12 lead EKG if indicated- Evaluate effectiveness of antiarrhythmic and heart rate control medications as ordered- Initiate emergency measures for life threatening arrhythmias- Monitor electrolytes and administer replacement therapy as ordered  Outcome: Progressing     Problem: RESPIRATORY - ADULT  Goal: Achieves optimal ventilation and oxygenation  Description: INTERVENTIONS:- Assess for changes in respiratory status- Assess for changes in mentation and behavior- Position to facilitate oxygenation and minimize respiratory effort- Oxygen supplementation based on oxygen saturation or ABGs- Provide Smoking Cessation handout, if applicable- Encourage broncho-pulmonary hygiene including cough, deep breathe, Incentive Spirometry- Assess the need for suctioning and perform as needed- Assess and instruct to report SOB or any respiratory difficulty- Respiratory Therapy support as indicated- Manage/alleviate anxiety- Monitor for signs/symptoms of CO2 retention  Outcome: Progressing     Problem: SAFETY ADULT - FALL  Goal: Free from fall injury  Description: INTERVENTIONS:  - Assess pt frequently for physical needs  - Identify cognitive and physical deficits and behaviors that affect risk of falls.  - [place] fall precautions as indicated by assessment.  - Educate pt/family on patient safety including physical limitations  - Instruct pt to call for assistance with activity based on assessment  - Modify environment to reduce risk of injury  - Provide assistive devices as appropriate  - Consider OT/PT consult to assist with strengthening/mobility  - Encourage toileting schedule  Outcome: Progressing     Problem: DISCHARGE PLANNING  Goal: Discharge to home or other facility with appropriate resources  Description: INTERVENTIONS:  - Identify barriers to discharge w/pt and caregiver  - Include patient/family/discharge partner in discharge planning  -  Arrange for needed discharge resources and transportation as appropriate  - Identify discharge learning needs (meds, wound care, etc)  - Arrange for interpreters to assist at discharge as needed  - Consider post-discharge preferences of patient/family/discharge partner  - Complete POLST form as appropriate  - Assess patient's ability to be responsible for managing their own health  - Refer to Case Management Department for coordinating discharge planning if the patient needs post-hospital services based on physician/LIP order or complex needs related to functional status, cognitive ability or social support system  Outcome: Progressing     Problem: HEMATOLOGIC - ADULT  Goal: Free from bleeding injury  Description: (Example usage: patient with low platelets)  INTERVENTIONS:  - Avoid intramuscular injections, enemas and rectal medication administration  - Ensure safe mobilization of patient  - Hold pressure on venipuncture sites to achieve adequate hemostasis  - Assess for signs and symptoms of internal bleeding  - Monitor lab trends  - Patient is to report abnormal signs of bleeding to staff  - Avoid use of toothpicks and dental floss  - Use electric shaver for shaving  - Use soft bristle tooth brush  - Limit straining and forceful nose blowing  Outcome: Progressing  Goal: Maintains hematologic stability  Description: INTERVENTIONS  - Assess for signs and symptoms of bleeding or hemorrhage  - Monitor labs and vital signs for trends  - Administer supportive blood products/factors, fluids and medications as ordered and appropriate  - Administer supportive blood products/factors as ordered and appropriate  Outcome: Progressing  Goal: Free from bleeding injury  Description: (Example usage: patient with low platelets)  INTERVENTIONS:  - Avoid intramuscular injections, enemas and rectal medication administration  - Ensure safe mobilization of patient  - Hold pressure on venipuncture sites to achieve adequate hemostasis  -  Assess for signs and symptoms of internal bleeding  - Monitor lab trends  - Patient is to report abnormal signs of bleeding to staff  - Avoid use of toothpicks and dental floss  - Use electric shaver for shaving  - Use soft bristle tooth brush  - Limit straining and forceful nose blowing  Outcome: Progressing     Problem: GASTROINTESTINAL - ADULT  Goal: Minimal or absence of nausea and vomiting  Description: INTERVENTIONS:  - Maintain adequate hydration with IV or PO as ordered and tolerated  - Nasogastric tube to low intermittent suction as ordered  - Evaluate effectiveness of ordered antiemetic medications  - Provide nonpharmacologic comfort measures as appropriate  - Advance diet as tolerated, if ordered  - Obtain nutritional consult as needed  - Evaluate fluid balance  Outcome: Progressing  Goal: Maintains or returns to baseline bowel function  Description: INTERVENTIONS:  - Assess bowel function  - Maintain adequate hydration with IV or PO as ordered and tolerated  - Evaluate effectiveness of GI medications  - Encourage mobilization and activity  - Obtain nutritional consult as needed  - Establish a toileting routine/schedule  - Consider collaborating with pharmacy to review patient's medication profile  Outcome: Progressing  Goal: Maintains adequate nutritional intake (undernourished)  Description: INTERVENTIONS:  - Monitor percentage of each meal consumed  - Identify factors contributing to decreased intake, treat as appropriate  - Assist with meals as needed  - Monitor I&O, WT and lab values  - Obtain nutritional consult as needed  - Optimize oral hygiene and moisture  - Encourage food from home; allow for food preferences  - Enhance eating environment  Outcome: Progressing     Problem: GENITOURINARY - ADULT  Goal: Absence of urinary retention  Description: INTERVENTIONS:  - Assess patient’s ability to void and empty bladder  - Monitor intake/output and perform bladder scan as needed  - Follow urinary  retention protocol/standard of care  - Consider collaborating with pharmacy to review patient's medication profile  - Implement strategies to promote bladder emptying  Outcome: Progressing     Problem: METABOLIC/FLUID AND ELECTROLYTES - ADULT  Goal: Glucose maintained within prescribed range  Description: INTERVENTIONS:- Monitor Blood Glucose as ordered- Assess for signs and symptoms of hyperglycemia and hypoglycemia- Administer ordered medications to maintain glucose within target range- Assess barriers to adequate nutritional intake and initiate nutrition consult as needed- Instruct patient on self management of diabetes  Outcome: Progressing  Goal: Electrolytes maintained within normal limits  Description: INTERVENTIONS:  - Monitor labs and rhythm and assess patient for signs and symptoms of electrolyte imbalances  - Administer electrolyte replacement as ordered  - Monitor response to electrolyte replacements, including rhythm and repeat lab results as appropriate  - Fluid restriction as ordered  - Instruct patient on fluid and nutrition restrictions as appropriate  Outcome: Progressing  Goal: Hemodynamic stability and optimal renal function maintained  Description: INTERVENTIONS:  - Monitor labs and assess for signs and symptoms of volume excess or deficit  - Monitor intake, output and patient weight  - Monitor urine specific gravity, serum osmolarity and serum sodium as indicated or ordered  - Monitor response to interventions for patient's volume status, including labs, urine output, blood pressure (other measures as available)  - Encourage oral intake as appropriate  - Instruct patient on fluid and nutrition restrictions as appropriate  Outcome: Progressing     Problem: SKIN/TISSUE INTEGRITY - ADULT  Goal: Skin integrity remains intact  Description: INTERVENTIONS  - Assess and document risk factors for pressure ulcer development  - Assess and document skin integrity  - Monitor for areas of redness and/or skin  breakdown  - Initiate interventions, skin care algorithm/standards of care as needed  Outcome: Progressing  Goal: Incision(s), wounds(s) or drain site(s) healing without S/S of infection  Description: INTERVENTIONS:  - Assess and document risk factors for pressure ulcer development  - Assess and document skin integrity  - Assess and document dressing/incision, wound bed, drain sites and surrounding tissue  - Implement wound care per orders  - Initiate isolation precautions as appropriate  - Initiate Pressure Ulcer prevention bundle as indicated  Outcome: Progressing     Problem: MUSCULOSKELETAL - ADULT  Goal: Return mobility to safest level of function  Description: INTERVENTIONS:  - Assess patient stability and activity tolerance for standing, transferring and ambulating w/ or w/o assistive devices  - Assist with transfers and ambulation using safe patient handling equipment as needed  - Ensure adequate protection for wounds/incisions during mobilization  - Obtain PT/OT consults as needed  - Advance activity as appropriate  - Communicate ordered activity level and limitations with patient/family  Outcome: Progressing  Goal: Maintain proper alignment of affected body part  Description: INTERVENTIONS:  - Support and protect limb and body alignment per provider's orders  - Instruct and reinforce with patient and family use of appropriate assistive device and precautions (e.g. spinal or hip dislocation precautions)  Outcome: Progressing

## 2025-05-28 NOTE — PROGRESS NOTES
[facility]  part of [facility]    Progress Note    Kelli Fisher Patient Status:  Inpatient    1955 MRN [id number]   Location [facility]W Attending Lina Dueñas, DO   Hosp Day # 16 PCP Taylor Gallardo MD     Chief complaint sob     Subjective:   Kelli Fisher is a(n) 70 year old female Pt feels sob is better since admission.   Pt reports c/d alternating.       ROS:   No cp, sob   No c/d   No n/v     Objective:     Blood pressure 114/73, pulse 94, temperature 97.4 °F (36.3 °C), temperature source Oral, resp. rate 20, height 5' (1.524 m), weight 171 lb 11.8 oz (77.9 kg), SpO2 98%.      Intake/Output Summary (Last 24 hours) at 2025 1117  Last data filed at 2025 0059  Gross per 24 hour   Intake 110 ml   Output 1200 ml   Net -1090 ml       Patient Weight(s) for the past 336 hrs:   Weight   25 0625 171 lb 11.8 oz (77.9 kg)   25 0600 169 lb 12.1 oz (77 kg)   25 0500 171 lb 15.3 oz (78 kg)   25 0500 167 lb 8.8 oz (76 kg)   25 0600 168 lb 14 oz (76.6 kg)   25 0341 167 lb (75.8 kg)   25 0611 164 lb 10.9 oz (74.7 kg)   25 1600 156 lb 1.4 oz (70.8 kg)   25 0439 163 lb 8 oz (74.2 kg)   05/15/25 1243 154 lb 3.2 oz (69.9 kg)   05/15/25 0458 177 lb 11.2 oz (80.6 kg)           General appearance: alert, appears stated age and cooperative  Pulmonary:  decreased bs's at baess   Cardiovascular: S1, S2 normal, no murmur, click, rub or gallop, regular rate and rhythm  Abdominal: soft, non-tender; bowel sounds normal; no masses,  no organomegaly  Extremities: b/l swelling         Medicines:     Current Hospital Medications[1]                Blood Pressure and Cardiac Medications            amiodarone 200 MG Oral Tab            Medication Infusions[2]        Lab Results   Component Value Date    WBC 15.4 (H) 2025    HGB 7.1 (L) 2025    HCT 23.1 (L) 2025    .0 2025    CREATSERUM 1.04 (H) 2025    BUN 18  05/28/2025     (H) 05/28/2025    K 4.6 05/28/2025     05/28/2025    CO2 31.0 05/28/2025     (H) 05/28/2025    CA 8.7 05/28/2025    ALB 3.2 05/12/2025    ALKPHO 142 05/12/2025    BILT 0.7 05/12/2025    TP 6.8 05/12/2025    AST 26 05/12/2025    ALT 10 05/12/2025    PTT 37.0 (H) 05/21/2025    INR 1.31 (H) 05/22/2025    TSH 3.705 05/02/2025    LIP 42 12/11/2023    DDIMER 3.05 (H) 05/13/2025    MG 2.2 05/19/2025    B12 >2,000 (H) 05/02/2025       XR CHEST AP PORTABLE  (CPT=71045)  Result Date: 5/28/2025  CONCLUSION:   No significant overall change since the preceding examination.  Right greater than left bilateral pleural effusions with bilateral mid to lower lung opacities.    Dictated by (CST): Casey Diallo MD on 5/28/2025 at 7:57 AM     Finalized by (CST): Casey Diallo MD on 5/28/2025 at 7:59 AM          XR CHEST AP PORTABLE  (CPT=71045)  Result Date: 5/27/2025  CONCLUSION:  1. Small bilateral pleural effusions have increased in size suggesting worsening mild CHF/fluid overload.  There is also worsening underlying passive atelectasis at both lung bases. 2. Stable left upper lobe consolidation, which may relate to a metastasis, atelectasis and/or superimposed pneumonia.     Dictated by (CST): Vincent Vazquez MD on 5/27/2025 at 9:59 AM     Finalized by (CST): Vincent Vazquez MD on 5/27/2025 at 10:02 AM                Results:     CBC:    Lab Results   Component Value Date    WBC 15.4 (H) 05/28/2025    WBC 13.6 (H) 05/27/2025    WBC 14.2 (H) 05/26/2025     Lab Results   Component Value Date    HGB 7.1 (L) 05/28/2025    HGB 7.8 (L) 05/27/2025    HGB 7.9 (L) 05/26/2025      Lab Results   Component Value Date    .0 05/28/2025    .0 05/27/2025    .0 05/26/2025       Recent Labs   Lab 05/24/25  0542 05/25/25  0609 05/27/25  0440 05/27/25  1706 05/28/25  0600   *  --  124*  --  127*   BUN 27*  --  19  --  18   CREATSERUM 1.15*  --  0.98  --  1.04*   CA 8.1*  --   8.3*  --  8.7     --  145  --  147*   K 3.5   < > 3.4* 4.8 4.6     --  107  --  109   CO2 27.0  --  29.0  --  31.0    < > = values in this interval not displayed.           Assessment and Plan:      Afib RVR  Pericardial effusion  - sp pericardial window 5/21, chest tube out 5/23   - cardiology consulted  - IV amio --> now on oral. Avoiding BB, CCB with h/o junctional rhythm   - cont monitor on tele  - on 5/16 experienced RVR again with hypotension, received digoxin converted to NSR. Remained  hypotensive so sent to stepdown unit. Responded to fluid bolus and required jewel   - Transferred back to medical floor, normotensive rates controlled.  - CXR with b/l effusions, right side may need tap - discussed with pulm. Hold noac   - repeat ECHO showing large pericardial effusion ; f/u echo with no pericardial effusion   Limited ECHO 5/25: normal left ventricle, EF 65-70%  -SOB and CXR with Bilateral Pleural effusion -> IV lasix today 40 bid monitor output. Wts stable since admit     BL PE: acute small segmental/right lower extremity DVT  S/p IVC 5/14/2025  Was not on a/c due to thrombocytopenia  Now on eliquis but will hold for possible thora    Ok with Heme onc to resume Eliquis once Plt >50K         Metastatic endometrial cancer    Pancytopenia  Acute on chronic anemia of chronic disease  - s/p 1 unit prbc  - neutropenic precautions  - started neupogen until ANC 1500  - Plan is for further chemotherapy once recovered clinically per Dr. Herring.  - received 6 days of iv cefepime now off   - cefepime and vanco started for neutropenia and patient with cough/chills, elevated LA, possible early sepsis now off. Wbc 15. Afebrile. On 5 L    -Pancytopenia improving, monitor daily  - Overall plan of care is to continue to treat medically over the next several days and monitor for improvement.  If patient declines may consider palliative conversations.  If she improves overall plan is to resume chemotherapy in the  future as planned by Dr. Herring.  - s/p 1 unit PRBC   -hb stable      Acute on chronic respiratory failure  - due to 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, pulm edema   - on 4-5L NC baseline  - pulmonary following, weaning oxygen as tolerated. Cont lasix.        Thrush  - nystatin     Deconditioning  PT OT      Dispo: PT recommending EDUARDO, patient to discuss with family. Have previously refused    Pt lives by herself, continue to be SOB with min exertion, now fluid overload, will benefit from EDUARDO   .      MDM: High   I personally spent time on chart/note review, review of labs/imaging, discussion with patient, physical exam, discussion with staff, consultants, coordinating care, writing progress note, and discussion of plan of care.              Lina Dueñas,          Chart reviewed, including current vitals, notes, labs and imaging  Labs ordered and medications adjusted as outlined above  Coordinate care with care team/consultants  Discussed with patient results of tests, management plan as outlined above, and the need for ongoing hospitalization  D/w RN     [facility]        5/28/2025             Supplementary Documentation:   DVT Mechanical Prophylaxis: MACHELLE hose, SCDs, Early ambuation  DVT Pharmacologic Prophylaxis   Medication    [Held by provider] apixaban (Eliquis) tab 5 mg                Code Status: Full Code  Rogel: External urinary catheter in place  Rogel Duration (in days):   Central line:    JOSE:                             [1]   Current Facility-Administered Medications   Medication Dose Route Frequency    [START ON 5/29/2025] amiodarone (Pacerone) tab 200 mg  200 mg Oral Daily    furosemide (Lasix) 10 mg/mL injection 40 mg  40 mg Intravenous BID (Diuretic)    furosemide (Lasix) 10 mg/mL injection 40 mg  40 mg Intravenous Once    senna (Senokot) 8.8 MG/5ML oral syrup 17.6 mg  10 mL Per NG Tube Daily PRN    docusate (Colace) 50 MG/5ML oral solution 100 mg  100 mg Per NG Tube BID PRN     acetaminophen (Ofirmev) 10 mg/mL infusion premix 1,000 mg  1,000 mg Intravenous Q6H PRN    midodrine (ProAmatine) tab 10 mg  10 mg Oral TID    [Held by provider] apixaban (Eliquis) tab 5 mg  5 mg Oral BID    traMADol (Ultram) tab 50 mg  50 mg Oral Q6H PRN    ondansetron (Zofran) 4 MG/2ML injection 4 mg  4 mg Intravenous Q6H PRN    metoclopramide (Reglan) 5 mg/mL injection 5 mg  5 mg Intravenous Q8H PRN    morphINE PF 2 MG/ML injection 1 mg  1 mg Intravenous Q2H PRN    acetaminophen (Tylenol) 160 MG/5ML oral liquid 650 mg  650 mg Per NG Tube Q4H PRN    Or    acetaminophen (Tylenol) rectal suppository 650 mg  650 mg Rectal Q4H PRN    polyethylene glycol (PEG 3350) (Miralax) 17 g oral packet 17 g  17 g Per NG Tube Daily PRN    pantoprazole (Protonix) 40 mg in sodium chloride 0.9% PF 10 mL IV push  40 mg Intravenous Daily    insulin aspart (NovoLOG) 100 Units/mL FlexPen 1-5 Units  1-5 Units Subcutaneous TID CC and HS    ondansetron (Zofran) 4 MG/2ML injection 4 mg  4 mg Intravenous Q6H PRN    glucose (Dex4) 15 GM/59ML oral liquid 15 g  15 g Oral Q15 Min PRN    Or    glucose (Glutose) 40% oral gel 15 g  15 g Oral Q15 Min PRN    Or    glucose-vitamin C (Dex-4) chewable tab 4 tablet  4 tablet Oral Q15 Min PRN    Or    dextrose 50% injection 50 mL  50 mL Intravenous Q15 Min PRN    Or    glucose (Dex4) 15 GM/59ML oral liquid 30 g  30 g Oral Q15 Min PRN    Or    glucose (Glutose) 40% oral gel 30 g  30 g Oral Q15 Min PRN    Or    glucose-vitamin C (Dex-4) chewable tab 8 tablet  8 tablet Oral Q15 Min PRN    ipratropium-albuterol (Duoneb) 0.5-2.5 (3) MG/3ML inhalation solution 3 mL  3 mL Nebulization Q6H PRN    levothyroxine (Synthroid) tab 100 mcg  100 mcg Oral Before breakfast   [2]

## 2025-05-28 NOTE — OCCUPATIONAL THERAPY NOTE
Chart reviewed for OT session. RN approved pt participation in therapy. Pt received sitting up in recliner refusing therapy at this time due to fatigue. Pt offered chair level therex activities and ADL tasks. Pt continued to refuse; pt agreeable to therapist following-up for session this afternoon. Will follow-up for OT services as able and appropriate this afternoon.

## 2025-05-28 NOTE — PROGRESS NOTES
Progress Note  Kelli Fisher Patient Status:  Inpatient    1955 MRN [id number]   Location [facility] 3W/SW Attending Hina Bentley MD   Hosp Day # 16 PCP Taylor Gallardo MD     Subjective:  Denies cardiac complaints    Objective:  /73 (BP Location: Right arm)   Pulse 94   Temp 97.4 °F (36.3 °C) (Oral)   Resp 20   Ht 5' (1.524 m)   Wt 171 lb 11.8 oz (77.9 kg)   SpO2 98%   BMI 33.54 kg/m²     Telemetry: SR 80s-90s    Intake/Output:    Intake/Output Summary (Last 24 hours) at 2025 0957  Last data filed at 2025 0059  Gross per 24 hour   Intake 110 ml   Output 1200 ml   Net -1090 ml     Last 3 Weights   25 0625 171 lb 11.8 oz (77.9 kg)   25 0600 169 lb 12.1 oz (77 kg)   25 0500 171 lb 15.3 oz (78 kg)   25 0500 167 lb 8.8 oz (76 kg)   25 0600 168 lb 14 oz (76.6 kg)   25 0341 167 lb (75.8 kg)   25 0611 164 lb 10.9 oz (74.7 kg)   25 1600 156 lb 1.4 oz (70.8 kg)   25 0439 163 lb 8 oz (74.2 kg)   05/15/25 1243 154 lb 3.2 oz (69.9 kg)   05/15/25 0458 177 lb 11.2 oz (80.6 kg)   25 0645 166 lb 9.6 oz (75.6 kg)   25 0406 167 lb 9.6 oz (76 kg)   25 1736 165 lb 11.2 oz (75.2 kg)   25 1217 169 lb (76.7 kg)   25 0500 174 lb 9.6 oz (79.2 kg)   25 0549 175 lb 12.8 oz (79.7 kg)   25 1800 163 lb (73.9 kg)   258 165 lb (74.8 kg)     Labs:  Recent Labs   Lab 25  0542 25  0609 25  0440 25  1706 25  0600   *  --  124*  --  127*   BUN 27*  --  19  --  18   CREATSERUM 1.15*  --  0.98  --  1.04*   EGFRCR 52*  --  62  --  58*   CA 8.1*  --  8.3*  --  8.7     --  145  --  147*   K 3.5   < > 3.4* 4.8 4.6     --  107  --  109   CO2 27.0  --  29.0  --  31.0    < > = values in this interval not displayed.     Recent Labs   Lab 25  0435 25  0440 25  0600   RBC 2.97* 2.99* 2.68*   HGB 7.9* 7.8* 7.1*   HCT 26.0* 25.8* 23.1*   MCV 87.5  86.3 86.2   MCH 26.6 26.1 26.5   MCHC 30.4* 30.2* 30.7*   RDW 20.9* 21.3* 21.4*   NEPRELIM 11.68* 11.10* 12.13*   WBC 14.2* 13.6* 15.4*   .0 237.0 240.0     No results for input(s): \"TROP\", \"TROPHS\", \"CK\" in the last 168 hours.    No results found for: \"CHOLESTEROL\", \"HDL\", \"LDL\", \"TRIG\", \"NONHDL\", \"CHOLHDL\"  Lab Results   Component Value Date    DDIMER 3.05 (H) 05/13/2025     Lab Results   Component Value Date    TSH 3.705 05/02/2025     Review of Systems:   Constitutional: No fevers, chills, fatigue or night sweats.  Respiratory: Denies cough, wheezing or shortness of breath.  CV: Denies chest pain, palpitations, orthopnea, PND or dizziness.  GI: No nausea, vomiting or diarrhea. No blood in stools.    Physical Exam:   General: Alert, cooperative, ill-appearing appears older than stated age.  Neck: no JVD.  Lungs: diminished bilaterally  Chest wall: No tenderness or deformity.  Heart: Regular rate and rhythm, S1, S2 normal, no murmur, click, rub or gallop.  Abdomen: Soft, non-tender. Bowel sounds normal. No masses,  No organomegaly.  Extremities: Extremities normal, atraumatic, no cyanosis, 2+ BLE edema, 1+ BUE edema.  Pulses: 2+ and symmetric all extremities.  Neurologic: Grossly intact.    Medications:  Scheduled Medications[1]  Medication Infusions[2]    Assessment:    70 year old female with PMH of endometrial cancer with mets to lungs, anemia, PSVT, small pericardial effusion who presented to ER with dyspnea and palpitations. She was found to be in PAF/PAT and anemic with Hgb 7.3.     Paroxysmal atrial tachycardia/Atrial fibrillation   Hx PSVT during last admission with junctional rhythm noted while on BB  -PAF with RVR post pericardial window, now maintaining sinus  -amiodarone 400mg BID, dose started on 5/14, will transition to daily today  -no BB/CCB with previous junctional rhythm  -TSH unremarkable 3.705, s/p thyroidectomy  -eliquis on hold due to drop in hgb on 5/26, remains low 7.1  -LVEF  preserved on echo last admission  -CHADS-VASc= 3 for age, gender, DM    Dyspnea  Remains on 5L O2, 4L is baseline  -pericardial effusion on echo this admission, now s/p pericardial window 5/21 due to increase in size and concern for tamponade  -known lung mets  -worsening anemia (Hgb 9 on 5/25, dropped to 7 on 5/256, today 7.1)  -CXR on admission showed stable effusion, lymph node mets, Left mid/upper lobe mass, suboptimal inspiration  -PE on CT, D-dimer elevated 3  -s/p IV lasix x 2 doses 5/27 with 1.2L off, may need additional diuresis    Large pericardial effusion with tamponade  Echo on 5/5 revealed small to moderate pericardial effusion   -repeat echo this admission revealed large effusion with tamponade, required pressors,  2/2 malignancy  -BP stable on midodrine 10mg TID  -s/p pericardial window and chest tube placement  -pericardial fluid positive for malignancy  -BP stable off pressors    Bilateral PE/DVT s/p IVC filter  Initially not on OAC due to pancytopenia, eliquis resumed when labs improved  -now holding with sudden 2g drop in Hgb on 5/26, remains low 7.1  -will continue to hold eliquis    Endometrial cancer with mets to lungs/lymph nodes  Stage Ivb clear cell adenocarcinoma  -chemotherapy  -GYN following    Pancytopenia  Labs stabilized with improvement in hgb and plts, eliquis resumed, now holding due to recurrent drop in hgb  -plts stable  -recently started chemo, GYN following  -s/p 1u PRBC 5/14, goal to keep Hgb >7    Thrush  Sore throat  resolved    DM2-A1c 6.6    Hypothyroid-synthroid    Leukocytosis  WBC 15.4  -blood cultures drawn 5/12 negative  -sputum cx drawn 5/21 negative  -afebrile      Plan:  -decrease amiodarone to 200mg daily starting tomorrow  -start IV lasix 40mg BID, monitor strict I/Os, daily weights, daily BMP  -continue to hold eliquis with hgb 7.1  -Continue midodrine  -Further recs per pulm/ID/primary/gyn  -may need thoracentesis tomorrow, per pulm      Plan of care discussed  with patient, RN.        Margarette Sexton, MSN, APN, FNP-BC, CCK  05/28/25  9:58 AM  [phone] [place]  [phone] [place]        =======================================================  Note reviewed and labs reviewed.  Agree with above assessment and plan.  Possible thoracentesis given pleural effusion, increased O2 needs.   In regards to pericardial effusion with tamponade, she is s/p pericardial window with repeat TTE confirming resolution.   In regards to PAF, continue maintaining SR with amiodarone. Holding apixaban due to Hg downtrend (9.8 on 5/24 to 7.1 today).   I have personally performed the medical decision making in its entirety.   Javi Huerta DO           [1]    midodrine  10 mg Oral TID    [Held by provider] apixaban  5 mg Oral BID    pantoprazole  40 mg Intravenous Daily    amiodarone  400 mg Oral BID with meals    insulin aspart  1-5 Units Subcutaneous TID CC and HS    levothyroxine  100 mcg Oral Before breakfast   [2]

## 2025-05-28 NOTE — PAYOR COMM NOTE
--------------  CONTINUED STAY REVIEW    Payor: [employer] MED[employer] ADV PPO  Subscriber #:  [id number]  Authorization Number: [id number]    Admit date: 5/12/25  Admit time:  5:28 PM      5/24/25       S: Patient is now on Leon-Synephrine since SBP in 90s     Temp:  [98 °F (36.7 °C)-98.6 °F (37 °C)] 98.2 °F (36.8 °C)  Pulse:  [] 81  Resp:  [14-27] 16  BP: ()/(47-76) 88/72  SpO2:  [94 %-100 %] 100 %  AO: ()/(38-65) 97/50           Respiratory: Clear to auscultation bilaterally. No wheezes. No rhonchi.  Cardiovascular: S1, S2. Regular rate and rhythm. No murmurs, rubs or gallops.   Abdomen: Soft, nontender, nondistended.  Positive bowel sounds. No rebound or guarding.  Neurologic: No focal neurological deficits.   Musculoskeletal: Moves all extremities.  Extremities: No edema.    Labs Last 24 Hours:    BMP         CBC     Na 142 Cl 105 BUN 27 Glu 126     Hb 9.8     K 3.5 CO2 27.0 Cr 1.15     WBC 20.6 >< .0    [facility]       Hct 30.7      Ca 8.1 POC Gluc  134             [Scheduled Medications]   potassium chloride  40 mEq Intravenous Once    midodrine  10 mg Oral TID    apixaban  5 mg Oral BID    traMADol  50 mg Oral 2 times per day    senna  10 mL Per NG Tube BID    docusate  100 mg Per NG Tube BID    pantoprazole  40 mg Intravenous Daily    amiodarone  400 mg Oral BID with meals    insulin aspart  1-5 Units Subcutaneous TID CC and HS    levothyroxine  100 mcg Oral Before breakfast      Assessment & Plan:     Afib RVR  Pericardial effusion  - cardiology consulted  - IV amio --> now on oral. Avoiding BB, CCB with h/o junctional rhythm   - cont monitor on tele  - no anticoagulation for now due to pancytopenia   - on 5/16 experienced RVR again with hypotension, received digoxin converted to NSR. Remained  hypotensive so sent to stepdown unit. Responded to fluid bolus eventually.   - Transferred back to medical floor, normotensive rates controlled.  - CXR still with pulm edema, cont diuresis as  tolerated.    - repeat ECHO showing large pericardial effusion   -s/p Urgent pericardial window on 5/21  -plueral chest tube removed on 5/23. D/w CT surgery   -per Cards , limited echo tomorrow if pericardial drain removed today. Still 30 cc of fluid overnight.      BL PE: acute small segmental/right lower extremity DVT  S/p IVC 5/14/2025  Not on a/c due to thrombocytopenia  D/w Cardiology Dr Huerta, plans to resume once ok with hemeonc   Dr Peña -hemeonc paged -> Ok with Heme onc to resume Eliquis once Plt >50K         Metastatic endometrial cancer    Pancytopenia  Acute on chronic anemia of chronic disease  - s/p 1 unit prbc  - neutropenic precautions  - started neupogen until ANC 1500  - Plan is for further chemotherapy once recovered clinically per Dr. Herring.  - cefepime and vanco started for neutropenia and patient with cough/chills, elevated LA, possible early sepsis - pulm following for this as well  -Pancytopenia improving, monitor daily  - Overall plan of care is to continue to treat medically over the next several days and monitor for improvement.  If patient declines may consider palliative conversations.  If she improves overall plan is to resume chemotherapy in the future as planned by Dr. Herring.  - s/p 1 unit PRBC   -hb stable      Acute on chronic respiratory failure  - due to 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, pulm edema   - on 4-5L NC baseline  - pulmonary following, weaning oxygen as tolerated. Cont lasix.        Thrush  - nystatin                  5/25/25      S: Patient is now on Leon-Synephrine since SBP in 90s     Temp:  [98 °F (36.7 °C)-98.4 °F (36.9 °C)] 98.4 °F (36.9 °C)  Pulse:  [74-85] 80  Resp:  [10-21] 16  BP: ()/(60-77) 106/74  SpO2:  [89 %-100 %] 92 %  AO: ()/(53-65) 114/60   Labs Last 24 Hours:    BMP         CBC             Hb 9.1     K 3.9       WBC 17.1 >< .0     Endo       Hct 29.1       POC Gluc  128             [Scheduled Medications]   midodrine   10 mg Oral TID    apixaban  5 mg Oral BID    traMADol  50 mg Oral 2 times per day    senna  10 mL Per NG Tube BID    docusate  100 mg Per NG Tube BID    pantoprazole  40 mg Intravenous Daily    amiodarone  400 mg Oral BID with meals    insulin aspart  1-5 Units Subcutaneous TID CC and HS    levothyroxine  100 mcg Oral Before breakfast        Assessment & Plan:     Afib RVR  Pericardial effusion  - cardiology consulted  - IV amio --> now on oral. Avoiding BB, CCB with h/o junctional rhythm   - cont monitor on tele  - no anticoagulation for now due to pancytopenia   - on 5/16 experienced RVR again with hypotension, received digoxin converted to NSR. Remained  hypotensive so sent to stepdown unit. Responded to fluid bolus eventually.   - Transferred back to medical floor, normotensive rates controlled.  - CXR still with pulm edema, cont diuresis as tolerated.    - repeat ECHO showing large pericardial effusion   -s/p Urgent pericardial window on 5/21  -plueral chest tube removed on 5/23. D/w CT surgery   -per Cards , limited echo tomorrow if pericardial drain removed today. Still 30 cc of fluid overnight.      BL PE: acute small segmental/right lower extremity DVT  S/p IVC 5/14/2025  Not on a/c due to thrombocytopenia  D/w Cardiology Dr Huerta, plans to resume once ok with hemeonc   Dr Peña -hemeonc paged -> Ok with Heme onc to resume Eliquis once Plt >50K         Metastatic endometrial cancer    Pancytopenia  Acute on chronic anemia of chronic disease  - s/p 1 unit prbc  - neutropenic precautions  - started neupogen until ANC 1500  - Plan is for further chemotherapy once recovered clinically per Dr. Herring.  - cefepime and vanco started for neutropenia and patient with cough/chills, elevated LA, possible early sepsis - pulm following for this as well  -Pancytopenia improving, monitor daily  - Overall plan of care is to continue to treat medically over the next several days and monitor for improvement.  If patient  declines may consider palliative conversations.  If she improves overall plan is to resume chemotherapy in the future as planned by Dr. Herring.  - s/p 1 unit PRBC   -hb stable      Acute on chronic respiratory failure  - due to 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, pulm edema   - on 4-5L NC baseline  - pulmonary following, weaning oxygen as tolerated. Cont lasix.        Thrush  - nystatin                        5/26/25       S: Patient is off pressors, doing well today, walked with PT, eating fine,     Temp:  [97 °F (36.1 °C)-97.3 °F (36.3 °C)] 97.2 °F (36.2 °C)  Pulse:  [] 72  Resp:  [11-25] 11  BP: ()/(59-81) 108/67  SpO2:  [84 %-100 %] 100 %  AO: (84-95)/(42-51) 93/42     Respiratory: Clear to auscultation bilaterally. No wheezes. No rhonchi.  Cardiovascular: S1, S2. Regular rate and rhythm. No murmurs, rubs or gallops.   Abdomen: Soft, nontender, nondistended.  Positive bowel sounds. No rebound or guarding.  Neurologic: No focal neurological deficits.   Musculoskeletal: Moves all extremities.  Extremities: No edema.      Labs Last 24 Hours:       CBC          Hb 7.9        WBC 14.2 >< .0   Endo       Hct 26.0     POC Gluc  109       LFT        [Scheduled Medications]   midodrine  10 mg Oral TID    [Held by provider] apixaban  5 mg Oral BID    pantoprazole  40 mg Intravenous Daily    amiodarone  400 mg Oral BID with meals    insulin aspart  1-5 Units Subcutaneous TID CC and HS    levothyroxine  100 mcg Oral Before breakfast        Assessment & Plan:   Afib RVR  Pericardial effusion  - cardiology consulted  - IV amio --> now on oral. Avoiding BB, CCB with h/o junctional rhythm   - cont monitor on tele  - no anticoagulation for now due to pancytopenia   - on 5/16 experienced RVR again with hypotension, received digoxin converted to NSR. Remained  hypotensive so sent to stepdown unit. Responded to fluid bolus eventually.   - Transferred back to medical floor, normotensive rates controlled.  -  CXR still with pulm edema, cont diuresis as tolerated.    - repeat ECHO showing large pericardial effusion   -s/p Urgent pericardial window on 5/21  -plueral chest tube removed on 5/23. D/w CT surgery   -per Cards , limited echo tomorrow if pericardial drain removed today. Still 30 cc of fluid overnight.      BL PE: acute small segmental/right lower extremity DVT  S/p IVC 5/14/2025  Not on a/c due to thrombocytopenia  D/w Cardiology Dr Huerta, plans to resume once ok with hemeonc   Dr Peña -hemeonc paged -> Ok with Heme onc to resume Eliquis once Plt >50K         Metastatic endometrial cancer    Pancytopenia  Acute on chronic anemia of chronic disease  - s/p 1 unit prbc  - neutropenic precautions  - started neupogen until ANC 1500  - Plan is for further chemotherapy once recovered clinically per Dr. Herring.  - cefepime and vanco started for neutropenia and patient with cough/chills, elevated LA, possible early sepsis - pulm following for this as well  -Pancytopenia improving, monitor daily  - Overall plan of care is to continue to treat medically over the next several days and monitor for improvement.  If patient declines may consider palliative conversations.  If she improves overall plan is to resume chemotherapy in the future as planned by Dr. Herring.  - s/p 1 unit PRBC   -hb stable      Acute on chronic respiratory failur  - due to 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, pulm edema   - on 4-5L NC baseline  - pulmonary following, weaning oxygen as tolerated. Cont lasix.        Thrush  - nystatin                     5/27/25     S: Patient got really SOB after sitting in chair yesterday, again this am SOB, CXR with congestion, will get IV Lasix, on 7 Litres of oxygen, at home on 5 Litres, she lives by herself and no one to help her.     Temp:  [97.4 °F (36.3 °C)-98.1 °F (36.7 °C)] 97.8 °F (36.6 °C)  Pulse:  [70-83] 82  Resp:  [11-22] 15  BP: ()/(64-83) 118/80  SpO2:  [95 %-100 %] 100 %   Labs Last 24  Hours:                 BMP         CBC     Na 145 Cl 107 BUN 19 Glu 124     Hb 7.8     K 3.4 CO2 29.0 Cr 0.98     WBC 13.6 >< .0    [facility]       Hct 25.8      Ca 8.3 POC Gluc  120               [Scheduled Medications]   furosemide  40 mg Intravenous BID (Diuretic)    midodrine  10 mg Oral TID    [Held by provider] apixaban  5 mg Oral BID    pantoprazole  40 mg Intravenous Daily    amiodarone  400 mg Oral BID with meals    insulin aspart  1-5 Units Subcutaneous TID CC and HS    levothyroxine  100 mcg Oral Before breakfast      Assessment & Plan:     Afib RVR  Pericardial effusion  - cardiology consulted  - IV amio --> now on oral. Avoiding BB, CCB with h/o junctional rhythm   - cont monitor on tele  - no anticoagulation for now due to pancytopenia   - on 5/16 experienced RVR again with hypotension, received digoxin converted to NSR. Remained  hypotensive so sent to stepdown unit. Responded to fluid bolus eventually.   - Transferred back to medical floor, normotensive rates controlled.  - CXR still with pulm edema, cont diuresis as tolerated.    - repeat ECHO showing large pericardial effusion   -s/p Urgent pericardial window on 5/21  -plueral chest tube removed on 5/23. D/w CT surgery   -per Cards , limited echo tomorrow if pericardial drain removed today. Still 30 cc of fluid overnight.   Limited ECHO 5/25: normal left ventricle, EF 65-70%  -SOB and CXR with Bilateral Pleural effusion -> IV lasix today      BL PE: acute small segmental/right lower extremity DVT  S/p IVC 5/14/2025  Not on a/c due to thrombocytopenia  D/w Cardiology Dr Huerta, plans to resume once ok with hemeonc   Dr Peña -yessionc paged -> Ok with Heme onc to resume Eliquis once Plt >50K         Metastatic endometrial cancer    Pancytopenia  Acute on chronic anemia of chronic disease  - s/p 1 unit prbc  - neutropenic precautions  - started neupogen until ANC 1500  - Plan is for further chemotherapy once recovered clinically per   Nevaeh.  - cefepime and vanco started for neutropenia and patient with cough/chills, elevated LA, possible early sepsis - pulm following for this as well  -Pancytopenia improving, monitor daily  - Overall plan of care is to continue to treat medically over the next several days and monitor for improvement.  If patient declines may consider palliative conversations.  If she improves overall plan is to resume chemotherapy in the future as planned by Dr. Herring.  - s/p 1 unit PRBC   -hb stable      Acute on chronic respiratory failure  - due to 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, pulm edema   - on 4-5L NC baseline  - pulmonary following, weaning oxygen as tolerated. Cont lasix.        Thrush  - nystatin                   5/28/25       /73 (BP Location: Right arm)   Pulse 94   Temp 97.4 °F (36.3 °C) (Oral)   Resp 20   Ht 5' (1.524 m)   Wt 171 lb 11.8 oz (77.9 kg)   SpO2 98%   BMI 33.54 kg/m²      Telemetry: SR 80s-90s     Lab 05/24/25  0542 05/27/25  0440 05/27/25  1706 05/28/25  0600   * 124*  --  127*   BUN 27* 19  --  18   CREATSERUM 1.15* 0.98  --  1.04*   EGFRCR 52* 62  --  58*   CA 8.1* 8.3*  --  8.7    145  --  147*   K 3.5 3.4* 4.8 4.6    107  --  109   CO2 27.0 29.0  --  31.0     Lab 05/26/25  0435 05/27/25  0440 05/28/25  0600   RBC 2.97* 2.99* 2.68*   HGB 7.9* 7.8* 7.1*   HCT 26.0* 25.8* 23.1*   MCV 87.5 86.3 86.2   MCH 26.6 26.1 26.5   MCHC 30.4* 30.2* 30.7*   RDW 20.9* 21.3* 21.4*   NEPRELIM 11.68* 11.10* 12.13*   WBC 14.2* 13.6* 15.4*   .0 237.0 240.0     General: Alert, cooperative, ill-appearing appears older than stated age.  Neck: no JVD.  Lungs: diminished bilaterally  Chest wall: No tenderness or deformity.  Heart: Regular rate and rhythm, S1, S2 normal, no murmur, click, rub or gallop.  Abdomen: Soft, non-tender. Bowel sounds normal. No masses,  No organomegaly.  Extremities: Extremities normal, atraumatic, no cyanosis, 2+ BLE edema, 1+ BUE  edema.  Pulses: 2+ and symmetric all extremities.  Neurologic: Grossly intact.      Assessment:     70 year old female with PMH of endometrial cancer with mets to lungs, anemia, PSVT, small pericardial effusion who presented to ER with dyspnea and palpitations. She was found to be in PAF/PAT and anemic with Hgb 7.3.      Paroxysmal atrial tachycardia/Atrial fibrillation   Hx PSVT during last admission with junctional rhythm noted while on BB  -PAF with RVR post pericardial window, now maintaining sinus  -amiodarone 400mg BID, dose started on 5/14, will transition to daily today  -no BB/CCB with previous junctional rhythm  -TSH unremarkable 3.705, s/p thyroidectomy  -eliquis on hold due to drop in hgb on 5/26, remains low 7.1  -LVEF preserved on echo last admission  -CHADS-VASc= 3 for age, gender, DM     Dyspnea  Remains on 5L O2, 4L is baseline  -pericardial effusion on echo this admission, now s/p pericardial window 5/21 due to increase in size and concern for tamponade  -known lung mets  -worsening anemia (Hgb 9 on 5/25, dropped to 7 on 5/256, today 7.1)  -CXR on admission showed stable effusion, lymph node mets, Left mid/upper lobe mass, suboptimal inspiration  -PE on CT, D-dimer elevated 3  -s/p IV lasix x 2 doses 5/27 with 1.2L off, may need additional diuresis     Large pericardial effusion with tamponade  Echo on 5/5 revealed small to moderate pericardial effusion   -repeat echo this admission revealed large effusion with tamponade, required pressors,  2/2 malignancy  -BP stable on midodrine 10mg TID  -s/p pericardial window and chest tube placement  -pericardial fluid positive for malignancy  -BP stable off pressors     Bilateral PE/DVT s/p IVC filter  Initially not on OAC due to pancytopenia, eliquis resumed when labs improved  -now holding with sudden 2g drop in Hgb on 5/26, remains low 7.1  -will continue to hold eliquis     Endometrial cancer with mets to lungs/lymph nodes  Stage Ivb clear cell  adenocarcinoma  -chemotherapy  -GYN following     Pancytopenia  Labs stabilized with improvement in hgb and plts, eliquis resumed, now holding due to recurrent drop in hgb  -plts stable  -recently started chemo, GYN following  -s/p 1u PRBC 5/14, goal to keep Hgb >7     Thrush  Sore throat  resolved     DM2-A1c 6.6     Hypothyroid-synthroid     Leukocytosis  WBC 15.4  -blood cultures drawn 5/12 negative  -sputum cx drawn 5/21 negative  -afebrile        Plan:  -decrease amiodarone to 200mg daily starting tomorrow  -start IV lasix 40mg BID, monitor strict I/Os, daily weights, daily BMP  -continue to hold eliquis with hgb 7.1  -Continue midodrine  -Further recs per pulm/ID/primary/gyn  -may need thoracentesis tomorrow, per pulm                          MEDICATIONS ADMINISTERED IN LAST 1 DAY:  amiodarone (Pacerone) tab 400 mg       Date Action Dose Route User    5/28/2025 0842 Given 400 mg Oral Lucrecia Raya RN    5/27/2025 1818 Given 400 mg Oral Day Botello RN          furosemide (Lasix) 10 mg/mL injection 40 mg       Date Action Dose Route User    5/27/2025 2036 Given 40 mg Intravenous Tj Castillo RN          furosemide (Lasix) 10 mg/mL injection 40 mg       Date Action Dose Route User    5/28/2025 1215 Given 40 mg Intravenous Lucrecia Raya RN          levothyroxine (Synthroid) tab 100 mcg       Date Action Dose Route User    5/28/2025 0608 Given 100 mcg Oral Tj Castillo RN          midodrine (ProAmatine) tab 10 mg       Date Action Dose Route User    5/28/2025 1215 Given 10 mg Oral Lucrecia Raya RN    5/28/2025 0608 Given 10 mg Oral Tj Castillo RN    5/27/2025 1818 Given 10 mg Oral Day Botello RN          pantoprazole (Protonix) 40 mg in sodium chloride 0.9% PF 10 mL IV push       Date Action Dose Route User    5/28/2025 0842 Given 40 mg Intravenous Lucrecia Raya RN            Vitals (last day)       Date/Time Temp Pulse Resp BP SpO2 Weight O2 Device O2 Flow Rate (L/min)  Who    05/28/25 1230 -- 81 -- -- 100 % -- High flow nasal cannula 4 L/min BL    05/28/25 1212 97.3 °F (36.3 °C) 93 22 99/64 97 % -- High flow nasal cannula 5 L/min BL    05/28/25 0902 -- 94 -- -- 98 % -- High flow nasal cannula 5 L/min BL    05/28/25 0839 97.4 °F (36.3 °C) 87 20 114/73 100 % -- High flow nasal cannula 7 L/min BL    05/28/25 0604 97.4 °F (36.3 °C) 92 20 111/73 100 % -- High flow nasal cannula 7 L/min ZP    05/28/25 0101 97.4 °F (36.3 °C) 102 20 129/77 100 % -- High flow nasal cannula 7 L/min ZP    05/27/25 2024 97.3 °F (36.3 °C) 92 20 132/75 100 % -- High flow nasal cannula 5 L/min ZP    05/27/25 1816 97.4 °F (36.3 °C) 82 24 102/62 98 % -- High flow nasal cannula 5 L/min CG    05/27/25 1731 -- -- -- -- 97 % -- High flow nasal cannula 5 L/min SP    05/27/25 1644 94.5 °F (34.7 °C) -- -- -- -- -- -- -- CG    05/27/25 1418 -- 93 24 126/79 96 % -- Nasal cannula 9 L/min CG    05/27/25 1200 97.5 °F (36.4 °C) 85 22 125/77 100 % -- -- -- MN    05/27/25 0800 98 °F (36.7 °C) 89 16 114/74 98 % -- High flow nasal cannula 7 L/min MN    05/27/25 0400 97.8 °F (36.6 °C) 82 15 118/80 100 % -- High flow nasal cannula 7 L/min MA    05/27/25 0000 97.5 °F (36.4 °C) 76 11 119/83 100 % -- High flow nasal cannula 7 L/min MA     Blood Transfusion Record       Product Unit Status Volume Start End            Transfuse RBC       25  [id number]  [id number] Completed 05/22/25 0708 411.25 mL 05/21/25 1208 05/21/25 1500       25  [id number]  [id number] Completed 05/13/25 1431 335 mL 05/13/25 1144 05/13/25 1428                Transfuse platelets      25  [id number]  [id number] Completed 05/14/25 1319 200 mL 05/14/25 1116 05/14/25 1316

## 2025-05-28 NOTE — PLAN OF CARE
Problem: Diabetes/Glucose Control  Goal: Glucose maintained within prescribed range  Description: INTERVENTIONS:- Monitor Blood Glucose as ordered- Assess for signs and symptoms of hyperglycemia and hypoglycemia- Administer ordered medications to maintain glucose within target range- Assess barriers to adequate nutritional intake and initiate nutrition consult as needed- Instruct patient on self management of diabetes  Outcome: Progressing     Problem: Patient Centered Care  Goal: Patient preferences are identified and integrated in the patient's plan of care  Description: Interventions:- What would you like us to know as we care for you? From home alone with C - Provide timely, complete, and accurate information to patient/family- Incorporate patient and family knowledge, values, beliefs, and cultural backgrounds into the planning and delivery of care- Encourage patient/family to participate in care and decision-making at the level they choose- Honor patient and family perspectives and choices  Outcome: Progressing     Problem: RISK FOR INFECTION - ADULT  Goal: Absence of fever/infection during anticipated neutropenic period  Description: INTERVENTIONS- Monitor WBC- Administer growth factors as ordered- Implement neutropenic guidelines  Outcome: Progressing     Problem: CARDIOVASCULAR - ADULT  Goal: Maintains optimal cardiac output and hemodynamic stability  Description: INTERVENTIONS:- Monitor vital signs, rhythm, and trends- Monitor for bleeding, hypotension and signs of decreased cardiac output- Evaluate effectiveness of vasoactive medications to optimize hemodynamic stability- Monitor arterial and/or venous puncture sites for bleeding and/or hematoma- Assess quality of pulses, skin color and temperature- Assess for signs of decreased coronary artery perfusion - ex. Angina- Evaluate fluid balance, assess for edema, trend weights  Outcome: Progressing  Goal: Absence of cardiac arrhythmias or at  baseline  Description: INTERVENTIONS:- Continuous cardiac monitoring, monitor vital signs, obtain 12 lead EKG if indicated- Evaluate effectiveness of antiarrhythmic and heart rate control medications as ordered- Initiate emergency measures for life threatening arrhythmias- Monitor electrolytes and administer replacement therapy as ordered  Outcome: Progressing     Problem: RESPIRATORY - ADULT  Goal: Achieves optimal ventilation and oxygenation  Description: INTERVENTIONS:- Assess for changes in respiratory status- Assess for changes in mentation and behavior- Position to facilitate oxygenation and minimize respiratory effort- Oxygen supplementation based on oxygen saturation or ABGs- Provide Smoking Cessation handout, if applicable- Encourage broncho-pulmonary hygiene including cough, deep breathe, Incentive Spirometry- Assess the need for suctioning and perform as needed- Assess and instruct to report SOB or any respiratory difficulty- Respiratory Therapy support as indicated- Manage/alleviate anxiety- Monitor for signs/symptoms of CO2 retention  Outcome: Progressing     Problem: SAFETY ADULT - FALL  Goal: Free from fall injury  Description: INTERVENTIONS:  - Assess pt frequently for physical needs  - Identify cognitive and physical deficits and behaviors that affect risk of falls.  - [place] fall precautions as indicated by assessment.  - Educate pt/family on patient safety including physical limitations  - Instruct pt to call for assistance with activity based on assessment  - Modify environment to reduce risk of injury  - Provide assistive devices as appropriate  - Consider OT/PT consult to assist with strengthening/mobility  - Encourage toileting schedule  Outcome: Progressing     Problem: DISCHARGE PLANNING  Goal: Discharge to home or other facility with appropriate resources  Description: INTERVENTIONS:  - Identify barriers to discharge w/pt and caregiver  - Include patient/family/discharge partner in  discharge planning  - Arrange for needed discharge resources and transportation as appropriate  - Identify discharge learning needs (meds, wound care, etc)  - Arrange for interpreters to assist at discharge as needed  - Consider post-discharge preferences of patient/family/discharge partner  - Complete POLST form as appropriate  - Assess patient's ability to be responsible for managing their own health  - Refer to Case Management Department for coordinating discharge planning if the patient needs post-hospital services based on physician/LIP order or complex needs related to functional status, cognitive ability or social support system  Outcome: Progressing     Problem: HEMATOLOGIC - ADULT  Goal: Free from bleeding injury  Description: (Example usage: patient with low platelets)  INTERVENTIONS:  - Avoid intramuscular injections, enemas and rectal medication administration  - Ensure safe mobilization of patient  - Hold pressure on venipuncture sites to achieve adequate hemostasis  - Assess for signs and symptoms of internal bleeding  - Monitor lab trends  - Patient is to report abnormal signs of bleeding to staff  - Avoid use of toothpicks and dental floss  - Use electric shaver for shaving  - Use soft bristle tooth brush  - Limit straining and forceful nose blowing  Outcome: Progressing  Goal: Maintains hematologic stability  Description: INTERVENTIONS  - Assess for signs and symptoms of bleeding or hemorrhage  - Monitor labs and vital signs for trends  - Administer supportive blood products/factors, fluids and medications as ordered and appropriate  - Administer supportive blood products/factors as ordered and appropriate  Outcome: Progressing  Goal: Free from bleeding injury  Description: (Example usage: patient with low platelets)  INTERVENTIONS:  - Avoid intramuscular injections, enemas and rectal medication administration  - Ensure safe mobilization of patient  - Hold pressure on venipuncture sites to achieve  adequate hemostasis  - Assess for signs and symptoms of internal bleeding  - Monitor lab trends  - Patient is to report abnormal signs of bleeding to staff  - Avoid use of toothpicks and dental floss  - Use electric shaver for shaving  - Use soft bristle tooth brush  - Limit straining and forceful nose blowing  Outcome: Progressing     Problem: GASTROINTESTINAL - ADULT  Goal: Minimal or absence of nausea and vomiting  Description: INTERVENTIONS:  - Maintain adequate hydration with IV or PO as ordered and tolerated  - Nasogastric tube to low intermittent suction as ordered  - Evaluate effectiveness of ordered antiemetic medications  - Provide nonpharmacologic comfort measures as appropriate  - Advance diet as tolerated, if ordered  - Obtain nutritional consult as needed  - Evaluate fluid balance  Outcome: Progressing  Goal: Maintains or returns to baseline bowel function  Description: INTERVENTIONS:  - Assess bowel function  - Maintain adequate hydration with IV or PO as ordered and tolerated  - Evaluate effectiveness of GI medications  - Encourage mobilization and activity  - Obtain nutritional consult as needed  - Establish a toileting routine/schedule  - Consider collaborating with pharmacy to review patient's medication profile  Outcome: Progressing  Goal: Maintains adequate nutritional intake (undernourished)  Description: INTERVENTIONS:  - Monitor percentage of each meal consumed  - Identify factors contributing to decreased intake, treat as appropriate  - Assist with meals as needed  - Monitor I&O, WT and lab values  - Obtain nutritional consult as needed  - Optimize oral hygiene and moisture  - Encourage food from home; allow for food preferences  - Enhance eating environment  Outcome: Progressing     Problem: GENITOURINARY - ADULT  Goal: Absence of urinary retention  Description: INTERVENTIONS:  - Assess patient’s ability to void and empty bladder  - Monitor intake/output and perform bladder scan as  needed  - Follow urinary retention protocol/standard of care  - Consider collaborating with pharmacy to review patient's medication profile  - Implement strategies to promote bladder emptying  Outcome: Progressing     Problem: METABOLIC/FLUID AND ELECTROLYTES - ADULT  Goal: Glucose maintained within prescribed range  Description: INTERVENTIONS:- Monitor Blood Glucose as ordered- Assess for signs and symptoms of hyperglycemia and hypoglycemia- Administer ordered medications to maintain glucose within target range- Assess barriers to adequate nutritional intake and initiate nutrition consult as needed- Instruct patient on self management of diabetes  Outcome: Progressing  Goal: Electrolytes maintained within normal limits  Description: INTERVENTIONS:  - Monitor labs and rhythm and assess patient for signs and symptoms of electrolyte imbalances  - Administer electrolyte replacement as ordered  - Monitor response to electrolyte replacements, including rhythm and repeat lab results as appropriate  - Fluid restriction as ordered  - Instruct patient on fluid and nutrition restrictions as appropriate  Outcome: Progressing  Goal: Hemodynamic stability and optimal renal function maintained  Description: INTERVENTIONS:  - Monitor labs and assess for signs and symptoms of volume excess or deficit  - Monitor intake, output and patient weight  - Monitor urine specific gravity, serum osmolarity and serum sodium as indicated or ordered  - Monitor response to interventions for patient's volume status, including labs, urine output, blood pressure (other measures as available)  - Encourage oral intake as appropriate  - Instruct patient on fluid and nutrition restrictions as appropriate  Outcome: Progressing     Problem: SKIN/TISSUE INTEGRITY - ADULT  Goal: Skin integrity remains intact  Description: INTERVENTIONS  - Assess and document risk factors for pressure ulcer development  - Assess and document skin integrity  - Monitor for  areas of redness and/or skin breakdown  - Initiate interventions, skin care algorithm/standards of care as needed  Outcome: Progressing  Goal: Incision(s), wounds(s) or drain site(s) healing without S/S of infection  Description: INTERVENTIONS:  - Assess and document risk factors for pressure ulcer development  - Assess and document skin integrity  - Assess and document dressing/incision, wound bed, drain sites and surrounding tissue  - Implement wound care per orders  - Initiate isolation precautions as appropriate  - Initiate Pressure Ulcer prevention bundle as indicated  Outcome: Progressing     Problem: MUSCULOSKELETAL - ADULT  Goal: Return mobility to safest level of function  Description: INTERVENTIONS:  - Assess patient stability and activity tolerance for standing, transferring and ambulating w/ or w/o assistive devices  - Assist with transfers and ambulation using safe patient handling equipment as needed  - Ensure adequate protection for wounds/incisions during mobilization  - Obtain PT/OT consults as needed  - Advance activity as appropriate  - Communicate ordered activity level and limitations with patient/family  Outcome: Progressing  Goal: Maintain proper alignment of affected body part  Description: INTERVENTIONS:  - Support and protect limb and body alignment per provider's orders  - Instruct and reinforce with patient and family use of appropriate assistive device and precautions (e.g. spinal or hip dislocation precautions)  Outcome: Progressing

## 2025-05-28 NOTE — OCCUPATIONAL THERAPY NOTE
OCCUPATIONAL THERAPY TREATMENT NOTE - INPATIENT        Room Number: 522/522-A     Presenting Problem: s/p pericardial window, CT placement    Problem List  Principal Problem:    Acute respiratory failure with hypoxia (HCC)  Active Problems:    Thrombocytopenia (HCC)    Azotemia    Pancytopenia (HCC)    Hyperglycemia    Atrial fibrillation with RVR (HCC)    Malnutrition (HCC)      OCCUPATIONAL THERAPY ASSESSMENT   Patient demonstrates limited progress this session, goals remain in progress.    Patient is requiring supervision and maximum assist as a result of the following impairments: decreased functional strength, decreased endurance, decreased muscular endurance, medical status, and increased O2 needs from baseline.    Patient continues to function below baseline with toileting, bathing, upper body dressing, lower body dressing, grooming, bed mobility, transfers, and aerobic capacity.  Next session anticipate patient to progress upper body dressing, grooming, and bed mobility.  Occupational Therapy will continue to follow patient for duration of hospitalization.    Patient continues to benefit from continued skilled OT services: to promote return to prior level of function and safety with continuous assistance and gradual rehabilitative therapy.     PLAN DURING HOSPITALIZATION  OT Device Recommendations: Shower chair  OT Treatment Plan: Balance activities, Energy conservation/work simplification techniques, ADL training, Functional transfer training, Endurance training, Patient/Family education, Patient/Family training, Compensatory technique education     SUBJECTIVE  \"I need you to fix the pillow.\"     OBJECTIVE  Precautions: Chest tube to suction (RT Port)       PAIN ASSESSMENT  Ratin  Comments: Pt not reporting any pain but wincing with bed level activity and demo increased SOB.       O2 SATURATIONS  Oxygen Therapy  SPO2% on Oxygen at Rest: 98  At rest oxygen flow (liters per minute): 4    ACTIVITIES OF  DAILY LIVING ASSESSMENT  AM-PAC ‘6-Clicks’ Inpatient Daily Activity Short Form  How much help from another person does the patient currently need…  -   Putting on and taking off regular lower body clothing?: A Lot  -   Bathing (including washing, rinsing, drying)?: A Lot  -   Toileting, which includes using toilet, bedpan or urinal? : Total  -   Putting on and taking off regular upper body clothing?: A Lot  -   Taking care of personal grooming such as brushing teeth?: A Little  -   Eating meals?: A Little    AM-PAC Score:  Score: 13  Approx Degree of Impairment: 63.03%  Standardized Score (AM-PAC Scale): 32.03  CMS Modifier (G-Code): CL    BALANCE ASSESSMENT  Static Sitting: Supervision    FUNCTIONAL ADL ASSESSMENT  Eating: Supervision  Grooming Seated: Supervision    THERAPEUTIC EXERCISE  Therapeutic Exercise  Lower Body: AROM  AROM: ankle pumps: 10 each leg; heel slides: 10x each leg        Skilled Therapy Provided:   RN cleared pt for participation. Pt received in bed with no visitors present. Pt agreeable to bed level participation in therapy. Pt required SUP to complete grooming from bed level; demo appropriate BUE strength and coordination to complete without physical assist from therapist. Pt completed bed level therex as reported above. Pt benefited from ~2 min rest break between each task; demo increased dyspnea with activity. Vitals monitored and oxygen sat remained WNL throughout. Pt remained in bed with all needs in reach and alarm on at end of session. RN aware.       EDUCATION PROVIDED  Patient Education : Role of Occupational Therapy; Plan of Care; Posture/Positioning; Energy Conservation; Proper Body Mechanics; LE HEP for ROM; LE HEP for Strengthening  Patient's Response to Education: Verbalized Understanding; Returned Demonstration  Family/Caregiver Education : Role of Occupational Therapy; Plan of Care; Discharge Recommendations; DME Recommendations; Functional Transfer Techniques; Fall Prevention;  Posture/Positioning; Edema Reduction; Energy Conservation; Proper Body Mechanics  Family/Caregiver's Response to Education: Verbalized Understanding; Returned Demonstration    The patient's Approx Degree of Impairment: 63.03% has been calculated based on documentation in the [facility] '6 clicks' Inpatient Daily Activity Short Form.  Research supports that patients with this level of impairment may benefit from rehab.  Final disposition will be made by interdisciplinary medical team.    Patient End of Session: In bed, Call light within reach, Needs met, RN aware of session/findings, All patient questions and concerns addressed, Hospital anti-slip socks, Alarm set    OT Goals:  Patients self stated goal is: feel better     Patient will complete functional transfer with Min A  Comment: ongoing    Patient will complete toileting with MIn A  Comment: ongoing    Patient will tolerate standing for 3-5 minutes in prep for adls with SBA   Comment: ongoing       Comment:          Goals  on: 6/3/25  Frequency: 3-5x/week    OT Session Time: 15 minutes  Therapeutic Activity: 15 minutes     General

## 2025-05-29 ENCOUNTER — APPOINTMENT (OUTPATIENT)
Dept: ULTRASOUND IMAGING | Facility: HOSPITAL | Age: 70
DRG: 270 | End: 2025-05-29
Attending: INTERNAL MEDICINE
Payer: MEDICARE

## 2025-05-29 ENCOUNTER — APPOINTMENT (OUTPATIENT)
Dept: GENERAL RADIOLOGY | Facility: HOSPITAL | Age: 70
DRG: 270 | End: 2025-05-29
Payer: MEDICARE

## 2025-05-29 ENCOUNTER — APPOINTMENT (OUTPATIENT)
Dept: GENERAL RADIOLOGY | Facility: HOSPITAL | Age: 70
DRG: 270 | End: 2025-05-29
Attending: INTERNAL MEDICINE
Payer: MEDICARE

## 2025-05-29 LAB
ANION GAP SERPL CALC-SCNC: 9 MMOL/L (ref 0–18)
ANTIBODY SCREEN: NEGATIVE
BASOPHILS # BLD AUTO: 0.06 X10(3) UL (ref 0–0.2)
BASOPHILS NFR BLD AUTO: 0.4 %
BASOPHILS NFR PLR: 0 %
BUN BLD-MCNC: 19 MG/DL (ref 9–23)
BUN/CREAT SERPL: 17.4 (ref 10–20)
CALCIUM BLD-MCNC: 8.7 MG/DL (ref 8.7–10.4)
CHLORIDE SERPL-SCNC: 106 MMOL/L (ref 98–112)
CO2 SERPL-SCNC: 33 MMOL/L (ref 21–32)
CREAT BLD-MCNC: 1.09 MG/DL (ref 0.55–1.02)
DEPRECATED RDW RBC AUTO: 66.1 FL (ref 35.1–46.3)
EGFRCR SERPLBLD CKD-EPI 2021: 55 ML/MIN/1.73M2 (ref 60–?)
EOSINOPHIL # BLD AUTO: 0.17 X10(3) UL (ref 0–0.7)
EOSINOPHIL NFR BLD AUTO: 1.1 %
EOSINOPHIL NFR PLR: 1 %
ERYTHROCYTE [DISTWIDTH] IN BLOOD BY AUTOMATED COUNT: 21.9 % (ref 11–15)
GLUCOSE BLD-MCNC: 138 MG/DL (ref 70–99)
GLUCOSE BLDC GLUCOMTR-MCNC: 135 MG/DL (ref 70–99)
GLUCOSE BLDC GLUCOMTR-MCNC: 145 MG/DL (ref 70–99)
GLUCOSE BLDC GLUCOMTR-MCNC: 147 MG/DL (ref 70–99)
GLUCOSE PLR-MCNC: 120 MG/DL
HCT VFR BLD AUTO: 21.1 % (ref 35–48)
HCT VFR BLD AUTO: 24.7 % (ref 35–48)
HGB BLD-MCNC: 6.5 G/DL (ref 12–16)
HGB BLD-MCNC: 7.7 G/DL (ref 12–16)
IMM GRANULOCYTES # BLD AUTO: 0.42 X10(3) UL (ref 0–1)
IMM GRANULOCYTES NFR BLD: 2.8 %
LDH FLD L TO P-CCNC: 440 U/L
LYMPHOCYTES # BLD AUTO: 1.34 X10(3) UL (ref 1–4)
LYMPHOCYTES NFR BLD AUTO: 9 %
LYMPHOCYTES NFR PLR: 11 %
MCH RBC QN AUTO: 26.1 PG (ref 26–34)
MCHC RBC AUTO-ENTMCNC: 30.8 G/DL (ref 31–37)
MCV RBC AUTO: 84.7 FL (ref 80–100)
MONOCYTES # BLD AUTO: 1.85 X10(3) UL (ref 0.1–1)
MONOCYTES NFR BLD AUTO: 12.4 %
MONOS+MACROS NFR PLR: 9 %
NEUTROPHILS # BLD AUTO: 11.08 X10 (3) UL (ref 1.5–7.7)
NEUTROPHILS # BLD AUTO: 11.08 X10(3) UL (ref 1.5–7.7)
NEUTROPHILS NFR BLD AUTO: 74.3 %
NEUTROPHILS NFR PLR: 79 %
NT-PROBNP SERPL-MCNC: 1826 PG/ML (ref ?–125)
OSMOLALITY SERPL CALC.SUM OF ELEC: 310 MOSM/KG (ref 275–295)
PLATELET # BLD AUTO: 271 10(3)UL (ref 150–450)
POTASSIUM SERPL-SCNC: 4 MMOL/L (ref 3.5–5.1)
PROT PLR-MCNC: 4.3 G/DL
RBC # BLD AUTO: 2.49 X10(6)UL (ref 3.8–5.3)
RH BLOOD TYPE: POSITIVE
SODIUM SERPL-SCNC: 148 MMOL/L (ref 136–145)
TOTAL CELLS COUNTED FLD: 100
TOTAL CELLS COUNTED PLR: 7284 /CUMM (ref ?–1)
WBC # BLD AUTO: 14.9 X10(3) UL (ref 4–11)
WBC # PLR: 7284 /CUMM

## 2025-05-29 PROCEDURE — 32555 ASPIRATE PLEURA W/ IMAGING: CPT | Performed by: INTERNAL MEDICINE

## 2025-05-29 PROCEDURE — 99233 SBSQ HOSP IP/OBS HIGH 50: CPT | Performed by: HOSPITALIST

## 2025-05-29 PROCEDURE — 0W993ZZ DRAINAGE OF RIGHT PLEURAL CAVITY, PERCUTANEOUS APPROACH: ICD-10-PCS | Performed by: INTERNAL MEDICINE

## 2025-05-29 PROCEDURE — 71045 X-RAY EXAM CHEST 1 VIEW: CPT

## 2025-05-29 RX ORDER — MORPHINE SULFATE 10 MG/5ML
2.5 SOLUTION ORAL 3 TIMES DAILY PRN
Refills: 0 | Status: DISCONTINUED | OUTPATIENT
Start: 2025-05-29 | End: 2025-06-05

## 2025-05-29 RX ORDER — SODIUM CHLORIDE 9 MG/ML
INJECTION, SOLUTION INTRAVENOUS ONCE
Status: COMPLETED | OUTPATIENT
Start: 2025-05-29 | End: 2025-05-29

## 2025-05-29 NOTE — PAYOR COMM NOTE
--------------  5/29:  CONTINUED STAY REVIEW    Payor: [facility] MEDICARE ADV PPO  Subscriber #:  [id number]  Authorization Number: [id number]    Admit date: 5/12/25  Admit time:  5:28 PM      HOSPITALIST:    Chief complaint sob      Subjective:   Kelli Fsiher is a(n) 70 year old female Pt feels sob is better today compared to last night   Plan for thoracentesis today   Discussed palliative care consult         ROS:   sob   No c/d   No n/v      Objective:      Blood pressure 116/74, pulse 86, temperature 97.6 °F (36.4 °C), temperature source Axillary, resp. rate 20, height 5' (1.524 m), weight 175 lb 8 oz (79.6 kg), SpO2 100%.                  Lab Results   Component Value Date     WBC 14.9 (H) 05/29/2025     HGB 7.7 (L) 05/29/2025     HCT 24.7 (L) 05/29/2025     .0 05/29/2025     CREATSERUM 1.09 (H) 05/29/2025     BUN 19 05/29/2025      (H) 05/29/2025     K 4.0 05/29/2025      05/29/2025     CO2 33.0 (H) 05/29/2025      (H) 05/29/2025     CA 8.7 05/29/2025     ALB 3.2 05/12/2025     ALKPHO 142 05/12/2025     BILT 0.7 05/12/2025     TP 6.8 05/12/2025     AST 26 05/12/2025     ALT 10 05/12/2025     PTT 37.0 (H) 05/21/2025     INR 1.31 (H) 05/22/2025     TSH 3.705 05/02/2025     LIP 42 12/11/2023     DDIMER 3.05 (H) 05/13/2025     MG 2.2 05/19/2025     B12 >2,000 (H) 05/02/2025         XR CHEST AP PORTABLE  (CPT=71045)  Result Date: 5/29/2025  CONCLUSION:               1. Borderline cardiomegaly prominent central vasculature. 2. Bilateral perihilar lower lobe mixed alveolar and interstitial multifocal airspace opacification with bibasilar subpulmonic effusions, worse on the right.  Slight progression right side.    Dictated by (CST): Marlon Harp MD on 5/29/2025 at 9:34 AM     Finalized by (CST): Marlon Harp MD on 5/29/2025 at 9:35 AM           XR CHEST AP/PA (1 VIEW) (CPT=71045)  Result Date: 5/28/2025  PROCEDURE:              XR CHEST AP/PA (1 VIEW) (CPT=71045)  COMPARISON:  [facility], XR CHEST AP PORTABLE (CPT=71045), 5/28/2025, 7:06 AM.  INDICATIONS:           Chest pain.  TECHNIQUE:       Single PA view.   FINDINGS/IMPRESSION:  1. The heart mediastinal structures are enlarged.  Pulmonary vascularity is moderately increased.  There is a small to moderate-sized right-sided pleural effusion and a very small left pleural effusion.  The findings are compatible with moderate fluid overload and have not changed since previous exam.  2. Lung volumes are slightly diminished.  There is redemonstration of moderate size streaky opacities within the bilateral perihilar regions which could represent atelectasis, infiltrate, or a combination.  3. There is redemonstration of a right IJ Port-A-Cath with tip at the cavoatrial junction.  4. There are moderate degenerative changes within the thoracic spine.     Dictated by (CST): Kaleb Epps MD on 5/28/2025 at 5:09 PM     Finalized by (CST): Kaleb Epps MD on 5/28/2025 at 5:10 PM           XR CHEST AP PORTABLE  (CPT=71045)  Result Date: 5/28/2025  CONCLUSION:                No significant overall change since the preceding examination.  Right greater than left bilateral pleural effusions with bilateral mid to lower lung opacities.    Dictated by (CST): Casey Diallo MD on 5/28/2025 at 7:57 AM     Finalized by (CST): Casey Diallo MD on 5/28/2025 at 7:59 AM                  Results:      CBC:          Lab Results   Component Value Date     WBC 14.9 (H) 05/29/2025     WBC 15.4 (H) 05/28/2025     WBC 13.6 (H) 05/27/2025            Lab Results   Component Value Date     HGB 7.7 (L) 05/29/2025     HGB 6.5 (LL) 05/29/2025     HGB 7.1 (L) 05/28/2025            Lab Results   Component Value Date     .0 05/29/2025     .0 05/28/2025     .0 05/27/2025                Recent Labs   Lab 05/27/25  0440 05/27/25  1706 05/28/25  0600 05/29/25  0542   *  --  127* 138*   BUN 19  --  18 19   CREATSERUM 0.98  --   1.04* 1.09*   CA 8.3*  --  8.7 8.7     --  147* 148*   K 3.4* 4.8 4.6 4.0     --  109 106   CO2 29.0  --  31.0 33.0*               Assessment and Plan:      Afib RVR  Pericardial effusion  - sp pericardial window 5/21, chest tube out 5/23 ; fluid positive for malignancy   - cardiology consulted  - IV amio --> now on oral. Avoiding BB, CCB with h/o junctional rhythm   - cont monitor on tele  - on 5/16 experienced RVR again with hypotension, received digoxin converted to NSR. Remained  hypotensive so sent to stepdown unit. Responded to fluid bolus and required jewel   - Transferred back to medical floor, normotensive rates controlled.  - CXR with b/l effusions, right side may need tap - discussed with pulm. Hold noac - plan for thora today   - repeat ECHO showing large pericardial effusion ; f/u echo with no pericardial effusion   Limited ECHO 5/25: normal left ventricle, EF 65-70%  -SOB and CXR with Bilateral Pleural effusion -> IV lasix today 40 bid monitor output. Wts stable since admit     BL PE: acute small segmental/right lower extremity DVT  S/p IVC 5/14/2025  Was not on a/c due to thrombocytopenia  Now on eliquis but will hold for possible thora    Ok with Heme onc to resume Eliquis once Plt >50K         Metastatic endometrial cancer    Pancytopenia  Acute on chronic anemia of chronic disease  - s/p 1 unit prbc; repeat again today   - neutropenic precautions  - started neupogen until ANC 1500  - Plan is for further chemotherapy once recovered clinically per Dr. Herring.  - received 6 days of iv cefepime now off   - cefepime and vanco started for neutropenia and patient with cough/chills, elevated LA, possible early sepsis now off. Wbc 15. Afebrile. On 5 L    -Pancytopenia improving, monitor daily  - Overall plan of care is to continue to treat medically over the next several days and monitor for improvement.    - discussed with pt and daughter today and they are comfortable with palliative care  consult for information . 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 Cont lasix.   - thora today.      Thrush  - nystatin     Deconditioning  PT OT      Dispo: PT recommending EDUARDO, patient to discuss with family. Have previously refused     Pt lives by herself, continue to be SOB with min exertion, now fluid overload, will benefit from EDUARDO   .      MDM: High   I personally spent time on chart/note review, review of labs/imaging, discussion with patient, physical exam, discussion with staff, consultants, coordinating care, writing progress note, and discussion of plan of care.                  Lina Dueñas, DO              MEDICATIONS ADMINISTERED IN LAST 1 DAY:  Transfuse RBC       Date Action Dose Route User    5/29/2025 1013 Rate/Dose Change (none) Intravenous Lucrecia Raya RN    5/29/2025 0957 New Bag (none) Intravenous Lucrecia Raya RN          amiodarone (Pacerone) tab 200 mg       Date Action Dose Route User    5/29/2025 0923 Given 200 mg Oral Lucrecia Raya RN          furosemide (Lasix) 10 mg/mL injection 40 mg       Date Action Dose Route User    5/29/2025 0923 Given 40 mg Intravenous Lucrecia Raya RN    5/28/2025 1619 Given 40 mg Intravenous Lucrecia Raya RN          insulin aspart (NovoLOG) 100 Units/mL FlexPen 1-5 Units       Date Action Dose Route User    5/28/2025 2039 Given 1 Units Subcutaneous (Left Lower Arm) Tegan Newman RN          levothyroxine (Synthroid) tab 100 mcg       Date Action Dose Route User    5/29/2025 0603 Given 100 mcg Oral Tegan Newman RN          midodrine (ProAmatine) tab 10 mg       Date Action Dose Route User    5/29/2025 1205 Given 10 mg Oral Lucrecia Raya RN    5/29/2025 0603 Given 10 mg Oral Paty  UNRULY Javed    5/28/2025 1619 Given 10 mg Oral Lucrecia Raya RN          pantoprazole (Protonix) DR tab 40 mg       Date Action Dose Route User    5/29/2025 0603 Given 40 mg Oral Tegan Newman RN          sodium chloride (Saline Mist) 0.65 % nasal solution 1 spray       Date Action Dose Route User    5/28/2025 1719 Given 1 spray Each Nare Lucrecia Raya RN          sodium chloride 0.9% infusion       Date Action Dose Route User    5/29/2025 0957 New Bag (none) Intravenous Lucrecia Raya RN            Vitals (last day)       Date/Time Temp Pulse Resp BP SpO2 Weight O2 Device O2 Flow Rate (L/min) [facility]    05/29/25 1157 97.6 °F (36.4 °C) 86 20 116/74 100 % -- -- --     05/29/25 1057 97.3 °F (36.3 °C) 90 22 101/72 100 % -- -- --     05/29/25 1013 97.3 °F (36.3 °C) 90 22 98/67 100 % -- -- --     05/29/25 0957 97.3 °F (36.3 °C) 88 20 102/61 100 % -- -- --     05/29/25 0917 -- 95 -- 114/71 98 % -- High flow nasal cannula 6 L/min     05/29/25 0600 97.5 °F (36.4 °C) 80 20 113/75 100 % -- High flow nasal cannula 6 L/min AB    05/29/25 0514 -- -- -- -- -- 175 lb 8 oz (79.6 kg) -- -- BA    05/29/25 0300 -- 88 -- -- -- -- -- -- GT    05/29/25 0016 97.4 °F (36.3 °C) 99 20 128/85 96 % -- High flow nasal cannula 6 L/min AB    05/28/25 2110 -- 100 -- -- 94 % -- High flow nasal cannula 6 L/min AB    05/28/25 2034 97.6 °F (36.4 °C) 98 22 133/80 93 % -- High flow nasal cannula 5 L/min AB    05/28/25 1905 -- 95 -- -- -- -- -- -- GT    05/28/25 1613 97.3 °F (36.3 °C) 92 25 113/72 99 % -- High flow nasal cannula 4 L/min     05/28/25 1230 -- 81 -- -- 100 % -- High flow nasal cannula 4 L/min     05/28/25 1212 97.3 °F (36.3 °C) 93 22 99/64 97 % -- High flow nasal cannula 5 L/min     05/28/25 0902 -- 94 -- -- 98 % -- High flow nasal cannula 5 L/min     05/28/25 0839 97.4 °F (36.3 °C) 87 20 114/73 100 % -- High flow nasal cannula 7 L/min     05/28/25 0604 97.4 °F (36.3 °C) 92 20 111/73 100 % -- High flow nasal  cannula 7 L/min ZP    05/28/25 0300 -- 83 -- -- -- -- -- -- GT    05/28/25 0101 97.4 °F (36.3 °C) 102 20 129/77 100 % -- High flow nasal cannula 7 L/min ZP            Blood Transfusion Record       Product Unit Status Volume Start End            Transfuse RBC       25  [id number]  [id number] Transfusing  05/29/25 0957        25  [id number]  [id number] Completed 05/22/25 0708 411.25 mL 05/21/25 1208 05/21/25 1500       25  [id number]  [id number] Completed 05/13/25 1431 335 mL 05/13/25 1144 05/13/25 1428                Transfuse platelets       25  [id number]  [id number] Completed 05/14/25 1319 200 mL 05/14/25 1116 05/14/25 1316

## 2025-05-29 NOTE — DIETARY NOTE
ADULT NUTRITION REASSESSMENT    Pt is at high nutrition risk r/t prolonged inadequate oral intake.  Pt meets moderate malnutrition criteria.     RECOMMENDATIONS TO MD: See nutrition intervention for ONS (oral nutrition supplements). Liberalized diet to ATTILA vs cardiac due to limited po intake.    Given prolonged inadequate oral intake, recommend temporary EN if in accordance with pt/family GOC and MD POC.    ADMITTING DIAGNOSIS:  Atrial fibrillation with RVR (HCC) [I48.91]  Acute respiratory failure with hypoxia (HCC) [J96.01]  Pancytopenia (HCC) [D61.818]  PERTINENT PAST MEDICAL HISTORY:   Past Medical History:    Asthma (HCC)    Esophageal reflux    History of blood transfusion    Visual impairment     PATIENT STATUS: Initial 05/13/25: Pt assessed due to screening at risk for decreased appetite and unintentional weight loss. RD also consulted for \"heart failure diet education\" - not appropriate given clinical status (cleared consult). Pt known to nutrition services from previous admissions, last seen 5/1/25 and met criteria for chronic moderate protein calorie malnutrition (Chronic MPCM) - pt discharged 5/6/25. Per past RD notes (5/1 and 4/14) - decreased po intake reported/noted. Chart reviewed, pt admitted by PCP for IRVIN and HTN x 3 days. Pt with increased swelling to legs. Pt with hx of metastatic endometrial cancer with lung mets (on chemotherapy). Pt also noted to have thrush - nystatin ordered. Discussion with RN, poor oral intake. Intakes reviewed, 25%  1 meal noted. Pt visited, reports decreased po intake over last 3 days. Prior to 3 days ago pt reports better intake/appetite. Typically eats small frequent meals as recommended by MD. Pt reports drinking protein shake but unsure which one (only drinks 1 daily). Pt noted coughing while drinking Ginger ale, spitting up - RN notified. Pt reports difficulties swallowing liquids and solids - last admission pt on chopped/soft  & bite sized with mildly thick  liquids. Pt noted to have VFSS last admission recommending regular texture/thin liquids per RN. Pt reports some stomach pain r/t feeling like needing to have a bowel movement (last BM noted a few days ago). Pt unsure of weight loss, reports usual body weight (UBW) around 169#. Current weight 167# 9.6 oz. EMR weight review, last known weight # 10 oz on 5/5/25 - 7.0# or 4.0% weight loss x 1 week (significant). Per EMR weight review, pt noted weighing 162# 4 oz on 4/12/25 - stable, 175# 14.8 oz on 4/3/25 - 8.3# or 4.7% weight loss x 1 month (non-significant), 182# 11.2 oz on 2/20/25 - 15.1# or 8.3% weight loss x 3 months (significant), 183# on 12/19/24 - 15.4# or 8.4%  weight loss x 5 months (non-significant), 197# on 1/3/24 and 197# on 12/11/23. Nutrition focused physical exam (NFPE) completed, see below for details. Encouraged small frequent meals with emphasis on high calorie and high protein. Discussed oral nutritional supplements (ONS) - pt in agreement and provided flavor preferences. +ONS per discussion.  5/19/25 UPDATE: po remains small, but drinks the ensure max most days. She doesn't like the glucerna or magic cup, so will change to ensure max bid--mohsen, pt pref. Pt states thrush is still bothersome to her.  Urged pt to push po intake some. Pt says a dr told her not to push it because they dont want her to throw up. Told her not to push it that much, just a little.    5/23 Update: Pt reassessed early. Pt transferred to CCU on 5/21 for emergent pericardial window d/t hypoventilation and hypoxia. Pleural chest tube removed this AM. Continues with poor po, consuming 0-20% of meals since last RD visit. Pt stated she is drinking Ensure BID. Pt has not had breakfast yet this AM, waiting for delivery- ordered Italian ice and cream of chicken. Encouraged pt to continue increasing intake to help meet nutritional needs. Answered all questions at this time. Will continue to follow per protocol.    Update  05/29/25: RA completed. Chart reviewed, IV lasix given r/t SOB and CXR with bilateral pleural effusion. Discussion with RN, possible thoracentesis vs pleurx. Intakes reviewed, 25% x 2 meals since last visit per flowsheet documentation. No po documentation from 5/23-5/28. Meal tickets reviewed - mainly ordering lighter meals. Noted refusals of all meals on 5/27. Poor/inadequate oral intake throughout admission (17 days) + 3 days PTA per initial assessment with pt. Pt visited, daughter at bedside, pt sitting up but eyes remained closed. Daughter reports intake remains poor. Daughter brought in outside food and pt only took about a bite or so. Family assisting in encouraging meal intake. Noted multiple supplements at bedside/in room. Daughter reports pt normally drinking ONS however did cough up some this morning. Discussed adjusting ONS, daughter would like to keep Ensure Max ordered as mostly drinking these but in agreement to add back magic cup vanilla as pt likes cold items and remembers pt liking this (magic cup mixed berry discontinued 5/19). Adjusted ONS per discussion with daughter. Monitor intakes. Fluctuating weight this admission. Changed to high nutrition risk.   Given prolonged inadequate oral intake, pt may benefit from temporary enteral nutrition (EN) if in accordance with pt/family GOC and MD POC. Discussed with MD in person.    FOOD/NUTRITION RELATED HISTORY:  Appetite: Poor  Intake: ~25% x2 meals documented since last visit - poor intake documentation from 5/23-5/28. 0-30% x 2 ONS per flowsheet (limited documentation). Variable intake of ONS (noted about 4 bottles at bedside/in room)  Intake Meeting Needs: No, even with oral nutrition supplements (ONS) ordered Adjusted ONS per discussion with daughter  Percent Meals Eaten (last 6 days)       Date/Time Percent Meals Eaten (%)    05/27/25 1740 --     Percent Meals Eaten (%): outside food, smoothie for dinner at 05/27/25 1740    05/28/25 1133 25 %     05/28/25 1657 25 %    05/28/25 1846 --     Percent Meals Eaten (%): tray at bedside at 05/28/25 1846    05/29/25 1013 --     Percent Meals Eaten (%): family brought outside food at 05/29/25 1013           Food Allergies: No Known Food Allergies (NKFA)  Cultural/Ethnic/Rastafari Preferences: Not Obtained    GASTROINTESTINAL: +BM 5/28/25 - small;soft, Loss of appetite, and Swallow evaluation noted  U/O: 1100 ml (0.6 ml/kg/hr) - adequate    MEDICATIONS: reviewed  IV Lasix BID noted  Electrolyte replacement per protocol (cardiac)   amiodarone  200 mg Oral Daily    furosemide  40 mg Intravenous BID (Diuretic)    pantoprazole  40 mg Oral QAM AC    midodrine  10 mg Oral TID    apixaban  5 mg Oral BID    insulin aspart  1-5 Units Subcutaneous TID CC and HS    levothyroxine  100 mcg Oral Before breakfast     LABS: reviewed A1c 6.6% on 5/12/25  Hypernatremia (148) noted  Recent Labs     05/27/25  0440 05/27/25  1706 05/28/25  0600 05/29/25  0542   *  --  127* 138*   BUN 19  --  18 19   CREATSERUM 0.98  --  1.04* 1.09*   CA 8.3*  --  8.7 8.7     --  147* 148*   K 3.4* 4.8 4.6 4.0     --  109 106   CO2 29.0  --  31.0 33.0*   OSMOCALC 304*  --  307* 310*     WEIGHT HISTORY:  Patient Weight(s) for the past 336 hrs:   Weight   05/29/25 0514 79.6 kg (175 lb 8 oz)   05/27/25 0625 77.9 kg (171 lb 11.8 oz)   05/25/25 0600 77 kg (169 lb 12.1 oz)   05/24/25 0500 78 kg (171 lb 15.3 oz)   05/23/25 0500 76 kg (167 lb 8.8 oz)   05/22/25 0600 76.6 kg (168 lb 14 oz)   05/19/25 0341 75.8 kg (167 lb)   05/17/25 0611 74.7 kg (164 lb 10.9 oz)   05/16/25 1600 70.8 kg (156 lb 1.4 oz)   05/16/25 0439 74.2 kg (163 lb 8 oz)   05/15/25 1243 69.9 kg (154 lb 3.2 oz)     Wt Readings from Last 10 Encounters:   05/29/25 79.6 kg (175 lb 8 oz)   04/24/25 76.7 kg (169 lb)   05/05/25 79.2 kg (174 lb 9.6 oz)   04/14/25 76.7 kg (169 lb)   12/11/23 89.4 kg (197 lb)     ANTHROPOMETRICS:  HT:  152.4 cm (5')    Wt Readings from Last 1 Encounters:    05/29/25 79.6 kg (175 lb 8 oz)     Last weight: Fluid changes noted true weight likely lower given below noted edema (+2 to bilateral lower extremities)--current wt c/w admission wt.    BMI: Body mass index is 34.27 kg/m².  Dosing Weight: 76 kg - taken on 5/13/25, utilized for anthropometric calculations  BMI CLASSIFICATION: 30-34.9 kg/m2 - obesity class I  IBW/lbs (Calculated) Female: 100 lbs             176% IBW  Usual Body Wt: 197 lbs January 2024 per EMR review      89% UBW    NUTRITION RELATED PHYSICAL FINDINGS:  - Nutrition Focused Physical Exam (NFPE): mild depletion body fat triceps region and mild depletion muscle mass dorsal dale region and calf region  - Fluid Accumulation: non-pitting Bilateral Lower extremity, +1 generalized, and +2 Bilateral Upper extremity per flowsheet review --> See RN documentation for details  - Skin Integrity: at risk, intact, and surgical wound(s) per flowsheet review --> See RN documentation for details    CRITERIA FOR MALNUTRITION DIAGNOSIS:  Criteria for non-severe malnutrition diagnosis: chronic illness related to wt loss 7.5% in 3 months, body fat mild depletion, and muscle mass mild depletion.    NUTRITION DIAGNOSIS/PROBLEM:   Moderate Malnutrition related to Chronic - Physiological causes resulting in anorexia or diminished intake as evidenced by wt loss 7.5% in 3 months, body fat mild depletion, muscle mass mild depletion and variable intakes.    NUTRITION DIAGNOSIS PROGRESS:  No Improvement (continue)--intake remains low despite ONS    NUTRITION INTERVENTION:   NUTRITION PRESCRIPTION:   Estimated Nutrition needs: --dosing wt of 76 kg - wt taken on 5/13/25  Calories: 2875-3698 calories/day (20-25 calories per kg Dosing wt)  Protein: 55-68 g protein/day (1.2-1.5 g protein/kg Ideal body wt (IBW)45.5 kg)    - Diet:       Procedures    Sodium restricted diet Sodium Restriction: 3-4 GM NA; Is Patient on Accuchecks? Yes      - Nutrition Care Plan: Encouraged increased PO  intake, Encouraged small frequent meals with emphasis on high calorie/high protein, and Initiated ONS (oral nutritional supplements)  - ONS (Oral Nutrition Supplements)/Meals/Snacks: Ensure Max (150 calories and 30 g protein each) BID Chocolate and Magic Cup (290 calories/ 9 g protein each) Daily Vanilla   - Vitamin and mineral supplements: none  - Feeding assistance: meal set up  - Nutrition education: Discussed importance of adequate energy and protein intake    - Coordination of nutrition care: collaboration with other providers  - Discharge and transfer of nutrition care to new setting or provider: monitor plans    MONITOR AND EVALUATE/NUTRITION GOALS:  - Food and Nutrient Intake:      Monitor: adequacy of PO intake, tolerance of PO intake, and adequacy of supplement intake  - Food and Nutrient Administration:      Monitor: need for temporary enteral nutrition/GOC regarding nutrition  - Anthropometric Measurement:    Monitor weight  - Nutrition Goals:      allow wt loss due to fluid losses, PO and supplement greater than 75% of needs, good supplement intake, labs within acceptable limits, euglycemia, prevent skin breakdown, support body systems, halt true wt loss, and improved GI status    DIETITIAN FOLLOW UP: RD to follow and monitor nutrition status    Hailey Lowery MS, MELISSA, RDN, LDN  Clinical Dietitian  P: [phone]

## 2025-05-29 NOTE — PROGRESS NOTES
[facility]  part of [facility]    Gynecologic Oncology Progress Note    Kelli Fisher Patient Status:  Inpatient    1955 MRN [id number]   Location [facility]5W Attending Lina Dueñas,    Hosp Day # 17 PCP Taylor Gallardo MD       Date of Admission:  2025  Reason for Admission:  Stage IV high-grade clear-cell adenocarcinoma  Pleural effusions  Failure to thrive  Cardiac arrhythmias  Pancytopenia    SUBJECTIVE:  Patient is a 70 year old female.  No obstetric history on file.  No LMP recorded. Patient has had a hysterectomy.  Patient has been admitted since May 12 with multiple medical comorbidities and failure to thrive.  I have been seeing her periodically and the patient states that she is eating less although continues to have daily bowel movements.  She states that she is short of breath and was awaiting the thoracentesis.  States that she continues to get weaker but that she was can make a more concerted effort to eat better.  She denies nausea or vomiting and is passing flatus.    Medications:  Prescriptions Prior to Admission[1]    Allergies:  Allergies[2]       Review of Systems:  Please see HPI above  Decreased appetite, shortness of breath, extreme fatigue    OBJECTIVE:  Patient Vitals for the past 24 hrs:   BP Temp Temp src Pulse Resp SpO2 Weight   25 1700 108/72 97.3 °F (36.3 °C) Axillary 95 22 98 % --   25 1157 116/74 97.6 °F (36.4 °C) Axillary 86 20 100 % --   25 1057 101/72 97.3 °F (36.3 °C) Axillary 90 22 100 % --   25 1013 98/67 97.3 °F (36.3 °C) Axillary 90 22 100 % --   25 0957 102/61 97.3 °F (36.3 °C) Axillary 88 20 100 % --   25 0917 114/71 -- -- 95 -- 98 % --   25 0600 113/75 97.5 °F (36.4 °C) Axillary 80 20 100 % --   25 0514 -- -- -- -- -- -- 175 lb 8 oz (79.6 kg)   25 0300 -- -- -- 88 -- -- --   25 0016 128/85 97.4 °F (36.3 °C) Axillary 99 20 96 % --   250 -- -- -- 100 -- 94 % --    05/28/25 2034 133/80 97.6 °F (36.4 °C) Axillary 98 22 93 % --   05/28/25 1905 -- -- -- 95 -- -- --       Intake/Output Summary (Last 24 hours) at 5/29/2025 1707  Last data filed at 5/29/2025 1157  Gross per 24 hour   Intake 552 ml   Output 250 ml   Net 302 ml       Physical Exam:  General appearance: alert, appears stated age and cooperative, however appears to be fatigued and frail  Head: Normocephalic, without obvious abnormality, atraumatic  Eyes: conjunctivae/corneas clear. PERRL, EOM's intact.   Ears:  external ear canals both ears  Throat: lips, mucosa, and tongue normal; teeth and gums normal  Neck: no adenopathy, no carotid bruit, no JVD, supple, symmetrical, trachea midline and thyroid not enlarged, symmetric, no tenderness/mass/nodules  Back: symmetric, no curvature. ROM normal. No CVA tenderness.  Extremities: extremities normal, atraumatic, no cyanosis or edema  Skin: Skin color, texture, turgor normal. No rashes or lesions  Lymph nodes: Cervical, supraclavicular, and axillary nodes normal.  Neurologic: Grossly normal    Diagnostics:  XR CHEST AP PORTABLE  (CPT=71045)  Result Date: 5/29/2025  PROCEDURE: XR CHEST AP PORTABLE  (CPT=71045) TIME: 0906 hours  COMPARISON: [facility], XR CHEST AP/PA (1 VIEW) (CPT=71045), 5/28/2025, 4:40 PM.  INDICATIONS: Effusion follow up.  TECHNIQUE:   Single view.   FINDINGS:  CARDIAC/VASC: Borderline heart size.  Prominent central vasculature  MEDIAST/SCOTT:   Atherosclerotic aorta with no visible aneurysm.  Right IJ Port-A-Cath with tip in the distal SVC.  No pneumothorax LUNGS/PLEURA: Bilateral perihilar and lower lobe mixed alveolar and interstitial airspace opacification most pronounced at the lung bases.  Superimposed bilateral effusions, worse on the right. BONES: Mild scoliosis with mild degenerative disc disease and spondylosis.  OTHER: Negative.          CONCLUSION:  1. Borderline cardiomegaly prominent central vasculature. 2. Bilateral perihilar  lower lobe mixed alveolar and interstitial multifocal airspace opacification with bibasilar subpulmonic effusions, worse on the right.  Slight progression right side.    Dictated by (CST): Marlon Harp MD on 5/29/2025 at 9:34 AM     Finalized by (CST): Marlon Harp MD on 5/29/2025 at 9:35 AM          XR CHEST AP/PA (1 VIEW) (CPT=71045)  Result Date: 5/28/2025         PROCEDURE: XR CHEST AP/PA (1 VIEW) (CPT=71045)  COMPARISON: [facility], XR CHEST AP PORTABLE (CPT=71045), 5/28/2025, 7:06 AM.  INDICATIONS: Chest pain.  TECHNIQUE:   Single PA view.   FINDINGS/IMPRESSION:  1. The heart mediastinal structures are enlarged.  Pulmonary vascularity is moderately increased.  There is a small to moderate-sized right-sided pleural effusion and a very small left pleural effusion.  The findings are compatible with moderate fluid overload and have not changed since previous exam.  2. Lung volumes are slightly diminished.  There is redemonstration of moderate size streaky opacities within the bilateral perihilar regions which could represent atelectasis, infiltrate, or a combination.  3. There is redemonstration of a right IJ Port-A-Cath with tip at the cavoatrial junction.  4. There are moderate degenerative changes within the thoracic spine.     Dictated by (CST): Kaleb Epps MD on 5/28/2025 at 5:09 PM     Finalized by (CST): Kaleb Epps MD on 5/28/2025 at 5:10 PM          XR CHEST AP PORTABLE  (CPT=71045)  Result Date: 5/28/2025  PROCEDURE: XR CHEST AP PORTABLE  (CPT=71045) TIME: 706 hours.   COMPARISON: [facility], CT CHEST PE AORTA (IV ONLY) (CPT=71260), 5/13/2025, 5:15 PM.  [facility], XR CHEST AP PORTABLE (CPT=71045), 5/27/2025, 9:24 AM.  [facility], XR CHEST AP PORTABLE (CPT=71045), 5/24/2025, 6:39 AM.  [facility], XR CHEST AP PORTABLE (CPT=71045), 5/22/2025, 6:52 AM.  INDICATIONS: Follow up shortness of breath.   TECHNIQUE:   Single view.   FINDINGS:  CARDIAC/MEDIAST: Heart and mediastinum are unchanged.  Stable right paratracheal prominence previously noted to represent adenopathy.  LUNGS/PLEURA:  Small left pleural effusion and small to moderate right pleural effusion.  There is patchy opacity in the bilateral mid to lower lungs.  No pneumothorax. OTHER:  Osseous structures unchanged.  Stable right chest wall port.         CONCLUSION:   No significant overall change since the preceding examination.  Right greater than left bilateral pleural effusions with bilateral mid to lower lung opacities.    Dictated by (CST): Casey Diallo MD on 5/28/2025 at 7:57 AM     Finalized by (CST): Casey Diallo MD on 5/28/2025 at 7:59 AM          XR CHEST AP PORTABLE  (CPT=71045)  Result Date: 5/27/2025  PROCEDURE: XR CHEST AP PORTABLE  (CPT=71045) TIME: 9:26.   COMPARISON: [facility], XR CHEST AP PORTABLE (CPT=71045), 5/22/2025, 6:52 AM.  [facility], CT CHEST PE AORTA (IV ONLY) (CPT=71260), 5/13/2025, 5:15 PM.  [facility], XR CHEST AP PORTABLE (CPT=71045), 5/24/2025, 6:39 AM.  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  TECHNIQUE:   Single view.   FINDINGS:  CARDIAC/VASC: The cardiac silhouette is not enlarged.  The thoracic aorta is again tortuous with mild calcification of the aortic knob.  Unremarkable pulmonary vasculature.  MEDIAST/SCOTT:   The right paratracheal stripe is again prominent consistent with known underlying lymphadenopathy. LUNGS/PLEURA: There is stable consolidation in the left upper lobe.  Small pleural effusions have increased in size, right greater than left.  There is worsening consolidation and volume loss at both lung bases.  No pneumothorax. BONES: The osseous structures are unchanged. OTHER: A right-sided subcutaneous chest port  tip again projects over the right atrium.         CONCLUSION:  1. Small bilateral pleural effusions have increased in size suggesting worsening mild CHF/fluid overload.  There is also worsening underlying passive atelectasis at both lung bases. 2. Stable left upper lobe consolidation, which may relate to a metastasis, atelectasis and/or superimposed pneumonia.     Dictated by (CST): Vincent Vazquez MD on 2025 at 9:59 AM     Finalized by (CST): Vincent Vazquez MD on 2025 at 10:02 AM          [employer] (CPT=93308/[id number]/[id number])  Result Date: 2025  Transthoracic Echocardiogram Name:Kelli Fisher Date: 2025 :  1955 Ht:  (60in)  BP: 106 / 68 MRN:  [id number]    Age:  70years    Wt:  (169lb) HR: 84bpm Loc:  [facility]       Gndr: F          BSA: 1.74m^2 Sonographer: BLAIR Loya Ordering:    Cheo Bird MD Consulting:  Bebo Yu ---------------------------------------------------------------------------- History/Indications:   Pericardial effusion. Status post pericardial window. ---------------------------------------------------------------------------- Procedure information:  A transthoracic echocardiogram, limited study was performed. Additional evaluation included M-mode and limited 2D.  Patient status:  Inpatient.  Location:  Bedside.    Comparison was made to the study of 2025.    This was a routine study. Transthoracic echocardiography for diagnosis, ventricular function evaluation, assessment of pericardial effusion and hemodynamic status, and post intervention evaluation. Image quality was adequate. ECG rhythm:   Normal sinus ---------------------------------------------------------------------------- Conclusions: 1. Left ventricle: The cavity size was normal. Wall thickness was normal.    Systolic function was vigorous. The estimated ejection fraction was    65-70%, by visual assessment. No diagnostic evidence for regional wall    motion abnormalities. Unable to  assess LV diastolic function. 2. Pericardium, extracardiac: There was no pericardial effusion. There was a    left pleural effusion. Impressions:  This study is compared with previous dated 05/23/2025: No significant change since prior study. * ---------------------------------------------------------------------------- * Findings: Left ventricle:  The cavity size was normal. Wall thickness was normal. Systolic function was vigorous. The estimated ejection fraction was 65-70%, by visual assessment. No diagnostic evidence for regional wall motion abnormalities. Unable to assess LV diastolic function. Left atrium:  The atrium was normal in size. Right ventricle:  The cavity size was normal. Systolic function was normal. Right atrium:  The atrium was normal in size. Mitral valve:  The valve was structurally normal. Leaflet separation was normal. Aortic valve:  The valve was structurally normal. The valve was trileaflet. Cusp separation was normal. Tricuspid valve:  The valve is structurally normal. Leaflet separation was normal. Pulmonic valve:   The valve is structurally normal. Cusp separation was normal. Pericardium:   There was no pericardial effusion. Pleura:  There was a left pleural effusion. Aorta: Aortic root: The aortic root was normal. Pulmonary arteries: Systolic pressure could not be accurately estimated. Systemic veins:  Not visualized. ---------------------------------------------------------------------------- Measurements  Left ventricle             Value    Ref       05/23/2025  IVS thickness, ED, PLAX    0.8   cm 0.6 - 0.9 1.1  LV ID, ED, PLAX            4.3   cm 3.8 - 5.2 3.4  LV ID, ES, PLAX            2.5   cm 2.2 - 3.5 2.2  LV PW thickness, ED, PLAX  0.8   cm 0.6 - 0.9 0.9  IVS/LV PW ratio, ED, PLAX  1.00     --------- 1.22  LV PW/LV ID ratio, ED,     0.19     --------- 0.26  PLAX  LV ejection fraction       73    %  54 - 74   66  Aortic root                Value    Ref       05/23/2025  Aortic  root ID             3.1   cm 2.5 - 3.9 ----------  Left atrium                Value    Ref       2025  LA ID, A-P, ES             3.2   cm 2.7 - 3.8 ----------  LA/aortic root ratio       1.03     --------- ---------- Legend: (L)  and  (H)  corinna values outside specified reference range. ---------------------------------------------------------------------------- Prepared and electronically signed by Cheo Bird MD 2025 11:07     XR CHEST AP PORTABLE  (CPT=71045)  Result Date: 2025  PROCEDURE: XR CHEST AP PORTABLE  (CPT=71045)  COMPARISON: [facility], CT CHEST PE AORTA (IV ONLY) (CPT=71260), 2025, 5:15 PM.  [facility], XR CHEST AP PORTABLE (CPT=71045), 2025, 6:52 AM.  INDICATIONS: Shortness of breath.  Findings and impression:  Stable diffuse intrathoracic adenopathy, stable heart size  Gloria catheter in the lower SVC  Persistent low lung volumes with left greater than right basilar opacities and small effusions  No pneumothorax Finalized by (CST): Chris Lovell MD on 2025 at 9:48 AM          CARD ECHO LIMITED (CPT=93308/34803/05403)  Result Date: 2025  Transthoracic Echocardiogram Name:Kelli Fisher Date:    2025    :     1955    Ht:     (60in)     BP: MRN:     [id number]       Age:     70years       Wt:     (167lb)    HR: Loc:     [facility]          Gndr:    F             BSA:    1.73m^2 Sonographer: Naty PINTO Ordering:    Leeanne Shi Consulting:  Bebo Yu ---------------------------------------------------------------------------- Procedure information:  A transthoracic echocardiogram, limited study was performed. Additional evaluation included M-mode and limited 2D.  Image quality was adequate. ---------------------------------------------------------------------------- Conclusions: 1. Left ventricle: The cavity size was normal. Wall thickness was normal.    Systolic function was normal. The estimated ejection fraction was 55-60%,     by visual assessment. Unable to assess LV diastolic function. 2. Pericardium, extracardiac: There was a large left pleural effusion. Impressions:  This study is compared with previous dated 05/20/2025: TACHYCARDIC DURING THE EXAM 104 BPM. Pericardial effusion is no longer present. Compared to the previous study, these findings represent improvement. * ---------------------------------------------------------------------------- * Findings: Left ventricle:  The cavity size was normal. Wall thickness was normal. Systolic function was normal. The estimated ejection fraction was 55-60%, by visual assessment. Unable to assess LV diastolic function. Left atrium:  Well visualized. Right ventricle:  The cavity size was normal. Systolic function was normal. Right atrium:  Well visualized. Mitral valve:  Well visualized. Aortic valve:  Well visualized.  The valve was trileaflet. Tricuspid valve:  Well visualized. Pulmonic valve:   Well visualized. Pericardium:   There was no pericardial effusion. Pleura:  There was a large left pleural effusion. Systemic veins:  Central venous respirophasic diameter changes are in the normal range (>50%). Inferior vena cava: The IVC was normal-sized. ---------------------------------------------------------------------------- Measurements  Left ventricle          Value       Ref       05/20/2025  IVS thickness, ED,  (H) 1.1   cm    0.6 - 0.9 1.0  PLAX  LV ID, ED, PLAX     (L) 3.4   cm    3.8 - 5.2 3.6  LV ID, ES, PLAX         2.2   cm    2.2 - 3.5 2.5  LV PW thickness,        0.9   cm    0.6 - 0.9 1.0  ED, PLAX  IVS/LV PW ratio,        1.22        --------- 1.00  ED, PLAX  LV PW/LV ID ratio,      0.26        --------- 0.28  ED, PLAX  LV ejection             66    %     54 - 74   59  fraction  Systemic veins          Value       Ref       05/20/2025  Estimated CVP           3     mm Hg --------- 15  Inferior vena cava      Value       Ref       05/20/2025  ID                      1.3   cm     <=2.1     2.4 Legend: (L)  and  (H)  corinna values outside specified reference range. ---------------------------------------------------------------------------- Prepared and electronically signed by Vic Mckeon 05/23/2025 17:00     XR CHEST AP PORTABLE  (CPT=71045)  Result Date: 5/22/2025  PROCEDURE: XR CHEST AP PORTABLE  (CPT=71045) TIME: 6:56 a.m.   COMPARISON: [facility], CT CHEST PE AORTA (IV ONLY) (CPT=71260), 5/13/2025, 5:15 PM.  [facility], XR CHEST AP PORTABLE (CPT=71045), 5/21/2025, 2:47 PM.  INDICATIONS: Shortness of breath.  TECHNIQUE:   Single view.   FINDINGS:  CARDIAC/VASC: The cardiac silhouette is enlarged.  Unremarkable pulmonary vasculature.  MEDIAST/SCOTT:   Atherosclerotic aorta with no visible aneurysm.  LUNGS/PLEURA: Small bilateral pleural effusions.  Left basal pulmonary opacity.  Diffuse interstitial prominence. BONES: Mild degenerative disc disease and spondylosis without visible acute abnormalities.  OTHER: Right chest port with tip in the right atrium.  IVC filter is partially included in the field of imaging.  Endotracheal tube has been removed.         CONCLUSION: Stable left basal/perihilar pulmonary opacity which which could represent pneumonia or neoplasm.  Stable small bilateral pleural effusions slightly larger on the left.  Mild interstitial edema. Interval extubation   Dictated by (CST): Sincere Canales MD on 5/22/2025 at 7:38 AM     Finalized by (CST): Sincere Canales MD on 5/22/2025 at 7:40 AM          XR CHEST AP PORTABLE  (CPT=71045)  Result Date: 5/21/2025  PROCEDURE: XR CHEST AP PORTABLE  (CPT=71045) TIME: 2:50 p.m.   COMPARISON: [facility], CT CHEST PE AORTA (IV ONLY) (CPT=71260), 5/13/2025, 5:15 PM.  [facility], XR CHEST AP PORTABLE (CPT=71045), 5/21/2025, 9:37 AM.  INDICATIONS: Shortness of breath; post pericardial window.  TECHNIQUE:   Single view.   FINDINGS:  CARDIAC/VASC: Pericardial drain over the left  heart with tip along the inferior margin of the right atrial shadow.  Cardiac silhouette is enlarged.  Mild vascular congestion. MEDIAST/SCOTT:   Atherosclerotic aorta with no visible aneurysm.  LUNGS/PLEURA: Small bilateral pleural effusions.  Left infrahilar pulmonary opacity. BONES: Scattered mild degenerative endplate changes in the visualized thoracolumbar spine. OTHER: IVC filter is partially included in the field of imaging.  Right chest port with tip at the cavoatrial junction.  Endotracheal tube overlying the trachea the level of the aortic knob, approximately 33 mm above the christian.         CONCLUSION:  1.  Post pericardial window.  Pericardial drain over the lower aspect of the cardiac silhouette. 2.  Interval intubation.  Endotracheal tube is present over the trachea in gross satisfactory position 33 mm above the christian. 3.  Small bilateral pleural effusions, with decrease in size of left effusion.  Nonspecific left perihilar pulmonary opacity which could represent atelectasis, pneumonia, or other inflammatory/neoplastic process.    Dictated by (CST): Sincere Canales MD on 5/21/2025 at 3:15 PM     Finalized by (CST): Sincere Canales MD on 5/21/2025 at 3:17 PM          XR CHEST AP PORTABLE  (CPT=71045)  Result Date: 5/21/2025  PROCEDURE: XR CHEST AP PORTABLE  (CPT=71045) TIME: 937.   COMPARISON: [facility], XR CHEST AP PORTABLE (CPT=71045), 5/18/2025, 9:23 AM.  INDICATIONS: Shortness of breath.  TECHNIQUE:   Single view.   FINDINGS:  CARDIAC/VASC: The heart mediastinal structures are significantly enlarged but unchanged since previous exam.  Pulmonary vascularity is within normal limits. MEDIAST/SCOTT:   There is widening of the mediastinum unchanged since previous exam. LUNGS/PLEURA: There is redemonstration of increased density with mid/lower left lung compatible with a combination of infiltrate, atelectasis, and a small to moderate pleural effusion.  BONES: No fracture or visible bony lesion.  OTHER: Negative.          CONCLUSION:   1. Redemonstration of increased density within the left lung (mid and lower regions).  The findings are most compatible with a combination of infiltrate, atelectasis, and small to moderate effusion.   2. Cardiac enlargement.  There is widening of the mediastinum likely representing adenopathy.    Dictated by (CST): Kaleb Epps MD on 2025 at 9:56 AM     Finalized by (CST): Kaleb Epps MD on 2025 at 9:59 AM          [employer] English (CPT=93308/29990/62748)  Result Date: 2025  Transthoracic Echocardiogram Name:Kelli Fisher Date: 2025 :  1955 Ht:  (60in)  BP: 98 / 72 MRN:  [id number]    Age:  70years    Wt:  (167lb) HR: 89bpm Loc:  [facility]       Gndr: F          BSA: 1.73m^2 Sonographer: Chris DOMINGUEZ Ordering:    Srinath Levy Consulting:  Trista Peña ---------------------------------------------------------------------------- History/Indications:  Reevaluate enlarging pericardial effusion. Acute respiratory failure with hypoxia. Shortness of breath. ---------------------------------------------------------------------------- Procedure information:  A transthoracic echocardiogram, limited study was performed. Additional evaluation included M-mode and limited 2D.  Patient status:  Inpatient.  Location:  Bedside.    Comparison was made to the study of 2025.    This was a routine study. Transthoracic echocardiography for diagnosis and ventricular function evaluation. Image quality was adequate. ECG rhythm:   Normal sinus ---------------------------------------------------------------------------- Conclusions: 1. Left ventricle: The cavity size was normal. Wall thickness was normal.    Systolic function was normal. The estimated ejection fraction was 55-60%,    by visual assessment. Unable to assess LV diastolic function. 2. Pericardium, extracardiac: A large, free-flowing pericardial effusion was    identified. There is RV collapse in  early diastolic for less than 50% of    the cardiac cycle. There was evidence for mildly increased RV-LV    interaction demonstrated by respirophasic changes in transmitral    velocities (25% reduction in mitral valve e'wave velocity during    inspiration). There was a left pleural effusion. 3. Inferior vena cava: The IVC was dilated (23mm). Respirophasic diameter    changes are blunted (< 50%), consistent with elevated central venous    pressure. Impressions:  This study is compared with previous dated 5/13/2025: The pericardial effusion has slightly increased, now with maximal dimensions in diastole of 20mm with evidence of early tamponade (25% reduction in mitral valve inflow velocity and right ventricle free wall collapse in early diastole). * ---------------------------------------------------------------------------- * Findings: Left ventricle:  The cavity size was normal. Wall thickness was normal. Systolic function was normal. The estimated ejection fraction was 55-60%, by visual assessment. Unable to assess LV diastolic function. Right ventricle:  The cavity size was normal. Systolic function was normal. Systolic pressure was not estimated. Mitral valve:  The leaflets were mildly calcified. Aortic valve:   The valve was trileaflet. The leaflets were mildly calcified. Pericardium:  A large, free-flowing pericardial effusion was identified. Doppler:  There is RV collapse in early diastolic for less than 50% of the cardiac cycle. There was evidence for mildly increased RV-LV interaction demonstrated by respirophasic changes in transmitral velocities (25% reduction in mitral valve e'wave velocity during inspiration). Pleura:  There was a left pleural effusion. Aorta: Aortic root: The aortic root was normal. Ascending aorta: The ascending aorta was normal. Systemic veins: Inferior vena cava: The IVC was dilated (23mm).  Respirophasic diameter changes are blunted (< 50%), consistent with elevated central venous  pressure. ---------------------------------------------------------------------------- Measurements  Left ventricle          Value       Ref       05/13/2025  IVS thickness, ED,  (H) 1.0   cm    0.6 - 0.9 1.3  PLAX  LV ID, ED, PLAX     (L) 3.6   cm    3.8 - 5.2 4.0  LV ID, ES, PLAX         2.5   cm    2.2 - 3.5 2.4  LV PW thickness,    (H) 1.0   cm    0.6 - 0.9 1.1  ED, PLAX  IVS/LV PW ratio,        1.00        --------- 1.18  ED, PLAX  LV PW/LV ID ratio,      0.28        --------- 0.28  ED, PLAX  LV ejection             59    %     54 - 74   71  fraction  LVOT                    Value       Ref       05/13/2025  LVOT ID                 2     cm    --------- 2  Aortic root             Value       Ref       05/13/2025  Aortic root ID          3.3   cm    2.5 - 3.9 ----------  Ascending aorta         Value       Ref       05/13/2025  Ascending aorta ID  (H) 3.6   cm    1.9 - 3.5 ----------  Systemic veins          Value       Ref       05/13/2025  Estimated CVP           15    mm Hg --------- 15  Inferior vena cava      Value       Ref       05/13/2025  ID                  (H) 2.4   cm    <=2.1     2.3 Legend: (L)  and  (H)  corinna values outside specified reference range. ---------------------------------------------------------------------------- Prepared and electronically signed by Javi Huerta 05/20/2025 17:58     IR IVC FILTER PLACEMENT  Result Date: 5/20/2025  PROCEDURE: Inferior vena cavogram with placement of an inferior vena cava filter PROCEDURE DATE: 5/14/2025 : MD Kellie CLINICAL INDICATION: DVT/PE.  History of metastatic endometrial cancer.  Anticoagulation contraindicated secondary to anemia/thrombocytopenia.  IVC filter placement indicated for prophylaxis against recurrent PE. SEDATION: Intravenous moderate sedation with continuous physiologic monitoring was provided for 16 minutes. FLUOROSCOPY TIME: 0.7 min FLUOROSCOPY DOSE: 34 mGy CONTRAST: 40mL isovue TECHNIQUE AND FINDINGS: Following  discussion of the indications, risks, benefits and alternatives to the procedure, written informed consent was obtained. The patient was transferred to the interventional radiology suite and placed in a supine position on the fluoroscopic imaging table. The right groin was prepped and draped in sterile fashion. 2% lidocaine was administered for local anesthesia. Under real-time sonographic guidance, the right  common femoral vein was accessed using a 5-Fr micropuncture kit. A 0.035\" guidewire was advanced through the sheath into the inferior vena cava under fluoroscopic guidance.  Exchange was made for a 5 Chinese Sos Omni catheter, which was advanced into the inferior vena cava under fluoroscopic guidance. Inferior vena cavography was performed, demonstrating a solitary, patent inferior vena cava of normal caliber. Exchange was made for the filter delivery sheath.  A Venatech permanent filter was successfully deployed in an infrarenal position without significant tilt. The sheath was removed.  Hemostasis was achieved with direct manual pressure, and a sterile dressing was applied. The patient tolerated the procedure without complication and left the department in satisfactory condition. IMPRESSION: Successful inferior vena cavography demonstrating a solitary, patent inferior vena cava of normal caliber. Successful placement of a Venatech inferior vena cava filter. Rony Hopkins MD     XR CHEST AP PORTABLE  (CPT=71045)  Result Date: 5/18/2025  PROCEDURE: XR CHEST AP PORTABLE  (CPT=71045) TIME: 928 hours.   COMPARISON: [facility], XR CHEST AP PORTABLE (CPT=71045), 5/15/2025, 6:31 AM.  [facility], XR CHEST AP PORTABLE (CPT=71045), 5/12/2025, 2:43 PM.  [facility], XR CHEST AP PORTABLE (CPT=71045), 5/02/2025, 6:22 AM.  INDICATIONS: Shortness of breath; IV diuresis.  TECHNIQUE:   Single view.   FINDINGS:  CARDIAC/VASC: The cardiac silhouette remains enlarged.  Mild  prominence of the central pulmonary vasculature, mildly increased compared to prior  MEDIAST/SCOTT:   Prominence of the mediastinal and bilateral hilar contours is unchanged consistent with known lymphadenopathy. LUNGS/PLEURA: There is unchanged consolidation in the lateral aspect of the lingula/left upper lobe.  The right hemidiaphragm remains mildly elevated.  Moderate left and trace right pleural effusions and mild associated passive atelectasis are unchanged bilaterally.  No pneumothorax. BONES: The osseous structures are unchanged. OTHER: A right-sided subcutaneous chest port tip again projects over the cavoatrial junction, in customary positioning.         CONCLUSION:   Unchanged mild cardiomegaly.  Interstitial pulmonary edema, appearing slightly increased from 05/15/2025.  Unchanged moderate left pleural effusion associated basilar opacity.  Unchanged trace right pleural effusion with minimal associated atelectasis  Unchanged metastatic mediastinal/hilar adenopathy, better seen on cross-sectional imaging.      Dictated by (CST): Lisa Navas MD on 5/18/2025 at 10:14 AM     Finalized by (CST): Lisa Navas MD on 5/18/2025 at 10:16 AM          XR CHEST AP PORTABLE  (CPT=71045)  Result Date: 5/15/2025  PROCEDURE: XR CHEST AP PORTABLE  (CPT=71045) TIME: 6:34.   COMPARISON: [facility], CT CHEST PE AORTA (IV ONLY) (CPT=71260), 5/13/2025, 5:15 PM.  [facility], XR CHEST AP PORTABLE (CPT=71045), 5/12/2025, 2:43 PM.  INDICATIONS: Shortness of breath.  Acute on chronic respiratory failure, pulmonary emboli, history of metastatic endometrial carcinoma.  TECHNIQUE:   Single view.   FINDINGS:  CARDIAC/VASC: The cardiac silhouette remains enlarged.  Unremarkable pulmonary vasculature.  MEDIAST/SCOTT:   Prominence of the mediastinal and bilateral hilar contours is unchanged consistent with known lymphadenopathy. LUNGS/PLEURA: There is stable consolidation in the lateral aspect of the  lingula/left upper lobe.  The right hemidiaphragm remains mildly elevated.  Small pleural effusions and mild associated passive atelectasis are unchanged bilaterally.  No pneumothorax. BONES: The osseous structures are unchanged. OTHER: A right-sided subcutaneous chest port tip again projects over the cavoatrial junction, in customary positioning.         CONCLUSION:  1. The cardiac silhouette remains enlarged, likely relating to the known pericardial effusion.  Small pleural effusions are also unchanged bilaterally suggesting mild CHF or fluid overload.  There is stable mild associated passive atelectasis at both lung bases. 2. There is a history of metastatic endometrial carcinoma.  Stable left upper lobe/lingular consolidation may relate to a metastasis, atelectasis and/or pneumonia.  Unchanged prominence of the mediastinal and bilateral hilar contours is compatible with known underlying metastatic lymphadenopathy.     Dictated by (CST): Vincent Vazquez MD on 5/15/2025 at 7:25 AM     Finalized by (CST): Vincent Vazquez MD on 5/15/2025 at 7:28 AM          US VENOUS DOPPLER LEG BILAT - DIAG IMG (CPT=93970)  Result Date: 5/14/2025  PROCEDURE: US VENOUS DOPPLER LEG BILAT-DIAG IMG (CPT=93970)  COMPARISON: None.  INDICATIONS: b/l PE  TECHNIQUE: Color duplex Doppler venous ultrasound of both lower extremities was performed in the  usual manner.  FINDINGS:   There is acute appearing nonocclusive DVT involving the right superficial femoral (mid and distal segments), the right popliteal, and right posterior tibial veins.  There is no DVT identified within the right common femoral vein.  The deep veins of the left lower extremity demonstrate normal blood flow, compressibility, and augmentation.  There is a 8.2 x 1.5 x 3.8 cm left posterior popliteal/Baker's cyst.         CONCLUSION:   1. Acute appearing nonocclusive DVT involving the right superficial femoral, popliteal, and posterior tibial veins.  No left lower  extremity DVT is identified.  2. 8.2 x 1.5 x 3.8 cm left posterior popliteal/Baker's cyst.     Dictated by (CST): Kaleb Epps MD on 5/14/2025 at 10:46 AM     Finalized by (CST): Kaleb Epps MD on 5/14/2025 at 10:49 AM          CT CHEST PE AORTA (IV ONLY) (CPT=71260)  Result Date: 5/13/2025  PROCEDURE: CT CHEST PE AORTA (IV ONLY) (CPT=71260)  COMPARISON: [facility], CT CHEST PE AORTA (IV ONLY) (CPT=71260), 4/29/2025, 11:50 PM.  INDICATIONS: Elevated d dimer.  TECHNIQUE: CT images of the chest were obtained with non-ionic intravenous contrast material.  Automated exposure control for dose reduction was used. Adjustment of the mA and/or kV was done based on the patient's size. Use of iterative reconstruction technique for dose reduction was used. Dose information is transmitted to the ACR (American College of Radiology) NRDR (National Radiology Data Registry) which includes the Dose Index Registry.  FINDINGS:  No acute aortic syndrome.  Positive for small volume acute bilateral nonocclusive segmental PE in the right upper lobe, right lower lobe, left lower lobe  Enlarging circumferential pericardial effusion now 1.7 centimeter thick over the lateral wall.   Increasing small left and new trace right pleural effusion  Stable heart size.  No significant coronary calcification.  Venous catheter in the lower SVC  Confluent neck and mediastinal mass is grossly unchanged  Enlarging left upper lobe consolidation  Stable right basilar atelectasis  Some of the bilateral pulmonary nodules have increased in size        CONCLUSION:   Positive for small volume bilateral segmental PE  Enlarging left upper lobe consolidation and few bilateral pulmonary nodules  Enlarging pericardial effusion now 1.7 centimeter thick.  Increasing left pleural effusion, new right pleural effusion  Discussed with UNRULY Brumfield at 5:40 p.m.   Dictated by (CST): Chris Lovell MD on 5/13/2025 at 5:34 PM     Finalized by (CST): Nas  MD Chris on 2025 at 5:42 PM          [email or web address] (CPT=93308/16812/66003)  Result Date: 2025  Transthoracic Echocardiogram Name:Kelli Fisher Date: 2025 :  1955 Ht:  (60in)  BP: 106 / 75 MRN:  [id number]    Age:  70years    Wt:  (167lb) HR: 85bpm Loc:  [facility]       Gndr: F          BSA: 1.73m^2 Sonographer: Naty PINTO Ordering:    Margarette Sexton Consulting:  Dominick Herring ---------------------------------------------------------------------------- History/Indications:   SOB and known Pericardial Effusion. ---------------------------------------------------------------------------- Procedure information:  A transthoracic echocardiogram, limited study was performed. Additional evaluation included M-mode and limited 2D.  Patient status:  Inpatient.  Location:  Bedside.    Comparison was made to the study of 2025.    This was a routine study. Transthoracic echocardiography for diagnosis. Image quality was adequate. ---------------------------------------------------------------------------- Conclusions: 1. Left ventricle: The cavity size was normal. Wall thickness was mildly    increased. There was mild concentric hypertrophy. Systolic function was    normal. The estimated ejection fraction was 55-60%, by visual assessment.    Unable to assess LV diastolic function. 2. Right ventricle: The cavity size was normal. Systolic function was    normal. 3. Pericardium, extracardiac: A moderate, free-flowing pericardial effusion    was identified. There was no evidence of hemodynamic compromise. 4. Systemic veins: Central venous respirophasic diameter changes are blunted    (< 50%). 5. Inferior vena cava: The IVC was dilated. * ---------------------------------------------------------------------------- * Findings: Left ventricle:  The cavity size was normal. Wall thickness was mildly increased. There was mild concentric hypertrophy. Systolic function was normal. The estimated ejection fraction was  55-60%, by visual assessment. Unable to assess LV diastolic function. Left atrium:  Well visualized. Right ventricle:  The cavity size was normal. Systolic function was normal. Right atrium:  Well visualized. Mitral valve:  Well visualized. Aortic valve:   The valve was trileaflet. Tricuspid valve:  The annulus is normal-sized. The leaflets are normal thickness. No echocardiographic evidence for tricuspid prolapse.  Doppler: Transvalvular velocity was within the normal range. There was no evidence for stenosis. There was mild regurgitation. Pulmonic valve:    Doppler:  There was trivial regurgitation. Pericardium:  A moderate, free-flowing pericardial effusion was identified. There was no evidence of hemodynamic compromise. Systemic veins:  Central venous respirophasic diameter changes are blunted (< 50%). Inferior vena cava: The IVC was dilated. ---------------------------------------------------------------------------- Measurements  Left ventricle         Value        Ref       05/05/2025  IVS thickness, ED, (H) 1.3   cm     0.6 - 0.9 1.0  PLAX  LV ID, ED, PLAX        4.0   cm     3.8 - 5.2 3.6  LV ID, ES, PLAX        2.4   cm     2.2 - 3.5 2.4  LV PW thickness,   (H) 1.1   cm     0.6 - 0.9 1.0  ED, PLAX  IVS/LV PW ratio,       1.18         --------- 1.00  ED, PLAX  LV PW/LV ID ratio,     0.28         --------- 0.28  ED, PLAX  LV ejection            71    %      54 - 74   63  fraction  Stroke volume/bsa,     35    ml/m^2 --------- ----------  2D  LVOT                   Value        Ref       05/05/2025  LVOT ID                2     cm     --------- 2  LVOT peak              1.21  m/sec  --------- ----------  velocity, S  LVOT VTI, S            19.4  cm     --------- ----------  LVOT peak              6     mm Hg  --------- ----------  gradient, S  LVOT mean              3     mm Hg  --------- ----------  gradient, S  Stroke volume          61    ml     --------- ----------  (SV), LVOT DP  Stroke index            35    ml/m^2 --------- ----------  (SV/bsa), LVOT DP  Pulmonary artery       Value        Ref       05/05/2025  PA pressure, S, DP     44    mm Hg  --------- ----------  Tricuspid valve        Value        Ref       05/05/2025  Tricuspid regurg       2.68  m/sec  <=2.8     ----------  peak velocity  Tricuspid peak         29    mm Hg  --------- ----------  RV-RA gradient  Systemic veins         Value        Ref       05/05/2025  Estimated CVP          15    mm Hg  --------- 15  Inferior vena cava     Value        Ref       05/05/2025  ID                 (H) 2.3   cm     <=2.1     2.4  Right ventricle        Value        Ref       05/05/2025  TAPSE, 2D              1.93  cm     >=1.70    ----------  TAPSE, MM              1.93  cm     >=1.70    ----------  RV pressure, S, DP     44    mm Hg  --------- ---------- Legend: (L)  and  (H)  corinna values outside specified reference range. ---------------------------------------------------------------------------- Prepared and electronically signed by Javi Huerta 05/13/2025 16:05     XR CHEST AP PORTABLE  (CPT=71045)  Result Date: 5/12/2025  PROCEDURE: XR CHEST AP PORTABLE  (CPT=71045) TIME: 1443 hours  COMPARISON: [facility], XR CHEST AP PORTABLE (CPT=71045), 4/29/2025, 10:40 PM.  [facility], CT CHEST PE AORTA (IV ONLY) (CPT=71260), 4/29/2025, 11:50 PM.  [facility], XR CHEST AP PORTABLE (CPT=71045), 5/02/2025, 6:22 AM.  INDICATIONS: Low oxygen, shortness of breath.  History of lung cancer.  TECHNIQUE:   Single view.   FINDINGS:  CARDIAC/VASC: Enlarged cardiac silhouette is related to known pericardial effusion. Pulmonary vascularity normal. MEDIAST/SCOTT:   Stable marked mediastinal lymphadenopathy. LUNGS/PLEURA: Stable left upper lobe mass.  Suboptimal inspiration.  No significant changes BONES: No fracture or visible bony lesion. OTHER: Right Port-A-Cath tip in superior right atrium.         CONCLUSION:  1. Suboptimal  inspiration.  No acute finding or significant change. 2. Left mid lung/upper lobe mass compatible with known malignancy. 3. Stable mediastinal lymph node metastases. 4. Stable pericardial effusion.    Dictated by (CST): Jayson Fournier MD on 5/12/2025 at 3:04 PM     Finalized by (CST): Jayson Fournier MD on 5/12/2025 at 3:08 PM          IR PORT A CATH INSERTION EXCHNGE CHECK  Result Date: 5/6/2025  PROCEDURE: IR PORT A CATH INSERTION  INDICATIONS: port insertion  COMPARISON: None.  (S):  Kellie  ANESTHESIA/SEDATION: Level of anesthesia/sedation: Moderate sedation (conscious sedation) Anesthesia/sedation administered by: Nurse or other independent trained observer who has no other duties with continuous monitoring of the patient's level of consciousness and physiologic status, under the personal supervision of the attending physician. Total intra-service sedation time:  15 min  ESTIMATED BLOOD LOSS: Less than 5 mL  COMPLICATIONS: None  FINDINGS:  Informed consent was obtained. The right side of the neck and chest were sterilely prepped and draped.  The procedure was performed with the patient in a sitting semi upright position, as she could not tolerate lying flat due to dyspnea.  Ultrasound demonstrated the right internal jugular vein to be patent. Local Lidocaine was administered. Under ultrasound guidance, the vein was accessed with a micropuncture set; an image was saved.  Under fluoroscopic guidance, a 0.035 inch wire was advanced into the inferior vena cava. The access was dilated. A peel-away sheath was advanced over the Amplatz wire and secured.  Local Lidocaine was administered, and a horizontal skin incision was made in the chest several cm from the venotomy with a 15 blade. Blunt dissection of the soft tissues was then performed to create a pocket for the chest port device.  The port catheter was tunneled from the pocket to the venotomy. The catheter was advanced through the peel-away sheath to the  superior vena cava-right atrial junction and cut to appropriate length. The port catheter was attached to the port device which was then placed into the pocket.  The port was accessed with a non-coring needle. It aspirated and flushed well. The catheter was flushed with heparin and secured to the skin. The subcutaneous pocket was then closed with interrupted deep 4-0 absorbable sutures. Skin glue was applied over  the pocket incision and the venotomy.         CONCLUSION:  Ultrasound and fluoroscopic guided surgical placement of chest port    Dictated by (CST): Rony Hopkins MD on 2025 at 10:44 AM     Finalized by (CST): Rony Hopkins MD on 2025 at 10:45 AM          [employer] (CPT=93308/97354/28027)  Result Date: 2025  Transthoracic Echocardiogram Name:Kelli Fisher Date: 2025 :  1955 Ht:  (60in)  BP: 142 / 86 MRN:  [id number]    Age:  70years    Wt:  (174lb) HR: 106bpm Loc:  [facility]       Gndr: F          BSA: 1.76m^2 Sonographer: Chris DOMINGUEZ Ordering:    Stella oGmez Consulting:  Khadar Segura ---------------------------------------------------------------------------- History/Indications:   Pericardial effusion. Lung mass. ---------------------------------------------------------------------------- Procedure information:  A transthoracic echocardiogram, limited study was performed. Additional evaluation included M-mode and limited 2D.  Patient status:  Inpatient.  Location:  Bedside.    Comparison was made to the study of 2025.    This was a routine study. Transthoracic echocardiography for diagnosis and ventricular function evaluation. Image quality was adequate. ECG rhythm:   Sinus tachycardia  Study completion:  There were no complications. ---------------------------------------------------------------------------- Conclusions: 1. Left ventricle: The cavity size was normal. Wall thickness was normal.    Systolic function was vigorous. The estimated ejection fraction  was    65-70%, by biplane method of disks. No diagnostic evidence for regional    wall motion abnormalities. Unable to assess LV diastolic function. 2. Pericardium, extracardiac: A small to moderate, free-flowing pericardial    effusion was identified circumferential to the heart. Probably not    hemodynamically significant. Impressions:  This study is compared with previous dated 4/30/2025: No significant change since prior study. * ---------------------------------------------------------------------------- * Findings: Left ventricle:  The cavity size was normal. Wall thickness was normal. Systolic function was vigorous. The estimated ejection fraction was 65-70%, by biplane method of disks. No diagnostic evidence for regional wall motion abnormalities. Unable to assess LV diastolic function. Ventricular septum:   Thickness was normal. Left atrium:  The left atrial volume was normal. Right ventricle:  Well visualized. The cavity size was normal. Systolic function was normal. Right atrium:  Well visualized. The atrium was normal in size. Mitral valve:  Well visualized. The leaflets were mildly calcified. Aortic valve:  Well visualized.  The valve was trileaflet. The leaflets were mildly calcified. Tricuspid valve:  Well visualized. Pulmonic valve:   Well visualized. Pericardium:  A small to moderate, free-flowing pericardial effusion was identified circumferential to the heart. Probably not hemodynamically significant. Aorta: Aortic root: The aortic root was normal. Ascending aorta: The ascending aorta was normal. Pulmonary arteries: Systolic pressure could not be accurately estimated. ---------------------------------------------------------------------------- Measurements  Left ventricle         Value        Ref       04/30/2025  IVS thickness, ED, (H) 1.0   cm     0.6 - 0.9 1.0  PLAX  LV ID, ED, PLAX    (L) 3.6   cm     3.8 - 5.2 3.9  LV ID, ES, PLAX        2.4   cm     2.2 - 3.5 2.5  LV PW thickness,   (H) 1.0    cm     0.6 - 0.9 1.0  ED, PLAX  IVS/LV PW ratio,       1.00         --------- 1.00  ED, PLAX  LV PW/LV ID ratio,     0.28         --------- 0.26  ED, PLAX  LV ejection            63    %      54 - 74   66  fraction  LV end-diastolic       62    ml     48 - 140  ----------  volume, 1-p A4C  LV ejection            65    %      46 - 78   ----------  fraction, 1-p A4C  Stroke volume, 1-p     40    ml     --------- ----------  A4C  LV end-diastolic       35    ml/m^2 30 - 82   ----------  volume/bsa, 1-p  A4C  Stroke volume/bsa,     23    ml/m^2 --------- ----------  1-p A4C  LV end-diastolic       61    ml     46 - 106  ----------  volume, 2-p  LV end-systolic        21    ml     14 - 42   ----------  volume, 2-p  LV ejection            65    %      54 - 74   ----------  fraction, 2-p  Stroke volume, 2-p     40    ml     --------- ----------  LV end-diastolic       35    ml/m^2 29 - 61   ----------  volume/bsa, 2-p  LV end-systolic        12    ml/m^2 8 - 24    ----------  volume/bsa, 2-p  Stroke volume/bsa,     22.5  ml/m^2 --------- ----------  2-p  LVOT                   Value        Ref       04/30/2025  LVOT ID                2     cm     --------- ----------  Aortic root            Value        Ref       04/30/2025  Aortic root ID         3.2   cm     2.5 - 4.0 ----------  Ascending aorta        Value        Ref       04/30/2025  Ascending aorta ID (H) 3.7   cm     1.9 - 3.5 ----------  Left atrium            Value        Ref       04/30/2025  LA ID, A-P, ES         2.7   cm     2.7 - 3.8 ----------  LA volume, S           43    ml     22 - 52   ----------  LA volume/bsa, S       24    ml/m^2 16 - 34   ----------  LA volume, ES, 1-p     39    ml     22 - 52   ----------  A4C  LA volume, ES, 1-p     46    ml     22 - 52   ----------  A2C  LA volume, ES, A/L     46    ml     --------- ----------  LA volume/bsa, ES,     26    ml/m^2 16 - 34   ----------  A/L  LA/aortic root         0.84         --------- ----------   ratio  Right atrium           Value        Ref       04/30/2025  RA ID, S-I, ES,        4.8   cm     3.4 - 5.3 ----------  A4C  RA ID/bsa, S-I,        2.7   cm/m^2 1.9 - 3.1 ----------  ES, A4C  RA area, ES, A4C       14    cm^2   10 - 18   ----------  RA volume, ES, 1-p     32    ml     --------- ----------  A4C  RA volume/bsa, ES,     18    ml/m^2 9 - 33    ----------  1-p A4C  Systemic veins         Value        Ref       04/30/2025  Estimated CVP          15    mm Hg  --------- ----------  Inferior vena cava     Value        Ref       04/30/2025  ID                 (H) 2.4   cm     <=2.1     2.3 Legend: (L)  and  (H)  corinna values outside specified reference range. ---------------------------------------------------------------------------- Prepared and electronically signed by Margareth Haas 05/05/2025 12:36     XR VIDEO SWALLOW (CPT=74230)  Result Date: 5/3/2025  PROCEDURE: XR VIDEO SWALLOW (CPT=74230)  COMPARISON: None available.  INDICATIONS: Clinical concern for aspiration.  TECHNIQUE: A swallowing evaluation was performed in the Radiology Department in conjunction with the Department of Speech and Hearing.  A separate detailed report will be issued by the speech pathologist who recorded the study. Fluoroscopy was provided by a radiologist who was present during the procedure.    Materials of various consistencies were given by mouth, and swallowing was evaluated fluoroscopically.   # of FLUOROGRAPHIC CINE FILES:  7 FLUOROSCOPY IMAGES OBTAINED:  1 FLUOROSCOPY TIME:  2.1 minutes RADIATION DOSE (Dose Area Product):  [id number]  FINDINGS:  ASPIRATION/PENETRATION:   None.  STRUCTURE: No visible obstruction, stricture, or dilatation.  OTHER: Negative.           CONCLUSION:  No penetration or aspiration.    Dictated by (CST): Harpreet Jade MD on 5/03/2025 at 12:08 PM     Finalized by (CST): Harpreet Jade MD on 5/03/2025 at 12:09 PM          XR CHEST AP PORTABLE  (CPT=71045)  Result Date:  2025  PROCEDURE: XR CHEST AP PORTABLE  (CPT=71045) TIME: 622 hours.   COMPARISON: [facility], XR CHEST AP PORTABLE (CPT=71045), 2025, 10:40 PM.  [facility], X CHEST PORTABLE, 2014, 8:51 PM.  INDICATIONS: Shortness of breath.  TECHNIQUE:   Single view.   FINDINGS:  CARDIAC/MEDIAST: Heart and mediastinum are unchanged. LUNGS/PLEURA:  Opacity in the left perihilar mid lung not significantly changed.  Low lung volumes.  There is mild opacity in the right lung base.  No significant pleural effusion or pneumothorax. OTHER:  Stable appearance of the osseous structures.          CONCLUSION:   Stable opacity left perihilar mid lung.  Mild opacity right lung base which may reflect atelectasis with or without superimposed pneumonia.    Dictated by (CST): Casey Diallo MD on 2025 at 7:18 AM     Finalized by (CST): Casey Diallo MD on 2025 at 7:19 AM          [place] (CPT=93308/96701/25244)  Result Date: 2025  Transthoracic Echocardiogram Name:Kelli Fisher Date: 2025 :  1955 Ht:  (60in)  BP: 106 / 74 MRN:  [id number]    Age:  70years    Wt:  (165lb) HR: 108bpm Loc:  [facility]       Gndr: F          BSA: 1.72m^2 Sonographer: James PINTO RVT Ordering:    Javi Huerta Consulting:  Ector Byrnes ---------------------------------------------------------------------------- History/Indications:  Pericardial effusion. ---------------------------------------------------------------------------- Procedure information:  A transthoracic echocardiogram, limited study was performed. Additional evaluation included M-mode and limited 2D.  Patient status:  Inpatient.  Location:  Bedside.    Comparison was made to the study of 2025.    This was a routine study. Transthoracic echocardiography for ventricular function evaluation and assessment of pericardial effusion and hemodynamic status. Image quality was adequate. ECG rhythm:   Sinus  tachycardia ---------------------------------------------------------------------------- Conclusions: 1. Left ventricle: The cavity size was normal. Wall thickness was mildly    increased. Systolic function was normal. The estimated ejection fraction    was 60-65%, by visual assessment. No diagnostic evidence for regional    wall motion abnormalities. Unable to assess LV diastolic function. 2. Pericardium, extracardiac: A small to moderate, free-flowing pericardial    effusion was identified circumferential to the heart. Maximal diameter in    diastole is 1.1cm. Features were not consistent with tamponade    physiology. 3. Inferior vena cava: The IVC was dilated. Respirophasic diameter changes    are blunted (< 50%), consistent with elevated central venous pressure. * ---------------------------------------------------------------------------- * Findings: Left ventricle:  The cavity size was normal. Wall thickness was mildly increased. Systolic function was normal. The estimated ejection fraction was 60-65%, by visual assessment. No diagnostic evidence for regional wall motion abnormalities. Unable to assess LV diastolic function. Right ventricle:  The cavity size was normal. Mitral valve:  The valve was structurally normal. Aortic valve:  The valve was structurally normal. Tricuspid valve:  The valve is structurally normal. Pulmonic valve:   Not well visualized. Pericardium:  A small to moderate, free-flowing pericardial effusion was identified circumferential to the heart. Maximal diameter in diastole is 1.1cm.  Doppler:  Features were not consistent with tamponade physiology. Systemic veins: Inferior vena cava: The IVC was dilated.  Respirophasic diameter changes are blunted (< 50%), consistent with elevated central venous pressure. ---------------------------------------------------------------------------- Measurements  Left ventricle              Value    Ref      04/12/2025  IVS thickness, ED, PLAX (H) 1.0    cm 0.6 -    0.8                                       0.9  LV ID, ED, PLAX             3.9   cm 3.8 -    4.4                                       5.2  LV ID, ES, PLAX             2.5   cm 2.2 -    2.8                                       3.5  LV PW thickness, ED,    (H) 1.0   cm 0.6 -    0.7  PLAX                                 0.9  IVS/LV PW ratio, ED,        1.00     -------- 1.14  PLAX  LV PW/LV ID ratio, ED,      0.26     -------- 0.16  PLAX  LV ejection fraction        66    %  54 - 74  66  Inferior vena cava          Value    Ref      04/12/2025  ID                      (H) 2.3   cm <=2.1    2.7 Legend: (L)  and  (H)  corinna values outside specified reference range. ---------------------------------------------------------------------------- Prepared and electronically signed by Javi Huerta 04/30/2025 13:43     CT CHEST PE AORTA (IV ONLY) (CPT=71260)  Result Date: 4/30/2025  PROCEDURE: CT CHEST PE AORTA (IV ONLY) (CPT=71260)  COMPARISON: [facility], CT CHEST PE AORTA (IV ONLY) (CPT=71260), 4/11/2025, 9:30 PM.  INDICATIONS: dyspnea, lung cancer  TECHNIQUE: CT images of the chest were obtained with non-ionic intravenous contrast material.  Automated exposure control for dose reduction was used. Adjustment of the mA and/or kV was done based on the patient's size. Use of iterative reconstruction technique for dose reduction was used. Dose information is transmitted to the ACR (American College of Radiology) NRDR (National Radiology Data Registry) which includes the Dose Index Registry.  FINDINGS:  No PE  Normal heart size with stable small to moderate circumferential pericardial effusion  No short interval change in left upper lobe mass, diffuse intrathoracic adenopathy  Trace left pleural effusion similar to prior.  Trace right effusion has resolved   A few of the right-sided pulmonary nodules may have mildly increased in size.  Adenopathy causes severe narrowing of the lower right IJ with  collaterals in the chest wall    CONCLUSION: No PE.   [facility] provided a prelim report for this exam. This final report has no significant discrepancies with the [facility] report. Finalized by (CST): Chris Lovell MD on 4/30/2025 at 9:39 AM          XR CHEST AP PORTABLE  (CPT=71045)  Result Date: 4/30/2025  PROCEDURE: XR CHEST AP PORTABLE  (CPT=71045)  COMPARISON: [facility], CT CHEST PE AORTA (IV ONLY) (CPT=71260), 4/29/2025, 11:50 PM.  INDICATIONS: Chest discomfort x1 day.  Findings:  Left lung mass with significant diffuse intrathoracic adenopathy seen on recent CT  Heart size likely normal for technique  No pneumothorax or large effusion  [facility] provided a prelim report for this exam. This final report has no significant discrepancies with the [facility] report. Finalized by (CST): Chris Lovell MD on 4/30/2025 at 6:45 AM            Data Review:   Recent Labs   Lab 05/23/25  0540 05/24/25  0542 05/25/25  0609 05/26/25  0435 05/27/25  0440 05/28/25  0600 05/29/25  0542 05/29/25  1314   RBC 3.70* 3.70* 3.38*   < > 2.99* 2.68* 2.49*  --    HGB 9.6* 9.8* 9.1*   < > 7.8* 7.1* 6.5* 7.7*   HCT 30.9* 30.7* 29.1*   < > 25.8* 23.1* 21.1* 24.7*   WBC 16.6* 20.6* 17.1*   < > 13.6* 15.4* 14.9*  --    .0 227.0 224.0   < > 237.0 240.0 271.0  --    NEUT 93 91 91  --   --   --   --   --    LYMPH 2 5 1  --   --   --   --   --     < > = values in this interval not displayed.       Recent Labs   Lab 05/27/25  0440 05/27/25  1706 05/28/25  0600 05/29/25  0542   *  --  127* 138*   BUN 19  --  18 19   CREATSERUM 0.98  --  1.04* 1.09*   CA 8.3*  --  8.7 8.7     --  147* 148*   K 3.4* 4.8 4.6 4.0     --  109 106   CO2 29.0  --  31.0 33.0*       Lab Results   Component Value Date     443.0 (H) 05/04/2025     210.0 (H) 04/16/2025    CEA <0.5 04/16/2025     230.8 (H) 05/04/2025     156.3 (H) 04/16/2025       ASSESSMENT/PLAN:  Patient is a 70 year old female No  obstetric history on file.   Stage IV clear-cell endometrial adenocarcinoma.:  Patient appears to be platinum resistant and appears to have progression of her disease.  She did only get her first cycle of carboplatin, Taxol, Keytruda on I believe May 7 and thereafter became pancytopenic.  She was due for her next cycle of chemo yesterday.   Goals of care: The patient continues to have worsening weakness, increasing fatigue, and decreased appetite.  I do not believe the patient will ever be a candidate for any future chemotherapy.  I did have a very long discussion with the patient and her daughter today explaining that her in order to get chemotherapy she needs functioning organs and the fact that the pleural effusions returned so quickly despite just receiving chemotherapy is a poor prognostic indicator.  I did offer palliative care and hospice and strongly recommended having a good discussion with them for goals of care.    Dispo: I believe the patient's and especially her daughter were very receptive to this discussion and recommendations.  When the patient is eventually discharged, I do believe that she needs to go to a rehab facility as she requires too much support.  The patient did agree to this.  Pleural effusions: Patient just had a right sided thoracentesis in which 900 cc of bloody fluid was removed.  I do believe the patient would benefit from a Pleurx catheter especially if this fluid reaccumulate's quickly.  Anemia: Patient is currently getting 1 unit of packed RBCs today.  I suspect this is multifactorial including anemia of chronic disease.      All of the findings and plan were discussed with the patient.  She notes understanding and agrees with the plan of care.  All questions were answered to the best of my ability at this time.    CASEY GRIJALVA,   5/29/2025  5:07 PM        [1]   Medications Prior to Admission   Medication Sig Dispense Refill Last Dose/Taking    levothyroxine (SYNTHROID) 100  MCG Oral Tab Take 1 tablet (100 mcg total) by mouth before breakfast.   5/12/2025 at  7:00 AM    Potassium Chloride ER 20 MEQ Oral Tab CR Take 1 tablet by mouth daily.   5/12/2025 at  7:00 AM    furosemide 20 MG Oral Tab Take 1 tablet (20 mg total) by mouth daily.   5/12/2025 at  7:00 AM    Levalbuterol HCl 1.25 MG/3ML Inhalation Nebu Soln Take 3 mL (1.25 mg total) by nebulization every 8 (eight) hours as needed for Wheezing or Shortness of Breath.   Past Week    pantoprazole 40 MG Oral Tab EC Take 1 tablet (40 mg total) by mouth daily. 30 tablet 0 5/12/2025 at  7:00 AM    amiodarone 200 MG Oral Tab Take 1 tablet (200 mg total) by mouth in the morning, at noon, and at bedtime for 1 day, THEN 1 tablet (200 mg total) in the morning, at noon, and at bedtime. 93 tablet 0 5/12/2025 at 12:00 PM    Fluticasone Propionate  HFA (FLOVENT HFA) 220 MCG/ACT Inhalation Aerosol Inhale 1 puff into the lungs every 12 (twelve) hours. Rinse mouth following use.   5/8/2025   [2]   Allergies  Allergen Reactions    Aspirin Tightness in Throat    Penicillins HIVES    Other OTHER (SEE COMMENTS)     Pt states IV steroid allergy, states it makes her breathing worse

## 2025-05-29 NOTE — CM/SW NOTE
05/29/25 1200   Choice of Post-Acute Provider   Informed patient of right to choose their preferred provider Yes   List of appropriate post-acute services provided to patient/family with quality data Yes   Patient/family choice [facility]   Information given to Daughter     Social work was able to meet with the patient and her daughter at bedside to discuss EDUARDO.    The patient and her daughter would like [facility].    [facility] is reserved in [place]. MD request to hold off on ins auth for now ans the patient is not close to [place] yet.    Social work will follow up.    SW/CM to remain available for support and/or discharge planning.     Suki Abdi MSW, LSW  Discharge Planner [id number]

## 2025-05-29 NOTE — PROGRESS NOTES
Progress Note  Kelli Fisher Patient Status:  Inpatient    1955 MRN [id number]   Location [facility]W Attending Lina Dueñas, DO   Hosp Day # 17 PCP Taylor Gallardo MD     Subjective:  Pt denies CP. Reports mild SOB, orthopnea. Family at bedside    Objective:  /74   Pulse 86   Temp 97.6 °F (36.4 °C) (Axillary)   Resp 20   Ht 5' (1.524 m)   Wt 175 lb 8 oz (79.6 kg)   SpO2 100%   BMI 34.27 kg/m²     Telemetry: NSR, PAC's    Intake/Output:    Intake/Output Summary (Last 24 hours) at 2025 1218  Last data filed at 2025 1145  Gross per 24 hour   Intake 490 ml   Output 800 ml   Net -310 ml       Last 3 Weights   25 0514 175 lb 8 oz (79.6 kg)   25 0625 171 lb 11.8 oz (77.9 kg)   25 0600 169 lb 12.1 oz (77 kg)   25 0500 171 lb 15.3 oz (78 kg)   25 0500 167 lb 8.8 oz (76 kg)   25 0600 168 lb 14 oz (76.6 kg)   25 0341 167 lb (75.8 kg)   25 0611 164 lb 10.9 oz (74.7 kg)   25 1600 156 lb 1.4 oz (70.8 kg)   25 0439 163 lb 8 oz (74.2 kg)   05/15/25 1243 154 lb 3.2 oz (69.9 kg)   05/15/25 0458 177 lb 11.2 oz (80.6 kg)   25 0645 166 lb 9.6 oz (75.6 kg)   25 0406 167 lb 9.6 oz (76 kg)   25 1736 165 lb 11.2 oz (75.2 kg)   25 1217 169 lb (76.7 kg)   25 0500 174 lb 9.6 oz (79.2 kg)   25 0549 175 lb 12.8 oz (79.7 kg)   25 1800 163 lb (73.9 kg)   258 165 lb (74.8 kg)       Labs:  Recent Labs   Lab 25  0440 25  1706 25  0600 25  0542   *  --  127* 138*   BUN 19  --  18 19   CREATSERUM 0.98  --  1.04* 1.09*   EGFRCR 62  --  58* 55*   CA 8.3*  --  8.7 8.7     --  147* 148*   K 3.4* 4.8 4.6 4.0     --  109 106   CO2 29.0  --  31.0 33.0*     Recent Labs   Lab 25  0440 25  0600 25  0542   RBC 2.99* 2.68* 2.49*   HGB 7.8* 7.1* 6.5*   HCT 25.8* 23.1* 21.1*   MCV 86.3 86.2 84.7   MCH 26.1 26.5 26.1   MCHC 30.2* 30.7* 30.8*   RDW 21.3*  21.4* 21.9*   NEPRELIM 11.10* 12.13* 11.08*   WBC 13.6* 15.4* 14.9*   .0 240.0 271.0         No results for input(s): \"TROP\", \"TROPHS\", \"CK\" in the last 168 hours.    Diagnostics:  XR CHEST AP PORTABLE  (CPT=71045)  Result Date: 5/29/2025  CONCLUSION:  1. Borderline cardiomegaly prominent central vasculature. 2. Bilateral perihilar lower lobe mixed alveolar and interstitial multifocal airspace opacification with bibasilar subpulmonic effusions, worse on the right.  Slight progression right side.    Dictated by (CST): Marlon Harp MD on 5/29/2025 at 9:34 AM     Finalized by (CST): Marlon Harp MD on 5/29/2025 at 9:35 AM          XR CHEST AP/PA (1 VIEW) (CPT=71045)  Result Date: 5/28/2025  PROCEDURE: XR CHEST AP/PA (1 VIEW) (CPT=71045)  COMPARISON: [facility], XR CHEST AP PORTABLE (CPT=71045), 5/28/2025, 7:06 AM.  INDICATIONS: Chest pain.  TECHNIQUE:   Single PA view.   FINDINGS/IMPRESSION:  1. The heart mediastinal structures are enlarged.  Pulmonary vascularity is moderately increased.  There is a small to moderate-sized right-sided pleural effusion and a very small left pleural effusion.  The findings are compatible with moderate fluid overload and have not changed since previous exam.  2. Lung volumes are slightly diminished.  There is redemonstration of moderate size streaky opacities within the bilateral perihilar regions which could represent atelectasis, infiltrate, or a combination.  3. There is redemonstration of a right IJ Port-A-Cath with tip at the cavoatrial junction.  4. There are moderate degenerative changes within the thoracic spine.     Dictated by (CST): Kaleb Epps MD on 5/28/2025 at 5:09 PM     Finalized by (CST): Kaleb Epps MD on 5/28/2025 at 5:10 PM           Review of Systems   Cardiovascular:  Positive for dyspnea on exertion, leg swelling and orthopnea. Negative for chest pain.   Respiratory:  Positive for shortness of breath. Negative for  cough.        Physical Exam:    Gen: alert, oriented x 3, NAD  Heent: pupils equal, reactive. Mucous membranes moist.   Neck: no jvd  Cardiac: regular rate and rhythm, normal S1,S2, no murmur, clicks, rub or gallop  Lungs: diminished  Abd: soft, NT/ND +bs  Ext: generalized edema, BLE 1-2+ edema  Skin: Warm, dry  Neuro: No focal deficits      Medications:    Scheduled Medications[1]  Medication Infusions[2]      Assessment:  Acute Hypoxic Respiratory Failure - Multifactorial (Lung Mets, PE, Pleural Effusions, Volume Overload)  On 6L HFNC  Pulm following  Bilateral Pleural Effusion, R >L  Plan for R thoracentesis today   Bilateral PE/DVT s/p IVC filter  5/13 CTA Chest w/small volume acute bilateral segmental PE  5/14 US + RLE acute non-occlusive DVT; neg for LLE DVT  Initially unable to tolerate A/C d/t thrombocytopenia; now eliquis on hold d/t anemia   Acute HFpEF  proBNP 1110 on admit  Echo LVEF 65-70%, bilateral pleural effusion on CXR  Diuresis w/IV Lasix   Net - 5.5L, wts uptrending  Appears volume overloaded on exam  Large Pericardial Effusion w/Tamponade s/p Urgent Pericardial Window, R Pleural Chest Tube  Hx Known pericardial effusion last admission - previously small - moderate  5/13 Echo w/moderate effusion; no evidence of HD compromise  5/20 Repeat echo w/large pericardial effusion; evidence of RV collapse, dilated IVC  5/21 S/P Pericardial Window w/CV surgery  Fluid + for malignancy  5/23 Removed Pleural Chest Tube  5/24 Removed Nathaniel drain   5/25 Repeat echo w/o pericardial effusion  Paroxysmal Atrial Tachycardia/Atrial Fibrillation  Hx PSVT on recent admission w/junctional rhythm noted while on BB  PAF RVR post pericardial window, now maintaining NSR w/PAC's  S/P amio load; now on 200mg po daily   TSH WNL  [id number] 3 - Eliquis 5mg po BID; held d/t anemia  Hypotension  Initially w/shock, requiring vasopressor support  Now BP stable on midodrine  Stage IVb Recurrent Endometrial Clear Cell Adenocarcinoma  Cancer - mets to lung  OB/Gyn following - rec'd chemo as OP  Acute on Chronic Anemia  Hgb 6.5 this am s/p PRBC - now 7.7  Holding Eliquis  Thrombocytopenia - resolved  Leukocytosis   BC negative, sputum negative  Thrush - resolved  Hypothyroidism s/p Thyroidectomy - levothyroxine    Plan:  Repeat proBNP today  Pt appears volume overloaded - continue diuresis w/Lasix 40mg IV BID  Strict I&O, daily weights (standing if possible), daily BMP  Hold Eliquis for continued anemia - s/p PRBC today for Hgb 6.5  Continue midodrine - BP stable    Plan of care discussed with patient, RN.    Emily George, APRN  5/29/2025  12:18 PM             [1]    amiodarone  200 mg Oral Daily    furosemide  40 mg Intravenous BID (Diuretic)    pantoprazole  40 mg Oral QAM AC    midodrine  10 mg Oral TID    [Held by provider] apixaban  5 mg Oral BID    insulin aspart  1-5 Units Subcutaneous TID CC and HS    levothyroxine  100 mcg Oral Before breakfast   [2]

## 2025-05-29 NOTE — PROCEDURES
[facility]  part of [facility]     Procedure Note  25    Kelli Fisher Patient Status:  Inpatient    1955 MRN [id number]   Location [facility]W Attending Lina Dueñas DO   Hosp Day # 17 PCP Taylor Gallardo MD     Procedure: Ultrasound-guided right-sided thoracentesis    Pre-Procedure Diagnosis: Pleural effusion    Post-Procedure Diagnosis: 900 cc bloody fluid aspirated.    Anesthesia:  Local    Finding and procedure: The right posterior axillary line was cleaned, draped, anesthetized and 900 cc of bloody fluid was aspirated.  Chest x-ray is pending.    Specimens: Sent    Blood Loss: None    Tourniquet Time: None  Complications: None  Drains: None    Secondary Diagnosis: None    Srinath Levy MD  Medical Director, Critical Care, [facility]  Medical Director, [facility]  Pager: [phone]

## 2025-05-29 NOTE — PLAN OF CARE
Problem: Diabetes/Glucose Control  Goal: Glucose maintained within prescribed range  Description: INTERVENTIONS:- Monitor Blood Glucose as ordered- Assess for signs and symptoms of hyperglycemia and hypoglycemia- Administer ordered medications to maintain glucose within target range- Assess barriers to adequate nutritional intake and initiate nutrition consult as needed- Instruct patient on self management of diabetes  Outcome: Progressing     Problem: Patient Centered Care  Goal: Patient preferences are identified and integrated in the patient's plan of care  Description: Interventions:- What would you like us to know as we care for you? - Provide timely, complete, and accurate information to patient/family- Incorporate patient and family knowledge, values, beliefs, and cultural backgrounds into the planning and delivery of care- Encourage patient/family to participate in care and decision-making at the level they choose- Honor patient and family perspectives and choices  Outcome: Progressing     Problem: RISK FOR INFECTION - ADULT  Goal: Absence of fever/infection during anticipated neutropenic period  Description: INTERVENTIONS- Monitor WBC- Administer growth factors as ordered- Implement neutropenic guidelines  Outcome: Progressing     Problem: CARDIOVASCULAR - ADULT  Goal: Maintains optimal cardiac output and hemodynamic stability  Description: INTERVENTIONS:- Monitor vital signs, rhythm, and trends- Monitor for bleeding, hypotension and signs of decreased cardiac output- Evaluate effectiveness of vasoactive medications to optimize hemodynamic stability- Monitor arterial and/or venous puncture sites for bleeding and/or hematoma- Assess quality of pulses, skin color and temperature- Assess for signs of decreased coronary artery perfusion - ex. Angina- Evaluate fluid balance, assess for edema, trend weights  Outcome: Progressing  Goal: Absence of cardiac arrhythmias or at baseline  Description: INTERVENTIONS:-  Continuous cardiac monitoring, monitor vital signs, obtain 12 lead EKG if indicated- Evaluate effectiveness of antiarrhythmic and heart rate control medications as ordered- Initiate emergency measures for life threatening arrhythmias- Monitor electrolytes and administer replacement therapy as ordered  Outcome: Progressing     Problem: RESPIRATORY - ADULT  Goal: Achieves optimal ventilation and oxygenation  Description: INTERVENTIONS:- Assess for changes in respiratory status- Assess for changes in mentation and behavior- Position to facilitate oxygenation and minimize respiratory effort- Oxygen supplementation based on oxygen saturation or ABGs- Provide Smoking Cessation handout, if applicable- Encourage broncho-pulmonary hygiene including cough, deep breathe, Incentive Spirometry- Assess the need for suctioning and perform as needed- Assess and instruct to report SOB or any respiratory difficulty- Respiratory Therapy support as indicated- Manage/alleviate anxiety- Monitor for signs/symptoms of CO2 retention  Outcome: Progressing     Problem: SAFETY ADULT - FALL  Goal: Free from fall injury  Description: INTERVENTIONS:  - Assess pt frequently for physical needs  - Identify cognitive and physical deficits and behaviors that affect risk of falls.  - Ewing fall precautions as indicated by assessment.  - Educate pt/family on patient safety including physical limitations  - Instruct pt to call for assistance with activity based on assessment  - Modify environment to reduce risk of injury  - Provide assistive devices as appropriate  - Consider OT/PT consult to assist with strengthening/mobility  - Encourage toileting schedule  Outcome: Progressing     Problem: DISCHARGE PLANNING  Goal: Discharge to home or other facility with appropriate resources  Description: INTERVENTIONS:  - Identify barriers to discharge w/pt and caregiver  - Include patient/family/discharge partner in discharge planning  - Arrange for needed  discharge resources and transportation as appropriate  - Identify discharge learning needs (meds, wound care, etc)  - Arrange for interpreters to assist at discharge as needed  - Consider post-discharge preferences of patient/family/discharge partner  - Complete POLST form as appropriate  - Assess patient's ability to be responsible for managing their own health  - Refer to Case Management Department for coordinating discharge planning if the patient needs post-hospital services based on physician/LIP order or complex needs related to functional status, cognitive ability or social support system  Outcome: Progressing     Problem: HEMATOLOGIC - ADULT  Goal: Free from bleeding injury  Description: (Example usage: patient with low platelets)  INTERVENTIONS:  - Avoid intramuscular injections, enemas and rectal medication administration  - Ensure safe mobilization of patient  - Hold pressure on venipuncture sites to achieve adequate hemostasis  - Assess for signs and symptoms of internal bleeding  - Monitor lab trends  - Patient is to report abnormal signs of bleeding to staff  - Avoid use of toothpicks and dental floss  - Use electric shaver for shaving  - Use soft bristle tooth brush  - Limit straining and forceful nose blowing  Outcome: Progressing  Goal: Maintains hematologic stability  Description: INTERVENTIONS  - Assess for signs and symptoms of bleeding or hemorrhage  - Monitor labs and vital signs for trends  - Administer supportive blood products/factors, fluids and medications as ordered and appropriate  - Administer supportive blood products/factors as ordered and appropriate  Outcome: Progressing  Goal: Free from bleeding injury  Description: (Example usage: patient with low platelets)  INTERVENTIONS:  - Avoid intramuscular injections, enemas and rectal medication administration  - Ensure safe mobilization of patient  - Hold pressure on venipuncture sites to achieve adequate hemostasis  - Assess for signs  and symptoms of internal bleeding  - Monitor lab trends  - Patient is to report abnormal signs of bleeding to staff  - Avoid use of toothpicks and dental floss  - Use electric shaver for shaving  - Use soft bristle tooth brush  - Limit straining and forceful nose blowing  Outcome: Progressing     Problem: GASTROINTESTINAL - ADULT  Goal: Minimal or absence of nausea and vomiting  Description: INTERVENTIONS:  - Maintain adequate hydration with IV or PO as ordered and tolerated  - Nasogastric tube to low intermittent suction as ordered  - Evaluate effectiveness of ordered antiemetic medications  - Provide nonpharmacologic comfort measures as appropriate  - Advance diet as tolerated, if ordered  - Obtain nutritional consult as needed  - Evaluate fluid balance  Outcome: Progressing  Goal: Maintains or returns to baseline bowel function  Description: INTERVENTIONS:  - Assess bowel function  - Maintain adequate hydration with IV or PO as ordered and tolerated  - Evaluate effectiveness of GI medications  - Encourage mobilization and activity  - Obtain nutritional consult as needed  - Establish a toileting routine/schedule  - Consider collaborating with pharmacy to review patient's medication profile  Outcome: Progressing  Goal: Maintains adequate nutritional intake (undernourished)  Description: INTERVENTIONS:  - Monitor percentage of each meal consumed  - Identify factors contributing to decreased intake, treat as appropriate  - Assist with meals as needed  - Monitor I&O, WT and lab values  - Obtain nutritional consult as needed  - Optimize oral hygiene and moisture  - Encourage food from home; allow for food preferences  - Enhance eating environment  Outcome: Progressing     Problem: GENITOURINARY - ADULT  Goal: Absence of urinary retention  Description: INTERVENTIONS:  - Assess patient’s ability to void and empty bladder  - Monitor intake/output and perform bladder scan as needed  - Follow urinary retention  protocol/standard of care  - Consider collaborating with pharmacy to review patient's medication profile  - Implement strategies to promote bladder emptying  Outcome: Progressing     Problem: METABOLIC/FLUID AND ELECTROLYTES - ADULT  Goal: Glucose maintained within prescribed range  Description: INTERVENTIONS:- Monitor Blood Glucose as ordered- Assess for signs and symptoms of hyperglycemia and hypoglycemia- Administer ordered medications to maintain glucose within target range- Assess barriers to adequate nutritional intake and initiate nutrition consult as needed- Instruct patient on self management of diabetes  Outcome: Progressing  Goal: Electrolytes maintained within normal limits  Description: INTERVENTIONS:  - Monitor labs and rhythm and assess patient for signs and symptoms of electrolyte imbalances  - Administer electrolyte replacement as ordered  - Monitor response to electrolyte replacements, including rhythm and repeat lab results as appropriate  - Fluid restriction as ordered  - Instruct patient on fluid and nutrition restrictions as appropriate  Outcome: Progressing  Goal: Hemodynamic stability and optimal renal function maintained  Description: INTERVENTIONS:  - Monitor labs and assess for signs and symptoms of volume excess or deficit  - Monitor intake, output and patient weight  - Monitor urine specific gravity, serum osmolarity and serum sodium as indicated or ordered  - Monitor response to interventions for patient's volume status, including labs, urine output, blood pressure (other measures as available)  - Encourage oral intake as appropriate  - Instruct patient on fluid and nutrition restrictions as appropriate  Outcome: Progressing     Problem: SKIN/TISSUE INTEGRITY - ADULT  Goal: Skin integrity remains intact  Description: INTERVENTIONS  - Assess and document risk factors for pressure ulcer development  - Assess and document skin integrity  - Monitor for areas of redness and/or skin  breakdown  - Initiate interventions, skin care algorithm/standards of care as needed  Outcome: Progressing  Goal: Incision(s), wounds(s) or drain site(s) healing without S/S of infection  Description: INTERVENTIONS:  - Assess and document risk factors for pressure ulcer development  - Assess and document skin integrity  - Assess and document dressing/incision, wound bed, drain sites and surrounding tissue  - Implement wound care per orders  - Initiate isolation precautions as appropriate  - Initiate Pressure Ulcer prevention bundle as indicated  Outcome: Progressing     Problem: MUSCULOSKELETAL - ADULT  Goal: Return mobility to safest level of function  Description: INTERVENTIONS:  - Assess patient stability and activity tolerance for standing, transferring and ambulating w/ or w/o assistive devices  - Assist with transfers and ambulation using safe patient handling equipment as needed  - Ensure adequate protection for wounds/incisions during mobilization  - Obtain PT/OT consults as needed  - Advance activity as appropriate  - Communicate ordered activity level and limitations with patient/family  Outcome: Progressing  Goal: Maintain proper alignment of affected body part  Description: INTERVENTIONS:  - Support and protect limb and body alignment per provider's orders  - Instruct and reinforce with patient and family use of appropriate assistive device and precautions (e.g. spinal or hip dislocation precautions)  Outcome: Progressing

## 2025-05-29 NOTE — CONSULTS
[facility]  part of [facility]  Palliative Care Initial Consult Note    Kelli Fisher Patient Status:  Inpatient    1955 MRN [id number]   Location [facility]5W Attending Lina Dueñas, DO   Hosp Day # 17 PCP Taylor Gallardo MD     Date of Consult: 2025  Patient seen at: [facility] Inpatient-room ***    The  Cures Act makes medical notes like these available to patients in the interest of transparency. Please be advised this is a medical document. Medical documents are intended to carry relevant information, facts as evident, and the clinical opinion of the practitioner. The medical note is intended as peer to peer communication and may appear blunt or direct. It is written in medical language and may contain abbreviations or verbiage that are unfamiliar.     Reason for Consultation: Consult ordered by:: Dr. Lina Dueñas for evaluation of Palliative Care needs and Uncontrolled symptoms;Goals of care discussion;Establish palliative care.    Subjective     History of Present Illness: Kelli Fisher is a 70 year old female with history of ***.  Patient was admitted on 2025 for ***.   Work up in our hospital has included imaging of *** which revealed and labs****  which revealed ***.    History was obtained from [facility] and ***.    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 She has been experiencing SOB for the past few weeks and has had multiple hospitalizations where it was suggested she had pneumonia or symptomatic anemia. Additionally she has experienced difficulty swallowing, stating she has the sensation of food feeling stuck when she swallows which has also been worsening. On admission she had a CT chest notable for 7cm L lung lesion, supraclavicular LAD and mediastinal LAD.  This was biopsied yesterday, path consistent with recurrent endometrial cancer.      Patient has a history of endometrial cancer treated with radiation, surgery, and adjuvant chemotherapy. Her last treatment was in 9/2024. Full treatment history is below. She has been following with her Gyn Onc regularly with pelvic exams, but states her last CT scan was in Dec. She denies abdominal pain, nausea, vomiting, vaginal discharge or vaginal bleeding. No additional concerns or complaints.       8/2023- Evaluated for PMB, pelvic US with thickened endometrium to 2.7cm and endometrial masses   11/2023- Pap with adenocarcinoma   1/2024- cervical tissue with clear cell carcinoma and EMB with mixed clear cell carcinoma (90%) and serous carcinoma (10%)  2/2024-4/2024- 2 cycle of cisplatin, XRT and brachytherapy with Rad Onc  5/28/25: RA- TLH, BSO, cystoscopy for stage IIIB clear cell and serous endometrial cancer  9/2024: Completed 4 cycles of carboplatin (declined Taxol and Pembro per notes)  01/2025: CT A/P without evidence of disease  3/25/25:  CT chest and neck revealed a left lung lass and nodularity with enlarged mediastinal, hilar, and supraclavicular lymph nodes   3/25/25-3/26/25: Admission for SOB, neck pain, declined blood transfusion          Today is day #17 of hospitalization. This is the *** hospitalization in the past *** months.  Hospitalization Summary  # ***:  # ***:  # ***:       When I entered the room, the patient was {Blank multiple:85381::\"awake & alert\",\"sitting at EOB\",\"lying in bed\",\"sitting in chair\",\"sleeping\",\"sleeping but easy to wake\",\"eating\",\"intubated\",\"sedated\"}. {Blank single:81193::\"Family\",\"Friend\",\"No family\"} present at bedside.      Review of Systems/ Symptoms:   Patient was ***able or not able  to provide subjective input. Assessment is assisted by RN,*** ans family member. ***    Fatigue:  Yes  Overall Functional status:   Dyspnea: denies  At rest: ***;  CASANOVA: ***   Conversational: ***      Current O2 therapy: ***  Drowsiness:   Cough: denies  Pain: denies  Non-verbal signs of pain present: No  Appetite/Nutritional Status:   Dysphagia:  Constipation: denies   Last BM  Diarrhea: denies  Nausea/Vomiting: denies  Depression: denies  Anxiety: denies  Emotional / coping response to illness:   Other:      Symptoms(s): Anorexia, Cough, Dyspnea, Fatigue, Weight loss    Bowel Movement         5/28/2025  0059             Stool Count Calculated for I/O: 1            Palliative Care Social History:   Marital Status: {Blank single:15338::\"Single\",\"\",\"Partner\",\"\",\"\"}  Children: {Blank single:19197::\"Yes\",\"No\"}  Living Situation Prior to Admit: {Blank single:19197::\"Home\",\"Assisted Living\",\"SNF\",\"LTC\"}  Does Patient Live Alone: {Blank single:19197::\"Yes\",\"No\"}  Is Patient Confused: {Blank single:19197::\"Yes\",\"No\"}  Occupational History: {Blank single:19197::\"Working\",\"Retired\"}    Substance History:   reports that she has never smoked. She has never used smokeless tobacco.  reports no history of alcohol use.  reports no history of drug use.  Hx of Substance Use/Abuse: {Blank single:19197::\"No\",\"Illicit\",\"Alcohol\"}    Spiritual Assessment:   None  {Blank single:19197::\"Pt requested  visit\",\"pt declines  visit\",\"spiritual care following\",\"private sathish leader involved\"}    Past Medical History/Past Surgical History:     Medical History: obtained from [facility]  Past Medical History[1]  Past Surgical History[2]    Family History: obtained from [facility]  Family History[3]    Allergies:  Allergies[4]    Medications:   Current Hospital Medications[5]    Functional Status History:  ADLs: bathing or showering, dressing, getting in and out of bed or a chair, walking, using the toilet, and eating  - {Blank single:19197::\"Independent\",\"Requires some assistance\",\"Requires significant assistance\",\"Dependent\"}  IADLs: use the phone, shop for groceries, meal preparation, manage medicines, clean living  area, use transportation by self, manage money  - {Rita single:19197::\"Independent\",\"Requires some assistance\",\"Requires significant assistance\",\"Dependent\"}  DME: {Rita multiple:19196::\"Cane\",\"Walker\",\"Wheelchair\",\"Hospital Bed\",\"Bedside commode\",\"Shower Chair\",\"Other ***\"}  Falls: {Rita single:19197::\"Yes\",\"no\",\"recurrent\"}    Palliative Performance Scale:   Prior to admission: (pt/family reported) {Rita single:19197::\"10\",\"20\",\"30\",\"40\",\"50\",\"60\",\"70\",\"80\",\"90\",\"100\"} %  Observed during hospitalization: {Rita single:19197::\"10\",\"20\",\"30\",\"40\",\"50\",\"60\",\"70\",\"80\",\"90\",\"100\"} %  % Ambulation Activity Level Self-Care Intake Consciousness   100 Full  Normal  No Disease Full Normal Full   90 Full  Normal  Some Disease Full Normal Full   80 Full  Normal w/effort  Some Disease Full Normal or reduced Full   70 Reduced  Can't Perform Job  Some Disease Full Normal or reduced Full   60 Reduced  Can't Perform Hobby   Significant Disease Occ Assist Normal or reduced Full or confused   50 Mainly sit/lie Can't do any work  Extensive Disease Partial Assist Normal or reduced Full or confused   40 Mainly in bed Can't do any work  Extensive Disease Mainly Assist Normal or reduced Full or confused   30 Bed Bound Can't do any work  Extensive Disease Max Assist  Total Care Reduced  Drowsy/confused   20 Bed Bound Can't do any work  Extensive Disease Max Assist  Total Care Minimal  Drowsy/confused   10 Bed Bound Can't do any work  Extensive Disease Max Assist  Total Care Mouth Care  Drowsy/confused   0 Death        Objective      Vital Signs:  Blood pressure 116/74, pulse 86, temperature 97.6 °F (36.4 °C), temperature source Axillary, resp. rate 20, height 5' (1.524 m), weight 175 lb 8 oz (79.6 kg), SpO2 100%.  Body mass index is 34.27 kg/m².  Present Level of pain: ***/10  Non-verbal signs of pain present: {Blank single:76140::\"Yes\",\"No\"}    Physical Exam:  General: {Blank single:19197::\"Alert &  Awake\",\"Sleeping\",\"Lethargic\",\"somnolent\",\"Unresponsive\"}. In {Blank single:19197::\"no apparent\",\"mild\",\"moderate\",\"severe\"} respiratory distress. Body habitus {Blank single:19197::\"BMI WNL\",\"Obese\",\"Thin\",\"Cachectic\"}   HEENT: AT/NC. No gross focal deficits. {Blank single:19197::\"MMM\",\"Dry MM\"}   Cardiac: {Blank single:19197::\"RRR\",\"Irregularly irregular\",\"tachycardia\",\"bradycardia\",\"Murmur present\"}; On tele shows ***  Lungs: Bilateral breath sounds {Blank single:19197::\"Normal effort\",\"Increased effort\",\"tachypnea\",\"apnea\",\"wheezing\"} on {Blank single:19197::\"RA\",\"NC\",\"HFNC\",\"Vapotherm\",\"Bipap\",\"Ventilator\"}  Abdomen: Soft, non-tender, non-distended, BS X 4  Extremities: {Blank single:19197::\"No\",\"Trace\",\"1+\",\"2+\"} LE edema present. (***) Sarcopenia  Neurologic: Alert and oriented to {Blank multiple:19196::\"person\",\"place\",\"time\",\"situation\"} . No gross focal deficits. Gracie. No myoclonus, tremors or seizures   Psychiatric: Mood {Blank single:19197::\"pleasant mood\",\"anxious\",\"depressed\",\"flat affect\",\"agitated\",\"restless\",\"delirious\"}   Skin: Pale, warm and dry. **Bruising.  No rash or  excessive bruising noted***     Hematology:  Lab Results   Component Value Date    WBC 14.9 (H) 05/29/2025    HGB 7.7 (L) 05/29/2025    HCT 24.7 (L) 05/29/2025    .0 05/29/2025       Coags:  Lab Results   Component Value Date    INR 1.31 (H) 05/22/2025    PTT 37.0 (H) 05/21/2025       Chemistry:  Lab Results   Component Value Date    CREATSERUM 1.09 (H) 05/29/2025    BUN 19 05/29/2025     (H) 05/29/2025    K 4.0 05/29/2025     05/29/2025    CO2 33.0 (H) 05/29/2025     (H) 05/29/2025    CA 8.7 05/29/2025    ALB 3.2 05/12/2025    ALKPHO 142 05/12/2025    BILT 0.7 05/12/2025    TP 6.8 05/12/2025    AST 26 05/12/2025    ALT 10 05/12/2025    DDIMER 3.05 (H) 05/13/2025    MG 2.2 05/19/2025       Imaging:  [unfilled]    Summary of Discussion      I discussed reason for palliative care consultation with  {Blank multiple:46423::\"***\",\"Patient\",\"Family\",\"Spouse\",\"Daughter\",\"Son\"}    I differentiated the palliative treatment-focus model versus the hospice comfort-focused philosophy of care. I informed the patient/family that having palliative care support does not limit medical treatment options or decisions to those who wish to continue curative or restorative medical therapies. I discussed the benefits of palliative care to include assistance with arising symptom management needs, an extra layer of support, to ensure GOC are respected throughout healthcare continuum, and assist with transition to hospice care when appropriate.      Outpatient(office setting /Community Palliative Care Services( home or facility) :  Usually visit once every 4- 6  weeks  Focus on GOC and symptom management   Palliative Care criteria:  Serious illness  Not altered by prognosis   Does not limit curative or restorative therapies, but complement to usual care      Hospice services:  24/7 phone triage services available and prn nurse visits also available    Interdisciplinary team: RN as CM, HHA, SW, , volunteers and some have complementary services (music therapy, massage etc)    Visits are based on need/ no limits- can range from one or more times per week   Hospice criteria:  Prognosis: six-month or less if disease takes  it's natural course   Life-prolonging measures/treatments are forgone; identified as no longer helpful or allgn with comfort goals        Focus solely on comfort / symptom management   Involves consent process to enroll into hospice benefit with specific agency     I provided brief overview of Medicare hospice benefit along with hospice philosophy and answered all questions. At this time, Kelli Fisher condition {Blank single:55676::\"does not qualify for hospice services\",\"does qualify for hospice services\"} and they wish  {Blank single:87425::\"Consent\",\"Do not consent\"} to meet with hospice for information and  evaluation.     PROGNOSTIC AWARENESS/UNDERSTANDING: {Blank single:19197::\"Excellent\",\"Good\",\"Fair\",\"Poor\",\"Remains in the information gathering phase\"}.  ***  We discussed the disease trajectory of  *** with associated symptoms and decline over time.   We discussed current clinical condition and overall {Blank single:19197::\"Good\",\"Fair\",\"Poor\",\"Guarded\",\"Terminal\"} prognosis per clinical team.     HOPES/GOALS:   ***  ***    FEARS/CONCERNS:   ***  ***      PROVIDED EMOTIONAL SUPPORT TO {Blank multiple:19196::\"***\",\"Patient\",\"Family\",\"Spouse\",\"POA\"}.    Advance Care Planning counseling and discussion:   HCPOA Documentation {Blank single:19197::\"Completed--Document in Epic\",\"Pt Reports completion but not in Epic\",\"Completed--Document signed and will be scanned\",\"Not completed\",\"Not completed & Form provided\",\"Does not wish to complete at this time\",\"Not addressed given ***\"}. Healthcare Agent's Name: Theresa Fisher, which is pt's {Blank single:19197::\"Spouse\",\"Daughter\",\"Son\",\"Parent\",\"Sibling\",\"Friend\"}   CODE STATUS DISCUSSION:  We discussed the risks & potential harm > benefits of life sustaining treatment of resuscitation efforts  in the setting of pt's advanced age and *** with {Blank multiple:04215::\"Patient\",\"Family\",\"Spouse\",\"POA\"}. Discussed the appropriateness of limit setting in medical interventions at this time    POLST FORM: reviewed form and provided written info on how to complete and sheet frequently asked questions    {Blank single:19197::\"Completed--Document in Epic\",\"Completed--Document signed and will be scanned\",\"Pt Reports completion but not in Epic\",\"Not completed\",\"Not completed & Form provided\",\"Does not wish to complete at this time\",\"Not addressed given ***\"}  CODE STATUS: {Blank single:22595::\"Full Code\",\"DNAR/Full Treatment\",\"DNAR/Select\",\"DNAR/Comfort Care \"}    Assessment and Recommendations      Briefly, very pleasant 69 yo AA female with Stage IV clear-cell endometrial adenocarcinoma  initially treated with radiation, surgery, and adjuvant chemotherapy. (Feb- Sept 2024) . More recently she received adjuvant chemo carboplatin, Taxol, Keytruda on May 6 and did receive Fulphila which is the equivalent of Neulasta on May 7, 2025. Other PMH includes asthma, GERD, hypothyroidism,PAT and DM2   She was admitted 5/12/25 for difficulty breathing / CP/ palpitation and tachycardia. Since admission she has had a protracted course with identification of multiple cancer related issues: pancytopenia, pericardial effusion s/p pericardial window, progressive acute hypoxic resp failure with  know pulm mets and development  pl effusions s/p R thoracentesis today, ongoing Afib w/ RVR, bilat PE w/ R -DVT s/p IVC filter and recent start of Eliquis, progressive weakness and low appetite with PCM. Today palliative care was asked to meet with pt and her family as they have been informed that given the change in her clinical course and ongoing functional decline discussions regarding GOC and POC moving forward need to be discussed as further cancer treatment options are not looking to be beneficial for Kelli at this point.         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)      Acute respiratory failure with hypoxia (HCC)   Pericardial Effusion w/ tamponade  ( malignant )  s/p  pericardial window/ drain 5/21     Bilateral pleural effusion R > L  s/p R thoracentesis     Azotemia    Acute on chronic anemia     Pancytopenia (HCC)- chemo related     Hyperglycemia    Atrial fibrillation with RVR (HCC)    Bilateral PE and RLE-DVT s/p IVC filter  ow on Eliquis     Malnutrition (HCC)- moderate    Generalized weakness     Dyspnea    Anorexia     GOALS OF CARE: At present continue supportive medical treatment for acute medical issues as they arise during this admission and  restoriative goals with  ongoing discussions regarding GOC & POC moving forward  as pt/ family just learned today after discussion with gyn/ onc/ Dr. Herring,  earlier there are likely no further beneficial cancer treatment options    Code Status: Full Code but discussion began regarding burden/ harm > benefit and to continue when they can get all family  members together and we meet   Healthcare Agent's Name: Theresa Fisher. Pt has completed HCPOA and daughter to bring in copy for scanning     SYMPTOMS:   Dyspnea: Trial of Morphine (low dose 2.5 mr po TID prn . Pt having thoracentesis b Dr Barcenas soon   Dry Mouth/ Xerostomia: PT not like Biotene; Discussed  products outside hospital like Biotene paste, gum and lozenges, Also sippy method- keeping  water fluids near by and reachable. Also at home can make salt/ baking soda/ water daily mixture swish and spit. Keep in bathroom and use each time go in after wash hands or prior to meals   Anorexia:  talked about not a lot of good meds to help with this : Megace off table due to clot risk ; Remeron at hs can consider/ trail at 7.5 mg / concern daytime sleepiness w/ little benefit. Can consider Marinol 2.5 mg po BID and monitor SE. Lots of discussion about being burden of disease         -Discussed today's visit with:  2 daughters at bedside and pt  and RN- Lucrecia     Palliative Care Follow Up: Palliative care team will Continue to follow while inpatient.  Palliative care follow up outpatient: TBD. We discussed CPC vs hospice . Family would like to have a family meeting and one of the daughter's is trying to set that up so they can all hear the same info at the same time .     Thank you for allowing Palliative Care services to participate in the care of Kelli Fisher.    A total of {Blank single:55138::\"40\",\"55\",\"75\",\"***\"} minutes were spent on this consult, which included all of the following: chart review, direct face to face contact, history taking, physical  examination, counseling and coordinating care, and documentation.     Pat Jayna, APRN  5/29/2025  3:38 PM  Palliative Care Services at [facility] :  extension [phone]   or [phone]         [1]   Past Medical History:   Asthma (HCC)    Esophageal reflux    History of blood transfusion    Visual impairment   [2]   Past Surgical History:  Procedure Laterality Date    Thyroidectomy      Total abdom hysterectomy     [3] No family history on file.  [4]   Allergies  Allergen Reactions    Aspirin Tightness in Throat    Penicillins HIVES    Other OTHER (SEE COMMENTS)     Pt states IV steroid allergy, states it makes her breathing worse    [5]   Current Facility-Administered Medications:     morphINE 10 MG/5ML oral solution 2.5 mg, 2.5 mg, Oral, TID PRN    amiodarone (Pacerone) tab 200 mg, 200 mg, Oral, Daily    furosemide (Lasix) 10 mg/mL injection 40 mg, 40 mg, Intravenous, BID (Diuretic)    sodium chloride (Saline Mist) 0.65 % nasal solution 1 spray, 1 spray, Each Nare, Q3H PRN    pantoprazole (Protonix) DR tab 40 mg, 40 mg, Oral, QAM AC    acetaminophen (Tylenol Extra Strength) tab 1,000 mg, 1,000 mg, Oral, Q6H PRN    senna (Senokot) 8.8 MG/5ML oral syrup 17.6 mg, 10 mL, Per NG Tube, Daily PRN    docusate (Colace) 50 MG/5ML oral solution 100 mg, 100 mg, Per NG Tube, BID PRN    midodrine (ProAmatine) tab 10 mg, 10 mg, Oral, TID    [Held by provider] apixaban (Eliquis) tab 5 mg, 5 mg, Oral, BID    traMADol (Ultram) tab 50 mg, 50 mg, Oral, Q6H PRN    ondansetron (Zofran) 4 MG/2ML injection 4 mg, 4 mg, Intravenous, Q6H PRN    metoclopramide (Reglan) 5 mg/mL injection 5 mg, 5 mg, Intravenous, Q8H PRN    morphINE PF 2 MG/ML injection 1 mg, 1 mg, Intravenous, Q2H PRN    acetaminophen (Tylenol) 160 MG/5ML oral liquid 650 mg, 650 mg, Per NG Tube, Q4H PRN **OR** acetaminophen (Tylenol) rectal suppository 650 mg, 650 mg, Rectal, Q4H PRN **OR** [DISCONTINUED] acetaminophen (Ofirmev) 10 mg/mL infusion premix 1,000 mg,  1,000 mg, Intravenous, Q6H PRN    polyethylene glycol (PEG 3350) (Miralax) 17 g oral packet 17 g, 17 g, Per NG Tube, Daily PRN    insulin aspart (NovoLOG) 100 Units/mL FlexPen 1-5 Units, 1-5 Units, Subcutaneous, TID CC and HS    glucose (Dex4) 15 GM/59ML oral liquid 15 g, 15 g, Oral, Q15 Min PRN **OR** glucose (Glutose) 40% oral gel 15 g, 15 g, Oral, Q15 Min PRN **OR** glucose-vitamin C (Dex-4) chewable tab 4 tablet, 4 tablet, Oral, Q15 Min PRN **OR** dextrose 50% injection 50 mL, 50 mL, Intravenous, Q15 Min PRN **OR** glucose (Dex4) 15 GM/59ML oral liquid 30 g, 30 g, Oral, Q15 Min PRN **OR** glucose (Glutose) 40% oral gel 30 g, 30 g, Oral, Q15 Min PRN **OR** glucose-vitamin C (Dex-4) chewable tab 8 tablet, 8 tablet, Oral, Q15 Min PRN    ipratropium-albuterol (Duoneb) 0.5-2.5 (3) MG/3ML inhalation solution 3 mL, 3 mL, Nebulization, Q6H PRN    levothyroxine (Synthroid) tab 100 mcg, 100 mcg, Oral, Before breakfast

## 2025-05-29 NOTE — PROGRESS NOTES
[facility]  part of [facility]    Progress Note    Kelli Fisher Patient Status:  Inpatient    1955 MRN [id number]   Location [facility]5W Attending Lina Dueñas, DO   Hosp Day # 17 PCP Taylor Gallardo MD     Chief complaint sob     Subjective:   Kelli Fisher is a(n) 70 year old female Pt feels sob is better today compared to last night   Plan for thoracentesis today   Discussed palliative care consult       ROS:   sob   No c/d   No n/v     Objective:     Blood pressure 116/74, pulse 86, temperature 97.6 °F (36.4 °C), temperature source Axillary, resp. rate 20, height 5' (1.524 m), weight 175 lb 8 oz (79.6 kg), SpO2 100%.      Intake/Output Summary (Last 24 hours) at 2025 1423  Last data filed at 2025 1145  Gross per 24 hour   Intake 490 ml   Output 800 ml   Net -310 ml       Patient Weight(s) for the past 336 hrs:   Weight   25 0514 175 lb 8 oz (79.6 kg)   25 0625 171 lb 11.8 oz (77.9 kg)   25 0600 169 lb 12.1 oz (77 kg)   25 0500 171 lb 15.3 oz (78 kg)   25 0500 167 lb 8.8 oz (76 kg)   25 0600 168 lb 14 oz (76.6 kg)   25 0341 167 lb (75.8 kg)   25 0611 164 lb 10.9 oz (74.7 kg)   25 1600 156 lb 1.4 oz (70.8 kg)   25 0439 163 lb 8 oz (74.2 kg)           General appearance: alert, appears stated age and cooperative  Pulmonary:  decreased bs's at baess   Cardiovascular: S1, S2 normal, no murmur, click, rub or gallop, regular rate and rhythm  Abdominal: soft, non-tender; bowel sounds normal; no masses,  no organomegaly  Extremities: b/l swelling         Medicines:     Current Hospital Medications[1]                Blood Pressure and Cardiac Medications            amiodarone 200 MG Oral Tab            Medication Infusions[2]        Lab Results   Component Value Date    WBC 14.9 (H) 2025    HGB 7.7 (L) 2025    HCT 24.7 (L) 2025    .0 2025    CREATSERUM 1.09 (H) 2025    BUN 19  05/29/2025     (H) 05/29/2025    K 4.0 05/29/2025     05/29/2025    CO2 33.0 (H) 05/29/2025     (H) 05/29/2025    CA 8.7 05/29/2025    ALB 3.2 05/12/2025    ALKPHO 142 05/12/2025    BILT 0.7 05/12/2025    TP 6.8 05/12/2025    AST 26 05/12/2025    ALT 10 05/12/2025    PTT 37.0 (H) 05/21/2025    INR 1.31 (H) 05/22/2025    TSH 3.705 05/02/2025    LIP 42 12/11/2023    DDIMER 3.05 (H) 05/13/2025    MG 2.2 05/19/2025    B12 >2,000 (H) 05/02/2025       XR CHEST AP PORTABLE  (CPT=71045)  Result Date: 5/29/2025  CONCLUSION:  1. Borderline cardiomegaly prominent central vasculature. 2. Bilateral perihilar lower lobe mixed alveolar and interstitial multifocal airspace opacification with bibasilar subpulmonic effusions, worse on the right.  Slight progression right side.    Dictated by (CST): Marlon Harp MD on 5/29/2025 at 9:34 AM     Finalized by (CST): Marlon Harp MD on 5/29/2025 at 9:35 AM          XR CHEST AP/PA (1 VIEW) (CPT=71045)  Result Date: 5/28/2025  PROCEDURE: XR CHEST AP/PA (1 VIEW) (CPT=71045)  COMPARISON: [facility], XR CHEST AP PORTABLE (CPT=71045), 5/28/2025, 7:06 AM.  INDICATIONS: Chest pain.  TECHNIQUE:   Single PA view.   FINDINGS/IMPRESSION:  1. The heart mediastinal structures are enlarged.  Pulmonary vascularity is moderately increased.  There is a small to moderate-sized right-sided pleural effusion and a very small left pleural effusion.  The findings are compatible with moderate fluid overload and have not changed since previous exam.  2. Lung volumes are slightly diminished.  There is redemonstration of moderate size streaky opacities within the bilateral perihilar regions which could represent atelectasis, infiltrate, or a combination.  3. There is redemonstration of a right IJ Port-A-Cath with tip at the cavoatrial junction.  4. There are moderate degenerative changes within the thoracic spine.     Dictated by (CST): Kaleb Epps MD on 5/28/2025  at 5:09 PM     Finalized by (CST): Kaleb Epps MD on 5/28/2025 at 5:10 PM          XR CHEST AP PORTABLE  (CPT=71045)  Result Date: 5/28/2025  CONCLUSION:   No significant overall change since the preceding examination.  Right greater than left bilateral pleural effusions with bilateral mid to lower lung opacities.    Dictated by (CST): Casey Diallo MD on 5/28/2025 at 7:57 AM     Finalized by (CST): Casey Diallo MD on 5/28/2025 at 7:59 AM                Results:     CBC:    Lab Results   Component Value Date    WBC 14.9 (H) 05/29/2025    WBC 15.4 (H) 05/28/2025    WBC 13.6 (H) 05/27/2025     Lab Results   Component Value Date    HGB 7.7 (L) 05/29/2025    HGB 6.5 (LL) 05/29/2025    HGB 7.1 (L) 05/28/2025      Lab Results   Component Value Date    .0 05/29/2025    .0 05/28/2025    .0 05/27/2025       Recent Labs   Lab 05/27/25  0440 05/27/25  1706 05/28/25  0600 05/29/25  0542   *  --  127* 138*   BUN 19  --  18 19   CREATSERUM 0.98  --  1.04* 1.09*   CA 8.3*  --  8.7 8.7     --  147* 148*   K 3.4* 4.8 4.6 4.0     --  109 106   CO2 29.0  --  31.0 33.0*           Assessment and Plan:      Afib RVR  Pericardial effusion  - sp pericardial window 5/21, chest tube out 5/23 ; fluid positive for malignancy   - cardiology consulted  - IV amio --> now on oral. Avoiding BB, CCB with h/o junctional rhythm   - cont monitor on tele  - on 5/16 experienced RVR again with hypotension, received digoxin converted to NSR. Remained  hypotensive so sent to stepdown unit. Responded to fluid bolus and required jewel   - Transferred back to medical floor, normotensive rates controlled.  - CXR with b/l effusions, right side may need tap - discussed with pulm. Hold noac - plan for thora today   - repeat ECHO showing large pericardial effusion ; f/u echo with no pericardial effusion   Limited ECHO 5/25: normal left ventricle, EF 65-70%  -SOB and CXR with Bilateral Pleural effusion -> IV lasix  today 40 bid monitor output. Wts stable since admit     BL PE: acute small segmental/right lower extremity DVT  S/p IVC 5/14/2025  Was not on a/c due to thrombocytopenia  Now on eliquis but will hold for possible thora    Ok with Heme onc to resume Eliquis once Plt >50K         Metastatic endometrial cancer    Pancytopenia  Acute on chronic anemia of chronic disease  - s/p 1 unit prbc; repeat again today   - neutropenic precautions  - started neupogen until ANC 1500  - Plan is for further chemotherapy once recovered clinically per Dr. Herring.  - received 6 days of iv cefepime now off   - cefepime and vanco started for neutropenia and patient with cough/chills, elevated LA, possible early sepsis now off. Wbc 15. Afebrile. On 5 L    -Pancytopenia improving, monitor daily  - Overall plan of care is to continue to treat medically over the next several days and monitor for improvement.    - discussed with pt and daughter today and they are comfortable with palliative care consult for information . 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 Cont lasix.   - thora today.      Thrush  - nystatin     Deconditioning  PT OT      Dispo: PT recommending EDUARDO, patient to discuss with family. Have previously refused    Pt lives by herself, continue to be SOB with min exertion, now fluid overload, will benefit from EDUARDO   .      MDM: High   I personally spent time on chart/note review, review of labs/imaging, discussion with patient, physical exam, discussion with staff, consultants, coordinating care, writing progress note, and discussion of plan of care.              Lina Dueñas, DO              Supplementary Documentation:   DVT Mechanical Prophylaxis: MACHELLE hose, DENAs, Early  ambuation  DVT Pharmacologic Prophylaxis   Medication    [Held by provider] apixaban (Eliquis) tab 5 mg                Code Status: Full Code  Rogel: External urinary catheter in place  Rogel Duration (in days):   Central line:    JOSE:                             [1]   Current Facility-Administered Medications   Medication Dose Route Frequency    amiodarone (Pacerone) tab 200 mg  200 mg Oral Daily    furosemide (Lasix) 10 mg/mL injection 40 mg  40 mg Intravenous BID (Diuretic)    sodium chloride (Saline Mist) 0.65 % nasal solution 1 spray  1 spray Each Nare Q3H PRN    pantoprazole (Protonix) DR tab 40 mg  40 mg Oral QAM AC    acetaminophen (Tylenol Extra Strength) tab 1,000 mg  1,000 mg Oral Q6H PRN    senna (Senokot) 8.8 MG/5ML oral syrup 17.6 mg  10 mL Per NG Tube Daily PRN    docusate (Colace) 50 MG/5ML oral solution 100 mg  100 mg Per NG Tube BID PRN    midodrine (ProAmatine) tab 10 mg  10 mg Oral TID    [Held by provider] apixaban (Eliquis) tab 5 mg  5 mg Oral BID    traMADol (Ultram) tab 50 mg  50 mg Oral Q6H PRN    ondansetron (Zofran) 4 MG/2ML injection 4 mg  4 mg Intravenous Q6H PRN    metoclopramide (Reglan) 5 mg/mL injection 5 mg  5 mg Intravenous Q8H PRN    morphINE PF 2 MG/ML injection 1 mg  1 mg Intravenous Q2H PRN    acetaminophen (Tylenol) 160 MG/5ML oral liquid 650 mg  650 mg Per NG Tube Q4H PRN    Or    acetaminophen (Tylenol) rectal suppository 650 mg  650 mg Rectal Q4H PRN    polyethylene glycol (PEG 3350) (Miralax) 17 g oral packet 17 g  17 g Per NG Tube Daily PRN    insulin aspart (NovoLOG) 100 Units/mL FlexPen 1-5 Units  1-5 Units Subcutaneous TID CC and HS    glucose (Dex4) 15 GM/59ML oral liquid 15 g  15 g Oral Q15 Min PRN    Or    glucose (Glutose) 40% oral gel 15 g  15 g Oral Q15 Min PRN    Or    glucose-vitamin C (Dex-4) chewable tab 4 tablet  4 tablet Oral Q15 Min PRN    Or    dextrose 50% injection 50 mL  50 mL Intravenous Q15 Min PRN    Or    glucose (Dex4) 15 GM/59ML oral liquid 30 g   30 g Oral Q15 Min PRN    Or    glucose (Glutose) 40% oral gel 30 g  30 g Oral Q15 Min PRN    Or    glucose-vitamin C (Dex-4) chewable tab 8 tablet  8 tablet Oral Q15 Min PRN    ipratropium-albuterol (Duoneb) 0.5-2.5 (3) MG/3ML inhalation solution 3 mL  3 mL Nebulization Q6H PRN    levothyroxine (Synthroid) tab 100 mcg  100 mcg Oral Before breakfast   [2]

## 2025-05-30 LAB
ANION GAP SERPL CALC-SCNC: 5 MMOL/L (ref 0–18)
BASOPHILS # BLD AUTO: 0.06 X10(3) UL (ref 0–0.2)
BASOPHILS NFR BLD AUTO: 0.5 %
BLOOD TYPE BARCODE: 7300
BUN BLD-MCNC: 17 MG/DL (ref 9–23)
BUN/CREAT SERPL: 16.2 (ref 10–20)
CALCIUM BLD-MCNC: 8.2 MG/DL (ref 8.7–10.4)
CHLORIDE SERPL-SCNC: 103 MMOL/L (ref 98–112)
CO2 SERPL-SCNC: 37 MMOL/L (ref 21–32)
CREAT BLD-MCNC: 1.05 MG/DL (ref 0.55–1.02)
DEPRECATED RDW RBC AUTO: 62.6 FL (ref 35.1–46.3)
EGFRCR SERPLBLD CKD-EPI 2021: 58 ML/MIN/1.73M2 (ref 60–?)
EOSINOPHIL # BLD AUTO: 0.16 X10(3) UL (ref 0–0.7)
EOSINOPHIL NFR BLD AUTO: 1.5 %
ERYTHROCYTE [DISTWIDTH] IN BLOOD BY AUTOMATED COUNT: 20.1 % (ref 11–15)
GLUCOSE BLD-MCNC: 116 MG/DL (ref 70–99)
GLUCOSE BLDC GLUCOMTR-MCNC: 116 MG/DL (ref 70–99)
GLUCOSE BLDC GLUCOMTR-MCNC: 127 MG/DL (ref 70–99)
GLUCOSE BLDC GLUCOMTR-MCNC: 153 MG/DL (ref 70–99)
GLUCOSE BLDC GLUCOMTR-MCNC: 159 MG/DL (ref 70–99)
GLUCOSE BLDC GLUCOMTR-MCNC: 189 MG/DL (ref 70–99)
HCT VFR BLD AUTO: 23.7 % (ref 35–48)
HGB BLD-MCNC: 7.5 G/DL (ref 12–16)
IMM GRANULOCYTES # BLD AUTO: 0.3 X10(3) UL (ref 0–1)
IMM GRANULOCYTES NFR BLD: 2.7 %
LYMPHOCYTES # BLD AUTO: 0.82 X10(3) UL (ref 1–4)
LYMPHOCYTES NFR BLD AUTO: 7.5 %
MCH RBC QN AUTO: 27 PG (ref 26–34)
MCHC RBC AUTO-ENTMCNC: 31.6 G/DL (ref 31–37)
MCV RBC AUTO: 85.3 FL (ref 80–100)
MONOCYTES # BLD AUTO: 1.51 X10(3) UL (ref 0.1–1)
MONOCYTES NFR BLD AUTO: 13.8 %
NEUTROPHILS # BLD AUTO: 8.1 X10 (3) UL (ref 1.5–7.7)
NEUTROPHILS # BLD AUTO: 8.1 X10(3) UL (ref 1.5–7.7)
NEUTROPHILS NFR BLD AUTO: 74 %
NON GYNE INTERPRETATION: NEGATIVE
OSMOLALITY SERPL CALC.SUM OF ELEC: 303 MOSM/KG (ref 275–295)
PLATELET # BLD AUTO: 242 10(3)UL (ref 150–450)
POTASSIUM SERPL-SCNC: 3.4 MMOL/L (ref 3.5–5.1)
POTASSIUM SERPL-SCNC: 3.9 MMOL/L (ref 3.5–5.1)
RBC # BLD AUTO: 2.78 X10(6)UL (ref 3.8–5.3)
SODIUM SERPL-SCNC: 145 MMOL/L (ref 136–145)
UNIT VOLUME: 350 ML
WBC # BLD AUTO: 11 X10(3) UL (ref 4–11)

## 2025-05-30 PROCEDURE — 99233 SBSQ HOSP IP/OBS HIGH 50: CPT | Performed by: NURSE PRACTITIONER

## 2025-05-30 PROCEDURE — 99497 ADVNCD CARE PLAN 30 MIN: CPT | Performed by: NURSE PRACTITIONER

## 2025-05-30 PROCEDURE — 99233 SBSQ HOSP IP/OBS HIGH 50: CPT | Performed by: HOSPITALIST

## 2025-05-30 PROCEDURE — 99232 SBSQ HOSP IP/OBS MODERATE 35: CPT | Performed by: INTERNAL MEDICINE

## 2025-05-30 RX ORDER — DIGOXIN 0.25 MG/ML
250 INJECTION INTRAMUSCULAR; INTRAVENOUS ONCE
Status: COMPLETED | OUTPATIENT
Start: 2025-05-30 | End: 2025-05-30

## 2025-05-30 RX ORDER — ACETAMINOPHEN 10 MG/ML
1000 INJECTION, SOLUTION INTRAVENOUS EVERY 4 HOURS PRN
Status: DISCONTINUED | OUTPATIENT
Start: 2025-05-30 | End: 2025-06-05

## 2025-05-30 RX ORDER — POTASSIUM CHLORIDE 1500 MG/1
40 TABLET, EXTENDED RELEASE ORAL EVERY 4 HOURS
Status: COMPLETED | OUTPATIENT
Start: 2025-05-30 | End: 2025-05-30

## 2025-05-30 RX ORDER — FUROSEMIDE 10 MG/ML
40 INJECTION INTRAMUSCULAR; INTRAVENOUS
Status: DISCONTINUED | OUTPATIENT
Start: 2025-05-30 | End: 2025-06-03

## 2025-05-30 NOTE — PLAN OF CARE
Problem: Diabetes/Glucose Control  Goal: Glucose maintained within prescribed range  Description: INTERVENTIONS:- Monitor Blood Glucose as ordered- Assess for signs and symptoms of hyperglycemia and hypoglycemia- Administer ordered medications to maintain glucose within target range- Assess barriers to adequate nutritional intake and initiate nutrition consult as needed- Instruct patient on self management of diabetes  Outcome: Progressing     Problem: Patient Centered Care  Goal: Patient preferences are identified and integrated in the patient's plan of care  Description: Interventions:- What would you like us to know as we care for you? From home with [place] - Provide timely, complete, and accurate information to patient/family- Incorporate patient and family knowledge, values, beliefs, and cultural backgrounds into the planning and delivery of care- Encourage patient/family to participate in care and decision-making at the level they choose- Honor patient and family perspectives and choices  Outcome: Progressing     Problem: RISK FOR INFECTION - ADULT  Goal: Absence of fever/infection during anticipated neutropenic period  Description: INTERVENTIONS- Monitor WBC- Administer growth factors as ordered- Implement neutropenic guidelines  Outcome: Progressing     Problem: CARDIOVASCULAR - ADULT  Goal: Maintains optimal cardiac output and hemodynamic stability  Description: INTERVENTIONS:- Monitor vital signs, rhythm, and trends- Monitor for bleeding, hypotension and signs of decreased cardiac output- Evaluate effectiveness of vasoactive medications to optimize hemodynamic stability- Monitor arterial and/or venous puncture sites for bleeding and/or hematoma- Assess quality of pulses, skin color and temperature- Assess for signs of decreased coronary artery perfusion - ex. Angina- Evaluate fluid balance, assess for edema, trend weights  Outcome: Progressing  Goal: Absence of cardiac arrhythmias or at  baseline  Description: INTERVENTIONS:- Continuous cardiac monitoring, monitor vital signs, obtain 12 lead EKG if indicated- Evaluate effectiveness of antiarrhythmic and heart rate control medications as ordered- Initiate emergency measures for life threatening arrhythmias- Monitor electrolytes and administer replacement therapy as ordered  Outcome: Progressing     Problem: RESPIRATORY - ADULT  Goal: Achieves optimal ventilation and oxygenation  Description: INTERVENTIONS:- Assess for changes in respiratory status- Assess for changes in mentation and behavior- Position to facilitate oxygenation and minimize respiratory effort- Oxygen supplementation based on oxygen saturation or ABGs- Provide Smoking Cessation handout, if applicable- Encourage broncho-pulmonary hygiene including cough, deep breathe, Incentive Spirometry- Assess the need for suctioning and perform as needed- Assess and instruct to report SOB or any respiratory difficulty- Respiratory Therapy support as indicated- Manage/alleviate anxiety- Monitor for signs/symptoms of CO2 retention  Outcome: Progressing     Problem: SAFETY ADULT - FALL  Goal: Free from fall injury  Description: INTERVENTIONS:  - Assess pt frequently for physical needs  - Identify cognitive and physical deficits and behaviors that affect risk of falls.  - [place] fall precautions as indicated by assessment.  - Educate pt/family on patient safety including physical limitations  - Instruct pt to call for assistance with activity based on assessment  - Modify environment to reduce risk of injury  - Provide assistive devices as appropriate  - Consider OT/PT consult to assist with strengthening/mobility  - Encourage toileting schedule  Outcome: Progressing     Problem: DISCHARGE PLANNING  Goal: Discharge to home or other facility with appropriate resources  Description: INTERVENTIONS:  - Identify barriers to discharge w/pt and caregiver  - Include patient/family/discharge partner in  discharge planning  - Arrange for needed discharge resources and transportation as appropriate  - Identify discharge learning needs (meds, wound care, etc)  - Arrange for interpreters to assist at discharge as needed  - Consider post-discharge preferences of patient/family/discharge partner  - Complete POLST form as appropriate  - Assess patient's ability to be responsible for managing their own health  - Refer to Case Management Department for coordinating discharge planning if the patient needs post-hospital services based on physician/LIP order or complex needs related to functional status, cognitive ability or social support system  Outcome: Progressing     Problem: HEMATOLOGIC - ADULT  Goal: Free from bleeding injury  Description: (Example usage: patient with low platelets)  INTERVENTIONS:  - Avoid intramuscular injections, enemas and rectal medication administration  - Ensure safe mobilization of patient  - Hold pressure on venipuncture sites to achieve adequate hemostasis  - Assess for signs and symptoms of internal bleeding  - Monitor lab trends  - Patient is to report abnormal signs of bleeding to staff  - Avoid use of toothpicks and dental floss  - Use electric shaver for shaving  - Use soft bristle tooth brush  - Limit straining and forceful nose blowing  Outcome: Progressing  Goal: Maintains hematologic stability  Description: INTERVENTIONS  - Assess for signs and symptoms of bleeding or hemorrhage  - Monitor labs and vital signs for trends  - Administer supportive blood products/factors, fluids and medications as ordered and appropriate  - Administer supportive blood products/factors as ordered and appropriate  Outcome: Progressing  Goal: Free from bleeding injury  Description: (Example usage: patient with low platelets)  INTERVENTIONS:  - Avoid intramuscular injections, enemas and rectal medication administration  - Ensure safe mobilization of patient  - Hold pressure on venipuncture sites to achieve  adequate hemostasis  - Assess for signs and symptoms of internal bleeding  - Monitor lab trends  - Patient is to report abnormal signs of bleeding to staff  - Avoid use of toothpicks and dental floss  - Use electric shaver for shaving  - Use soft bristle tooth brush  - Limit straining and forceful nose blowing  Outcome: Progressing     Problem: GASTROINTESTINAL - ADULT  Goal: Minimal or absence of nausea and vomiting  Description: INTERVENTIONS:  - Maintain adequate hydration with IV or PO as ordered and tolerated  - Nasogastric tube to low intermittent suction as ordered  - Evaluate effectiveness of ordered antiemetic medications  - Provide nonpharmacologic comfort measures as appropriate  - Advance diet as tolerated, if ordered  - Obtain nutritional consult as needed  - Evaluate fluid balance  Outcome: Progressing  Goal: Maintains or returns to baseline bowel function  Description: INTERVENTIONS:  - Assess bowel function  - Maintain adequate hydration with IV or PO as ordered and tolerated  - Evaluate effectiveness of GI medications  - Encourage mobilization and activity  - Obtain nutritional consult as needed  - Establish a toileting routine/schedule  - Consider collaborating with pharmacy to review patient's medication profile  Outcome: Progressing  Goal: Maintains adequate nutritional intake (undernourished)  Description: INTERVENTIONS:  - Monitor percentage of each meal consumed  - Identify factors contributing to decreased intake, treat as appropriate  - Assist with meals as needed  - Monitor I&O, WT and lab values  - Obtain nutritional consult as needed  - Optimize oral hygiene and moisture  - Encourage food from home; allow for food preferences  - Enhance eating environment  Outcome: Progressing     Problem: GENITOURINARY - ADULT  Goal: Absence of urinary retention  Description: INTERVENTIONS:  - Assess patient’s ability to void and empty bladder  - Monitor intake/output and perform bladder scan as  needed  - Follow urinary retention protocol/standard of care  - Consider collaborating with pharmacy to review patient's medication profile  - Implement strategies to promote bladder emptying  Outcome: Progressing     Problem: METABOLIC/FLUID AND ELECTROLYTES - ADULT  Goal: Glucose maintained within prescribed range  Description: INTERVENTIONS:- Monitor Blood Glucose as ordered- Assess for signs and symptoms of hyperglycemia and hypoglycemia- Administer ordered medications to maintain glucose within target range- Assess barriers to adequate nutritional intake and initiate nutrition consult as needed- Instruct patient on self management of diabetes  Outcome: Progressing  Goal: Electrolytes maintained within normal limits  Description: INTERVENTIONS:  - Monitor labs and rhythm and assess patient for signs and symptoms of electrolyte imbalances  - Administer electrolyte replacement as ordered  - Monitor response to electrolyte replacements, including rhythm and repeat lab results as appropriate  - Fluid restriction as ordered  - Instruct patient on fluid and nutrition restrictions as appropriate  Outcome: Progressing  Goal: Hemodynamic stability and optimal renal function maintained  Description: INTERVENTIONS:  - Monitor labs and assess for signs and symptoms of volume excess or deficit  - Monitor intake, output and patient weight  - Monitor urine specific gravity, serum osmolarity and serum sodium as indicated or ordered  - Monitor response to interventions for patient's volume status, including labs, urine output, blood pressure (other measures as available)  - Encourage oral intake as appropriate  - Instruct patient on fluid and nutrition restrictions as appropriate  Outcome: Progressing     Problem: SKIN/TISSUE INTEGRITY - ADULT  Goal: Skin integrity remains intact  Description: INTERVENTIONS  - Assess and document risk factors for pressure ulcer development  - Assess and document skin integrity  - Monitor for  areas of redness and/or skin breakdown  - Initiate interventions, skin care algorithm/standards of care as needed  Outcome: Progressing  Goal: Incision(s), wounds(s) or drain site(s) healing without S/S of infection  Description: INTERVENTIONS:  - Assess and document risk factors for pressure ulcer development  - Assess and document skin integrity  - Assess and document dressing/incision, wound bed, drain sites and surrounding tissue  - Implement wound care per orders  - Initiate isolation precautions as appropriate  - Initiate Pressure Ulcer prevention bundle as indicated  Outcome: Progressing     Problem: MUSCULOSKELETAL - ADULT  Goal: Return mobility to safest level of function  Description: INTERVENTIONS:  - Assess patient stability and activity tolerance for standing, transferring and ambulating w/ or w/o assistive devices  - Assist with transfers and ambulation using safe patient handling equipment as needed  - Ensure adequate protection for wounds/incisions during mobilization  - Obtain PT/OT consults as needed  - Advance activity as appropriate  - Communicate ordered activity level and limitations with patient/family  Outcome: Progressing  Goal: Maintain proper alignment of affected body part  Description: INTERVENTIONS:  - Support and protect limb and body alignment per provider's orders  - Instruct and reinforce with patient and family use of appropriate assistive device and precautions (e.g. spinal or hip dislocation precautions)  Outcome: Progressing

## 2025-05-30 NOTE — CONSULTS
BRIEF PALLIATIVE CARE CONSULT NOTE     Chart  reviewed, Pt interviewed and examined. Spoke with 2 daughters at bedside. Full consult to follow    Assessment and Recommendations      Briefly, very pleasant 69 yo AA female with Stage IV clear-cell endometrial adenocarcinoma initially treated with radiation, surgery, and adjuvant chemotherapy. (Feb- Sept 2024) . More recently she received adjuvant chemo carboplatin, Taxol, Keytruda on May 6 and did receive Fulphila which is the equivalent of Neulasta on May 7, 2025. Other PMH includes asthma, GERD, hypothyroidism,PAT and DM2   She was admitted 5/12/25 for difficulty breathing / CP/ palpitation and tachycardia. Since admission she has had a protracted course with identification of multiple cancer related issues: pancytopenia, pericardial effusion s/p pericardial window, progressive acute hypoxic resp failure with  know pulm mets and development  pl effusions s/p R thoracentesis today, ongoing Afib w/ RVR, bilat PE w/ R -DVT s/p IVC filter and recent start of Eliquis, progressive weakness and low appetite with PCM. Today palliative care was asked to meet with pt and her family as they have been informed that given the change in her clinical course and ongoing functional decline discussions regarding GOC and POC moving forward need to be discussed as further cancer treatment options are not looking to be beneficial for Kelli at this point.         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)      Acute respiratory failure with hypoxia (HCC)   Pericardial Effusion w/ tamponade  ( malignant )  s/p  pericardial window/ drain 5/21     Bilateral pleural effusion R > L  s/p R thoracentesis     Azotemia    Acute on chronic anemia     Pancytopenia (HCC)- chemo related     Hyperglycemia    Atrial fibrillation with RVR (HCC)     Bilateral PE and RLE-DVT s/p IVC filter  ow on Eliquis     Malnutrition (HCC)- moderate    Generalized weakness     Dyspnea    Anorexia     GOALS OF CARE: At present continue supportive medical treatment for acute medical issues as they arise during this admission and restorative goals with  ongoing discussions regarding GOC & POC moving forward  as pt/ family just learned today after discussion with gyn/ onc/ Dr. Herring,  earlier there are likely no further beneficial cancer treatment options    Code Status: Full Code but discussion began regarding burden/ harm > benefit and to continue when they can get all family  members together and we meet   Healthcare Agent's Name: Theresa Fisher. Pt has completed HCPOA and daughter to bring in copy for scanning     SYMPTOMS:   Dyspnea: Trial of Morphine (low dose 2.5 mr po TID prn . Pt having thoracentesis by Dr Levy soon, There is also consideration of pleurX cath   Dry Mouth/ Xerostomia: Kelli does not like Biotene; Discussed  products outside hospital like Biotene paste, gum and lozenges, Also sippy method- keeping  water fluids near by and reachable. Also at home can make salt/ baking soda/ water daily mixture swish and spit. Keep in bathroom and use each time go in after wash hands or prior to meals   Anorexia:  talked about not a lot of good meds to help with this : Megace off table due to clot risk ; Remeron at hs can consider/ trail at 7.5 mg / concern daytime sleepiness w/ little benefit. Can consider Marinol 2.5 mg po BID and monitor SE. Lots of discussion about being burden of disease         -Discussed today's visit with:  2 daughters at bedside, pt and HENRY Singer     Palliative Care Follow Up: Palliative care team will Continue to follow while inpatient.I will f/u on 5/30   Palliative care follow up outpatient: TBD. We discussed [facility]PC vs hospice . Family would like to have a family meeting with palliative care. One of the daughter's is trying to set that up so  they can all hear the same info at the same time and will let me .     Thank you for allowing Palliative Care services to participate in the care of Kelli Fisher.    Pat Dorantes, APRN  5/29/2025  Palliative Care Services at [facility] :  extension [phone]   or [phone]  Late entry    05-Apr-2017

## 2025-05-30 NOTE — PROGRESS NOTES
Progress Note  Kelli Fisher Patient Status:  Inpatient    1955 MRN [id number]   Location [facility]5W Attending Lina Dueñas, DO   Hosp Day # 18 PCP Taylor Gallardo MD     Subjective:  Pt denies SOB; did note palpitations last night when in afib. Feeling better now. Sitting up in chair    Objective:  BP (!) 85/57 (BP Location: Right arm)   Pulse 85   Temp (!) 95.1 °F (35.1 °C) (Axillary)   Resp 22   Ht 5' (1.524 m)   Wt 175 lb (79.4 kg)   SpO2 100%   BMI 34.18 kg/m²     Telemetry: NSR, 's-140's overnight     Intake/Output:    Intake/Output Summary (Last 24 hours) at 2025 1120  Last data filed at 2025 0657  Gross per 24 hour   Intake 702 ml   Output 1750 ml   Net -1048 ml       Last 3 Weights   25 0406 175 lb (79.4 kg)   25 0514 175 lb 8 oz (79.6 kg)   25 0625 171 lb 11.8 oz (77.9 kg)   25 0600 169 lb 12.1 oz (77 kg)   25 0500 171 lb 15.3 oz (78 kg)   25 0500 167 lb 8.8 oz (76 kg)   25 0600 168 lb 14 oz (76.6 kg)   25 0341 167 lb (75.8 kg)   25 0611 164 lb 10.9 oz (74.7 kg)   25 1600 156 lb 1.4 oz (70.8 kg)   25 0439 163 lb 8 oz (74.2 kg)   05/15/25 1243 154 lb 3.2 oz (69.9 kg)   05/15/25 0458 177 lb 11.2 oz (80.6 kg)   25 0645 166 lb 9.6 oz (75.6 kg)   25 0406 167 lb 9.6 oz (76 kg)   25 1736 165 lb 11.2 oz (75.2 kg)   25 1217 169 lb (76.7 kg)   25 0500 174 lb 9.6 oz (79.2 kg)   25 0549 175 lb 12.8 oz (79.7 kg)   25 1800 163 lb (73.9 kg)   25 2038 165 lb (74.8 kg)       Labs:  Recent Labs   Lab 25  0600 25  0542 25  0456   * 138* 116*   BUN 18 19 17   CREATSERUM 1.04* 1.09* 1.05*   EGFRCR 58* 55* 58*   CA 8.7 8.7 8.2*   * 148* 145   K 4.6 4.0 3.4*    106 103   CO2 31.0 33.0* 37.0*     Recent Labs   Lab 25  0600 25  0542 25  1314 25  0456   RBC 2.68* 2.49*  --  2.78*   HGB 7.1* 6.5* 7.7* 7.5*   HCT  23.1* 21.1* 24.7* 23.7*   MCV 86.2 84.7  --  85.3   MCH 26.5 26.1  --  27.0   MCHC 30.7* 30.8*  --  31.6   RDW 21.4* 21.9*  --  20.1*   NEPRELIM 12.13* 11.08*  --  8.10*   WBC 15.4* 14.9*  --  11.0   .0 271.0  --  242.0         No results for input(s): \"TROP\", \"TROPHS\", \"CK\" in the last 168 hours.    Diagnostics:  XR CHEST AP PORTABLE  (CPT=71045)  Result Date: 5/29/2025  CONCLUSION:   Post right-sided thoracentesis with small residual right pleural effusion.  No pneumothorax.  Moderate interstitial edema, unchanged.  Patchy left midlung airspace opacity is unchanged suggestive of atelectasis or pneumonia   Dictated by (CST): Jaime Gan MD on 5/29/2025 at 5:21 PM     Finalized by (CST): Jaime Gan MD on 5/29/2025 at 5:22 PM          US THORACENTESIS GUIDED RIGHT (CPT=32555)  Result Date: 5/29/2025  CONCLUSION: Moderate right pleural effusion.  Thoracentesis attempted.    Dictated by (CST): Jaime Gan MD on 5/29/2025 at 5:12 PM     Finalized by (CST): Jaime Gan MD on 5/29/2025 at 5:13 PM           ROS    Physical Exam:    Gen: alert, oriented x 3, NAD  Heent: pupils equal, reactive. Mucous membranes moist.   Neck: no jvd  Cardiac: regular rate and rhythm, normal S1,S2, no murmur, clicks, rub or gallop  Lungs: diminished  Abd: soft, NT/ND +bs  Ext: generalized edema, BLE 2+ edema  Skin: Warm, dry  Neuro: No focal deficits      Medications:    Scheduled Medications[1]  Medication Infusions[2]      Assessment:  Acute Hypoxic Respiratory Failure - Multifactorial (Lung Mets, PE, Pleural Effusions, Volume Overload)  On 5L HFNC  Pulm following  Bilateral Pleural Effusion, R >L  5/29 S/p R thoracentesis - 900ML bloody fluid  Bilateral PE/DVT s/p IVC filter  5/13 CTA Chest w/small volume acute bilateral segmental PE  5/14 US + RLE acute non-occlusive DVT; neg for LLE DVT  Initially unable to tolerate A/C d/t thrombocytopenia; now eliquis on hold d/t anemia   Acute HFpEF  proBNP 1110 on admit, 1826 on 5/29    Echo LVEF 65-70%, bilateral pleural effusion on CXR  Diuresis w/IV Lasix - dose held this am d/t hypotension  Net - 5.5L, I&O incomplete, wts uptrending  Appears volume overloaded on exam  Large Pericardial Effusion w/Tamponade s/p Urgent Pericardial Window, R Pleural Chest Tube  Hx Known pericardial effusion last admission - previously small - moderate  5/13 Echo w/moderate effusion; no evidence of HD compromise  5/20 Repeat echo w/large pericardial effusion; evidence of RV collapse, dilated IVC  5/21 S/P Urgent Pericardial Window w/CV surgery  Fluid + for malignancy  5/23 Removed Pleural Chest Tube  5/24 Removed Nathaniel drain   5/25 Repeat echo w/o pericardial effusion  Paroxysmal Atrial Tachycardia/Atrial Fibrillation  Hx PSVT on recent admission w/junctional rhythm noted while on BB  Intermittent PAF RVR post pericardial window, and overnight - now back to NSR s/p IV digoxin x1  S/P amio load; now on 200mg po daily   TSH WNL  [id number] 3 - Eliquis 5mg po BID; held d/t anemia  Hypotension  Initially w/shock, requiring vasopressor support  Now BP improved on midodrine  Stage IVb Recurrent Endometrial Clear Cell Adenocarcinoma Cancer - mets to lung  OB/Gyn following - rec'd chemo as OP  Acute on Chronic Anemia  Hgb 6.5 this am s/p PRBC - now 7.5  Holding Eliquis  Thrombocytopenia - resolved  Leukocytosis   BC negative, sputum negative  Hypothyroidism s/p Thyroidectomy - levothyroxine  Hypokalemia - cardiac replacement    Plan:  Eliquis remains on hold with continued anemia  Continue midodrine for hypotension - Bp improved this am  Resume IV lasix - pt remains volume overloaded, elevated proBNP from admission  Now maintaining NSR this am - s/p IV digoxin overnight. Continue po amio. Previously had junctional rhythms with BB so will avoid at this time  EP to see for further recommendations   Strict I&O, daily weights (standing if possible), daily BMP    Plan of care discussed with patient, RN.    Emily George,  APRN  5/30/2025  11:20 AM        CARDIOLOGIST ADDENDUM:      I agree with the findings and complexity of problems addressed and approve of the plan detailed above. I have personally performed the assessment and plan, and summarized the salient points of the medical decision making below:    Problems:  -parox AF  -volume overload  -pericardial effusion s/p window    Plan and Risks:  -continue amiodarone 200 mg daily  -no anticoag due to severe risks for lifethreatening bleed  -drug rx requiring intensive monitoring for toxicity with vitals, tele-, lytes, lfts, and drug interactions      Tahira Carreno M.D.   Cardiology/Electrophysiology   5/30/2025  L3  -----------------------------------------           [1]    potassium chloride  40 mEq Oral Q4H    amiodarone  200 mg Oral Daily    [Held by provider] furosemide  40 mg Intravenous BID (Diuretic)    pantoprazole  40 mg Oral QAM AC    midodrine  10 mg Oral TID    [Held by provider] apixaban  5 mg Oral BID    insulin aspart  1-5 Units Subcutaneous TID CC and HS    levothyroxine  100 mcg Oral Before breakfast   [2]

## 2025-05-30 NOTE — PLAN OF CARE
Problem: Diabetes/Glucose Control  Goal: Glucose maintained within prescribed range  Description: INTERVENTIONS:- Monitor Blood Glucose as ordered- Assess for signs and symptoms of hyperglycemia and hypoglycemia- Administer ordered medications to maintain glucose within target range- Assess barriers to adequate nutritional intake and initiate nutrition consult as needed- Instruct patient on self management of diabetes  Outcome: Progressing     Problem: Patient Centered Care  Goal: Patient preferences are identified and integrated in the patient's plan of care  Description: Interventions:- What would you like us to know as we care for you? From home alone with C- Provide timely, complete, and accurate information to patient/family- Incorporate patient and family knowledge, values, beliefs, and cultural backgrounds into the planning and delivery of care- Encourage patient/family to participate in care and decision-making at the level they choose- Honor patient and family perspectives and choices  Outcome: Progressing     Problem: RISK FOR INFECTION - ADULT  Goal: Absence of fever/infection during anticipated neutropenic period  Description: INTERVENTIONS- Monitor WBC- Administer growth factors as ordered- Implement neutropenic guidelines  Outcome: Progressing     Problem: CARDIOVASCULAR - ADULT  Goal: Maintains optimal cardiac output and hemodynamic stability  Description: INTERVENTIONS:- Monitor vital signs, rhythm, and trends- Monitor for bleeding, hypotension and signs of decreased cardiac output- Evaluate effectiveness of vasoactive medications to optimize hemodynamic stability- Monitor arterial and/or venous puncture sites for bleeding and/or hematoma- Assess quality of pulses, skin color and temperature- Assess for signs of decreased coronary artery perfusion - ex. Angina- Evaluate fluid balance, assess for edema, trend weights  Outcome: Progressing     Problem: RESPIRATORY - ADULT  Goal: Achieves optimal  ventilation and oxygenation  Description: INTERVENTIONS:- Assess for changes in respiratory status- Assess for changes in mentation and behavior- Position to facilitate oxygenation and minimize respiratory effort- Oxygen supplementation based on oxygen saturation or ABGs- Provide Smoking Cessation handout, if applicable- Encourage broncho-pulmonary hygiene including cough, deep breathe, Incentive Spirometry- Assess the need for suctioning and perform as needed- Assess and instruct to report SOB or any respiratory difficulty- Respiratory Therapy support as indicated- Manage/alleviate anxiety- Monitor for signs/symptoms of CO2 retention  Outcome: Progressing     Problem: SAFETY ADULT - FALL  Goal: Free from fall injury  Description: INTERVENTIONS:  - Assess pt frequently for physical needs  - Identify cognitive and physical deficits and behaviors that affect risk of falls.  - [place] fall precautions as indicated by assessment.  - Educate pt/family on patient safety including physical limitations  - Instruct pt to call for assistance with activity based on assessment  - Modify environment to reduce risk of injury  - Provide assistive devices as appropriate  - Consider OT/PT consult to assist with strengthening/mobility  - Encourage toileting schedule  Outcome: Progressing     Problem: DISCHARGE PLANNING  Goal: Discharge to home or other facility with appropriate resources  Description: INTERVENTIONS:  - Identify barriers to discharge w/pt and caregiver  - Include patient/family/discharge partner in discharge planning  - Arrange for needed discharge resources and transportation as appropriate  - Identify discharge learning needs (meds, wound care, etc)  - Arrange for interpreters to assist at discharge as needed  - Consider post-discharge preferences of patient/family/discharge partner  - Complete POLST form as appropriate  - Assess patient's ability to be responsible for managing their own health  - Refer to Case  Management Department for coordinating discharge planning if the patient needs post-hospital services based on physician/LIP order or complex needs related to functional status, cognitive ability or social support system  Outcome: Progressing     Problem: HEMATOLOGIC - ADULT  Goal: Free from bleeding injury  Description: (Example usage: patient with low platelets)  INTERVENTIONS:  - Avoid intramuscular injections, enemas and rectal medication administration  - Ensure safe mobilization of patient  - Hold pressure on venipuncture sites to achieve adequate hemostasis  - Assess for signs and symptoms of internal bleeding  - Monitor lab trends  - Patient is to report abnormal signs of bleeding to staff  - Avoid use of toothpicks and dental floss  - Use electric shaver for shaving  - Use soft bristle tooth brush  - Limit straining and forceful nose blowing  Outcome: Progressing  Goal: Maintains hematologic stability  Description: INTERVENTIONS  - Assess for signs and symptoms of bleeding or hemorrhage  - Monitor labs and vital signs for trends  - Administer supportive blood products/factors, fluids and medications as ordered and appropriate  - Administer supportive blood products/factors as ordered and appropriate  Outcome: Progressing  Goal: Free from bleeding injury  Description: (Example usage: patient with low platelets)  INTERVENTIONS:  - Avoid intramuscular injections, enemas and rectal medication administration  - Ensure safe mobilization of patient  - Hold pressure on venipuncture sites to achieve adequate hemostasis  - Assess for signs and symptoms of internal bleeding  - Monitor lab trends  - Patient is to report abnormal signs of bleeding to staff  - Avoid use of toothpicks and dental floss  - Use electric shaver for shaving  - Use soft bristle tooth brush  - Limit straining and forceful nose blowing  Outcome: Progressing     Problem: GASTROINTESTINAL - ADULT  Goal: Minimal or absence of nausea and  vomiting  Description: INTERVENTIONS:  - Maintain adequate hydration with IV or PO as ordered and tolerated  - Nasogastric tube to low intermittent suction as ordered  - Evaluate effectiveness of ordered antiemetic medications  - Provide nonpharmacologic comfort measures as appropriate  - Advance diet as tolerated, if ordered  - Obtain nutritional consult as needed  - Evaluate fluid balance  Outcome: Progressing     Problem: GENITOURINARY - ADULT  Goal: Absence of urinary retention  Description: INTERVENTIONS:  - Assess patient’s ability to void and empty bladder  - Monitor intake/output and perform bladder scan as needed  - Follow urinary retention protocol/standard of care  - Consider collaborating with pharmacy to review patient's medication profile  - Implement strategies to promote bladder emptying  Outcome: Progressing     Problem: METABOLIC/FLUID AND ELECTROLYTES - ADULT  Goal: Glucose maintained within prescribed range  Description: INTERVENTIONS:- Monitor Blood Glucose as ordered- Assess for signs and symptoms of hyperglycemia and hypoglycemia- Administer ordered medications to maintain glucose within target range- Assess barriers to adequate nutritional intake and initiate nutrition consult as needed- Instruct patient on self management of diabetes  Outcome: Progressing  Goal: Electrolytes maintained within normal limits  Description: INTERVENTIONS:  - Monitor labs and rhythm and assess patient for signs and symptoms of electrolyte imbalances  - Administer electrolyte replacement as ordered  - Monitor response to electrolyte replacements, including rhythm and repeat lab results as appropriate  - Fluid restriction as ordered  - Instruct patient on fluid and nutrition restrictions as appropriate  Outcome: Progressing  Goal: Hemodynamic stability and optimal renal function maintained  Description: INTERVENTIONS:  - Monitor labs and assess for signs and symptoms of volume excess or deficit  - Monitor intake,  output and patient weight  - Monitor urine specific gravity, serum osmolarity and serum sodium as indicated or ordered  - Monitor response to interventions for patient's volume status, including labs, urine output, blood pressure (other measures as available)  - Encourage oral intake as appropriate  - Instruct patient on fluid and nutrition restrictions as appropriate  Outcome: Progressing     Problem: SKIN/TISSUE INTEGRITY - ADULT  Goal: Skin integrity remains intact  Description: INTERVENTIONS  - Assess and document risk factors for pressure ulcer development  - Assess and document skin integrity  - Monitor for areas of redness and/or skin breakdown  - Initiate interventions, skin care algorithm/standards of care as needed  Outcome: Progressing     Problem: MUSCULOSKELETAL - ADULT  Goal: Return mobility to safest level of function  Description: INTERVENTIONS:  - Assess patient stability and activity tolerance for standing, transferring and ambulating w/ or w/o assistive devices  - Assist with transfers and ambulation using safe patient handling equipment as needed  - Ensure adequate protection for wounds/incisions during mobilization  - Obtain PT/OT consults as needed  - Advance activity as appropriate  - Communicate ordered activity level and limitations with patient/family  Outcome: Progressing     Problem: CARDIOVASCULAR - ADULT  Goal: Absence of cardiac arrhythmias or at baseline  Description: INTERVENTIONS:- Continuous cardiac monitoring, monitor vital signs, obtain 12 lead EKG if indicated- Evaluate effectiveness of antiarrhythmic and heart rate control medications as ordered- Initiate emergency measures for life threatening arrhythmias- Monitor electrolytes and administer replacement therapy as ordered  Outcome: Not Progressing     Problem: GASTROINTESTINAL - ADULT  Goal: Maintains or returns to baseline bowel function  Description: INTERVENTIONS:  - Assess bowel function  - Maintain adequate hydration with  IV or PO as ordered and tolerated  - Evaluate effectiveness of GI medications  - Encourage mobilization and activity  - Obtain nutritional consult as needed  - Establish a toileting routine/schedule  - Consider collaborating with pharmacy to review patient's medication profile  Outcome: Not Progressing  Goal: Maintains adequate nutritional intake (undernourished)  Description: INTERVENTIONS:  - Monitor percentage of each meal consumed  - Identify factors contributing to decreased intake, treat as appropriate  - Assist with meals as needed  - Monitor I&O, WT and lab values  - Obtain nutritional consult as needed  - Optimize oral hygiene and moisture  - Encourage food from home; allow for food preferences  - Enhance eating environment  Outcome: Not Progressing

## 2025-05-30 NOTE — PLAN OF CARE
Problem: Diabetes/Glucose Control  Goal: Glucose maintained within prescribed range  Description: INTERVENTIONS:- Monitor Blood Glucose as ordered- Assess for signs and symptoms of hyperglycemia and hypoglycemia- Administer ordered medications to maintain glucose within target range- Assess barriers to adequate nutritional intake and initiate nutrition consult as needed- Instruct patient on self management of diabetes  Outcome: Progressing     Problem: Patient Centered Care  Goal: Patient preferences are identified and integrated in the patient's plan of care  Description: Interventions:- What would you like us to know as we care for you? - Provide timely, complete, and accurate information to patient/family- Incorporate patient and family knowledge, values, beliefs, and cultural backgrounds into the planning and delivery of care- Encourage patient/family to participate in care and decision-making at the level they choose- Honor patient and family perspectives and choices  Outcome: Progressing     Problem: RISK FOR INFECTION - ADULT  Goal: Absence of fever/infection during anticipated neutropenic period  Description: INTERVENTIONS- Monitor WBC- Administer growth factors as ordered- Implement neutropenic guidelines  Outcome: Progressing     Problem: CARDIOVASCULAR - ADULT  Goal: Maintains optimal cardiac output and hemodynamic stability  Description: INTERVENTIONS:- Monitor vital signs, rhythm, and trends- Monitor for bleeding, hypotension and signs of decreased cardiac output- Evaluate effectiveness of vasoactive medications to optimize hemodynamic stability- Monitor arterial and/or venous puncture sites for bleeding and/or hematoma- Assess quality of pulses, skin color and temperature- Assess for signs of decreased coronary artery perfusion - ex. Angina- Evaluate fluid balance, assess for edema, trend weights  Outcome: Progressing  Goal: Absence of cardiac arrhythmias or at baseline  Description: INTERVENTIONS:-  Continuous cardiac monitoring, monitor vital signs, obtain 12 lead EKG if indicated- Evaluate effectiveness of antiarrhythmic and heart rate control medications as ordered- Initiate emergency measures for life threatening arrhythmias- Monitor electrolytes and administer replacement therapy as ordered  Outcome: Progressing     Problem: RESPIRATORY - ADULT  Goal: Achieves optimal ventilation and oxygenation  Description: INTERVENTIONS:- Assess for changes in respiratory status- Assess for changes in mentation and behavior- Position to facilitate oxygenation and minimize respiratory effort- Oxygen supplementation based on oxygen saturation or ABGs- Provide Smoking Cessation handout, if applicable- Encourage broncho-pulmonary hygiene including cough, deep breathe, Incentive Spirometry- Assess the need for suctioning and perform as needed- Assess and instruct to report SOB or any respiratory difficulty- Respiratory Therapy support as indicated- Manage/alleviate anxiety- Monitor for signs/symptoms of CO2 retention  Outcome: Progressing     Problem: SAFETY ADULT - FALL  Goal: Free from fall injury  Description: INTERVENTIONS:  - Assess pt frequently for physical needs  - Identify cognitive and physical deficits and behaviors that affect risk of falls.  - [place] fall precautions as indicated by assessment.  - Educate pt/family on patient safety including physical limitations  - Instruct pt to call for assistance with activity based on assessment  - Modify environment to reduce risk of injury  - Provide assistive devices as appropriate  - Consider OT/PT consult to assist with strengthening/mobility  - Encourage toileting schedule  Outcome: Progressing     Problem: HEMATOLOGIC - ADULT  Goal: Free from bleeding injury  Description: (Example usage: patient with low platelets)  INTERVENTIONS:  - Avoid intramuscular injections, enemas and rectal medication administration  - Ensure safe mobilization of patient  - Hold pressure  on venipuncture sites to achieve adequate hemostasis  - Assess for signs and symptoms of internal bleeding  - Monitor lab trends  - Patient is to report abnormal signs of bleeding to staff  - Avoid use of toothpicks and dental floss  - Use electric shaver for shaving  - Use soft bristle tooth brush  - Limit straining and forceful nose blowing  Outcome: Progressing  Goal: Maintains hematologic stability  Description: INTERVENTIONS  - Assess for signs and symptoms of bleeding or hemorrhage  - Monitor labs and vital signs for trends  - Administer supportive blood products/factors, fluids and medications as ordered and appropriate  - Administer supportive blood products/factors as ordered and appropriate  Outcome: Progressing  Goal: Free from bleeding injury  Description: (Example usage: patient with low platelets)  INTERVENTIONS:  - Avoid intramuscular injections, enemas and rectal medication administration  - Ensure safe mobilization of patient  - Hold pressure on venipuncture sites to achieve adequate hemostasis  - Assess for signs and symptoms of internal bleeding  - Monitor lab trends  - Patient is to report abnormal signs of bleeding to staff  - Avoid use of toothpicks and dental floss  - Use electric shaver for shaving  - Use soft bristle tooth brush  - Limit straining and forceful nose blowing  Outcome: Progressing     Problem: GASTROINTESTINAL - ADULT  Goal: Minimal or absence of nausea and vomiting  Description: INTERVENTIONS:  - Maintain adequate hydration with IV or PO as ordered and tolerated  - Nasogastric tube to low intermittent suction as ordered  - Evaluate effectiveness of ordered antiemetic medications  - Provide nonpharmacologic comfort measures as appropriate  - Advance diet as tolerated, if ordered  - Obtain nutritional consult as needed  - Evaluate fluid balance  Outcome: Progressing  Goal: Maintains or returns to baseline bowel function  Description: INTERVENTIONS:  - Assess bowel function  -  Maintain adequate hydration with IV or PO as ordered and tolerated  - Evaluate effectiveness of GI medications  - Encourage mobilization and activity  - Obtain nutritional consult as needed  - Establish a toileting routine/schedule  - Consider collaborating with pharmacy to review patient's medication profile  Outcome: Progressing     Problem: GENITOURINARY - ADULT  Goal: Absence of urinary retention  Description: INTERVENTIONS:  - Assess patient’s ability to void and empty bladder  - Monitor intake/output and perform bladder scan as needed  - Follow urinary retention protocol/standard of care  - Consider collaborating with pharmacy to review patient's medication profile  - Implement strategies to promote bladder emptying  Outcome: Progressing     Problem: METABOLIC/FLUID AND ELECTROLYTES - ADULT  Goal: Glucose maintained within prescribed range  Description: INTERVENTIONS:- Monitor Blood Glucose as ordered- Assess for signs and symptoms of hyperglycemia and hypoglycemia- Administer ordered medications to maintain glucose within target range- Assess barriers to adequate nutritional intake and initiate nutrition consult as needed- Instruct patient on self management of diabetes  Outcome: Progressing  Goal: Hemodynamic stability and optimal renal function maintained  Description: INTERVENTIONS:  - Monitor labs and assess for signs and symptoms of volume excess or deficit  - Monitor intake, output and patient weight  - Monitor urine specific gravity, serum osmolarity and serum sodium as indicated or ordered  - Monitor response to interventions for patient's volume status, including labs, urine output, blood pressure (other measures as available)  - Encourage oral intake as appropriate  - Instruct patient on fluid and nutrition restrictions as appropriate  Outcome: Progressing     Problem: SKIN/TISSUE INTEGRITY - ADULT  Goal: Skin integrity remains intact  Description: INTERVENTIONS  - Assess and document risk factors  for pressure ulcer development  - Assess and document skin integrity  - Monitor for areas of redness and/or skin breakdown  - Initiate interventions, skin care algorithm/standards of care as needed  Outcome: Progressing  Goal: Incision(s), wounds(s) or drain site(s) healing without S/S of infection  Description: INTERVENTIONS:  - Assess and document risk factors for pressure ulcer development  - Assess and document skin integrity  - Assess and document dressing/incision, wound bed, drain sites and surrounding tissue  - Implement wound care per orders  - Initiate isolation precautions as appropriate  - Initiate Pressure Ulcer prevention bundle as indicated  Outcome: Progressing     Problem: MUSCULOSKELETAL - ADULT  Goal: Maintain proper alignment of affected body part  Description: INTERVENTIONS:  - Support and protect limb and body alignment per provider's orders  - Instruct and reinforce with patient and family use of appropriate assistive device and precautions (e.g. spinal or hip dislocation precautions)  Outcome: Progressing     Problem: DISCHARGE PLANNING  Goal: Discharge to home or other facility with appropriate resources  Description: INTERVENTIONS:  - Identify barriers to discharge w/pt and caregiver  - Include patient/family/discharge partner in discharge planning  - Arrange for needed discharge resources and transportation as appropriate  - Identify discharge learning needs (meds, wound care, etc)  - Arrange for interpreters to assist at discharge as needed  - Consider post-discharge preferences of patient/family/discharge partner  - Complete POLST form as appropriate  - Assess patient's ability to be responsible for managing their own health  - Refer to Case Management Department for coordinating discharge planning if the patient needs post-hospital services based on physician/LIP order or complex needs related to functional status, cognitive ability or social support system  Outcome: Not Progressing      Problem: GASTROINTESTINAL - ADULT  Goal: Maintains adequate nutritional intake (undernourished)  Description: INTERVENTIONS:  - Monitor percentage of each meal consumed  - Identify factors contributing to decreased intake, treat as appropriate  - Assist with meals as needed  - Monitor I&O, WT and lab values  - Obtain nutritional consult as needed  - Optimize oral hygiene and moisture  - Encourage food from home; allow for food preferences  - Enhance eating environment  Outcome: Not Progressing     Problem: METABOLIC/FLUID AND ELECTROLYTES - ADULT  Goal: Electrolytes maintained within normal limits  Description: INTERVENTIONS:  - Monitor labs and rhythm and assess patient for signs and symptoms of electrolyte imbalances  - Administer electrolyte replacement as ordered  - Monitor response to electrolyte replacements, including rhythm and repeat lab results as appropriate  - Fluid restriction as ordered  - Instruct patient on fluid and nutrition restrictions as appropriate  Outcome: Not Progressing     Problem: MUSCULOSKELETAL - ADULT  Goal: Return mobility to safest level of function  Description: INTERVENTIONS:  - Assess patient stability and activity tolerance for standing, transferring and ambulating w/ or w/o assistive devices  - Assist with transfers and ambulation using safe patient handling equipment as needed  - Ensure adequate protection for wounds/incisions during mobilization  - Obtain PT/OT consults as needed  - Advance activity as appropriate  - Communicate ordered activity level and limitations with patient/family  Outcome: Not Progressing

## 2025-05-30 NOTE — PROGRESS NOTES
[facility]  part of [facility]  Palliative Care Progress Note    Kelli Fisher Patient Status:  Inpatient    1955 MRN [id number]   Location [facility]5W Attending Lina Dueñas, DO   Hosp Day # 18 PCP Taylor Gallardo MD     The  Cures Act makes medical notes like these available to patients in the interest of transparency. Please be advised this is a medical document. Medical documents are intended to carry relevant information, facts as evident, and the clinical opinion of the practitioner. The medical note is intended as peer to peer communication and may appear blunt or direct. It is written in medical language and may contain abbreviations or verbiage that are unfamiliar.     Summary     Kelli Fisher is a very pleasant 71 yo AA female with Stage IV clear-cell endometrial adenocarcinoma initially treated with radiation, surgery, and adjuvant chemotherapy. (Feb- 2024). More recently she received adjuvant chemo carboplatin, Taxol, Keytruda on May 6 and did receive Fulphila which is the equivalent of Neulasta on May 7, 2025. Other PMH includes asthma, GERD, hypothyroidism,PAT and DM2   She was admitted 25 for difficulty breathing / CP/ palpitation and tachycardia. Since admission she has had a protracted course with identification of multiple cancer related issues: pancytopenia, pericardial effusion s/p pericardial window, progressive acute hypoxic resp failure with know pulm mets and development  pl effusions s/p R thoracentesis today, ongoing Afib w/ RVR, bilat PE w/ R -DVT s/p IVC filter and recent start of Eliquis, progressive weakness and low appetite with mod PCM. Today palliative care was asked to meet with pt and her family as they have been informed that given the change in her clinical course and ongoing functional decline discussions regarding GOC and POC moving forward need to be discussed as further cancer treatment options are not looking to be  beneficial for Kelli at this point. Pt is being followed by several specialist this admission:  CV surgery, Hem/Onc, Gyn/ Onc, IR, Pulmonary and cardiology.   TODAY, is hospital Day # 18 . This is the first admission for 2025 and 2nd since Oct 2024     Consult ordered by:: Dr. Lina Dueñas for evaluation of Palliative Care needs and Uncontrolled symptoms;Goals of care discussion;Establish palliative care.    Subjective     Interval events: Overnight, pt had episode of Afib with RVR / palp given IVP Dig and resolved. She tells me today she fells better overall after \" they pulled that fluid out of my lungs. The breathing is better and I just feel better/ stronger.\"   Per chart review  s/p R  ultrasound  guided thoracentesis at bedside with 900 ml bloody fluid Cytology report - no malignant cells seen     When I entered the room, the patient was awake & alert and sitting in chair/ appears use of lift has been used and pt confirms  Multiple family members ( 2 sons  3 daughter) present at bedside. We met for a family meeting see below     MAR Symptom Review     Other   Lasix 40 mg IV BID   Midodrine 10 mg po TID   Protonix DR 40 mg daily     Pain   Tylenol 1000 mg po Q 6 hr prn - none   Morphine 1 mg  Q 3 hr prn - none used   Tramadol 50 mg po Q 6 hr prn - none used     Dyspnea  Morphine 2.5 mg po TID prn- none used  Duo Nebs 3 ml Q 6 hr prn - last 5/27    Constipation and n/v  Colace solution 100 mg po Daily prn- none   Miralax 17 g po daily prn- none   Reglan 5 mg IVP q 8 hr prn- none   Zofran 4 mg IV q 6 hr prn - none     Review of Systems/ Symptoms:    Pt is able to provide subjective input as well as info form family and RN   Fatigue:  Yes, but pt reports feels lie improving 5/28 last PT/ OT notes ; max assist ; lift equipment; Impairment 8,66 % ; Max assist x 2 to stand    Dyspnea: present but improved   At rest: no;  CASANOVA: yes   Conversational: no     Current O2 therapy: 4- 5 lpm . NO use  of prn Morphine low  does. Feels better after thoracentesis- R yesterday   Drowsiness: no nodding off today; talkative and offering complaints about her care   Cough:  present intermittent dry / nonproductive   Pain: denies; non meds taking   Non-verbal signs of pain present: No  Appetite: continues to be poor Recorded 0-10 % up to 50 %  but ate 100 % of fish and chips from outside; 5/29 Last dietician note; mod PCM- sm freq meals ; high calore/ high protein  ONS and magic cup    Constipation: Last BM    5/28 soft brown small; 5/27 soft brown; 5/26 BM x 2 soft brown medium % small; 5/25 soft brown med x 2  Diarrhea: denies/ none documented   Nausea/Vomiting: denies/ none documented  Depression: denies  Anxiety: denies  Emotional / coping response to illness: Pt tells me she has strong sathish in God and he will determine how this will go and she is OK with this   Other:    Allergies:  Allergies[1]    Medications:   Current Hospital Medications[2]    Objective     Vital Signs:  Blood pressure 104/69, pulse 116, temperature 97.7 °F (36.5 °C), temperature source Axillary, resp. rate 20, height 5' (1.524 m), weight 175 lb (79.4 kg), SpO2 100%.  Body mass index is 34.18 kg/m².  Present Level of pain: 0/10  Non-verbal signs of pain present: No    Physical Exam:  General: Alert & Awake. In no apparent respiratory distress. Body habitus Thin ill appearing    HEENT: AT/NC. No gross focal deficits. Dry MM no lesions but erythema and pain   Cardiac: red tachycardia on tele NSR / ST  Lungs: Bilat BS  diminished with scattered crackles  Normal effort at rest today on NC 4 lpm s/p thoracentesis 5/29 on R 900 ml bloody fluid ; Dressy  dry and intact   Abdomen: Soft, non-tender, non-distended, normal bowel sounds X 4 quadrants   Extremities: 1+ LE edema present  Neurologic: Alert and oriented to person, place, time, and situation   Psychiatric: Mood pleasant mood, interactive and talkative today . Engaged in discussion   Skin: Warm and dry.No rash noted      Prior to admission Palliative performance scale PPSv2 (%): 50    Hematology:  Lab Results   Component Value Date    WBC 11.0 05/30/2025    HGB 7.5 (L) 05/30/2025    HCT 23.7 (L) 05/30/2025    .0 05/30/2025     Coags:  Lab Results   Component Value Date    INR 1.31 (H) 05/22/2025    PTT 37.0 (H) 05/21/2025     Chemistry:  Lab Results   Component Value Date    CREATSERUM 1.05 (H) 05/30/2025    BUN 17 05/30/2025     05/30/2025    K 3.4 (L) 05/30/2025     05/30/2025    CO2 37.0 (H) 05/30/2025     (H) 05/30/2025    CA 8.2 (L) 05/30/2025    ALB 3.2 05/12/2025    ALKPHO 142 05/12/2025    BILT 0.7 05/12/2025    TP 6.8 05/12/2025    AST 26 05/12/2025    ALT 10 05/12/2025    DDIMER 3.05 (H) 05/13/2025    MG 2.2 05/19/2025       Imaging:  XR CHEST AP PORTABLE  (CPT=71045) Result Date: 5/29/2025  CONCLUSION:   Post right-sided thoracentesis with small residual right pleural effusion.  No pneumothorax.  Moderate interstitial edema, unchanged.  Patchy left mid lung airspace opacity is unchanged suggestive of atelectasis or pneumonia     Dictated by (CST): Jaime Gan MD on 5/29/2025 at 5:21 PM     Finalized by (CST): Jaime Gan MD on 5/29/2025 at 5:22 PM          US THORACENTESIS GUIDED RIGHT (CPT=32555)Result Date: 5/29/2025  CONCLUSION: Moderate right pleural effusion.  Thoracentesis attempted.    Dictated by (CST): Jaime Gan MD on 5/29/2025 at 5:12 PM     Finalized by (CST): Jaime Gan MD on 5/29/2025 at 5:13 PM          XR CHEST AP PORTABLE  (CPT=71045) Result Date: 5/29/2025  CONCLUSION:    1. Borderline cardiomegaly prominent central vasculature.   2. Bilateral perihilar lower lobe mixed alveolar and interstitial multifocal airspace opacification with bibasilar subpulmonic effusions, worse on the right.  Slight progression right side.    Dictated by (CST): Marlon Harp MD on 5/29/2025 at 9:34 AM     Finalized by (CST): Marlon Harp MD on 5/29/2025 at 9:35 AM          XR  CHEST AP/PA (1 VIEW) (CPT=71045) Result Date: 5/28/2025  FINDINGS/IMPRESSION:    1. The heart mediastinal structures are enlarged.  Pulmonary vascularity is moderately increased.  There is a small to moderate-sized right-sided pleural effusion and a very small left pleural effusion.  The findings are compatible with moderate fluid overload and have not changed since previous exam.    2. Lung volumes are slightly diminished.  There is redemonstration of moderate size streaky opacities within the bilateral perihilar regions which could represent atelectasis, infiltrate, or a combination.    3. There is redemonstration of a right IJ Port-A-Cath with tip at the cavoatrial junction.    4. There are moderate degenerative changes within the thoracic spine.       Dictated by (CST): Kaleb Epps MD on 5/28/2025 at 5:09 PM     Finalized by (CST): Kaleb Epps MD on 5/28/2025 at 5:10 PM            Summary of Discussion      Family Meeting: Present was pt, her 3 daughters and 2 sons. We had a long discussion regarding pt's illness trajectory regarding her quickly progressing metastatic endometrial cancer and recent bad news about platinum resistance and unless there is significant change in her overall functional and nutritional status the option of further chemotherapy is not likely/ beneficial. \"The chemo would back her sicker in the state she is in\"  They state her gyn/oncologist Dr. Herring has recommended they meet with palliative care to discuss care preferences, GOC and POC moving forward. They all understand Kelli's caner is not curative and treatment was all aimed at disease control. We discussed several care paths moving forward including focusing on Kelli and her symptoms not her disease and this is best done with the support of hospice care. Kelli tells us she is not ready for hospice at present and wants to give a trial of restorative care despite knowing how hard this can/ will be with her progressive  cancer. She wants to try to regain her strength and if she is not able to she knows hospice is likely the next path. Her family wants to support these goals and they wish to do this through a [facility] and tell me they have chosen [facility]. They also agree to [facility] referral. I talked with them about the concept of \" hoping for the best but planning for all the rest \" We need to have a plan A ( what we want/ hope for but a back up plan if our body does not cooperate and take us in a different direction). They all verbalized understanding. We discussed resuscitation status. See  below and pt is now DNAR/ selective       Advance Care Planning counseling and discussion:  HC POA Documentation Daughter  Reports completion but not in Epic. Healthcare Agent's Name: Theresa Fisher, which is pt's Daughter. She brought in copy and this was sent for scanning   Primary HCPOA: Theresa Fisher at [phone]  Successor#1: Jp Fisher at [phone]  -CODE STATUS  POLST FORM Completed--Document signed and will be scanned  Code Status: DNAR/Select    Advance Care Planning   A voluntarily discussion and explanation regarding Advance Care Planning (ACP) took place today with patient and children. We discussed the risks vs benefits of life sustaining treatments in the setting of Kelli Fisher's comorbid medical conditions. Items addressed: patient preferences , code status , Health Care Power of  (HCPOA) , POLST (by section in detail), change in health status , end-of-life care plan, and general prognosis . This resulted in a better understanding of pt's expressed wishes/preferences , change in code status, completion of POLST, family discussions to continue privately , and family to bring in medical ACP forms from home to be verified and scanned.   Summary:    Total time dedicated to ACP >16 minutes.       Assessment and Recommendation     Palliative Care encounter    Counseling regarding goals of care an advance care  planning     Metastatic/Stage IV clear-cell endometrial adenocarcinoma- platinum resistant/ progressive (w/  lung mets)      Acute respiratory failure with hypoxia (HCC)   Pericardial Effusion w/ tamponade  ( malignant )  s/p  pericardial window/ drain 5/21     Bilateral pleural effusion R > L  s/p R thoracentesis     Azotemia    Acute on chronic anemia     Pancytopenia (HCC)- chemo related     Hyperglycemia    Atrial fibrillation with RVR (HCC)    Bilateral PE and RLE-DVT s/p IVC filter  ow on Eliquis     Malnutrition (HCC)- moderate    Generalized weakness     Dyspnea    Anorexia     GOALS OF CARE: At present continue supportive medical treatment for acute medical issues as they arise during this admission and restorative goals with  ongoing discussions regarding GOC & POC moving forward    Code Status: Full Code ---> DNAR / Select   POLST form completed and sent for scanning   Healthcare Agent's Name: Pt has completed HCPOA and daughter brought in and sent for scanning   Primary HCPOA: Theresa Fisher at [phone]  Successor#1: Jp Fisher at [phone]     SYMPTOMS:   Dyspnea: Continue Morphine 2.5 mg po TID prn . Now  much improved after R thoracentesis yesterday. Family/ pt  would consider PleuRx cath if indicated   Dry Mouth/ Xerostomia: Kelli does not like Biotene rinse . Daughter brought in Biotene  paste  5/29: Discussed  products outside hospital like Biotene paste, gum and lozenges, Also sippy method- keeping  water fluids near by and reachable. Also at home can make salt/ baking soda/ water daily mixture swish and spit. Keep in bathroom and use each time go in after wash hands or prior to meals   Anorexia: Will continue to monitor; Provided a lot of pt/ family education; Pt is trying.  Encouraging family bring in pr preference; Liberalize  diet.  5/29 talked about not a lot of good meds to help with this : Megace off table due to clot risk ; Remeron at hs can consider/ trail at 7.5 mg / concern  daytime sleepiness w/ little benefit. Can consider Marinol 2.5 mg po BID and monitor SE. Lots of discussion about being burden of disease     Discussed today's visit with  Family, RN, and Care team, [facility]    Palliative Care Follow Up: Palliative care team will Continue to follow while inpatient.  Palliative care follow up outpatient: Community palliative care ordered.    Thank you for allowing Palliative Care services to participate in the care of Kelli Fisher.    A total of 50 minutes were spent on this consult, which included all of the following: chart review, direct face to face contact, history taking, physical examination, counseling and coordinating care, and documentation.     Pat Dorantes, APRN  5/30/2025  Palliative Care Services at [facility] :  extension [phone]   or [phone]         [1]   Allergies  Allergen Reactions    Aspirin Tightness in Throat    Penicillins HIVES    Other OTHER (SEE COMMENTS)     Pt states IV steroid allergy, states it makes her breathing worse    [2]   Current Facility-Administered Medications:     acetaminophen (Ofirmev) 10 mg/mL infusion premix 1,000 mg, 1,000 mg, Intravenous, Q4H PRN    potassium chloride (Klor-Con M20) tab 40 mEq, 40 mEq, Oral, Q4H    morphINE 10 MG/5ML oral solution 2.5 mg, 2.5 mg, Oral, TID PRN    amiodarone (Pacerone) tab 200 mg, 200 mg, Oral, Daily    furosemide (Lasix) 10 mg/mL injection 40 mg, 40 mg, Intravenous, BID (Diuretic)    sodium chloride (Saline Mist) 0.65 % nasal solution 1 spray, 1 spray, Each Nare, Q3H PRN    pantoprazole (Protonix) DR tab 40 mg, 40 mg, Oral, QAM AC    acetaminophen (Tylenol Extra Strength) tab 1,000 mg, 1,000 mg, Oral, Q6H PRN    senna (Senokot) 8.8 MG/5ML oral syrup 17.6 mg, 10 mL, Per NG Tube, Daily PRN    docusate (Colace) 50 MG/5ML oral solution 100 mg, 100 mg, Per NG Tube, BID PRN    midodrine (ProAmatine) tab 10 mg, 10 mg, Oral, TID    [Held by provider] apixaban (Eliquis) tab 5 mg, 5 mg,  Oral, BID    traMADol (Ultram) tab 50 mg, 50 mg, Oral, Q6H PRN    ondansetron (Zofran) 4 MG/2ML injection 4 mg, 4 mg, Intravenous, Q6H PRN    metoclopramide (Reglan) 5 mg/mL injection 5 mg, 5 mg, Intravenous, Q8H PRN    morphINE PF 2 MG/ML injection 1 mg, 1 mg, Intravenous, Q2H PRN    polyethylene glycol (PEG 3350) (Miralax) 17 g oral packet 17 g, 17 g, Per NG Tube, Daily PRN    insulin aspart (NovoLOG) 100 Units/mL FlexPen 1-5 Units, 1-5 Units, Subcutaneous, TID CC and HS    glucose (Dex4) 15 GM/59ML oral liquid 15 g, 15 g, Oral, Q15 Min PRN **OR** glucose (Glutose) 40% oral gel 15 g, 15 g, Oral, Q15 Min PRN **OR** glucose-vitamin C (Dex-4) chewable tab 4 tablet, 4 tablet, Oral, Q15 Min PRN **OR** dextrose 50% injection 50 mL, 50 mL, Intravenous, Q15 Min PRN **OR** glucose (Dex4) 15 GM/59ML oral liquid 30 g, 30 g, Oral, Q15 Min PRN **OR** glucose (Glutose) 40% oral gel 30 g, 30 g, Oral, Q15 Min PRN **OR** glucose-vitamin C (Dex-4) chewable tab 8 tablet, 8 tablet, Oral, Q15 Min PRN    ipratropium-albuterol (Duoneb) 0.5-2.5 (3) MG/3ML inhalation solution 3 mL, 3 mL, Nebulization, Q6H PRN    levothyroxine (Synthroid) tab 100 mcg, 100 mcg, Oral, Before breakfast

## 2025-05-30 NOTE — RESPIRATORY THERAPY NOTE
Patient refused ezpap. She said she would do it around lunchtime.  Educated pt on importance of therapy.

## 2025-05-30 NOTE — PROGRESS NOTES
[facility]  part of [facility]    Progress Note      Assessment and Plan:   1.  Acute on chronic respiratory failure-multifactorial with significant burden of tumor in the chest but now recognized to have small volume of bilateral subsegmental PEs.  The patient has low platelets and is a poor candidate for full anticoagulation at this moment.  Lower extremity venous ultrasound demonstrated acute appearing nonocclusive DVT in the right superficial femoral popliteal and posterior tibial veins.  She got the IVC filter.    The patient underwent pericardial window and both chest tubes are removed.  The pericardial fluid is consistent with malignancy.  The patient underwent large-volume thoracentesis with a liter of bloody fluid.  She is breathing better.  The chest x-ray looked good post procedure.  There was no malignancy identified on the pleural fluid.    Recommendations:  1.  Okay to floor.  2.  Lasix  3.  Morning chest x-ray  4.  Discharge planning.      2.  Metastatic endometrial carcinoma-to follow-up gynecologic oncology.    3.  Malignant pericardial qsgcxyol-rogbzo-sf gynecologic oncology.  Now status post pericardial window.    Recommendations: As per cardiology.    4.  Recurrent episodes of atrial tachycardia-on amiodarone.         Subjective:   Kelli Fisher is a(n) 70 year old female who is breathing improved status post    Objective:   Blood pressure 94/64, pulse 84, temperature 97.7 °F (36.5 °C), temperature source Axillary, resp. rate 20, height 5' (1.524 m), weight 175 lb (79.4 kg), SpO2 100%.    Physical Exam alert white female  HEENT examination is unremarkable with pupils equal round and reactive to light and accommodation.   Neck without adenopathy, thyromegaly, JVD nor bruit.   Lungs diminished bilaterally to auscultation and percussion.  Cardiac regular rate and rhythm no murmur.   Abdomen nontender, without hepatosplenomegaly and no mass appreciable.   Extremities without  clubbing cyanosis nor edema.   Neurologic grossly intact with symmetric tone and strength and reflex.  Skin without gross abnormality     Results:     Lab Results   Component Value Date    WBC 11.0 05/30/2025    HGB 7.5 05/30/2025    HCT 23.7 05/30/2025    .0 05/30/2025    CREATSERUM 1.05 05/30/2025    BUN 17 05/30/2025     05/30/2025    K 3.4 05/30/2025     05/30/2025    CO2 37.0 05/30/2025     05/30/2025    CA 8.2 05/30/2025      CT scan of the chest-Positive for small volume bilateral segmental PE      Enlarging left upper lobe consolidation and few bilateral pulmonary nodules      Enlarging pericardial effusion now 1.7 centimeter thick.  Increasing left pleural effusion, new right pleural effusion     Srinath Levy MD  Medical Director, Critical Care, [facility]  Medical Director, [facility]  Pager: [phone]

## 2025-05-30 NOTE — PAYOR COMM NOTE
--------------  CONTINUED STAY REVIEW    Payor: [employer] MEDICARE ADV PPO  Subscriber #:  [id number]  Authorization Number: [id number]    Admit date: 5/12/25  Admit time:  5:28 PM      5/30/25  CARDIOLOGY       Subjective:  Pt denies SOB; did note palpitations last night when in afib.      BP (!) 85/57 (BP Location: Right arm)   Pulse 85   Temp (!) 95.1 °F (35.1 °C) (Axillary)   Resp 22   Ht 5' (1.524 m)   Wt 175 lb (79.4 kg)   SpO2 100%   BMI 34.18 kg/m²      Telemetry: NSR, 's-140's overnight      Lab 05/28/25  0600 05/29/25  0542 05/30/25  0456   * 138* 116*   BUN 18 19 17   CREATSERUM 1.04* 1.09* 1.05*   EGFRCR 58* 55* 58*   CA 8.7 8.7 8.2*   * 148* 145   K 4.6 4.0 3.4*    106 103   CO2 31.0 33.0* 37.0*      Lab 05/28/25  0600 05/29/25  0542 05/29/25  1314 05/30/25  0456   RBC 2.68* 2.49*  --  2.78*   HGB 7.1* 6.5* 7.7* 7.5*   HCT 23.1* 21.1* 24.7* 23.7*   MCV 86.2 84.7  --  85.3   MCH 26.5 26.1  --  27.0   MCHC 30.7* 30.8*  --  31.6   RDW 21.4* 21.9*  --  20.1*   NEPRELIM 12.13* 11.08*  --  8.10*   WBC 15.4* 14.9*  --  11.0   .0 271.0  --  242.0      XR CHEST AP PORTABLE  (CPT=71045)  Result Date: 5/29/2025  CONCLUSION:   Post right-sided thoracentesis with small residual right pleural effusion.  No pneumothorax.  Moderate interstitial edema, unchanged.  Patchy left midlung airspace opacity is unchanged suggestive of atelectasis or pneumonia   Dictated by (CST): Jaime Gan MD on 5/29/2025 at 5:21 PM     Finalized by (CST): Jaime Gan MD on 5/29/2025 at 5:22 PM           US THORACENTESIS GUIDED RIGHT (CPT=32555)  Result Date: 5/29/2025  CONCLUSION: Moderate right pleural effusion.  Thoracentesis attempted.    Dictated by (CST): Jaime Gan MD on 5/29/2025 at 5:12 PM     Finalized by (CST): Jaime Gan MD on 5/29/2025 at 5:13 PM           Gen: alert, oriented x 3  Heent: pupils equal, reactive. Mucous membranes moist.   Neck: no jvd  Cardiac: regular rate and rhythm,  normal S1,S2, no murmur, clicks, rub or gallop  Lungs: diminished  Abd: soft, NT/ND +bs  Ext: generalized edema, BLE 2+ edema  Skin: Warm, dry  Neuro: No focal deficits     [Scheduled Medications]   potassium chloride  40 mEq Oral Q4H    amiodarone  200 mg Oral Daily    [Held by provider] furosemide  40 mg Intravenous BID (Diuretic)    pantoprazole  40 mg Oral QAM AC    midodrine  10 mg Oral TID    [Held by provider] apixaban  5 mg Oral BID    insulin aspart  1-5 Units Subcutaneous TID CC and HS    levothyroxine  100 mcg Oral Before breakfast      Assessment:  Acute Hypoxic Respiratory Failure - Multifactorial (Lung Mets, PE, Pleural Effusions, Volume Overload)  On 5L HFNC  Pulm following  Bilateral Pleural Effusion, R >L  5/29 S/p R thoracentesis - 900ML bloody fluid  Bilateral PE/DVT s/p IVC filter  5/13 CTA Chest w/small volume acute bilateral segmental PE  5/14 US + RLE acute non-occlusive DVT; neg for LLE DVT  Initially unable to tolerate A/C d/t thrombocytopenia; now eliquis on hold d/t anemia   Acute HFpEF  proBNP 1110 on admit, 1826 on 5/29   Echo LVEF 65-70%, bilateral pleural effusion on CXR  Diuresis w/IV Lasix - dose held this am d/t hypotension  Net - 5.5L, I&O incomplete, wts uptrending  Appears volume overloaded on exam  Large Pericardial Effusion w/Tamponade s/p Urgent Pericardial Window, R Pleural Chest Tube  Hx Known pericardial effusion last admission - previously small - moderate  5/13 Echo w/moderate effusion; no evidence of HD compromise  5/20 Repeat echo w/large pericardial effusion; evidence of RV collapse, dilated IVC  5/21 S/P Urgent Pericardial Window w/CV surgery  Fluid + for malignancy  5/23 Removed Pleural Chest Tube  5/24 Removed Nathaniel drain   5/25 Repeat echo w/o pericardial effusion  Paroxysmal Atrial Tachycardia/Atrial Fibrillation  Hx PSVT on recent admission w/junctional rhythm noted while on BB  Intermittent PAF RVR post pericardial window, and overnight - now back to NSR s/p IV  digoxin x1  S/P amio load; now on 200mg po daily   TSH WNL  [id number] - Eliquis 5mg po BID; held d/t anemia  Hypotension  Initially w/shock, requiring vasopressor support  Now BP improved on midodrine  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 Continue po amio. Previously had junctional rhythms with BB so will avoid at this time  EP to see for further recommendations   Strict I&O, daily weights (standing if possible), daily BMP                       MEDICATIONS ADMINISTERED IN LAST 1 DAY:  acetaminophen (Ofirmev) 10 mg/mL infusion premix 1,000 mg       Date Action Dose Route User    5/30/2025 0345 New Bag 1,000 mg Intravenous Lilia Dillon RN          amiodarone (Pacerone) tab 200 mg       Date Action Dose Route User    5/30/2025 1001 Given 200 mg Oral Aisha Zimmerman RN          digoxin (Lanoxin) 250 MCG/ML injection 250 mcg       Date Action Dose Route User    5/30/2025 0309 Given 250 mcg Intravenous Lilia Dillon RN          furosemide (Lasix) 10 mg/mL injection 40 mg       Date Action Dose Route User    5/29/2025 1827 Given 40 mg Intravenous Lucrecia Raya RN          furosemide (Lasix) 10 mg/mL injection 40 mg       Date Action Dose Route User    5/30/2025 1231 Given 40 mg Intravenous Aisha Zimmerman RN          levothyroxine (Synthroid) tab 100 mcg       Date Action Dose Route User    5/30/2025 0621 Given 100 mcg Oral Lilia Dillon RN           midodrine (ProAmatine) tab 10 mg       Date Action Dose Route User    5/30/2025 1136 Given 10 mg Oral Aisha Zimmerman RN    5/30/2025 0621 Given 10 mg Oral Lilia Dillon RN    5/29/2025 1827 Given 10 mg Oral Lucrecia Raya RN          pantoprazole (Protonix) DR tab 40 mg       Date Action Dose Route User    5/30/2025 0621 Given 40 mg Oral Lilia Dillon RN          potassium chloride (Klor-Con M20) tab 40 mEq       Date Action Dose Route User    5/30/2025 1434 Given 40 mEq Oral Aisha Zimmerman RN    5/30/2025 1001 Given 40 mEq Oral Aisha Zimmerman RN            Vitals (last day)       Date/Time Temp Pulse Resp BP SpO2 Weight O2 Device O2 Flow Rate (L/min) [facility]    05/30/25 1135 97.7 °F (36.5 °C) 84 20 94/64 100 % -- High flow nasal cannula 5 L/min     05/30/25 0942 95.1 °F (35.1 °C) 85 22 85/57 99 % -- High flow nasal cannula 4 L/min     05/30/25 0406 97.7 °F (36.5 °C) 116 20 104/69 100 % -- High flow nasal cannula 4 L/min     05/30/25 0406 -- -- -- -- -- 175 lb (79.4 kg) -- -- BA    05/30/25 0309 -- 135 -- -- -- -- -- --     05/30/25 0233 -- 132 -- -- -- -- -- -- MO    05/30/25 0102 97.7 °F (36.5 °C) 96 20 98/61 100 % -- High flow nasal cannula 4 L/min     05/29/25 2029 97.5 °F (36.4 °C) 86 20 95/64 100 % -- High flow nasal cannula 4 L/min     05/29/25 1715 97.3 °F (36.3 °C) 88 20 97/65 100 % -- High flow nasal cannula 4 L/min     05/29/25 1700 97.3 °F (36.3 °C) 95 22 108/72 98 % -- High flow nasal cannula 6 L/min SM    05/29/25 1157 97.6 °F (36.4 °C) 86 20 116/74 100 % -- -- -- BL    05/29/25 1057 97.3 °F (36.3 °C) 90 22 101/72 100 % -- -- --     05/29/25 1013 97.3 °F (36.3 °C) 90 22 98/67 100 % -- -- --     05/29/25 0016 97.4 °F (36.3 °C) 99 20 128/85 96 % -- High flow nasal cannula 6 L/min AB       Blood Transfusion Record       Product Unit Status Volume Start End            Transfuse RBC       25  [id number]  [id number] Stopped 292 mL 05/29/25 0957 05/29/25 1157        25  [id number]  [id number] Completed 05/22/25 0708 411.25 mL 05/21/25 1208 05/21/25 1500       25  [id number]  [id number] Completed 05/13/25 1431 335 mL 05/13/25 1144 05/13/25 1428                Transfuse platelets       25  [id number]  [id number] Completed 05/14/25 1319 200 mL 05/14/25 1116 05/14/25 1316

## 2025-05-30 NOTE — PROGRESS NOTES
[facility]  part of [facility]    Progress Note    Kelli Fisher Patient Status:  Inpatient    1955 MRN [id number]   Location [facility]5W Attending Lina Dueñas, DO   Hosp Day # 18 PCP Taylor Gallardo MD     Chief complaint sob     Subjective:   Kelli Fisher is a(n) 70 year old female Pt sitting in chair today. Sob better after thoracentesis with 900 out.     Discussed palliative care consult and pt wants to continue all treatment       ROS:   sob   No c/d   No n/v     Objective:     Blood pressure (!) 88/67, pulse 89, temperature 97.6 °F (36.4 °C), temperature source Axillary, resp. rate 18, height 5' (1.524 m), weight 175 lb (79.4 kg), SpO2 94%.      Intake/Output Summary (Last 24 hours) at 2025 1744  Last data filed at 2025 0854  Gross per 24 hour   Intake 500 ml   Output 1750 ml   Net -1250 ml       Patient Weight(s) for the past 336 hrs:   Weight   25 0406 175 lb (79.4 kg)   25 0514 175 lb 8 oz (79.6 kg)   25 0625 171 lb 11.8 oz (77.9 kg)   25 0600 169 lb 12.1 oz (77 kg)   25 0500 171 lb 15.3 oz (78 kg)   25 0500 167 lb 8.8 oz (76 kg)   25 0600 168 lb 14 oz (76.6 kg)   25 0341 167 lb (75.8 kg)   25 0611 164 lb 10.9 oz (74.7 kg)           General appearance: alert, appears stated age and cooperative  Pulmonary: cta improved   Cardiovascular: S1, S2 normal, no murmur, click, rub or gallop, regular rate and rhythm  Abdominal: soft, non-tender; bowel sounds normal; no masses,  no organomegaly  Extremities: b/l swelling         Medicines:     Current Hospital Medications[1]                Blood Pressure and Cardiac Medications            amiodarone 200 MG Oral Tab            Medication Infusions[2]        Lab Results   Component Value Date    WBC 11.0 2025    HGB 7.5 (L) 2025    HCT 23.7 (L) 2025    .0 2025    CREATSERUM 1.05 (H) 2025    BUN 17 2025     2025     K 3.4 (L) 05/30/2025     05/30/2025    CO2 37.0 (H) 05/30/2025     (H) 05/30/2025    CA 8.2 (L) 05/30/2025    ALB 3.2 05/12/2025    ALKPHO 142 05/12/2025    BILT 0.7 05/12/2025    TP 6.8 05/12/2025    AST 26 05/12/2025    ALT 10 05/12/2025    PTT 37.0 (H) 05/21/2025    INR 1.31 (H) 05/22/2025    TSH 3.705 05/02/2025    LIP 42 12/11/2023    DDIMER 3.05 (H) 05/13/2025    MG 2.2 05/19/2025    B12 >2,000 (H) 05/02/2025       XR CHEST AP PORTABLE  (CPT=71045)  Result Date: 5/29/2025  CONCLUSION:   Post right-sided thoracentesis with small residual right pleural effusion.  No pneumothorax.  Moderate interstitial edema, unchanged.  Patchy left midlung airspace opacity is unchanged suggestive of atelectasis or pneumonia   Dictated by (CST): Jaime Gan MD on 5/29/2025 at 5:21 PM     Finalized by (CST): Jaime Gan MD on 5/29/2025 at 5:22 PM          US THORACENTESIS GUIDED RIGHT (CPT=32555)  Result Date: 5/29/2025  CONCLUSION: Moderate right pleural effusion.  Thoracentesis attempted.    Dictated by (CST): Jaime Gan MD on 5/29/2025 at 5:12 PM     Finalized by (CST): Jaime Gan MD on 5/29/2025 at 5:13 PM          XR CHEST AP PORTABLE  (CPT=71045)  Result Date: 5/29/2025  CONCLUSION:  1. Borderline cardiomegaly prominent central vasculature. 2. Bilateral perihilar lower lobe mixed alveolar and interstitial multifocal airspace opacification with bibasilar subpulmonic effusions, worse on the right.  Slight progression right side.    Dictated by (CST): Marlon Harp MD on 5/29/2025 at 9:34 AM     Finalized by (CST): Marlon Harp MD on 5/29/2025 at 9:35 AM                Results:     CBC:    Lab Results   Component Value Date    WBC 11.0 05/30/2025    WBC 14.9 (H) 05/29/2025    WBC 15.4 (H) 05/28/2025     Lab Results   Component Value Date    HGB 7.5 (L) 05/30/2025    HGB 7.7 (L) 05/29/2025    HGB 6.5 (LL) 05/29/2025      Lab Results   Component Value Date    .0 05/30/2025    .0  05/29/2025    .0 05/28/2025       Recent Labs   Lab 05/28/25  0600 05/29/25  0542 05/30/25  0456   * 138* 116*   BUN 18 19 17   CREATSERUM 1.04* 1.09* 1.05*   CA 8.7 8.7 8.2*   * 148* 145   K 4.6 4.0 3.4*    106 103   CO2 31.0 33.0* 37.0*           Assessment and Plan:      Afib RVR  Pericardial effusion  - sp pericardial window 5/21, chest tube out 5/23 ; fluid positive for malignancy   - cardiology consulted  - IV amio --> now on oral. Avoiding BB, CCB with h/o junctional rhythm   - cont monitor on tele  - on 5/16 experienced RVR again with hypotension, received digoxin converted to NSR. Remained  hypotensive so sent to stepdown unit. Responded to fluid bolus and required jewel   - Transferred back to medical floor, normotensive rates controlled.  -5/30 - bp dropped overnight and lasix held. Received dig x 1 for rvr. Better today  - CXR with b/l effusions - sp 900 removed by thora. Apprec pulm - > dc planning   - repeat ECHO showing large pericardial effusion ; f/u echo with no pericardial effusion   Limited ECHO 5/25: normal left ventricle, EF 65-70%  -SOB and CXR with Bilateral Pleural effusion -> IV lasix today 40 bid monitor output. Wts up since admit     BL PE: acute small segmental/right lower extremity DVT  S/p IVC 5/14/2025  Was not on a/c due to thrombocytopenia  Now on eliquis but will hold for possible thora -> will hold indefinitely given bleeding risk per cards         Metastatic endometrial cancer    Pancytopenia  Acute on chronic anemia of chronic disease  - s/p 2 units prbcs improved today  - neutropenic precautions  - started neupogen until ANC 1500  - Plan is for further chemotherapy once recovered clinically per Dr. Herring.  - received 6 days of iv cefepime now off   - cefepime and vanco started for neutropenia and patient with cough/chills, elevated LA, possible early sepsis now off. Wbc 15. Afebrile. On 5 L    -Pancytopenia improving, monitor daily  - Overall plan of  care is to continue to treat medically over the next several days and monitor for improvement.    - discussed with pt and daughter today and they are comfortable with palliative care consult for information . Explained it is meant for more comfort care then treatment of the cancer   - apprec pall care consult - discussed with Pat and pt not ready for hospice.. 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 Cont lasix.   -sp thora as above       Thrush  - nystatin    Malnutrition - pt eating more today. Monitor over weekend. May need tube feeds      Deconditioning  PT OT      Dispo: PT recommending EDUARDO, patient to discuss with family. Have previously refused    Pt lives by herself, continue to be SOB with min exertion, now fluid overload, will benefit from EDUARDO   .      MDM: High   I personally spent time on chart/note review, review of labs/imaging, discussion with patient, physical exam, discussion with staff, consultants, coordinating care, writing progress note, and discussion of plan of care.              Lina Dueñas, DO              Supplementary Documentation:   DVT Mechanical Prophylaxis: MACHELLE hose, SCDs, Early ambuation  DVT Pharmacologic Prophylaxis   Medication   None                Code Status: DNAR/Selective Treatment  Rogel: External urinary catheter in place  Rogel Duration (in days):   Central line:    JOSE:                             [1]   Current Facility-Administered Medications   Medication Dose Route Frequency    acetaminophen (Ofirmev) 10 mg/mL infusion premix 1,000 mg  1,000 mg Intravenous Q4H PRN    furosemide (Lasix) 10 mg/mL injection 40 mg  40 mg Intravenous BID (Diuretic)    morphINE 10 MG/5ML  oral solution 2.5 mg  2.5 mg Oral TID PRN    amiodarone (Pacerone) tab 200 mg  200 mg Oral Daily    sodium chloride (Saline Mist) 0.65 % nasal solution 1 spray  1 spray Each Nare Q3H PRN    pantoprazole (Protonix) DR tab 40 mg  40 mg Oral QAM AC    acetaminophen (Tylenol Extra Strength) tab 1,000 mg  1,000 mg Oral Q6H PRN    senna (Senokot) 8.8 MG/5ML oral syrup 17.6 mg  10 mL Per NG Tube Daily PRN    docusate (Colace) 50 MG/5ML oral solution 100 mg  100 mg Per NG Tube BID PRN    midodrine (ProAmatine) tab 10 mg  10 mg Oral TID    traMADol (Ultram) tab 50 mg  50 mg Oral Q6H PRN    ondansetron (Zofran) 4 MG/2ML injection 4 mg  4 mg Intravenous Q6H PRN    metoclopramide (Reglan) 5 mg/mL injection 5 mg  5 mg Intravenous Q8H PRN    morphINE PF 2 MG/ML injection 1 mg  1 mg Intravenous Q2H PRN    polyethylene glycol (PEG 3350) (Miralax) 17 g oral packet 17 g  17 g Per NG Tube Daily PRN    insulin aspart (NovoLOG) 100 Units/mL FlexPen 1-5 Units  1-5 Units Subcutaneous TID CC and HS    glucose (Dex4) 15 GM/59ML oral liquid 15 g  15 g Oral Q15 Min PRN    Or    glucose (Glutose) 40% oral gel 15 g  15 g Oral Q15 Min PRN    Or    glucose-vitamin C (Dex-4) chewable tab 4 tablet  4 tablet Oral Q15 Min PRN    Or    dextrose 50% injection 50 mL  50 mL Intravenous Q15 Min PRN    Or    glucose (Dex4) 15 GM/59ML oral liquid 30 g  30 g Oral Q15 Min PRN    Or    glucose (Glutose) 40% oral gel 30 g  30 g Oral Q15 Min PRN    Or    glucose-vitamin C (Dex-4) chewable tab 8 tablet  8 tablet Oral Q15 Min PRN    ipratropium-albuterol (Duoneb) 0.5-2.5 (3) MG/3ML inhalation solution 3 mL  3 mL Nebulization Q6H PRN    levothyroxine (Synthroid) tab 100 mcg  100 mcg Oral Before breakfast   [2]

## 2025-05-31 ENCOUNTER — APPOINTMENT (OUTPATIENT)
Dept: GENERAL RADIOLOGY | Facility: HOSPITAL | Age: 70
DRG: 270 | End: 2025-05-31
Attending: INTERNAL MEDICINE
Payer: MEDICARE

## 2025-05-31 PROBLEM — I26.99 ACUTE PULMONARY EMBOLISM (HCC): Status: ACTIVE | Noted: 2025-05-31

## 2025-05-31 PROBLEM — Z51.5 PALLIATIVE CARE BY SPECIALIST: Status: ACTIVE | Noted: 2025-05-31

## 2025-05-31 PROBLEM — R06.00 DYSPNEA AND RESPIRATORY ABNORMALITIES: Status: ACTIVE | Noted: 2025-05-31

## 2025-05-31 PROBLEM — R06.89 DYSPNEA AND RESPIRATORY ABNORMALITIES: Status: ACTIVE | Noted: 2025-05-31

## 2025-05-31 PROBLEM — J90 PLEURAL EFFUSION, BILATERAL: Status: ACTIVE | Noted: 2025-05-31

## 2025-05-31 PROBLEM — R06.89 DYSPNEA AND RESPIRATORY ABNORMALITIES: Status: ACTIVE | Noted: 2025-01-01

## 2025-05-31 PROBLEM — C78.00 MALIGNANT NEOPLASM METASTATIC TO LUNG (HCC): Status: ACTIVE | Noted: 2025-05-31

## 2025-05-31 PROBLEM — R06.00 DYSPNEA AND RESPIRATORY ABNORMALITIES: Status: ACTIVE | Noted: 2025-01-01

## 2025-05-31 PROBLEM — J90 PLEURAL EFFUSION, BILATERAL: Status: ACTIVE | Noted: 2025-01-01

## 2025-05-31 PROBLEM — R53.1 GENERALIZED WEAKNESS: Status: ACTIVE | Noted: 2025-01-01

## 2025-05-31 PROBLEM — Z51.5 PALLIATIVE CARE BY SPECIALIST: Status: ACTIVE | Noted: 2025-01-01

## 2025-05-31 PROBLEM — I26.99 ACUTE PULMONARY EMBOLISM (HCC): Status: ACTIVE | Noted: 2025-01-01

## 2025-05-31 PROBLEM — R53.1 GENERALIZED WEAKNESS: Status: ACTIVE | Noted: 2025-05-31

## 2025-05-31 PROBLEM — C78.00 MALIGNANT NEOPLASM METASTATIC TO LUNG (HCC): Status: ACTIVE | Noted: 2025-01-01

## 2025-05-31 PROBLEM — I31.31 MALIGNANT PERICARDIAL EFFUSION (HCC): Status: ACTIVE | Noted: 2025-01-01

## 2025-05-31 PROBLEM — I31.31 MALIGNANT PERICARDIAL EFFUSION (HCC): Status: ACTIVE | Noted: 2025-05-31

## 2025-05-31 LAB
ANION GAP SERPL CALC-SCNC: 5 MMOL/L (ref 0–18)
BASOPHILS # BLD AUTO: 0.05 X10(3) UL (ref 0–0.2)
BASOPHILS NFR BLD AUTO: 0.5 %
BUN BLD-MCNC: 17 MG/DL (ref 9–23)
BUN/CREAT SERPL: 16.3 (ref 10–20)
CALCIUM BLD-MCNC: 8.2 MG/DL (ref 8.7–10.4)
CHLORIDE SERPL-SCNC: 105 MMOL/L (ref 98–112)
CO2 SERPL-SCNC: 37 MMOL/L (ref 21–32)
CREAT BLD-MCNC: 1.04 MG/DL (ref 0.55–1.02)
DEPRECATED RDW RBC AUTO: 65.8 FL (ref 35.1–46.3)
EGFRCR SERPLBLD CKD-EPI 2021: 58 ML/MIN/1.73M2 (ref 60–?)
EOSINOPHIL # BLD AUTO: 0.25 X10(3) UL (ref 0–0.7)
EOSINOPHIL NFR BLD AUTO: 2.4 %
ERYTHROCYTE [DISTWIDTH] IN BLOOD BY AUTOMATED COUNT: 20.5 % (ref 11–15)
GLUCOSE BLD-MCNC: 117 MG/DL (ref 70–99)
GLUCOSE BLDC GLUCOMTR-MCNC: 130 MG/DL (ref 70–99)
GLUCOSE BLDC GLUCOMTR-MCNC: 142 MG/DL (ref 70–99)
GLUCOSE BLDC GLUCOMTR-MCNC: 158 MG/DL (ref 70–99)
GLUCOSE BLDC GLUCOMTR-MCNC: 159 MG/DL (ref 70–99)
HCT VFR BLD AUTO: 23.7 % (ref 35–48)
HGB BLD-MCNC: 7.5 G/DL (ref 12–16)
IMM GRANULOCYTES # BLD AUTO: 0.28 X10(3) UL (ref 0–1)
IMM GRANULOCYTES NFR BLD: 2.6 %
LYMPHOCYTES # BLD AUTO: 1.17 X10(3) UL (ref 1–4)
LYMPHOCYTES NFR BLD AUTO: 11 %
MCH RBC QN AUTO: 28.1 PG (ref 26–34)
MCHC RBC AUTO-ENTMCNC: 31.6 G/DL (ref 31–37)
MCV RBC AUTO: 88.8 FL (ref 80–100)
MONOCYTES # BLD AUTO: 1.65 X10(3) UL (ref 0.1–1)
MONOCYTES NFR BLD AUTO: 15.6 %
NEUTROPHILS # BLD AUTO: 7.19 X10 (3) UL (ref 1.5–7.7)
NEUTROPHILS # BLD AUTO: 7.19 X10(3) UL (ref 1.5–7.7)
NEUTROPHILS NFR BLD AUTO: 67.9 %
OSMOLALITY SERPL CALC.SUM OF ELEC: 307 MOSM/KG (ref 275–295)
PLATELET # BLD AUTO: 256 10(3)UL (ref 150–450)
POTASSIUM SERPL-SCNC: 4 MMOL/L (ref 3.5–5.1)
RBC # BLD AUTO: 2.67 X10(6)UL (ref 3.8–5.3)
SODIUM SERPL-SCNC: 147 MMOL/L (ref 136–145)
WBC # BLD AUTO: 10.6 X10(3) UL (ref 4–11)

## 2025-05-31 PROCEDURE — 99232 SBSQ HOSP IP/OBS MODERATE 35: CPT | Performed by: INTERNAL MEDICINE

## 2025-05-31 PROCEDURE — 99233 SBSQ HOSP IP/OBS HIGH 50: CPT | Performed by: FAMILY MEDICINE

## 2025-05-31 PROCEDURE — 71045 X-RAY EXAM CHEST 1 VIEW: CPT | Performed by: INTERNAL MEDICINE

## 2025-05-31 NOTE — PROGRESS NOTES
[facility]  part of [facility]    Progress Note      Assessment and Plan:   1.  Acute on chronic respiratory failure-multifactorial with significant burden of tumor in the chest but now recognized to have small volume of bilateral subsegmental PEs.  The patient has low platelets and is a poor candidate for full anticoagulation at this moment.  Lower extremity venous ultrasound demonstrated acute appearing nonocclusive DVT in the right superficial femoral popliteal and posterior tibial veins.  She got the IVC filter.    The patient underwent pericardial window and both chest tubes are removed.  The pericardial fluid is consistent with malignancy.  The patient underwent large-volume thoracentesis with a liter of bloody fluid.  She is breathing better.  The chest x-ray looked good post procedure and there is only slight reappearance of effusion at this time on the morning x-ray.  There was no malignancy identified on the pleural fluid.    Recommendations:  1.  Anticipate home within the next day or 2.  2.  Lasix  3.  Morning chest x-ray  4.  Discharge planning.      2.  Metastatic endometrial carcinoma-to follow-up gynecologic oncology.    3.  Malignant pericardial [id number] gynecologic oncology.  Now status post pericardial window.    Recommendations: As per cardiology.    4.  Recurrent episodes of atrial tachycardia-on amiodarone.         Subjective:   Kelli Fisher is a(n) 70 year old female who is breathing improved status post thoracentesis    Objective:   Blood pressure 95/64, pulse 83, temperature 98 °F (36.7 °C), temperature source Axillary, resp. rate 18, height 5' (1.524 m), weight 175 lb (79.4 kg), SpO2 100%.    Physical Exam alert white female  HEENT examination is unremarkable with pupils equal round and reactive to light and accommodation.   Neck without adenopathy, thyromegaly, JVD nor bruit.   Lungs diminished bilaterally to auscultation and percussion.  Cardiac regular rate  and rhythm no murmur.   Abdomen nontender, without hepatosplenomegaly and no mass appreciable.   Extremities without clubbing cyanosis nor edema.   Neurologic grossly intact with symmetric tone and strength and reflex.  Skin without gross abnormality     Results:     Lab Results   Component Value Date    WBC 10.6 05/31/2025    HGB 7.5 05/31/2025    HCT 23.7 05/31/2025    .0 05/31/2025    CREATSERUM 1.04 05/31/2025    BUN 17 05/31/2025     05/31/2025    K 4.0 05/31/2025     05/31/2025    CO2 37.0 05/31/2025     05/31/2025    CA 8.2 05/31/2025      CT scan of the chest-Positive for small volume bilateral segmental PE      Enlarging left upper lobe consolidation and few bilateral pulmonary nodules      Enlarging pericardial effusion now 1.7 centimeter thick.  Increasing left pleural effusion, new right pleural effusion     Srinath Levy MD  Medical Director, Critical Care, [facility]  Medical Director, [facility]  Pager: [phone]

## 2025-05-31 NOTE — PROGRESS NOTES
[facility]  part of [facility]    Progress Note    Kelli Fisher Patient Status:  Inpatient    1955 MRN [id number]   Location [facility]5W Attending Lina Dueñas, DO   Hosp Day # 19 PCP Taylor Gallardo MD     Chief complaint sob     Subjective:   Kelli Fisher is a(n) 70 year old female Pt sitting in chair today. Sob better after thoracentesis with 900 out.     Discussed palliative care consult and pt wants to continue all treatment   Feeling fine today       ROS:   sob   No c/d   No n/v     Objective:     Blood pressure 91/68, pulse 84, temperature 98.2 °F (36.8 °C), temperature source Axillary, resp. rate 20, height 5' (1.524 m), weight 175 lb (79.4 kg), SpO2 98%.      Intake/Output Summary (Last 24 hours) at 2025 1344  Last data filed at 2025 1342  Gross per 24 hour   Intake 480 ml   Output 200 ml   Net 280 ml       Patient Weight(s) for the past 336 hrs:   Weight   25 0406 175 lb (79.4 kg)   25 0514 175 lb 8 oz (79.6 kg)   25 0625 171 lb 11.8 oz (77.9 kg)   25 0600 169 lb 12.1 oz (77 kg)   25 0500 171 lb 15.3 oz (78 kg)   25 0500 167 lb 8.8 oz (76 kg)   25 0600 168 lb 14 oz (76.6 kg)   25 0341 167 lb (75.8 kg)           General appearance: alert, appears stated age and cooperative  Pulmonary: cta improved   Cardiovascular: S1, S2 normal, no murmur, click, rub or gallop, regular rate and rhythm  Abdominal: soft, non-tender; bowel sounds normal; no masses,  no organomegaly  Extremities: b/l swelling         Medicines:     Current Hospital Medications[1]                Blood Pressure and Cardiac Medications            amiodarone 200 MG Oral Tab            Medication Infusions[2]        Lab Results   Component Value Date    WBC 10.6 2025    HGB 7.5 (L) 2025    HCT 23.7 (L) 2025    .0 2025    CREATSERUM 1.04 (H) 2025    BUN 17 2025     (H) 2025    K 4.0 2025    CL  105 05/31/2025    CO2 37.0 (H) 05/31/2025     (H) 05/31/2025    CA 8.2 (L) 05/31/2025    ALB 3.2 05/12/2025    ALKPHO 142 05/12/2025    BILT 0.7 05/12/2025    TP 6.8 05/12/2025    AST 26 05/12/2025    ALT 10 05/12/2025    PTT 37.0 (H) 05/21/2025    INR 1.31 (H) 05/22/2025    TSH 3.705 05/02/2025    LIP 42 12/11/2023    DDIMER 3.05 (H) 05/13/2025    MG 2.2 05/19/2025    B12 >2,000 (H) 05/02/2025       XR CHEST AP PORTABLE  (CPT=71045)  Result Date: 5/31/2025  CONCLUSION:   Stable to slightly increasing right pleural effusion.  Pulmonary edema is again seen.  Persistent patchy left mid lung airspace opacity, atelectasis versus pneumonia.    [facility]     Dictated by (CST): Portillo Ramirez MD on 5/31/2025 at 7:30 AM     Finalized by (CST): Portillo Ramirez MD on 5/31/2025 at 7:32 AM          XR CHEST AP PORTABLE  (CPT=71045)  Result Date: 5/29/2025  CONCLUSION:   Post right-sided thoracentesis with small residual right pleural effusion.  No pneumothorax.  Moderate interstitial edema, unchanged.  Patchy left midlung airspace opacity is unchanged suggestive of atelectasis or pneumonia   Dictated by (CST): Jaime Gan MD on 5/29/2025 at 5:21 PM     Finalized by (CST): Jaime Gan MD on 5/29/2025 at 5:22 PM          US THORACENTESIS GUIDED RIGHT (CPT=32555)  Result Date: 5/29/2025  CONCLUSION: Moderate right pleural effusion.  Thoracentesis attempted.    Dictated by (CST): Jaime Gan MD on 5/29/2025 at 5:12 PM     Finalized by (CST): Jaime Gan MD on 5/29/2025 at 5:13 PM                Results:     CBC:    Lab Results   Component Value Date    WBC 10.6 05/31/2025    WBC 11.0 05/30/2025    WBC 14.9 (H) 05/29/2025     Lab Results   Component Value Date    HGB 7.5 (L) 05/31/2025    HGB 7.5 (L) 05/30/2025    HGB 7.7 (L) 05/29/2025      Lab Results   Component Value Date    .0 05/31/2025    .0 05/30/2025    .0 05/29/2025       Recent Labs   Lab 05/29/25  0542 05/30/25  0456 05/30/25  1810  05/31/25  0617   * 116*  --  117*   BUN 19 17  --  17   CREATSERUM 1.09* 1.05*  --  1.04*   CA 8.7 8.2*  --  8.2*   * 145  --  147*   K 4.0 3.4* 3.9 4.0    103  --  105   CO2 33.0* 37.0*  --  37.0*           Assessment and Plan:      Afib RVR  Pericardial effusion  - sp pericardial window 5/21, chest tube out 5/23 ; fluid positive for malignancy   - cardiology consulted  - IV amio --> now on oral. Avoiding BB, CCB with h/o junctional rhythm   - cont monitor on tele  - on 5/16 experienced RVR again with hypotension, received digoxin converted to NSR. Remained  hypotensive so sent to stepdown unit. Responded to fluid bolus and required jewel   - Transferred back to medical floor, normotensive rates controlled.  -5/30 - bp dropped overnight and lasix held. Received dig x 1 for rvr. Better today  - CXR with b/l effusions - sp 900 removed by thora. Apprec pulm - > dc planning   - repeat ECHO showing large pericardial effusion ; f/u echo with no pericardial effusion   Limited ECHO 5/25: normal left ventricle, EF 65-70%  -SOB and CXR with Bilateral Pleural effusion -> IV lasix 40 bid monitor output. Wts up since admit     BL PE: acute small segmental/right lower extremity DVT  S/p IVC 5/14/2025  Was not on a/c due to thrombocytopenia  Now on eliquis but will hold for possible thora -> will hold indefinitely given bleeding risk per cards         Metastatic endometrial cancer    Pancytopenia  Acute on chronic anemia of chronic disease  - s/p 2 units prbcs improved today  - neutropenic precautions  - started neupogen until ANC 1500  - Plan is for further chemotherapy once recovered clinically per Dr. Herring.  - received 6 days of iv cefepime now off   - cefepime and vanco started for neutropenia and patient with cough/chills, elevated LA, possible early sepsis now off. Wbc 15. Afebrile. On 5 L    -Pancytopenia improving, monitor daily  - Overall plan of care is to continue to treat medically over the next  several days and monitor for improvement.    - discussed with pt and daughter today and they are comfortable with palliative care consult for information . Explained it is meant for more comfort care then treatment of the cancer   - apprec pall care consult - discussed with Pat and pt not ready for hospice.. 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 Cont lasix.   -sp thora as above       Thrush  - nystatin    Malnutrition - pt eating more today. Monitor over weekend. May need tube feeds      Deconditioning  PT OT      Dispo: PT recommending EDUARDO, patient to discuss with family. Have previously refused    Pt lives by herself, continue to be SOB with min exertion, now fluid overload, will benefit from EDUARDO   .      MDM: High   I personally spent time on chart/note review, review of labs/imaging, discussion with patient, physical exam, discussion with staff, consultants, coordinating care, writing progress note, and discussion of plan of care.                Inna Bullock MD, FAAFP                Supplementary Documentation:   DVT Mechanical Prophylaxis: MACHELLE hose, SCDs, Early ambuation  DVT Pharmacologic Prophylaxis   Medication   None                Code Status: DNAR/Selective Treatment  Rogel: External urinary catheter in place  Rogel Duration (in days):   Central line:    JOSE:                             [1]   Current Facility-Administered Medications   Medication Dose Route Frequency    acetaminophen (Ofirmev) 10 mg/mL infusion premix 1,000 mg  1,000 mg Intravenous Q4H PRN    furosemide (Lasix) 10 mg/mL injection 40 mg  40 mg Intravenous BID (Diuretic)    morphINE 10 MG/5ML oral solution 2.5 mg  2.5 mg Oral TID PRN     amiodarone (Pacerone) tab 200 mg  200 mg Oral Daily    sodium chloride (Saline Mist) 0.65 % nasal solution 1 spray  1 spray Each Nare Q3H PRN    pantoprazole (Protonix) DR tab 40 mg  40 mg Oral QAM AC    acetaminophen (Tylenol Extra Strength) tab 1,000 mg  1,000 mg Oral Q6H PRN    senna (Senokot) 8.8 MG/5ML oral syrup 17.6 mg  10 mL Per NG Tube Daily PRN    docusate (Colace) 50 MG/5ML oral solution 100 mg  100 mg Per NG Tube BID PRN    midodrine (ProAmatine) tab 10 mg  10 mg Oral TID    traMADol (Ultram) tab 50 mg  50 mg Oral Q6H PRN    ondansetron (Zofran) 4 MG/2ML injection 4 mg  4 mg Intravenous Q6H PRN    metoclopramide (Reglan) 5 mg/mL injection 5 mg  5 mg Intravenous Q8H PRN    morphINE PF 2 MG/ML injection 1 mg  1 mg Intravenous Q2H PRN    polyethylene glycol (PEG 3350) (Miralax) 17 g oral packet 17 g  17 g Per NG Tube Daily PRN    insulin aspart (NovoLOG) 100 Units/mL FlexPen 1-5 Units  1-5 Units Subcutaneous TID CC and HS    glucose (Dex4) 15 GM/59ML oral liquid 15 g  15 g Oral Q15 Min PRN    Or    glucose (Glutose) 40% oral gel 15 g  15 g Oral Q15 Min PRN    Or    glucose-vitamin C (Dex-4) chewable tab 4 tablet  4 tablet Oral Q15 Min PRN    Or    dextrose 50% injection 50 mL  50 mL Intravenous Q15 Min PRN    Or    glucose (Dex4) 15 GM/59ML oral liquid 30 g  30 g Oral Q15 Min PRN    Or    glucose (Glutose) 40% oral gel 30 g  30 g Oral Q15 Min PRN    Or    glucose-vitamin C (Dex-4) chewable tab 8 tablet  8 tablet Oral Q15 Min PRN    ipratropium-albuterol (Duoneb) 0.5-2.5 (3) MG/3ML inhalation solution 3 mL  3 mL Nebulization Q6H PRN    levothyroxine (Synthroid) tab 100 mcg  100 mcg Oral Before breakfast   [2]

## 2025-05-31 NOTE — PLAN OF CARE
Problem: Diabetes/Glucose Control  Goal: Glucose maintained within prescribed range  Description: INTERVENTIONS:- Monitor Blood Glucose as ordered- Assess for signs and symptoms of hyperglycemia and hypoglycemia- Administer ordered medications to maintain glucose within target range- Assess barriers to adequate nutritional intake and initiate nutrition consult as needed- Instruct patient on self management of diabetes  Outcome: Progressing     Problem: Patient Centered Care  Goal: Patient preferences are identified and integrated in the patient's plan of care  Description: Interventions:- What would you like us to know as we care for you? From home alone with C - Provide timely, complete, and accurate information to patient/family- Incorporate patient and family knowledge, values, beliefs, and cultural backgrounds into the planning and delivery of care- Encourage patient/family to participate in care and decision-making at the level they choose- Honor patient and family perspectives and choices  Outcome: Progressing     Problem: RISK FOR INFECTION - ADULT  Goal: Absence of fever/infection during anticipated neutropenic period  Description: INTERVENTIONS- Monitor WBC- Administer growth factors as ordered- Implement neutropenic guidelines  Outcome: Progressing     Problem: CARDIOVASCULAR - ADULT  Goal: Maintains optimal cardiac output and hemodynamic stability  Description: INTERVENTIONS:- Monitor vital signs, rhythm, and trends- Monitor for bleeding, hypotension and signs of decreased cardiac output- Evaluate effectiveness of vasoactive medications to optimize hemodynamic stability- Monitor arterial and/or venous puncture sites for bleeding and/or hematoma- Assess quality of pulses, skin color and temperature- Assess for signs of decreased coronary artery perfusion - ex. Angina- Evaluate fluid balance, assess for edema, trend weights  Outcome: Progressing  Goal: Absence of cardiac arrhythmias or at  baseline  Description: INTERVENTIONS:- Continuous cardiac monitoring, monitor vital signs, obtain 12 lead EKG if indicated- Evaluate effectiveness of antiarrhythmic and heart rate control medications as ordered- Initiate emergency measures for life threatening arrhythmias- Monitor electrolytes and administer replacement therapy as ordered  Outcome: Progressing     Problem: RESPIRATORY - ADULT  Goal: Achieves optimal ventilation and oxygenation  Description: INTERVENTIONS:- Assess for changes in respiratory status- Assess for changes in mentation and behavior- Position to facilitate oxygenation and minimize respiratory effort- Oxygen supplementation based on oxygen saturation or ABGs- Provide Smoking Cessation handout, if applicable- Encourage broncho-pulmonary hygiene including cough, deep breathe, Incentive Spirometry- Assess the need for suctioning and perform as needed- Assess and instruct to report SOB or any respiratory difficulty- Respiratory Therapy support as indicated- Manage/alleviate anxiety- Monitor for signs/symptoms of CO2 retention  Outcome: Progressing     Problem: SAFETY ADULT - FALL  Goal: Free from fall injury  Description: INTERVENTIONS:  - Assess pt frequently for physical needs  - Identify cognitive and physical deficits and behaviors that affect risk of falls.  - Sheldon fall precautions as indicated by assessment.  - Educate pt/family on patient safety including physical limitations  - Instruct pt to call for assistance with activity based on assessment  - Modify environment to reduce risk of injury  - Provide assistive devices as appropriate  - Consider OT/PT consult to assist with strengthening/mobility  - Encourage toileting schedule  Outcome: Progressing     Problem: DISCHARGE PLANNING  Goal: Discharge to home or other facility with appropriate resources  Description: INTERVENTIONS:  - Identify barriers to discharge w/pt and caregiver  - Include patient/family/discharge partner in  discharge planning  - Arrange for needed discharge resources and transportation as appropriate  - Identify discharge learning needs (meds, wound care, etc)  - Arrange for interpreters to assist at discharge as needed  - Consider post-discharge preferences of patient/family/discharge partner  - Complete POLST form as appropriate  - Assess patient's ability to be responsible for managing their own health  - Refer to Case Management Department for coordinating discharge planning if the patient needs post-hospital services based on physician/LIP order or complex needs related to functional status, cognitive ability or social support system  Outcome: Progressing     Problem: HEMATOLOGIC - ADULT  Goal: Free from bleeding injury  Description: (Example usage: patient with low platelets)  INTERVENTIONS:  - Avoid intramuscular injections, enemas and rectal medication administration  - Ensure safe mobilization of patient  - Hold pressure on venipuncture sites to achieve adequate hemostasis  - Assess for signs and symptoms of internal bleeding  - Monitor lab trends  - Patient is to report abnormal signs of bleeding to staff  - Avoid use of toothpicks and dental floss  - Use electric shaver for shaving  - Use soft bristle tooth brush  - Limit straining and forceful nose blowing  Outcome: Progressing  Goal: Maintains hematologic stability  Description: INTERVENTIONS  - Assess for signs and symptoms of bleeding or hemorrhage  - Monitor labs and vital signs for trends  - Administer supportive blood products/factors, fluids and medications as ordered and appropriate  - Administer supportive blood products/factors as ordered and appropriate  Outcome: Progressing  Goal: Free from bleeding injury  Description: (Example usage: patient with low platelets)  INTERVENTIONS:  - Avoid intramuscular injections, enemas and rectal medication administration  - Ensure safe mobilization of patient  - Hold pressure on venipuncture sites to achieve  adequate hemostasis  - Assess for signs and symptoms of internal bleeding  - Monitor lab trends  - Patient is to report abnormal signs of bleeding to staff  - Avoid use of toothpicks and dental floss  - Use electric shaver for shaving  - Use soft bristle tooth brush  - Limit straining and forceful nose blowing  Outcome: Progressing     Problem: GASTROINTESTINAL - ADULT  Goal: Minimal or absence of nausea and vomiting  Description: INTERVENTIONS:  - Maintain adequate hydration with IV or PO as ordered and tolerated  - Nasogastric tube to low intermittent suction as ordered  - Evaluate effectiveness of ordered antiemetic medications  - Provide nonpharmacologic comfort measures as appropriate  - Advance diet as tolerated, if ordered  - Obtain nutritional consult as needed  - Evaluate fluid balance  Outcome: Progressing  Goal: Maintains or returns to baseline bowel function  Description: INTERVENTIONS:  - Assess bowel function  - Maintain adequate hydration with IV or PO as ordered and tolerated  - Evaluate effectiveness of GI medications  - Encourage mobilization and activity  - Obtain nutritional consult as needed  - Establish a toileting routine/schedule  - Consider collaborating with pharmacy to review patient's medication profile  Outcome: Progressing  Goal: Maintains adequate nutritional intake (undernourished)  Description: INTERVENTIONS:  - Monitor percentage of each meal consumed  - Identify factors contributing to decreased intake, treat as appropriate  - Assist with meals as needed  - Monitor I&O, WT and lab values  - Obtain nutritional consult as needed  - Optimize oral hygiene and moisture  - Encourage food from home; allow for food preferences  - Enhance eating environment  Outcome: Progressing     Problem: GENITOURINARY - ADULT  Goal: Absence of urinary retention  Description: INTERVENTIONS:  - Assess patient’s ability to void and empty bladder  - Monitor intake/output and perform bladder scan as  needed  - Follow urinary retention protocol/standard of care  - Consider collaborating with pharmacy to review patient's medication profile  - Implement strategies to promote bladder emptying  Outcome: Progressing     Problem: METABOLIC/FLUID AND ELECTROLYTES - ADULT  Goal: Glucose maintained within prescribed range  Description: INTERVENTIONS:- Monitor Blood Glucose as ordered- Assess for signs and symptoms of hyperglycemia and hypoglycemia- Administer ordered medications to maintain glucose within target range- Assess barriers to adequate nutritional intake and initiate nutrition consult as needed- Instruct patient on self management of diabetes  Outcome: Progressing  Goal: Electrolytes maintained within normal limits  Description: INTERVENTIONS:  - Monitor labs and rhythm and assess patient for signs and symptoms of electrolyte imbalances  - Administer electrolyte replacement as ordered  - Monitor response to electrolyte replacements, including rhythm and repeat lab results as appropriate  - Fluid restriction as ordered  - Instruct patient on fluid and nutrition restrictions as appropriate  Outcome: Progressing  Goal: Hemodynamic stability and optimal renal function maintained  Description: INTERVENTIONS:  - Monitor labs and assess for signs and symptoms of volume excess or deficit  - Monitor intake, output and patient weight  - Monitor urine specific gravity, serum osmolarity and serum sodium as indicated or ordered  - Monitor response to interventions for patient's volume status, including labs, urine output, blood pressure (other measures as available)  - Encourage oral intake as appropriate  - Instruct patient on fluid and nutrition restrictions as appropriate  Outcome: Progressing     Problem: SKIN/TISSUE INTEGRITY - ADULT  Goal: Skin integrity remains intact  Description: INTERVENTIONS  - Assess and document risk factors for pressure ulcer development  - Assess and document skin integrity  - Monitor for  areas of redness and/or skin breakdown  - Initiate interventions, skin care algorithm/standards of care as needed  Outcome: Progressing  Goal: Incision(s), wounds(s) or drain site(s) healing without S/S of infection  Description: INTERVENTIONS:  - Assess and document risk factors for pressure ulcer development  - Assess and document skin integrity  - Assess and document dressing/incision, wound bed, drain sites and surrounding tissue  - Implement wound care per orders  - Initiate isolation precautions as appropriate  - Initiate Pressure Ulcer prevention bundle as indicated  Outcome: Progressing     Problem: MUSCULOSKELETAL - ADULT  Goal: Return mobility to safest level of function  Description: INTERVENTIONS:  - Assess patient stability and activity tolerance for standing, transferring and ambulating w/ or w/o assistive devices  - Assist with transfers and ambulation using safe patient handling equipment as needed  - Ensure adequate protection for wounds/incisions during mobilization  - Obtain PT/OT consults as needed  - Advance activity as appropriate  - Communicate ordered activity level and limitations with patient/family  Outcome: Progressing  Goal: Maintain proper alignment of affected body part  Description: INTERVENTIONS:  - Support and protect limb and body alignment per provider's orders  - Instruct and reinforce with patient and family use of appropriate assistive device and precautions (e.g. spinal or hip dislocation precautions)  Outcome: Progressing

## 2025-05-31 NOTE — PLAN OF CARE
Problem: Diabetes/Glucose Control  Goal: Glucose maintained within prescribed range  Description: INTERVENTIONS:- Monitor Blood Glucose as ordered- Assess for signs and symptoms of hyperglycemia and hypoglycemia- Administer ordered medications to maintain glucose within target range- Assess barriers to adequate nutritional intake and initiate nutrition consult as needed- Instruct patient on self management of diabetes  Outcome: Progressing     Problem: RISK FOR INFECTION - ADULT  Goal: Absence of fever/infection during anticipated neutropenic period  Description: INTERVENTIONS- Monitor WBC- Administer growth factors as ordered- Implement neutropenic guidelines  Outcome: Progressing     Problem: RESPIRATORY - ADULT  Goal: Achieves optimal ventilation and oxygenation  Description: INTERVENTIONS:- Assess for changes in respiratory status- Assess for changes in mentation and behavior- Position to facilitate oxygenation and minimize respiratory effort- Oxygen supplementation based on oxygen saturation or ABGs- Provide Smoking Cessation handout, if applicable- Encourage broncho-pulmonary hygiene including cough, deep breathe, Incentive Spirometry- Assess the need for suctioning and perform as needed- Assess and instruct to report SOB or any respiratory difficulty- Respiratory Therapy support as indicated- Manage/alleviate anxiety- Monitor for signs/symptoms of CO2 retention  Outcome: Progressing     Problem: SAFETY ADULT - FALL  Goal: Free from fall injury  Description: INTERVENTIONS:  - Assess pt frequently for physical needs  - Identify cognitive and physical deficits and behaviors that affect risk of falls.  - [place] fall precautions as indicated by assessment.  - Educate pt/family on patient safety including physical limitations  - Instruct pt to call for assistance with activity based on assessment  - Modify environment to reduce risk of injury  - Provide assistive devices as appropriate  - Consider OT/PT  consult to assist with strengthening/mobility  - Encourage toileting schedule  Outcome: Progressing     Problem: HEMATOLOGIC - ADULT  Goal: Free from bleeding injury  Description: (Example usage: patient with low platelets)  INTERVENTIONS:  - Avoid intramuscular injections, enemas and rectal medication administration  - Ensure safe mobilization of patient  - Hold pressure on venipuncture sites to achieve adequate hemostasis  - Assess for signs and symptoms of internal bleeding  - Monitor lab trends  - Patient is to report abnormal signs of bleeding to staff  - Avoid use of toothpicks and dental floss  - Use electric shaver for shaving  - Use soft bristle tooth brush  - Limit straining and forceful nose blowing  Outcome: Progressing  Goal: Maintains hematologic stability  Description: INTERVENTIONS  - Assess for signs and symptoms of bleeding or hemorrhage  - Monitor labs and vital signs for trends  - Administer supportive blood products/factors, fluids and medications as ordered and appropriate  - Administer supportive blood products/factors as ordered and appropriate  Outcome: Progressing  Goal: Free from bleeding injury  Description: (Example usage: patient with low platelets)  INTERVENTIONS:  - Avoid intramuscular injections, enemas and rectal medication administration  - Ensure safe mobilization of patient  - Hold pressure on venipuncture sites to achieve adequate hemostasis  - Assess for signs and symptoms of internal bleeding  - Monitor lab trends  - Patient is to report abnormal signs of bleeding to staff  - Avoid use of toothpicks and dental floss  - Use electric shaver for shaving  - Use soft bristle tooth brush  - Limit straining and forceful nose blowing  Outcome: Progressing     Problem: GASTROINTESTINAL - ADULT  Goal: Maintains adequate nutritional intake (undernourished)  Description: INTERVENTIONS:  - Monitor percentage of each meal consumed  - Identify factors contributing to decreased intake, treat  as appropriate  - Assist with meals as needed  - Monitor I&O, WT and lab values  - Obtain nutritional consult as needed  - Optimize oral hygiene and moisture  - Encourage food from home; allow for food preferences  - Enhance eating environment  Outcome: Progressing     Problem: GENITOURINARY - ADULT  Goal: Absence of urinary retention  Description: INTERVENTIONS:  - Assess patient’s ability to void and empty bladder  - Monitor intake/output and perform bladder scan as needed  - Follow urinary retention protocol/standard of care  - Consider collaborating with pharmacy to review patient's medication profile  - Implement strategies to promote bladder emptying  Outcome: Progressing     Problem: METABOLIC/FLUID AND ELECTROLYTES - ADULT  Goal: Glucose maintained within prescribed range  Description: INTERVENTIONS:- Monitor Blood Glucose as ordered- Assess for signs and symptoms of hyperglycemia and hypoglycemia- Administer ordered medications to maintain glucose within target range- Assess barriers to adequate nutritional intake and initiate nutrition consult as needed- Instruct patient on self management of diabetes  Outcome: Progressing     Problem: SKIN/TISSUE INTEGRITY - ADULT  Goal: Skin integrity remains intact  Description: INTERVENTIONS  - Assess and document risk factors for pressure ulcer development  - Assess and document skin integrity  - Monitor for areas of redness and/or skin breakdown  - Initiate interventions, skin care algorithm/standards of care as needed  Outcome: Progressing     Problem: MUSCULOSKELETAL - ADULT  Goal: Return mobility to safest level of function  Description: INTERVENTIONS:  - Assess patient stability and activity tolerance for standing, transferring and ambulating w/ or w/o assistive devices  - Assist with transfers and ambulation using safe patient handling equipment as needed  - Ensure adequate protection for wounds/incisions during mobilization  - Obtain PT/OT consults as needed  -  Advance activity as appropriate  - Communicate ordered activity level and limitations with patient/family  Outcome: Progressing     On 4 L high flow nasal cannula and remote telemetry monitoring. Patient denies pain at this time. Up in chair for meals.   Safety precautions in place, frequent nursing rounding completed, call light within reach. All questions answered and needs met.

## 2025-05-31 NOTE — PROGRESS NOTES
Progress Note  Kelli Fisher Patient Status:  Inpatient    1955 MRN [id number]   Location [facility] 3W/SW Attending Hina Bentley MD   Hosp Day # 19 PCP Taylor Gallardo MD     Subjective:  Denies cardiac complaints  Up in chair, states she feels good today    Objective:  BP 95/73 (BP Location: Right arm)   Pulse 81   Temp 97.1 °F (36.2 °C) (Axillary)   Resp 18   Ht 5' (1.524 m)   Wt 175 lb (79.4 kg)   SpO2 100%   BMI 34.18 kg/m²     Telemetry: SR 80s-90s    Intake/Output:    Intake/Output Summary (Last 24 hours) at 2025 0952  Last data filed at 2025  Gross per 24 hour   Intake 230 ml   Output 400 ml   Net -170 ml     Last 3 Weights   25 0406 175 lb (79.4 kg)   25 0514 175 lb 8 oz (79.6 kg)   25 0625 171 lb 11.8 oz (77.9 kg)   25 0600 169 lb 12.1 oz (77 kg)   25 0500 171 lb 15.3 oz (78 kg)   25 0500 167 lb 8.8 oz (76 kg)   25 0600 168 lb 14 oz (76.6 kg)   25 0341 167 lb (75.8 kg)   25 0611 164 lb 10.9 oz (74.7 kg)   25 1600 156 lb 1.4 oz (70.8 kg)   25 0439 163 lb 8 oz (74.2 kg)   05/15/25 1243 154 lb 3.2 oz (69.9 kg)   05/15/25 0458 177 lb 11.2 oz (80.6 kg)   25 0645 166 lb 9.6 oz (75.6 kg)   25 0406 167 lb 9.6 oz (76 kg)   25 1736 165 lb 11.2 oz (75.2 kg)   25 1217 169 lb (76.7 kg)   25 0500 174 lb 9.6 oz (79.2 kg)   25 0549 175 lb 12.8 oz (79.7 kg)   25 1800 163 lb (73.9 kg)   25 165 lb (74.8 kg)     Labs:  Recent Labs   Lab 25  0542 25  0456 25  1810 25  0617   * 116*  --  117*   BUN 19 17  --  17   CREATSERUM 1.09* 1.05*  --  1.04*   EGFRCR 55* 58*  --  58*   CA 8.7 8.2*  --  8.2*   * 145  --  147*   K 4.0 3.4* 3.9 4.0    103  --  105   CO2 33.0* 37.0*  --  37.0*     Recent Labs   Lab 25  0542 25  1314 25  0456 25  0617   RBC 2.49*  --  2.78* 2.67*   HGB 6.5* 7.7* 7.5* 7.5*   HCT  21.1* 24.7* 23.7* 23.7*   MCV 84.7  --  85.3 88.8   MCH 26.1  --  27.0 28.1   MCHC 30.8*  --  31.6 31.6   RDW 21.9*  --  20.1* 20.5*   NEPRELIM 11.08*  --  8.10* 7.19   WBC 14.9*  --  11.0 10.6   .0  --  242.0 256.0     No results for input(s): \"TROP\", \"TROPHS\", \"CK\" in the last 168 hours.    No results found for: \"CHOLESTEROL\", \"HDL\", \"LDL\", \"TRIG\", \"NONHDL\", \"CHOLHDL\"  Lab Results   Component Value Date    DDIMER 3.05 (H) 05/13/2025     Lab Results   Component Value Date    TSH 3.705 05/02/2025     Review of Systems:   Constitutional: No fevers, chills, fatigue or night sweats.  Respiratory: Denies cough, wheezing or shortness of breath.  CV: Denies chest pain, palpitations, orthopnea, PND or dizziness.  GI: No nausea, vomiting or diarrhea. No blood in stools.    Physical Exam:   General: Alert, cooperative, ill-appearing appears older than stated age.  Neck: no JVD.  Lungs: diminished bilaterally  Chest wall: No tenderness or deformity.  Heart: Regular rate and rhythm, S1, S2 normal, no murmur, click, rub or gallop.  Abdomen: Soft, non-tender. Bowel sounds normal. No masses,  No organomegaly.  Extremities: Extremities normal, atraumatic, no cyanosis, mild BLE edema.  Pulses: 2+ and symmetric all extremities.  Neurologic: Grossly intact.    Medications:  Scheduled Medications[1]  Medication Infusions[2]    Assessment:    70 year old female with PMH of endometrial cancer with mets to lungs, anemia, PSVT, small pericardial effusion who presented to ER with dyspnea and palpitations. She was found to be in PAF/PAT and anemic with Hgb 7.3.     Paroxysmal atrial tachycardia/Atrial fibrillation   Hx PSVT during last admission with junctional rhythm noted while on BB  -PAF with RVR post pericardial window, now maintaining sinus  -amiodarone 200mg daily following 2 week oral load  -no BB/CCB with previous junctional rhythm  -TSH unremarkable 3.705, s/p thyroidectomy  -eliquis on hold due to drop in hgb on 5/26,  remains low 7.5, s/p 1u PRBC on 5/29, total 2u this admission  -LVEF preserved on echo last admission  -CHADS-VASc= 3 for age, gender, DM    Dyspnea  Remains on 4L O2, her baseline  -pericardial effusion on echo this admission, now s/p pericardial window 5/21 due to increase in size and concern for tamponade  -known lung mets  -s/p 2u PRBC, Hgb stable ~7.5  -CXR on admission showed stable effusion, lymph node mets, Left mid/upper lobe mass, suboptimal inspiration  -PE on CT, D-dimer elevated 3  -IV lasix BID with good response  -s/p thoracentesis 5/29 with 900cc bloody fluid aspirated    Large pericardial effusion with tamponade  Echo on 5/5 revealed small to moderate pericardial effusion   -repeat echo this admission revealed large effusion with tamponade, required pressors,  2/2 malignancy  -BP stable on midodrine 10mg TID  -s/p pericardial window and chest tube placement  -pericardial fluid positive for malignancy  -BP stable off pressors    Bilateral PE/DVT s/p IVC filter  Initially not on OAC due to pancytopenia, eliquis resumed when labs improved  -now holding with sudden 2g drop in Hgb on 5/26, required transfusion 5/29, remains low 7.5  -will continue to hold eliquis indefinitely with high risk    Endometrial cancer with mets to lungs/lymph nodes  Stage Ivb clear cell adenocarcinoma  -chemotherapy  -GYN following    Pancytopenia  Labs stabilized with improvement in hgb and plts, eliquis resumed, now holding due to recurrent drop in hgb requiring repeat transfusion  -plts stable  -recently started chemo, GYN following  -s/p 2u PRBC 5/14 and 5/29, goal to keep Hgb >7    Thrush  Sore throat  resolved    DM2-A1c 6.6    Hypothyroid-synthroid    Leukocytosis  improving  -blood cultures drawn 5/12 negative  -sputum cx drawn 5/21 negative  -afebrile      Plan:  -continue amiodarone  - IV lasix 40mg BID, monitor strict I/Os, daily weights, daily BMP  -continue to hold eliquis indefinitely  -Continue  midodrine  -Further recs per pulm/ID/primary/gyn      Plan of care discussed with patient, RN.        Margarette Sexton, MSN, APN, FNP-BC, CCK  05/31/25  9:59 AM  [phone] [place]  [phone] [place]        PAF but anticoagulation on hold secondary to anemia. Continue amiodarone.  IV diuretics for dyspnea, vol excess.   Agree with continuation of midodrine.   Will check follow up labs and monitor on telemetry    I agree with the findings of the complexity of problems addressed and take responsibility for the plan's risks and complications. I approve of the plan as documented above.     Yamilka Greenberg MD  L-2       [1]    furosemide  40 mg Intravenous BID (Diuretic)    amiodarone  200 mg Oral Daily    pantoprazole  40 mg Oral QAM AC    midodrine  10 mg Oral TID    insulin aspart  1-5 Units Subcutaneous TID CC and HS    levothyroxine  100 mcg Oral Before breakfast   [2]

## 2025-06-01 LAB
BASOPHILS # BLD AUTO: 0.04 X10(3) UL (ref 0–0.2)
BASOPHILS NFR BLD AUTO: 0.4 %
DEPRECATED RDW RBC AUTO: 66.7 FL (ref 35.1–46.3)
EOSINOPHIL # BLD AUTO: 0.32 X10(3) UL (ref 0–0.7)
EOSINOPHIL NFR BLD AUTO: 3.5 %
ERYTHROCYTE [DISTWIDTH] IN BLOOD BY AUTOMATED COUNT: 20.5 % (ref 11–15)
GLUCOSE BLDC GLUCOMTR-MCNC: 109 MG/DL (ref 70–99)
GLUCOSE BLDC GLUCOMTR-MCNC: 131 MG/DL (ref 70–99)
GLUCOSE BLDC GLUCOMTR-MCNC: 141 MG/DL (ref 70–99)
GLUCOSE BLDC GLUCOMTR-MCNC: 215 MG/DL (ref 70–99)
HCT VFR BLD AUTO: 23.4 % (ref 35–48)
HGB BLD-MCNC: 7.2 G/DL (ref 12–16)
IMM GRANULOCYTES # BLD AUTO: 0.26 X10(3) UL (ref 0–1)
IMM GRANULOCYTES NFR BLD: 2.9 %
LYMPHOCYTES # BLD AUTO: 1.05 X10(3) UL (ref 1–4)
LYMPHOCYTES NFR BLD AUTO: 11.6 %
MCH RBC QN AUTO: 27.5 PG (ref 26–34)
MCHC RBC AUTO-ENTMCNC: 30.8 G/DL (ref 31–37)
MCV RBC AUTO: 89.3 FL (ref 80–100)
MONOCYTES # BLD AUTO: 1.79 X10(3) UL (ref 0.1–1)
MONOCYTES NFR BLD AUTO: 19.7 %
NEUTROPHILS # BLD AUTO: 5.61 X10 (3) UL (ref 1.5–7.7)
NEUTROPHILS # BLD AUTO: 5.61 X10(3) UL (ref 1.5–7.7)
NEUTROPHILS NFR BLD AUTO: 61.9 %
PLATELET # BLD AUTO: 238 10(3)UL (ref 150–450)
PLATELET MORPHOLOGY: NORMAL
RBC # BLD AUTO: 2.62 X10(6)UL (ref 3.8–5.3)
WBC # BLD AUTO: 9.1 X10(3) UL (ref 4–11)

## 2025-06-01 PROCEDURE — 99233 SBSQ HOSP IP/OBS HIGH 50: CPT | Performed by: INTERNAL MEDICINE

## 2025-06-01 PROCEDURE — 99233 SBSQ HOSP IP/OBS HIGH 50: CPT | Performed by: FAMILY MEDICINE

## 2025-06-01 NOTE — PLAN OF CARE
Problem: Diabetes/Glucose Control  Goal: Glucose maintained within prescribed range  Description: INTERVENTIONS:- Monitor Blood Glucose as ordered- Assess for signs and symptoms of hyperglycemia and hypoglycemia- Administer ordered medications to maintain glucose within target range- Assess barriers to adequate nutritional intake and initiate nutrition consult as needed- Instruct patient on self management of diabetes  Outcome: Progressing     Problem: RISK FOR INFECTION - ADULT  Goal: Absence of fever/infection during anticipated neutropenic period  Description: INTERVENTIONS- Monitor WBC- Administer growth factors as ordered- Implement neutropenic guidelines  Outcome: Progressing     Problem: RESPIRATORY - ADULT  Goal: Achieves optimal ventilation and oxygenation  Description: INTERVENTIONS:- Assess for changes in respiratory status- Assess for changes in mentation and behavior- Position to facilitate oxygenation and minimize respiratory effort- Oxygen supplementation based on oxygen saturation or ABGs- Provide Smoking Cessation handout, if applicable- Encourage broncho-pulmonary hygiene including cough, deep breathe, Incentive Spirometry- Assess the need for suctioning and perform as needed- Assess and instruct to report SOB or any respiratory difficulty- Respiratory Therapy support as indicated- Manage/alleviate anxiety- Monitor for signs/symptoms of CO2 retention  Outcome: Progressing     Problem: SAFETY ADULT - FALL  Goal: Free from fall injury  Description: INTERVENTIONS:  - Assess pt frequently for physical needs  - Identify cognitive and physical deficits and behaviors that affect risk of falls.  - [place] fall precautions as indicated by assessment.  - Educate pt/family on patient safety including physical limitations  - Instruct pt to call for assistance with activity based on assessment  - Modify environment to reduce risk of injury  - Provide assistive devices as appropriate  - Consider OT/PT  consult to assist with strengthening/mobility  - Encourage toileting schedule  Outcome: Progressing     Problem: HEMATOLOGIC - ADULT  Goal: Free from bleeding injury  Description: (Example usage: patient with low platelets)  INTERVENTIONS:  - Avoid intramuscular injections, enemas and rectal medication administration  - Ensure safe mobilization of patient  - Hold pressure on venipuncture sites to achieve adequate hemostasis  - Assess for signs and symptoms of internal bleeding  - Monitor lab trends  - Patient is to report abnormal signs of bleeding to staff  - Avoid use of toothpicks and dental floss  - Use electric shaver for shaving  - Use soft bristle tooth brush  - Limit straining and forceful nose blowing  Outcome: Progressing  Goal: Maintains hematologic stability  Description: INTERVENTIONS  - Assess for signs and symptoms of bleeding or hemorrhage  - Monitor labs and vital signs for trends  - Administer supportive blood products/factors, fluids and medications as ordered and appropriate  - Administer supportive blood products/factors as ordered and appropriate  Outcome: Progressing  Goal: Free from bleeding injury  Description: (Example usage: patient with low platelets)  INTERVENTIONS:  - Avoid intramuscular injections, enemas and rectal medication administration  - Ensure safe mobilization of patient  - Hold pressure on venipuncture sites to achieve adequate hemostasis  - Assess for signs and symptoms of internal bleeding  - Monitor lab trends  - Patient is to report abnormal signs of bleeding to staff  - Avoid use of toothpicks and dental floss  - Use electric shaver for shaving  - Use soft bristle tooth brush  - Limit straining and forceful nose blowing  Outcome: Progressing     Problem: GASTROINTESTINAL - ADULT  Goal: Maintains adequate nutritional intake (undernourished)  Description: INTERVENTIONS:  - Monitor percentage of each meal consumed  - Identify factors contributing to decreased intake, treat  as appropriate  - Assist with meals as needed  - Monitor I&O, WT and lab values  - Obtain nutritional consult as needed  - Optimize oral hygiene and moisture  - Encourage food from home; allow for food preferences  - Enhance eating environment  Outcome: Progressing     Problem: METABOLIC/FLUID AND ELECTROLYTES - ADULT  Goal: Glucose maintained within prescribed range  Description: INTERVENTIONS:- Monitor Blood Glucose as ordered- Assess for signs and symptoms of hyperglycemia and hypoglycemia- Administer ordered medications to maintain glucose within target range- Assess barriers to adequate nutritional intake and initiate nutrition consult as needed- Instruct patient on self management of diabetes  Outcome: Progressing  Goal: Electrolytes maintained within normal limits  Description: INTERVENTIONS:  - Monitor labs and rhythm and assess patient for signs and symptoms of electrolyte imbalances  - Administer electrolyte replacement as ordered  - Monitor response to electrolyte replacements, including rhythm and repeat lab results as appropriate  - Fluid restriction as ordered  - Instruct patient on fluid and nutrition restrictions as appropriate  Outcome: Progressing     Problem: SKIN/TISSUE INTEGRITY - ADULT  Goal: Incision(s), wounds(s) or drain site(s) healing without S/S of infection  Description: INTERVENTIONS:  - Assess and document risk factors for pressure ulcer development  - Assess and document skin integrity  - Assess and document dressing/incision, wound bed, drain sites and surrounding tissue  - Implement wound care per orders  - Initiate isolation precautions as appropriate  - Initiate Pressure Ulcer prevention bundle as indicated  Outcome: Progressing     Problem: MUSCULOSKELETAL - ADULT  Goal: Return mobility to safest level of function  Description: INTERVENTIONS:  - Assess patient stability and activity tolerance for standing, transferring and ambulating w/ or w/o assistive devices  - Assist with  transfers and ambulation using safe patient handling equipment as needed  - Ensure adequate protection for wounds/incisions during mobilization  - Obtain PT/OT consults as needed  - Advance activity as appropriate  - Communicate ordered activity level and limitations with patient/family  Outcome: Progressing     Patient on 4 L high flow nasal cannula and remote telemetry monitoring. Up to chair for meals. Reports slightly better appetite this shift.   Safety precautions in place, frequent nursing rounding completed, call light within reach. All questions answered and needs met.

## 2025-06-01 NOTE — PROGRESS NOTES
Pulmonary Medicine Inpatient Progress Note                 Subjective:  On HFNC 4 liters  Afebrile  Feels better       ALLERGIES:  Allergies[1]     MEDS:  Home Medications:  Medications Taking[2]  Scheduled Medication:  Scheduled Medications[3]  Continuous Infusing Medication:  Medication Infusions[4]  PRN Medications:  PRN Medications[5]       PHYSICAL EXAM:  /64 (BP Location: Right arm)   Pulse 84   Temp 98.3 °F (36.8 °C) (Axillary)   Resp 20   Ht 5' (1.524 m)   Wt 175 lb (79.4 kg)   SpO2 100%   BMI 34.18 kg/m²   CONSTITUTIONAL: alert, oriented, no apparent distress  HEENT: atraumatic normocephalic  MOUTH: mucous membranes are moist. No OP exudates  NECK/THROAT: no JVD. Trachea midline. No obvious thyromegaly  LUNG: clear upper b/l no wheezing,+ basilar crackles. Diminished bases. Chest symmetric with respiratory motion  HEART: regular rate and rhythm, no obvious murmers or gallops note. + tube  ABD: soft non tender. + bowel sounds. No organomegaly noted  EXT: no clubbing, cyanosis. 2+ b/l LE edema       IMAGES:  CXR 5/31/25  CONCLUSION:   Stable to slightly increasing right pleural effusion.   Pulmonary edema is again seen.   Persistent patchy left mid lung airspace opacity, atelectasis versus pneumonia.       CXR 5/28/25  No significant overall change since the preceding examination.  Right greater than left bilateral pleural effusions with bilateral mid to lower lung opacities.     CXR 5/22/25  CONCLUSION: Stable left basal/perihilar pulmonary opacity which which could represent pneumonia or neoplasm.  Stable small bilateral pleural effusions slightly larger on the left.  Mild interstitial edema. Interval extubation        CXR 5/18/25  CONCLUSION:   Unchanged mild cardiomegaly.   Interstitial pulmonary edema, appearing slightly increased from 05/15/2025.   Unchanged moderate left pleural effusion associated basilar opacity.   Unchanged trace right pleural effusion with minimal associated  atelectasis   Unchanged metastatic mediastinal/hilar adenopathy, better seen on cross-sectional imaging.     CXR 5/15/25  CONCLUSION:   1. The cardiac silhouette remains enlarged, likely relating to the known pericardial effusion.  Small pleural effusions are also unchanged bilaterally suggesting mild CHF or fluid overload.  There is stable mild associated passive atelectasis at both   lung bases.   2. There is a history of metastatic endometrial carcinoma.  Stable left upper lobe/lingular consolidation may relate to a metastasis, atelectasis and/or pneumonia.  Unchanged prominence of the mediastinal and bilateral hilar contours is compatible with known underlying metastatic lymphadenopathy.         LABS:  Recent Labs   Lab 05/30/25  0456 05/31/25  0617 06/01/25  0613   RBC 2.78* 2.67* 2.62*   HGB 7.5* 7.5* 7.2*   HCT 23.7* 23.7* 23.4*   MCV 85.3 88.8 89.3   MCH 27.0 28.1 27.5   MCHC 31.6 31.6 30.8*   RDW 20.1* 20.5* 20.5*   NEPRELIM 8.10* 7.19 5.61   WBC 11.0 10.6 9.1   .0 256.0 238.0       Recent Labs   Lab 05/29/25  0542 05/30/25  0456 05/30/25  1810 05/31/25  0617   * 116*  --  117*   BUN 19 17  --  17   CREATSERUM 1.09* 1.05*  --  1.04*   EGFRCR 55* 58*  --  58*   CA 8.7 8.2*  --  8.2*   * 145  --  147*   K 4.0 3.4* 3.9 4.0    103  --  105   CO2 33.0* 37.0*  --  37.0*       ASSESSMENT/PLAN:  Endometrial ca with lung mets  -oncology f/u    B/l pulmonary emboli and DVT  -oncology following  -s/p IVC filter placement by IR    Pericardial effusion-large with tamponade  -s/p pericardial window on 5/21/25  -chest tubes removed as per CV surgery   -f/u on fluid results-malignant    Pleural effusion  -s/p thoracentesis with blood fluid removal   -neg for malignancy   -continue supp 02-weaned to RA    P-afib  -cards following   -on amio. Eliquis being held d/t anemia     GERD  -PPI     Proph  -DVT: SCDs d/t anemia. Eliquis being held    Dispo  -DNR/select     Thank you for the opportunity to  care for Kelli Fisher.     DARIEL Reaves DO, MPH  Pulmonary Critical Care Medicine  [facility]          [1]   Allergies  Allergen Reactions    Aspirin Tightness in Throat    Penicillins HIVES    Lactose OTHER (SEE COMMENTS)     Cramping, bloating    Other OTHER (SEE COMMENTS)     Pt states IV steroid allergy, states it makes her breathing worse    [2]   Outpatient Medications Marked as Taking for the 5/12/25 encounter (Hospital Encounter)   Medication Sig Dispense Refill    levothyroxine (SYNTHROID) 100 MCG Oral Tab Take 1 tablet (100 mcg total) by mouth before breakfast.      Potassium Chloride ER 20 MEQ Oral Tab CR Take 1 tablet by mouth daily.      furosemide 20 MG Oral Tab Take 1 tablet (20 mg total) by mouth daily.      Levalbuterol HCl 1.25 MG/3ML Inhalation Nebu Soln Take 3 mL (1.25 mg total) by nebulization every 8 (eight) hours as needed for Wheezing or Shortness of Breath.      pantoprazole 40 MG Oral Tab EC Take 1 tablet (40 mg total) by mouth daily. 30 tablet 0    amiodarone 200 MG Oral Tab Take 1 tablet (200 mg total) by mouth in the morning, at noon, and at bedtime for 1 day, THEN 1 tablet (200 mg total) in the morning, at noon, and at bedtime. 93 tablet 0    Fluticasone Propionate  HFA (FLOVENT HFA) 220 MCG/ACT Inhalation Aerosol Inhale 1 puff into the lungs every 12 (twelve) hours. Rinse mouth following use.     [3]    furosemide  40 mg Intravenous BID (Diuretic)    amiodarone  200 mg Oral Daily    pantoprazole  40 mg Oral QAM AC    midodrine  10 mg Oral TID    insulin aspart  1-5 Units Subcutaneous TID CC and HS    levothyroxine  100 mcg Oral Before breakfast   [4] [5]   acetaminophen    morphINE    sodium chloride    acetaminophen    senna    docusate    traMADol    ondansetron    metoclopramide    morphINE    polyethylene glycol (PEG 3350)    glucose **OR** glucose **OR** glucose-vitamin C **OR** dextrose **OR** glucose **OR** glucose **OR**  glucose-vitamin C    ipratropium-albuterol

## 2025-06-01 NOTE — PLAN OF CARE
Problem: Diabetes/Glucose Control  Goal: Glucose maintained within prescribed range  Description: INTERVENTIONS:- Monitor Blood Glucose as ordered- Assess for signs and symptoms of hyperglycemia and hypoglycemia- Administer ordered medications to maintain glucose within target range- Assess barriers to adequate nutritional intake and initiate nutrition consult as needed- Instruct patient on self management of diabetes  Outcome: Progressing     Problem: Patient Centered Care  Goal: Patient preferences are identified and integrated in the patient's plan of care  Description: Interventions:- What would you like us to know as we care for you? - Provide timely, complete, and accurate information to patient/family- Incorporate patient and family knowledge, values, beliefs, and cultural backgrounds into the planning and delivery of care- Encourage patient/family to participate in care and decision-making at the level they choose- Honor patient and family perspectives and choices  Outcome: Progressing     Problem: RISK FOR INFECTION - ADULT  Goal: Absence of fever/infection during anticipated neutropenic period  Description: INTERVENTIONS- Monitor WBC- Administer growth factors as ordered- Implement neutropenic guidelines  Outcome: Progressing     Problem: CARDIOVASCULAR - ADULT  Goal: Maintains optimal cardiac output and hemodynamic stability  Description: INTERVENTIONS:- Monitor vital signs, rhythm, and trends- Monitor for bleeding, hypotension and signs of decreased cardiac output- Evaluate effectiveness of vasoactive medications to optimize hemodynamic stability- Monitor arterial and/or venous puncture sites for bleeding and/or hematoma- Assess quality of pulses, skin color and temperature- Assess for signs of decreased coronary artery perfusion - ex. Angina- Evaluate fluid balance, assess for edema, trend weights  Outcome: Progressing  Goal: Absence of cardiac arrhythmias or at baseline  Description: INTERVENTIONS:-  Continuous cardiac monitoring, monitor vital signs, obtain 12 lead EKG if indicated- Evaluate effectiveness of antiarrhythmic and heart rate control medications as ordered- Initiate emergency measures for life threatening arrhythmias- Monitor electrolytes and administer replacement therapy as ordered  Outcome: Progressing     Problem: RESPIRATORY - ADULT  Goal: Achieves optimal ventilation and oxygenation  Description: INTERVENTIONS:- Assess for changes in respiratory status- Assess for changes in mentation and behavior- Position to facilitate oxygenation and minimize respiratory effort- Oxygen supplementation based on oxygen saturation or ABGs- Provide Smoking Cessation handout, if applicable- Encourage broncho-pulmonary hygiene including cough, deep breathe, Incentive Spirometry- Assess the need for suctioning and perform as needed- Assess and instruct to report SOB or any respiratory difficulty- Respiratory Therapy support as indicated- Manage/alleviate anxiety- Monitor for signs/symptoms of CO2 retention  Outcome: Progressing     Problem: SAFETY ADULT - FALL  Goal: Free from fall injury  Description: INTERVENTIONS:  - Assess pt frequently for physical needs  - Identify cognitive and physical deficits and behaviors that affect risk of falls.  - [place] fall precautions as indicated by assessment.  - Educate pt/family on patient safety including physical limitations  - Instruct pt to call for assistance with activity based on assessment  - Modify environment to reduce risk of injury  - Provide assistive devices as appropriate  - Consider OT/PT consult to assist with strengthening/mobility  - Encourage toileting schedule  Outcome: Progressing     Problem: DISCHARGE PLANNING  Goal: Discharge to home or other facility with appropriate resources  Description: INTERVENTIONS:  - Identify barriers to discharge w/pt and caregiver  - Include patient/family/discharge partner in discharge planning  - Arrange for needed  discharge resources and transportation as appropriate  - Identify discharge learning needs (meds, wound care, etc)  - Arrange for interpreters to assist at discharge as needed  - Consider post-discharge preferences of patient/family/discharge partner  - Complete POLST form as appropriate  - Assess patient's ability to be responsible for managing their own health  - Refer to Case Management Department for coordinating discharge planning if the patient needs post-hospital services based on physician/LIP order or complex needs related to functional status, cognitive ability or social support system  Outcome: Progressing     Problem: HEMATOLOGIC - ADULT  Goal: Free from bleeding injury  Description: (Example usage: patient with low platelets)  INTERVENTIONS:  - Avoid intramuscular injections, enemas and rectal medication administration  - Ensure safe mobilization of patient  - Hold pressure on venipuncture sites to achieve adequate hemostasis  - Assess for signs and symptoms of internal bleeding  - Monitor lab trends  - Patient is to report abnormal signs of bleeding to staff  - Avoid use of toothpicks and dental floss  - Use electric shaver for shaving  - Use soft bristle tooth brush  - Limit straining and forceful nose blowing  Outcome: Progressing  Goal: Maintains hematologic stability  Description: INTERVENTIONS  - Assess for signs and symptoms of bleeding or hemorrhage  - Monitor labs and vital signs for trends  - Administer supportive blood products/factors, fluids and medications as ordered and appropriate  - Administer supportive blood products/factors as ordered and appropriate  Outcome: Progressing  Goal: Free from bleeding injury  Description: (Example usage: patient with low platelets)  INTERVENTIONS:  - Avoid intramuscular injections, enemas and rectal medication administration  - Ensure safe mobilization of patient  - Hold pressure on venipuncture sites to achieve adequate hemostasis  - Assess for signs  and symptoms of internal bleeding  - Monitor lab trends  - Patient is to report abnormal signs of bleeding to staff  - Avoid use of toothpicks and dental floss  - Use electric shaver for shaving  - Use soft bristle tooth brush  - Limit straining and forceful nose blowing  Outcome: Progressing     Problem: GASTROINTESTINAL - ADULT  Goal: Minimal or absence of nausea and vomiting  Description: INTERVENTIONS:  - Maintain adequate hydration with IV or PO as ordered and tolerated  - Nasogastric tube to low intermittent suction as ordered  - Evaluate effectiveness of ordered antiemetic medications  - Provide nonpharmacologic comfort measures as appropriate  - Advance diet as tolerated, if ordered  - Obtain nutritional consult as needed  - Evaluate fluid balance  Outcome: Progressing  Goal: Maintains or returns to baseline bowel function  Description: INTERVENTIONS:  - Assess bowel function  - Maintain adequate hydration with IV or PO as ordered and tolerated  - Evaluate effectiveness of GI medications  - Encourage mobilization and activity  - Obtain nutritional consult as needed  - Establish a toileting routine/schedule  - Consider collaborating with pharmacy to review patient's medication profile  Outcome: Progressing  Goal: Maintains adequate nutritional intake (undernourished)  Description: INTERVENTIONS:  - Monitor percentage of each meal consumed  - Identify factors contributing to decreased intake, treat as appropriate  - Assist with meals as needed  - Monitor I&O, WT and lab values  - Obtain nutritional consult as needed  - Optimize oral hygiene and moisture  - Encourage food from home; allow for food preferences  - Enhance eating environment  Outcome: Progressing     Problem: GENITOURINARY - ADULT  Goal: Absence of urinary retention  Description: INTERVENTIONS:  - Assess patient’s ability to void and empty bladder  - Monitor intake/output and perform bladder scan as needed  - Follow urinary retention  protocol/standard of care  - Consider collaborating with pharmacy to review patient's medication profile  - Implement strategies to promote bladder emptying  Outcome: Progressing     Problem: METABOLIC/FLUID AND ELECTROLYTES - ADULT  Goal: Glucose maintained within prescribed range  Description: INTERVENTIONS:- Monitor Blood Glucose as ordered- Assess for signs and symptoms of hyperglycemia and hypoglycemia- Administer ordered medications to maintain glucose within target range- Assess barriers to adequate nutritional intake and initiate nutrition consult as needed- Instruct patient on self management of diabetes  Outcome: Progressing  Goal: Electrolytes maintained within normal limits  Description: INTERVENTIONS:  - Monitor labs and rhythm and assess patient for signs and symptoms of electrolyte imbalances  - Administer electrolyte replacement as ordered  - Monitor response to electrolyte replacements, including rhythm and repeat lab results as appropriate  - Fluid restriction as ordered  - Instruct patient on fluid and nutrition restrictions as appropriate  Outcome: Progressing  Goal: Hemodynamic stability and optimal renal function maintained  Description: INTERVENTIONS:  - Monitor labs and assess for signs and symptoms of volume excess or deficit  - Monitor intake, output and patient weight  - Monitor urine specific gravity, serum osmolarity and serum sodium as indicated or ordered  - Monitor response to interventions for patient's volume status, including labs, urine output, blood pressure (other measures as available)  - Encourage oral intake as appropriate  - Instruct patient on fluid and nutrition restrictions as appropriate  Outcome: Progressing     Problem: SKIN/TISSUE INTEGRITY - ADULT  Goal: Skin integrity remains intact  Description: INTERVENTIONS  - Assess and document risk factors for pressure ulcer development  - Assess and document skin integrity  - Monitor for areas of redness and/or skin  breakdown  - Initiate interventions, skin care algorithm/standards of care as needed  Outcome: Progressing  Goal: Incision(s), wounds(s) or drain site(s) healing without S/S of infection  Description: INTERVENTIONS:  - Assess and document risk factors for pressure ulcer development  - Assess and document skin integrity  - Assess and document dressing/incision, wound bed, drain sites and surrounding tissue  - Implement wound care per orders  - Initiate isolation precautions as appropriate  - Initiate Pressure Ulcer prevention bundle as indicated  Outcome: Progressing     Problem: MUSCULOSKELETAL - ADULT  Goal: Return mobility to safest level of function  Description: INTERVENTIONS:  - Assess patient stability and activity tolerance for standing, transferring and ambulating w/ or w/o assistive devices  - Assist with transfers and ambulation using safe patient handling equipment as needed  - Ensure adequate protection for wounds/incisions during mobilization  - Obtain PT/OT consults as needed  - Advance activity as appropriate  - Communicate ordered activity level and limitations with patient/family  Outcome: Progressing  Goal: Maintain proper alignment of affected body part  Description: INTERVENTIONS:  - Support and protect limb and body alignment per provider's orders  - Instruct and reinforce with patient and family use of appropriate assistive device and precautions (e.g. spinal or hip dislocation precautions)  Outcome: Progressing

## 2025-06-01 NOTE — PROGRESS NOTES
[facility]  Progress Note    Kelli Fisher Patient Status:  Inpatient    1955 MRN [id number]   Location [facility]5W Attending Inna Bullock MD   Hosp Day # 20 PCP Taylor Gallardo MD     Subjective:  No chest pain or shortness of breath.  Up in chair, says she is feeling well.     Objective:  BP 95/63 (BP Location: Right arm)   Pulse 86   Temp 97.4 °F (36.3 °C) (Axillary)   Resp 18   Ht 5' (1.524 m)   Wt 175 lb (79.4 kg)   SpO2 98%   BMI 34.18 kg/m²     Temp (24hrs), Av °F (36.7 °C), Min:97.4 °F (36.3 °C), Max:98.3 °F (36.8 °C)      Intake/Output:    Intake/Output Summary (Last 24 hours) at 2025 1021  Last data filed at 2025 0600  Gross per 24 hour   Intake 420 ml   Output 500 ml   Net -80 ml       Last 3 Weights   25 0406 175 lb (79.4 kg)   25 0514 175 lb 8 oz (79.6 kg)   25 0625 171 lb 11.8 oz (77.9 kg)   25 0600 169 lb 12.1 oz (77 kg)   25 0500 171 lb 15.3 oz (78 kg)   25 0500 167 lb 8.8 oz (76 kg)   25 0600 168 lb 14 oz (76.6 kg)   25 0341 167 lb (75.8 kg)   25 0611 164 lb 10.9 oz (74.7 kg)   25 1600 156 lb 1.4 oz (70.8 kg)   25 0439 163 lb 8 oz (74.2 kg)   05/15/25 1243 154 lb 3.2 oz (69.9 kg)   05/15/25 0458 177 lb 11.2 oz (80.6 kg)   25 0645 166 lb 9.6 oz (75.6 kg)   25 0406 167 lb 9.6 oz (76 kg)   25 1736 165 lb 11.2 oz (75.2 kg)   25 1217 169 lb (76.7 kg)   25 0500 174 lb 9.6 oz (79.2 kg)   25 0549 175 lb 12.8 oz (79.7 kg)   25 1800 163 lb (73.9 kg)   25 2038 165 lb (74.8 kg)       Allergies:  Allergies[1]    Physical Exam:  Blood pressure 95/63, pulse 86, temperature 97.4 °F (36.3 °C), temperature source Axillary, resp. rate 18, height 5' (1.524 m), weight 175 lb (79.4 kg), SpO2 98%.  General: Alert and oriented in no apparent distress.  HEENT: No focal deficits.  Neck: No JVD, carotids 2+ no bruits.  Cardiac: Regular rate and rhythm, S1, S2 normal  Lungs: Clear  without wheezes  Abdomen: Soft, non-tender.   Extremities: Without clubbing, cyanosis or edema.   Neurologic: Alert and oriented, normal affect.  Skin: Warm and dry.       Labs:  Lab Results   Component Value Date    WBC 9.1 06/01/2025    RBC 2.62 06/01/2025    HGB 7.2 06/01/2025    HCT 23.4 06/01/2025    MCV 89.3 06/01/2025    MCH 27.5 06/01/2025    MCHC 30.8 06/01/2025    RDW 20.5 06/01/2025    .0 06/01/2025     Lab Results   Component Value Date    INR 1.31 (H) 05/22/2025    INR 1.30 (H) 05/21/2025    INR 1.33 (H) 04/29/2025       Medications:  Current Hospital Medications[2]    Assessment and Plan:  Problem List[3]    .  1.  History of endometrial cancer with mets to the lungs and lymph nodes  2.  Malignant pericardial effusion status post pericardial window and chest tube placement  3.  Paroxysmal atrial tachycardia/atrial fibrillation, currently sinus.  On amiodarone.  Unfortunately with her significant anemia Eliquis has been on hold.  4.  Baseline shortness of breath has been unchanged.  She did have a history of pulmonary embolism with IVC filter.  She also had thoracentesis and had a good response to IV Lasix.She has normal EF on echo.   5.  Anemia  6.  Diabetes  7.  Hypothyroidism    The plan is to continue amiodarone  Continue midodrine for low blood pressures  Will be holding anticoagulation indefinitely but can always, as an outpatient, consider Watchman procedure, if electrophysiologists would want to pursue that  She is not ready for hospice/palliative care per notes in chart.     Agree with Discharge planning.     Yamilka Greenberg MD  6/1/2025  10:21 AM         [1]   Allergies  Allergen Reactions    Aspirin Tightness in Throat    Penicillins HIVES    Lactose OTHER (SEE COMMENTS)     Cramping, bloating    Other OTHER (SEE COMMENTS)     Pt states IV steroid allergy, states it makes her breathing worse    [2]   Current Facility-Administered Medications   Medication Dose Route Frequency     acetaminophen (Ofirmev) 10 mg/mL infusion premix 1,000 mg  1,000 mg Intravenous Q4H PRN    furosemide (Lasix) 10 mg/mL injection 40 mg  40 mg Intravenous BID (Diuretic)    morphINE 10 MG/5ML oral solution 2.5 mg  2.5 mg Oral TID PRN    amiodarone (Pacerone) tab 200 mg  200 mg Oral Daily    sodium chloride (Saline Mist) 0.65 % nasal solution 1 spray  1 spray Each Nare Q3H PRN    pantoprazole (Protonix) DR tab 40 mg  40 mg Oral QAM AC    acetaminophen (Tylenol Extra Strength) tab 1,000 mg  1,000 mg Oral Q6H PRN    senna (Senokot) 8.8 MG/5ML oral syrup 17.6 mg  10 mL Per NG Tube Daily PRN    docusate (Colace) 50 MG/5ML oral solution 100 mg  100 mg Per NG Tube BID PRN    midodrine (ProAmatine) tab 10 mg  10 mg Oral TID    traMADol (Ultram) tab 50 mg  50 mg Oral Q6H PRN    ondansetron (Zofran) 4 MG/2ML injection 4 mg  4 mg Intravenous Q6H PRN    metoclopramide (Reglan) 5 mg/mL injection 5 mg  5 mg Intravenous Q8H PRN    morphINE PF 2 MG/ML injection 1 mg  1 mg Intravenous Q2H PRN    polyethylene glycol (PEG 3350) (Miralax) 17 g oral packet 17 g  17 g Per NG Tube Daily PRN    insulin aspart (NovoLOG) 100 Units/mL FlexPen 1-5 Units  1-5 Units Subcutaneous TID CC and HS    glucose (Dex4) 15 GM/59ML oral liquid 15 g  15 g Oral Q15 Min PRN    Or    glucose (Glutose) 40% oral gel 15 g  15 g Oral Q15 Min PRN    Or    glucose-vitamin C (Dex-4) chewable tab 4 tablet  4 tablet Oral Q15 Min PRN    Or    dextrose 50% injection 50 mL  50 mL Intravenous Q15 Min PRN    Or    glucose (Dex4) 15 GM/59ML oral liquid 30 g  30 g Oral Q15 Min PRN    Or    glucose (Glutose) 40% oral gel 30 g  30 g Oral Q15 Min PRN    Or    glucose-vitamin C (Dex-4) chewable tab 8 tablet  8 tablet Oral Q15 Min PRN    ipratropium-albuterol (Duoneb) 0.5-2.5 (3) MG/3ML inhalation solution 3 mL  3 mL Nebulization Q6H PRN    levothyroxine (Synthroid) tab 100 mcg  100 mcg Oral Before breakfast   [3]   Patient Active Problem List  Diagnosis    Shortness of breath     Acute hypoxic respiratory failure (HCC)    Lung mass    Supraclavicular adenopathy    Dysphagia, unspecified type    Pericardial effusion (HCC)    Endometrial cancer (HCC)    Microcytic anemia    Thrombocytopenia (HCC)    Azotemia    Pancytopenia (HCC)    Hyperglycemia    Acute respiratory failure with hypoxia (HCC)    Atrial fibrillation with RVR (HCC)    Malnutrition (HCC)    Malignant neoplasm metastatic to lung (HCC)    Palliative care by specialist    Pleural effusion, bilateral    Generalized weakness    Acute pulmonary embolism (HCC)    Malignant pericardial effusion (HCC)    Dyspnea and respiratory abnormalities

## 2025-06-01 NOTE — PROGRESS NOTES
[facility]  part of [facility]    Progress Note    Kelli Fisher Patient Status:  Inpatient    1955 MRN [id number]   Location [facility]5W Attending Lina Dueñas, DO   Hosp Day # 20 PCP Taylor Gallardo MD     Chief complaint sob     Subjective:   Kelli Fisher is a(n) 70 year old female Pt sitting in chair today. Sob better after thoracentesis with 900 out.     Discussed palliative care consult and pt wants to continue all treatment   Feeling fine today       ROS:   sob   No c/d   No n/v     Objective:     Blood pressure 103/64, pulse 84, temperature 98.3 °F (36.8 °C), temperature source Axillary, resp. rate 20, height 5' (1.524 m), weight 175 lb (79.4 kg), SpO2 100%.      Intake/Output Summary (Last 24 hours) at 2025 1303  Last data filed at 2025 0600  Gross per 24 hour   Intake 420 ml   Output 500 ml   Net -80 ml       Patient Weight(s) for the past 336 hrs:   Weight   25 0406 175 lb (79.4 kg)   25 0514 175 lb 8 oz (79.6 kg)   25 0625 171 lb 11.8 oz (77.9 kg)   25 0600 169 lb 12.1 oz (77 kg)   25 0500 171 lb 15.3 oz (78 kg)   25 0500 167 lb 8.8 oz (76 kg)   25 0600 168 lb 14 oz (76.6 kg)   25 0341 167 lb (75.8 kg)           General appearance: alert, appears stated age and cooperative  Pulmonary: cta improved   Cardiovascular: S1, S2 normal, no murmur, click, rub or gallop, regular rate and rhythm  Abdominal: soft, non-tender; bowel sounds normal; no masses,  no organomegaly  Extremities: b/l swelling         Medicines:     Current Hospital Medications[1]                Blood Pressure and Cardiac Medications            amiodarone 200 MG Oral Tab            Medication Infusions[2]        Lab Results   Component Value Date    WBC 9.1 2025    HGB 7.2 (L) 2025    HCT 23.4 (L) 2025    .0 2025    CREATSERUM 1.04 (H) 2025    BUN 17 2025     (H) 2025    K 4.0 2025    CL  105 05/31/2025    CO2 37.0 (H) 05/31/2025     (H) 05/31/2025    CA 8.2 (L) 05/31/2025    ALB 3.2 05/12/2025    ALKPHO 142 05/12/2025    BILT 0.7 05/12/2025    TP 6.8 05/12/2025    AST 26 05/12/2025    ALT 10 05/12/2025    PTT 37.0 (H) 05/21/2025    INR 1.31 (H) 05/22/2025    TSH 3.705 05/02/2025    LIP 42 12/11/2023    DDIMER 3.05 (H) 05/13/2025    MG 2.2 05/19/2025    B12 >2,000 (H) 05/02/2025       XR CHEST AP PORTABLE  (CPT=71045)  Result Date: 5/31/2025  CONCLUSION:   Stable to slightly increasing right pleural effusion.  Pulmonary edema is again seen.  Persistent patchy left mid lung airspace opacity, atelectasis versus pneumonia.    el-remote     Dictated by (CST): Portillo Ramirez MD on 5/31/2025 at 7:30 AM     Finalized by (CST): Portillo Ramirez MD on 5/31/2025 at 7:32 AM                Results:     CBC:    Lab Results   Component Value Date    WBC 9.1 06/01/2025    WBC 10.6 05/31/2025    WBC 11.0 05/30/2025     Lab Results   Component Value Date    HGB 7.2 (L) 06/01/2025    HGB 7.5 (L) 05/31/2025    HGB 7.5 (L) 05/30/2025      Lab Results   Component Value Date    .0 06/01/2025    .0 05/31/2025    .0 05/30/2025       Recent Labs   Lab 05/29/25  0542 05/30/25  0456 05/30/25  1810 05/31/25  0617   * 116*  --  117*   BUN 19 17  --  17   CREATSERUM 1.09* 1.05*  --  1.04*   CA 8.7 8.2*  --  8.2*   * 145  --  147*   K 4.0 3.4* 3.9 4.0    103  --  105   CO2 33.0* 37.0*  --  37.0*           Assessment and Plan:      Afib RVR  Pericardial effusion  - sp pericardial window 5/21, chest tube out 5/23 ; fluid positive for malignancy   - cardiology consulted  - IV amio --> now on oral. Avoiding BB, CCB with h/o junctional rhythm   - cont monitor on tele  - on 5/16 experienced RVR again with hypotension, received digoxin converted to NSR. Remained  hypotensive so sent to stepdown unit. Responded to fluid bolus and required jewel   - Transferred back to medical floor, normotensive  rates controlled.  -5/30 - bp dropped overnight and lasix held. Received dig x 1 for rvr. Better today  - CXR with b/l effusions - sp 900 removed by thora. Apprec pulm - > dc planning   - repeat ECHO showing large pericardial effusion ; f/u echo with no pericardial effusion   Limited ECHO 5/25: normal left ventricle, EF 65-70%  -SOB and CXR with Bilateral Pleural effusion -> IV lasix 40 bid monitor output. Wts up since admit     BL PE: acute small segmental/right lower extremity DVT  S/p IVC 5/14/2025  Was not on a/c due to thrombocytopenia  Now on eliquis but will hold for possible thora -> will hold indefinitely given bleeding risk per cards         Metastatic endometrial cancer    Pancytopenia  Acute on chronic anemia of chronic disease  - s/p 2 units prbcs improved today  - neutropenic precautions  - started neupogen until ANC 1500  - Plan is for further chemotherapy once recovered clinically per Dr. Herring.  - received 6 days of iv cefepime now off   - cefepime and vanco started for neutropenia and patient with cough/chills, elevated LA, possible early sepsis now off. Wbc 15. Afebrile. On 5 L    -Pancytopenia improving, monitor daily  - Overall plan of care is to continue to treat medically over the next several days and monitor for improvement.    - discussed with pt and daughter today and they are comfortable with palliative care consult for information . Explained it is meant for more comfort care then treatment of the cancer   - apprec pall care consult - discussed with Pat and pt not ready for hospice.. 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 Cont lasix.   -sp thora as above       Thrush  -  nystatin    Malnutrition - pt eating more today. Monitor over weekend. May need tube feeds      Deconditioning  PT OT      Dispo: PT recommending EDUARDO, patient to discuss with family. Have previously refused    Pt lives by herself, continue to be SOB with min exertion, now fluid overload, will benefit from EDUARDO   .      MDM: High   I personally spent time on chart/note review, review of labs/imaging, discussion with patient, physical exam, discussion with staff, consultants, coordinating care, writing progress note, and discussion of plan of care.                Inna Bullock MD, FAAFP                Supplementary Documentation:   DVT Mechanical Prophylaxis: MACHELLE hose, SCDs, Early ambuation  DVT Pharmacologic Prophylaxis   Medication   None                Code Status: DNAR/Selective Treatment  Rogel: External urinary catheter in place  Rogel Duration (in days):   Central line:    JOSE:                             [1]   Current Facility-Administered Medications   Medication Dose Route Frequency    acetaminophen (Ofirmev) 10 mg/mL infusion premix 1,000 mg  1,000 mg Intravenous Q4H PRN    furosemide (Lasix) 10 mg/mL injection 40 mg  40 mg Intravenous BID (Diuretic)    morphINE 10 MG/5ML oral solution 2.5 mg  2.5 mg Oral TID PRN    amiodarone (Pacerone) tab 200 mg  200 mg Oral Daily    sodium chloride (Saline Mist) 0.65 % nasal solution 1 spray  1 spray Each Nare Q3H PRN    pantoprazole (Protonix) DR tab 40 mg  40 mg Oral QAM AC    acetaminophen (Tylenol Extra Strength) tab 1,000 mg  1,000 mg Oral Q6H PRN    senna (Senokot) 8.8 MG/5ML oral syrup 17.6 mg  10 mL Per NG Tube Daily PRN    docusate (Colace) 50 MG/5ML oral solution 100 mg  100 mg Per NG Tube BID PRN    midodrine (ProAmatine) tab 10 mg  10 mg Oral TID    traMADol (Ultram) tab 50 mg  50 mg Oral Q6H PRN    ondansetron (Zofran) 4 MG/2ML injection 4 mg  4 mg Intravenous Q6H PRN    metoclopramide (Reglan) 5 mg/mL injection 5 mg  5 mg Intravenous Q8H PRN    morphINE PF  2 MG/ML injection 1 mg  1 mg Intravenous Q2H PRN    polyethylene glycol (PEG 3350) (Miralax) 17 g oral packet 17 g  17 g Per NG Tube Daily PRN    insulin aspart (NovoLOG) 100 Units/mL FlexPen 1-5 Units  1-5 Units Subcutaneous TID CC and HS    glucose (Dex4) 15 GM/59ML oral liquid 15 g  15 g Oral Q15 Min PRN    Or    glucose (Glutose) 40% oral gel 15 g  15 g Oral Q15 Min PRN    Or    glucose-vitamin C (Dex-4) chewable tab 4 tablet  4 tablet Oral Q15 Min PRN    Or    dextrose 50% injection 50 mL  50 mL Intravenous Q15 Min PRN    Or    glucose (Dex4) 15 GM/59ML oral liquid 30 g  30 g Oral Q15 Min PRN    Or    glucose (Glutose) 40% oral gel 30 g  30 g Oral Q15 Min PRN    Or    glucose-vitamin C (Dex-4) chewable tab 8 tablet  8 tablet Oral Q15 Min PRN    ipratropium-albuterol (Duoneb) 0.5-2.5 (3) MG/3ML inhalation solution 3 mL  3 mL Nebulization Q6H PRN    levothyroxine (Synthroid) tab 100 mcg  100 mcg Oral Before breakfast   [2]

## 2025-06-02 LAB
ALBUMIN SERPL-MCNC: 3 G/DL (ref 3.2–4.8)
ANION GAP SERPL CALC-SCNC: 6 MMOL/L (ref 0–18)
BASOPHILS # BLD AUTO: 0.05 X10(3) UL (ref 0–0.2)
BASOPHILS NFR BLD AUTO: 0.6 %
BUN BLD-MCNC: 14 MG/DL (ref 9–23)
BUN/CREAT SERPL: 12.4 (ref 10–20)
CALCIUM BLD-MCNC: 8.4 MG/DL (ref 8.7–10.4)
CHLORIDE SERPL-SCNC: 97 MMOL/L (ref 98–112)
CO2 SERPL-SCNC: 40 MMOL/L (ref 21–32)
CREAT BLD-MCNC: 1.13 MG/DL (ref 0.55–1.02)
DEPRECATED RDW RBC AUTO: 66.3 FL (ref 35.1–46.3)
EGFRCR SERPLBLD CKD-EPI 2021: 53 ML/MIN/1.73M2 (ref 60–?)
EOSINOPHIL # BLD AUTO: 0.28 X10(3) UL (ref 0–0.7)
EOSINOPHIL NFR BLD AUTO: 3.2 %
ERYTHROCYTE [DISTWIDTH] IN BLOOD BY AUTOMATED COUNT: 20.3 % (ref 11–15)
GLUCOSE BLD-MCNC: 129 MG/DL (ref 70–99)
GLUCOSE BLDC GLUCOMTR-MCNC: 139 MG/DL (ref 70–99)
GLUCOSE BLDC GLUCOMTR-MCNC: 144 MG/DL (ref 70–99)
GLUCOSE BLDC GLUCOMTR-MCNC: 146 MG/DL (ref 70–99)
GLUCOSE BLDC GLUCOMTR-MCNC: 171 MG/DL (ref 70–99)
HCT VFR BLD AUTO: 24.2 % (ref 35–48)
HGB BLD-MCNC: 7.5 G/DL (ref 12–16)
IMM GRANULOCYTES # BLD AUTO: 0.31 X10(3) UL (ref 0–1)
IMM GRANULOCYTES NFR BLD: 3.5 %
LYMPHOCYTES # BLD AUTO: 1.15 X10(3) UL (ref 1–4)
LYMPHOCYTES NFR BLD AUTO: 13.1 %
MAGNESIUM SERPL-MCNC: 1.5 MG/DL (ref 1.6–2.6)
MCH RBC QN AUTO: 27.4 PG (ref 26–34)
MCHC RBC AUTO-ENTMCNC: 31 G/DL (ref 31–37)
MCV RBC AUTO: 88.3 FL (ref 80–100)
MONOCYTES # BLD AUTO: 1.65 X10(3) UL (ref 0.1–1)
MONOCYTES NFR BLD AUTO: 18.8 %
NEUTROPHILS # BLD AUTO: 5.33 X10 (3) UL (ref 1.5–7.7)
NEUTROPHILS # BLD AUTO: 5.33 X10(3) UL (ref 1.5–7.7)
NEUTROPHILS NFR BLD AUTO: 60.8 %
OSMOLALITY SERPL CALC.SUM OF ELEC: 298 MOSM/KG (ref 275–295)
PLATELET # BLD AUTO: 246 10(3)UL (ref 150–450)
POTASSIUM SERPL-SCNC: 3.4 MMOL/L (ref 3.5–5.1)
RBC # BLD AUTO: 2.74 X10(6)UL (ref 3.8–5.3)
SODIUM SERPL-SCNC: 143 MMOL/L (ref 136–145)
WBC # BLD AUTO: 8.8 X10(3) UL (ref 4–11)

## 2025-06-02 PROCEDURE — 99232 SBSQ HOSP IP/OBS MODERATE 35: CPT | Performed by: INTERNAL MEDICINE

## 2025-06-02 PROCEDURE — 99233 SBSQ HOSP IP/OBS HIGH 50: CPT | Performed by: FAMILY MEDICINE

## 2025-06-02 RX ORDER — MAGNESIUM OXIDE 400 MG/1
800 TABLET ORAL ONCE
Status: COMPLETED | OUTPATIENT
Start: 2025-06-02 | End: 2025-06-02

## 2025-06-02 NOTE — CM/SW NOTE
Department  notified of request for Palliative , aidin referrals started. Assigned CM/SW to follow up with pt/family on further discharge planning.     Jessica Chu, DSC

## 2025-06-02 NOTE — PROGRESS NOTES
[facility]  part of [facility]    Progress Note    Kelli Fisher Patient Status:  Inpatient    1955 MRN [id number]   Location [facility]5W Attending Inna Bullock MD   Hosp Day # 21 PCP Taylor Gallardo MD         Subjective:   Subjective:  Up to the chair and comfortable on 2 L of oxygen with minimal cough  No fever  Generalized fatigue  Objective:   Blood pressure 106/68, pulse 76, temperature 97.3 °F (36.3 °C), temperature source Axillary, resp. rate 16, height 5' (1.524 m), weight 155 lb 12.8 oz (70.7 kg), SpO2 99%.  Physical Exam  Constitutional:       Appearance: She is ill-appearing.   Cardiovascular:      Rate and Rhythm: Normal rate.      Heart sounds:      No gallop.   Pulmonary:      Comments: Slightly diminished in the bases otherwise clear  Abdominal:      Palpations: Abdomen is soft.   Neurological:      General: No focal deficit present.         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

## 2025-06-02 NOTE — PLAN OF CARE
Pt with little appetite throughout the day and poor oral intake.       Problem: Diabetes/Glucose Control  Goal: Glucose maintained within prescribed range  Description: INTERVENTIONS:- Monitor Blood Glucose as ordered- Assess for signs and symptoms of hyperglycemia and hypoglycemia- Administer ordered medications to maintain glucose within target range- Assess barriers to adequate nutritional intake and initiate nutrition consult as needed- Instruct patient on self management of diabetes  Outcome: Progressing     Problem: Patient Centered Care  Goal: Patient preferences are identified and integrated in the patient's plan of care  Description: Interventions:- What would you like us to know as we care for you? - Provide timely, complete, and accurate information to patient/family- Incorporate patient and family knowledge, values, beliefs, and cultural backgrounds into the planning and delivery of care- Encourage patient/family to participate in care and decision-making at the level they choose- Honor patient and family perspectives and choices  Outcome: Progressing     Problem: RISK FOR INFECTION - ADULT  Goal: Absence of fever/infection during anticipated neutropenic period  Description: INTERVENTIONS- Monitor WBC- Administer growth factors as ordered- Implement neutropenic guidelines  Outcome: Progressing     Problem: CARDIOVASCULAR - ADULT  Goal: Maintains optimal cardiac output and hemodynamic stability  Description: INTERVENTIONS:- Monitor vital signs, rhythm, and trends- Monitor for bleeding, hypotension and signs of decreased cardiac output- Evaluate effectiveness of vasoactive medications to optimize hemodynamic stability- Monitor arterial and/or venous puncture sites for bleeding and/or hematoma- Assess quality of pulses, skin color and temperature- Assess for signs of decreased coronary artery perfusion - ex. Angina- Evaluate fluid balance, assess for edema, trend weights  Outcome: Progressing  Goal: Absence  of cardiac arrhythmias or at baseline  Description: INTERVENTIONS:- Continuous cardiac monitoring, monitor vital signs, obtain 12 lead EKG if indicated- Evaluate effectiveness of antiarrhythmic and heart rate control medications as ordered- Initiate emergency measures for life threatening arrhythmias- Monitor electrolytes and administer replacement therapy as ordered  Outcome: Progressing     Problem: RESPIRATORY - ADULT  Goal: Achieves optimal ventilation and oxygenation  Description: INTERVENTIONS:- Assess for changes in respiratory status- Assess for changes in mentation and behavior- Position to facilitate oxygenation and minimize respiratory effort- Oxygen supplementation based on oxygen saturation or ABGs- Provide Smoking Cessation handout, if applicable- Encourage broncho-pulmonary hygiene including cough, deep breathe, Incentive Spirometry- Assess the need for suctioning and perform as needed- Assess and instruct to report SOB or any respiratory difficulty- Respiratory Therapy support as indicated- Manage/alleviate anxiety- Monitor for signs/symptoms of CO2 retention  Outcome: Progressing     Problem: SAFETY ADULT - FALL  Goal: Free from fall injury  Description: INTERVENTIONS:  - Assess pt frequently for physical needs  - Identify cognitive and physical deficits and behaviors that affect risk of falls.  - [place] fall precautions as indicated by assessment.  - Educate pt/family on patient safety including physical limitations  - Instruct pt to call for assistance with activity based on assessment  - Modify environment to reduce risk of injury  - Provide assistive devices as appropriate  - Consider OT/PT consult to assist with strengthening/mobility  - Encourage toileting schedule  Outcome: Progressing     Problem: DISCHARGE PLANNING  Goal: Discharge to home or other facility with appropriate resources  Description: INTERVENTIONS:  - Identify barriers to discharge w/pt and caregiver  - Include  patient/family/discharge partner in discharge planning  - Arrange for needed discharge resources and transportation as appropriate  - Identify discharge learning needs (meds, wound care, etc)  - Arrange for interpreters to assist at discharge as needed  - Consider post-discharge preferences of patient/family/discharge partner  - Complete POLST form as appropriate  - Assess patient's ability to be responsible for managing their own health  - Refer to Case Management Department for coordinating discharge planning if the patient needs post-hospital services based on physician/LIP order or complex needs related to functional status, cognitive ability or social support system  Outcome: Progressing     Problem: HEMATOLOGIC - ADULT  Goal: Free from bleeding injury  Description: (Example usage: patient with low platelets)  INTERVENTIONS:  - Avoid intramuscular injections, enemas and rectal medication administration  - Ensure safe mobilization of patient  - Hold pressure on venipuncture sites to achieve adequate hemostasis  - Assess for signs and symptoms of internal bleeding  - Monitor lab trends  - Patient is to report abnormal signs of bleeding to staff  - Avoid use of toothpicks and dental floss  - Use electric shaver for shaving  - Use soft bristle tooth brush  - Limit straining and forceful nose blowing  Outcome: Progressing  Goal: Maintains hematologic stability  Description: INTERVENTIONS  - Assess for signs and symptoms of bleeding or hemorrhage  - Monitor labs and vital signs for trends  - Administer supportive blood products/factors, fluids and medications as ordered and appropriate  - Administer supportive blood products/factors as ordered and appropriate  Outcome: Progressing  Goal: Free from bleeding injury  Description: (Example usage: patient with low platelets)  INTERVENTIONS:  - Avoid intramuscular injections, enemas and rectal medication administration  - Ensure safe mobilization of patient  - Hold  pressure on venipuncture sites to achieve adequate hemostasis  - Assess for signs and symptoms of internal bleeding  - Monitor lab trends  - Patient is to report abnormal signs of bleeding to staff  - Avoid use of toothpicks and dental floss  - Use electric shaver for shaving  - Use soft bristle tooth brush  - Limit straining and forceful nose blowing  Outcome: Progressing     Problem: GASTROINTESTINAL - ADULT  Goal: Minimal or absence of nausea and vomiting  Description: INTERVENTIONS:  - Maintain adequate hydration with IV or PO as ordered and tolerated  - Nasogastric tube to low intermittent suction as ordered  - Evaluate effectiveness of ordered antiemetic medications  - Provide nonpharmacologic comfort measures as appropriate  - Advance diet as tolerated, if ordered  - Obtain nutritional consult as needed  - Evaluate fluid balance  Outcome: Progressing  Goal: Maintains or returns to baseline bowel function  Description: INTERVENTIONS:  - Assess bowel function  - Maintain adequate hydration with IV or PO as ordered and tolerated  - Evaluate effectiveness of GI medications  - Encourage mobilization and activity  - Obtain nutritional consult as needed  - Establish a toileting routine/schedule  - Consider collaborating with pharmacy to review patient's medication profile  Outcome: Progressing     Problem: GENITOURINARY - ADULT  Goal: Absence of urinary retention  Description: INTERVENTIONS:  - Assess patient’s ability to void and empty bladder  - Monitor intake/output and perform bladder scan as needed  - Follow urinary retention protocol/standard of care  - Consider collaborating with pharmacy to review patient's medication profile  - Implement strategies to promote bladder emptying  Outcome: Progressing     Problem: METABOLIC/FLUID AND ELECTROLYTES - ADULT  Goal: Glucose maintained within prescribed range  Description: INTERVENTIONS:- Monitor Blood Glucose as ordered- Assess for signs and symptoms of  hyperglycemia and hypoglycemia- Administer ordered medications to maintain glucose within target range- Assess barriers to adequate nutritional intake and initiate nutrition consult as needed- Instruct patient on self management of diabetes  Outcome: Progressing  Goal: Electrolytes maintained within normal limits  Description: INTERVENTIONS:  - Monitor labs and rhythm and assess patient for signs and symptoms of electrolyte imbalances  - Administer electrolyte replacement as ordered  - Monitor response to electrolyte replacements, including rhythm and repeat lab results as appropriate  - Fluid restriction as ordered  - Instruct patient on fluid and nutrition restrictions as appropriate  Outcome: Progressing  Goal: Hemodynamic stability and optimal renal function maintained  Description: INTERVENTIONS:  - Monitor labs and assess for signs and symptoms of volume excess or deficit  - Monitor intake, output and patient weight  - Monitor urine specific gravity, serum osmolarity and serum sodium as indicated or ordered  - Monitor response to interventions for patient's volume status, including labs, urine output, blood pressure (other measures as available)  - Encourage oral intake as appropriate  - Instruct patient on fluid and nutrition restrictions as appropriate  Outcome: Progressing     Problem: SKIN/TISSUE INTEGRITY - ADULT  Goal: Skin integrity remains intact  Description: INTERVENTIONS  - Assess and document risk factors for pressure ulcer development  - Assess and document skin integrity  - Monitor for areas of redness and/or skin breakdown  - Initiate interventions, skin care algorithm/standards of care as needed  Outcome: Progressing  Goal: Incision(s), wounds(s) or drain site(s) healing without S/S of infection  Description: INTERVENTIONS:  - Assess and document risk factors for pressure ulcer development  - Assess and document skin integrity  - Assess and document dressing/incision, wound bed, drain sites and  surrounding tissue  - Implement wound care per orders  - Initiate isolation precautions as appropriate  - Initiate Pressure Ulcer prevention bundle as indicated  Outcome: Progressing     Problem: MUSCULOSKELETAL - ADULT  Goal: Return mobility to safest level of function  Description: INTERVENTIONS:  - Assess patient stability and activity tolerance for standing, transferring and ambulating w/ or w/o assistive devices  - Assist with transfers and ambulation using safe patient handling equipment as needed  - Ensure adequate protection for wounds/incisions during mobilization  - Obtain PT/OT consults as needed  - Advance activity as appropriate  - Communicate ordered activity level and limitations with patient/family  Outcome: Progressing  Goal: Maintain proper alignment of affected body part  Description: INTERVENTIONS:  - Support and protect limb and body alignment per provider's orders  - Instruct and reinforce with patient and family use of appropriate assistive device and precautions (e.g. spinal or hip dislocation precautions)  Outcome: Progressing     Problem: GASTROINTESTINAL - ADULT  Goal: Maintains adequate nutritional intake (undernourished)  Description: INTERVENTIONS:  - Monitor percentage of each meal consumed  - Identify factors contributing to decreased intake, treat as appropriate  - Assist with meals as needed  - Monitor I&O, WT and lab values  - Obtain nutritional consult as needed  - Optimize oral hygiene and moisture  - Encourage food from home; allow for food preferences  - Enhance eating environment  Outcome: Not Progressing

## 2025-06-02 NOTE — PLAN OF CARE
Problem: Diabetes/Glucose Control  Goal: Glucose maintained within prescribed range  Description: INTERVENTIONS:- Monitor Blood Glucose as ordered- Assess for signs and symptoms of hyperglycemia and hypoglycemia- Administer ordered medications to maintain glucose within target range- Assess barriers to adequate nutritional intake and initiate nutrition consult as needed- Instruct patient on self management of diabetes  Outcome: Progressing     Problem: Patient Centered Care  Goal: Patient preferences are identified and integrated in the patient's plan of care  Description: Interventions:- What would you like us to know as we care for you? - Provide timely, complete, and accurate information to patient/family- Incorporate patient and family knowledge, values, beliefs, and cultural backgrounds into the planning and delivery of care- Encourage patient/family to participate in care and decision-making at the level they choose- Honor patient and family perspectives and choices  Outcome: Progressing     Problem: RISK FOR INFECTION - ADULT  Goal: Absence of fever/infection during anticipated neutropenic period  Description: INTERVENTIONS- Monitor WBC- Administer growth factors as ordered- Implement neutropenic guidelines  Outcome: Progressing     Problem: CARDIOVASCULAR - ADULT  Goal: Maintains optimal cardiac output and hemodynamic stability  Description: INTERVENTIONS:- Monitor vital signs, rhythm, and trends- Monitor for bleeding, hypotension and signs of decreased cardiac output- Evaluate effectiveness of vasoactive medications to optimize hemodynamic stability- Monitor arterial and/or venous puncture sites for bleeding and/or hematoma- Assess quality of pulses, skin color and temperature- Assess for signs of decreased coronary artery perfusion - ex. Angina- Evaluate fluid balance, assess for edema, trend weights  Outcome: Progressing  Goal: Absence of cardiac arrhythmias or at baseline  Description: INTERVENTIONS:-  Continuous cardiac monitoring, monitor vital signs, obtain 12 lead EKG if indicated- Evaluate effectiveness of antiarrhythmic and heart rate control medications as ordered- Initiate emergency measures for life threatening arrhythmias- Monitor electrolytes and administer replacement therapy as ordered  Outcome: Progressing     Problem: RESPIRATORY - ADULT  Goal: Achieves optimal ventilation and oxygenation  Description: INTERVENTIONS:- Assess for changes in respiratory status- Assess for changes in mentation and behavior- Position to facilitate oxygenation and minimize respiratory effort- Oxygen supplementation based on oxygen saturation or ABGs- Provide Smoking Cessation handout, if applicable- Encourage broncho-pulmonary hygiene including cough, deep breathe, Incentive Spirometry- Assess the need for suctioning and perform as needed- Assess and instruct to report SOB or any respiratory difficulty- Respiratory Therapy support as indicated- Manage/alleviate anxiety- Monitor for signs/symptoms of CO2 retention  Outcome: Progressing     Problem: SAFETY ADULT - FALL  Goal: Free from fall injury  Description: INTERVENTIONS:  - Assess pt frequently for physical needs  - Identify cognitive and physical deficits and behaviors that affect risk of falls.  - [place] fall precautions as indicated by assessment.  - Educate pt/family on patient safety including physical limitations  - Instruct pt to call for assistance with activity based on assessment  - Modify environment to reduce risk of injury  - Provide assistive devices as appropriate  - Consider OT/PT consult to assist with strengthening/mobility  - Encourage toileting schedule  Outcome: Progressing     Problem: DISCHARGE PLANNING  Goal: Discharge to home or other facility with appropriate resources  Description: INTERVENTIONS:  - Identify barriers to discharge w/pt and caregiver  - Include patient/family/discharge partner in discharge planning  - Arrange for needed  discharge resources and transportation as appropriate  - Identify discharge learning needs (meds, wound care, etc)  - Arrange for interpreters to assist at discharge as needed  - Consider post-discharge preferences of patient/family/discharge partner  - Complete POLST form as appropriate  - Assess patient's ability to be responsible for managing their own health  - Refer to Case Management Department for coordinating discharge planning if the patient needs post-hospital services based on physician/LIP order or complex needs related to functional status, cognitive ability or social support system  Outcome: Progressing     Problem: HEMATOLOGIC - ADULT  Goal: Free from bleeding injury  Description: (Example usage: patient with low platelets)  INTERVENTIONS:  - Avoid intramuscular injections, enemas and rectal medication administration  - Ensure safe mobilization of patient  - Hold pressure on venipuncture sites to achieve adequate hemostasis  - Assess for signs and symptoms of internal bleeding  - Monitor lab trends  - Patient is to report abnormal signs of bleeding to staff  - Avoid use of toothpicks and dental floss  - Use electric shaver for shaving  - Use soft bristle tooth brush  - Limit straining and forceful nose blowing  Outcome: Progressing  Goal: Maintains hematologic stability  Description: INTERVENTIONS  - Assess for signs and symptoms of bleeding or hemorrhage  - Monitor labs and vital signs for trends  - Administer supportive blood products/factors, fluids and medications as ordered and appropriate  - Administer supportive blood products/factors as ordered and appropriate  Outcome: Progressing  Goal: Free from bleeding injury  Description: (Example usage: patient with low platelets)  INTERVENTIONS:  - Avoid intramuscular injections, enemas and rectal medication administration  - Ensure safe mobilization of patient  - Hold pressure on venipuncture sites to achieve adequate hemostasis  - Assess for signs  and symptoms of internal bleeding  - Monitor lab trends  - Patient is to report abnormal signs of bleeding to staff  - Avoid use of toothpicks and dental floss  - Use electric shaver for shaving  - Use soft bristle tooth brush  - Limit straining and forceful nose blowing  Outcome: Progressing     Problem: GASTROINTESTINAL - ADULT  Goal: Minimal or absence of nausea and vomiting  Description: INTERVENTIONS:  - Maintain adequate hydration with IV or PO as ordered and tolerated  - Nasogastric tube to low intermittent suction as ordered  - Evaluate effectiveness of ordered antiemetic medications  - Provide nonpharmacologic comfort measures as appropriate  - Advance diet as tolerated, if ordered  - Obtain nutritional consult as needed  - Evaluate fluid balance  Outcome: Progressing  Goal: Maintains or returns to baseline bowel function  Description: INTERVENTIONS:  - Assess bowel function  - Maintain adequate hydration with IV or PO as ordered and tolerated  - Evaluate effectiveness of GI medications  - Encourage mobilization and activity  - Obtain nutritional consult as needed  - Establish a toileting routine/schedule  - Consider collaborating with pharmacy to review patient's medication profile  Outcome: Progressing  Goal: Maintains adequate nutritional intake (undernourished)  Description: INTERVENTIONS:  - Monitor percentage of each meal consumed  - Identify factors contributing to decreased intake, treat as appropriate  - Assist with meals as needed  - Monitor I&O, WT and lab values  - Obtain nutritional consult as needed  - Optimize oral hygiene and moisture  - Encourage food from home; allow for food preferences  - Enhance eating environment  Outcome: Progressing     Problem: GENITOURINARY - ADULT  Goal: Absence of urinary retention  Description: INTERVENTIONS:  - Assess patient’s ability to void and empty bladder  - Monitor intake/output and perform bladder scan as needed  - Follow urinary retention  protocol/standard of care  - Consider collaborating with pharmacy to review patient's medication profile  - Implement strategies to promote bladder emptying  Outcome: Progressing     Problem: METABOLIC/FLUID AND ELECTROLYTES - ADULT  Goal: Glucose maintained within prescribed range  Description: INTERVENTIONS:- Monitor Blood Glucose as ordered- Assess for signs and symptoms of hyperglycemia and hypoglycemia- Administer ordered medications to maintain glucose within target range- Assess barriers to adequate nutritional intake and initiate nutrition consult as needed- Instruct patient on self management of diabetes  Outcome: Progressing  Goal: Electrolytes maintained within normal limits  Description: INTERVENTIONS:  - Monitor labs and rhythm and assess patient for signs and symptoms of electrolyte imbalances  - Administer electrolyte replacement as ordered  - Monitor response to electrolyte replacements, including rhythm and repeat lab results as appropriate  - Fluid restriction as ordered  - Instruct patient on fluid and nutrition restrictions as appropriate  Outcome: Progressing  Goal: Hemodynamic stability and optimal renal function maintained  Description: INTERVENTIONS:  - Monitor labs and assess for signs and symptoms of volume excess or deficit  - Monitor intake, output and patient weight  - Monitor urine specific gravity, serum osmolarity and serum sodium as indicated or ordered  - Monitor response to interventions for patient's volume status, including labs, urine output, blood pressure (other measures as available)  - Encourage oral intake as appropriate  - Instruct patient on fluid and nutrition restrictions as appropriate  Outcome: Progressing     Problem: SKIN/TISSUE INTEGRITY - ADULT  Goal: Skin integrity remains intact  Description: INTERVENTIONS  - Assess and document risk factors for pressure ulcer development  - Assess and document skin integrity  - Monitor for areas of redness and/or skin  breakdown  - Initiate interventions, skin care algorithm/standards of care as needed  Outcome: Progressing  Goal: Incision(s), wounds(s) or drain site(s) healing without S/S of infection  Description: INTERVENTIONS:  - Assess and document risk factors for pressure ulcer development  - Assess and document skin integrity  - Assess and document dressing/incision, wound bed, drain sites and surrounding tissue  - Implement wound care per orders  - Initiate isolation precautions as appropriate  - Initiate Pressure Ulcer prevention bundle as indicated  Outcome: Progressing     Problem: MUSCULOSKELETAL - ADULT  Goal: Return mobility to safest level of function  Description: INTERVENTIONS:  - Assess patient stability and activity tolerance for standing, transferring and ambulating w/ or w/o assistive devices  - Assist with transfers and ambulation using safe patient handling equipment as needed  - Ensure adequate protection for wounds/incisions during mobilization  - Obtain PT/OT consults as needed  - Advance activity as appropriate  - Communicate ordered activity level and limitations with patient/family  Outcome: Progressing  Goal: Maintain proper alignment of affected body part  Description: INTERVENTIONS:  - Support and protect limb and body alignment per provider's orders  - Instruct and reinforce with patient and family use of appropriate assistive device and precautions (e.g. spinal or hip dislocation precautions)  Outcome: Progressing

## 2025-06-02 NOTE — PLAN OF CARE
Problem: ALTERED NUTRIENT INTAKE - ADULT  Goal: Nutrient intake appropriate for improving, restoring or maintaining nutritional needs  Description: INTERVENTIONS:- Assess nutritional status and recommend course of action- Monitor oral intake, labs, and treatment plans- Recommend appropriate diets, oral nutritional supplements, and vitamin/mineral supplements- Recommend, monitor, and adjust tube feedings and TPN/PPN based on assessed needs- Provide specific nutrition education as appropriate  6/2/2025 1301 by Hailey Lowery, OLVIN  Outcome: Not Progressing

## 2025-06-02 NOTE — OCCUPATIONAL THERAPY NOTE
OT treatment session attempted. Pt refusing; eating breakfast. Will follow up as schedule allows. Thank you.    Casandra Crane, Occupational Therapist, MS, OTR/L

## 2025-06-02 NOTE — PAYOR COMM NOTE
--------------  CONTINUED STAY REVIEW    Payor: [employer] MEDICARE ADV PPO  Subscriber #:  [id number]  Authorization Number: [id number]    Admit date: 5/12/25  Admit time:  5:28 PM    5/31/25      Subjective:   Kelli Fisher is a(n) 70 year old female Pt sitting in chair today. Sob better after thoracentesis with 900 out.      Blood pressure 91/68, pulse 84, temperature 98.2 °F (36.8 °C), temperature source Axillary, resp. rate 20, height 5' (1.524 m), weight 175 lb (79.4 kg), SpO2 98%.      General appearance: alert, appears stated age and cooperative  Pulmonary: cta improved   Cardiovascular: S1, S2 normal, no murmur, click, rub or gallop, regular rate and rhythm  Abdominal: soft, non-tender; bowel sounds normal; no masses,  no organomegaly  Extremities: b/l swelling     XR CHEST AP PORTABLE  (CPT=71045)  Result Date: 5/31/2025  CONCLUSION:                Stable to slightly increasing right pleural effusion.  Pulmonary edema is again seen.  Persistent patchy left mid lung airspace opacity, atelectasis versus pneumonia.    [email or web address]     Dictated by (CST): Portillo Ramirez MD on 5/31/2025 at 7:30 AM     Finalized by (CST): Portillo Ramirze MD on 5/31/2025 at 7:32 AM           XR CHEST AP PORTABLE  (CPT=71045)  Result Date: 5/29/2025  CONCLUSION:                Post right-sided thoracentesis with small residual right pleural effusion.  No pneumothorax.  Moderate interstitial edema, unchanged.  Patchy left midlung airspace opacity is unchanged suggestive of atelectasis or pneumonia   Dictated by (CST): Jaime Gan MD on 5/29/2025 at 5:21 PM     Finalized by (CST): Jaime Gan MD on 5/29/2025 at 5:22 PM           US THORACENTESIS GUIDED RIGHT (CPT=32555)  Result Date: 5/29/2025  CONCLUSION:              Moderate right pleural effusion.  Thoracentesis attempted.    Dictated by (CST): Jaime Gan MD on 5/29/2025 at 5:12 PM     Finalized by (CST): Jaime Gan MD on 5/29/2025 at 5:13 PM                CBC:    Lab Results    Component Value Date     WBC 10.6 05/31/2025     WBC 11.0 05/30/2025     WBC 14.9 (H) 05/29/2025      Lab Results   Component Value Date     HGB 7.5 (L) 05/31/2025     HGB 7.5 (L) 05/30/2025     HGB 7.7 (L) 05/29/2025      Lab Results   Component Value Date     .0 05/31/2025     .0 05/30/2025     .0 05/29/2025      Lab 05/29/25  0542 05/30/25  0456 05/30/25  1810 05/31/25  0617   * 116*  --  117*   BUN 19 17  --  17   CREATSERUM 1.09* 1.05*  --  1.04*   CA 8.7 8.2*  --  8.2*   * 145  --  147*   K 4.0 3.4* 3.9 4.0    103  --  105   CO2 33.0* 37.0*  --  37.0*       Assessment and Plan:      Afib RVR  Pericardial effusion  - sp pericardial window 5/21, chest tube out 5/23 ; fluid positive for malignancy   - cardiology consulted  - IV amio --> now on oral. Avoiding BB, CCB with h/o junctional rhythm   - cont monitor on tele  - on 5/16 experienced RVR again with hypotension, received digoxin converted to NSR. Remained  hypotensive so sent to stepdown unit. Responded to fluid bolus and required jewel   - Transferred back to medical floor, normotensive rates controlled.  -5/30 - bp dropped overnight and lasix held. Received dig x 1 for rvr. Better today  - CXR with b/l effusions - sp 900 removed by thora. Apprec pulm - > dc planning   - repeat ECHO showing large pericardial effusion ; f/u echo with no pericardial effusion   Limited ECHO 5/25: normal left ventricle, EF 65-70%  -SOB and CXR with Bilateral Pleural effusion -> IV lasix 40 bid monitor output. Wts up since admit     BL PE: acute small segmental/right lower extremity DVT  S/p IVC 5/14/2025  Was not on a/c due to thrombocytopenia  Now on eliquis but will hold for possible thora -> will hold indefinitely given bleeding risk per cards       Metastatic endometrial cancer    Pancytopenia  Acute on chronic anemia of chronic disease  - s/p 2 units prbcs improved today  - neutropenic precautions  - started neupogen until ANC  1500  - Plan is for further chemotherapy once recovered clinically per Dr. Herring.  - received 6 days of iv cefepime now off   - cefepime and vanco started for neutropenia and patient with cough/chills, elevated LA, possible early sepsis now off. Wbc 15. Afebrile. 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 Cont lasix.   -sp thora as above       Thrush  - nystatin     Malnutrition - pt eating more today. Monitor over weekend. May need tube feeds                          6/1/25        Subjective:   Kelli Fisher is a(n) 70 year old female Pt sitting in chair today. Sob better after thoracentesis with 900 out.       General appearance: alert, appears stated age and cooperative  Pulmonary: cta improved   Cardiovascular: S1, S2 normal, no murmur, click, rub or gallop, regular rate and rhythm  Abdominal: soft, non-tender; bowel sounds normal; no masses,  no organomegaly  Extremities: b/l swelling      CBC:    Lab Results   Component Value Date     WBC 9.1 06/01/2025     WBC 10.6 05/31/2025     WBC 11.0 05/30/2025      Lab Results   Component Value Date     HGB 7.2 (L) 06/01/2025     HGB 7.5 (L) 05/31/2025     HGB 7.5 (L) 05/30/2025      Lab Results   Component Value Date     .0 06/01/2025     .0 05/31/2025     .0 05/30/2025      Lab 05/29/25  0542 05/30/25  0456 05/30/25  1810 05/31/25  0617   * 116*  --  117*   BUN 19 17  --  17   CREATSERUM 1.09* 1.05*  --  1.04*   CA 8.7 8.2*  --  8.2*   * 145  --  147*   K 4.0 3.4* 3.9 4.0    103  --  105   CO2 33.0* 37.0*  --  37.0*       Assessment and Plan:      Afib RVR  Pericardial effusion  - sp pericardial window 5/21, chest tube out 5/23 ; fluid positive for malignancy   - cardiology consulted  - IV amio --> now on oral.  Avoiding BB, CCB with h/o junctional rhythm   - cont monitor on tele  - on 5/16 experienced RVR again with hypotension, received digoxin converted to NSR. Remained  hypotensive so sent to stepdown unit. Responded to fluid bolus and required jewel   - Transferred back to medical floor, normotensive rates controlled.  -5/30 - bp dropped overnight and lasix held. Received dig x 1 for rvr. Better today  - CXR with b/l effusions - sp 900 removed by thora. Apprec pulm - > dc planning   - repeat ECHO showing large pericardial effusion ; f/u echo with no pericardial effusion   Limited ECHO 5/25: normal left ventricle, EF 65-70%  -SOB and CXR with Bilateral Pleural effusion -> IV lasix 40 bid monitor output. Wts up since admit     BL PE: acute small segmental/right lower extremity DVT  S/p IVC 5/14/2025  Was not on a/c due to thrombocytopenia  Now on eliquis but will hold for possible thora -> will hold indefinitely given bleeding risk per cards       Metastatic endometrial cancer    Pancytopenia  Acute on chronic anemia of chronic disease  - s/p 2 units prbcs improved today  - neutropenic precautions  - started neupogen until ANC 1500  - Plan is for further chemotherapy once recovered clinically per Dr. Herring.  - received 6 days of iv cefepime now off   - cefepime and vanco started for neutropenia and patient with cough/chills, elevated LA, possible early sepsis now off. Wbc 15. Afebrile. 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 Cont lasix.   -sp thora as above       Thrush  - nystatin     Malnutrition - pt eating more today. Monitor over weekend. May need tube feeds                        MEDICATIONS ADMINISTERED IN LAST 1 DAY:  amiodarone (Pacerone) tab 200 mg       Date Action Dose Route User    6/2/2025  0842 Given 200 mg Oral Aisha Zimmerman RN          furosemide (Lasix) 10 mg/mL injection 40 mg       Date Action Dose Route User    6/1/2025 1719 Given 40 mg Intravenous Margarette Taylor RN          insulin aspart (NovoLOG) 100 Units/mL FlexPen 1-5 Units       Date Action Dose Route User    6/1/2025 1858 Given 2 Units Subcutaneous (Right Upper Arm) Margarette Taylor RN          levothyroxine (Synthroid) tab 100 mcg       Date Action Dose Route User    6/2/2025 0628 Given 100 mcg Oral Terri Vincent RN          midodrine (ProAmatine) tab 10 mg       Date Action Dose Route User    6/2/2025 0628 Given 10 mg Oral Terri Vincent RN    6/1/2025 1719 Given 10 mg Oral Margarette Taylor RN    6/1/2025 1311 Given 10 mg Oral Margarette Taylor RN          pantoprazole (Protonix) 40 mg in sodium chloride 0.9% PF 10 mL IV push       Date Action Dose Route User    6/2/2025 0628 Given 40 mg Intravenous Terri Vincent RN            Vitals (last day)       Date/Time Temp Pulse Resp BP SpO2 Weight O2 Device O2 Flow Rate (L/min) Who    06/02/25 0842 97.5 °F (36.4 °C) 76 18 90/60 100 % -- High flow nasal cannula 4 L/min     06/02/25 0611 97.6 °F (36.4 °C) 69 16 98/67 99 % -- High flow nasal cannula 4 L/min     06/02/25 0105 97.7 °F (36.5 °C) 74 16 94/59 100 % -- High flow nasal cannula 4 L/min     06/01/25 2007 97.5 °F (36.4 °C) 80 18 97/71 100 % -- High flow nasal cannula 4 L/min     06/01/25 0942 97.4 °F (36.3 °C) 86 18 95/63 98 % -- High flow nasal cannula 4 L/min     06/01/25 0550 98 °F (36.7 °C) 79 16 103/63 100 % -- High flow nasal cannula 4 L/min        Blood Transfusion Record       Product Unit Status Volume Start End            Transfuse RBC       25  [id number]  [id number] Completed 05/29/25 1357 292 mL 05/29/25 0957 05/29/25 1157       25  [id number]  [id number] Completed 05/22/25 0708 411.25 mL 05/21/25 1208 05/21/25 1500       25  [id number]  [id number] Completed 05/13/25 1431 335 mL 05/13/25 1144  05/13/25 1428                Transfuse platelets       25  [id number]  [id number] Completed 05/14/25 1319 200 mL 05/14/25 1116 05/14/25 1316

## 2025-06-02 NOTE — PROGRESS NOTES
[facility]  part of [facility]    Progress Note    Kelli Fisher Patient Status:  Inpatient    1955 MRN [id number]   Location [facility]5W Attending Lina Dueñas, DO   Hosp Day # 21 PCP Taylor Gallardo MD     Chief complaint sob     Subjective:   Kelli Fisher is a(n) 70 year old female Pt sitting in chair today. Sob better after thoracentesis with 900 out.     Pt contiues to be on 4 Litres of oxygen       ROS:   sob   No c/d   No n/v     Objective:     Blood pressure 106/68, pulse 76, temperature 97.3 °F (36.3 °C), temperature source Axillary, resp. rate 16, height 5' (1.524 m), weight 155 lb 12.8 oz (70.7 kg), SpO2 99%.      Intake/Output Summary (Last 24 hours) at 2025 1419  Last data filed at 2025 1349  Gross per 24 hour   Intake 520 ml   Output 1450 ml   Net -930 ml       Patient Weight(s) for the past 336 hrs:   Weight   25 1343 155 lb 12.8 oz (70.7 kg)   25 0406 175 lb (79.4 kg)   25 0514 175 lb 8 oz (79.6 kg)   25 0625 171 lb 11.8 oz (77.9 kg)   25 0600 169 lb 12.1 oz (77 kg)   25 0500 171 lb 15.3 oz (78 kg)   25 0500 167 lb 8.8 oz (76 kg)   25 0600 168 lb 14 oz (76.6 kg)           General appearance: alert, appears stated age and cooperative  Pulmonary: cta improved   Cardiovascular: S1, S2 normal, no murmur, click, rub or gallop, regular rate and rhythm  Abdominal: soft, non-tender; bowel sounds normal; no masses,  no organomegaly  Extremities: b/l swelling         Medicines:     Current Hospital Medications[1]                Blood Pressure and Cardiac Medications            amiodarone 200 MG Oral Tab            Medication Infusions[2]        Lab Results   Component Value Date    WBC 8.8 2025    HGB 7.5 (L) 2025    HCT 24.2 (L) 2025    .0 2025    CREATSERUM 1.13 (H) 2025    BUN 14 2025     2025    K 3.4 (L) 2025    CL 97 (L) 2025    CO2 40.0 (HH)  06/02/2025     (H) 06/02/2025    CA 8.4 (L) 06/02/2025    ALB 3.0 (L) 06/02/2025    ALKPHO 142 05/12/2025    BILT 0.7 05/12/2025    TP 6.8 05/12/2025    AST 26 05/12/2025    ALT 10 05/12/2025    PTT 37.0 (H) 05/21/2025    INR 1.31 (H) 05/22/2025    TSH 3.705 05/02/2025    LIP 42 12/11/2023    DDIMER 3.05 (H) 05/13/2025    MG 1.5 (L) 06/02/2025    B12 >2,000 (H) 05/02/2025       No results found.          Results:     CBC:    Lab Results   Component Value Date    WBC 8.8 06/02/2025    WBC 9.1 06/01/2025    WBC 10.6 05/31/2025     Lab Results   Component Value Date    HGB 7.5 (L) 06/02/2025    HGB 7.2 (L) 06/01/2025    HGB 7.5 (L) 05/31/2025      Lab Results   Component Value Date    .0 06/02/2025    .0 06/01/2025    .0 05/31/2025       Recent Labs   Lab 05/30/25  0456 05/30/25  1810 05/31/25  0617 06/02/25  1002   *  --  117* 129*   BUN 17  --  17 14   CREATSERUM 1.05*  --  1.04* 1.13*   CA 8.2*  --  8.2* 8.4*     --  147* 143   K 3.4* 3.9 4.0 3.4*     --  105 97*   CO2 37.0*  --  37.0* 40.0*           Assessment and Plan:      Afib RVR  Pericardial effusion  - sp pericardial window 5/21, chest tube out 5/23 ; fluid positive for malignancy   - cardiology consulted  - IV amio --> now on oral. Avoiding BB, CCB with h/o junctional rhythm   - cont monitor on tele  - on 5/16 experienced RVR again with hypotension, received digoxin converted to NSR. Remained  hypotensive so sent to stepdown unit. Responded to fluid bolus and required jewel   - Transferred back to medical floor, normotensive rates controlled.  -5/30 - bp dropped overnight and lasix held. Received dig x 1 for rvr. Better today  - CXR with b/l effusions - sp 900 removed by ghislaine. Apprec pulm - > dc planning   - repeat ECHO showing large pericardial effusion ; f/u echo with no pericardial effusion   Limited ECHO 5/25: normal left ventricle, EF 65-70%  -SOB and CXR with Bilateral Pleural effusion -> IV lasix 40 bid  monitor output. Wts up since admit  She continues to be volum overloaded, cards plans for IV Lasix and      BL PE: acute small segmental/right lower extremity DVT  S/p IVC 5/14/2025  Was not on a/c due to thrombocytopenia  Now on eliquis but will hold for possible thora -> will hold indefinitely given bleeding risk per cards         Metastatic endometrial cancer    Pancytopenia  Acute on chronic anemia of chronic disease  - s/p 2 units prbcs improved today  - neutropenic precautions  - started neupogen until ANC 1500  - Plan is for further chemotherapy once recovered clinically per Dr. Herring.  - received 6 days of iv cefepime now off   - cefepime and vanco started for neutropenia and patient with cough/chills, elevated LA, possible early sepsis now off. Wbc 15. Afebrile. On 5 L    -Pancytopenia improving, monitor daily  - Overall plan of care is to continue to treat medically over the next several days and monitor for improvement.    - discussed with pt and daughter today and they are comfortable with palliative care consult for information . Explained it is meant for more comfort care then treatment of the cancer   - apprec pall care consult - discussed with Pat and pt not ready for hospice.. 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 Cont lasix.   -sp thora as above       Thrush  - nystatin    Malnutrition - pt eating more today. Monitor over weekend. May need tube feeds      Deconditioning  PT OT      Dispo: PT recommending EDUARDO, patient to discuss with family. Have previously refused    Pt lives by herself, continue to be SOB with min exertion, now fluid overload, will benefit from EDUARDO   .      MDM: High   I  personally spent time on chart/note review, review of labs/imaging, discussion with patient, physical exam, discussion with staff, consultants, coordinating care, writing progress note, and discussion of plan of care.                Inna Bullock MD, FAAFP                Supplementary Documentation:   DVT Mechanical Prophylaxis: MACHELLE hose, SCDs, Early ambuation  DVT Pharmacologic Prophylaxis   Medication   None                Code Status: DNAR/Selective Treatment  Rogel: External urinary catheter in place  Rogel Duration (in days):   Central line:    JOSE: 6/3/2025                            [1]   Current Facility-Administered Medications   Medication Dose Route Frequency    pantoprazole (Protonix) 40 mg in sodium chloride 0.9% PF 10 mL IV push  40 mg Intravenous Daily    potassium chloride 40 mEq in 250mL sodium chloride 0.9% IVPB premix  40 mEq Intravenous Once    acetaminophen (Ofirmev) 10 mg/mL infusion premix 1,000 mg  1,000 mg Intravenous Q4H PRN    furosemide (Lasix) 10 mg/mL injection 40 mg  40 mg Intravenous BID (Diuretic)    morphINE 10 MG/5ML oral solution 2.5 mg  2.5 mg Oral TID PRN    amiodarone (Pacerone) tab 200 mg  200 mg Oral Daily    sodium chloride (Saline Mist) 0.65 % nasal solution 1 spray  1 spray Each Nare Q3H PRN    acetaminophen (Tylenol Extra Strength) tab 1,000 mg  1,000 mg Oral Q6H PRN    senna (Senokot) 8.8 MG/5ML oral syrup 17.6 mg  10 mL Per NG Tube Daily PRN    docusate (Colace) 50 MG/5ML oral solution 100 mg  100 mg Per NG Tube BID PRN    midodrine (ProAmatine) tab 10 mg  10 mg Oral TID    traMADol (Ultram) tab 50 mg  50 mg Oral Q6H PRN    ondansetron (Zofran) 4 MG/2ML injection 4 mg  4 mg Intravenous Q6H PRN    metoclopramide (Reglan) 5 mg/mL injection 5 mg  5 mg Intravenous Q8H PRN    morphINE PF 2 MG/ML injection 1 mg  1 mg Intravenous Q2H PRN    polyethylene glycol (PEG 3350) (Miralax) 17 g oral packet 17 g  17 g Per NG Tube Daily PRN    insulin aspart (NovoLOG) 100 Units/mL  FlexPen 1-5 Units  1-5 Units Subcutaneous TID CC and HS    glucose (Dex4) 15 GM/59ML oral liquid 15 g  15 g Oral Q15 Min PRN    Or    glucose (Glutose) 40% oral gel 15 g  15 g Oral Q15 Min PRN    Or    glucose-vitamin C (Dex-4) chewable tab 4 tablet  4 tablet Oral Q15 Min PRN    Or    dextrose 50% injection 50 mL  50 mL Intravenous Q15 Min PRN    Or    glucose (Dex4) 15 GM/59ML oral liquid 30 g  30 g Oral Q15 Min PRN    Or    glucose (Glutose) 40% oral gel 30 g  30 g Oral Q15 Min PRN    Or    glucose-vitamin C (Dex-4) chewable tab 8 tablet  8 tablet Oral Q15 Min PRN    ipratropium-albuterol (Duoneb) 0.5-2.5 (3) MG/3ML inhalation solution 3 mL  3 mL Nebulization Q6H PRN    levothyroxine (Synthroid) tab 100 mcg  100 mcg Oral Before breakfast   [2]

## 2025-06-02 NOTE — PHYSICAL THERAPY NOTE
PHYSICAL THERAPY TREATMENT NOTE - INPATIENT     Room Number: 522/522-A       Presenting Problem: chest pain and respiratory failure with hypoxia, seen for re-evaluation as patient is now s/p pericardial window and drainage and placement of chest tube  Co-Morbidities : metastatic endometrial cancer with lung mets, anemia, paroxysmal SVT, small pericardial effusion    Problem List  Principal Problem:    Acute respiratory failure with hypoxia (HCC)  Active Problems:    Thrombocytopenia (HCC)    Azotemia    Pancytopenia (HCC)    Hyperglycemia    Atrial fibrillation with RVR (HCC)    Malnutrition (HCC)    Malignant neoplasm metastatic to lung (HCC)    Palliative care by specialist    Pleural effusion, bilateral    Generalized weakness    Acute pulmonary embolism (HCC)    Malignant pericardial effusion (HCC)    Dyspnea and respiratory abnormalities      PHYSICAL THERAPY ASSESSMENT   Patient demonstrates fair progress this session, goals  remain in progress.      Patient is requiring minimal assist as a result of the following impairments: decreased functional strength, decreased endurance/aerobic capacity, pain, impaired standing balance, impaired coordination, impaired motor planning, decreased muscular endurance, and medical status.     Patient continues to function below baseline with bed mobility, transfers, gait, maintaining seated position, standing prolonged periods, and performing household tasks.  Next session anticipate patient to progress bed mobility, transfers, gait, maintaining seated position, standing prolonged periods, and performing household tasks.  Physical Therapy will continue to follow patient for duration of hospitalization.    Patient continues to benefit from continued skilled PT services: to promote return to prior level of function and safety with continuous assistance and gradual rehabilitative therapy .    PLAN DURING HOSPITALIZATION  Nursing Mobility Recommendation :  (assist x 2 with RW vs  mary ann)  PT Device Recommendation: Rolling walker  PT Treatment Plan: Bed mobility, Body mechanics, Endurance, Energy conservation, Patient education, Gait training, Range of motion, Strengthening, Transfer training  Frequency (Obs): 3-5x/week     SUBJECTIVE  I feel weak but I can get up to the chair.     OBJECTIVE  Precautions: Chest tube to suction (RT Port)    WEIGHT BEARING RESTRICTION       PAIN ASSESSMENT   Ratin  Location: incisional region  Management Techniques: Activity promotion, Breathing techniques, Relaxation, Repositioning, Body mechanics    BALANCE  Static Sitting: Fair +  Dynamic Sitting: Fair -  Static Standing: Poor +  Dynamic Standing: Poor +    ACTIVITY TOLERANCE                          O2 WALK       AM-PAC '6-Clicks' INPATIENT SHORT FORM - BASIC MOBILITY  How much difficulty does the patient currently have...  Patient Difficulty: Turning over in bed (including adjusting bedclothes, sheets and blankets)?: A Little   Patient Difficulty: Sitting down on and standing up from a chair with arms (e.g., wheelchair, bedside commode, etc.): A Little   Patient Difficulty: Moving from lying on back to sitting on the side of the bed?: A Little   How much help from another person does the patient currently need...   Help from Another: Moving to and from a bed to a chair (including a wheelchair)?: A Lot   Help from Another: Need to walk in hospital room?: A Lot   Help from Another: Climbing 3-5 steps with a railing?: A Lot     AM-PAC Score:  Raw Score: 15   Approx Degree of Impairment: 57.7%   Standardized Score (AM-PAC Scale): 39.45   CMS Modifier (G-Code): CK    FUNCTIONAL ABILITY STATUS  Functional Mobility/Gait Assessment  Gait Assistance: Minimum assistance  Distance (ft): 5  Assistive Device: Rolling walker  Pattern: Shuffle (unsteady gait and standing balance fatigues quickly with standing and wants to sit)  Rolling: minimal assist  Supine to Sit: minimal assist  Sit to Supine: minimal assist  Sit  to Stand: minimal assist    Skilled Therapy Provided: Pt ed with bed mobility and transfers with a RW tot he chair. Pt ed with pre gait mobility and 5' with shuffling unsteady gait with chair to follow. Pt ed with therex in the chair x 10 reps for LE strength and ROM. Pt is on track for a GR with PT as medical progress allows.     The patient's Approx Degree of Impairment: 57.7% has been calculated based on documentation in the [facility] '6 clicks' Inpatient Daily Activity Short Form.  Research supports that patients with this level of impairment may benefit from GR with PT.    Final disposition will be made by interdisciplinary medical team.    THERAPEUTIC EXERCISES  Lower Extremity Ankle pumps  Heel slides  LAQ     Position Sitting & Standing       Patient End of Session: Up in chair, With  staff, Needs met, Call light within reach, RN aware of session/findings, All patient questions and concerns addressed, Hospital anti-slip socks, Alarm set    CURRENT GOALS   CURRENT GOALS   Goals to be met by: 6/6/25  Patient Goal Patient's self-stated goal is: none stated   Goal #1 Patient is able to demonstrate supine - sit EOB @ level: minimum assistance      Goal #1   Current Status  Min A   Goal #2 Patient is able to demonstrate transfers Sit to/from Stand at assistance level: CGA with walker - rolling      Goal #2  Current Status  Min A with RW    Goal #3 Patient is able to ambulate 50 feet with assist device: walker - rolling at assistance level: CGA   Goal #3   Current Status  Min A 5' shuffling unsteady gait   Goal #4 Patient to demonstrate independence with home activity/exercise instructions provided to patient in preparation for discharge.   Goal #4   Current Status  Progressing     Gait Training: 10 minutes  Therapeutic Exercise: 15 minutes

## 2025-06-02 NOTE — CM/SW NOTE
CM f/u on MDO for C-PC    Pt is res'd to go to [facility], their provider is [facility].    CM asked [facility] to send ref to [place] to eval.    1530  CM res'd [facility] for C-PC at [facility]    Plan  [facility] for C-PC    / to remain available for support and/or discharge planning.   Ashley Muñoz RN    Ext [phone]

## 2025-06-02 NOTE — PROGRESS NOTES
Progress Note  Kelli Fisher Patient Status:  Inpatient    1955 MRN [id number]   Location [facility]5W Attending Inna Bullock MD   Hosp Day # 21 PCP Taylor Gallardo MD     Subjective:  Pt sitting up in chair; denies SOB, orthopnea, palpitations currently. Still with LE swelling.     Objective:  BP 93/61 (BP Location: Right arm)   Pulse 76   Temp 97.3 °F (36.3 °C) (Axillary)   Resp 16   Ht 5' (1.524 m)   Wt 175 lb (79.4 kg)   SpO2 99%   BMI 34.18 kg/m²     Telemetry: NSR    Intake/Output:    Intake/Output Summary (Last 24 hours) at 2025 1304  Last data filed at 2025 1108  Gross per 24 hour   Intake 520 ml   Output 1250 ml   Net -730 ml       Last 3 Weights   25 0406 175 lb (79.4 kg)   25 0514 175 lb 8 oz (79.6 kg)   25 0625 171 lb 11.8 oz (77.9 kg)   25 0600 169 lb 12.1 oz (77 kg)   25 0500 171 lb 15.3 oz (78 kg)   25 0500 167 lb 8.8 oz (76 kg)   25 0600 168 lb 14 oz (76.6 kg)   25 0341 167 lb (75.8 kg)   25 0611 164 lb 10.9 oz (74.7 kg)   25 1600 156 lb 1.4 oz (70.8 kg)   25 0439 163 lb 8 oz (74.2 kg)   05/15/25 1243 154 lb 3.2 oz (69.9 kg)   05/15/25 0458 177 lb 11.2 oz (80.6 kg)   25 0645 166 lb 9.6 oz (75.6 kg)   25 0406 167 lb 9.6 oz (76 kg)   25 1736 165 lb 11.2 oz (75.2 kg)   25 1217 169 lb (76.7 kg)   25 0500 174 lb 9.6 oz (79.2 kg)   25 0549 175 lb 12.8 oz (79.7 kg)   25 1800 163 lb (73.9 kg)   25 2038 165 lb (74.8 kg)       Labs:  Recent Labs   Lab 25  0456 25  1810 25  0617 25  1002   *  --  117* 129*   BUN 17  --  17 14   CREATSERUM 1.05*  --  1.04* 1.13*   EGFRCR 58*  --  58* 53*   CA 8.2*  --  8.2* 8.4*     --  147* 143   K 3.4* 3.9 4.0 3.4*     --  105 97*   CO2 37.0*  --  37.0* 40.0*     Recent Labs   Lab 25  0617 25  0613 25  0636   RBC 2.67* 2.62* 2.74*   HGB 7.5* 7.2* 7.5*   HCT 23.7* 23.4*  24.2*   MCV 88.8 89.3 88.3   MCH 28.1 27.5 27.4   MCHC 31.6 30.8* 31.0   RDW 20.5* 20.5* 20.3*   NEPRELIM 7.19 5.61 5.33   WBC 10.6 9.1 8.8   .0 238.0 246.0         No results for input(s): \"TROP\", \"TROPHS\", \"CK\" in the last 168 hours.    Diagnostics:  No results found.   Review of Systems   Cardiovascular:  Positive for leg swelling. Negative for chest pain and dyspnea on exertion.   Respiratory:  Negative for cough and shortness of breath.        Physical Exam:    Gen: alert, oriented x 3, NAD  Heent: pupils equal, reactive. Mucous membranes moist.   Neck: no jvd  Cardiac: regular rate and rhythm, normal S1,S2, no murmur, clicks, rub or gallop  Lungs: diminished  Abd: soft, NT/ND +bs  Ext: BLE 2+ pitting edema  Skin: Warm, dry    Neuro: No focal deficits      Medications:    Scheduled Medications[1]  Medication Infusions[2]      Assessment:  Acute Hypoxic Respiratory Failure - Multifactorial (Lung Mets, PE, Pleural Effusions, Volume Overload)  Decreasing O2 requirements - on baseline O2 4L  Pulm following  Bilateral Pleural Effusion, R >L  5/29 S/p R thoracentesis - 900ML bloody fluid  Bilateral PE/DVT s/p IVC filter  5/13 CTA Chest w/small volume acute bilateral segmental PE  5/14 US + RLE acute non-occlusive DVT; neg for LLE DVT  Initially unable to tolerate A/C d/t thrombocytopenia; now eliquis on hold d/t anemia   Acute HFpEF  proBNP 1110 on admit, 1826 on 5/29   Echo LVEF 65-70%, bilateral pleural effusion on CXR  Diuresis w/IV Lasix - dose held this am d/t hypotension  Net - 5.5L, I&O incomplete, wts uptrending  Appears volume overloaded on exam  Large Pericardial Effusion w/Tamponade s/p Urgent Pericardial Window, R Pleural Chest Tube  Hx Known pericardial effusion last admission - previously small - moderate  5/13 Echo w/moderate effusion; no evidence of HD compromise  5/20 Repeat echo w/large pericardial effusion; evidence of RV collapse, dilated IVC  5/21 S/P Urgent Pericardial Window w/CV  surgery  Fluid + for malignancy  5/23 Removed Pleural Chest Tube  5/24 Removed Nathaniel drain   5/25 Repeat echo w/o pericardial effusion  Paroxysmal Atrial Tachycardia/Atrial Fibrillation  Hx PSVT on recent admission w/junctional rhythm noted while on BB  Intermittent PAF RVR this admission - now NSR   S/P amio load; now on 200mg po daily   TSH WNL  [id number] - hx on Eliquis 5mg po BID; held d/t anemia  Hypotension  Initially w/shock, requiring vasopressor support  Now BP improved on midodrine  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 Check albumin and continue IV lasix  Strict I&O, daily weights (standing if possible), daily BMP, HF order set     Plan of care discussed with patient, RN.    Emily George, ABBIE  6/2/2025  1:04 PM             [1]    pantoprazole  40 mg Intravenous Daily    potassium chloride  40 mEq Intravenous Once    furosemide  40 mg Intravenous BID (Diuretic)    amiodarone  200 mg Oral Daily    midodrine  10 mg Oral TID    insulin aspart  1-5 Units Subcutaneous TID CC and HS    levothyroxine  100 mcg Oral Before breakfast   [2]

## 2025-06-02 NOTE — DIETARY NOTE
ADULT NUTRITION REASSESSMENT    Pt is at high nutrition risk r/t prolonged inadequate oral intake.  Pt meets moderate malnutrition criteria.     RECOMMENDATIONS TO MD: See nutrition intervention for ONS (oral nutrition supplements). Liberalized diet to ATTILA vs cardiac due to limited po intake.    Given prolonged inadequate oral intake, recommend temporary EN if in accordance with pt/family GOC and MD POC.    ADMITTING DIAGNOSIS:  Atrial fibrillation with RVR (HCC) [I48.91]  Acute respiratory failure with hypoxia (HCC) [J96.01]  Pancytopenia (HCC) [D61.818]  PERTINENT PAST MEDICAL HISTORY:   Past Medical History:    Asthma (HCC)    Esophageal reflux    History of blood transfusion    Visual impairment     PATIENT STATUS: Initial 05/13/25: Pt assessed due to screening at risk for decreased appetite and unintentional weight loss. RD also consulted for \"heart failure diet education\" - not appropriate given clinical status (cleared consult). Pt known to nutrition services from previous admissions, last seen 5/1/25 and met criteria for chronic moderate protein calorie malnutrition (Chronic MPCM) - pt discharged 5/6/25. Per past RD notes (5/1 and 4/14) - decreased po intake reported/noted. Chart reviewed, pt admitted by PCP for IRVIN and HTN x 3 days. Pt with increased swelling to legs. Pt with hx of metastatic endometrial cancer with lung mets (on chemotherapy). Pt also noted to have thrush - nystatin ordered. Discussion with RN, poor oral intake. Intakes reviewed, 25%  1 meal noted. Pt visited, reports decreased po intake over last 3 days. Prior to 3 days ago pt reports better intake/appetite. Typically eats small frequent meals as recommended by MD. Pt reports drinking protein shake but unsure which one (only drinks 1 daily). Pt noted coughing while drinking Ginger ale, spitting up - RN notified. Pt reports difficulties swallowing liquids and solids - last admission pt on chopped/soft  & bite sized with mildly thick  liquids. Pt noted to have VFSS last admission recommending regular texture/thin liquids per RN. Pt reports some stomach pain r/t feeling like needing to have a bowel movement (last BM noted a few days ago). Pt unsure of weight loss, reports usual body weight (UBW) around 169#. Current weight 167# 9.6 oz. EMR weight review, last known weight # 10 oz on 5/5/25 - 7.0# or 4.0% weight loss x 1 week (significant). Per EMR weight review, pt noted weighing 162# 4 oz on 4/12/25 - stable, 175# 14.8 oz on 4/3/25 - 8.3# or 4.7% weight loss x 1 month (non-significant), 182# 11.2 oz on 2/20/25 - 15.1# or 8.3% weight loss x 3 months (significant), 183# on 12/19/24 - 15.4# or 8.4%  weight loss x 5 months (non-significant), 197# on 1/3/24 and 197# on 12/11/23. Nutrition focused physical exam (NFPE) completed, see below for details. Encouraged small frequent meals with emphasis on high calorie and high protein. Discussed oral nutritional supplements (ONS) - pt in agreement and provided flavor preferences. +ONS per discussion.  5/19/25 UPDATE: po remains small, but drinks the ensure max most days. She doesn't like the glucerna or magic cup, so will change to ensure max bid--mohsen, pt pref. Pt states thrush is still bothersome to her.  Urged pt to push po intake some. Pt says a dr told her not to push it because they dont want her to throw up. Told her not to push it that much, just a little.    5/23 Update: Pt reassessed early. Pt transferred to CCU on 5/21 for emergent pericardial window d/t hypoventilation and hypoxia. Pleural chest tube removed this AM. Continues with poor po, consuming 0-20% of meals since last RD visit. Pt stated she is drinking Ensure BID. Pt has not had breakfast yet this AM, waiting for delivery- ordered Italian ice and cream of chicken. Encouraged pt to continue increasing intake to help meet nutritional needs. Answered all questions at this time. Will continue to follow per protocol.  Update 05/29/25:  RA completed. Chart reviewed, IV lasix given r/t SOB and CXR with bilateral pleural effusion. Discussion with RN, possible thoracentesis vs pleurx. Intakes reviewed, 25% x 2 meals since last visit per flowsheet documentation. No po documentation from 5/23-5/28. Meal tickets reviewed - mainly ordering lighter meals. Noted refusals of all meals on 5/27. Poor/inadequate oral intake throughout admission (17 days) + 3 days PTA per initial assessment with pt. Pt visited, daughter at bedside, pt sitting up but eyes remained closed. Daughter reports intake remains poor. Daughter brought in outside food and pt only took about a bite or so. Family assisting in encouraging meal intake. Noted multiple supplements at bedside/in room. Daughter reports pt normally drinking ONS however did cough up some this morning. Discussed adjusting ONS, daughter would like to keep Ensure Max ordered as mostly drinking these but in agreement to add back magic cup vanilla as pt likes cold items and remembers pt liking this (magic cup mixed berry discontinued 5/19). Adjusted ONS per discussion with daughter. Monitor intakes. Fluctuating weight this admission. Changed to high nutrition risk.   Given prolonged inadequate oral intake, pt may benefit from temporary enteral nutrition (EN) if in accordance with pt/family GOC and MD POC. Discussed with MD in person.    Update 06/02/25: RA completed per protocol. Chart reviewed, thoracentesis completed 5/29 - 900 ml removed. Discussion with RN, not much appetite still - only ate 25% of oatmeal, refused lunch so far. Intakes reviewed, 0-100% x 11 meals since last visit (averaging 25% overall - poor). Pt visited, reports appetite is fine. Pt reports improvement in po intake - ate about 50% of oatmeal this morning (per RN, only a few bites ~ 25%). Pt reports planning on ordering lunch but not at this time. Pt reports ate 50% of fish and chips on Friday that family brought in and enjoyed. Pt reports drinking  ONS occasionally - noted 3-4 unopened in room, pt would like to continue ONS. Denies any concerns. Encouraged increased energy and protein intake with ONS to help maximize nutrition. Palliative care consulted, note reviewed. Current code status: DNAR/Select.    Given prolonged inadequate oral intake, pt may benefit from temporary enteral nutrition (EN) if in accordance with pt/family [facility] and MD POC. MD aware.     FOOD/NUTRITION RELATED HISTORY:  Appetite: Poor  Intake: ~0-100% x11 meals documented since last visit - averaging 25% overall (poor) + variable intake of ONS (3-4 unopened note at bedside)   50% fish and chips from outside per pt (documented as 100%).  Intake Meeting Needs: No, even with oral nutrition supplements (ONS) ordered  Percent Meals Eaten (last 6 days)       Date/Time Percent Meals Eaten (%)    05/27/25 1740 --     Percent Meals Eaten (%): outside food, smoothie for dinner at 05/27/25 1740    05/28/25 1133 25 %    05/28/25 1657 25 %    05/28/25 1846 --     Percent Meals Eaten (%): tray at bedside at 05/28/25 1846    05/29/25 1013 10 %     Percent Meals Eaten (%): family brought outside food at 05/29/25 1013    05/29/25 1443 0 %     Percent Meals Eaten (%): refused lunch at 05/29/25 1443    05/29/25 1815 50 %    05/30/25 0657 10 %     Percent Meals Eaten (%): applesauce with meds at 05/30/25 0657    05/30/25 0854 0 %     Percent Meals Eaten (%): refusing lunch. ate one cookie at 05/30/25 0854    05/30/25 1754 0 %     Percent Meals Eaten (%): pt not eating at 05/30/25 1754    05/30/25 2047 100 %     Percent Meals Eaten (%): fish+chips from outside at 05/30/25 2047    05/31/25 1342 15 %    06/01/25 1200 15 %    06/01/25 1500 40 %    06/01/25 2008 25 %     Percent Meals Eaten (%): dinner at 06/01/25 2008 06/02/25 1108 25 %     Percent Meals Eaten (%): oatmeal at 06/02/25 1108           Food Allergies: Lactose Intolerant causes cramping,bloating  Cultural/Ethnic/Yazdanism Preferences: Not  Obtained    GASTROINTESTINAL: +BM 6/2/25 - large;soft, Loss of appetite, and Swallow evaluation noted  U/O: 1100 ml (0.6 ml/kg/hr) - adequate    MEDICATIONS: reviewed  IV Lasix BID noted  Electrolyte replacement per protocol (cardiac)   pantoprazole  40 mg Intravenous Daily    potassium chloride  40 mEq Intravenous Once    furosemide  40 mg Intravenous BID (Diuretic)    amiodarone  200 mg Oral Daily    midodrine  10 mg Oral TID    insulin aspart  1-5 Units Subcutaneous TID CC and HS    levothyroxine  100 mcg Oral Before breakfast     LABS: reviewed A1c 6.6% on 5/12/25  Hypernatremia resolved  Hypokalemia (3.4) and Hypomagnesemia (1.5) noted - replace per protocol  Recent Labs     05/30/25  0456 05/30/25  1810 05/31/25  0617 06/02/25  1002   *  --  117* 129*   BUN 17  --  17 14   CREATSERUM 1.05*  --  1.04* 1.13*   CA 8.2*  --  8.2* 8.4*   MG  --   --   --  1.5*     --  147* 143   K 3.4* 3.9 4.0 3.4*     --  105 97*   CO2 37.0*  --  37.0* 40.0*   OSMOCALC 303*  --  307* 298*     WEIGHT HISTORY:  Patient Weight(s) for the past 336 hrs:   Weight   05/30/25 0406 79.4 kg (175 lb)   05/29/25 0514 79.6 kg (175 lb 8 oz)   05/27/25 0625 77.9 kg (171 lb 11.8 oz)   05/25/25 0600 77 kg (169 lb 12.1 oz)   05/24/25 0500 78 kg (171 lb 15.3 oz)   05/23/25 0500 76 kg (167 lb 8.8 oz)   05/22/25 0600 76.6 kg (168 lb 14 oz)     Wt Readings from Last 10 Encounters:   05/30/25 79.4 kg (175 lb)   04/24/25 76.7 kg (169 lb)   05/05/25 79.2 kg (174 lb 9.6 oz)   04/14/25 76.7 kg (169 lb)   12/11/23 89.4 kg (197 lb)     ANTHROPOMETRICS:  HT:  152.4 cm (5')    Wt Readings from Last 1 Encounters:   05/30/25 79.4 kg (175 lb)     Last weight: Fluid changes noted true weight likely lower given below noted edema--current wt c/w admission wt.    BMI: Body mass index is 34.18 kg/m².  Dosing Weight: 76 kg - taken on 5/13/25, utilized for anthropometric calculations  BMI CLASSIFICATION: 30-34.9 kg/m2 - obesity class I  IBW/lbs  (Calculated) Female: 100 lbs             175% IBW  Usual Body Wt: 197 lbs January 2024 per EMR review      89% UBW    NUTRITION RELATED PHYSICAL FINDINGS:  - Nutrition Focused Physical Exam (NFPE): mild depletion body fat triceps region and mild depletion muscle mass dorsal dale region and calf region  - Fluid Accumulation: non-pitting Bilateral Upper extremity and generalized, +1 Bilateral Lower extremity and Right Foot , and +2 Left Foot  per flowsheet review --> See RN documentation for details  - Skin Integrity: at risk, intact, and surgical wound(s) per flowsheet review --> See RN documentation for details    CRITERIA FOR MALNUTRITION DIAGNOSIS:  Criteria for non-severe malnutrition diagnosis: chronic illness related to wt loss 7.5% in 3 months, body fat mild depletion, and muscle mass mild depletion.    NUTRITION DIAGNOSIS/PROBLEM:   Moderate Malnutrition related to Chronic - Physiological causes resulting in anorexia or diminished intake as evidenced by wt loss 7.5% in 3 months, body fat mild depletion, muscle mass mild depletion and variable intakes.    NUTRITION DIAGNOSIS PROGRESS:  No Improvement (continue)--intake remains low despite ONS    NUTRITION INTERVENTION:   NUTRITION PRESCRIPTION:   Estimated Nutrition needs: --dosing wt of 76 kg - wt taken on 5/13/25  Calories: 1134-7308 calories/day (20-25 calories per kg Dosing wt)  Protein: 55-68 g protein/day (1.2-1.5 g protein/kg Ideal body wt (IBW)45.5 kg)    - Diet:       Procedures    Sodium restricted diet Sodium Restriction: 3-4 GM NA; Is Patient on Accuchecks? Yes      - Nutrition Care Plan: Encouraged increased PO intake, Encouraged small frequent meals with emphasis on high calorie/high protein, and Initiated ONS (oral nutritional supplements)  - ONS (Oral Nutrition Supplements)/Meals/Snacks: Optensity (325 calories and 16 g protein each) BID Chocolate   - Vitamin and mineral supplements: none  - Feeding assistance: meal set up  - Nutrition  education: Discussed importance of adequate energy and protein intake    - Coordination of nutrition care: collaboration with other providers  - Discharge and transfer of nutrition care to new setting or provider: monitor plans Plan for Tammy Hodgson pending medical clearance    MONITOR AND EVALUATE/NUTRITION GOALS:  - Food and Nutrient Intake:      Monitor: adequacy of PO intake, tolerance of PO intake, and adequacy of supplement intake  - Food and Nutrient Administration:      Monitor: need for temporary enteral nutrition/GOC regarding nutrition  - Anthropometric Measurement:    Monitor weight  - Nutrition Goals:      allow wt loss due to fluid losses, PO and supplement greater than 75% of needs, good supplement intake, labs within acceptable limits, euglycemia, prevent skin breakdown, support body systems, halt true wt loss, and improved GI status    DIETITIAN FOLLOW UP: RD to follow and monitor nutrition status    Hailey Lowery MS, MELISSA, RDN, LDN  Clinical Dietitian  P: [phone]

## 2025-06-03 PROBLEM — J34.89 NASAL DRYNESS: Status: ACTIVE | Noted: 2025-06-03

## 2025-06-03 PROBLEM — J34.89 NASAL DRYNESS: Status: ACTIVE | Noted: 2025-01-01

## 2025-06-03 PROBLEM — Z71.89 GOALS OF CARE, COUNSELING/DISCUSSION: Status: ACTIVE | Noted: 2025-06-03

## 2025-06-03 PROBLEM — C54.1 ENDOMETRIAL ADENOCARCINOMA (HCC): Status: ACTIVE | Noted: 2025-06-03

## 2025-06-03 PROBLEM — R63.0 ANOREXIA: Status: ACTIVE | Noted: 2025-01-01

## 2025-06-03 PROBLEM — R63.0 ANOREXIA: Status: ACTIVE | Noted: 2025-06-03

## 2025-06-03 PROBLEM — Z71.89 GOALS OF CARE, COUNSELING/DISCUSSION: Status: ACTIVE | Noted: 2025-01-01

## 2025-06-03 PROBLEM — C54.1 ENDOMETRIAL ADENOCARCINOMA (HCC): Status: ACTIVE | Noted: 2025-01-01

## 2025-06-03 LAB
BASOPHILS # BLD AUTO: 0.04 X10(3) UL (ref 0–0.2)
BASOPHILS NFR BLD AUTO: 0.4 %
BUN BLD-MCNC: 14 MG/DL (ref 9–23)
BUN/CREAT SERPL: 12.8 (ref 10–20)
CALCIUM BLD-MCNC: 8.1 MG/DL (ref 8.7–10.4)
CHLORIDE SERPL-SCNC: 97 MMOL/L (ref 98–112)
CO2 SERPL-SCNC: >40 MMOL/L (ref 21–32)
CREAT BLD-MCNC: 1.09 MG/DL (ref 0.55–1.02)
DEPRECATED RDW RBC AUTO: 65.8 FL (ref 35.1–46.3)
EGFRCR SERPLBLD CKD-EPI 2021: 55 ML/MIN/1.73M2 (ref 60–?)
EOSINOPHIL # BLD AUTO: 0.26 X10(3) UL (ref 0–0.7)
EOSINOPHIL NFR BLD AUTO: 2.8 %
ERYTHROCYTE [DISTWIDTH] IN BLOOD BY AUTOMATED COUNT: 20.2 % (ref 11–15)
GLUCOSE BLD-MCNC: 123 MG/DL (ref 70–99)
GLUCOSE BLDC GLUCOMTR-MCNC: 124 MG/DL (ref 70–99)
GLUCOSE BLDC GLUCOMTR-MCNC: 134 MG/DL (ref 70–99)
GLUCOSE BLDC GLUCOMTR-MCNC: 141 MG/DL (ref 70–99)
GLUCOSE BLDC GLUCOMTR-MCNC: 150 MG/DL (ref 70–99)
HCT VFR BLD AUTO: 23.2 % (ref 35–48)
HGB BLD-MCNC: 7.3 G/DL (ref 12–16)
IMM GRANULOCYTES # BLD AUTO: 0.24 X10(3) UL (ref 0–1)
IMM GRANULOCYTES NFR BLD: 2.6 %
LYMPHOCYTES # BLD AUTO: 1.15 X10(3) UL (ref 1–4)
LYMPHOCYTES NFR BLD AUTO: 12.5 %
MAGNESIUM SERPL-MCNC: 1.5 MG/DL (ref 1.6–2.6)
MCH RBC QN AUTO: 28.2 PG (ref 26–34)
MCHC RBC AUTO-ENTMCNC: 31.5 G/DL (ref 31–37)
MCV RBC AUTO: 89.6 FL (ref 80–100)
MONOCYTES # BLD AUTO: 1.7 X10(3) UL (ref 0.1–1)
MONOCYTES NFR BLD AUTO: 18.5 %
NEUTROPHILS # BLD AUTO: 5.82 X10 (3) UL (ref 1.5–7.7)
NEUTROPHILS # BLD AUTO: 5.82 X10(3) UL (ref 1.5–7.7)
NEUTROPHILS NFR BLD AUTO: 63.2 %
OSMOLALITY SERPL CALC.SUM OF ELEC: 300 MOSM/KG (ref 275–295)
PLATELET # BLD AUTO: 223 10(3)UL (ref 150–450)
POTASSIUM SERPL-SCNC: 3.3 MMOL/L (ref 3.5–5.1)
POTASSIUM SERPL-SCNC: 3.3 MMOL/L (ref 3.5–5.1)
RBC # BLD AUTO: 2.59 X10(6)UL (ref 3.8–5.3)
SODIUM SERPL-SCNC: 144 MMOL/L (ref 136–145)
WBC # BLD AUTO: 9.2 X10(3) UL (ref 4–11)

## 2025-06-03 PROCEDURE — 99232 SBSQ HOSP IP/OBS MODERATE 35: CPT | Performed by: NURSE PRACTITIONER

## 2025-06-03 PROCEDURE — 99233 SBSQ HOSP IP/OBS HIGH 50: CPT | Performed by: FAMILY MEDICINE

## 2025-06-03 PROCEDURE — 99232 SBSQ HOSP IP/OBS MODERATE 35: CPT | Performed by: PHYSICIAN ASSISTANT

## 2025-06-03 RX ORDER — MAGNESIUM OXIDE 400 MG/1
800 TABLET ORAL ONCE
Status: COMPLETED | OUTPATIENT
Start: 2025-06-03 | End: 2025-06-03

## 2025-06-03 RX ORDER — FUROSEMIDE 40 MG/1
40 TABLET ORAL DAILY
Status: DISCONTINUED | OUTPATIENT
Start: 2025-06-04 | End: 2025-06-05

## 2025-06-03 RX ORDER — SODIUM CHLORIDE/ALOE VERA
GEL (GRAM) NASAL 3 TIMES DAILY
Status: DISCONTINUED | OUTPATIENT
Start: 2025-06-03 | End: 2025-06-05

## 2025-06-03 RX ORDER — POTASSIUM CHLORIDE 1500 MG/1
20 TABLET, EXTENDED RELEASE ORAL DAILY
Status: DISCONTINUED | OUTPATIENT
Start: 2025-06-04 | End: 2025-06-05

## 2025-06-03 NOTE — PLAN OF CARE
Problem: Diabetes/Glucose Control  Goal: Glucose maintained within prescribed range  Description: INTERVENTIONS:- Monitor Blood Glucose as ordered- Assess for signs and symptoms of hyperglycemia and hypoglycemia- Administer ordered medications to maintain glucose within target range- Assess barriers to adequate nutritional intake and initiate nutrition consult as needed- Instruct patient on self management of diabetes  Outcome: Progressing     Problem: Patient Centered Care  Goal: Patient preferences are identified and integrated in the patient's plan of care  Description: Interventions:- What would you like us to know as we care for you?   - Provide timely, complete, and accurate information to patient/family- Incorporate patient and family knowledge, values, beliefs, and cultural backgrounds into the planning and delivery of care- Encourage patient/family to participate in care and decision-making at the level they choose- Honor patient and family perspectives and choices  Outcome: Progressing     Problem: RISK FOR INFECTION - ADULT  Goal: Absence of fever/infection during anticipated neutropenic period  Description: INTERVENTIONS- Monitor WBC- Administer growth factors as ordered- Implement neutropenic guidelines  Outcome: Progressing     Problem: CARDIOVASCULAR - ADULT  Goal: Maintains optimal cardiac output and hemodynamic stability  Description: INTERVENTIONS:- Monitor vital signs, rhythm, and trends- Monitor for bleeding, hypotension and signs of decreased cardiac output- Evaluate effectiveness of vasoactive medications to optimize hemodynamic stability- Monitor arterial and/or venous puncture sites for bleeding and/or hematoma- Assess quality of pulses, skin color and temperature- Assess for signs of decreased coronary artery perfusion - ex. Angina- Evaluate fluid balance, assess for edema, trend weights  Outcome: Progressing  Goal: Absence of cardiac arrhythmias or at baseline  Description:  INTERVENTIONS:- Continuous cardiac monitoring, monitor vital signs, obtain 12 lead EKG if indicated- Evaluate effectiveness of antiarrhythmic and heart rate control medications as ordered- Initiate emergency measures for life threatening arrhythmias- Monitor electrolytes and administer replacement therapy as ordered  Outcome: Progressing     Problem: RESPIRATORY - ADULT  Goal: Achieves optimal ventilation and oxygenation  Description: INTERVENTIONS:- Assess for changes in respiratory status- Assess for changes in mentation and behavior- Position to facilitate oxygenation and minimize respiratory effort- Oxygen supplementation based on oxygen saturation or ABGs- Provide Smoking Cessation handout, if applicable- Encourage broncho-pulmonary hygiene including cough, deep breathe, Incentive Spirometry- Assess the need for suctioning and perform as needed- Assess and instruct to report SOB or any respiratory difficulty- Respiratory Therapy support as indicated- Manage/alleviate anxiety- Monitor for signs/symptoms of CO2 retention  Outcome: Progressing     Problem: SAFETY ADULT - FALL  Goal: Free from fall injury  Description: INTERVENTIONS:  - Assess pt frequently for physical needs  - Identify cognitive and physical deficits and behaviors that affect risk of falls.  - [place] fall precautions as indicated by assessment.  - Educate pt/family on patient safety including physical limitations  - Instruct pt to call for assistance with activity based on assessment  - Modify environment to reduce risk of injury  - Provide assistive devices as appropriate  - Consider OT/PT consult to assist with strengthening/mobility  - Encourage toileting schedule  Outcome: Progressing     Problem: DISCHARGE PLANNING  Goal: Discharge to home or other facility with appropriate resources  Description: INTERVENTIONS:  - Identify barriers to discharge w/pt and caregiver  - Include patient/family/discharge partner in discharge planning  -  Arrange for needed discharge resources and transportation as appropriate  - Identify discharge learning needs (meds, wound care, etc)  - Arrange for interpreters to assist at discharge as needed  - Consider post-discharge preferences of patient/family/discharge partner  - Complete POLST form as appropriate  - Assess patient's ability to be responsible for managing their own health  - Refer to Case Management Department for coordinating discharge planning if the patient needs post-hospital services based on physician/LIP order or complex needs related to functional status, cognitive ability or social support system  Outcome: Progressing     Problem: HEMATOLOGIC - ADULT  Goal: Free from bleeding injury  Description: (Example usage: patient with low platelets)  INTERVENTIONS:  - Avoid intramuscular injections, enemas and rectal medication administration  - Ensure safe mobilization of patient  - Hold pressure on venipuncture sites to achieve adequate hemostasis  - Assess for signs and symptoms of internal bleeding  - Monitor lab trends  - Patient is to report abnormal signs of bleeding to staff  - Avoid use of toothpicks and dental floss  - Use electric shaver for shaving  - Use soft bristle tooth brush  - Limit straining and forceful nose blowing  Outcome: Progressing  Goal: Maintains hematologic stability  Description: INTERVENTIONS  - Assess for signs and symptoms of bleeding or hemorrhage  - Monitor labs and vital signs for trends  - Administer supportive blood products/factors, fluids and medications as ordered and appropriate  - Administer supportive blood products/factors as ordered and appropriate  Outcome: Progressing  Goal: Free from bleeding injury  Description: (Example usage: patient with low platelets)  INTERVENTIONS:  - Avoid intramuscular injections, enemas and rectal medication administration  - Ensure safe mobilization of patient  - Hold pressure on venipuncture sites to achieve adequate hemostasis  -  Assess for signs and symptoms of internal bleeding  - Monitor lab trends  - Patient is to report abnormal signs of bleeding to staff  - Avoid use of toothpicks and dental floss  - Use electric shaver for shaving  - Use soft bristle tooth brush  - Limit straining and forceful nose blowing  Outcome: Progressing     Problem: GASTROINTESTINAL - ADULT  Goal: Minimal or absence of nausea and vomiting  Description: INTERVENTIONS:  - Maintain adequate hydration with IV or PO as ordered and tolerated  - Nasogastric tube to low intermittent suction as ordered  - Evaluate effectiveness of ordered antiemetic medications  - Provide nonpharmacologic comfort measures as appropriate  - Advance diet as tolerated, if ordered  - Obtain nutritional consult as needed  - Evaluate fluid balance  Outcome: Progressing  Goal: Maintains or returns to baseline bowel function  Description: INTERVENTIONS:  - Assess bowel function  - Maintain adequate hydration with IV or PO as ordered and tolerated  - Evaluate effectiveness of GI medications  - Encourage mobilization and activity  - Obtain nutritional consult as needed  - Establish a toileting routine/schedule  - Consider collaborating with pharmacy to review patient's medication profile  Outcome: Progressing  Goal: Maintains adequate nutritional intake (undernourished)  Description: INTERVENTIONS:  - Monitor percentage of each meal consumed  - Identify factors contributing to decreased intake, treat as appropriate  - Assist with meals as needed  - Monitor I&O, WT and lab values  - Obtain nutritional consult as needed  - Optimize oral hygiene and moisture  - Encourage food from home; allow for food preferences  - Enhance eating environment  Outcome: Progressing     Problem: GENITOURINARY - ADULT  Goal: Absence of urinary retention  Description: INTERVENTIONS:  - Assess patient’s ability to void and empty bladder  - Monitor intake/output and perform bladder scan as needed  - Follow urinary  retention protocol/standard of care  - Consider collaborating with pharmacy to review patient's medication profile  - Implement strategies to promote bladder emptying  Outcome: Progressing     Problem: METABOLIC/FLUID AND ELECTROLYTES - ADULT  Goal: Glucose maintained within prescribed range  Description: INTERVENTIONS:- Monitor Blood Glucose as ordered- Assess for signs and symptoms of hyperglycemia and hypoglycemia- Administer ordered medications to maintain glucose within target range- Assess barriers to adequate nutritional intake and initiate nutrition consult as needed- Instruct patient on self management of diabetes  Outcome: Progressing  Goal: Electrolytes maintained within normal limits  Description: INTERVENTIONS:  - Monitor labs and rhythm and assess patient for signs and symptoms of electrolyte imbalances  - Administer electrolyte replacement as ordered  - Monitor response to electrolyte replacements, including rhythm and repeat lab results as appropriate  - Fluid restriction as ordered  - Instruct patient on fluid and nutrition restrictions as appropriate  Outcome: Progressing  Goal: Hemodynamic stability and optimal renal function maintained  Description: INTERVENTIONS:  - Monitor labs and assess for signs and symptoms of volume excess or deficit  - Monitor intake, output and patient weight  - Monitor urine specific gravity, serum osmolarity and serum sodium as indicated or ordered  - Monitor response to interventions for patient's volume status, including labs, urine output, blood pressure (other measures as available)  - Encourage oral intake as appropriate  - Instruct patient on fluid and nutrition restrictions as appropriate  Outcome: Progressing     Problem: SKIN/TISSUE INTEGRITY - ADULT  Goal: Skin integrity remains intact  Description: INTERVENTIONS  - Assess and document risk factors for pressure ulcer development  - Assess and document skin integrity  - Monitor for areas of redness and/or skin  breakdown  - Initiate interventions, skin care algorithm/standards of care as needed  Outcome: Progressing  Goal: Incision(s), wounds(s) or drain site(s) healing without S/S of infection  Description: INTERVENTIONS:  - Assess and document risk factors for pressure ulcer development  - Assess and document skin integrity  - Assess and document dressing/incision, wound bed, drain sites and surrounding tissue  - Implement wound care per orders  - Initiate isolation precautions as appropriate  - Initiate Pressure Ulcer prevention bundle as indicated  Outcome: Progressing     Problem: MUSCULOSKELETAL - ADULT  Goal: Return mobility to safest level of function  Description: INTERVENTIONS:  - Assess patient stability and activity tolerance for standing, transferring and ambulating w/ or w/o assistive devices  - Assist with transfers and ambulation using safe patient handling equipment as needed  - Ensure adequate protection for wounds/incisions during mobilization  - Obtain PT/OT consults as needed  - Advance activity as appropriate  - Communicate ordered activity level and limitations with patient/family  Outcome: Progressing  Goal: Maintain proper alignment of affected body part  Description: INTERVENTIONS:  - Support and protect limb and body alignment per provider's orders  - Instruct and reinforce with patient and family use of appropriate assistive device and precautions (e.g. spinal or hip dislocation precautions)  Outcome: Progressing

## 2025-06-03 NOTE — PROGRESS NOTES
[facility]  part of [facility]    Progress Note    Kelli Fisher Patient Status:  Inpatient    1955 MRN [id number]   Location [facility]5W Attending Lina Dueñas, DO   Hosp Day # 22 PCP Taylor Gallardo MD     Chief complaint sob     Subjective:   Kelli Fisher is a(n) 70 year old female Pt sitting in chair today. Sob better after thoracentesis with 900 out.     Pt contiues to be on 4 Litres of oxygen       ROS:   sob   No c/d   No n/v     Objective:     Blood pressure 100/66, pulse 78, temperature 97.4 °F (36.3 °C), temperature source Axillary, resp. rate 20, height 5' (1.524 m), weight 156 lb (70.8 kg), SpO2 100%.      Intake/Output Summary (Last 24 hours) at 6/3/2025 1256  Last data filed at 6/3/2025 1013  Gross per 24 hour   Intake 600 ml   Output 1350 ml   Net -750 ml       Patient Weight(s) for the past 336 hrs:   Weight   25 0500 156 lb (70.8 kg)   25 1343 155 lb 12.8 oz (70.7 kg)   25 0406 175 lb (79.4 kg)   25 0514 175 lb 8 oz (79.6 kg)   25 0625 171 lb 11.8 oz (77.9 kg)   25 0600 169 lb 12.1 oz (77 kg)   25 0500 171 lb 15.3 oz (78 kg)   25 0500 167 lb 8.8 oz (76 kg)   25 0600 168 lb 14 oz (76.6 kg)           General appearance: alert, appears stated age and cooperative  Pulmonary: cta improved   Cardiovascular: S1, S2 normal, no murmur, click, rub or gallop, regular rate and rhythm  Abdominal: soft, non-tender; bowel sounds normal; no masses,  no organomegaly  Extremities: b/l swelling         Medicines:     Current Hospital Medications[1]                Blood Pressure and Cardiac Medications            amiodarone 200 MG Oral Tab            Medication Infusions[2]        Lab Results   Component Value Date    WBC 9.2 2025    HGB 7.3 (L) 2025    HCT 23.2 (L) 2025    .0 2025    CREATSERUM 1.09 (H) 2025    BUN 14 2025     2025    K 3.3 (L) 2025    K 3.3 (L)  06/03/2025    CL 97 (L) 06/03/2025    CO2 >40.0 (HH) 06/03/2025     (H) 06/03/2025    CA 8.1 (L) 06/03/2025    ALB 3.0 (L) 06/02/2025    ALKPHO 142 05/12/2025    BILT 0.7 05/12/2025    TP 6.8 05/12/2025    AST 26 05/12/2025    ALT 10 05/12/2025    PTT 37.0 (H) 05/21/2025    INR 1.31 (H) 05/22/2025    TSH 3.705 05/02/2025    LIP 42 12/11/2023    DDIMER 3.05 (H) 05/13/2025    MG 1.5 (L) 06/03/2025    B12 >2,000 (H) 05/02/2025       No results found.          Results:     CBC:    Lab Results   Component Value Date    WBC 9.2 06/03/2025    WBC 8.8 06/02/2025    WBC 9.1 06/01/2025     Lab Results   Component Value Date    HGB 7.3 (L) 06/03/2025    HGB 7.5 (L) 06/02/2025    HGB 7.2 (L) 06/01/2025      Lab Results   Component Value Date    .0 06/03/2025    .0 06/02/2025    .0 06/01/2025       Recent Labs   Lab 05/31/25  0617 06/02/25  1002 06/03/25  0546   * 129* 123*   BUN 17 14 14   CREATSERUM 1.04* 1.13* 1.09*   CA 8.2* 8.4* 8.1*   * 143 144   K 4.0 3.4* 3.3*  3.3*    97* 97*   CO2 37.0* 40.0* >40.0*           Assessment and Plan:      Afib RVR  Pericardial effusion  - sp pericardial window 5/21, chest tube out 5/23 ; fluid positive for malignancy   - cardiology consulted  - IV amio --> now on oral. Avoiding BB, CCB with h/o junctional rhythm   - cont monitor on tele  - on 5/16 experienced RVR again with hypotension, received digoxin converted to NSR. Remained  hypotensive so sent to stepdown unit. Responded to fluid bolus and required jewel   - Transferred back to medical floor, normotensive rates controlled.  -5/30 - bp dropped overnight and lasix held. Received dig x 1 for rvr. Better today  - CXR with b/l effusions - sp 900 removed by thorkenji. Apprec pulm - > dc planning   - repeat ECHO showing large pericardial effusion ; f/u echo with no pericardial effusion   Limited ECHO 5/25: normal left ventricle, EF 65-70%  -SOB and CXR with Bilateral Pleural effusion -> IV lasix 40  bid monitor output. Wts up since admit  She continues to be volum overloaded, cards plans for IV Lasix and   Eliquis held indefinitely per cards due to Anemia     BL PE: acute small segmental/right lower extremity DVT  S/p IVC 5/14/2025  Was not on a/c due to thrombocytopenia  Now on eliquis but will hold for possible thora -> will hold indefinitely given bleeding risk per cards         Metastatic endometrial cancer    Pancytopenia  Acute on chronic anemia of chronic disease  - s/p 2 units prbcs improved today  - neutropenic precautions  - started neupogen until ANC 1500  - Plan is for further chemotherapy once recovered clinically per Dr. Herring.  - received 6 days of iv cefepime now off   - cefepime and vanco started for neutropenia and patient with cough/chills, elevated LA, possible early sepsis now off. Wbc 15. Afebrile. On 5 L    -Pancytopenia improving, monitor daily  - Overall plan of care is to continue to treat medically over the next several days and monitor for improvement.    - discussed with pt and daughter today and they are comfortable with palliative care consult for information . Explained it is meant for more comfort care then treatment of the cancer   - apprec pall care consult - discussed with Pat and pt not ready for hospice.. 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 Cont lasix.   -sp thora as above       Thrush  - nystatin    Malnutrition - pt eating more today. Monitor over weekend. May need tube feeds      Deconditioning  PT OT      Dispo: PT recommending EDUARDO, patient to discuss with family. Have previously refused    Pt lives by herself, continue to be SOB with min exertion, now fluid  overload, will benefit from EDUARDO   . Plans for EDUARDO once stable      MDM: High   I personally spent time on chart/note review, review of labs/imaging, discussion with patient, physical exam, discussion with staff, consultants, coordinating care, writing progress note, and discussion of plan of care.                Inna Bullock MD, FAAFP                Supplementary Documentation:   DVT Mechanical Prophylaxis: MACHELLE hose, SCDs, Early ambuation  DVT Pharmacologic Prophylaxis   Medication   None                Code Status: DNAR/Selective Treatment  Rogel: External urinary catheter in place  Rogel Duration (in days):   Central line:    JOSE: 6/4/2025                            [1]   Current Facility-Administered Medications   Medication Dose Route Frequency    potassium chloride 40 mEq in 250mL sodium chloride 0.9% IVPB premix  40 mEq Intravenous Once    potassium chloride 40 mEq in 250mL sodium chloride 0.9% IVPB premix  40 mEq Intravenous Once    acetaZOLAMIDE (Diamox) 250 mg in sterile water for injection (PF) 2.5 mL IV push  250 mg Intravenous Once    [START ON 6/4/2025] furosemide (Lasix) tab 40 mg  40 mg Oral Daily    [START ON 6/4/2025] potassium chloride (Klor-Con M20) tab 20 mEq  20 mEq Oral Daily    pantoprazole (Protonix) 40 mg in sodium chloride 0.9% PF 10 mL IV push  40 mg Intravenous Daily    acetaminophen (Ofirmev) 10 mg/mL infusion premix 1,000 mg  1,000 mg Intravenous Q4H PRN    morphINE 10 MG/5ML oral solution 2.5 mg  2.5 mg Oral TID PRN    amiodarone (Pacerone) tab 200 mg  200 mg Oral Daily    sodium chloride (Saline Mist) 0.65 % nasal solution 1 spray  1 spray Each Nare Q3H PRN    acetaminophen (Tylenol Extra Strength) tab 1,000 mg  1,000 mg Oral Q6H PRN    senna (Senokot) 8.8 MG/5ML oral syrup 17.6 mg  10 mL Per NG Tube Daily PRN    docusate (Colace) 50 MG/5ML oral solution 100 mg  100 mg Per NG Tube BID PRN    midodrine (ProAmatine) tab 10 mg  10 mg Oral TID    traMADol (Ultram) tab 50 mg  50 mg Oral  Q6H PRN    ondansetron (Zofran) 4 MG/2ML injection 4 mg  4 mg Intravenous Q6H PRN    metoclopramide (Reglan) 5 mg/mL injection 5 mg  5 mg Intravenous Q8H PRN    morphINE PF 2 MG/ML injection 1 mg  1 mg Intravenous Q2H PRN    polyethylene glycol (PEG 3350) (Miralax) 17 g oral packet 17 g  17 g Per NG Tube Daily PRN    insulin aspart (NovoLOG) 100 Units/mL FlexPen 1-5 Units  1-5 Units Subcutaneous TID CC and HS    glucose (Dex4) 15 GM/59ML oral liquid 15 g  15 g Oral Q15 Min PRN    Or    glucose (Glutose) 40% oral gel 15 g  15 g Oral Q15 Min PRN    Or    glucose-vitamin C (Dex-4) chewable tab 4 tablet  4 tablet Oral Q15 Min PRN    Or    dextrose 50% injection 50 mL  50 mL Intravenous Q15 Min PRN    Or    glucose (Dex4) 15 GM/59ML oral liquid 30 g  30 g Oral Q15 Min PRN    Or    glucose (Glutose) 40% oral gel 30 g  30 g Oral Q15 Min PRN    Or    glucose-vitamin C (Dex-4) chewable tab 8 tablet  8 tablet Oral Q15 Min PRN    ipratropium-albuterol (Duoneb) 0.5-2.5 (3) MG/3ML inhalation solution 3 mL  3 mL Nebulization Q6H PRN    levothyroxine (Synthroid) tab 100 mcg  100 mcg Oral Before breakfast   [2]

## 2025-06-03 NOTE — PHYSICAL THERAPY NOTE
PHYSICAL THERAPY TREATMENT NOTE - INPATIENT     Room Number: 522/522-A       Presenting Problem: chest pain and respiratory failure with hypoxia, seen for re-evaluation as patient is now s/p pericardial window and drainage and placement of chest tube  Co-Morbidities : metastatic endometrial cancer with lung mets, anemia, paroxysmal SVT, small pericardial effusion    Problem List  Principal Problem:    Acute respiratory failure with hypoxia (HCC)  Active Problems:    Thrombocytopenia (HCC)    Azotemia    Pancytopenia (HCC)    Hyperglycemia    Atrial fibrillation with RVR (HCC)    Malnutrition (HCC)    Malignant neoplasm metastatic to lung (HCC)    Palliative care by specialist    Pleural effusion, bilateral    Generalized weakness    Acute pulmonary embolism (HCC)    Malignant pericardial effusion (HCC)    Dyspnea and respiratory abnormalities    PHYSICAL THERAPY ASSESSMENT   Patient demonstrates good  progress this session, goals  remain in progress.      Patient is requiring minimal assist as a result of the following impairments: decreased functional strength, decreased endurance/aerobic capacity, pain, impaired standing balance, decreased muscular endurance, and medical status.     Patient continues to function below baseline with bed mobility, transfers, gait, stair negotiation, maintaining seated position, standing prolonged periods, and performing household tasks.  Next session anticipate patient to progress bed mobility, transfers, gait, stair negotiation, maintaining seated position, standing prolonged periods, and performing household tasks.  Physical Therapy will continue to follow patient for duration of hospitalization.    Patient continues to benefit from continued skilled PT services: to promote return to prior level of function and safety with continuous assistance and gradual rehabilitative therapy .    PLAN DURING HOSPITALIZATION  Nursing Mobility Recommendation : 1 Assist (2nd person for line  management/safety)  PT Device Recommendation: Rolling walker  PT Treatment Plan: Bed mobility, Body mechanics, Coordination, Endurance, Energy conservation, Gait training, Strengthening, Transfer training, Balance training  Frequency (Obs): 3-5x/week     SUBJECTIVE  \"I want to walk\"    OBJECTIVE  Precautions: Chest tube to suction (RT Port)    PAIN ASSESSMENT   Ratin  Location: incisional region  Management Techniques: Activity promotion, Breathing techniques, Relaxation, Repositioning, Body mechanics    BALANCE  Static Sitting: Fair +  Dynamic Sitting: Fair  Static Standing: Fair -  Dynamic Standing: Poor    ACTIVITY TOLERANCE  Pulse: 101  Heart Rate Source: Monitor    O2 WALK  Oxygen Therapy  SPO2% Ambulation on Oxygen: 100  Ambulation oxygen flow (liters per minute): 4    AM-PAC '6-Clicks' INPATIENT SHORT FORM - BASIC MOBILITY  How much difficulty does the patient currently have...  Patient Difficulty: Turning over in bed (including adjusting bedclothes, sheets and blankets)?: A Little   Patient Difficulty: Sitting down on and standing up from a chair with arms (e.g., wheelchair, bedside commode, etc.): A Lot   Patient Difficulty: Moving from lying on back to sitting on the side of the bed?: A Little   How much help from another person does the patient currently need...   Help from Another: Moving to and from a bed to a chair (including a wheelchair)?: A Little   Help from Another: Need to walk in hospital room?: A Little   Help from Another: Climbing 3-5 steps with a railing?: A Lot     AM-PAC Score:  Raw Score: 16   Approx Degree of Impairment: 54.16%   Standardized Score (AM-PAC Scale): 40.78   CMS Modifier (G-Code): CK    FUNCTIONAL ABILITY STATUS  Functional Mobility/Gait Assessment  Gait Assistance: Minimum assistance (with a close chair follow)  Distance (ft): 60 feet x 2  Assistive Device: Rolling walker  Pattern:  (decreased ant, decreased step length, forward flexed posture, narrow base of  support, verbal cues for sequencing and rolling walker management.)  Rolling: not tested  Supine to Sit: not tested (patient up in recliner chair at start/end of session)  Sit to Supine: not tested  Sit to Stand: moderate assist    Skilled Therapy Provided: Patient received seated in recliner chair at initiation of session agreeable to participation in PT. Patient tolerates treatment well, demonstrates improvement in ambulation tolerance and decreased level of physical assistance for functional mobility in comparison to previous session. Patient ambulates 60 feet x 2 with BUE support on rolling walker and minimal assistance with a close chair follow. Patient left in bedside chair, lines intact, needs in reach and handoff to RN.    The patient's Approx Degree of Impairment: 54.16% has been calculated based on documentation in the [facility] '6 clicks' Inpatient Daily Activity Short Form.  Research supports that patients with this level of impairment may benefit from gradual rehab.  Final disposition will be made by interdisciplinary medical team.    Patient End of Session: Up in chair, Needs met, Call light within reach, RN aware of session/findings, All patient questions and concerns addressed, Hospital anti-slip socks, Family present    CURRENT GOALS   Goals to be met by: 6/6/25  Patient Goal Patient's self-stated goal is: none stated   Goal #1 Patient is able to demonstrate supine - sit EOB @ level: minimum assistance      Goal #1   Current Status NT   Goal #2 Patient is able to demonstrate transfers Sit to/from Stand at assistance level: CGA with walker - rolling      Goal #2  Current Status Progressing   Goal #3 Patient is able to ambulate 50 feet with assist device: walker - rolling at assistance level: CGA   Goal #3   Current Status Progressing   Goal #4 Patient to demonstrate independence with home activity/exercise instructions provided to patient in preparation for discharge.   Goal #4   Current Status  Progressing     Gait Trainin minutes    Lacey Botello, PT, DPT

## 2025-06-03 NOTE — PROGRESS NOTES
[facility]  part of [facility]    Progress Note    Kelli Fisher Patient Status:  Inpatient    1955 MRN [id number]   Location [facility]5W Attending Inna Bullock MD   Hosp Day # 22 PCP Taylor Gallardo MD     Subjective:   Seen and examined while sitting in chair. Daughter on speaker phone. Ongoing lower extremity swelling. Has not been ambulating much. No shortness of breath or cough. No fever or chills. On 4 L O2.    Objective:   Blood pressure 91/71, pulse 79, temperature 97.8 °F (36.6 °C), temperature source Axillary, resp. rate 18, height 5' (1.524 m), weight 156 lb (70.8 kg), SpO2 96%.  Physical Exam  Vitals and nursing note reviewed.   Constitutional:       General: She is awake. She is not in acute distress.     Appearance: She is ill-appearing.      Interventions: Nasal cannula in place.   HENT:      Head: Normocephalic and atraumatic.   Cardiovascular:      Rate and Rhythm: Normal rate and regular rhythm.   Pulmonary:      Effort: No respiratory distress.      Breath sounds: Examination of the right-lower field reveals decreased breath sounds. Decreased breath sounds present. No wheezing, rhonchi or rales.   Abdominal:      General: Bowel sounds are normal. There is no distension.      Palpations: Abdomen is soft.      Tenderness: There is no abdominal tenderness.   Musculoskeletal:      Cervical back: Normal range of motion and neck supple.      Right lower leg: Edema (1+ pitting) present.      Left lower leg: Edema (1+ pitting) present.   Skin:     General: Skin is warm and dry.   Neurological:      General: No focal deficit present.      Mental Status: She is alert and oriented to person, place, and time.   Psychiatric:         Mood and Affect: Mood normal.         Behavior: Behavior is cooperative.       Results:   Lab Results   Component Value Date    WBC 9.2 2025    HGB 7.3 (L) 2025    HCT 23.2 (L) 2025    .0 2025    CREATSERUM  1.09 (H) 06/03/2025    BUN 14 06/03/2025     06/03/2025    K 3.3 (L) 06/03/2025    K 3.3 (L) 06/03/2025    CL 97 (L) 06/03/2025    CO2 >40.0 (HH) 06/03/2025     (H) 06/03/2025    CA 8.1 (L) 06/03/2025    ALB 3.0 (L) 06/02/2025    ALKPHO 142 05/12/2025    BILT 0.7 05/12/2025    TP 6.8 05/12/2025    AST 26 05/12/2025    ALT 10 05/12/2025    PTT 37.0 (H) 05/21/2025    INR 1.31 (H) 05/22/2025    TSH 3.705 05/02/2025    LIP 42 12/11/2023    DDIMER 3.05 (H) 05/13/2025    MG 1.5 (L) 06/03/2025    TROPHS 5 05/17/2025    B12 >2,000 (H) 05/02/2025     CXR 5/31/25:  Stable to slightly increasing right pleural effusion.      Pulmonary edema is again seen.      Persistent patchy left mid lung airspace opacity, atelectasis versus pneumonia.     Assessment & Plan:   Recurrent metastatic stage IV endometrial carcinoma with large NATO mass and pulmonary nodules  With disease progression  Plan:  -As per oncology    PE/DVT  CTA chest with PE  LE Doppler US with RLE DVT  Off anticoagulation due to severe anemia  S/p IVC filter 5/14/25  Plan:  -S/p IVC filter  -Eliquis on hold due to severe anemia    Large pericardial effusion with tamponade  S/p pericardial window on 5/21/25  Positive for malignancy  F/u Echo 5/25/25 no pericardial effusion  Plan:  -As per cardiology    Pleural effusion  S/p R thoracentesis 5/29/25 with -900 ml bloody fluid  Pleural fluid cytology no malignancy identified  Plan:  -Follow  -Lasix    Paroxysmal atrial fibrillation  Plan:  -Amiodarone  -Eliquis on hold due to anemia  -As per cardiology    Anemia  Plan:  -Follow    Acute on chronic hypoxemic respiratory failure  Due to metastatic endometrial cancer with large tumor burden in the chest, acute PE, pleural effusion, severe anemia  On 4 L O2 (baseline 4-5 L O2)  Plan:  -O2    DVT prophylaxis: Eliquis on hold    Code status: DNAR/Select    Discharge planning.    D/w RN. D/w patient's daughter over the phone.    Joselo Rodrigues PA-C  Pulmonary  Medicine  6/3/2025

## 2025-06-03 NOTE — PLAN OF CARE
Problem: Diabetes/Glucose Control  Goal: Glucose maintained within prescribed range  Description: INTERVENTIONS:- Monitor Blood Glucose as ordered- Assess for signs and symptoms of hyperglycemia and hypoglycemia- Administer ordered medications to maintain glucose within target range- Assess barriers to adequate nutritional intake and initiate nutrition consult as needed- Instruct patient on self management of diabetes  Outcome: Progressing     Problem: RISK FOR INFECTION - ADULT  Goal: Absence of fever/infection during anticipated neutropenic period  Description: INTERVENTIONS- Monitor WBC- Administer growth factors as ordered- Implement neutropenic guidelines  Outcome: Progressing     Problem: CARDIOVASCULAR - ADULT  Goal: Maintains optimal cardiac output and hemodynamic stability  Description: INTERVENTIONS:- Monitor vital signs, rhythm, and trends- Monitor for bleeding, hypotension and signs of decreased cardiac output- Evaluate effectiveness of vasoactive medications to optimize hemodynamic stability- Monitor arterial and/or venous puncture sites for bleeding and/or hematoma- Assess quality of pulses, skin color and temperature- Assess for signs of decreased coronary artery perfusion - ex. Angina- Evaluate fluid balance, assess for edema, trend weights  Outcome: Progressing  Goal: Absence of cardiac arrhythmias or at baseline  Description: INTERVENTIONS:- Continuous cardiac monitoring, monitor vital signs, obtain 12 lead EKG if indicated- Evaluate effectiveness of antiarrhythmic and heart rate control medications as ordered- Initiate emergency measures for life threatening arrhythmias- Monitor electrolytes and administer replacement therapy as ordered  Outcome: Progressing     Problem: RESPIRATORY - ADULT  Goal: Achieves optimal ventilation and oxygenation  Description: INTERVENTIONS:- Assess for changes in respiratory status- Assess for changes in mentation and behavior- Position to facilitate oxygenation and  minimize respiratory effort- Oxygen supplementation based on oxygen saturation or ABGs- Provide Smoking Cessation handout, if applicable- Encourage broncho-pulmonary hygiene including cough, deep breathe, Incentive Spirometry- Assess the need for suctioning and perform as needed- Assess and instruct to report SOB or any respiratory difficulty- Respiratory Therapy support as indicated- Manage/alleviate anxiety- Monitor for signs/symptoms of CO2 retention  Outcome: Progressing     Problem: SAFETY ADULT - FALL  Goal: Free from fall injury  Description: INTERVENTIONS:  - Assess pt frequently for physical needs  - Identify cognitive and physical deficits and behaviors that affect risk of falls.  - [place] fall precautions as indicated by assessment.  - Educate pt/family on patient safety including physical limitations  - Instruct pt to call for assistance with activity based on assessment  - Modify environment to reduce risk of injury  - Provide assistive devices as appropriate  - Consider OT/PT consult to assist with strengthening/mobility  - Encourage toileting schedule  Outcome: Progressing     Problem: HEMATOLOGIC - ADULT  Goal: Free from bleeding injury  Description: (Example usage: patient with low platelets)  INTERVENTIONS:  - Avoid intramuscular injections, enemas and rectal medication administration  - Ensure safe mobilization of patient  - Hold pressure on venipuncture sites to achieve adequate hemostasis  - Assess for signs and symptoms of internal bleeding  - Monitor lab trends  - Patient is to report abnormal signs of bleeding to staff  - Avoid use of toothpicks and dental floss  - Use electric shaver for shaving  - Use soft bristle tooth brush  - Limit straining and forceful nose blowing  Outcome: Progressing  Goal: Maintains hematologic stability  Description: INTERVENTIONS  - Assess for signs and symptoms of bleeding or hemorrhage  - Monitor labs and vital signs for trends  - Administer supportive blood  products/factors, fluids and medications as ordered and appropriate  - Administer supportive blood products/factors as ordered and appropriate  Outcome: Progressing  Goal: Free from bleeding injury  Description: (Example usage: patient with low platelets)  INTERVENTIONS:  - Avoid intramuscular injections, enemas and rectal medication administration  - Ensure safe mobilization of patient  - Hold pressure on venipuncture sites to achieve adequate hemostasis  - Assess for signs and symptoms of internal bleeding  - Monitor lab trends  - Patient is to report abnormal signs of bleeding to staff  - Avoid use of toothpicks and dental floss  - Use electric shaver for shaving  - Use soft bristle tooth brush  - Limit straining and forceful nose blowing  Outcome: Progressing     Problem: GASTROINTESTINAL - ADULT  Goal: Maintains or returns to baseline bowel function  Description: INTERVENTIONS:  - Assess bowel function  - Maintain adequate hydration with IV or PO as ordered and tolerated  - Evaluate effectiveness of GI medications  - Encourage mobilization and activity  - Obtain nutritional consult as needed  - Establish a toileting routine/schedule  - Consider collaborating with pharmacy to review patient's medication profile  Outcome: Progressing     Problem: GENITOURINARY - ADULT  Goal: Absence of urinary retention  Description: INTERVENTIONS:  - Assess patient’s ability to void and empty bladder  - Monitor intake/output and perform bladder scan as needed  - Follow urinary retention protocol/standard of care  - Consider collaborating with pharmacy to review patient's medication profile  - Implement strategies to promote bladder emptying  Outcome: Progressing     Problem: METABOLIC/FLUID AND ELECTROLYTES - ADULT  Goal: Glucose maintained within prescribed range  Description: INTERVENTIONS:- Monitor Blood Glucose as ordered- Assess for signs and symptoms of hyperglycemia and hypoglycemia- Administer ordered medications to  maintain glucose within target range- Assess barriers to adequate nutritional intake and initiate nutrition consult as needed- Instruct patient on self management of diabetes  Outcome: Progressing     Problem: SKIN/TISSUE INTEGRITY - ADULT  Goal: Incision(s), wounds(s) or drain site(s) healing without S/S of infection  Description: INTERVENTIONS:  - Assess and document risk factors for pressure ulcer development  - Assess and document skin integrity  - Assess and document dressing/incision, wound bed, drain sites and surrounding tissue  - Implement wound care per orders  - Initiate isolation precautions as appropriate  - Initiate Pressure Ulcer prevention bundle as indicated  Outcome: Progressing     Problem: MUSCULOSKELETAL - ADULT  Goal: Return mobility to safest level of function  Description: INTERVENTIONS:  - Assess patient stability and activity tolerance for standing, transferring and ambulating w/ or w/o assistive devices  - Assist with transfers and ambulation using safe patient handling equipment as needed  - Ensure adequate protection for wounds/incisions during mobilization  - Obtain PT/OT consults as needed  - Advance activity as appropriate  - Communicate ordered activity level and limitations with patient/family  Outcome: Progressing     Patient on 4 L high flow nasal cannula and remote telemetry monitoring. Up to chair for meals, ambulated in hallway with PT/OT.   Safety precautions in place, frequent nursing rounding completed, call light within reach. All questions answered and needs met.

## 2025-06-03 NOTE — PROGRESS NOTES
[facility]  part of [facility]  Palliative Care Progress Note    Kelli Fisher Patient Status:  Inpatient    1955 MRN [id number]   Location [facility]5W Attending Inna Bullock MD   Hosp Day # 22 PCP Taylor Gallardo MD     The  Cures Act makes medical notes like these available to patients in the interest of transparency. Please be advised this is a medical document. Medical documents are intended to carry relevant information, facts as evident, and the clinical opinion of the practitioner. The medical note is intended as peer to peer communication and may appear blunt or direct. It is written in medical language and may contain abbreviations or verbiage that are unfamiliar.     Summary     71 yo AA female with Stage IV clear-cell endometrial adenocarcinoma initially treated with radiation, surgery, and adjuvant chemotherapy. (Feb- 2024) . More recently she received adjuvant chemo carboplatin, Taxol, Keytruda on May 6 and did receive Fulphila which is the equivalent of Neulasta on May 7, 2025. Other PMH includes asthma, GERD, hypothyroidism,PAT and DM2   She was admitted 25 for difficulty breathing / CP/ palpitation and tachycardia. Since admission she has had a protracted course with identification of multiple cancer related issues: pancytopenia, pericardial effusion s/p pericardial window, progressive acute hypoxic resp failure with  know pulm mets and development  pl effusions s/p R thoracentesis today, ongoing Afib w/ RVR, bilat PE w/ R -DVT s/p IVC filter and recent start of Eliquis, progressive weakness and low appetite with PCM. Today palliative care was asked to meet with pt and her family as they have been informed that given the change in her clinical course and ongoing functional decline discussions regarding GOC and POC moving forward need to be discussed as further cancer treatment options are not looking to be beneficial for Kelli at this  point.     TODAY, is hospital Day # 22 . This is the first admission for 2025 and 2nd since Oct 2024    Consult ordered by:: Dr. Lina Dueñas for evaluation of Palliative Care needs and Uncontrolled symptoms;Goals of care discussion;Establish palliative care.    Subjective     Interval events: Since last encounter, pt tells me she feels she is getting stronger and is making a much greater attempt at eating. She tells me they are not recording all she is eating. She tells me he daughter bought her some chicken and sweet potato and she ate some of that last pm. She also works on taking the Ensure. She says \" I am doing the bestest I can and I am really trying. \"  We talked about trying the meds for appetite stimulation and went back and forth. She is already dislikes her pill burden and tells me \" if it ain't a sure thing not wanting to introduce anything new.\" We talked to Theresa by phone and see agrees at this point. Pt shares with me me she is doing more then some of the nurses are telling and she is getting frustrated about this. She shares her daughter was here yesterday and confirmed this      When I entered the room, the patient was awake & alert and sitting in chair watching TV on loud volume. No family present at bedside but spoke to daughter Theresa by phone. Overall pt reports she is feeling better and wants to try to get stronger and do so with the help of the  almighty and the support of her children and grandchildren       MAR Symptom Review      Other - getting K and Mg  Lasix 40 mg IV BID - discontinue start po tomorrow per RN  Diamox 250 mg IV today   Midodrine 10 mg po TID -  conts   Protonix DR 40 mg daily      Pain   Tylenol 1000 mg po Q 6 hr prn - none   Morphine 1 mg  Q 3 hr prn - none used   Tramadol 50 mg po Q 6 hr prn - none used      Dyspnea  Morphine 2.5 mg po TID prn- ordered 5/29 none used since R thor n 5/29  Duo Nebs 3 ml Q 6 hr prn - last 5/27     Constipation and  n/v  Constipation  Colace solution 100 mg po Daily prn- none   Miralax 17 g po daily prn- none   Senna 17.6 mg daily prn- none   N/V  Reglan 5 mg IVP q 8 hr prn- none   Zofran 4 mg IV q 6 hr prn - none        Review of Systems/ Symptoms:  Pt was able to provide subjective input as well as RN, Margarette and pt's daughter Theresa  Fatigue:  Yes but feels  improving and is up most of the day in chair   Dyspnea:  not too bad At rest: no;  CASANOVA: yes   Conversational: sometimes when I get to talking and my temper gets flaring up      Current O2 therapy: NC 4 NC LPM  Drowsiness: none noted during conversation/ no nodding off  Cough: intermittent dry cough usually not productive   Pain: denies no prn meds   Non-verbal signs of pain present: No  Appetite:  remains poor ; but pt says it has picked up some and she is really trying   Constipation:  denies Last BM today 6/3- soft brown med formed ; Prior 6/2  BM x 2 soft brown large x 2 ; 5/28: BM x 2 mall and medium soft brown; 5.25 soft brown medium; 5/25 BM X 3 soft brown small med  X 2   Diarrhea:  none documented  Nausea/Vomiting: denies/ none documented; no prn meds given   Depression: denies/ no meds   Anxiety: denies/ no meds   Emotional / coping response to illness:  Tells me she is leaving it in the Lords hands and he is leading the way and is not going to let her down. He is not ready to take her and she is not ready to go yet . She still has some fight left in her and she wants to try to get stronger and she how things go from there. She tells me she is not ready to shift her GOC to that of just comfort and enroll in a program like hospice   Other:    Allergies:  Allergies[1]    Medications:   Current Hospital Medications[2]    Objective     Vital Signs:  Blood pressure 91/71, pulse 79, temperature 97.8 °F (36.6 °C), temperature source Axillary, resp. rate 18, height 5' (1.524 m), weight 156 lb (70.8 kg), SpO2 96%.  Body mass index is 30.47 kg/m².  Present Level of pain:  denies and same per RN documentation   Non-verbal signs of pain present: No    Physical Exam:  General: Alert & Awake. In no apparent respiratory distress at rest wearing NC . Body habitus Thin, frail and ill appearing, although her facial skin shows no wrinkles   HEENT: AT/NC. No gross focal deficits. MMM   Cardiac: RRR no murmur appreciated on Tele  showing NSR   Lungs: Bilat BS ; diminished at bases; no Wheeze  Normal effort on NC 4 lpm sating at 96 - 100 % R raúl cath accessed site clean and dry ; o erythema or drainage . Midline stables in place with some gauze dressing to not catch on things, R thoracentesis site C & D    Abdomen: Soft, non-tender, non-distended, normal bowel sounds X 4 quadrants (+) BMs  Extremities: 1+ LE edema present/ improved ; Has SCDs in place  Neurologic: Alert and oriented to person, place, time, and situation Gracie and follows commands   Psychiatric: Mood pleasant mood, talkative and interactive; Sharing concerns about \"staff not telling full truth/ story about what she is and ain't  going. My nurse today, Margarette  listens to me.\"    Skin: Warm and dry. No rash or bruising    Prior to admission Palliative performance scale PPSv2 (%): 50    Hematology:  Lab Results   Component Value Date    WBC 9.2 06/03/2025    HGB 7.3 (L) 06/03/2025    HCT 23.2 (L) 06/03/2025    .0 06/03/2025   Coags:  Lab Results   Component Value Date    INR 1.31 (H) 05/22/2025    PTT 37.0 (H) 05/21/2025   Chemistry: Creat cl 35 ml / min    eGFR- 55 ml /min   Lab Results   Component Value Date    CREATSERUM 1.09 (H) 06/03/2025    BUN 14 06/03/2025     06/03/2025    K 3.3 (L) 06/03/2025    K 3.3 (L) 06/03/2025    CL 97 (L) 06/03/2025    CO2 >40.0 (HH) 06/03/2025     (H) 06/03/2025    CA 8.1 (L) 06/03/2025    ALB 3.0 (L) 06/02/2025    ALKPHO 142 05/12/2025    BILT 0.7 05/12/2025    TP 6.8 05/12/2025    AST 26 05/12/2025    ALT 10 05/12/2025    DDIMER 3.05 (H) 05/13/2025    MG 1.5 (L) 06/03/2025      Imaging:  No results found.    Summary of Discussion      Advance Care Planning counseling and discussion:  HC POA Documentation 5/31: Daughter  Reports completion but not in Epic. Healthcare Agent's Name: Theresa Fisher, which is pt's Daughter. She brought in copy and this was sent for scanning 6/23: Confirmed in [facility]  Primary HCPOA: Theresa Fisher at [phone]  Successor#1: Jp Fisher at [phone]  - CODE STATUS  POLST FORM 5/31:Completed--Document signed and will be scanned  6/3: Confirmed in EPIC  Code Status: DNAR/Select - 6/3:unchanged      Advance Care Planning 5/31/25  A voluntarily discussion and explanation regarding Advance Care Planning (ACP) took place today with patient and children. We discussed the risks vs benefits of life sustaining treatments in the setting of Kelli Fisher's comorbid medical conditions. Items addressed: patient preferences , code status , Health Care Power of  (HCPOA) , POLST (by section in detail), change in health status , end-of-life care plan, and general prognosis . This resulted in a better understanding of pt's expressed wishes/preferences , change in code status, completion of POLST, family discussions to continue privately , and family to bring in medical ACP forms from home to be verified and scanned.   Summary:    Total time dedicated to ACP >16 minutes.   Assessment and Recommendation      Palliative Care encounter/ Palliative care by specialist    Counseling regarding goals of care an advance care planning     Metastatic/Stage IV clear-cell endometrial adenocarcinoma- platinum resistant/ progressive (w/  lung mets)   Malignant neoplasm metastatic to lung (HCC)     Acute respiratory failure with hypoxia (HCC)   Pericardial Effusion w/ tamponade  (malignant )  s/p  pericardial window/ drain 5/21     Bilateral pleural effusion R > L  s/p R thoracentesis     Azotemia    Acute on chronic anemia     Pancytopenia (HCC)- chemo related     Hyperglycemia     Atrial fibrillation with RVR (HCC)    Bilateral acute PE and RLE-DVT s/p IVC filter  ow on Eliquis     Malnutrition (HCC)- moderate    Generalized weakness    Dyspnea and resp abnormalities    Anorexia     5/31: Family Meeting: Present was pt, her 3 daughters and 2 sons. We had a long discussion regarding pt's illness trajectory regarding her quickly progressing metastatic endometrial cancer and recent bad news about platinum resistance and unless there is significant change in her overall functional and nutritional status the option of further chemotherapy is not likely/ beneficial. \"The chemo would back her sicker in the state she is in\"  They state her gyn/oncologist Dr. Herring has recommended they meet with palliative care to discuss care preferences, GOC and POC moving forward. They all understand Kelli's caner is not curative and treatment was all aimed at disease control. We discussed several care paths moving forward including focusing on Kelli and her symptoms not her disease and this is best done with the support of hospice care. Kelli tells us she is not ready for hospice at present and wants to give a trial of restorative care despite knowing how hard this can/ will be with her progressive cancer. She wants to try to regain her strength and if she is not able to she knows hospice is likely the next path. Her family wants to support these goals and they wish to do this through a [facility] and tell me they have chosen [facility]. They also agree to [facility] referral. I talked with them about the concept of \" hoping for the best but planning for all the rest \" We need to have a plan A ( what we want/ hope for but a back up plan if our body does not cooperate and take us in a different direction). They all verbalized understanding. We discussed resuscitation status. See  below and pt is now DNAR/ selective         GOALS OF CARE: 6/3 Continue with supportive medical treatment for acute medical issues as they  arise during this admission and restorative goals with with transition to [facility] upon discharge with ongoing discussions regarding GOC & POC moving forward as pt/ family are aware after discussion with pt's gyn/ onc- Dr. Herring on 5/29/25 there are likely no further beneficial cancer treatment options (unless pt's dysfunctional status greatly improves)    Code Status: DNAR/ selective POLST from completed and on file in [facility] 6/3   Healthcare Agent's Name:   Primary HCPOA: Theresa Fisher, daughter  at [phone]  Successor#1: Jp Fisher, son  at [phone]  SYMPTOMS:   Dyspnea: Trial of Morphine (low dose 2.5 mg po TID prn . S/p  thoracentesis by Dr Levy 5/29  There is also consideration of PleuRx cath - 6/3: so far no re-accumulation and pt's dyspneic symptom much improved ; No use.  Remains available for prn use  Dry Mouth/ Xerostomia: Kelli does not like Biotene; Discussed  products outside hospital like Biotene paste, gum and lozenges, Also sippy method- keeping  water fluids near by and reachable. Also at home can make salt/ baking soda/ water daily mixture swish and spit. Keep in bathroom and use each time go in after wash hands or prior to meals. Daughter purchased Biotene paste and pt using for dry mouth and pt likes    Anorexia:  talked about not a lot of good meds to help with this : Megace off table due to clot risk ; Remeron at hs can consider/ trail at 7.5 mg / concern daytime sleepiness w/ little benefit. Can consider Marinol 2.5 mg po BID and monitor SE. Lots of discussion about being burden of disease. 6/3:Again talked about trying the meds for appetite stimulation and went back and forth. She is already dislikes her pill burden and \"if it ain't a sure thing not wanting to introduce anything new.\"  We talked to Theresa by phone and see agrees at this point. Pt shares with me me she is doing more then some of the nurses are telling and she is getting frustrated about this. She shares her daughter  was here yesterday and confirmed this. Will hold on any med trial for anorexia.      Bilat dry nasal mucosa membranes 6/3:  d/t O2 use: Schedule Nasal gel TID to apply to nasal mucosa TID and prn . May use spray prn . Instructed daughter about [employer] Nasal gel spray in community is available at [employer] and other [employer]/ med supply companies - info on AVS     Discussed today's visit with  UNRULY Pfeiffer Family, POA, and RN.    Palliative Care Follow Up: Palliative care team will Continue to follow while inpatient.  Palliative care follow up outpatient: Community palliative care ordered and has been set up  to follow pt at [facility]    Thank you for allowing Palliative Care services to participate in the care of Kelli Fisher.    A total of 35 minutes were spent on this consult, which included all of the following: chart review, direct face to face contact, history taking, physical examination, counseling and coordinating care, and documentation.     Pat Dorantes, APRN  6/3/2025  12:25 PM  Palliative Care Services at [facility] :  extension [phone]  or [phone]         [1]   Allergies  Allergen Reactions    Aspirin Tightness in Throat    Penicillins HIVES    Lactose OTHER (SEE COMMENTS)     Cramping, bloating    Other OTHER (SEE COMMENTS)     Pt states IV steroid allergy, states it makes her breathing worse    [2]   Current Facility-Administered Medications:     potassium chloride 40 mEq in 250mL sodium chloride 0.9% IVPB premix, 40 mEq, Intravenous, Once    potassium chloride 40 mEq in 250mL sodium chloride 0.9% IVPB premix, 40 mEq, Intravenous, Once    acetaZOLAMIDE (Diamox) 250 mg in sterile water for injection (PF) 2.5 mL IV push, 250 mg, Intravenous, Once    [START ON 6/4/2025] furosemide (Lasix) tab 40 mg, 40 mg, Oral, Daily    [START ON 6/4/2025] potassium chloride (Klor-Con M20) tab 20 mEq, 20 mEq, Oral, Daily    pantoprazole (Protonix) 40 mg in sodium chloride 0.9%  PF 10 mL IV push, 40 mg, Intravenous, Daily    acetaminophen (Ofirmev) 10 mg/mL infusion premix 1,000 mg, 1,000 mg, Intravenous, Q4H PRN    morphINE 10 MG/5ML oral solution 2.5 mg, 2.5 mg, Oral, TID PRN    amiodarone (Pacerone) tab 200 mg, 200 mg, Oral, Daily    sodium chloride (Saline Mist) 0.65 % nasal solution 1 spray, 1 spray, Each Nare, Q3H PRN    acetaminophen (Tylenol Extra Strength) tab 1,000 mg, 1,000 mg, Oral, Q6H PRN    senna (Senokot) 8.8 MG/5ML oral syrup 17.6 mg, 10 mL, Per NG Tube, Daily PRN    docusate (Colace) 50 MG/5ML oral solution 100 mg, 100 mg, Per NG Tube, BID PRN    midodrine (ProAmatine) tab 10 mg, 10 mg, Oral, TID    traMADol (Ultram) tab 50 mg, 50 mg, Oral, Q6H PRN    ondansetron (Zofran) 4 MG/2ML injection 4 mg, 4 mg, Intravenous, Q6H PRN    metoclopramide (Reglan) 5 mg/mL injection 5 mg, 5 mg, Intravenous, Q8H PRN    morphINE PF 2 MG/ML injection 1 mg, 1 mg, Intravenous, Q2H PRN    polyethylene glycol (PEG 3350) (Miralax) 17 g oral packet 17 g, 17 g, Per NG Tube, Daily PRN    insulin aspart (NovoLOG) 100 Units/mL FlexPen 1-5 Units, 1-5 Units, Subcutaneous, TID CC and HS    glucose (Dex4) 15 GM/59ML oral liquid 15 g, 15 g, Oral, Q15 Min PRN **OR** glucose (Glutose) 40% oral gel 15 g, 15 g, Oral, Q15 Min PRN **OR** glucose-vitamin C (Dex-4) chewable tab 4 tablet, 4 tablet, Oral, Q15 Min PRN **OR** dextrose 50% injection 50 mL, 50 mL, Intravenous, Q15 Min PRN **OR** glucose (Dex4) 15 GM/59ML oral liquid 30 g, 30 g, Oral, Q15 Min PRN **OR** glucose (Glutose) 40% oral gel 30 g, 30 g, Oral, Q15 Min PRN **OR** glucose-vitamin C (Dex-4) chewable tab 8 tablet, 8 tablet, Oral, Q15 Min PRN    ipratropium-albuterol (Duoneb) 0.5-2.5 (3) MG/3ML inhalation solution 3 mL, 3 mL, Nebulization, Q6H PRN    levothyroxine (Synthroid) tab 100 mcg, 100 mcg, Oral, Before breakfast

## 2025-06-03 NOTE — OCCUPATIONAL THERAPY NOTE
OCCUPATIONAL THERAPY TREATMENT NOTE - INPATIENT        Room Number: 522/522-A     Presenting Problem: s/p pericardial window, CT placement    Problem List  Principal Problem:    Acute respiratory failure with hypoxia (HCC)  Active Problems:    Thrombocytopenia (HCC)    Azotemia    Pancytopenia (HCC)    Hyperglycemia    Atrial fibrillation with RVR (HCC)    Malnutrition (HCC)    Malignant neoplasm metastatic to lung (HCC)    Palliative care by specialist    Pleural effusion, bilateral    Generalized weakness    Acute pulmonary embolism (HCC)    Malignant pericardial effusion (HCC)    Dyspnea and respiratory abnormalities      OCCUPATIONAL THERAPY ASSESSMENT   Patient demonstrates fair progress this session, goals remain in progress.    Patient is requiring contact guard assist, minimal assist, and moderate assist as a result of the following impairments: decreased functional strength, decreased endurance, impaired standing balance, decreased muscular endurance, medical status, and increased O2 needs from baseline.    Patient continues to function below baseline with upper body dressing, lower body dressing, transfers, static standing balance, dynamic standing balance, functional standing tolerance, and aerobic capacity.  Next session anticipate patient to progress upper body dressing, grooming, transfers, and functional standing tolerance.  Occupational Therapy will continue to follow patient for duration of hospitalization.    Patient continues to benefit from continued skilled OT services: to promote return to prior level of function and safety with continuous assistance and gradual rehabilitative therapy.     PLAN DURING HOSPITALIZATION  OT Device Recommendations: Shower chair  OT Treatment Plan: Balance activities, Energy conservation/work simplification techniques, ADL training, Functional transfer training, Endurance training, Patient/Family education, Patient/Family training, Compensatory technique education      SUBJECTIVE  \"I want to really walk.\"     OBJECTIVE  Precautions: Chest tube to suction (RT Port)       PAIN ASSESSMENT  Ratin    O2 SATURATIONS  Oxygen Therapy  SPO2% Ambulation on Oxygen: 100  Ambulation oxygen flow (liters per minute): 4    ACTIVITIES OF DAILY LIVING ASSESSMENT  AM-PAC ‘6-Clicks’ Inpatient Daily Activity Short Form  How much help from another person does the patient currently need…  -   Putting on and taking off regular lower body clothing?: A Lot  -   Bathing (including washing, rinsing, drying)?: A Lot  -   Toileting, which includes using toilet, bedpan or urinal? : A Lot  -   Putting on and taking off regular upper body clothing?: A Little  -   Taking care of personal grooming such as brushing teeth?: A Little  -   Eating meals?: None    AM-PAC Score:  Score: 16  Approx Degree of Impairment: 53.32%  Standardized Score (AM-PAC Scale): 35.96  CMS Modifier (G-Code): CK    FUNCTIONAL TRANSFER ASSESSMENT  Sit to Stand: Chair  Chair: Moderate Assist  Simulated toilet transfer: mod assist at RW level     FUNCTIONAL MOBILITY  Pt required min assist with RW support and a chair follow to complete hallway distance mobility to simulate household distances.     BALANCE ASSESSMENT  Static Sitting: Stand-by Assist  Static Standing: Minimal Assist  Dynamic sitting: SBA  Dynamic Standing: min assist      FUNCTIONAL ADL ASSESSMENT  UB Dressing Seated: Contact Guard Assist    Skilled Therapy Provided:   RN cleared pt for participation. Session coordinated with PT. Pt received in recliner with son present for session. Pt agreeable to participation in therapy. Pt demo good progress this session compared to previous sessions. Pt completed all functional transfers with mod assist; benefited from verbal cues for hand placement and BLE positioning when completing. Pt required min assist with RW support and a chair follow to complete 2 bouts of mobility (60ft x2) to simulate household distances; pt benefited  from seated rest break between bouts due to decreased strength and endurance. Vitals monitored during tasks; oxygen sats remained WNL on 4L high flow NC. Pt required no physical assist to complete UB dressing from recliner level; CGA provided for line management/safety. Pt remained in recliner with all needs in reach and family present at end of session. RN aware.       EDUCATION PROVIDED  Patient Education : Role of Occupational Therapy; Plan of Care; Functional Transfer Techniques; Fall Prevention; Posture/Positioning; Proper Body Mechanics; Energy Conservation  Patient's Response to Education: Verbalized Understanding; Returned Demonstration  Family/Caregiver Education : Role of Occupational Therapy; Plan of Care  Family/Caregiver's Response to Education: Verbalized Understanding    The patient's Approx Degree of Impairment: 53.32% has been calculated based on documentation in the [facility] '6 clicks' Inpatient Daily Activity Short Form.  Research supports that patients with this level of impairment may benefit from rehab.  Final disposition will be made by interdisciplinary medical team.    Patient End of Session: Up in chair, Needs met, Call light within reach, RN aware of session/findings, All patient questions and concerns addressed, Hospital anti-slip socks, Family present    OT Goals:  Patients self stated goal is: feel better     Patient will complete functional transfer with Min A  Comment: ongoing- mod assist     Patient will complete toileting with MIn A  Comment: ongoing    Patient will tolerate standing for 3-5 minutes in prep for adls with SBA   Comment: ongoing- min assist       Comment:          Goals  on: 6/3/25  Frequency: 3-5x/week  OT Session Time: 23 minutes  Therapeutic Activity: 23 minutes

## 2025-06-03 NOTE — CM/SW NOTE
Prior Authorization - Destination  Destination Type: Skilled nursing facility  Service Provider: BTE  Payer Communication Destination Comments: [employer] [id number]  Prior Authorization Status: Approved  Prior Authorization Start Date: 06/02/25 6/2 TO 6/6    Jessica Chu DSC

## 2025-06-03 NOTE — PROGRESS NOTES
Progress Note  Kelli Fisher Patient Status:  Inpatient    1955 MRN [id number]   Location [facility]5W Attending Inna Bullock MD   Hosp Day # 22 PCP Taylor Gallardo MD     Subjective:  Pt sitting up in chair; denies SOB, orthopnea. Wanting to increase mobility     Objective:  BP 91/71 (BP Location: Right arm)   Pulse 79   Temp 97.8 °F (36.6 °C) (Axillary)   Resp 18   Ht 5' (1.524 m)   Wt 156 lb (70.8 kg)   SpO2 96%   BMI 30.47 kg/m²     Telemetry: NSR    Intake/Output:    Intake/Output Summary (Last 24 hours) at 6/3/2025 1009  Last data filed at 6/3/2025 0530  Gross per 24 hour   Intake 540 ml   Output 1350 ml   Net -810 ml       Last 3 Weights   25 0500 156 lb (70.8 kg)   25 1343 155 lb 12.8 oz (70.7 kg)   25 0406 175 lb (79.4 kg)   25 0514 175 lb 8 oz (79.6 kg)   25 0625 171 lb 11.8 oz (77.9 kg)   25 0600 169 lb 12.1 oz (77 kg)   25 0500 171 lb 15.3 oz (78 kg)   25 0500 167 lb 8.8 oz (76 kg)   25 0600 168 lb 14 oz (76.6 kg)   25 0341 167 lb (75.8 kg)   25 0611 164 lb 10.9 oz (74.7 kg)   25 1600 156 lb 1.4 oz (70.8 kg)   25 0439 163 lb 8 oz (74.2 kg)   05/15/25 1243 154 lb 3.2 oz (69.9 kg)   05/15/25 0458 177 lb 11.2 oz (80.6 kg)   25 0645 166 lb 9.6 oz (75.6 kg)   25 0406 167 lb 9.6 oz (76 kg)   25 1736 165 lb 11.2 oz (75.2 kg)   25 1217 169 lb (76.7 kg)   25 0500 174 lb 9.6 oz (79.2 kg)   25 0549 175 lb 12.8 oz (79.7 kg)   25 1800 163 lb (73.9 kg)   25 2038 165 lb (74.8 kg)       Labs:  Recent Labs   Lab 25  0617 25  1002 25  0546   * 129* 123*   BUN 17 14 14   CREATSERUM 1.04* 1.13* 1.09*   EGFRCR 58* 53* 55*   CA 8.2* 8.4* 8.1*   * 143 144   K 4.0 3.4* 3.3*  3.3*    97* 97*   CO2 37.0* 40.0* >40.0*     Recent Labs   Lab 25  0613 25  0636 25  0546   RBC 2.62* 2.74* 2.59*   HGB 7.2* 7.5* 7.3*   HCT 23.4*  24.2* 23.2*   MCV 89.3 88.3 89.6   MCH 27.5 27.4 28.2   MCHC 30.8* 31.0 31.5   RDW 20.5* 20.3* 20.2*   NEPRELIM 5.61 5.33 5.82   WBC 9.1 8.8 9.2   .0 246.0 223.0         No results for input(s): \"TROP\", \"TROPHS\", \"CK\" in the last 168 hours.    Diagnostics:  No results found.   Review of Systems   Cardiovascular:  Positive for leg swelling. Negative for chest pain, dyspnea on exertion and orthopnea.   Respiratory:  Negative for cough and shortness of breath.        Physical Exam:    Gen: alert, oriented x 3, NAD  Heent: pupils equal, reactive. Mucous membranes moist.   Neck: no jvd  Cardiac: regular rate and rhythm, normal S1,S2, no murmur, clicks, rub or gallop  Lungs: diminished  Abd: soft, NT/ND +bs  Ext: + BLE edema  Skin: Warm, dry  Neuro: No focal deficits      Medications:    Scheduled Medications[1]  Medication Infusions[2]      Assessment:  Acute Hypoxic Respiratory Failure - Multifactorial (Lung Mets, PE, Pleural Effusions, Volume Overload)  Decreasing O2 requirements - on baseline O2 4L  Pulm following  Bilateral Pleural Effusion, R >L  5/29 S/p R thoracentesis - 900ML bloody fluid; not malignant  Bilateral PE/DVT s/p IVC filter  5/13 CTA Chest w/small volume acute bilateral segmental PE  5/14 US + RLE acute non-occlusive DVT; neg for LLE DVT  Initially unable to tolerate A/C d/t thrombocytopenia; now eliquis on hold d/t anemia   Acute HFpEF  proBNP 1110 on admit, 1826 on 5/29   Echo LVEF 65-70%, bilateral pleural effusion on CXR  Diuresis w/IV Lasix   Net - 6.8L, though I&O incomplete, wts appear inaccurate  GDMT - not hx on ARB/ARNI, MRA - limited d/t hypotension; not on SGLT2i  Appears improved from volume standpoint - some edema likely d/t third spacing, hypoalbuminemia  Large Pericardial Effusion w/Tamponade s/p Urgent Pericardial Window, R Pleural Chest Tube  Hx Known pericardial effusion last admission - previously small - moderate  5/13 Echo w/moderate effusion; no evidence of HD  compromise  5/20 Repeat echo w/large pericardial effusion; evidence of RV collapse, dilated IVC  5/21 S/P Urgent Pericardial Window w/CV surgery - Fluid + for malignancy  5/23 Removed Pleural Chest Tube  5/24 Removed Nathaniel drain   5/25 Repeat echo w/o pericardial effusion  Paroxysmal Atrial Tachycardia/Atrial Fibrillation  Hx PSVT on recent admission w/junctional rhythm noted while on BB  Intermittent PAF RVR this admission - now maintaining NSR   S/P amio load; now on 200mg po daily   TSH WNL  [id number] 3 - hx on Eliquis 5mg po BID; held d/t anemia  Hypotension  Initially w/shock, requiring vasopressor support  Now BP stable on midodrine  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 Add diamox 250mg IV x1 today.  Transition to oral Lasix 40mg po daily   Strict I&O, daily weights (standing if possible), daily BMP, HF order set   Discharge planning     Plan of care discussed with patient, RN.    ABBIE Britton  6/3/2025  10:09 AM             [1]    potassium chloride  40 mEq Intravenous Once    pantoprazole  40 mg Intravenous Daily    furosemide  40 mg Intravenous BID (Diuretic)    amiodarone  200 mg Oral Daily    midodrine  10 mg Oral TID    insulin aspart  1-5 Units Subcutaneous TID CC and HS    levothyroxine  100 mcg Oral Before breakfast   [2]

## 2025-06-04 LAB
ANION GAP SERPL CALC-SCNC: 6 MMOL/L (ref 0–18)
ANTIBODY SCREEN: NEGATIVE
BASOPHILS # BLD AUTO: 0.06 X10(3) UL (ref 0–0.2)
BASOPHILS NFR BLD AUTO: 0.6 %
BUN BLD-MCNC: 13 MG/DL (ref 9–23)
BUN/CREAT SERPL: 12.1 (ref 10–20)
CALCIUM BLD-MCNC: 8 MG/DL (ref 8.7–10.4)
CHLORIDE SERPL-SCNC: 100 MMOL/L (ref 98–112)
CO2 SERPL-SCNC: 37 MMOL/L (ref 21–32)
CREAT BLD-MCNC: 1.07 MG/DL (ref 0.55–1.02)
DEPRECATED RDW RBC AUTO: 65.7 FL (ref 35.1–46.3)
EGFRCR SERPLBLD CKD-EPI 2021: 56 ML/MIN/1.73M2 (ref 60–?)
EOSINOPHIL # BLD AUTO: 0.21 X10(3) UL (ref 0–0.7)
EOSINOPHIL NFR BLD AUTO: 2.2 %
ERYTHROCYTE [DISTWIDTH] IN BLOOD BY AUTOMATED COUNT: 19.9 % (ref 11–15)
GLUCOSE BLD-MCNC: 115 MG/DL (ref 70–99)
GLUCOSE BLDC GLUCOMTR-MCNC: 101 MG/DL (ref 70–99)
GLUCOSE BLDC GLUCOMTR-MCNC: 145 MG/DL (ref 70–99)
GLUCOSE BLDC GLUCOMTR-MCNC: 174 MG/DL (ref 70–99)
HCT VFR BLD AUTO: 23 % (ref 35–48)
HGB BLD-MCNC: 7.1 G/DL (ref 12–16)
HGB BLD-MCNC: 9.3 G/DL (ref 12–16)
IMM GRANULOCYTES # BLD AUTO: 0.32 X10(3) UL (ref 0–1)
IMM GRANULOCYTES NFR BLD: 3.4 %
LYMPHOCYTES # BLD AUTO: 1.04 X10(3) UL (ref 1–4)
LYMPHOCYTES NFR BLD AUTO: 11 %
MAGNESIUM SERPL-MCNC: 1.5 MG/DL (ref 1.6–2.6)
MCH RBC QN AUTO: 27.1 PG (ref 26–34)
MCHC RBC AUTO-ENTMCNC: 30.9 G/DL (ref 31–37)
MCV RBC AUTO: 87.8 FL (ref 80–100)
MONOCYTES # BLD AUTO: 1.7 X10(3) UL (ref 0.1–1)
MONOCYTES NFR BLD AUTO: 18 %
NEUTROPHILS # BLD AUTO: 6.11 X10 (3) UL (ref 1.5–7.7)
NEUTROPHILS # BLD AUTO: 6.11 X10(3) UL (ref 1.5–7.7)
NEUTROPHILS NFR BLD AUTO: 64.8 %
OSMOLALITY SERPL CALC.SUM OF ELEC: 297 MOSM/KG (ref 275–295)
PLATELET # BLD AUTO: 242 10(3)UL (ref 150–450)
POTASSIUM SERPL-SCNC: 3.3 MMOL/L (ref 3.5–5.1)
POTASSIUM SERPL-SCNC: 3.3 MMOL/L (ref 3.5–5.1)
RBC # BLD AUTO: 2.62 X10(6)UL (ref 3.8–5.3)
RH BLOOD TYPE: POSITIVE
SODIUM SERPL-SCNC: 143 MMOL/L (ref 136–145)
WBC # BLD AUTO: 9.4 X10(3) UL (ref 4–11)

## 2025-06-04 PROCEDURE — 99233 SBSQ HOSP IP/OBS HIGH 50: CPT | Performed by: FAMILY MEDICINE

## 2025-06-04 PROCEDURE — 99233 SBSQ HOSP IP/OBS HIGH 50: CPT | Performed by: INTERNAL MEDICINE

## 2025-06-04 RX ORDER — SODIUM CHLORIDE 9 MG/ML
INJECTION, SOLUTION INTRAVENOUS ONCE
Status: COMPLETED | OUTPATIENT
Start: 2025-06-04 | End: 2025-06-04

## 2025-06-04 RX ORDER — FUROSEMIDE 10 MG/ML
20 INJECTION INTRAMUSCULAR; INTRAVENOUS ONCE
Status: COMPLETED | OUTPATIENT
Start: 2025-06-04 | End: 2025-06-04

## 2025-06-04 RX ORDER — POTASSIUM CHLORIDE 1.5 G/1.58G
40 POWDER, FOR SOLUTION ORAL ONCE
Status: COMPLETED | OUTPATIENT
Start: 2025-06-04 | End: 2025-06-04

## 2025-06-04 NOTE — PLAN OF CARE
Problem: Patient Centered Care  Goal: Patient preferences are identified and integrated in the patient's plan of care  Description: Interventions:- What would you like us to know as we care for you? From home alone with home health care     Problem: Patient/Family Goals  Goal: Patient/Family Long Term Goal  Description: Patient's Long Term Goal: To discharge from hospital     Interventions:  -  Monitor vital signs   - Monitor appropriate labs   - Monitor blood glucose levels   - Pain management   - Administer medications per order   - Follow MD orders   - Diagnostics per order   - Update/inform patient and family on plan of care   - Discharge planning    - See additional Care Plan goals for specific interventions  Outcome: Progressing  Goal: Patient/Family Short Term Goal  Description: Patient's Short Term Goal: To improve weakness     Interventions:   -  Monitor vital signs   - Monitor appropriate labs   - Monitor blood glucose levels   - Pain management   - Administer medications per order   - Follow MD orders   - Diagnostics per order   - Update/inform patient and family on plan of care   - See additional Care Plan goals for specific interventions  Outcome: Progressing   - Provide timely, complete, and accurate information to patient/family- Incorporate patient and family knowledge, values, beliefs, and cultural backgrounds into the planning and delivery of care- Encourage patient/family to participate in care and decision-making at the level they choose- Honor patient and family perspectives and choices  Outcome: Progressing    Problem: Diabetes/Glucose Control  Goal: Glucose maintained within prescribed range  Description: INTERVENTIONS:- Monitor Blood Glucose as ordered- Assess for signs and symptoms of hyperglycemia and hypoglycemia- Administer ordered medications to maintain glucose within target range- Assess barriers to adequate nutritional intake and initiate nutrition consult as needed- Instruct  patient on self management of diabetes  Outcome: Progressing     Problem: RISK FOR INFECTION - ADULT  Goal: Absence of fever/infection during anticipated neutropenic period  Description: INTERVENTIONS- Monitor WBC- Administer growth factors as ordered- Implement neutropenic guidelines  Outcome: Progressing     Problem: SAFETY ADULT - FALL  Goal: Free from fall injury  Description: INTERVENTIONS:  - Assess pt frequently for physical needs  - Identify cognitive and physical deficits and behaviors that affect risk of falls.  - [place] fall precautions as indicated by assessment.  - Educate pt/family on patient safety including physical limitations  - Instruct pt to call for assistance with activity based on assessment  - Modify environment to reduce risk of injury  - Provide assistive devices as appropriate  - Consider OT/PT consult to assist with strengthening/mobility  - Encourage toileting schedule  Outcome: Progressing     Problem: DISCHARGE PLANNING  Goal: Discharge to home or other facility with appropriate resources  Description: INTERVENTIONS:  - Identify barriers to discharge w/pt and caregiver  - Include patient/family/discharge partner in discharge planning  - Arrange for needed discharge resources and transportation as appropriate  - Identify discharge learning needs (meds, wound care, etc)  - Arrange for interpreters to assist at discharge as needed  - Consider post-discharge preferences of patient/family/discharge partner  - Complete POLST form as appropriate  - Assess patient's ability to be responsible for managing their own health  - Refer to Case Management Department for coordinating discharge planning if the patient needs post-hospital services based on physician/LIP order or complex needs related to functional status, cognitive ability or social support system  Outcome: Progressing     Problem: PAIN - ADULT  Goal: Verbalizes/displays adequate comfort level or patient's stated pain  goal  Description: INTERVENTIONS:  - Encourage pt to monitor pain and request assistance  - Assess pain using appropriate pain scale  - Administer analgesics based on type and severity of pain and evaluate response  - Implement non-pharmacological measures as appropriate and evaluate response  - Consider cultural and social influences on pain and pain management  - Manage/alleviate anxiety  - Utilize distraction and/or relaxation techniques  - Monitor for opioid side effects  - Notify MD/LIP if interventions unsuccessful or patient reports new pain  - Anticipate increased pain with activity and pre-medicate as appropriate  Outcome: Progressing     Problem: CARDIOVASCULAR - ADULT  Goal: Maintains optimal cardiac output and hemodynamic stability  Description: INTERVENTIONS:- Monitor vital signs, rhythm, and trends- Monitor for bleeding, hypotension and signs of decreased cardiac output- Evaluate effectiveness of vasoactive medications to optimize hemodynamic stability- Monitor arterial and/or venous puncture sites for bleeding and/or hematoma- Assess quality of pulses, skin color and temperature- Assess for signs of decreased coronary artery perfusion - ex. Angina- Evaluate fluid balance, assess for edema, trend weights  Outcome: Progressing  Goal: Absence of cardiac arrhythmias or at baseline  Description: INTERVENTIONS:- Continuous cardiac monitoring, monitor vital signs, obtain 12 lead EKG if indicated- Evaluate effectiveness of antiarrhythmic and heart rate control medications as ordered- Initiate emergency measures for life threatening arrhythmias- Monitor electrolytes and administer replacement therapy as ordered  Outcome: Progressing     Problem: HEMATOLOGIC - ADULT  Goal: Free from bleeding injury  Description: (Example usage: patient with low platelets)  INTERVENTIONS:  - Avoid intramuscular injections, enemas and rectal medication administration  - Ensure safe mobilization of patient  - Hold pressure on  venipuncture sites to achieve adequate hemostasis  - Assess for signs and symptoms of internal bleeding  - Monitor lab trends  - Patient is to report abnormal signs of bleeding to staff  - Avoid use of toothpicks and dental floss  - Use electric shaver for shaving  - Use soft bristle tooth brush  - Limit straining and forceful nose blowing  Outcome: Progressing  Goal: Maintains hematologic stability  Description: INTERVENTIONS  - Assess for signs and symptoms of bleeding or hemorrhage  - Monitor labs and vital signs for trends  - Administer supportive blood products/factors, fluids and medications as ordered and appropriate  - Administer supportive blood products/factors as ordered and appropriate  Outcome: Progressing  Goal: Free from bleeding injury  Description: (Example usage: patient with low platelets)  INTERVENTIONS:  - Avoid intramuscular injections, enemas and rectal medication administration  - Ensure safe mobilization of patient  - Hold pressure on venipuncture sites to achieve adequate hemostasis  - Assess for signs and symptoms of internal bleeding  - Monitor lab trends  - Patient is to report abnormal signs of bleeding to staff  - Avoid use of toothpicks and dental floss  - Use electric shaver for shaving  - Use soft bristle tooth brush  - Limit straining and forceful nose blowing  Outcome: Progressing     Problem: RESPIRATORY - ADULT  Goal: Achieves optimal ventilation and oxygenation  Description: INTERVENTIONS:- Assess for changes in respiratory status- Assess for changes in mentation and behavior- Position to facilitate oxygenation and minimize respiratory effort- Oxygen supplementation based on oxygen saturation or ABGs- Provide Smoking Cessation handout, if applicable- Encourage broncho-pulmonary hygiene including cough, deep breathe, Incentive Spirometry- Assess the need for suctioning and perform as needed- Assess and instruct to report SOB or any respiratory difficulty- Respiratory Therapy  support as indicated- Manage/alleviate anxiety- Monitor for signs/symptoms of CO2 retention  Outcome: Progressing     Problem: GENITOURINARY - ADULT  Goal: Absence of urinary retention  Description: INTERVENTIONS:  - Assess patient’s ability to void and empty bladder  - Monitor intake/output and perform bladder scan as needed  - Follow urinary retention protocol/standard of care  - Consider collaborating with pharmacy to review patient's medication profile  - Implement strategies to promote bladder emptying  Outcome: Progressing     Problem: METABOLIC/FLUID AND ELECTROLYTES - ADULT  Goal: Glucose maintained within prescribed range  Description: INTERVENTIONS:- Monitor Blood Glucose as ordered- Assess for signs and symptoms of hyperglycemia and hypoglycemia- Administer ordered medications to maintain glucose within target range- Assess barriers to adequate nutritional intake and initiate nutrition consult as needed- Instruct patient on self management of diabetes  Outcome: Progressing  Goal: Electrolytes maintained within normal limits  Description: INTERVENTIONS:  - Monitor labs and rhythm and assess patient for signs and symptoms of electrolyte imbalances  - Administer electrolyte replacement as ordered  - Monitor response to electrolyte replacements, including rhythm and repeat lab results as appropriate  - Fluid restriction as ordered  - Instruct patient on fluid and nutrition restrictions as appropriate  Outcome: Progressing  Goal: Hemodynamic stability and optimal renal function maintained  Description: INTERVENTIONS:  - Monitor labs and assess for signs and symptoms of volume excess or deficit  - Monitor intake, output and patient weight  - Monitor urine specific gravity, serum osmolarity and serum sodium as indicated or ordered  - Monitor response to interventions for patient's volume status, including labs, urine output, blood pressure (other measures as available)  - Encourage oral intake as appropriate  -  Instruct patient on fluid and nutrition restrictions as appropriate  Outcome: Progressing       Monitoring vital signs, stable at this time. No acute changes at this moment.  Monitoring blood glucose levels. Fall precautions in place- bed alarm on, bed locked in lowest position, call light within reach. Frequent rounding by nursing staff.

## 2025-06-04 NOTE — PAYOR COMM NOTE
--------------  CONTINUED STAY REVIEW    Payor: [employer]  Subscriber #:  [id number]  Authorization Number: [id number]    Admit date: 5/12/25  Admit time:  5:28 PM    6/1/25       Lab 05/30/25  0456 05/31/25  0617 06/01/25  0613   RBC 2.78* 2.67* 2.62*   HGB 7.5* 7.5* 7.2*   HCT 23.7* 23.7* 23.4*   MCV 85.3 88.8 89.3   MCH 27.0 28.1 27.5   MCHC 31.6 31.6 30.8*   RDW 20.1* 20.5* 20.5*   NEPRELIM 8.10* 7.19 5.61   WBC 11.0 10.6 9.1   .0 256.0 238.0      Lab 05/29/25  0542 05/30/25  0456 05/30/25  1810 05/31/25  0617   * 116*  --  117*   BUN 19 17  --  17   CREATSERUM 1.09* 1.05*  --  1.04*   EGFRCR 55* 58*  --  58*   CA 8.7 8.2*  --  8.2*   * 145  --  147*   K 4.0 3.4* 3.9 4.0    103  --  105   CO2 33.0* 37.0*  --  37.0*     Assessment and Plan:      Afib RVR  Pericardial effusion  - sp pericardial window 5/21, chest tube out 5/23 ; fluid positive for malignancy   - cardiology consulted  - IV amio --> now on oral. Avoiding BB, CCB with h/o junctional rhythm   - cont monitor on tele  - on 5/16 experienced RVR again with hypotension, received digoxin converted to NSR. Remained  hypotensive so sent to stepdown unit. Responded to fluid bolus and required jewel   - Transferred back to medical floor, normotensive rates controlled.  -5/30 - bp dropped overnight and lasix held. Received dig x 1 for rvr. Better today  - CXR with b/l effusions - sp 900 removed by thora. Apprec pulm   - repeat ECHO showing large pericardial effusion ; f/u echo with no pericardial effusion   Limited ECHO 5/25: normal left ventricle, EF 65-70%  -SOB and CXR with Bilateral Pleural effusion -> IV lasix 40 bid monitor output. Wts up since admit     BL PE: acute small segmental/right lower extremity DVT  S/p IVC 5/14/2025  Was not on a/c due to thrombocytopenia  Now on eliquis but will hold for possible thora -> will hold indefinitely given bleeding risk per cards         Metastatic endometrial cancer     Pancytopenia  Acute on chronic anemia of chronic disease  - s/p 2 units prbcs improved today  - neutropenic precautions  - started neupogen until ANC 1500  - Plan is for further chemotherapy once recovered clinically per Dr. Herring.  - received 6 days of iv cefepime now off   - cefepime and vanco started for neutropenia and patient with cough/chills, elevated LA, possible early sepsis now off. Wbc 15. Afebrile. On 5 L    -Pancytopenia improving, monitor daily  - Overall plan of care is to continue to treat medically over the next several days and monitor for improvement.    - s/p 2 unit PRBC   -hb stable      Acute on chronic respiratory failure  - due to 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, pulm edema   - on 4-5L NC baseline  - pulmonary following, weaning oxygen as tolerated. Cont lasix.   -sp thora as above       Thrush  - nystatin     Malnutrition - pt eating more today. Monitor over weekend. May need tube feeds          PULMONOLOGY  ASSESSMENT/PLAN:  Endometrial ca with lung mets  -oncology f/u     B/l pulmonary emboli and DVT  -oncology following  -s/p IVC filter placement by IR     Pericardial effusion-large with tamponade  -s/p pericardial window on 5/21/25  -chest tubes removed as per CV surgery   -f/u on fluid results-malignant     Pleural effusion  -s/p thoracentesis with blood fluid removal   -neg for malignancy   -continue supp 02-weaned to RA     P-afib  -cards following   -on amio. Eliquis being held d/t anemia      GERD  -PPI     Proph  -DVT: SCDs d/t anemia. Eliquis being held        6/2/25  Pt contiues to be on 4 Litres of oxygen      CBC:    Lab Results   Component Value Date     WBC 8.8 06/02/2025     WBC 9.1 06/01/2025     WBC 10.6 05/31/2025      Lab Results   Component Value Date     HGB 7.5 (L) 06/02/2025     HGB 7.2 (L) 06/01/2025     HGB 7.5 (L) 05/31/2025      Lab Results   Component Value Date     .0 06/02/2025     .0 06/01/2025     .0 05/31/2025      Lab  05/30/25  0456 05/30/25  1810 05/31/25  0617 06/02/25  1002   *  --  117* 129*   BUN 17  --  17 14   CREATSERUM 1.05*  --  1.04* 1.13*   CA 8.2*  --  8.2* 8.4*     --  147* 143   K 3.4* 3.9 4.0 3.4*     --  105 97*   CO2 37.0*  --  37.0* 40.0*       Assessment and Plan:      Afib RVR  Pericardial effusion  - sp pericardial window 5/21, chest tube out 5/23 ; fluid positive for malignancy   - cardiology consulted  - IV amio --> now on oral. Avoiding BB, CCB with h/o junctional rhythm   - cont monitor on tele  - on 5/16 experienced RVR again with hypotension, received digoxin converted to NSR. Remained  hypotensive so sent to stepdown unit. Responded to fluid bolus and required jewel   - Transferred back to medical floor, normotensive rates controlled.  -5/30 - bp dropped overnight and lasix held. Received dig x 1 for rvr. Better today  - CXR with b/l effusions - sp 900 removed by thora. Apprec pulm - > dc planning   - repeat ECHO showing large pericardial effusion ; f/u echo with no pericardial effusion   Limited ECHO 5/25: normal left ventricle, EF 65-70%  -SOB and CXR with Bilateral Pleural effusion -> IV lasix 40 bid monitor output. Wts up since admit  She continues to be volum overloaded, cards plans for IV Lasix and      BL PE: acute small segmental/right lower extremity DVT  S/p IVC 5/14/2025  Was not on a/c due to thrombocytopenia  Now on eliquis but will hold for possible thora -> will hold indefinitely given bleeding risk per cards         Metastatic endometrial cancer    Pancytopenia  Acute on chronic anemia of chronic disease  - s/p 2 units prbcs improved today  - neutropenic precautions  - started neupogen until ANC 1500  - Plan is for further chemotherapy once recovered clinically per Dr. Herring.  - received 6 days of iv cefepime now off   - cefepime and vanco started for neutropenia and patient with cough/chills, elevated LA, possible early sepsis now off. Wbc 15. Afebrile. On 5 L     -Pancytopenia improving, monitor daily  - Overall plan of care is to continue to treat medically over the next several days and monitor for improvement.    - s/p 2 unit PRBC   -hb stable      Acute on chronic respiratory failure  - due to 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, pulm edema   - on 4-5L NC baseline  - pulmonary following, weaning oxygen as tolerated. Cont lasix.   -sp thora as above       Thrush  - nystatin     Malnutrition - pt eating more today. Monitor over weekend. May need tube feeds          6/3/25  Ongoing lower extremity swelling. Has not been ambulating much. No shortness of breath or cough. No fever or chills. On 4 L O2.     Blood pressure 91/71, pulse 79, temperature 97.8 °F (36.6 °C), temperature source Axillary, resp. rate 18, height 5' (1.524 m), weight 156 lb (70.8 kg), SpO2 96%.    Constitutional:       General: She is awake.      Appearance: She is ill-appearing.      Interventions: Nasal cannula in place.   HENT:      Head: Normocephalic and atraumatic.   Cardiovascular:      Rate and Rhythm: Normal rate and regular rhythm.   Pulmonary:      Effort: No respiratory distress.      Breath sounds: Examination of the right-lower field reveals decreased breath sounds. Decreased breath sounds present. No wheezing, rhonchi or rales.   Abdominal:      General: Bowel sounds are normal. There is no distension.      Palpations: Abdomen is soft.      Tenderness: There is no abdominal tenderness.   Musculoskeletal:      Cervical back: Normal range of motion and neck supple.      Right lower leg: Edema (1+ pitting) present.      Left lower leg: Edema (1+ pitting) present.   Skin:     General: Skin is warm and dry.   Neurological:      General: No focal deficit present.      Mental Status: She is alert and oriented to person, place, and time.   Psychiatric:         Mood and Affect: Mood normal.         Behavior: Behavior is cooperative.      Lab Results   Component Value Date     WBC 9.2 06/03/2025      HGB 7.3 (L) 06/03/2025     HCT 23.2 (L) 06/03/2025     .0 06/03/2025     CREATSERUM 1.09 (H) 06/03/2025     BUN 14 06/03/2025      06/03/2025     K 3.3 (L) 06/03/2025     K 3.3 (L) 06/03/2025     CL 97 (L) 06/03/2025     CO2 >40.0 (HH) 06/03/2025      (H) 06/03/2025     CA 8.1 (L) 06/03/2025   Assessment & Plan:   Recurrent metastatic stage IV endometrial carcinoma with large NATO mass and pulmonary nodules  With disease progression  Plan:  -As per oncology     PE/DVT  CTA chest with PE  LE Doppler US with RLE DVT  Off anticoagulation due to severe anemia  S/p IVC filter 5/14/25  Plan:  -S/p IVC filter  -Eliquis on hold due to severe anemia     Large pericardial effusion with tamponade  S/p pericardial window on 5/21/25  Positive for malignancy  F/u Echo 5/25/25 no pericardial effusion  Plan:  -As per cardiology     Pleural effusion  S/p R thoracentesis 5/29/25 with -900 ml bloody fluid  Pleural fluid cytology no malignancy identified  Plan:  -Follow  -Lasix     Paroxysmal atrial fibrillation  Plan:  -Amiodarone  -Eliquis on hold due to anemia  -As per cardiology     Anemia  Plan:  -Follow     Acute on chronic hypoxemic respiratory failure  Due to metastatic endometrial cancer with large tumor burden in the chest, acute PE, pleural effusion, severe anemia  On 4 L O2 (baseline 4-5 L O2)  Plan:  -O2     DVT prophylaxis: Eliquis on hold       MEDICATIONS ADMINISTERED IN LAST 1 DAY:  acetaZOLAMIDE (Diamox) 250 mg in sterile water for injection (PF) 2.5 mL IV push       Date Action Dose Route User    6/3/2025 1322 Given 250 mg Intravenous Margarette Taylor, RN          amiodarone (Pacerone) tab 200 mg       Date Action Dose Route User    6/3/2025 0952 Given 200 mg Oral Margarette Taylor, RN          saline ([place] Saline) nasal gel       Date Action Dose Route User    6/3/2025 2035 Given (none) Nasal (Bilateral Nares) Dylan Shafer    6/3/2025 1522 Given 1 Application Nasal (Bilateral Nares) Brandon,  UNRULY Pfeiffer          furosemide (Lasix) 10 mg/mL injection 40 mg       Date Action Dose Route User    6/3/2025 0952 Given 40 mg Intravenous Margarette Taylor RN          levothyroxine (Synthroid) tab 100 mcg       Date Action Dose Route User    6/4/2025 0530 Given 100 mcg Oral D'Kvng, Dylan          magnesium oxide (Mag-Ox) tab 800 mg       Date Action Dose Route User    6/3/2025 0952 Given 800 mg Oral Margarette Taylor RN          midodrine (ProAmatine) tab 10 mg       Date Action Dose Route User    6/4/2025 0530 Given 10 mg Oral D'Kvng, Dylan    6/3/2025 1735 Given 10 mg Oral Margarette Taylor RN    6/3/2025 1252 Given 10 mg Oral Margarette Taylor RN          pantoprazole (Protonix) 40 mg in sodium chloride 0.9% PF 10 mL IV push       Date Action Dose Route User    6/4/2025 0530 Given 40 mg Intravenous D'Kvng, Dylan          potassium chloride 40 mEq in 250mL sodium chloride 0.9% IVPB premix       Date Action Dose Route User    6/3/2025 1002 New Bag 40 mEq Intravenous Margarette Taylor RN          potassium chloride 40 mEq in 250mL sodium chloride 0.9% IVPB premix       Date Action Dose Route User    6/3/2025 1523 New Bag 40 mEq Intravenous Margarette Taylor RN          sodium chloride (Saline Mist) 0.65 % nasal solution 1 spray       Date Action Dose Route User    6/3/2025 1003 Given 1 spray Each Nare Margarette Taylor RN            Vitals (last day)       Date/Time Temp Pulse Resp BP SpO2 Weight O2 Device O2 Flow Rate (L/min) Who    06/04/25 0539 -- -- -- -- -- 127 lb 8 oz (57.8 kg) -- -- AD    06/04/25 0528 97.7 °F (36.5 °C) 69 18 91/76 100 % -- High flow nasal cannula 4 L/min AD    06/03/25 2335 98.3 °F (36.8 °C) 79 18 94/50 100 % -- High flow nasal cannula 4 L/min AD    06/03/25 2032 98.3 °F (36.8 °C) 78 18 116/68 100 % -- High flow nasal cannula 4 L/min AD    06/03/25 1731 98.4 °F (36.9 °C) 81 18 100/76 97 % -- High flow nasal cannula 4 L/min KK    06/03/25 1249 97.4 °F (36.3 °C) 78 20 100/66 100 % -- High flow  nasal cannula 4 L/min     06/03/25 0839 97.8 °F (36.6 °C) 79 18 91/71 96 % -- High flow nasal cannula 4 L/min KK    06/03/25 0640 -- 73 -- 104/64 100 % -- High flow nasal cannula 4 L/min     06/03/25 0524 97.6 °F (36.4 °C) -- 16 88/59 -- -- High flow nasal cannula 4 L/min      Blood Transfusion Record       Product Unit Status Volume Start End            Transfuse RBC       25  [id number]  [id number] Completed 05/29/25 1357 292 mL 05/29/25 0957 05/29/25 1157       25  [id number]  [id number] Completed 05/22/25 0708 411.25 mL 05/21/25 1208 05/21/25 1500       25  [id number]  [id number] Completed 05/13/25 1431 335 mL 05/13/25 1144 05/13/25 1428                Transfuse platelets       25  [id number]  [id number] Completed 05/14/25 1319 200 mL 05/14/25 1116 05/14/25 1316

## 2025-06-04 NOTE — PROGRESS NOTES
Pulmonary Medicine Inpatient Progress Note                 Subjective:  On HFNC 4 liters  Afebrile  Feels tired today    Getting 1 unit pRBC       ALLERGIES:  Allergies[1]     MEDS:  Home Medications:  Medications Taking[2]  Scheduled Medication:  Scheduled Medications[3]  Continuous Infusing Medication:  Medication Infusions[4]  PRN Medications:  PRN Medications[5]       PHYSICAL EXAM:  BP 95/70   Pulse 72   Temp 98 °F (36.7 °C) (Axillary)   Resp 18   Ht 5' (1.524 m)   Wt 127 lb 8 oz (57.8 kg)   SpO2 100%   BMI 24.90 kg/m²   CONSTITUTIONAL: alert, oriented, no apparent distress  HEENT: atraumatic normocephalic  MOUTH: mucous membranes are moist. No OP exudates  NECK/THROAT: no JVD. Trachea midline. No obvious thyromegaly  LUNG: clear upper b/l no wheezing,+ basilar crackles. Diminished bases. Chest symmetric with respiratory motion  HEART: regular rate and rhythm, no obvious murmers or gallops note. + tube  ABD: soft non tender. + bowel sounds. No organomegaly noted  EXT: no clubbing, cyanosis. 3+ b/l LE edema       IMAGES:  CXR 5/31/25  CONCLUSION:   Stable to slightly increasing right pleural effusion.   Pulmonary edema is again seen.   Persistent patchy left mid lung airspace opacity, atelectasis versus pneumonia.       CXR 5/28/25  No significant overall change since the preceding examination. Right greater than left bilateral pleural effusions with bilateral mid to lower lung opacities.     CXR 5/22/25  CONCLUSION: Stable left basal/perihilar pulmonary opacity which which could represent pneumonia or neoplasm.  Stable small bilateral pleural effusions slightly larger on the left.  Mild interstitial edema. Interval extubation        CXR 5/18/25  CONCLUSION:   Unchanged mild cardiomegaly.   Interstitial pulmonary edema, appearing slightly increased from 05/15/2025.   Unchanged moderate left pleural effusion associated basilar opacity.   Unchanged trace right pleural effusion with minimal associated  atelectasis   Unchanged metastatic mediastinal/hilar adenopathy, better seen on cross-sectional imaging.     CXR 5/15/25  CONCLUSION:   1. The cardiac silhouette remains enlarged, likely relating to the known pericardial effusion.  Small pleural effusions are also unchanged bilaterally suggesting mild CHF or fluid overload.  There is stable mild associated passive atelectasis at both   lung bases.   2. There is a history of metastatic endometrial carcinoma.  Stable left upper lobe/lingular consolidation may relate to a metastasis, atelectasis and/or pneumonia.  Unchanged prominence of the mediastinal and bilateral hilar contours is compatible with known underlying metastatic lymphadenopathy.         LABS:  Recent Labs   Lab 06/02/25  0636 06/03/25  0546 06/04/25  0610   RBC 2.74* 2.59* 2.62*   HGB 7.5* 7.3* 7.1*   HCT 24.2* 23.2* 23.0*   MCV 88.3 89.6 87.8   MCH 27.4 28.2 27.1   MCHC 31.0 31.5 30.9*   RDW 20.3* 20.2* 19.9*   NEPRELIM 5.33 5.82 6.11   WBC 8.8 9.2 9.4   .0 223.0 242.0       Recent Labs   Lab 06/02/25  1002 06/03/25  0546 06/04/25  0610   * 123* 115*   BUN 14 14 13   CREATSERUM 1.13* 1.09* 1.07*   EGFRCR 53* 55* 56*   CA 8.4* 8.1* 8.0*   ALB 3.0*  --   --     144 143   K 3.4* 3.3*  3.3* 3.3*  3.3*   CL 97* 97* 100   CO2 40.0* >40.0* 37.0*       ASSESSMENT/PLAN:  Endometrial ca with lung mets  -oncology f/u    B/l pulmonary emboli and DVT  -oncology following  -s/p IVC filter placement by IR    Pericardial effusion-large with tamponade  -s/p pericardial window on 5/21/25  -chest tubes removed as per CV surgery   -f/u on fluid results-malignant    Pleural effusion  -s/p thoracentesis with bloody fluid removal   -neg for malignancy   -continue supp 02-weaned to 4 LNC  -on lasix    P-afib  -cards following   -on amichuck lasix  -eliquis being held d/t anemia     Acute on chronic anemia  -receiving 1 unit pRBC on 6/4/25    GERD  -PPI     Proph  -DVT: SCDs d/t anemia. Eliquis being  held    Dispo  -DNR/select  -ok with discharge to NH      Thank you for the opportunity to care for Kelli Fisher.     DARIEL Reaves DO, MPH  Pulmonary Critical Care Medicine  [facility]          [1]   Allergies  Allergen Reactions    Aspirin Tightness in Throat    Penicillins HIVES    Lactose OTHER (SEE COMMENTS)     Cramping, bloating    Other OTHER (SEE COMMENTS)     Pt states IV steroid allergy, states it makes her breathing worse    [2]   Outpatient Medications Marked as Taking for the 5/12/25 encounter (Hospital Encounter)   Medication Sig Dispense Refill    levothyroxine (SYNTHROID) 100 MCG Oral Tab Take 1 tablet (100 mcg total) by mouth before breakfast.      Potassium Chloride ER 20 MEQ Oral Tab CR Take 1 tablet by mouth daily.      furosemide 20 MG Oral Tab Take 1 tablet (20 mg total) by mouth daily.      Levalbuterol HCl 1.25 MG/3ML Inhalation Nebu Soln Take 3 mL (1.25 mg total) by nebulization every 8 (eight) hours as needed for Wheezing or Shortness of Breath.      pantoprazole 40 MG Oral Tab EC Take 1 tablet (40 mg total) by mouth daily. 30 tablet 0    amiodarone 200 MG Oral Tab Take 1 tablet (200 mg total) by mouth in the morning, at noon, and at bedtime for 1 day, THEN 1 tablet (200 mg total) in the morning, at noon, and at bedtime. 93 tablet 0    Fluticasone Propionate  HFA (FLOVENT HFA) 220 MCG/ACT Inhalation Aerosol Inhale 1 puff into the lungs every 12 (twelve) hours. Rinse mouth following use.     [3]    magnesium sulfate  4 g Intravenous Once    furosemide  20 mg Intravenous Once    furosemide  40 mg Oral Daily    potassium chloride  20 mEq Oral Daily    saline   Nasal TID    pantoprazole  40 mg Intravenous Daily    amiodarone  200 mg Oral Daily    midodrine  10 mg Oral TID    insulin aspart  1-5 Units Subcutaneous TID CC and HS    levothyroxine  100 mcg Oral Before breakfast   [4] [5]   acetaminophen    morphINE    sodium chloride    acetaminophen     senna    docusate    traMADol    ondansetron    metoclopramide    morphINE    polyethylene glycol (PEG 3350)    glucose **OR** glucose **OR** glucose-vitamin C **OR** dextrose **OR** glucose **OR** glucose **OR** glucose-vitamin C    ipratropium-albuterol

## 2025-06-04 NOTE — PROGRESS NOTES
Progress Note  Kelli Fisher Patient Status:  Inpatient    1955 MRN [id number]   Location [facility] Attending Inna Bullock MD   Hosp Day # 23 PCP Taylor Gallardo MD     SUBJECTIVE:    Ambulated in the fitch yesterday and felt well. No dyspnea today.     VITALS:  BP 91/76 (BP Location: Right arm)   Pulse 69   Temp 97.7 °F (36.5 °C) (Axillary)   Resp 18   Ht 5' (1.524 m)   Wt 127 lb 8 oz (57.8 kg)   SpO2 100%   BMI 24.90 kg/m²   INTAKE/OUTPUT:    Intake/Output Summary (Last 24 hours) at 2025 1009  Last data filed at 2025 0533  Gross per 24 hour   Intake 690 ml   Output 250 ml   Net 440 ml     Last 3 Weights   25 0539 127 lb 8 oz (57.8 kg)   25 0500 156 lb (70.8 kg)   25 1343 155 lb 12.8 oz (70.7 kg)   25 0406 175 lb (79.4 kg)   25 0514 175 lb 8 oz (79.6 kg)   25 0625 171 lb 11.8 oz (77.9 kg)   25 0600 169 lb 12.1 oz (77 kg)   25 0500 171 lb 15.3 oz (78 kg)   25 0500 167 lb 8.8 oz (76 kg)   25 0600 168 lb 14 oz (76.6 kg)   25 0341 167 lb (75.8 kg)   25 0611 164 lb 10.9 oz (74.7 kg)   25 1600 156 lb 1.4 oz (70.8 kg)   25 0439 163 lb 8 oz (74.2 kg)   05/15/25 1243 154 lb 3.2 oz (69.9 kg)   05/15/25 0458 177 lb 11.2 oz (80.6 kg)   25 0645 166 lb 9.6 oz (75.6 kg)   25 0406 167 lb 9.6 oz (76 kg)   25 1736 165 lb 11.2 oz (75.2 kg)   25 1217 169 lb (76.7 kg)   25 0500 174 lb 9.6 oz (79.2 kg)   25 0549 175 lb 12.8 oz (79.7 kg)   25 1800 163 lb (73.9 kg)   25 2038 165 lb (74.8 kg)     LABS:  Recent Labs   Lab 25  1002 25  0546 25  0610   * 123* 115*   BUN 14 14 13   CREATSERUM 1.13* 1.09* 1.07*   EGFRCR 53* 55* 56*   CA 8.4* 8.1* 8.0*    144 143   K 3.4* 3.3*  3.3* 3.3*  3.3*   CL 97* 97* 100   CO2 40.0* >40.0* 37.0*     Recent Labs   Lab 25  0636 25  0546 25  0610   RBC 2.74* 2.59* 2.62*   HGB 7.5* 7.3* 7.1*    HCT 24.2* 23.2* 23.0*   MCV 88.3 89.6 87.8   MCH 27.4 28.2 27.1   MCHC 31.0 31.5 30.9*   RDW 20.3* 20.2* 19.9*   NEPRELIM 5.33 5.82 6.11   WBC 8.8 9.2 9.4   .0 223.0 242.0     No results for input(s): \"TROP\", \"CK\" in the last 168 hours.  DIAGNOSTICS:  TELEMETRY: SB/SR      ROS: Negative unless noted above   PHYSICAL EXAM:  General: Alert and oriented x 3. No apparent distress.  HEENT: Normocephalic, sclera are nonicteric. Hearing appropriate bilaterally.  Neck: No JVD or Carotid bruits. Trachea midline.   Cardiac: Regular rate and rhythm. S1, S2 auscultated. No murmurs, rubs, or gallops appreciated.   Lungs: Clear without wheezes, rales, rhonchi or dullness. Chest expansion symmetrical. Regular effort.  Abdomen: Soft, non-tender, +BS. No hepatosplenomegaly or appreciable masses.   Extremities: Without clubbing, cyanosis. Peripheral pulses are 2+. Edema; +2 BLE that extends from inferior patella to feet  Neurologic: Motor and sensory nerves grossly intact.   Psych: Appropriate affect   Skin: Warm and dry.    MEDICATIONS:  Scheduled Medications[1]  Medication Infusions[2]    ASSESSMENT:    Acute on Chronic Hypoxic Respiratory Failure   Bilateral Pleural Effusions  Bilateral PE/DVT s/p IVC Filter   - On baseline O2 4L   - 5/29 S/p R thoracentesis - 900ML bloody fluid; not malignant  - 5/13 CTA Chest w/small volume acute bilateral segmental PE  - 5/14 US + RLE acute non-occlusive DVT; neg for LLE DVT  - Initially unable to tolerate A/C d/t thrombocytopenia; now eliquis on hold d/t anemia     Malignant Large Pericardial Effusion w/ Tampondate s/p Urgent Pericardial Window, R Pleural CT   - Hx Known pericardial effusion last admission - previously small - moderate  -5/13 Echo w/moderate effusion; no evidence of HD compromise  -5/20 Repeat echo w/large pericardial effusion; evidence of RV collapse, dilated IVC  -5/21 S/P Urgent Pericardial Window w/CV surgery - Fluid + for malignancy  -5/23 Removed Pleural Chest  Tube  -5/24 Removed Nathaniel drain   -5/25 Repeat echo w/o pericardial effusion    Acute on Chronic HFpEF   - proBNP 1110 on admit, 1826 on 5/29  - PO Lasix, Appears compensated  - Bicarb elevated yesterday s/p Diamox, improved   - GDMT limited with hypotension   - Albumin 3    Paroxysmal Atrial Tachycardia/Atrial Fibrillation  -Hx PSVT on recent admission w/junctional rhythm noted while on BB  -Intermittent PAF RVR this admission - now maintaining NSR   -S/P amio load; now on 200mg po daily, LFTs stable   -TSH WNL  -[id number] - hx on Eliquis held indefinitely d/t anemia    Stage IV Metastatic Endometrial Ca   Pancytopenia/ Thrombocytopenia/ Acute on Chronic Anemia   - Hgb 7 today, PRBC today per primary   - s/p 3 units PBCS & 1 unit PLTs this stay   - Heme/ Onco signed off 5/29: no plans for future chemo    Hypothyroidism s/p Thyroidectomy- Synthroid   Type II DM- A1c 6.6%   Electrolyte Dysfunction- Cardiac replacement     PLAN:  - 1 unit of PRBC today per primary, agree with additional Lasix post transfusion  - Compression hose and ambulation as tolerated   - proBNP on day of discharge     Plan of care discussed with patient and RN.     Meggan Garcia, MSN, FNP-BC, CCK  06/04/25   10:09 AM           [1]    magnesium sulfate  4 g Intravenous Once    potassium chloride  40 mEq Intravenous Once    furosemide  40 mg Oral Daily    potassium chloride  20 mEq Oral Daily    saline   Nasal TID    pantoprazole  40 mg Intravenous Daily    amiodarone  200 mg Oral Daily    midodrine  10 mg Oral TID    insulin aspart  1-5 Units Subcutaneous TID CC and HS    levothyroxine  100 mcg Oral Before breakfast   [2]

## 2025-06-04 NOTE — PROGRESS NOTES
[facility]  part of [facility]    Progress Note    Kelli Fisher Patient Status:  Inpatient    1955 MRN [id number]   Location [facility]W Attending Lina Dueñas, DO   Hosp Day # 23 PCP Taylor Gallardo MD     Chief complaint sob     Subjective:   Kelli Fisher is a(n) 70 year old female     Pt contiues to be on 4 Litres of oxygen   No new issues      ROS:   sob   No c/d   No n/v     Objective:     Blood pressure (!) 88/54, pulse 73, temperature 96.5 °F (35.8 °C), temperature source Axillary, resp. rate 16, height 5' (1.524 m), weight 127 lb 8 oz (57.8 kg), SpO2 95%.      Intake/Output Summary (Last 24 hours) at 2025 1155  Last data filed at 2025 0533  Gross per 24 hour   Intake 570 ml   Output 250 ml   Net 320 ml       Patient Weight(s) for the past 336 hrs:   Weight   25 0539 127 lb 8 oz (57.8 kg)   25 0500 156 lb (70.8 kg)   25 1343 155 lb 12.8 oz (70.7 kg)   25 0406 175 lb (79.4 kg)   25 0514 175 lb 8 oz (79.6 kg)   25 0625 171 lb 11.8 oz (77.9 kg)   25 0600 169 lb 12.1 oz (77 kg)   25 0500 171 lb 15.3 oz (78 kg)   25 0500 167 lb 8.8 oz (76 kg)   25 0600 168 lb 14 oz (76.6 kg)           General appearance: alert, appears stated age and cooperative  Pulmonary: cta improved   Cardiovascular: S1, S2 normal, no murmur, click, rub or gallop, regular rate and rhythm  Abdominal: soft, non-tender; bowel sounds normal; no masses,  no organomegaly  Extremities: b/l swelling         Medicines:     Current Hospital Medications[1]                Blood Pressure and Cardiac Medications            amiodarone 200 MG Oral Tab            Medication Infusions[2]        Lab Results   Component Value Date    WBC 9.4 2025    HGB 7.1 (L) 2025    HCT 23.0 (L) 2025    .0 2025    CREATSERUM 1.07 (H) 2025    BUN 13 2025     2025    K 3.3 (L) 2025    K 3.3 (L) 2025    CL  100 06/04/2025    CO2 37.0 (H) 06/04/2025     (H) 06/04/2025    CA 8.0 (L) 06/04/2025    ALB 3.0 (L) 06/02/2025    ALKPHO 142 05/12/2025    BILT 0.7 05/12/2025    TP 6.8 05/12/2025    AST 26 05/12/2025    ALT 10 05/12/2025    PTT 37.0 (H) 05/21/2025    INR 1.31 (H) 05/22/2025    TSH 3.705 05/02/2025    LIP 42 12/11/2023    DDIMER 3.05 (H) 05/13/2025    MG 1.5 (L) 06/04/2025    B12 >2,000 (H) 05/02/2025       No results found.          Results:     CBC:    Lab Results   Component Value Date    WBC 9.4 06/04/2025    WBC 9.2 06/03/2025    WBC 8.8 06/02/2025     Lab Results   Component Value Date    HGB 7.1 (L) 06/04/2025    HGB 7.3 (L) 06/03/2025    HGB 7.5 (L) 06/02/2025      Lab Results   Component Value Date    .0 06/04/2025    .0 06/03/2025    .0 06/02/2025       Recent Labs   Lab 06/02/25  1002 06/03/25  0546 06/04/25  0610   * 123* 115*   BUN 14 14 13   CREATSERUM 1.13* 1.09* 1.07*   CA 8.4* 8.1* 8.0*    144 143   K 3.4* 3.3*  3.3* 3.3*  3.3*   CL 97* 97* 100   CO2 40.0* >40.0* 37.0*           Assessment and Plan:      Afib RVR  Pericardial effusion  - sp pericardial window 5/21, chest tube out 5/23 ; fluid positive for malignancy   - cardiology consulted  - IV amio --> now on oral. Avoiding BB, CCB with h/o junctional rhythm   - cont monitor on tele  - on 5/16 experienced RVR again with hypotension, received digoxin converted to NSR. Remained  hypotensive so sent to stepdown unit. Responded to fluid bolus and required jewel   - Transferred back to medical floor, normotensive rates controlled.  -5/30 - bp dropped overnight and lasix held. Received dig x 1 for rvr. Better today  - CXR with b/l effusions - sp 900 removed by ghislaine. Apprec pulm - > dc planning   - repeat ECHO showing large pericardial effusion ; f/u echo with no pericardial effusion   Limited ECHO 5/25: normal left ventricle, EF 65-70%  -SOB and CXR with Bilateral Pleural effusion -> IV lasix 40 bid monitor  output. Wts up since admit  She continues to be volum overloaded, cards plans for IV Lasix -> on PO lasix   Eliquis held indefinitely per cards due to Anemia   Pt hb is low at 7.1, One unit PRBC transfusion today , Lasix IV one time after transfusion     BL PE: acute small segmental/right lower extremity DVT  S/p IVC 5/14/2025  Was not on a/c due to thrombocytopenia  Now on eliquis but will hold for possible thora -> will hold indefinitely given bleeding risk per cards         Metastatic endometrial cancer    Pancytopenia  Acute on chronic anemia of chronic disease  - s/p 2 units prbcs improved today  - neutropenic precautions  - started neupogen until ANC 1500  - Plan is for further chemotherapy once recovered clinically per Dr. Herring.  - received 6 days of iv cefepime now off   - cefepime and vanco started for neutropenia and patient with cough/chills, elevated LA, possible early sepsis now off. Wbc 15. Afebrile. On 4 L    -Pancytopenia improving, monitor daily  - Overall plan of care is to continue to treat medically over the next several days and monitor for improvement.    - discussed with pt and daughter today and they are comfortable with palliative care consult for information . Explained it is meant for more comfort care then treatment of the cancer   - apprec pall care consult - discussed with Pat and pt not ready for hospice.. discussed with pt today and she is still hopeful but did discuss at this time she cannot get chemo due to poor performance status   - s/p 2 unit PRBC   -hb stable        Acute on chronic respiratory failure  - due to 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, pulm edema   - on 4-5L NC baseline  - pulmonary following, weaning oxygen as tolerated. Cont lasix.   -sp thora as above       Thrush  - nystatin    Malnutrition - pt eating more today. Monitor over weekend. May need tube feeds      Deconditioning  PT OT      Dispo: PT recommending EDUARDO, patient to discuss with family. Have  previously refused    Pt lives by herself, continue to be SOB with min exertion, now fluid overload, will benefit from EDUARDO   . Plans for EDUARDO once stable    D/w Daughter and Pt a bedside, d/w current treatment and plan, possibly dc tomorrow.       MDM: High   I personally spent time on chart/note review, review of labs/imaging, discussion with patient, physical exam, discussion with staff, consultants, coordinating care, writing progress note, and discussion of plan of care.                Inna Bullock MD, FAAFP                Supplementary Documentation:   DVT Mechanical Prophylaxis: MACHELLE hose, SCDs, Early ambuation  DVT Pharmacologic Prophylaxis   Medication   None                Code Status: DNAR/Selective Treatment  Rogel: External urinary catheter in place  Rogel Duration (in days):   Central line:    JOSE: 6/4/2025                            [1]   Current Facility-Administered Medications   Medication Dose Route Frequency    magnesium sulfate 4 g/100mL IVPB premix 4 g  4 g Intravenous Once    sodium chloride 0.9% infusion   Intravenous Once    furosemide (Lasix) 10 mg/mL injection 20 mg  20 mg Intravenous Once    potassium chloride (Klor-Con) 20 MEQ oral powder 40 mEq  40 mEq Oral Once    furosemide (Lasix) tab 40 mg  40 mg Oral Daily    potassium chloride (Klor-Con M20) tab 20 mEq  20 mEq Oral Daily    saline ([place] Saline) nasal gel   Nasal TID    pantoprazole (Protonix) 40 mg in sodium chloride 0.9% PF 10 mL IV push  40 mg Intravenous Daily    acetaminophen (Ofirmev) 10 mg/mL infusion premix 1,000 mg  1,000 mg Intravenous Q4H PRN    morphINE 10 MG/5ML oral solution 2.5 mg  2.5 mg Oral TID PRN    amiodarone (Pacerone) tab 200 mg  200 mg Oral Daily    sodium chloride (Saline Mist) 0.65 % nasal solution 1 spray  1 spray Each Nare Q3H PRN    acetaminophen (Tylenol Extra Strength) tab 1,000 mg  1,000 mg Oral Q6H PRN    senna (Senokot) 8.8 MG/5ML oral syrup 17.6 mg  10 mL Per NG Tube Daily PRN    docusate (Colace)  50 MG/5ML oral solution 100 mg  100 mg Per NG Tube BID PRN    midodrine (ProAmatine) tab 10 mg  10 mg Oral TID    traMADol (Ultram) tab 50 mg  50 mg Oral Q6H PRN    ondansetron (Zofran) 4 MG/2ML injection 4 mg  4 mg Intravenous Q6H PRN    metoclopramide (Reglan) 5 mg/mL injection 5 mg  5 mg Intravenous Q8H PRN    morphINE PF 2 MG/ML injection 1 mg  1 mg Intravenous Q2H PRN    polyethylene glycol (PEG 3350) (Miralax) 17 g oral packet 17 g  17 g Per NG Tube Daily PRN    insulin aspart (NovoLOG) 100 Units/mL FlexPen 1-5 Units  1-5 Units Subcutaneous TID CC and HS    glucose (Dex4) 15 GM/59ML oral liquid 15 g  15 g Oral Q15 Min PRN    Or    glucose (Glutose) 40% oral gel 15 g  15 g Oral Q15 Min PRN    Or    glucose-vitamin C (Dex-4) chewable tab 4 tablet  4 tablet Oral Q15 Min PRN    Or    dextrose 50% injection 50 mL  50 mL Intravenous Q15 Min PRN    Or    glucose (Dex4) 15 GM/59ML oral liquid 30 g  30 g Oral Q15 Min PRN    Or    glucose (Glutose) 40% oral gel 30 g  30 g Oral Q15 Min PRN    Or    glucose-vitamin C (Dex-4) chewable tab 8 tablet  8 tablet Oral Q15 Min PRN    ipratropium-albuterol (Duoneb) 0.5-2.5 (3) MG/3ML inhalation solution 3 mL  3 mL Nebulization Q6H PRN    levothyroxine (Synthroid) tab 100 mcg  100 mcg Oral Before breakfast   [2]

## 2025-06-05 VITALS
OXYGEN SATURATION: 100 % | TEMPERATURE: 97 F | HEIGHT: 60 IN | RESPIRATION RATE: 16 BRPM | WEIGHT: 127.5 LBS | HEART RATE: 59 BPM | BODY MASS INDEX: 25.03 KG/M2 | SYSTOLIC BLOOD PRESSURE: 98 MMHG | DIASTOLIC BLOOD PRESSURE: 69 MMHG

## 2025-06-05 LAB
ANION GAP SERPL CALC-SCNC: 4 MMOL/L (ref 0–18)
BASOPHILS # BLD AUTO: 0.07 X10(3) UL (ref 0–0.2)
BASOPHILS NFR BLD AUTO: 0.7 %
BLOOD TYPE BARCODE: 7300
BUN BLD-MCNC: 13 MG/DL (ref 9–23)
BUN/CREAT SERPL: 12 (ref 10–20)
CALCIUM BLD-MCNC: 8.1 MG/DL (ref 8.7–10.4)
CHLORIDE SERPL-SCNC: 99 MMOL/L (ref 98–112)
CO2 SERPL-SCNC: 35 MMOL/L (ref 21–32)
CREAT BLD-MCNC: 1.08 MG/DL (ref 0.55–1.02)
DEPRECATED RDW RBC AUTO: 55.4 FL (ref 35.1–46.3)
EGFRCR SERPLBLD CKD-EPI 2021: 56 ML/MIN/1.73M2 (ref 60–?)
EOSINOPHIL # BLD AUTO: 0.2 X10(3) UL (ref 0–0.7)
EOSINOPHIL NFR BLD AUTO: 2 %
ERYTHROCYTE [DISTWIDTH] IN BLOOD BY AUTOMATED COUNT: 17.9 % (ref 11–15)
GLUCOSE BLD-MCNC: 122 MG/DL (ref 70–99)
GLUCOSE BLDC GLUCOMTR-MCNC: 115 MG/DL (ref 70–99)
GLUCOSE BLDC GLUCOMTR-MCNC: 121 MG/DL (ref 70–99)
HCT VFR BLD AUTO: 26.9 % (ref 35–48)
HGB BLD-MCNC: 8.7 G/DL (ref 12–16)
IMM GRANULOCYTES # BLD AUTO: 0.26 X10(3) UL (ref 0–1)
IMM GRANULOCYTES NFR BLD: 2.6 %
LYMPHOCYTES # BLD AUTO: 0.95 X10(3) UL (ref 1–4)
LYMPHOCYTES NFR BLD AUTO: 9.6 %
MAGNESIUM SERPL-MCNC: 2.4 MG/DL (ref 1.6–2.6)
MCH RBC QN AUTO: 27.5 PG (ref 26–34)
MCHC RBC AUTO-ENTMCNC: 32.3 G/DL (ref 31–37)
MCV RBC AUTO: 85.1 FL (ref 80–100)
MONOCYTES # BLD AUTO: 1.56 X10(3) UL (ref 0.1–1)
MONOCYTES NFR BLD AUTO: 15.8 %
NEUTROPHILS # BLD AUTO: 6.82 X10 (3) UL (ref 1.5–7.7)
NEUTROPHILS # BLD AUTO: 6.82 X10(3) UL (ref 1.5–7.7)
NEUTROPHILS NFR BLD AUTO: 69.3 %
NT-PROBNP SERPL-MCNC: 1157 PG/ML (ref ?–125)
OSMOLALITY SERPL CALC.SUM OF ELEC: 287 MOSM/KG (ref 275–295)
PLATELET # BLD AUTO: 223 10(3)UL (ref 150–450)
POTASSIUM SERPL-SCNC: 3.5 MMOL/L (ref 3.5–5.1)
POTASSIUM SERPL-SCNC: 3.5 MMOL/L (ref 3.5–5.1)
RBC # BLD AUTO: 3.16 X10(6)UL (ref 3.8–5.3)
SODIUM SERPL-SCNC: 138 MMOL/L (ref 136–145)
UNIT VOLUME: 350 ML
WBC # BLD AUTO: 9.9 X10(3) UL (ref 4–11)

## 2025-06-05 PROCEDURE — 99232 SBSQ HOSP IP/OBS MODERATE 35: CPT | Performed by: INTERNAL MEDICINE

## 2025-06-05 PROCEDURE — 99239 HOSP IP/OBS DSCHRG MGMT >30: CPT | Performed by: FAMILY MEDICINE

## 2025-06-05 RX ORDER — MIDODRINE HYDROCHLORIDE 10 MG/1
10 TABLET ORAL 3 TIMES DAILY
Qty: 90 TABLET | Refills: 0 | Status: SHIPPED | OUTPATIENT
Start: 2025-06-05

## 2025-06-05 RX ORDER — POTASSIUM CHLORIDE 1.5 G/1.58G
40 POWDER, FOR SOLUTION ORAL EVERY 4 HOURS
Status: DISCONTINUED | OUTPATIENT
Start: 2025-06-05 | End: 2025-06-05

## 2025-06-05 RX ORDER — AMIODARONE HYDROCHLORIDE 200 MG/1
200 TABLET ORAL DAILY
Qty: 30 TABLET | Refills: 0 | Status: SHIPPED | OUTPATIENT
Start: 2025-06-05

## 2025-06-05 RX ORDER — POTASSIUM CHLORIDE 1500 MG/1
20 TABLET, EXTENDED RELEASE ORAL DAILY
Qty: 30 TABLET | Refills: 0 | Status: SHIPPED | OUTPATIENT
Start: 2025-06-05

## 2025-06-05 RX ORDER — FUROSEMIDE 40 MG/1
40 TABLET ORAL DAILY
Qty: 30 TABLET | Refills: 0 | Status: SHIPPED | OUTPATIENT
Start: 2025-06-05

## 2025-06-05 NOTE — PLAN OF CARE
Discharge education provided verbally and in writing at bedside by this RN. Patient and family verbalized understanding. Port de-accessed at bedside. No further questions at this time. Full RN to RN report given to UNRULY Mohamud at [facility]. Patient transported via [facility] ambulance at time of discharge.    Problem: Diabetes/Glucose Control  Goal: Glucose maintained within prescribed range  Description: INTERVENTIONS:- Monitor Blood Glucose as ordered- Assess for signs and symptoms of hyperglycemia and hypoglycemia- Administer ordered medications to maintain glucose within target range- Assess barriers to adequate nutritional intake and initiate nutrition consult as needed- Instruct patient on self management of diabetes  Outcome: Adequate for Discharge     Problem: Patient Centered Care  Goal: Patient preferences are identified and integrated in the patient's plan of care  Description: Interventions:- What would you like us to know as we care for you? I live home alone with home health care.- Provide timely, complete, and accurate information to patient/family- Incorporate patient and family knowledge, values, beliefs, and cultural backgrounds into the planning and delivery of care- Encourage patient/family to participate in care and decision-making at the level they choose- Honor patient and family perspectives and choices  Outcome: Adequate for Discharge     Problem: RISK FOR INFECTION - ADULT  Goal: Absence of fever/infection during anticipated neutropenic period  Description: INTERVENTIONS- Monitor WBC- Administer growth factors as ordered- Implement neutropenic guidelines  Outcome: Adequate for Discharge     Problem: CARDIOVASCULAR - ADULT  Goal: Maintains optimal cardiac output and hemodynamic stability  Description: INTERVENTIONS:- Monitor vital signs, rhythm, and trends- Monitor for bleeding, hypotension and signs of decreased cardiac output- Evaluate effectiveness of vasoactive medications to  optimize hemodynamic stability- Monitor arterial and/or venous puncture sites for bleeding and/or hematoma- Assess quality of pulses, skin color and temperature- Assess for signs of decreased coronary artery perfusion - ex. Angina- Evaluate fluid balance, assess for edema, trend weights  Outcome: Adequate for Discharge  Goal: Absence of cardiac arrhythmias or at baseline  Description: INTERVENTIONS:- Continuous cardiac monitoring, monitor vital signs, obtain 12 lead EKG if indicated- Evaluate effectiveness of antiarrhythmic and heart rate control medications as ordered- Initiate emergency measures for life threatening arrhythmias- Monitor electrolytes and administer replacement therapy as ordered  Outcome: Adequate for Discharge     Problem: RESPIRATORY - ADULT  Goal: Achieves optimal ventilation and oxygenation  Description: INTERVENTIONS:- Assess for changes in respiratory status- Assess for changes in mentation and behavior- Position to facilitate oxygenation and minimize respiratory effort- Oxygen supplementation based on oxygen saturation or ABGs- Provide Smoking Cessation handout, if applicable- Encourage broncho-pulmonary hygiene including cough, deep breathe, Incentive Spirometry- Assess the need for suctioning and perform as needed- Assess and instruct to report SOB or any respiratory difficulty- Respiratory Therapy support as indicated- Manage/alleviate anxiety- Monitor for signs/symptoms of CO2 retention  Outcome: Adequate for Discharge     Problem: SAFETY ADULT - FALL  Goal: Free from fall injury  Description: INTERVENTIONS:  - Assess pt frequently for physical needs  - Identify cognitive and physical deficits and behaviors that affect risk of falls.  - [place] fall precautions as indicated by assessment.  - Educate pt/family on patient safety including physical limitations  - Instruct pt to call for assistance with activity based on assessment  - Modify environment to reduce risk of injury  - Provide  assistive devices as appropriate  - Consider OT/PT consult to assist with strengthening/mobility  - Encourage toileting schedule  Outcome: Adequate for Discharge     Problem: DISCHARGE PLANNING  Goal: Discharge to home or other facility with appropriate resources  Description: INTERVENTIONS:  - Identify barriers to discharge w/pt and caregiver  - Include patient/family/discharge partner in discharge planning  - Arrange for needed discharge resources and transportation as appropriate  - Identify discharge learning needs (meds, wound care, etc)  - Arrange for interpreters to assist at discharge as needed  - Consider post-discharge preferences of patient/family/discharge partner  - Complete POLST form as appropriate  - Assess patient's ability to be responsible for managing their own health  - Refer to Case Management Department for coordinating discharge planning if the patient needs post-hospital services based on physician/LIP order or complex needs related to functional status, cognitive ability or social support system  Outcome: Adequate for Discharge     Problem: HEMATOLOGIC - ADULT  Goal: Free from bleeding injury  Description: (Example usage: patient with low platelets)  INTERVENTIONS:  - Avoid intramuscular injections, enemas and rectal medication administration  - Ensure safe mobilization of patient  - Hold pressure on venipuncture sites to achieve adequate hemostasis  - Assess for signs and symptoms of internal bleeding  - Monitor lab trends  - Patient is to report abnormal signs of bleeding to staff  - Avoid use of toothpicks and dental floss  - Use electric shaver for shaving  - Use soft bristle tooth brush  - Limit straining and forceful nose blowing  Outcome: Adequate for Discharge  Goal: Maintains hematologic stability  Description: INTERVENTIONS  - Assess for signs and symptoms of bleeding or hemorrhage  - Monitor labs and vital signs for trends  - Administer supportive blood products/factors, fluids  and medications as ordered and appropriate  - Administer supportive blood products/factors as ordered and appropriate  Outcome: Adequate for Discharge  Goal: Free from bleeding injury  Description: (Example usage: patient with low platelets)  INTERVENTIONS:  - Avoid intramuscular injections, enemas and rectal medication administration  - Ensure safe mobilization of patient  - Hold pressure on venipuncture sites to achieve adequate hemostasis  - Assess for signs and symptoms of internal bleeding  - Monitor lab trends  - Patient is to report abnormal signs of bleeding to staff  - Avoid use of toothpicks and dental floss  - Use electric shaver for shaving  - Use soft bristle tooth brush  - Limit straining and forceful nose blowing  Outcome: Adequate for Discharge     Problem: GASTROINTESTINAL - ADULT  Goal: Minimal or absence of nausea and vomiting  Description: INTERVENTIONS:  - Maintain adequate hydration with IV or PO as ordered and tolerated  - Nasogastric tube to low intermittent suction as ordered  - Evaluate effectiveness of ordered antiemetic medications  - Provide nonpharmacologic comfort measures as appropriate  - Advance diet as tolerated, if ordered  - Obtain nutritional consult as needed  - Evaluate fluid balance  Outcome: Adequate for Discharge  Goal: Maintains or returns to baseline bowel function  Description: INTERVENTIONS:  - Assess bowel function  - Maintain adequate hydration with IV or PO as ordered and tolerated  - Evaluate effectiveness of GI medications  - Encourage mobilization and activity  - Obtain nutritional consult as needed  - Establish a toileting routine/schedule  - Consider collaborating with pharmacy to review patient's medication profile  Outcome: Adequate for Discharge  Goal: Maintains adequate nutritional intake (undernourished)  Description: INTERVENTIONS:  - Monitor percentage of each meal consumed  - Identify factors contributing to decreased intake, treat as appropriate  -  Assist with meals as needed  - Monitor I&O, WT and lab values  - Obtain nutritional consult as needed  - Optimize oral hygiene and moisture  - Encourage food from home; allow for food preferences  - Enhance eating environment  Outcome: Adequate for Discharge     Problem: GENITOURINARY - ADULT  Goal: Absence of urinary retention  Description: INTERVENTIONS:  - Assess patient’s ability to void and empty bladder  - Monitor intake/output and perform bladder scan as needed  - Follow urinary retention protocol/standard of care  - Consider collaborating with pharmacy to review patient's medication profile  - Implement strategies to promote bladder emptying  Outcome: Adequate for Discharge     Problem: METABOLIC/FLUID AND ELECTROLYTES - ADULT  Goal: Glucose maintained within prescribed range  Description: INTERVENTIONS:- Monitor Blood Glucose as ordered- Assess for signs and symptoms of hyperglycemia and hypoglycemia- Administer ordered medications to maintain glucose within target range- Assess barriers to adequate nutritional intake and initiate nutrition consult as needed- Instruct patient on self management of diabetes  Outcome: Adequate for Discharge  Goal: Electrolytes maintained within normal limits  Description: INTERVENTIONS:  - Monitor labs and rhythm and assess patient for signs and symptoms of electrolyte imbalances  - Administer electrolyte replacement as ordered  - Monitor response to electrolyte replacements, including rhythm and repeat lab results as appropriate  - Fluid restriction as ordered  - Instruct patient on fluid and nutrition restrictions as appropriate  Outcome: Adequate for Discharge  Goal: Hemodynamic stability and optimal renal function maintained  Description: INTERVENTIONS:  - Monitor labs and assess for signs and symptoms of volume excess or deficit  - Monitor intake, output and patient weight  - Monitor urine specific gravity, serum osmolarity and serum sodium as indicated or ordered  -  Monitor response to interventions for patient's volume status, including labs, urine output, blood pressure (other measures as available)  - Encourage oral intake as appropriate  - Instruct patient on fluid and nutrition restrictions as appropriate  Outcome: Adequate for Discharge     Problem: SKIN/TISSUE INTEGRITY - ADULT  Goal: Skin integrity remains intact  Description: INTERVENTIONS  - Assess and document risk factors for pressure ulcer development  - Assess and document skin integrity  - Monitor for areas of redness and/or skin breakdown  - Initiate interventions, skin care algorithm/standards of care as needed  Outcome: Adequate for Discharge  Goal: Incision(s), wounds(s) or drain site(s) healing without S/S of infection  Description: INTERVENTIONS:  - Assess and document risk factors for pressure ulcer development  - Assess and document skin integrity  - Assess and document dressing/incision, wound bed, drain sites and surrounding tissue  - Implement wound care per orders  - Initiate isolation precautions as appropriate  - Initiate Pressure Ulcer prevention bundle as indicated  Outcome: Adequate for Discharge     Problem: MUSCULOSKELETAL - ADULT  Goal: Return mobility to safest level of function  Description: INTERVENTIONS:  - Assess patient stability and activity tolerance for standing, transferring and ambulating w/ or w/o assistive devices  - Assist with transfers and ambulation using safe patient handling equipment as needed  - Ensure adequate protection for wounds/incisions during mobilization  - Obtain PT/OT consults as needed  - Advance activity as appropriate  - Communicate ordered activity level and limitations with patient/family  Outcome: Adequate for Discharge  Goal: Maintain proper alignment of affected body part  Description: INTERVENTIONS:  - Support and protect limb and body alignment per provider's orders  - Instruct and reinforce with patient and family use of appropriate assistive device  and precautions (e.g. spinal or hip dislocation precautions)  Outcome: Adequate for Discharge     Problem: PAIN - ADULT  Goal: Verbalizes/displays adequate comfort level or patient's stated pain goal  Description: INTERVENTIONS:  - Encourage pt to monitor pain and request assistance  - Assess pain using appropriate pain scale  - Administer analgesics based on type and severity of pain and evaluate response  - Implement non-pharmacological measures as appropriate and evaluate response  - Consider cultural and social influences on pain and pain management  - Manage/alleviate anxiety  - Utilize distraction and/or relaxation techniques  - Monitor for opioid side effects  - Notify MD/LIP if interventions unsuccessful or patient reports new pain  - Anticipate increased pain with activity and pre-medicate as appropriate  Outcome: Adequate for Discharge     Problem: Patient/Family Goals  Goal: Patient/Family Long Term Goal  Description: Patient's Long Term Goal: Improve strength    Interventions:  - Monitor vital signs  - Monitor appropriate labs  - Monitor blood glucose levels  - Pain management  - Oxygen and respiratory intervention as needed  - Administer medications per order  - Follow MD orders  - Diagnostics per order  - Update / inform patient and family on plan of care  - Discharge planning  - See additional Care Plan goals for specific interventions  Outcome: Adequate for Discharge  Goal: Patient/Family Short Term Goal  Description: Patient's Short Term Goal: Discharge from hospital    Interventions:   - Monitor vital signs  - Monitor appropriate labs  - Monitor blood glucose levels  - Pain management  - Oxygen and respiratory intervention as needed  - Administer medications per order  - Follow MD orders  - Diagnostics per order  - Update / inform patient and family on plan of care  - Discharge planning  - See additional Care Plan goals for specific interventions  Outcome: Adequate for Discharge

## 2025-06-05 NOTE — PROGRESS NOTES
Progress Note  Kelli Fisher Patient Status:  Inpatient    1955 MRN [id number]   Location [facility]5W Attending Inna Bullock MD   Hosp Day # 24 PCP Taylor Gallardo MD     SUBJECTIVE:    No dyspnea today. Feels ready to go home.     VITALS:  /67 (BP Location: Right arm)   Pulse 73   Temp 97 °F (36.1 °C) (Axillary)   Resp 16   Ht 5' (1.524 m)   Wt 127 lb 8 oz (57.8 kg)   SpO2 100%   BMI 24.90 kg/m²   INTAKE/OUTPUT:    Intake/Output Summary (Last 24 hours) at 2025 1011  Last data filed at 2025 0558  Gross per 24 hour   Intake 549.25 ml   Output 700 ml   Net -150.75 ml     Last 3 Weights   25 0539 127 lb 8 oz (57.8 kg)   25 0500 156 lb (70.8 kg)   25 1343 155 lb 12.8 oz (70.7 kg)   25 0406 175 lb (79.4 kg)   25 0514 175 lb 8 oz (79.6 kg)   25 0625 171 lb 11.8 oz (77.9 kg)   25 0600 169 lb 12.1 oz (77 kg)   25 0500 171 lb 15.3 oz (78 kg)   25 0500 167 lb 8.8 oz (76 kg)   25 0600 168 lb 14 oz (76.6 kg)   25 0341 167 lb (75.8 kg)   25 0611 164 lb 10.9 oz (74.7 kg)   25 1600 156 lb 1.4 oz (70.8 kg)   25 0439 163 lb 8 oz (74.2 kg)   05/15/25 1243 154 lb 3.2 oz (69.9 kg)   05/15/25 0458 177 lb 11.2 oz (80.6 kg)   25 0645 166 lb 9.6 oz (75.6 kg)   25 0406 167 lb 9.6 oz (76 kg)   25 1736 165 lb 11.2 oz (75.2 kg)   25 1217 169 lb (76.7 kg)   25 0500 174 lb 9.6 oz (79.2 kg)   25 0549 175 lb 12.8 oz (79.7 kg)   25 1800 163 lb (73.9 kg)   25 2038 165 lb (74.8 kg)     LABS:  Recent Labs   Lab 25  0546 25  0610 25  0556   * 115* 122*   BUN 14 13 13   CREATSERUM 1.09* 1.07* 1.08*   EGFRCR 55* 56* 56*   CA 8.1* 8.0* 8.1*    143 138   K 3.3*  3.3* 3.3*  3.3* 3.5  3.5   CL 97* 100 99   CO2 >40.0* 37.0* 35.0*     Recent Labs   Lab 25  0546 25  0610 25  1704 25  0556   RBC 2.59* 2.62*  --  3.16*   HGB 7.3* 7.1*  9.3* 8.7*   HCT 23.2* 23.0*  --  26.9*   MCV 89.6 87.8  --  85.1   MCH 28.2 27.1  --  27.5   MCHC 31.5 30.9*  --  32.3   RDW 20.2* 19.9*  --  17.9*   NEPRELIM 5.82 6.11  --  6.82   WBC 9.2 9.4  --  9.9   .0 242.0  --  223.0     No results for input(s): \"TROP\", \"CK\" in the last 168 hours.  DIAGNOSTICS:  TELEMETRY: SB/SR    ROS: Negative unless noted above   PHYSICAL EXAM:  General: Alert and oriented x 3. No apparent distress.  HEENT: Normocephalic, sclera are nonicteric. Hearing appropriate bilaterally.  Neck: No JVD or Carotid bruits. Trachea midline.   Cardiac: Regular rate and rhythm. S1, S2 auscultated. No murmurs, rubs, or gallops appreciated.   Lungs: Clear without wheezes, rales, rhonchi or dullness. Chest expansion symmetrical. Regular effort.  Abdomen: Soft, non-tender, +BS. No hepatosplenomegaly or appreciable masses.   Extremities: Without clubbing, cyanosis. Peripheral pulses are 2+. Edema; +2 BLE that extends from inferior patella to feet  Neurologic: Motor and sensory nerves grossly intact.   Psych: Appropriate affect   Skin: Warm and dry.    MEDICATIONS:  Scheduled Medications[1]  Medication Infusions[2]    ASSESSMENT:    Acute on Chronic Hypoxic Respiratory Failure   Bilateral Pleural Effusions  Bilateral PE/DVT s/p IVC Filter   - On baseline O2 4L   - 5/29 S/p R thoracentesis - 900ML bloody fluid; not malignant  - 5/13 CTA Chest w/small volume acute bilateral segmental PE  - 5/14 US + RLE acute non-occlusive DVT; neg for LLE DVT  - Initially unable to tolerate A/C d/t thrombocytopenia; now eliquis on hold d/t anemia     Malignant Large Pericardial Effusion w/ Tampondate s/p Urgent Pericardial Window, R Pleural CT   - Hx Known pericardial effusion last admission - previously small - moderate  -5/13 Echo w/moderate effusion; no evidence of HD compromise  -5/20 Repeat echo w/large pericardial effusion; evidence of RV collapse, dilated IVC  -5/21 S/P Urgent Pericardial Window w/CV surgery - Fluid  + for malignancy  -5/23 Removed Pleural Chest Tube  -5/24 Removed Nathaniel drain   -5/25 Repeat echo w/o pericardial effusion    Acute on Chronic HFpEF   - proBNP 1110 on admit, 1826 on 5/29  - PO Lasix, Appears compensated  - Bicarb elevated yesterday s/p Diamox, improved   - GDMT limited with hypotension   - Albumin 3    Paroxysmal Atrial Tachycardia/Atrial Fibrillation  -Hx PSVT on recent admission w/junctional rhythm noted while on BB  -Intermittent PAF RVR this admission - now maintaining NSR   -S/P amio load; now on 200mg po daily, LFTs stable   -TSH WNL  -[id number] 3 - hx on Eliquis held indefinitely d/t anemia    Stage IV Metastatic Endometrial Ca   Pancytopenia/ Thrombocytopenia/ Acute on Chronic Anemia   - Hgb 8.7 today  - s/p 4 units PBCS & 1 unit PLTs this stay   - Heme/ Onco signed off 5/29: no plans for future chemo    Hypothyroidism s/p Thyroidectomy- Synthroid   Type II DM- A1c 6.6%   Electrolyte Dysfunction- Cardiac replacement     PLAN:  - Compression hose and ambulation as tolerated   - Discharge proBNP is 1157  - Appears stable for discharge from a cardiology standpoint, she has a follow up appointment with the [facility] scheduled for after rehab; BMP as well     Plan of care discussed with patient and RN.     Meggan Garcia, MSN, FNP-BC, CCK  06/05/25   10:11 AM           [1]    furosemide  40 mg Oral Daily    potassium chloride  20 mEq Oral Daily    saline   Nasal TID    pantoprazole  40 mg Intravenous Daily    amiodarone  200 mg Oral Daily    midodrine  10 mg Oral TID    insulin aspart  1-5 Units Subcutaneous TID CC and HS    levothyroxine  100 mcg Oral Before breakfast   [2]

## 2025-06-05 NOTE — CM/SW NOTE
06/05/25 1318   Discharge disposition   Expected discharge disposition subacute   Post Acute Care Provider Azalia Edmond  (Tammy Edmondmhurst)   Discharge transportation [facility]     On O2 4L.  Set up with ambulance.  Daughter is aware of pick-up time for discharge.    Melissa Jenkins MBA BSN RN CRRTHEODORE CALLEJAS  RN Case Manager [facility]

## 2025-06-05 NOTE — PLAN OF CARE
Problem: Diabetes/Glucose Control  Goal: Glucose maintained within prescribed range  Description: INTERVENTIONS:- Monitor Blood Glucose as ordered- Assess for signs and symptoms of hyperglycemia and hypoglycemia- Administer ordered medications to maintain glucose within target range- Assess barriers to adequate nutritional intake and initiate nutrition consult as needed- Instruct patient on self management of diabetes  Outcome: Progressing     Problem: RISK FOR INFECTION - ADULT  Goal: Absence of fever/infection during anticipated neutropenic period  Description: INTERVENTIONS- Monitor WBC- Administer growth factors as ordered- Implement neutropenic guidelines  Outcome: Progressing     Problem: CARDIOVASCULAR - ADULT  Goal: Maintains optimal cardiac output and hemodynamic stability  Description: INTERVENTIONS:- Monitor vital signs, rhythm, and trends- Monitor for bleeding, hypotension and signs of decreased cardiac output- Evaluate effectiveness of vasoactive medications to optimize hemodynamic stability- Monitor arterial and/or venous puncture sites for bleeding and/or hematoma- Assess quality of pulses, skin color and temperature- Assess for signs of decreased coronary artery perfusion - ex. Angina- Evaluate fluid balance, assess for edema, trend weights  Outcome: Progressing     Problem: RESPIRATORY - ADULT  Goal: Achieves optimal ventilation and oxygenation  Description: INTERVENTIONS:- Assess for changes in respiratory status- Assess for changes in mentation and behavior- Position to facilitate oxygenation and minimize respiratory effort- Oxygen supplementation based on oxygen saturation or ABGs- Provide Smoking Cessation handout, if applicable- Encourage broncho-pulmonary hygiene including cough, deep breathe, Incentive Spirometry- Assess the need for suctioning and perform as needed- Assess and instruct to report SOB or any respiratory difficulty- Respiratory Therapy support as indicated- Manage/alleviate  anxiety- Monitor for signs/symptoms of CO2 retention  Outcome: Progressing     Problem: SAFETY ADULT - FALL  Goal: Free from fall injury  Description: INTERVENTIONS:  - Assess pt frequently for physical needs  - Identify cognitive and physical deficits and behaviors that affect risk of falls.  - [place] fall precautions as indicated by assessment.  - Educate pt/family on patient safety including physical limitations  - Instruct pt to call for assistance with activity based on assessment  - Modify environment to reduce risk of injury  - Provide assistive devices as appropriate  - Consider OT/PT consult to assist with strengthening/mobility  - Encourage toileting schedule  Outcome: Progressing     Problem: HEMATOLOGIC - ADULT  Goal: Maintains hematologic stability  Description: INTERVENTIONS  - Assess for signs and symptoms of bleeding or hemorrhage  - Monitor labs and vital signs for trends  - Administer supportive blood products/factors, fluids and medications as ordered and appropriate  - Administer supportive blood products/factors as ordered and appropriate  Outcome: Progressing     Problem: GASTROINTESTINAL - ADULT  Goal: Minimal or absence of nausea and vomiting  Description: INTERVENTIONS:  - Maintain adequate hydration with IV or PO as ordered and tolerated  - Nasogastric tube to low intermittent suction as ordered  - Evaluate effectiveness of ordered antiemetic medications  - Provide nonpharmacologic comfort measures as appropriate  - Advance diet as tolerated, if ordered  - Obtain nutritional consult as needed  - Evaluate fluid balance  Outcome: Progressing     Problem: METABOLIC/FLUID AND ELECTROLYTES - ADULT  Goal: Glucose maintained within prescribed range  Description: INTERVENTIONS:- Monitor Blood Glucose as ordered- Assess for signs and symptoms of hyperglycemia and hypoglycemia- Administer ordered medications to maintain glucose within target range- Assess barriers to adequate nutritional intake  and initiate nutrition consult as needed- Instruct patient on self management of diabetes  Outcome: Progressing     Problem: MUSCULOSKELETAL - ADULT  Goal: Return mobility to safest level of function  Description: INTERVENTIONS:  - Assess patient stability and activity tolerance for standing, transferring and ambulating w/ or w/o assistive devices  - Assist with transfers and ambulation using safe patient handling equipment as needed  - Ensure adequate protection for wounds/incisions during mobilization  - Obtain PT/OT consults as needed  - Advance activity as appropriate  - Communicate ordered activity level and limitations with patient/family  Outcome: Progressing     Problem: PAIN - ADULT  Goal: Verbalizes/displays adequate comfort level or patient's stated pain goal  Description: INTERVENTIONS:  - Encourage pt to monitor pain and request assistance  - Assess pain using appropriate pain scale  - Administer analgesics based on type and severity of pain and evaluate response  - Implement non-pharmacological measures as appropriate and evaluate response  - Consider cultural and social influences on pain and pain management  - Manage/alleviate anxiety  - Utilize distraction and/or relaxation techniques  - Monitor for opioid side effects  - Notify MD/LIP if interventions unsuccessful or patient reports new pain  - Anticipate increased pain with activity and pre-medicate as appropriate  Outcome: Progressing     Patient on 4 L high flow nasal cannula and remote telemetry monitoring. 1 unit PRBCs transfused without incident.   Safety precautions in place, frequent nursing rounding completed, call light within reach. All questions answered and needs met.

## 2025-06-05 NOTE — PAYOR COMM NOTE
--------------  CONTINUED STAY REVIEW    Payor: [employer]  Subscriber #:  [id number]  Authorization Number: [id number]    Admit date: 5/12/25  Admit time:  5:28 PM    6/4/25   Pt contiues to be on 4 Litres of oxygen     CBC:    Lab Results   Component Value Date     WBC 9.4 06/04/2025     WBC 9.2 06/03/2025     WBC 8.8 06/02/2025      Lab Results   Component Value Date     HGB 7.1 (L) 06/04/2025     HGB 7.3 (L) 06/03/2025     HGB 7.5 (L) 06/02/2025      Lab Results   Component Value Date     .0 06/04/2025     .0 06/03/2025     .0 06/02/2025      Lab 06/02/25  1002 06/03/25  0546 06/04/25  0610   * 123* 115*   BUN 14 14 13   CREATSERUM 1.13* 1.09* 1.07*   CA 8.4* 8.1* 8.0*    144 143   K 3.4* 3.3*  3.3* 3.3*  3.3*   CL 97* 97* 100   CO2 40.0* >40.0* 37.0*       Assessment and Plan:      Afib RVR  Pericardial effusion  - sp pericardial window 5/21, chest tube out 5/23 ; fluid positive for malignancy   - cardiology consulted  - IV amio --> now on oral. Avoiding BB, CCB with h/o junctional rhythm   - cont monitor on tele  - on 5/16 experienced RVR again with hypotension, received digoxin converted to NSR. Remained  hypotensive so sent to stepdown unit. Responded to fluid bolus and required jewel   - Transferred back to medical floor, normotensive rates controlled.  -5/30 - bp dropped overnight and lasix held. Received dig x 1 for rvr. Better today  - CXR with b/l effusions - sp 900 removed by thora. Apprec pulm - > dc planning   - repeat ECHO showing large pericardial effusion ; f/u echo with no pericardial effusion   Limited ECHO 5/25: normal left ventricle, EF 65-70%  -SOB and CXR with Bilateral Pleural effusion -> IV lasix 40 bid monitor output. Wts up since admit  She continues to be volum overloaded, cards plans for IV Lasix -> on PO lasix   Eliquis held indefinitely per cards due to Anemia   Pt hb is low at 7.1, One unit PRBC transfusion today , Lasix IV one time  after transfusion      BL PE: acute small segmental/right lower extremity DVT  S/p IVC 5/14/2025  Was not on a/c due to thrombocytopenia  Now on eliquis but will hold for possible thora -> will hold indefinitely given bleeding risk per cards       Metastatic endometrial cancer    Pancytopenia  Acute on chronic anemia of chronic disease  - s/p 2 units prbcs improved today  - neutropenic precautions  - started neupogen until ANC 1500  - Plan is for further chemotherapy once recovered clinically per Dr. Herring.  - received 6 days of iv cefepime now off   - cefepime and vanco started for neutropenia and patient with cough/chills, elevated LA, possible early sepsis now off. Wbc 15. Afebrile. On 4 L    -Pancytopenia improving, monitor daily  - Overall plan of care is to continue to treat medically over the next several days and monitor for improvement.    - discussed with pt and daughter today and they are comfortable with palliative care consult for information . Explained it is meant for more comfort care then treatment of the cancer   - apprec pall care consult - discussed with Pat and pt not ready for hospice.. discussed with pt today and she is still hopeful but did discuss at this time she cannot get chemo due to poor performance status   - s/p 2 unit PRBC   -hb stable       Acute on chronic respiratory failure  - due to 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, pulm edema   - on 4-5L NC baseline  - pulmonary following, weaning oxygen as tolerated. Cont lasix.   -sp thora as above       Thrush  - nystatin     Malnutrition - pt eating more today. Monitor over weekend. May need tube feeds           MEDICATIONS ADMINISTERED IN LAST 1 DAY:  amiodarone (Pacerone) tab 200 mg       Date Action Dose Route User    6/5/2025 0905 Given 200 mg Oral Kim Howard, RN    6/4/2025 1035 Given 200 mg Oral Margarette Taylor, RN          saline ([place] Saline) nasal gel       Date Action Dose Route User    6/5/2025 0915 Given (none) Nasal  (Bilateral Nares) Kim Howard RN    6/4/2025 2134 Given (none) Nasal (Bilateral Nares) Terence Shafera    6/4/2025 1520 Given (none) Nasal (Bilateral Nares) Margarette Taylor RN    6/4/2025 1034 Given 1 Application Nasal (Bilateral Nares) Margarette Taylor RN          furosemide (Lasix) 10 mg/mL injection 20 mg       Date Action Dose Route User    6/4/2025 1658 Given 20 mg Intravenous Margarette Taylor RN          furosemide (Lasix) tab 40 mg       Date Action Dose Route User    6/5/2025 0905 Given 40 mg Oral Kim Howard RN    6/4/2025 1036 Given 40 mg Oral Margarette Taylor RN          levothyroxine (Synthroid) tab 100 mcg       Date Action Dose Route User    6/5/2025 0541 Given 100 mcg Oral Dylan Shafer          magnesium sulfate 4 g/100mL IVPB premix 4 g       Date Action Dose Route User    6/4/2025 1659 New Bag 4 g Intravenous Margarette Taylor RN          midodrine (ProAmatine) tab 10 mg       Date Action Dose Route User    6/5/2025 0541 Given 10 mg Oral Terence Shafera    6/4/2025 1659 Given 10 mg Oral Margarette Taylor RN    6/4/2025 1310 Given 10 mg Oral Margarette Taylor RN          pantoprazole (Protonix) 40 mg in sodium chloride 0.9% PF 10 mL IV push       Date Action Dose Route User    6/5/2025 0541 Given 40 mg Intravenous Dylan Shafer          potassium chloride (Klor-Con) 20 MEQ oral powder 40 mEq       Date Action Dose Route User    6/4/2025 1310 Given 40 mEq Oral Margarette Taylor RN          potassium chloride (Klor-Con M20) tab 20 mEq       Date Action Dose Route User    6/5/2025 0904 Given 20 mEq Oral Kim Howard RN    6/4/2025 1036 Given 20 mEq Oral Margarette Taylor RN          sodium chloride 0.9% infusion       Date Action Dose Route User    6/4/2025 1321 New Bag (none) Intravenous Margarette Taylor RN            Vitals (last day)       Date/Time Temp Pulse Resp BP SpO2 Weight O2 Device O2 Flow Rate (L/min) [facility]    06/05/25 0902 97 °F (36.1 °C) 73 16 100/67 100 % -- High flow nasal cannula 4  L/min EH    06/05/25 0540 97.6 °F (36.4 °C) 64 16 101/72 99 % -- High flow nasal cannula 4 L/min AD    06/05/25 0012 98.3 °F (36.8 °C) 62 18 90/63 100 % -- High flow nasal cannula 4 L/min AD    06/04/25 2131 98 °F (36.7 °C) 68 18 107/75 98 % -- High flow nasal cannula 4 L/min AD    06/04/25 1302 97.3 °F (36.3 °C) 72 20 88/74 100 % -- High flow nasal cannula 4 L/min KK    06/04/25 1021 96.5 °F (35.8 °C) 73 16 88/54 95 % -- High flow nasal cannula 4 L/min KK    06/04/25 0539 -- -- -- -- -- 127 lb 8 oz (57.8 kg) -- -- AD    06/04/25 0528 97.7 °F (36.5 °C) 69 18 91/76 100 % -- High flow nasal cannula 4 L/min AD       Blood Transfusion Record       Product Unit Status Volume Start End            Transfuse RBC       25  [id number]  [id number] Completed 06/04/25 1620 409.25 mL 06/04/25 1309 06/04/25 1618       25  [id number]  [id number] Completed 05/29/25 1357 292 mL 05/29/25 0957 05/29/25 1157       25  [id number]  [id number] Completed 05/22/25 0708 411.25 mL 05/21/25 1208 05/21/25 1500       25  [id number]  [id number] Completed 05/13/25 1431 335 mL 05/13/25 1144 05/13/25 1428                Transfuse platelets       25  [id number]  [id number] Completed 05/14/25 1319 200 mL 05/14/25 1116 05/14/25 1316

## 2025-06-05 NOTE — PROGRESS NOTES
[facility]  part of [facility]    Progress Note      Assessment and Plan:   1.  Acute on chronic respiratory failure-multifactorial with significant burden of tumor in the chest but now recognized to have small volume of bilateral subsegmental PEs.  The patient has low platelets and is a poor candidate for full anticoagulation at this moment.  Lower extremity venous ultrasound demonstrated acute appearing nonocclusive DVT in the right superficial femoral popliteal and posterior tibial veins.  She got the IVC filter.    The patient underwent pericardial window and both chest tubes are removed.  The pericardial fluid is consistent with malignancy.  The patient underwent large-volume thoracentesis with a liter of bloody fluid.  The chest x-ray looked good post procedure and there is only slight reappearance of effusion at this time on the morning x-ray.  There was no malignancy identified on the pleural fluid.    The patient is breathing better and okay to be discharged home from my perspective.    Recommendations:  1.  Home and patient can see me in the office in a month with chest x-ray on the same day.    2.  Metastatic endometrial carcinoma-to follow-up gynecologic oncology.    3.  Malignant pericardial [id number] gynecologic oncology.  Now status post pericardial window.    Recommendations: As per cardiology.    4.  Recurrent episodes of atrial tachycardia-on amiodarone.         Subjective:   Kelli Fisher is a(n) 70 year old female who is breathing improved status post thoracentesis    Objective:   Blood pressure 100/67, pulse 73, temperature 97 °F (36.1 °C), temperature source Axillary, resp. rate 16, height 5' (1.524 m), weight 127 lb 8 oz (57.8 kg), SpO2 100%.    Physical Exam alert white female  HEENT examination is unremarkable with pupils equal round and reactive to light and accommodation.   Neck without adenopathy, thyromegaly, JVD nor bruit.   Lungs diminished bilaterally to  auscultation and percussion.  Cardiac regular rate and rhythm no murmur.   Abdomen nontender, without hepatosplenomegaly and no mass appreciable.   Extremities without clubbing cyanosis nor edema.   Neurologic grossly intact with symmetric tone and strength and reflex.  Skin without gross abnormality     Results:     Lab Results   Component Value Date    WBC 9.9 06/05/2025    HGB 8.7 06/05/2025    HCT 26.9 06/05/2025    .0 06/05/2025    CREATSERUM 1.08 06/05/2025    BUN 13 06/05/2025     06/05/2025    K 3.5 06/05/2025    K 3.5 06/05/2025    CL 99 06/05/2025    CO2 35.0 06/05/2025     06/05/2025    CA 8.1 06/05/2025    MG 2.4 06/05/2025      CT scan of the chest-Positive for small volume bilateral segmental PE      Enlarging left upper lobe consolidation and few bilateral pulmonary nodules      Enlarging pericardial effusion now 1.7 centimeter thick.  Increasing left pleural effusion, new right pleural effusion     Srinath Levy MD  Medical Director, Critical Care, [facility]  Medical Director, [facility]  Pager: [phone]

## 2025-06-05 NOTE — PLAN OF CARE
Problem: Patient Centered Care  Goal: Patient preferences are identified and integrated in the patient's plan of care  Description: Interventions:- What would you like us to know as we care for you? From home alone with home health care     Problem: Patient/Family Goals  Goal: Patient/Family Long Term Goal  Description: Patient's Long Term Goal: To discharge from hospital     Interventions:  -  Monitor vital signs   - Monitor appropriate labs   - Monitor blood glucose levels   - Pain management   - Administer medications per order   - Follow MD orders   - Diagnostics per order   - Update/inform patient and family on plan of care   - Discharge planning    - See additional Care Plan goals for specific interventions  Outcome: Progressing  Goal: Patient/Family Short Term Goal  Description: Patient's Short Term Goal: To improve weakness     Interventions:   -  Monitor vital signs   - Monitor appropriate labs   - Monitor blood glucose levels   - Pain management   - Administer medications per order   - Follow MD orders   - Diagnostics per order   - Update/inform patient and family on plan of care   - See additional Care Plan goals for specific interventions  Outcome: Progressing   - Provide timely, complete, and accurate information to patient/family- Incorporate patient and family knowledge, values, beliefs, and cultural backgrounds into the planning and delivery of care- Encourage patient/family to participate in care and decision-making at the level they choose- Honor patient and family perspectives and choices  Outcome: Progressing    Problem: Diabetes/Glucose Control  Goal: Glucose maintained within prescribed range  Description: INTERVENTIONS:- Monitor Blood Glucose as ordered- Assess for signs and symptoms of hyperglycemia and hypoglycemia- Administer ordered medications to maintain glucose within target range- Assess barriers to adequate nutritional intake and initiate nutrition consult as needed- Instruct  patient on self management of diabetes  Outcome: Progressing     Problem: RISK FOR INFECTION - ADULT  Goal: Absence of fever/infection during anticipated neutropenic period  Description: INTERVENTIONS- Monitor WBC- Administer growth factors as ordered- Implement neutropenic guidelines  Outcome: Progressing     Problem: SAFETY ADULT - FALL  Goal: Free from fall injury  Description: INTERVENTIONS:  - Assess pt frequently for physical needs  - Identify cognitive and physical deficits and behaviors that affect risk of falls.  - [place] fall precautions as indicated by assessment.  - Educate pt/family on patient safety including physical limitations  - Instruct pt to call for assistance with activity based on assessment  - Modify environment to reduce risk of injury  - Provide assistive devices as appropriate  - Consider OT/PT consult to assist with strengthening/mobility  - Encourage toileting schedule  Outcome: Progressing     Problem: DISCHARGE PLANNING  Goal: Discharge to home or other facility with appropriate resources  Description: INTERVENTIONS:  - Identify barriers to discharge w/pt and caregiver  - Include patient/family/discharge partner in discharge planning  - Arrange for needed discharge resources and transportation as appropriate  - Identify discharge learning needs (meds, wound care, etc)  - Arrange for interpreters to assist at discharge as needed  - Consider post-discharge preferences of patient/family/discharge partner  - Complete POLST form as appropriate  - Assess patient's ability to be responsible for managing their own health  - Refer to Case Management Department for coordinating discharge planning if the patient needs post-hospital services based on physician/LIP order or complex needs related to functional status, cognitive ability or social support system  Outcome: Progressing     Problem: PAIN - ADULT  Goal: Verbalizes/displays adequate comfort level or patient's stated pain  goal  Description: INTERVENTIONS:  - Encourage pt to monitor pain and request assistance  - Assess pain using appropriate pain scale  - Administer analgesics based on type and severity of pain and evaluate response  - Implement non-pharmacological measures as appropriate and evaluate response  - Consider cultural and social influences on pain and pain management  - Manage/alleviate anxiety  - Utilize distraction and/or relaxation techniques  - Monitor for opioid side effects  - Notify MD/LIP if interventions unsuccessful or patient reports new pain  - Anticipate increased pain with activity and pre-medicate as appropriate  Outcome: Progressing     Problem: CARDIOVASCULAR - ADULT  Goal: Maintains optimal cardiac output and hemodynamic stability  Description: INTERVENTIONS:- Monitor vital signs, rhythm, and trends- Monitor for bleeding, hypotension and signs of decreased cardiac output- Evaluate effectiveness of vasoactive medications to optimize hemodynamic stability- Monitor arterial and/or venous puncture sites for bleeding and/or hematoma- Assess quality of pulses, skin color and temperature- Assess for signs of decreased coronary artery perfusion - ex. Angina- Evaluate fluid balance, assess for edema, trend weights  Outcome: Progressing  Goal: Absence of cardiac arrhythmias or at baseline  Description: INTERVENTIONS:- Continuous cardiac monitoring, monitor vital signs, obtain 12 lead EKG if indicated- Evaluate effectiveness of antiarrhythmic and heart rate control medications as ordered- Initiate emergency measures for life threatening arrhythmias- Monitor electrolytes and administer replacement therapy as ordered  Outcome: Progressing     Problem: HEMATOLOGIC - ADULT  Goal: Free from bleeding injury  Description: (Example usage: patient with low platelets)  INTERVENTIONS:  - Avoid intramuscular injections, enemas and rectal medication administration  - Ensure safe mobilization of patient  - Hold pressure on  venipuncture sites to achieve adequate hemostasis  - Assess for signs and symptoms of internal bleeding  - Monitor lab trends  - Patient is to report abnormal signs of bleeding to staff  - Avoid use of toothpicks and dental floss  - Use electric shaver for shaving  - Use soft bristle tooth brush  - Limit straining and forceful nose blowing  Outcome: Progressing  Goal: Maintains hematologic stability  Description: INTERVENTIONS  - Assess for signs and symptoms of bleeding or hemorrhage  - Monitor labs and vital signs for trends  - Administer supportive blood products/factors, fluids and medications as ordered and appropriate  - Administer supportive blood products/factors as ordered and appropriate  Outcome: Progressing  Goal: Free from bleeding injury  Description: (Example usage: patient with low platelets)  INTERVENTIONS:  - Avoid intramuscular injections, enemas and rectal medication administration  - Ensure safe mobilization of patient  - Hold pressure on venipuncture sites to achieve adequate hemostasis  - Assess for signs and symptoms of internal bleeding  - Monitor lab trends  - Patient is to report abnormal signs of bleeding to staff  - Avoid use of toothpicks and dental floss  - Use electric shaver for shaving  - Use soft bristle tooth brush  - Limit straining and forceful nose blowing  Outcome: Progressing     Problem: RESPIRATORY - ADULT  Goal: Achieves optimal ventilation and oxygenation  Description: INTERVENTIONS:- Assess for changes in respiratory status- Assess for changes in mentation and behavior- Position to facilitate oxygenation and minimize respiratory effort- Oxygen supplementation based on oxygen saturation or ABGs- Provide Smoking Cessation handout, if applicable- Encourage broncho-pulmonary hygiene including cough, deep breathe, Incentive Spirometry- Assess the need for suctioning and perform as needed- Assess and instruct to report SOB or any respiratory difficulty- Respiratory Therapy  support as indicated- Manage/alleviate anxiety- Monitor for signs/symptoms of CO2 retention  Outcome: Progressing     Problem: GENITOURINARY - ADULT  Goal: Absence of urinary retention  Description: INTERVENTIONS:  - Assess patient’s ability to void and empty bladder  - Monitor intake/output and perform bladder scan as needed  - Follow urinary retention protocol/standard of care  - Consider collaborating with pharmacy to review patient's medication profile  - Implement strategies to promote bladder emptying  Outcome: Progressing     Problem: METABOLIC/FLUID AND ELECTROLYTES - ADULT  Goal: Glucose maintained within prescribed range  Description: INTERVENTIONS:- Monitor Blood Glucose as ordered- Assess for signs and symptoms of hyperglycemia and hypoglycemia- Administer ordered medications to maintain glucose within target range- Assess barriers to adequate nutritional intake and initiate nutrition consult as needed- Instruct patient on self management of diabetes  Outcome: Progressing  Goal: Electrolytes maintained within normal limits  Description: INTERVENTIONS:  - Monitor labs and rhythm and assess patient for signs and symptoms of electrolyte imbalances  - Administer electrolyte replacement as ordered  - Monitor response to electrolyte replacements, including rhythm and repeat lab results as appropriate  - Fluid restriction as ordered  - Instruct patient on fluid and nutrition restrictions as appropriate  Outcome: Progressing  Goal: Hemodynamic stability and optimal renal function maintained  Description: INTERVENTIONS:  - Monitor labs and assess for signs and symptoms of volume excess or deficit  - Monitor intake, output and patient weight  - Monitor urine specific gravity, serum osmolarity and serum sodium as indicated or ordered  - Monitor response to interventions for patient's volume status, including labs, urine output, blood pressure (other measures as available)  - Encourage oral intake as appropriate  -  Instruct patient on fluid and nutrition restrictions as appropriate  Outcome: Progressing       Monitoring vital signs, stable at this time. No acute changes at this moment.  Monitoring blood glucose levels. Fall precautions in place- bed alarm on, bed locked in lowest position, call light within reach. Frequent rounding by nursing staff.

## 2025-06-05 NOTE — TRANSITION NOTE
Heart Failure Transition of Care    HPI: Patient is a 70 year old female with significant past medical history of metastatic endometrial cancer with lung mets, anemia, paroxysmal SVT, small pericardial effusion who presents to ED with increase in dyspnea and palpitations.   Of note, pt was recently hospitalized with acute hypoxic respiratory failure, responded well to diuretics, had paroxysmal SVT on amiodarone due to junctional rhythm with CCB/BB and unchanged pericardial effusion.   In the ED, pt with tachycardiac and on 4L of supplemental O2. ECG and rhythm strips with paroxysmal AT/AF. Labs revealed neutropenia (WBC of 0.5K) and worsening anemia with Hg of 7.3.    Date of Admission/ Weight/proBNP: 5/12/25/ Wt 165/ proBNP 1110    Was this hospitalization a readmission for HF within the past 30 days? Yes    LVEF/ Date Assessed: 65-70%, on 5/25/25    Date of Discharge/ Weight/proBNP: 6/5/25/ Wt 156 lb/ proBNP     Renal Function at Discharge: Cr 1.08/ BUN 13/ GFR 56    Brief Summary of IV diuretic and/or inotrope/pressor use: Intermittent IV lasix; primary concern within stay was pericardial effusion requiring window and pleural effusion requiring CT     Mechanical support required throughout hospital stay? No     Current Devices and/or Candidacy for Devices: N/a     Outpatient [facility]/Diagnostics [facility]: [facility]    Heart Failure Follow-up appointment:  Scheduled       HF Type: HFpEF  Stage: C  NYHA Class: III    GDMT on Discharge:  ACE/ARB/ARNI: No, limited with hypotension   Beta-Blocker: Yes   Diuretic: Yes   MRA: No, limited with Cr; plans to start o/p in renal fx remains stable   SGLT-2 Inhibitor: No, Patient will consider this

## 2025-06-05 NOTE — DISCHARGE SUMMARY
[facility]  part of [facility]    Discharge Summary    Kelli Fisher Patient Status:  Inpatient    1955 MRN [id number]   Location [facility] Attending Inna Bullock MD   Hosp Day # 24 PCP Taylor Gallardo MD     Date of Admission: 2025 Disposition: Home Health Care Services     Date of Discharge: 2025     Admitting Diagnosis: Atrial fibrillation with RVR ([facility]) [I48.91]  Acute respiratory failure with hypoxia ([facility]) [J96.01]  Pancytopenia ([facility]) [D61.818]    Hospital Discharge Diagnoses:   Afib RVR  Pericardial effusion  - s/p pericardial window , chest tube out  ; fluid positive for malignancy   BL PE: acute small segmental/right lower extremity DVT  S/p IVC 2025  Metastatic endometrial cancer    Pancytopenia  Acute on chronic anemia of chronic disease  Acute on chronic respiratory failure   [place]   Malnutrition           Lace+ Score: 85  59-90 High Risk  29-58 Medium Risk  0-28   Low Risk.    [facility] Follow-Up Recommendation:  LACE > 58: High Risk of readmission after discharge from the hospital.      Problem List: Problem List[1]      Physical Exam:   General appearance: alert, appears stated age and cooperative  Pulmonary:  clear to auscultation bilaterally  Cardiovascular: S1, S2 normal, no murmur, click, rub or gallop, regular rate and rhythm  Abdominal: soft, non-tender; bowel sounds normal; no masses,  no organomegaly  Extremities: extremities normal, atraumatic, no cyanosis or edema  Psychiatric: calm      HPI per admitting : The patient is a 70-year-old  female who was recently diagnosed with recurrent metastatic endometrial cancer, discharged from the hospital last week and initiate on chemotherapy. Receive her first chemotherapy 6 days ago. For the last few days been having progressive palpitations and dyspnea on exertion. Today came into the emergency department for evaluation. CBC showed white blood cell count of 0.5, absolute  neutrophil count of 0.11, hemoglobin 7.3. Chemistry was unremarkable except for glucose 233. She had no history of diabetes. Chest x-ray showed suboptimal inspiration; no acute finding or significant change from prior; left mid lung upper lobe mass compatible with known metastatic malignancy; stable mediastinal lymph node metastases.     Hospital Course:       Afib RVR  Pericardial effusion  - sp pericardial window 5/21, chest tube out 5/23 ; fluid positive for malignancy   - cardiology consulted  - IV amio --> now on oral. Avoiding BB, CCB with h/o junctional rhythm   - cont monitor on tele  - repeat ECHO showing large pericardial effusion ; f/u echo with no pericardial effusion   Limited ECHO 5/25: normal left ventricle, EF 65-70%  -SOB and CXR with Bilateral Pleural effusion -> IV lasix 40 bid monitor output.   She continues to be volum overloaded, cards plans for IV Lasix -> on PO lasix   Eliquis held indefinitely per cards due to Anemia         BL PE: acute small segmental/right lower extremity DVT  S/p IVC 5/14/2025  Was not on a/c due to thrombocytopenia  will hold Eliquis  indefinitely given bleeding risk per cards       Pleural effusion  -s/p thoracentesis with bloody fluid removal   -neg for malignancy   -continue supp 02-weaned to 4 LNC  -on lasix     Metastatic endometrial cancer    Pancytopenia  Acute on chronic anemia of chronic disease  - s/p 2 units prbcs improved today  - neutropenic precautions  - started neupogen until ANC 1500  - Plan is for further chemotherapy once recovered clinically per Dr. Herring.  - received 6 days of iv cefepime now off   - cefepime and vanco started for neutropenia and patient with cough/chills, elevated LA, possible early sepsis now off. Wbc 15. Afebrile. On 4 L  -completed all Abx   -Pancytopenia improving, monitor daily  - Overall plan of care is to continue to treat medically over the next several days and monitor for improvement.    - discussed with pt and  daughter and they are comfortable with palliative care consult for information . Explained it is meant for more comfort care then treatment of the cancer   - apprec pall care consult - discussed with Pat and pt not ready for hospice.. discussed with pt today and she is still hopeful but did discuss at this time she cannot get chemo due to poor performance status   - s/p 2 unit PRBC   -hb stable         Acute on chronic respiratory failure  - due to 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, pulm edema   - on 4-5L NC baseline  - pulmonary following, weaning oxygen as tolerated. Cont lasix.   -sp thora as above        Consultations: CT surgery, cardiology, Pulmonary, IR,     Procedures: as above     Complications: see hospital course     Discharge Condition: Good    Discharge Medications:      Discharge Medications        START taking these medications        Instructions Prescription details   midodrine 10 MG Tabs  Commonly known as: ProAmatine      Take 1 tablet (10 mg total) by mouth in the morning and 1 tablet (10 mg total) at noon and 1 tablet (10 mg total) in the evening.   Quantity: 90 tablet  Refills: 0     potassium chloride 20 MEQ Tbcr  Commonly known as: Klor-Con M20      Take 1 tablet (20 mEq total) by mouth daily.   Quantity: 30 tablet  Refills: 0            CHANGE how you take these medications        Instructions Prescription details   amiodarone 200 MG Tabs  Commonly known as: Pacerone  What changed: See the new instructions.      Take 1 tablet (200 mg total) by mouth daily.   Quantity: 30 tablet  Refills: 0     furosemide 40 MG Tabs  Commonly known as: Lasix  What changed:   medication strength  how much to take      Take 1 tablet (40 mg total) by mouth daily.   Quantity: 30 tablet  Refills: 0            CONTINUE taking these medications        Instructions Prescription details   Fluticasone Propionate  MCG/ACT Aero  Commonly known as: FLOVENT HFA      Inhale 1 puff into the lungs every 12  (twelve) hours. Rinse mouth following use.   Refills: 0     pantoprazole 40 MG Tbec  Commonly known as: Protonix      Take 1 tablet (40 mg total) by mouth daily.   Quantity: 30 tablet  Refills: 0     Potassium Chloride ER 20 MEQ Tbcr      Take 1 tablet by mouth daily.   Refills: 0     Synthroid 100 MCG Tabs  Generic drug: levothyroxine      Take 1 tablet (100 mcg total) by mouth before breakfast.   Refills: 0            STOP taking these medications      Levalbuterol HCl 1.25 MG/3ML Nebu  Commonly known as: XOPENEX                  Where to Get Your Medications        These medications were sent to [id number] - [place], [place] - [address] [place] [address], [phone], [phone]  [address] [place] [id number]      Phone: [phone]   amiodarone 200 MG Tabs  furosemide 40 MG Tabs  midodrine 10 MG Tabs  potassium chloride 20 MEQ Tbcr         Follow up Visits: Follow-up with PCP And all specialties  in 1 week        Inna Bullock MD  6/5/2025  8:57 AM    > 35 min          [1]   Patient Active Problem List  Diagnosis    Shortness of breath    Acute hypoxic respiratory failure (HCC)    Lung mass    Supraclavicular adenopathy    Dysphagia, unspecified type    Pericardial effusion (HCC)    Endometrial cancer (HCC)    Microcytic anemia    Thrombocytopenia (HCC)    Azotemia    Pancytopenia (HCC)    Hyperglycemia    Acute respiratory failure with hypoxia (HCC)    Atrial fibrillation with RVR (HCC)    Malnutrition (HCC)    Malignant neoplasm metastatic to lung (HCC)    Palliative care by specialist    Pleural effusion, bilateral    Generalized weakness    Acute pulmonary embolism (HCC)    Malignant pericardial effusion (HCC)    Dyspnea and respiratory abnormalities    Goals of care, counseling/discussion    Nasal dryness    Anorexia    Endometrial adenocarcinoma (HCC)

## 2025-06-06 NOTE — PAYOR COMM NOTE
--------------  DISCHARGE REVIEW    Payor: BCBS MEDICARE ADV PPO  Subscriber #:  [id number]  Authorization Number: [id number]    Admit date: 25  Admit time:   5:28 PM  Discharge Date: 2025  4:31 PM     Admitting Physician: Fabrice Mitchell MD  Attending Physician:  No att. providers found  Primary Care Physician: Taylor Gallardo MD          Discharge Summary Notes        Discharge Summary signed by Inna Bullock MD at 2025  2:19 PM       Author: Inna Bullock MD Specialty: HOSPITALIST Author Type: Physician    Filed: 2025  2:19 PM Date of Service: 2025  8:57 AM Status: Signed    : Inna Bullock MD (Physician)         [facility]  part of [facility]    Discharge Summary    Kelli Fisher Patient Status:  Inpatient    1955 MRN [id number]   Location [facility] Attending Inna Bullock MD   Hosp Day # 24 PCP Taylor Gallardo MD     Date of Admission: 2025 Disposition: Home Health Care Services     Date of Discharge: 2025     Admitting Diagnosis: Atrial fibrillation with RVR ([facility]) [I48.91]  Acute respiratory failure with hypoxia (HCC) [J96.01]  Pancytopenia ([facility]) [D61.818]    Hospital Discharge Diagnoses:   Afib RVR  Pericardial effusion  - s/p pericardial window , chest tube out  ; fluid positive for malignancy   BL PE: acute small segmental/right lower extremity DVT  S/p IVC 2025  Metastatic endometrial cancer    Pancytopenia  Acute on chronic anemia of chronic disease  Acute on chronic respiratory failure   Thrush   Malnutrition           Lace+ Score: 85  59-90 High Risk  29-58 Medium Risk  0-28   Low Risk.    TCM Follow-Up Recommendation:  LACE > 58: High Risk of readmission after discharge from the hospital.      Problem List: Problem List[1]      Physical Exam:   General appearance: alert, appears stated age and cooperative  Pulmonary:  clear to auscultation bilaterally  Cardiovascular: S1, S2 normal, no murmur, click,  rub or gallop, regular rate and rhythm  Abdominal: soft, non-tender; bowel sounds normal; no masses,  no organomegaly  Extremities: extremities normal, atraumatic, no cyanosis or edema  Psychiatric: calm      HPI per admitting : The patient is a 70-year-old  female who was recently diagnosed with recurrent metastatic endometrial cancer, discharged from the hospital last week and initiate on chemotherapy. Receive her first chemotherapy 6 days ago. For the last few days been having progressive palpitations and dyspnea on exertion. Today came into the emergency department for evaluation. CBC showed white blood cell count of 0.5, absolute neutrophil count of 0.11, hemoglobin 7.3. Chemistry was unremarkable except for glucose 233. She had no history of diabetes. Chest x-ray showed suboptimal inspiration; no acute finding or significant change from prior; left mid lung upper lobe mass compatible with known metastatic malignancy; stable mediastinal lymph node metastases.     Hospital Course:       Afib RVR  Pericardial effusion  - sp pericardial window 5/21, chest tube out 5/23 ; fluid positive for malignancy   - cardiology consulted  - IV amio --> now on oral. Avoiding BB, CCB with h/o junctional rhythm   - cont monitor on tele  - repeat ECHO showing large pericardial effusion ; f/u echo with no pericardial effusion   Limited ECHO 5/25: normal left ventricle, EF 65-70%  -SOB and CXR with Bilateral Pleural effusion -> IV lasix 40 bid monitor output.   She continues to be volum overloaded, cards plans for IV Lasix -> on PO lasix   Eliquis held indefinitely per cards due to Anemia         BL PE: acute small segmental/right lower extremity DVT  S/p IVC 5/14/2025  Was not on a/c due to thrombocytopenia  will hold Eliquis  indefinitely given bleeding risk per cards       Pleural effusion  -s/p thoracentesis with bloody fluid removal   -neg for malignancy   -continue supp 02-weaned to 4 LNC  -on lasix      Metastatic endometrial cancer    Pancytopenia  Acute on chronic anemia of chronic disease  - s/p 2 units prbcs improved today  - neutropenic precautions  - started neupogen until ANC 1500  - Plan is for further chemotherapy once recovered clinically per Dr. Herring.  - received 6 days of iv cefepime now off   - cefepime and vanco started for neutropenia and patient with cough/chills, elevated LA, possible early sepsis now off. Wbc 15. Afebrile. On 4 L  -completed all Abx   -Pancytopenia improving, monitor daily  - Overall plan of care is to continue to treat medically over the next several days and monitor for improvement.    - discussed with pt and daughter and they are comfortable with palliative care consult for information . Explained it is meant for more comfort care then treatment of the cancer   - apprec pall care consult - discussed with Pat and pt not ready for hospice.. discussed with pt today and she is still hopeful but did discuss at this time she cannot get chemo due to poor performance status   - s/p 2 unit PRBC   -hb stable         Acute on chronic respiratory failure  - due to 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, pulm edema   - on 4-5L NC baseline  - pulmonary following, weaning oxygen as tolerated. Cont lasix.   -sp thora as above        Consultations: CT surgery, cardiology, Pulmonary, IR,     Procedures: as above     Complications: see hospital course     Discharge Condition: Good    Discharge Medications:      Discharge Medications        START taking these medications        Instructions Prescription details   midodrine 10 MG Tabs  Commonly known as: ProAmatine      Take 1 tablet (10 mg total) by mouth in the morning and 1 tablet (10 mg total) at noon and 1 tablet (10 mg total) in the evening.   Quantity: 90 tablet  Refills: 0     potassium chloride 20 MEQ Tbcr  Commonly known as: Klor-Con M20      Take 1 tablet (20 mEq total) by mouth daily.   Quantity: 30 tablet  Refills: 0             CHANGE how you take these medications        Instructions Prescription details   amiodarone 200 MG Tabs  Commonly known as: Pacerone  What changed: See the new instructions.      Take 1 tablet (200 mg total) by mouth daily.   Quantity: 30 tablet  Refills: 0     furosemide 40 MG Tabs  Commonly known as: Lasix  What changed:   medication strength  how much to take      Take 1 tablet (40 mg total) by mouth daily.   Quantity: 30 tablet  Refills: 0            CONTINUE taking these medications        Instructions Prescription details   Fluticasone Propionate  MCG/ACT Aero  Commonly known as: FLOVENT HFA      Inhale 1 puff into the lungs every 12 (twelve) hours. Rinse mouth following use.   Refills: 0     pantoprazole 40 MG Tbec  Commonly known as: Protonix      Take 1 tablet (40 mg total) by mouth daily.   Quantity: 30 tablet  Refills: 0     Potassium Chloride ER 20 MEQ Tbcr      Take 1 tablet by mouth daily.   Refills: 0     Synthroid 100 MCG Tabs  Generic drug: levothyroxine      Take 1 tablet (100 mcg total) by mouth before breakfast.   Refills: 0            STOP taking these medications      Levalbuterol HCl 1.25 MG/3ML Nebu  Commonly known as: XOPENEX                  Where to Get Your Medications        These medications were sent to [id number] [address], [phone], [phone]  [address] [place] [id number]      Phone: [phone]   amiodarone 200 MG Tabs  furosemide 40 MG Tabs  midodrine 10 MG Tabs  potassium chloride 20 MEQ Tbcr         Follow up Visits: Follow-up with PCP And all specialties  in 1 week        Inna Bullock MD  6/5/2025  8:57 AM    > 35 min       Electronically signed by Inna Bullock MD on 6/5/2025  2:19 PM         REVIEWER COMMENTS         [1]   Patient Active Problem List  Diagnosis    Shortness of breath    Acute hypoxic respiratory failure (HCC)    Lung mass    Supraclavicular adenopathy    Dysphagia,  unspecified type    Pericardial effusion (HCC)    Endometrial cancer (HCC)    Microcytic anemia    Thrombocytopenia (HCC)    Azotemia    Pancytopenia (HCC)    Hyperglycemia    Acute respiratory failure with hypoxia (HCC)    Atrial fibrillation with RVR (HCC)    Malnutrition (HCC)    Malignant neoplasm metastatic to lung (HCC)    Palliative care by specialist    Pleural effusion, bilateral    Generalized weakness    Acute pulmonary embolism (HCC)    Malignant pericardial effusion (HCC)    Dyspnea and respiratory abnormalities    Goals of care, counseling/discussion    Nasal dryness    Anorexia    Endometrial adenocarcinoma (HCC)

## 2025-06-09 ENCOUNTER — INITIAL APN SNF VISIT (OUTPATIENT)
Dept: INTERNAL MEDICINE CLINIC | Facility: SKILLED NURSING FACILITY | Age: 70
End: 2025-06-09

## 2025-06-09 VITALS
OXYGEN SATURATION: 100 % | RESPIRATION RATE: 18 BRPM | TEMPERATURE: 98 F | DIASTOLIC BLOOD PRESSURE: 66 MMHG | WEIGHT: 164 LBS | BODY MASS INDEX: 32 KG/M2 | HEART RATE: 64 BPM | SYSTOLIC BLOOD PRESSURE: 118 MMHG

## 2025-06-09 DIAGNOSIS — C54.1 ENDOMETRIAL CANCER (HCC): Primary | ICD-10-CM

## 2025-06-09 DIAGNOSIS — J44.9 CHRONIC OBSTRUCTIVE PULMONARY DISEASE, UNSPECIFIED COPD TYPE (HCC): ICD-10-CM

## 2025-06-09 DIAGNOSIS — Z95.828 S/P IVC FILTER: ICD-10-CM

## 2025-06-09 DIAGNOSIS — I26.99 ACUTE PULMONARY EMBOLISM, UNSPECIFIED PULMONARY EMBOLISM TYPE, UNSPECIFIED WHETHER ACUTE COR PULMONALE PRESENT (HCC): ICD-10-CM

## 2025-06-09 DIAGNOSIS — Z98.890 S/P PERICARDIAL WINDOW CREATION: ICD-10-CM

## 2025-06-09 DIAGNOSIS — D64.9 ANEMIA, UNSPECIFIED TYPE: ICD-10-CM

## 2025-06-09 DIAGNOSIS — D61.818 PANCYTOPENIA (HCC): ICD-10-CM

## 2025-06-09 DIAGNOSIS — J96.20 ACUTE ON CHRONIC RESPIRATORY FAILURE, UNSPECIFIED WHETHER WITH HYPOXIA OR HYPERCAPNIA (HCC): ICD-10-CM

## 2025-06-09 DIAGNOSIS — I48.91 ATRIAL FIBRILLATION, UNSPECIFIED TYPE (HCC): ICD-10-CM

## 2025-06-09 NOTE — PROGRESS NOTES
Kelli Fisher  : 1955  Age 70 year old  female patient is admitted to [facility] 25 for rehab and strengthening     [facility] Admission : 25 to 25     Chief complaint: Afib RVR  Pericardial effusion  - s/p pericardial window , chest tube out  ; fluid positive for malignancy   BL PE: acute small segmental/right lower extremity DVT  S/p IVC 2025  Metastatic endometrial cancer    Pancytopenia  Acute on chronic anemia of chronic disease  Acute on chronic respiratory failure   Thrush   Malnutrition     HPI  70-year-old  female who was recently diagnosed with recurrent metastatic endometrial cancer, discharged from [facility] 25  ( was admitted 25 to 25  for lung mass, pleural effusion , endometrial cancer and microcytic anemia)  and initiated on chemotherapy. Receive her first chemotherapy at that time .  Per patient over a  few days been having progressive palpitations and dyspnea on exertion which brought her into [facility] ED  for evaluation. CBC showed white blood cell count of 0.5, absolute neutrophil count of 0.11, hemoglobin 7.3. Chemistry was unremarkable except for glucose 233. She had no history of diabetes. Chest x-ray showed suboptimal inspiration; no acute finding or significant change from prior; left mid lung upper lobe mass compatible with known metastatic malignancy; stable mediastinal lymph node metastases.  Patient was admitted for further evaluation . Was followed by Cards for Afib/RVR and pericardial effusion s/p pericardial window , chest tube out  ; fluid positive for malignancy . Followed by Thoracic Surgery Dr. Yu .  Also diagnosed with BL PE: acute small segmental/right lower extremity DVT,S/p IVC 2025.  Followed by Dr Herring Medical Oncology for mets endometrial cancer and PE/DVT. S/p IVC 25 . Also followed by Pulm for Pleural effusion  -s/p thoracentesis with bloody fluid removal , weaned down to 4 liters NC . Had gotten 2 units or  PRBC'S this admission . Seen by Palliative care in hospital but per notes daughter and pt  not ready for hospice.  Patient was stabilized and then transferred to rehab for further monitoring and conditioning.       Patient seen a new admission, vss, no reported fevers or chills.  Patient SOB on exertion, lungs with congestion. Ordered duonebs q 6 around the clock. O2 at 4 liters . Patient reported had therapy in room but difficult . Reports she has not been on chronic O2 but reported when discharged last she did go home with oxygen .  She discussed her cancer and believes she really doesn't have cancer but she is told she does so she will cont the chemo . Denies changes in bowels or bladder. Lower legs with non pitting edema , pt reports they are better when elevated. Pulm and Cards to follow in rehab. Reports significant wt loss over the past months but unsure how much. She reports met with Palliative Care in Hospital and she is not ready for Hospice. Denies pain or discomfort. No other new issues or concerns.        Past Medical History[1]  Past Surgical History[2]  Family History[3]  Short Social Hx on File[4]    ALLERGIES:  Allergies[5]    CODE STATUS:  DNAR SELECT (Primary goal of treating medical conditions with selected measures. Relieve pain & suffering through the use of medication by any route as needed; use oxygen, suctioning & manual treatment of airway obstruction. Use medical treatment, IV fluids & IV medications (may include antibiotics and vasopressors), as medically appropriate and consistent with patient preference. Do Not Intubate. Consider less invasive airway support (e.g. CPAP, BiPAP).     ADVANCED CARE PLANNING TEAM: will need family care plan       CURRENT MEDICATIONS - reviewed and updated   Current Medications[6]    VITALS:  /66   Pulse 64   Temp 97.9 °F (36.6 °C)   Resp 18   Wt 164 lb (74.4 kg)   SpO2 100%   BMI 32.03 kg/m²      REVIEW OF SYSTEMS:  GENERAL HEALTH:ill appearing  71 y/o female, SOB on movement , O2 at 4 liters NC   SKIN: denies any unusual skin lesions or rashes  WOUNDS: site covered where she had pericardial window and chest tube sites.   EYES:no visual complaints or deficits  HENT: thrush resolving   RESPIRATORY: bilateral congestion/wheezing which she reports gets SOB easily  CARDIOVASCULAR:denies chest pain, no palpitations   GI: denies nausea, vomiting, constipation, diarrhea; no rectal bleeding; no heartburn  :no dysuria, urgency or frequency; no vaginal discharge; no urinary incontinence; no hematuria  MUSCULOSKELETAL:overall very weak   NEURO:overall very weak , needing assistance for most things   PSYCHE: reports feeling sad but believes she doesn't have cancer   HEMATOLOGY:anemia  ENDOCRINE: denies excessive thirst or urination; denies unexpected wt gain or wt loss  ALLERGY/IMM.: denies food or seasonal allergies      PHYSICAL EXAM:  GENERAL HEALTH: frail , malnourished , ill appearing 71 y/o female   LINES, TUBES, DRAINS:  02 NC at 4 liters   SKIN: no rashes, no suspicious lesions  WOUND: s/p pericardial window, s/p chest tubes   EYES: PERRLA, EOMI, sclera anicteric, conjunctiva normal; there is no nystagmus, no drainage from eyes  HENT: normocephalic; normal nose, no nasal drainage, mucous membranes pink, moist, pharynx no exudate, no visible cerumen.  NECK: supple; FROM; no JVD, no TMG, no carotid bruits  BREAST: deferred  RESPIRATORY:bilateral lung congestion and wheezing , ordered duonebs q 6, SOB on exertion  CARDIOVASCULAR: s/p pericardial window   ABDOMEN:  normal active BS+, soft, nondistended; no organomegaly, no masses; no bruits; nontender, no guarding, no rebound tenderness.  :no suprapubic distension  LYMPHATIC:bilateral lower leg edema, non pitting   MUSCULOSKELETAL: generalized weakness, mets endometrial cancer  EXTREMITIES/VASCULAR:bilateral lower leg edema , non pitting   NEUROLOGIC: follows commands  PSYCHIATRIC: alert and oriented x 3;  affect appropriate      MEDICAL DECISION MAKING  Capable     DIAGNOSTICS REVIEWED AT THIS VISIT:  Reviewed [facility] records and previous admission     SEE PLAN BELOW  Afib RVR  Pericardial effusion  - sp pericardial window 5/21, chest tube out 5/23 ; fluid positive for malignancy   - cardiology consulted in Hospital   -Cards consulted in rehab   - IV amio --> now on oral amiodarone 200mg po daily . Avoiding BB, CCB with h/o junctional rhythm   -  monitored on tele while in hospital   - repeat ECHO showing large pericardial effusion ; f/u echo with no pericardial effusion   Limited ECHO 5/25: normal left ventricle, EF 65-70%  -SOB and CXR with Bilateral Pleural effusion -> IV lasix 40 bid monitor output when in hospital .   - monitor for  volume overloaded, currently on  PO lasix 40mg po daily  with KCL ER 20meq po daily   - Eliquis held indefinitely per cards due to Anemia  -PT/OT/SPEECH in rehab   -ctm      2.BL PE: acute small segmental/right lower extremity DVT  --S/p IVC 5/14/2025  -Was not on a/c due to thrombocytopenia  -will hold Eliquis  indefinitely given bleeding risk per cards       3. Pleural effusion  -s/p thoracentesis with bloody fluid removal   -neg for malignancy   -continue supp 02-weaned to 4 LNC  -on lasix 40mg po q day  -Pulm consulted in rehab   -cont fluticasone propionate HFA inhalation one puff bid   -added duonebs q 6 hrs   -ctm      4. Metastatic endometrial cancer    Pancytopenia  Acute on chronic anemia of chronic disease  - s/p 2 units prbcs improved today  - neutropenic precautions  - started neupogen until ANC 1500  - Plan is for further chemotherapy once recovered clinically per Dr. Herring gyne onc   - received 6 days of iv cefepime now off   - cefepime and vanco started for neutropenia and patient with cough/chills, elevated LA, possible early sepsis now off. Wbc 15. Afebrile. On 4 L  -completed all Abx   -Pancytopenia improving, monitor daily  - Overall plan of care is to continue to  treat medically over the next several days and monitor for improvement.    - discussed with pt and daughter and they are comfortable with palliative care consult for information . Explained it is meant for more comfort care then treatment of the cancer   - Palliative   discussed with Pat and pt not ready for hospice.. discussed with pt again  and she is still hopeful but did discuss at this time with med onc that  she cannot get chemo due to poor performance status   - s/p 2 unit PRBC when in hospital   -hb stable at hospital   -weekly cbc and bmp in rehab   -PT/OT in rehab      5. Acute on chronic respiratory failure  - due to 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, pulm edema   - on 4-5L NC baseline  - pulmonary following, weaning oxygen as tolerated. Cont lasix 40mg po q day and will increase as needed .   -sp thora as above      6. Hypothyroidism   -cont synthroid 100 mcg po daily     7. Skin care   -wound RN following in rehab   -cont santyl ointment to left buttock  area q shift    -cont skin prep to coccyx q shift   -cont skin gel protected dressing left heel q t,th, s     8. Hypotension  -vs q shift and prn  -cont midodrine 10mg po tid , hold for SBP>130        Consultations: CT surgery, cardiology, Pulmonary, IR    Follow up- will discuss with  in rehab   -will need follow up with PCP Dr. Taylor Gallrado [facility]   -will need f/u with Dr. Nevaeh Kelly Onc 6/12/25   -will need follow up with Dr. Yu Thoracic surgery  6/12/25   -follow up with Dr. Peña  Oncology 6/12/25   -follow up with Maribell Mixon at specialty clinic 6/19/25   -follow up with [facility] - referral was made for [facility] Palliative care     This is a 35 minute visit and greater than 50% of the time was spent counseling the patient and/or coordinating care.    Thu Wilder, APRN  06/09/25   10:29 AM                [1]   Past Medical History:   Asthma (HCC)    Esophageal reflux    History of blood transfusion     Visual impairment   [2]   Past Surgical History:  Procedure Laterality Date    Thyroidectomy      Total abdom hysterectomy     [3] No family history on file.  [4]   Social History  Socioeconomic History    Marital status: Single   Tobacco Use    Smoking status: Never    Smokeless tobacco: Never   Vaping Use    Vaping status: Never Used   Substance and Sexual Activity    Alcohol use: Never    Drug use: Never     Social Drivers of Health     Food Insecurity: No Food Insecurity (5/12/2025)    NCSS - Food Insecurity     Worried About Running Out of Food in the Last Year: No     Ran Out of Food in the Last Year: No   Transportation Needs: No Transportation Needs (5/12/2025)    NCSS - Transportation     Lack of Transportation: No   Housing Stability: Not At Risk (5/12/2025)    NCSS - Housing/Utilities     Has Housing: Yes     Worried About Losing Housing: No     Unable to Get Utilities: No   [5]   Allergies  Allergen Reactions    Aspirin Tightness in Throat    Penicillins HIVES    Lactose OTHER (SEE COMMENTS)     Cramping, bloating    Other OTHER (SEE COMMENTS)     Pt states IV steroid allergy, states it makes her breathing worse    [6]   Current Outpatient Medications   Medication Sig Dispense Refill    amiodarone 200 MG Oral Tab Take 1 tablet (200 mg total) by mouth daily. 30 tablet 0    furosemide 40 MG Oral Tab Take 1 tablet (40 mg total) by mouth daily. 30 tablet 0    midodrine 10 MG Oral Tab Take 1 tablet (10 mg total) by mouth in the morning and 1 tablet (10 mg total) at noon and 1 tablet (10 mg total) in the evening. 90 tablet 0    potassium chloride 20 MEQ Oral Tab CR Take 1 tablet (20 mEq total) by mouth daily. 30 tablet 0    levothyroxine (SYNTHROID) 100 MCG Oral Tab Take 1 tablet (100 mcg total) by mouth before breakfast.      Potassium Chloride ER 20 MEQ Oral Tab CR Take 1 tablet by mouth daily.      pantoprazole 40 MG Oral Tab EC Take 1 tablet (40 mg total) by mouth daily. 30 tablet 0    Fluticasone  Propionate  HFA (FLOVENT HFA) 220 MCG/ACT Inhalation Aerosol Inhale 1 puff into the lungs every 12 (twelve) hours. Rinse mouth following use.

## 2025-06-11 ENCOUNTER — EXTERNAL FACILITY (OUTPATIENT)
Dept: PULMONOLOGY | Facility: CLINIC | Age: 70
End: 2025-06-11

## 2025-06-11 DIAGNOSIS — C54.1 ENDOMETRIAL CANCER (HCC): ICD-10-CM

## 2025-06-11 DIAGNOSIS — I82.90 VTE (VENOUS THROMBOEMBOLISM): ICD-10-CM

## 2025-06-11 DIAGNOSIS — J90 PLEURAL EFFUSION ON RIGHT: ICD-10-CM

## 2025-06-11 DIAGNOSIS — J96.11 CHRONIC HYPOXEMIC RESPIRATORY FAILURE (HCC): Primary | ICD-10-CM

## 2025-06-11 PROCEDURE — 1111F DSCHRG MED/CURRENT MED MERGE: CPT | Performed by: PHYSICIAN ASSISTANT

## 2025-06-11 PROCEDURE — 99305 1ST NF CARE MODERATE MDM 35: CPT | Performed by: PHYSICIAN ASSISTANT

## 2025-06-11 NOTE — PROGRESS NOTES
Pulmonary Consult Note  [facility] External Facility - [facility]    History of Present Illness:   Kelli Fisher is a(n) 70 year old female with metastatic endometrial cancer who I am now evaluating for respiratory failure and pleural effusion at [facility]. Patient recently had prolonged hospitalization with acute PE/DVT, large pericardial effusion s/p pericardial window with pathology c/w malignancy, and pleural effusion s/p R thoracentesis with fluid negative for malignant. CTA chest showed PE and LE Doppler US showed RLE DVT. She is poor candidate for anticoagulation due to severe anemia necessitating blood transfusions and had IVC filter placed 5/14/25. She had large volume thoracentesis 5/29/25 and post thoracentesis chest x-ray on 5/31/25 showed slight increase in R pleural effusion. She is on 4.5 L O2 with oxygen saturations 99%. She denies shortness of breath and chest pain. She admits to persistent leg swelling although this is improving. She has a nonproductive cough. No wheezing. Appetite is improving.    Past Medical History: DVT/PE, metastatic endometrial cancer, respiratory failure, anemia, PAF, CHF, hypothyroidism s/p thyroidectomy, DM2    Past Surgical History: Hysterectomy, thyroidectomy    Social History: No tobacco, no alcohol    Allergies: Aspirin, Penicillin, Lactose, IV STEROIDS     Medications: Reviewed on [facility] EMR    Review of Systems: Vision notable for visual impairment and wears glasses. Ears nose and throat normal. Bowel normal. Bladder function normal. Thyroid disease. No depression. No rash. Muscles and joints unremarkable. No weight loss or weight gain.     Physical Exam:  BP 98/69, HR 85, RR 18, sat 99% on 4.5 L O2   Constitutional: O2 via NC, awake, alert, NAD  HEENT: Head NC/AT, PEERL, MMM  Cardio: RRR, S1S2, no murmurs  Respiratory: Thorax symmetrical with no labored breathing. Markedly diminished bibasilar breath sounds. Otherwise clear to ausculation bilaterally. No wheezing,  rhonchi, or crackles.   Extremities: No clubbing or cyanosis. 1+ pitting LE edema bilaterally. No calf tenderness.  Neurologic: A&Ox3. No gross motor deficits.  Skin: Warm, dry.  Psych: Calm, cooperative. Pleasant affect.    Results:  -CXR 5/31/25:  Stable to slightly increasing right pleural effusion.      Pulmonary edema is again seen.      Persistent patchy left mid lung airspace opacity, atelectasis versus pneumonia.     Assessment and Plan:  1. PE/DVT - poor candidate for anticoagulation with severe anemia. S/p IVC filter 5/14/25.    Plan:  -S/p IVC filter  -Not on anticoagulation due to severe anemia    2. Pleural effusion - s/p R thoracentesis with -900 ml blood fluid on 5/29/25. Pleural fluid cytology no malignancy identified.    Plan:  -F/u CXR in 2 weeks  -F/u with Dr. Levy in 2 weeks if discharged from rehab before then    3. Chronic hypoxemic respiratory failure - due to metastatic endometrial cancer with large tumor burden in the chest, acute PE, pleural effusion, severe anemia. She is on 4.5  L O2 (baseline 4-5 L O2).    Plan:  -O2    4. Recurrent metastatic endometrial cancer with large NATO mass and pulmonary nodules - with disease progression.    Plan:  -F/u gyne/onc    Joselo Rodrigues PA-C  Pulmonary Medicine

## 2025-06-18 ENCOUNTER — EXTERNAL FACILITY (OUTPATIENT)
Dept: PULMONOLOGY | Facility: CLINIC | Age: 70
End: 2025-06-18

## 2025-06-18 DIAGNOSIS — I82.90 VTE (VENOUS THROMBOEMBOLISM): ICD-10-CM

## 2025-06-18 DIAGNOSIS — J96.11 CHRONIC HYPOXEMIC RESPIRATORY FAILURE (HCC): Primary | ICD-10-CM

## 2025-06-18 DIAGNOSIS — C54.1 ENDOMETRIAL CANCER (HCC): ICD-10-CM

## 2025-06-18 DIAGNOSIS — J90 PLEURAL EFFUSION ON RIGHT: ICD-10-CM

## 2025-06-18 PROCEDURE — 99309 SBSQ NF CARE MODERATE MDM 30: CPT | Performed by: PHYSICIAN ASSISTANT

## 2025-06-18 PROCEDURE — 1111F DSCHRG MED/CURRENT MED MERGE: CPT | Performed by: PHYSICIAN ASSISTANT

## 2025-06-18 NOTE — PROGRESS NOTES
[facility]    Kelli Fisher Patient Status:  Outpatient    1955 MRN [id number]   Location [facility] MD Dr Tahira Barrios        Kelli Fisher is a 70 year old female who presents to clinic for assessment and management for hospitalization for acute on chronic heart failure, atrial fib     Readmitted -25 with ARF with hypoxemia, atrial fib with RVR, acute on chronic blood loss anemia  Developed an acute pericardial effusion (malignant)  with tamponade, s/p pericardial window with drain on 25 with CV surgery IVC filter place for bilateral PE's and RLE DVT. Eliquis stopped due to pancytopenia. S/p R thoracentesis 25, draining 600 mL serosang fluid, negative for malignancy. Transfused 2 units PC. Home on .   Diuresed with IV furosemide and diamox. Started on midodrine 10 mg TID for hypotension.Discharged to [facility] rehab.     Admitted -25 with worsening shortness of breath and acute RF with hypoxemia.  Newly diagnosed endometrial cancer with meta stasis to the lungs.  Transfused with PCP for acute on chronic anemia. Treated with IV abx for possible  pneumonia. Small pericardial effusion noted, cardiology consulted. Amiodarone started for symptomatic PSVT, to follow up with Dr. 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  Mild rivera when first getting into bed then no orthopnea with 2 pillows and hob 30 degrees  Non productive cough  Denies heart racing, cp, dizziness, lightheadedness, or falls   Appetite improving, denies bloating  Improved makeda LE edema, able to wear normal shoes      Review of Systems:  Constitutional: negative for chills or fever  Respiratory:  negative for  hemoptysis and wheezing  Cardiovascular: negative for chest pain, exertional chest pressure/discomfort, near-syncope, orthopnea and palpitations  Gastrointestinal: negative for abdominal pain, diarrhea, melena, nausea and vomiting  Hematologic/lymphatic: negative  Musculoskeletal: negative for muscle weakness and myalgias    Objective:  Lab Results   Component Value Date/Time    WBC 9.9 06/05/2025 05:56 AM    HGB 8.7 (L) 06/05/2025 05:56 AM    HCT 26.9 (L) 06/05/2025 05:56 AM    .0 06/05/2025 05:56 AM    CREATSERUM 0.86 06/19/2025 11:28 AM    BUN 10 06/19/2025 11:28 AM     (L) 06/19/2025 11:28 AM    K 4.5 06/19/2025 11:28 AM    CL 95 (L) 06/19/2025 11:28 AM    CO2 36.0 (H) 06/19/2025 11:28 AM     (H) 06/19/2025 11:28 AM    CA 8.5 (L) 06/19/2025 11:28 AM    ALB 3.0 (L) 06/02/2025 10:02 AM    ALKPHO 142 05/12/2025 02:11 PM    BILT 0.7 05/12/2025 02:11 PM    TP 6.8 05/12/2025 02:11 PM    AST 26 05/12/2025 02:11 PM    ALT 10 05/12/2025 02:11 PM    PTT 37.0 (H) 05/21/2025 09:54 AM    INR 1.31 (H) 05/22/2025 05:05 AM    PTP 17.0 (H) 05/22/2025 05:05 AM    TSH 3.705 05/02/2025 06:05 AM    LIP 42 12/11/2023 12:55 PM    DDIMER 3.05 (H) 05/13/2025 03:12 PM    MG 2.4 06/05/2025 05:56 AM    B12 >2,000 (H) 05/02/2025 01:07 PM    PGLU 115 (H) 06/05/2025 12:14 PM          Lab Results   Component Value Date/Time    PBNP 1,157 (H) 06/05/2025 05:56 AM    PBNP 1,826 (H) 05/29/2025 05:42 AM    PBNP 1,110 (H) 05/12/2025 04:19 PM    PBNP 239 (H) 04/29/2025  10:00 PM    PBNP 121 2025 08:27 PM       Labs drawn by  RN : bmp,pro BNP, iron studies, results reviewed with patient and family.  Personally interpreted results     /75 (BP Location: Right arm, Patient Position: Sitting)   Pulse 74   Wt 159 lb 1.6 oz (72.2 kg)   SpO2 100%   PF (!) 4 L/min   BMI 31.07 kg/m²       Date  Clinic wt Home /rehab* wt [facility] wt Hospital /DC wt IV med  PO med   25    174     6/3/25    156     25    127 ?(25)     25  164*       25 159                     General appearance: alert, appears stated age and cooperative  Neck: no JVD at 90 degrees, R internal jugular puncture site with scab DI   Lungs: clear to auscultation bilaterally, diminished makeda bases  Heart: S1, S2 normal, no murmur, click, rub or gallop, regular rate and rhythm  Abdomen: soft, non-tender; bowel sounds normal; no masses,  no abdominal distension  Extremities:  no cyanosis , +1-2 makeda LE edema from below knees to pedals, no open wounds or blisters  Pulses: 2+ and symmetric  Neurologic: Grossly normal  Skin: Mid sternal incision and chest tube punctures x 2  upper mid abdomen RONNIE, small scabs DI     Diagnostics:  Rhythm strip: 25 NSR 74 bpm.     EC25: atrial fib with RVR  25: sinus tach 105 bpm    Echocardiogram:   25: limited: EF 65-70%, no wma, no pericardial effusion, + L pleural effusion  25: limited: mild LVH, EF 55-60%   25: limited :EF 65-70%, small-mo pericardial effusion  25: EF 50-55%, small pericardial effusion, no sign of tamponade, PAS 49 mmhg.       Assessment:  Acute on chronic HF pEF in setting of recent endometrial adenocarcinoma with malignant pericardial effusion, makeda pleural effusions, pAF, acute PE and RLE DVT in April/May 2025.   - NYHA class III, stage C  -mod pulmonary HTN  -EF 65-70 %  - large malignant pericardial effusion with tamponade, s/p pericardial window 25 with CV surgery,  no pericardial effusion on last echo  5/25/25  -diuretics furosemide 40 mg daily    -diuresed 9+ lbs on recent admission (5/12-6/5/25)  -GDMT : none  -other medications: midodrine 10 mg tid.   -weight stable   - BP normal , 115/75  -Renal function stable, bun/cr: 10/0.86, K 4.5 , sodium 133  -pro BNP down to 924 < 1157 (6/5/25) <-1826( 5/29/25) <- 1110 (5/12/25)  -mild hypervolemia, improved since discharge, decreased makeda LE edema, tolerating walking 50 feet or more on 02  -continue furosemide 40 mg daily and midodrine 10 mg daily  - begin daily weights at rehab, call with 3 lb wt gain overnight or 5 lb or more in one wee  - follow 2 gm sodium restricted diet, keep fluids at 48-64 oz/day   -wear knee high tubigrips during the day, off at night, donned in clinic   -follow up with [facility] in about one month or 1-2 weeks after discharge from rehab  - [facility], pulmonology and cardiology seeing pt at rehab     Paroxysmal atrial fib, atrial tachycardia and PSVT  -denies cp, heart racing,  palpitations or dizziness  - in SR on amiodarone  -rhythm strip today- NSR 74 bpm.   - off eliquis with acute anemia and pancytopenia       Acute on chronic respiratory failure with hypoxemia  - stable on 3-4 Liters per nasal cannula   - Sa02 100 % on 4 L n/c after chair transfer, Sa02 99% on 3 L n/c at rest.     Endometrial adenocarcinoma with lung mets and pancytopenia  - started  chemo May 2025, on hold with above  - following with Dr. Herring    Bilateral PE and RLE DVT  -s/p IVC filter on 5/13/25  -off eliquis with pancytopenia and acute on chronic anemia requiring transfusions  - recent HGB stable at 8.7 (6/5/25)   - denies any sign of bleeding     Plan:     Patient Instructions   Check daily weights every morning, call if gaining 3 lbs or more overnight or more than 5 lbs in one week.    Continue all your medications as prescribed     Follow up with Dr. Tahira Carreno - cardiologist , 1 week after discharge from [facility], call to make an  appointment at [phone]    Follow up with the specialty care clinic 1-2 weeks after discharging from [facility]. Our clinic will call you the first week of July to check on your discharge plan     Continue wearing oxygen at 3-4 liters per nasal cannula continuously, wean as tolerated keeping Sa02 at 92% or greater     Wear tubi  knee high compression stockings during the day and remove at night       Call if you are having shortness of breath, cough, wheezing, chest pain, dizziness, lightheadedness, heart racing, palpitations, swelling, rapid weight gain, fatigue, weakness, fever or chills.  Call 911 with severe shortness of breath, chest pain or chest pressure not improved after 15 minutes of rest or if feeling faint, passing out or having a fall.       Keep daily fluids at 48-64 ounces per day.  (8 oz. = 1 cup)    Heart healthy diet. Limit sodium to  2000 mg daily.   Common high sodium foods include frozen dinners, soups (not homemade), some cereal, vegetable juice, canned vegetables, lunch meats, processed meats like hotdogs, sausage, bautista, pepperoni, soy sauce, pre-packaged rice or potatoes. Remember to read nutrition labels for sodium content.   www.healthyeating.nhlbi.nih.gov    Exercise daily as tolerated, with goal of doing moderate aerobic exercise like walking for about 30 minutes 5 days per week. Start by walking at a slow to moderate pace for 3-5 minutes 2-3 times a day. Pace your activity to prevent shortness of breath or fatigue. Stop exercise if you develop chest pain, lightheadedness, or significant shortness of breath    Always carry a copy of your current medication list with you      **Please provide at least 24 hours’ notice if you need to reschedule or cancel your appointment. A $50 fee will be charged for missed appointments or same-day cancellations to permit others to schedule. If you must cancel, please reschedule at your earliest opportunity to ensure you receive the  continuity of care you need.     Patients may appeal this fee by calling one of our service area phone numbers below.  [facility]: [phone]  [facility]: [phone]  [facility]: [phone]    Medications - Current[1]    NITESH MENDOZA NP  6/19/25         60 minutes spent on patient education, chart review and documentation:  Patient instructed regarding clinic procedures, hours, purpose of clinic visits, sodium restricted diet, low sodium foods, fluid restrictions, daily weights, medication regimen s/s of heart failure, atrial fib exacerbation and  A/C respiratory failure with hypoxemia and home 02, when to call APN/clinic. Provided patient with  counseling, coordination of care and education given. Patient receptive.    Spoke with UNRULY Mckeon with update instructions for BMP in one week and wearing compression tubi  daily.          [1]   Current Outpatient Medications:     Ipratropium-Albuterol (DUONEB IN), Inhale 3 mL into the lungs every 6 (six) hours as needed., Disp: , Rfl:     amiodarone 200 MG Oral Tab, Take 1 tablet (200 mg total) by mouth daily., Disp: 30 tablet, Rfl: 0    furosemide 40 MG Oral Tab, Take 1 tablet (40 mg total) by mouth daily., Disp: 30 tablet, Rfl: 0    midodrine 10 MG Oral Tab, Take 1 tablet (10 mg total) by mouth in the morning and 1 tablet (10 mg total) at noon and 1 tablet (10 mg total) in the evening., Disp: 90 tablet, Rfl: 0    potassium chloride 20 MEQ Oral Tab CR, Take 1 tablet (20 mEq total) by mouth daily., Disp: 30 tablet, Rfl: 0    levothyroxine (SYNTHROID) 100 MCG Oral Tab, Take 1 tablet (100 mcg total) by mouth before breakfast., Disp: , Rfl:     pantoprazole 40 MG Oral Tab EC, Take 1 tablet (40 mg total) by mouth daily., Disp: 30 tablet, Rfl: 0    Fluticasone Propionate  HFA (FLOVENT HFA) 220 MCG/ACT Inhalation Aerosol, Inhale 1 puff into the lungs every 12 (twelve) hours. Rinse mouth following use., Disp: , Rfl:     Potassium  Chloride ER 20 MEQ Oral Tab CR, Take 1 tablet by mouth daily., Disp: , Rfl:

## 2025-06-19 ENCOUNTER — OFFICE VISIT (OUTPATIENT)
Dept: CARDIOLOGY CLINIC | Facility: HOSPITAL | Age: 70
End: 2025-06-19
Attending: NURSE PRACTITIONER
Payer: MEDICARE

## 2025-06-19 VITALS
OXYGEN SATURATION: 100 % | DIASTOLIC BLOOD PRESSURE: 75 MMHG | WEIGHT: 159.13 LBS | BODY MASS INDEX: 31 KG/M2 | SYSTOLIC BLOOD PRESSURE: 115 MMHG | HEART RATE: 74 BPM

## 2025-06-19 DIAGNOSIS — I27.20 PULMONARY HTN (HCC): ICD-10-CM

## 2025-06-19 DIAGNOSIS — J96.21 ACUTE ON CHRONIC RESPIRATORY FAILURE WITH HYPOXEMIA (HCC): Chronic | ICD-10-CM

## 2025-06-19 DIAGNOSIS — J90 PLEURAL EFFUSION, BILATERAL: ICD-10-CM

## 2025-06-19 DIAGNOSIS — E87.1 HYPONATREMIA: ICD-10-CM

## 2025-06-19 DIAGNOSIS — C54.1 ENDOMETRIAL ADENOCARCINOMA (HCC): ICD-10-CM

## 2025-06-19 DIAGNOSIS — D61.818 PANCYTOPENIA (HCC): ICD-10-CM

## 2025-06-19 DIAGNOSIS — I50.9 HEART FAILURE, UNSPECIFIED HF CHRONICITY, UNSPECIFIED HEART FAILURE TYPE (HCC): ICD-10-CM

## 2025-06-19 DIAGNOSIS — I31.31 MALIGNANT PERICARDIAL EFFUSION (HCC): ICD-10-CM

## 2025-06-19 DIAGNOSIS — I26.99 ACUTE PULMONARY EMBOLISM, UNSPECIFIED PULMONARY EMBOLISM TYPE, UNSPECIFIED WHETHER ACUTE COR PULMONALE PRESENT (HCC): ICD-10-CM

## 2025-06-19 DIAGNOSIS — I48.0 PAF (PAROXYSMAL ATRIAL FIBRILLATION) (HCC): ICD-10-CM

## 2025-06-19 DIAGNOSIS — I31.39 PERICARDIAL EFFUSION (HCC): ICD-10-CM

## 2025-06-19 DIAGNOSIS — I50.33 ACUTE ON CHRONIC HEART FAILURE WITH PRESERVED EJECTION FRACTION (HFPEF) (HCC): Primary | Chronic | ICD-10-CM

## 2025-06-19 PROBLEM — I82.401 ACUTE DEEP VEIN THROMBOSIS (DVT) OF RIGHT LOWER EXTREMITY (HCC): Status: ACTIVE | Noted: 2025-01-01

## 2025-06-19 PROBLEM — I82.401 ACUTE DEEP VEIN THROMBOSIS (DVT) OF RIGHT LOWER EXTREMITY (HCC): Status: ACTIVE | Noted: 2025-06-19

## 2025-06-19 LAB
ANION GAP SERPL CALC-SCNC: 2 MMOL/L (ref 0–18)
BUN BLD-MCNC: 10 MG/DL (ref 9–23)
BUN/CREAT SERPL: 11.6 (ref 10–20)
CALCIUM BLD-MCNC: 8.5 MG/DL (ref 8.7–10.4)
CHLORIDE SERPL-SCNC: 95 MMOL/L (ref 98–112)
CO2 SERPL-SCNC: 36 MMOL/L (ref 21–32)
CREAT BLD-MCNC: 0.86 MG/DL (ref 0.55–1.02)
EGFRCR SERPLBLD CKD-EPI 2021: 73 ML/MIN/1.73M2 (ref 60–?)
GLUCOSE BLD-MCNC: 113 MG/DL (ref 70–99)
NT-PROBNP SERPL-MCNC: 924 PG/ML (ref ?–125)
OSMOLALITY SERPL CALC.SUM OF ELEC: 276 MOSM/KG (ref 275–295)
POTASSIUM SERPL-SCNC: 4.5 MMOL/L (ref 3.5–5.1)
SODIUM SERPL-SCNC: 133 MMOL/L (ref 136–145)

## 2025-06-19 PROCEDURE — 99215 OFFICE O/P EST HI 40 MIN: CPT | Performed by: NURSE PRACTITIONER

## 2025-06-19 NOTE — PROGRESS NOTES
Subjective: From facility- Kelli is here today per w/c, accompanied by her dtr. She states that she is feeling okay and has been using oxygen since April. States her ankles have been swelling tho. Comes in with oxygen at 4 liters NC.   Weight:  Today 159.1#   Home - no  Last visit - n/a   Labs completed : by UNRULY - SRINI  Device:  n/a   IV placed:  - no   Measurements :  RLE calf- 14\" ankle- 9.4'     LLE calf- 14.2\" ankle- 10\"   Tubigrips size E applied to BLE for bilateral lower legs swelling  Medication list received from facility  Oxygen decreased to 3 liters and maintains at  99%    Patient and medication assessed. Discussed with APN. Treatments completed- venipuncture, compression applied, .  Medications given- none . Extensive education of disease management .  Reviewed AVS instructions. Patient verbalized understanding.

## 2025-06-19 NOTE — PATIENT INSTRUCTIONS
Check daily weights every morning, call if gaining 3 lbs or more overnight or more than 5 lbs in one week.    Continue all your medications as prescribed     Basic metabolic panel in one week     Follow up with Dr. Tahira Carreno - cardiologist , 1 week after discharge from [facility], call to make an appointment at [phone]    Follow up with the specialty care clinic 1-2 weeks after discharging from [facility]. Our clinic will call you the first week of July to check on your discharge plan     Continue wearing oxygen at 3-4 liters per nasal cannula continuously, wean as tolerated keeping Sa02 at 92% or greater     Wear tubi  knee high compression stockings during the day and remove at night       Call if you are having shortness of breath, cough, wheezing, chest pain, dizziness, lightheadedness, heart racing, palpitations, swelling, rapid weight gain, fatigue, weakness, fever or chills.  Call 911 with severe shortness of breath, chest pain or chest pressure not improved after 15 minutes of rest or if feeling faint, passing out or having a fall.       Keep daily fluids at 48-64 ounces per day.  (8 oz. = 1 cup)    Heart healthy diet. Limit sodium to  2000 mg daily.   Common high sodium foods include frozen dinners, soups (not homemade), some cereal, vegetable juice, canned vegetables, lunch meats, processed meats like hotdogs, sausage, bautista, pepperoni, soy sauce, pre-packaged rice or potatoes. Remember to read nutrition labels for sodium content.   www.healthyeating.nhlbi.nih.gov    Exercise daily as tolerated, with goal of doing moderate aerobic exercise like walking for about 30 minutes 5 days per week. Start by walking at a slow to moderate pace for 3-5 minutes 2-3 times a day. Pace your activity to prevent shortness of breath or fatigue. Stop exercise if you develop chest pain, lightheadedness, or significant shortness of breath    Always carry a copy of your current medication list with  you      **Please provide at least 24 hours’ notice if you need to reschedule or cancel your appointment. A $50 fee will be charged for missed appointments or same-day cancellations to permit others to schedule. If you must cancel, please reschedule at your earliest opportunity to ensure you receive the continuity of care you need.     Patients may appeal this fee by calling one of our service area phone numbers below.  [facility]: [phone]  [facility]: [phone]  [facility]: [phone]

## 2025-06-19 NOTE — PROGRESS NOTES
Pulmonary Progress Note  [facility] External Facility - [facility]     History of Present Illness:   Kelli Fisher is a(n) 70 year old female with metastatic endometrial cancer who I am seeing for follow up at [facility]. Patient recently had prolonged hospitalization with acute PE/DVT, large pericardial effusion s/p pericardial window with pathology c/w malignancy, and pleural effusion s/p R thoracentesis with fluid negative for malignancy. CTA chest showed PE and LE Doppler US showed RLE DVT. She is poor candidate for anticoagulation due to severe anemia necessitating blood transfusions and had IVC filter placed 5/14/25. She had large volume thoracentesis 5/29/25 and follow up chest x-ray on 5/31/25 showed slight increase in R pleural effusion.     Interval history: Patient admits to orthopnea and persistent leg swelling. No shortness of breath. Appetite is improved. She is on 5 L O2 with oxygen saturations 98% (4-5 L O2 at baseline).     Review of Systems: Vision notable for visual impairment and wears glasses. Ears nose and throat normal. Bowel normal. Bladder function normal. Thyroid disease. No depression. No rash. Muscles and joints unremarkable. No weight loss or weight gain.      Physical Exam:  /71, HR 75, RR 17, T 97.0, sat 98% on 5 L O2   Constitutional: O2 via NC, awake, alert, NAD  HEENT: Head NC/AT, MMM  Cardio: RRR, S1S2, no murmurs  Respiratory: Thorax symmetrical with no labored breathing. Markedly diminished bibasilar breath sounds. Otherwise clear to ausculation bilaterally. No wheezing, rhonchi, or crackles.   Extremities: No clubbing or cyanosis. L>R LE pitting edema. No calf tenderness.  Neurologic: A&Ox3. No gross motor deficits.  Skin: Warm, dry.  Psych: Calm, cooperative. Pleasant affect.     Results:  -CXR 5/31/25:  Stable to slightly increasing right pleural effusion.      Pulmonary edema is again seen.      Persistent patchy left mid lung airspace opacity, atelectasis versus  pneumonia.     -CXR 6/16/25: Prominent bilateral opacities     Assessment and Plan:  1. PE/DVT - poor candidate for anticoagulation with severe anemia. S/p IVC filter 5/14/25.     Plan:  -S/p IVC filter  -Not on anticoagulation due to severe anemia     2. Pleural effusion - s/p R thoracentesis with -900 ml bloody fluid on 5/29/25. Pleural fluid cytology no malignancy identified. F/u CXR with persistent bilateral opacities and R pleural effusion. No worsening dyspnea.     Plan:  -F/u with Dr. Levy 2 weeks after discharge from rehab     3. Chronic hypoxemic respiratory failure - due to metastatic endometrial cancer with large tumor burden in the chest, acute PE, pleural effusion, severe anemia. She is on 5  L O2 (baseline 4-5 L O2).     Plan:  -O2     4. Recurrent metastatic endometrial cancer with large NATO mass and pulmonary nodules - with disease progression.     Plan:  -F/u gyne/onc     Joselo Rodrigues PA-C  Pulmonary Medicine

## 2025-06-23 ENCOUNTER — SNF VISIT (OUTPATIENT)
Dept: INTERNAL MEDICINE CLINIC | Facility: SKILLED NURSING FACILITY | Age: 70
End: 2025-06-23

## 2025-06-23 VITALS
TEMPERATURE: 97 F | RESPIRATION RATE: 18 BRPM | SYSTOLIC BLOOD PRESSURE: 107 MMHG | OXYGEN SATURATION: 100 % | HEART RATE: 81 BPM | DIASTOLIC BLOOD PRESSURE: 69 MMHG

## 2025-06-23 DIAGNOSIS — R06.02 SHORTNESS OF BREATH: ICD-10-CM

## 2025-06-23 DIAGNOSIS — J96.01 ACUTE HYPOXIC RESPIRATORY FAILURE (HCC): ICD-10-CM

## 2025-06-23 DIAGNOSIS — D64.9 ANEMIA, UNSPECIFIED TYPE: ICD-10-CM

## 2025-06-23 DIAGNOSIS — I26.99 ACUTE PULMONARY EMBOLISM, UNSPECIFIED PULMONARY EMBOLISM TYPE, UNSPECIFIED WHETHER ACUTE COR PULMONALE PRESENT (HCC): ICD-10-CM

## 2025-06-23 DIAGNOSIS — I31.39 PERICARDIAL EFFUSION (HCC): ICD-10-CM

## 2025-06-23 DIAGNOSIS — R53.1 GENERALIZED WEAKNESS: ICD-10-CM

## 2025-06-23 DIAGNOSIS — I48.91 ATRIAL FIBRILLATION WITH RVR (HCC): ICD-10-CM

## 2025-06-23 DIAGNOSIS — K59.00 CONSTIPATION, UNSPECIFIED CONSTIPATION TYPE: Primary | ICD-10-CM

## 2025-06-23 DIAGNOSIS — D72.829 LEUKOCYTOSIS, UNSPECIFIED TYPE: ICD-10-CM

## 2025-06-23 PROCEDURE — 3078F DIAST BP <80 MM HG: CPT

## 2025-06-23 PROCEDURE — 3074F SYST BP LT 130 MM HG: CPT

## 2025-06-23 PROCEDURE — 1111F DSCHRG MED/CURRENT MED MERGE: CPT

## 2025-06-23 PROCEDURE — 99309 SBSQ NF CARE MODERATE MDM 30: CPT

## 2025-06-24 NOTE — PROGRESS NOTES
Kelli Fisher  : 1955  Age 70 year old  female patient is admitted to [facility] 25 for rehab and strengthening      [facility] Admission : 25 to 25      Chief complaint: Afib RVR  Pericardial effusion  - s/p pericardial window , chest tube out  ; fluid positive for malignancy   BL PE: acute small segmental/right lower extremity DVT  S/p IVC 2025  Metastatic endometrial cancer    Pancytopenia  Acute on chronic anemia of chronic disease  Acute on chronic respiratory failure   Thrush   Malnutrition     HPI     70-year-old female with recurrent metastatic endometrial cancer, recently discharged from [facility] on 25 after initial admission from -25 for evaluation of lung mass, pleural effusion, and microcytic anemia; received first dose of chemotherapy during that admission. Presented again with progressive palpitations and dyspnea on exertion. Labs showed neutropenia (WBC 0.5, ANC 0.11), anemia (Hgb 7.3), and hyperglycemia (glucose 233) with no known diabetes history. Imaging revealed stable known lung mass and mediastinal metastases. Hospital course notable for atrial fibrillation with RVR, pericardial effusion s/p pericardial window (), chest tube removal (), and malignant pleural fluid. Also diagnosed with bilateral pulmonary emboli and right lower extremity DVT, s/p IVC filter placement (25). Managed by Cardiology, Thoracic Surgery, Oncology, and Pulmonology. Received thoracentesis with bloody effusion, 2 units PRBCs, and was weaned to 4L NC. Palliative Care consulted; patient and daughter not ready for hospice. Patient stabilized and discharged to SNF for continued monitoring and rehabilitation.    Pt was seen and examined during follow-up. She was observed sitting in a chair, appearing comfortable and in no acute distress. The patient reports feeling generally well. She is currently on 4.5 L of oxygen via nasal cannula and is actively participating in  physical therapy daily. She inquired about her recent bloodwork, specifically her hemoglobin level, which returned at 6.7 g/dL. A stat repeat has been ordered. The patient was informed that if the repeat value remains low, she will likely need hospital transfer for a blood transfusion. She is agreeable with this plan. The patient denies any pain, change in appetite, nausea, vomiting, or abdominal discomfort. She does report mild constipation and is requesting MiraLAX. No other concerns were expressed.    ALLERGIES:  Allergies[1]    IMMUNIZATIONS    There is no immunization history on file for this patient.     CODE STATUS:  DNR    ADVANCED CARE PLANNING TEAM: Will need family care plan    CURRENT MEDICATIONS - reviewed and updated on [facility] EMR     SUBJECTIVE    Pt was seen and examined during follow-up. She was observed sitting in a chair, appearing comfortable and in no acute distress. The patient reports feeling generally well. She is currently on 4.5 L of oxygen via nasal cannula and is actively participating in physical therapy daily. She inquired about her recent bloodwork, specifically her hemoglobin level, which returned at 6.7 g/dL. A stat repeat has been ordered. The patient was informed that if the repeat value remains low, she will likely need hospital transfer for a blood transfusion. She is agreeable with this plan. The patient denies any pain, change in appetite, nausea, vomiting, or abdominal discomfort. She does report mild constipation and is requesting MiraLAX. No other concerns were expressed.    PHYSICAL EXAM:  Vitals:    06/23/25 2340   BP: 107/69   Pulse: 81   Resp: 18   Temp: 97 °F (36.1 °C)   SpO2: 100%      General: Pleasant, well-developed, well-nourished Black female in no acute distress.  Neurologic: Alert and oriented ×4. Pleasant and cooperative.  Respiratory: Diminished breath sounds bilaterally. Oxygen via nasal cannula at 4.5 L/min. No signs of respiratory distress.  Cardiovascular:  Regular rate and rhythm. No murmurs, rubs, or gallops.  Gastrointestinal: Abdomen soft, non-tender, non-distended. Mild constipation reported.  Genitourinary: No urinary complaints.  Extremities: 2+ pitting edema bilaterally in lower extremities.  Skin: Intact, no breakdown or lesions noted.    DIAGNOSTICS REVIEWED AT THIS VISIT:  Labs 06/23/25: wbc 11.49, hgb 6.7, plt 235, gluc 109, na 136, k 4.3, bun 10, creat 0.74  Lab 06/23/25 repeat: wbc 13.0, hgb 7.3, plt 247    SEE PLAN BELOW    Abnormal labs 06/23/25  - hgb at 6.7 and wbc at 11.49   - repeat cbc revealed hgb 7.3 and wbc at 13   - monitor hemoglobin closely   - check UA with C/S for elevated wbc   - monitor for s/s of infections, currently no symptoms   - monitor     Afib RVR  Pericardial effusion  - sp pericardial window 5/21, chest tube out 5/23 ; fluid positive for malignancy   - cardiology consulted in Hospital   -Cards consulted in rehab   - IV amio --> now on oral amiodarone 200mg po daily . Avoiding BB, CCB with h/o junctional rhythm   -  monitored on tele while in hospital   - repeat ECHO showing large pericardial effusion ; f/u echo with no pericardial effusion   Limited ECHO 5/25: normal left ventricle, EF 65-70%  -SOB and CXR with Bilateral Pleural effusion -> IV lasix 40 bid monitor output when in hospital .   - monitor for  volume overloaded, currently on  PO lasix 40mg po daily  with KCL ER 20meq po daily   - Eliquis held indefinitely per cards due to Anemia  -PT/OT/SPEECH in rehab   -ctm      BL PE: acute small segmental/right lower extremity DVT  --S/p IVC 5/14/2025  -Was not on a/c due to thrombocytopenia  -will hold Eliquis  indefinitely given bleeding risk per cards       Pleural effusion  -s/p thoracentesis with bloody fluid removal   -neg for malignancy   -continue supp 02-weaned to 4 LNC  -on lasix 40mg po q day  -Pulm consulted in rehab   -cont fluticasone propionate HFA inhalation one puff bid   -added duonebs q 6 hrs   -ctm       Metastatic endometrial cancer    Pancytopenia  Acute on chronic anemia of chronic disease  - s/p 2 units prbcs improved today  - neutropenic precautions  - started neupogen until ANC 1500  - Plan is for further chemotherapy once recovered clinically per Dr. Herring gyne onc   - received 6 days of iv cefepime now off   - cefepime and vanco started for neutropenia and patient with cough/chills, elevated LA, possible early sepsis now off. Wbc 15. Afebrile. On 4 L  -completed all Abx   -Pancytopenia improving, monitor daily  - Overall plan of care is to continue to treat medically over the next several days and monitor for improvement.    - discussed with pt and daughter and they are comfortable with palliative care consult for information . Explained it is meant for more comfort care then treatment of the cancer   - Palliative   discussed with Pat and pt not ready for hospice.. discussed with pt again  and she is still hopeful but did discuss at this time with med onc that  she cannot get chemo due to poor performance status   - s/p 2 unit PRBC when in hospital   -hb stable at hospital   -weekly cbc and bmp in rehab   -PT/OT in rehab      Acute on chronic respiratory failure  - due to 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, pulm edema   - on 4-5L NC baseline  - pulmonary following, weaning oxygen as tolerated. Cont lasix 40mg po q day and will increase as needed .   -sp thora as above       Hypothyroidism   -cont synthroid 100 mcg po daily      Skin care   -wound RN following in rehab   -cont santyl ointment to left buttock  area q shift    -cont skin prep to coccyx q shift   -cont skin gel protected dressing left heel q t,th, s      Hypotension  -vs q shift and prn  -cont midodrine 10mg po tid , hold for SBP>130     Constipation   - start miralax daily     FOLLOW UP APPOINTMENTS  No future appointments.     35 minutes spent w/ patient and staff, including but not limited to/ reviewing present status, needs,  abilities with disciplines, reviewing medical records, vital signs, labs, completing medication reconciliation and entering orders for continued care in [facility]     Note to patient: The 21st Century Cures Act makes medical notes like these available to patients in the interest of transparency. However, this is a medical document intended as peer to peer communication. It is written in medical language and may contain abbreviations or verbiage that are unfamiliar. It may appear blunt or direct. Medical documents are intended to carry relevant information, facts as evident, and the clinical opinion of the practitioner who signs the document.     Tri Islas, APRN   06/23/25         [1]   Allergies  Allergen Reactions    Aspirin Tightness in Throat    Penicillins HIVES    Lactose OTHER (SEE COMMENTS)     Cramping, bloating    Other OTHER (SEE COMMENTS)     Pt states IV steroid allergy, states it makes her breathing worse

## 2025-06-25 ENCOUNTER — EXTERNAL FACILITY (OUTPATIENT)
Dept: PULMONOLOGY | Facility: CLINIC | Age: 70
End: 2025-06-25

## 2025-06-25 DIAGNOSIS — I82.90 VTE (VENOUS THROMBOEMBOLISM): ICD-10-CM

## 2025-06-25 DIAGNOSIS — C54.1 ENDOMETRIAL CANCER (HCC): ICD-10-CM

## 2025-06-25 DIAGNOSIS — R91.1 LESION OF LUNG: ICD-10-CM

## 2025-06-25 DIAGNOSIS — J96.11 CHRONIC HYPOXEMIC RESPIRATORY FAILURE (HCC): Primary | ICD-10-CM

## 2025-06-25 DIAGNOSIS — J90 PLEURAL EFFUSION ON RIGHT: ICD-10-CM

## 2025-06-25 PROCEDURE — 1111F DSCHRG MED/CURRENT MED MERGE: CPT | Performed by: PHYSICIAN ASSISTANT

## 2025-06-25 PROCEDURE — 99309 SBSQ NF CARE MODERATE MDM 30: CPT | Performed by: PHYSICIAN ASSISTANT

## 2025-06-26 NOTE — PROGRESS NOTES
Pulmonary Progress Note  [facility] External Facility - [facility]     History of Present Illness:   Kelli Fisher is a(n) 70 year old female with metastatic endometrial cancer who I am seeing for follow up at [facility]. Patient recently had prolonged hospitalization with acute PE/DVT, large pericardial effusion s/p pericardial window with pathology c/w malignancy, and pleural effusion s/p R thoracentesis with fluid negative for malignancy. CTA chest showed PE and LE Doppler US showed RLE DVT. She is poor candidate for anticoagulation due to severe anemia necessitating blood transfusions and had IVC filter placed 5/14/25. She had large volume thoracentesis 5/29/25 and follow up chest x-ray on 5/31/25 showed slight increase in R pleural effusion.      Interval history: Patient admits to dyspnea on exertion but this is improving. No shortness of breath at rest. She has an occasional cough. No hemoptysis. No wheezing. She has persistent leg swelling. Appetite is improved. She is on 4 L O2 with oxygen saturations 100%.     Review of Systems: Vision notable for visual impairment and wears glasses. Ears nose and throat normal. Bowel normal. Bladder function normal. Thyroid disease. No depression. No rash. Muscles and joints unremarkable. No weight loss or weight gain.      Physical Exam:  /68, HR 83, RR 18, sat 100% on 4 L O2   Constitutional: O2 via NC, awake, alert, NAD  HEENT: Head NC/AT, MMM  Cardio: RRR, S1S2, no murmurs  Respiratory: Thorax symmetrical with no labored breathing. Diminished at R>L lung bases. Otherwise clear to ausculation bilaterally. No wheezing, rhonchi, or crackles.   Extremities: No clubbing or cyanosis. LE 2+ edema bilaterally. No calf tenderness.  Neurologic: A&Ox3. No gross motor deficits.  Skin: Warm, dry.  Psych: Calm, cooperative. Pleasant affect.     Results:  -CXR 5/31/25:  Stable to slightly increasing right pleural effusion.      Pulmonary edema is again seen.      Persistent  patchy left mid lung airspace opacity, atelectasis versus pneumonia.      -CXR 6/16/25: Prominent bilateral opacities     Assessment and Plan:  1. PE/DVT - poor candidate for anticoagulation with severe anemia. S/p IVC filter 5/14/25.     Plan:  -S/p IVC filter  -Not on anticoagulation due to severe anemia     2. Pleural effusion - s/p R thoracentesis with -900 ml bloody fluid on 5/29/25. Pleural fluid cytology no malignancy identified. F/u CXR 6/16 with persistent bilateral opacities and R pleural effusion. No worsening dyspnea.     Plan:  -F/u with Dr. Levy 2 weeks after discharge from rehab  -Consider CXR in 2-3 weeks     3. Chronic hypoxemic respiratory failure - due to metastatic endometrial cancer with large tumor burden in the chest, PE, pleural effusion, severe anemia. She is on 4 L O2 (baseline 4-5 L O2).     Plan:  -O2     4. Recurrent metastatic endometrial cancer with large NATO mass and pulmonary nodules - with disease progression.     Plan:  -F/u gyne/onc     Joselo Rodrigues PA-C  Pulmonary Medicine

## 2025-06-30 ENCOUNTER — SNF VISIT (OUTPATIENT)
Dept: INTERNAL MEDICINE CLINIC | Facility: SKILLED NURSING FACILITY | Age: 70
End: 2025-06-30

## 2025-06-30 VITALS
TEMPERATURE: 98 F | SYSTOLIC BLOOD PRESSURE: 100 MMHG | HEART RATE: 79 BPM | DIASTOLIC BLOOD PRESSURE: 66 MMHG | RESPIRATION RATE: 18 BRPM | OXYGEN SATURATION: 98 %

## 2025-06-30 DIAGNOSIS — J96.01 ACUTE RESPIRATORY FAILURE WITH HYPOXIA (HCC): ICD-10-CM

## 2025-06-30 DIAGNOSIS — R60.0 BILATERAL LEG EDEMA: ICD-10-CM

## 2025-06-30 DIAGNOSIS — I48.91 ATRIAL FIBRILLATION, UNSPECIFIED TYPE (HCC): ICD-10-CM

## 2025-06-30 DIAGNOSIS — D72.829 LEUKOCYTOSIS, UNSPECIFIED TYPE: Primary | ICD-10-CM

## 2025-06-30 DIAGNOSIS — I31.39 PERICARDIAL EFFUSION (HCC): ICD-10-CM

## 2025-06-30 DIAGNOSIS — R60.9 EDEMA, UNSPECIFIED TYPE: ICD-10-CM

## 2025-06-30 DIAGNOSIS — C54.1 ENDOMETRIAL CANCER (HCC): ICD-10-CM

## 2025-06-30 DIAGNOSIS — R53.1 GENERALIZED WEAKNESS: ICD-10-CM

## 2025-06-30 DIAGNOSIS — I48.91 ATRIAL FIBRILLATION WITH RVR (HCC): ICD-10-CM

## 2025-06-30 DIAGNOSIS — J90 PLEURAL EFFUSION, BILATERAL: ICD-10-CM

## 2025-06-30 NOTE — PROGRESS NOTES
Kelli Fisher  : 1955  Age 70 year old  female patient is admitted to [facility] for rehabilitation and strengthening     Reason for visit: Follow up on Afib RVR  Pericardial effusion  - s/p pericardial window , chest tube out  ; fluid positive for malignancy   BL PE: acute small segmental/right lower extremity DVT  S/p IVC 2025  Metastatic endometrial cancer    Pancytopenia  Acute on chronic anemia of chronic disease  Acute on chronic respiratory failure   Thrush   Malnutrition     Subjective     Patient was seen and examined during follow-up. She was sitting comfortably in a chair in the dining area and appeared to be in no acute distress. The patient reported feeling well overall and denied chest pain or shortness of breath. However, she mentioned experiencing mild dyspnea when lying down. She is currently on 4 L of supplemental oxygen. She reported having breakfast this morning and denied appetite changes, nausea, vomiting, abdominal pain, constipation, or diarrhea. She did report having some gas pains, which resolved after taking Tums. She noted that physical therapy is going well and she is making daily progress. She denied any other issues or concerns at this time.    ALLERGIES:  Allergies[1]    IMMUNIZATIONS    There is no immunization history on file for this patient.     CODE STATUS:  DNR    ADVANCED CARE PLANNING TEAM: Will need family care plan    CURRENT MEDICATIONS - reviewed and updated on [facility] EMR    PHYSICAL EXAM:  Vitals:    25 1343   BP: 100/66   Pulse: 79   Resp: 18   Temp: 97.5 °F (36.4 °C)   SpO2: 98%      General: Well-developed, well-nourished female in no acute distress  Lungs: Clear to auscultation bilaterally  Heart: Irregular rhythm  Abdomen: Soft, non-tender, non-distended  Extremities: 2+ pitting edema in both lower legs  Vital Signs: Stable    DIAGNOSTICS REVIEWED AT THIS VISIT:  Labs 25: wbc 11.49, hgb 6.7, plt 235, gluc 109, na 136, k 4.3, bun 10,  creat 0.74  Lab 06/23/25 repeat: wbc 13.0, hgb 7.3, plt 247  Labs 6/25/25: wbc 14, hgb 7.2, plt 316    UA: clear, yellow, neg nitrates, neg blood, neg leuks    SEE PLAN BELOW    Leg Edema  - pt presents with 2+ pitting edema to bilateral lower extremities   - pt is on lasix 40 mg daily, will add extra dose daily x 3 days   - not a candidate for anticoagulation. She is s/p IVC fiter   - monitor     Abnormal labs 06/23/25  - hgb at 6.7 and wbc at 11.49   - repeat cbc revealed hgb 7.3 and wbc at 13   - monitor hemoglobin closely   - check UA with C/S for elevated wbc - NEGATIVE   - monitor for s/s of infections, currently no symptoms   - Labs 6/25/25: wbc 14, hgb 7.2, plt 316  - no signs of infections. Pt follows with oncology, no new orders so far   - monitor       Afib RVR  Pericardial effusion  - sp pericardial window 5/21, chest tube out 5/23 ; fluid positive for malignancy   - cardiology consulted in Hospital   -Cards consulted in rehab   - IV amio --> now on oral amiodarone 200mg po daily . Avoiding BB, CCB with h/o junctional rhythm   -  monitored on tele while in hospital   - repeat ECHO showing large pericardial effusion ; f/u echo with no pericardial effusion   Limited ECHO 5/25: normal left ventricle, EF 65-70%  -SOB and CXR with Bilateral Pleural effusion -> IV lasix 40 bid monitor output when in hospital .   - monitor for  volume overloaded, currently on  PO lasix 40mg po daily  with KCL ER 20meq po daily   - Eliquis held indefinitely per cards due to Anemia  -PT/OT/SPEECH in rehab   -ctm      BL PE: acute small segmental/right lower extremity DVT  --S/p IVC 5/14/2025  -Was not on a/c due to thrombocytopenia  -will hold Eliquis  indefinitely given bleeding risk per cards       Pleural effusion  -s/p thoracentesis with bloody fluid removal   -neg for malignancy   -continue supp 02-weaned to 4 LNC  -on lasix 40mg po q day  -Pulm consulted in rehab   -cont fluticasone propionate HFA inhalation one puff bid    -added duonebs q 6 hrs   -ctm      Metastatic endometrial cancer    Pancytopenia  Acute on chronic anemia of chronic disease  - s/p 2 units prbcs improved today  - neutropenic precautions  - started neupogen until ANC 1500  - Plan is for further chemotherapy once recovered clinically per Dr. Herring gyne onc   - received 6 days of iv cefepime now off   - cefepime and vanco started for neutropenia and patient with cough/chills, elevated LA, possible early sepsis now off. Wbc 15. Afebrile. On 4 L  -completed all Abx   -Pancytopenia improving, monitor daily  - Overall plan of care is to continue to treat medically over the next several days and monitor for improvement.    - discussed with pt and daughter and they are comfortable with palliative care consult for information . Explained it is meant for more comfort care then treatment of the cancer   - Palliative   discussed with Pat and pt not ready for hospice.. discussed with pt again  and she is still hopeful but did discuss at this time with med onc that  she cannot get chemo due to poor performance status   - s/p 2 unit PRBC when in hospital   -hb stable at hospital   -weekly cbc and bmp in rehab   -PT/OT in rehab      Acute on chronic respiratory failure  - due to 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, pulm edema   - on 4-5L NC baseline  - pulmonary following, weaning oxygen as tolerated. Cont lasix 40mg po q day and will increase as needed .   -sp thora as above       Hypothyroidism   -cont synthroid 100 mcg po daily      Skin care   -wound RN following in rehab   -cont santyl ointment to left buttock  area q shift    -cont skin prep to coccyx q shift   -cont skin gel protected dressing left heel q t,th, s      Hypotension  -vs q shift and prn  -cont midodrine 10mg po tid , hold for SBP>130      Constipation   - start miralax daily     FOLLOW UP APPOINTMENTS  No future appointments.     35 minutes spent w/ patient and staff, including but not limited to/  reviewing present status, needs, abilities with disciplines, reviewing medical records, vital signs, labs, completing medication reconciliation and entering orders for continued care in [facility]     Note to patient: The 21st Century Cures Act makes medical notes like these available to patients in the interest of transparency. However, this is a medical document intended as peer to peer communication. It is written in medical language and may contain abbreviations or verbiage that are unfamiliar. It may appear blunt or direct. Medical documents are intended to carry relevant information, facts as evident, and the clinical opinion of the practitioner who signs the document.     Tri Islas, APRN   06/30/25         [1]   Allergies  Allergen Reactions    Aspirin Tightness in Throat    Penicillins HIVES    Lactose OTHER (SEE COMMENTS)     Cramping, bloating    Other OTHER (SEE COMMENTS)     Pt states IV steroid allergy, states it makes her breathing worse

## 2025-07-01 ENCOUNTER — HOSPITAL ENCOUNTER (INPATIENT)
Facility: HOSPITAL | Age: 70
LOS: 2 days | Discharge: NURSING, REHAB OR LONG-TERM CARE | End: 2025-07-05
Attending: EMERGENCY MEDICINE | Admitting: INTERNAL MEDICINE

## 2025-07-01 DIAGNOSIS — D64.9 ANEMIA, UNSPECIFIED TYPE: Primary | ICD-10-CM

## 2025-07-01 LAB
ANION GAP SERPL CALC-SCNC: 4 MMOL/L (ref 0–18)
ANTIBODY SCREEN: NEGATIVE
BASOPHILS # BLD AUTO: 0.03 X10(3) UL (ref 0–0.2)
BASOPHILS NFR BLD AUTO: 0.2 %
BUN BLD-MCNC: 12 MG/DL (ref 9–23)
BUN/CREAT SERPL: 14.1 (ref 10–20)
CALCIUM BLD-MCNC: 8.1 MG/DL (ref 8.7–10.4)
CHLORIDE SERPL-SCNC: 92 MMOL/L (ref 98–112)
CO2 SERPL-SCNC: 38 MMOL/L (ref 21–32)
CREAT BLD-MCNC: 0.85 MG/DL (ref 0.55–1.02)
DEPRECATED HBV CORE AB SER IA-ACNC: 2408 NG/ML (ref 50–306)
DEPRECATED RDW RBC AUTO: 52.1 FL (ref 35.1–46.3)
EGFRCR SERPLBLD CKD-EPI 2021: 74 ML/MIN/1.73M2 (ref 60–?)
EOSINOPHIL # BLD AUTO: 0.06 X10(3) UL (ref 0–0.7)
EOSINOPHIL NFR BLD AUTO: 0.5 %
ERYTHROCYTE [DISTWIDTH] IN BLOOD BY AUTOMATED COUNT: 16.8 % (ref 11–15)
GLUCOSE BLD-MCNC: 138 MG/DL (ref 70–99)
HCT VFR BLD AUTO: 18.7 % (ref 35–48)
HCT VFR BLD AUTO: 30.3 % (ref 35–48)
HGB BLD-MCNC: 6 G/DL (ref 12–16)
HGB BLD-MCNC: 9.7 G/DL (ref 12–16)
IMM GRANULOCYTES # BLD AUTO: 0.23 X10(3) UL (ref 0–1)
IMM GRANULOCYTES NFR BLD: 1.8 %
LYMPHOCYTES # BLD AUTO: 0.89 X10(3) UL (ref 1–4)
LYMPHOCYTES NFR BLD AUTO: 7.1 %
MCH RBC QN AUTO: 27.1 PG (ref 26–34)
MCHC RBC AUTO-ENTMCNC: 32.1 G/DL (ref 31–37)
MCV RBC AUTO: 84.6 FL (ref 80–100)
MONOCYTES # BLD AUTO: 1.56 X10(3) UL (ref 0.1–1)
MONOCYTES NFR BLD AUTO: 12.5 %
NEUTROPHILS # BLD AUTO: 9.7 X10 (3) UL (ref 1.5–7.7)
NEUTROPHILS # BLD AUTO: 9.7 X10(3) UL (ref 1.5–7.7)
NEUTROPHILS NFR BLD AUTO: 77.9 %
OSMOLALITY SERPL CALC.SUM OF ELEC: 280 MOSM/KG (ref 275–295)
PLATELET # BLD AUTO: 244 10(3)UL (ref 150–450)
POTASSIUM SERPL-SCNC: 3.9 MMOL/L (ref 3.5–5.1)
RBC # BLD AUTO: 2.21 X10(6)UL (ref 3.8–5.3)
RH BLOOD TYPE: POSITIVE
SODIUM SERPL-SCNC: 134 MMOL/L (ref 136–145)
WBC # BLD AUTO: 12.5 X10(3) UL (ref 4–11)

## 2025-07-01 PROCEDURE — 99222 1ST HOSP IP/OBS MODERATE 55: CPT | Performed by: HOSPITALIST

## 2025-07-01 PROCEDURE — 30233N1 TRANSFUSION OF NONAUTOLOGOUS RED BLOOD CELLS INTO PERIPHERAL VEIN, PERCUTANEOUS APPROACH: ICD-10-PCS | Performed by: HOSPITALIST

## 2025-07-01 RX ORDER — IPRATROPIUM BROMIDE AND ALBUTEROL SULFATE 2.5; .5 MG/3ML; MG/3ML
3 SOLUTION RESPIRATORY (INHALATION) EVERY 6 HOURS PRN
Status: DISCONTINUED | OUTPATIENT
Start: 2025-07-01 | End: 2025-07-05

## 2025-07-01 RX ORDER — LEVOTHYROXINE SODIUM 100 UG/1
100 TABLET ORAL
Status: DISCONTINUED | OUTPATIENT
Start: 2025-07-01 | End: 2025-07-05

## 2025-07-01 RX ORDER — PANTOPRAZOLE SODIUM 40 MG/1
40 TABLET, DELAYED RELEASE ORAL
Status: DISCONTINUED | OUTPATIENT
Start: 2025-07-01 | End: 2025-07-05

## 2025-07-01 RX ORDER — MIDODRINE HYDROCHLORIDE 5 MG/1
10 TABLET ORAL 3 TIMES DAILY
Status: DISCONTINUED | OUTPATIENT
Start: 2025-07-01 | End: 2025-07-05

## 2025-07-01 RX ORDER — ZOLPIDEM TARTRATE 5 MG/1
5 TABLET ORAL NIGHTLY PRN
Status: DISCONTINUED | OUTPATIENT
Start: 2025-07-01 | End: 2025-07-05

## 2025-07-01 RX ORDER — FUROSEMIDE 40 MG/1
40 TABLET ORAL DAILY
Status: DISCONTINUED | OUTPATIENT
Start: 2025-07-01 | End: 2025-07-04

## 2025-07-01 RX ORDER — BUDESONIDE 0.5 MG/2ML
0.5 INHALANT ORAL 2 TIMES DAILY
Status: DISCONTINUED | OUTPATIENT
Start: 2025-07-01 | End: 2025-07-03

## 2025-07-01 RX ORDER — POLYETHYLENE GLYCOL 3350 17 G/17G
17 POWDER, FOR SOLUTION ORAL DAILY PRN
Status: DISCONTINUED | OUTPATIENT
Start: 2025-07-01 | End: 2025-07-05

## 2025-07-01 RX ORDER — ONDANSETRON 2 MG/ML
4 INJECTION INTRAMUSCULAR; INTRAVENOUS EVERY 6 HOURS PRN
Status: DISCONTINUED | OUTPATIENT
Start: 2025-07-01 | End: 2025-07-05

## 2025-07-01 RX ORDER — HYDROCODONE BITARTRATE AND ACETAMINOPHEN 5; 325 MG/1; MG/1
1 TABLET ORAL EVERY 6 HOURS PRN
Status: DISCONTINUED | OUTPATIENT
Start: 2025-07-01 | End: 2025-07-05

## 2025-07-01 RX ORDER — AMIODARONE HYDROCHLORIDE 100 MG/1
200 TABLET ORAL DAILY
Status: DISCONTINUED | OUTPATIENT
Start: 2025-07-01 | End: 2025-07-05

## 2025-07-01 RX ORDER — POLYETHYLENE GLYCOL 3350 17 G/17G
17 POWDER, FOR SOLUTION ORAL DAILY
COMMUNITY

## 2025-07-01 RX ORDER — ACETAMINOPHEN 325 MG/1
650 TABLET ORAL EVERY 6 HOURS PRN
Status: DISCONTINUED | OUTPATIENT
Start: 2025-07-01 | End: 2025-07-05

## 2025-07-01 NOTE — PLAN OF CARE
Problem: Patient Centered Care  Goal: Patient preferences are identified and integrated in the patient's plan of care  Description: Interventions:  - What would you like us to know as we care for you? From Tammy Hodgson  - Provide timely, complete, and accurate information to patient/family  - Incorporate patient and family knowledge, values, beliefs, and cultural backgrounds into the planning and delivery of care  - Encourage patient/family to participate in care and decision-making at the level they choose  - Honor patient and family perspectives and choices  Outcome: Progressing     Problem: Patient/Family Goals  Goal: Patient/Family Long Term Goal  Description: Patient's Long Term Goal: Stay out of hospital    Interventions:  - Rehab  - See additional Care Plan goals for specific interventions  Outcome: Progressing  Goal: Patient/Family Short Term Goal  Description: Patient's Short Term Goal: Hemodynamic stability    Interventions:   - Blood transfusions, monitor Hgb  - See additional Care Plan goals for specific interventions  Outcome: Progressing     Problem: PAIN - ADULT  Goal: Verbalizes/displays adequate comfort level or patient's stated pain goal  Description: INTERVENTIONS:  - Encourage pt to monitor pain and request assistance  - Assess pain using appropriate pain scale  - Administer analgesics based on type and severity of pain and evaluate response  - Implement non-pharmacological measures as appropriate and evaluate response  - Consider cultural and social influences on pain and pain management  - Manage/alleviate anxiety  - Utilize distraction and/or relaxation techniques  - Monitor for opioid side effects  - Notify MD/LIP if interventions unsuccessful or patient reports new pain  - Anticipate increased pain with activity and pre-medicate as appropriate  Outcome: Progressing     Problem: RISK FOR INFECTION - ADULT  Goal: Absence of fever/infection during anticipated neutropenic  period  Description: INTERVENTIONS  - Monitor WBC  - Administer growth factors as ordered  - Implement neutropenic guidelines  Outcome: Progressing     Problem: SAFETY ADULT - FALL  Goal: Free from fall injury  Description: INTERVENTIONS:  - Assess pt frequently for physical needs  - Identify cognitive and physical deficits and behaviors that affect risk of falls.  - [place] fall precautions as indicated by assessment.  - Educate pt/family on patient safety including physical limitations  - Instruct pt to call for assistance with activity based on assessment  - Modify environment to reduce risk of injury  - Provide assistive devices as appropriate  - Consider OT/PT consult to assist with strengthening/mobility  - Encourage toileting schedule  Outcome: Progressing     Problem: DISCHARGE PLANNING  Goal: Discharge to home or other facility with appropriate resources  Description: INTERVENTIONS:  - Identify barriers to discharge w/pt and caregiver  - Include patient/family/discharge partner in discharge planning  - Arrange for needed discharge resources and transportation as appropriate  - Identify discharge learning needs (meds, wound care, etc)  - Arrange for interpreters to assist at discharge as needed  - Consider post-discharge preferences of patient/family/discharge partner  - Complete POLST form as appropriate  - Assess patient's ability to be responsible for managing their own health  - Refer to Case Management Department for coordinating discharge planning if the patient needs post-hospital services based on physician/LIP order or complex needs related to functional status, cognitive ability or social support system  Outcome: Progressing     Problem: CARDIOVASCULAR - ADULT  Goal: Maintains optimal cardiac output and hemodynamic stability  Description: INTERVENTIONS:  - Monitor vital signs, rhythm, and trends  - Monitor for bleeding, hypotension and signs of decreased cardiac output  - Evaluate effectiveness  of vasoactive medications to optimize hemodynamic stability  - Monitor arterial and/or venous puncture sites for bleeding and/or hematoma  - Assess quality of pulses, skin color and temperature  - Assess for signs of decreased coronary artery perfusion - ex. Angina  - Evaluate fluid balance, assess for edema, trend weights  Outcome: Progressing

## 2025-07-01 NOTE — PHYSICAL THERAPY NOTE
Physical Therapy Defer Note    Orders received and chart reviewed. Attempted to see patient for Physical Therapy services at 0807. Patient is not appropriate for therapy at this time secondary to patient with subtherapeutic HgB (6.0) indicating limited activity tolerance, per chart review patient is pending re-draw. Decision made to defer therapy pending medical stability. Will re-schedule visit as patient is medically able to participate.    Desi Canales, PT, DPT  [facility]  Rehab Services - Physical Therapy  [id number]

## 2025-07-01 NOTE — ED PROVIDER NOTES
Patient Seen in: [facility] Emergency Department        History  Chief Complaint   Patient presents with    Abnormal Labs     Stated Complaint: abnormal labs    Subjective:   HPI          Pt is 69 yo F who p/w low hemoglobin at NH. Pt states had blood transfusion on last admission. Denies melena or hematochezia. +chronic abdominal pains. No dizziness but feels weak. No other signs of bleeding       Objective:     Past Medical History:    A-fib (HCC)    Asthma (HCC)    Congestive heart disease (HCC)    Esophageal reflux    History of blood transfusion    Hypothyroidism    Visual impairment              Past Surgical History:   Procedure Laterality Date    Thyroidectomy      Total abdom hysterectomy                  Social History     Socioeconomic History    Marital status: Single   Tobacco Use    Smoking status: Never    Smokeless tobacco: Never   Vaping Use    Vaping status: Never Used   Substance and Sexual Activity    Alcohol use: Never    Drug use: Never     Social Drivers of Health     Food Insecurity: No Food Insecurity (5/12/2025)    NCSS - Food Insecurity     Worried About Running Out of Food in the Last Year: No     Ran Out of Food in the Last Year: No   Transportation Needs: No Transportation Needs (5/12/2025)    NCSS - Transportation     Lack of Transportation: No   Housing Stability: Not At Risk (5/12/2025)    NCSS - Housing/Utilities     Has Housing: Yes     Worried About Losing Housing: No     Unable to Get Utilities: No                                Physical Exam    ED Triage Vitals [07/01/25 0018]   /77   Pulse 85   Resp 18   Temp 97.3 °F (36.3 °C)   Temp src Temporal   SpO2 100 %   O2 Device Nasal cannula       Current Vitals:   Vital Signs  BP: 143/77  Pulse: 85  Resp: 18  Temp: 97.3 °F (36.3 °C)  Temp src: Temporal    Oxygen Therapy  SpO2: 100 %  O2 Device: Nasal cannula  O2 Flow Rate (L/min): 5 L/min            Physical Exam  GENERAL: No acute distress, awake and alert  HEENT:  EOMI, PERRL  Neck: supple  CV: RRR, no murmurs  Resp: CTAB, no wheezes or retractions  Extremities: FROM of all extremities  Neuro: CN intact, normal speech; no focal deficits  SKIN: warm, dry, no rashes          ED Course  Labs Reviewed   CBC WITH DIFFERENTIAL WITH PLATELET - Abnormal; Notable for the following components:       Result Value    WBC 12.5 (*)     RBC 2.21 (*)     HGB 6.0 (*)     HCT 18.7 (*)     RDW-SD 52.1 (*)     RDW 16.8 (*)     Neutrophil Absolute Prelim 9.70 (*)     Neutrophil Absolute 9.70 (*)     Lymphocyte Absolute 0.89 (*)     Monocyte Absolute 1.56 (*)     All other components within normal limits   BASIC METABOLIC PANEL (8) - Abnormal; Notable for the following components:    Glucose 138 (*)     Sodium 134 (*)     Chloride 92 (*)     CO2 38.0 (*)     Calcium, Total 8.1 (*)     All other components within normal limits   TYPE AND SCREEN    Narrative:     The following orders were created for panel order Type and screen.  Procedure                               Abnormality         Status                     ---------                               -----------         ------                     ABORH (Blood Type)[[id number]]                               Final result               Antibody Screen[[id number]]                                  Final result                 Please view results for these tests on the individual orders.   ABORH (BLOOD TYPE)   ANTIBODY SCREEN   PREPARE RBC                MDM   Admission disposition: 7/1/2025  3:14 AM           Medical Decision Making  No signs of active bleeding  Hemoglobin 6.0-prbcs ordered  Remains hemodynamically stable and will observe in hospital    Amount and/or Complexity of Data Reviewed  External Data Reviewed: labs and notes.     Details: Recent labs, admission notes 6/2025 reviewed  Labs: ordered.  Discussion of management or test interpretation with external provider(s): D/w dr Maza    Risk  Drug therapy requiring intensive monitoring for  toxicity.  Decision regarding hospitalization.    Critical Care  Total time providing critical care: 30 minutes        Disposition and Plan     Clinical Impression:  1. Anemia, unspecified type         Disposition:  Admit  7/1/2025  3:14 am    Follow-up:  No follow-up provider specified.  We recommend that you schedule follow up care with a primary care provider within the next three months to obtain basic health screening including reassessment of your blood pressure.      Medications Prescribed:  Current Discharge Medication List                Supplementary Documentation:         Hospital Problems       Present on Admission  Date Reviewed: 6/19/2025          ICD-10-CM Noted POA    * (Principal) Anemia, unspecified type D64.9 7/1/2025 Unknown

## 2025-07-01 NOTE — ED QUICK NOTES
Orders for admission, patient is aware of plan and ready to go upstairs. Any questions, please call ED UNRULY Sebastian at extension [phone].     Patient Covid vaccination status: Unvaccinated     COVID Test Ordered in ED: None    COVID Suspicion at Admission: N/A    Running Infusions: Medication Infusions[1] None    Mental Status/LOC at time of transport: A&Ox3-4    Other pertinent information:   CIWA score: N/A   NIH score:  N/A             [1]    Normal vision: sees adequately in most situations; can see medication labels, newsprint

## 2025-07-01 NOTE — ED INITIAL ASSESSMENT (HPI)
Pt arrived to the ED via [facility] for abnormal labs. Per EMS, pt had routine labs drawn today which showed hemoglobin of 6.9. No known bleeding per nursing home RN or pt. Pt on 5 L O2 at baseline. A/O x3 and speaking complete sentences.

## 2025-07-01 NOTE — OCCUPATIONAL THERAPY NOTE
OT orders rcvd; pt admitted overnight; per chart,  Hgb 6.0; pending re-draw; per rehab protocol, hold for Hgb <7; will re-attempt later, schedule and caseload permitting. IF unable, will re-schedule evaluation for 7/2

## 2025-07-01 NOTE — H&P
[facility]  part of [facility]    History & Physical    Kelli Fisher Patient Status:  Emergency    1955 MRN [id number]   Location [facility] EMERGENCY DEPARTMENT Attending Ashley Nettles MD   Hosp Day # 0 PCP Taylor Gallardo MD     Date:  2025  Date of Admission:  2025    Chief Complaint:  Chief Complaint   Patient presents with    Abnormal Labs       History of Present Illness:  Kelli Fisher is a(n) 70 year old female, with a past medical history significant for metastatic endometrial CA, pulmonary embolism not on any anticoagulation secondary to increased risk of bleeding, and pancytopenia presents to the ER after lab work indicated a drop in hemoglobin at the nursing home.  Patient denies any bleeding, however does admit to feeling weak.  Denies any dizziness chest pain or worsening shortness of breath    History:  Past Medical History[1]  Past Surgical History[2]  Family History[3] No sick contacts   reports that she has never smoked. She has never used smokeless tobacco. She reports that she does not drink alcohol and does not use drugs.    Allergies:  Allergies[4]    Home Medications:  Prior to Admission Medications   Prescriptions Last Dose Informant Patient Reported? Taking?   Fluticasone Propionate  HFA (FLOVENT HFA) 220 MCG/ACT Inhalation Aerosol   Yes No   Sig: Inhale 1 puff into the lungs every 12 (twelve) hours. Rinse mouth following use.   Ipratropium-Albuterol (DUONEB IN)   Yes No   Sig: Inhale 3 mL into the lungs every 6 (six) hours as needed.   Potassium Chloride ER 20 MEQ Oral Tab CR   Yes No   Sig: Take 1 tablet by mouth daily.   amiodarone 200 MG Oral Tab   No No   Sig: Take 1 tablet (200 mg total) by mouth daily.   furosemide 40 MG Oral Tab   No No   Sig: Take 1 tablet (40 mg total) by mouth daily.   levothyroxine (SYNTHROID) 100 MCG Oral Tab   Yes No   Sig: Take 1 tablet (100 mcg total) by mouth before breakfast.   midodrine 10 MG Oral Tab   No  No   Sig: Take 1 tablet (10 mg total) by mouth in the morning and 1 tablet (10 mg total) at noon and 1 tablet (10 mg total) in the evening.   pantoprazole 40 MG Oral Tab EC   No No   Sig: Take 1 tablet (40 mg total) by mouth daily.   potassium chloride 20 MEQ Oral Tab CR   No No   Sig: Take 1 tablet (20 mEq total) by mouth daily.      Facility-Administered Medications: None       Review of Systems:  Constitutional:  Weakness, Fatigue.  Eye:  Negative.  Ear/Nose/Mouth/Throat:  Negative.  Respiratory:  Negative  Cardiovascular: Negative  Gastrointestinal:  Negative.  Genitourinary:  Negative  Endocrine:  Negative.  Immunologic:  Negative.  Musculoskeletal:  Negative.  Integumentary:  Negative.  Neurologic:  Negative.  Psychiatric:  Negative.  ROS reviewed as documented in chart    Physical Exam:  Temp:  [97.3 °F (36.3 °C)-98 °F (36.7 °C)] 97.9 °F (36.6 °C)  Pulse:  [80-85] 80  Resp:  [16-18] 18  BP: ()/(56-77) 99/56  SpO2:  [100 %] 100 %    General:  Alert and oriented.  Diffuse skin problem:  None.  Eye:  Pupils are equal, round and reactive to light, extraocular movements are intact, Normal conjunctiva.  HENT:  Normocephalic, oral mucosa is moist.  Head:  Normocephalic, atraumatic.  Neck:  Supple, non-tender, no carotid bruit, no jugular venous distention, no lymphadenopathy, no thyromegaly.  Respiratory:  Lungs are clear to auscultation, respirations are non-labored, breath sounds are equal, symmetrical chest wall expansion.  Cardiovascular:  Normal rate, regular rhythm, no murmur, no edema.  Gastrointestinal:  Soft, non-tender, non-distended, normal bowel sounds, no organomegaly.  Lymphatics:  No lymphadenopathy neck, axilla, groin.  Musculoskeletal: Normal range of motion.  normal strength.  Feet:  Normal pulses.  Neurologic:  Alert, oriented, no focal deficits, cranial nerves II-XII are grossly intact.  Cognition and Speech:  Oriented, speech clear and coherent.  Psychiatric:  Cooperative, appropriate mood  & affect.      Laboratory Data:   Lab Results   Component Value Date    WBC 12.5 07/01/2025    HGB 6.0 07/01/2025    HCT 18.7 07/01/2025    .0 07/01/2025    CREATSERUM 0.85 07/01/2025    BUN 12 07/01/2025     07/01/2025    K 3.9 07/01/2025    CL 92 07/01/2025    CO2 38.0 07/01/2025     07/01/2025    CA 8.1 07/01/2025       Imaging:  No results found.     Assessment and Plan:    Acute on chronic anemia which in turn from chronic disease  Patient to be transfused 1 unit of packed RBC, will continue to monitor for possible signs of bleeding.  Repeat H&H later.    Chronic respiratory failure with hypoxia  Currently on baseline O2, will continue the same    Metastatic endometrial cancer  Unable to resume chemo due to poor generalized state, consider palliative/hospice consult    Prior PE  Currently not on anticoagulation due to increasing risk of bleed.    Recent malignant pericardial effusion  Status post drain with chest tube, consider need for follow-up limited echo.    Prophylaxis  SCDs, heparin on hold considering significant anemia and risk of bleeding    CODE STATUS  Full    Primary care physician  Taylor Gallardo MD    60 minutes spent on this admission - examining patient, obtaining history, reviewing previous medical records, going over test results/imaging and discussing plan of care. All questions answered.     Disposition  Clinical course will dictate outcome      JOSEPH MERCADO MD  7/1/2025  4:34 AM         [1]   Past Medical History:   A-fib (HCC)    Asthma (HCC)    Congestive heart disease (HCC)    Esophageal reflux    History of blood transfusion    Hypothyroidism    Visual impairment   [2]   Past Surgical History:  Procedure Laterality Date    Thyroidectomy      Total abdom hysterectomy     [3] History reviewed. No pertinent family history.  [4]   Allergies  Allergen Reactions    Aspirin Tightness in Throat    Penicillins HIVES    Lactose OTHER (SEE COMMENTS)     Cramping, bloating     Other OTHER (SEE COMMENTS)     Pt states IV steroid allergy, states it makes her breathing worse

## 2025-07-02 LAB
BLOOD TYPE BARCODE: 7300
HEMOCCULT STL QL: NEGATIVE
IRON SATN MFR SERPL: 9 % (ref 15–50)
IRON SERPL-MCNC: 11 UG/DL (ref 50–170)
TOTAL IRON BINDING CAPACITY: 127 UG/DL (ref 250–425)
TRANSFERRIN SERPL-MCNC: 73 MG/DL (ref 250–380)
UNIT VOLUME: 350 ML

## 2025-07-02 PROCEDURE — 99233 SBSQ HOSP IP/OBS HIGH 50: CPT | Performed by: HOSPITALIST

## 2025-07-02 RX ORDER — CALCIUM CARBONATE 500 MG/1
1000 TABLET, CHEWABLE ORAL EVERY 6 HOURS PRN
Status: DISCONTINUED | OUTPATIENT
Start: 2025-07-02 | End: 2025-07-05

## 2025-07-02 NOTE — OCCUPATIONAL THERAPY NOTE
OCCUPATIONAL THERAPY EVALUATION - INPATIENT     Room Number: 239/239-A  Evaluation Date: 7/2/2025  Type of Evaluation: Initial  Presenting Problem: Anemia; from [facility]    Physician Order: IP Consult to Occupational Therapy  Reason for Therapy: ADL/IADL Dysfunction and Discharge Planning    OCCUPATIONAL THERAPY ASSESSMENT   Patient is a 70 year old female admitted 7/1/2025 for anemia; pt is from [facility] where she was working with therapy to return home alone; reports working up the strength and endurance to complete stairs at home; pt's goal is to return home.  Prior to admission, patient's baseline is typically  Mod I with RW, driving, Mod I with aDLs; has 7 adult children who assist as needed.  Patient is currently functioning below baseline with self care and basic mobiltiy.  Patient is requiring up to Mod A  as a result of the following impairments: endurance, activity tolerance, deconditioning, generalized weakness. Occupational Therapy will continue to follow for duration of hospitalization.    Patient will benefit from continued skilled OT Services to promote return to prior level of function and safety with continuous assistance and gradual rehabilitative therapy.    PLAN DURING HOSPITALIZATION  OT Device Recommendations: TBD  OT Treatment Plan: Balance activities, Energy conservation/work simplification techniques, ADL training, Functional transfer training, Endurance training, Patient/Family education, Patient/Family training, Compensatory technique education     OCCUPATIONAL THERAPY MEDICAL/SOCIAL HISTORY   Problem List   Principal Problem:    Anemia, unspecified type    HOME SITUATION  Type of Home: House  Home Layout: One level  Lives With: Alone  Toilet and Equipment: Standard height toilet  Shower/Tub and Equipment: Tub-shower combo  Drives: Yes  Patient Regularly Uses: Home O2    SUBJECTIVE  I need to go back and finish my rehab at [facility]     OCCUPATIONAL THERAPY EXAMINATION   OBJECTIVE  Precautions:  Bed/chair alarm  Fall Risk: High fall risk    WEIGHT BEARING RESTRICTION     PAIN ASSESSMENT  Ratin    ACTIVITY TOLERANCE  Pulse: 80                      O2 SATURATIONS  Oxygen Therapy  SPO2% on Oxygen at Rest: 92  At rest oxygen flow (liters per minute): 4  SPO2% Ambulation on Oxygen: 93  Ambulation oxygen flow (liters per minute): 4    COGNITION  Overall Cognitive Status:  WFL - within functional limits    RANGE OF MOTION   Upper extremity ROM is within functional limits     STRENGTH ASSESSMENT  Upper extremity strength is within functional limits     ACTIVITIES OF DAILY LIVING ASSESSMENT  AM-PAC ‘6-Clicks’ Inpatient Daily Activity Short Form  How much help from another person does the patient currently need…  -   Putting on and taking off regular lower body clothing?: A Lot  -   Bathing (including washing, rinsing, drying)?: A Lot  -   Toileting, which includes using toilet, bedpan or urinal? : A Lot  -   Putting on and taking off regular upper body clothing?: A Little  -   Taking care of personal grooming such as brushing teeth?: A Little  -   Eating meals?: A Little    AM-PAC Score:  Score: 15  Approx Degree of Impairment: 56.46%  Standardized Score (AM-PAC Scale): 34.69  CMS Modifier (G-Code): CK    FUNCTIONAL TRANSFER ASSESSMENT  Sit to Stand: Edge of Bed  Edge of Bed: Minimal Assist    BED MOBILITY  Rolling: Minimal Assist  Supine to Sit : Minimal Assist  Sit to Supine (OT): Minimal Assist  Scooting: Min A    BALANCE ASSESSMENT  Static Sitting: Stand-by Assist  Static Standing: Minimal Assist    FUNCTIONAL ADL ASSESSMENT  Eating: Supervision  Grooming Seated: Minimal Assist  Bathing Seated: Moderate Assist  UB Dressing Seated: Minimal Assist  LB Dressing Seated: Maximum Assist  Toileting Seated: Moderate Assist    THERAPEUTIC EXERCISE     Skilled Therapy Provided: Pt rcvd in bed; pleasant and cooperative; decreased tolerance for activity noted and pt benefits from pacing and rest breaks with transitional  movements; currently requiring Min-Mod A for ADLs and is not tolerating ambulation for functional distances to be independent at home; pt requiring Min A for bed mobility; assist to don socks; stood to RW with Min A and was only able to take a few steps to the chair with assist; O2 >90% on 4L; pt requesting breathing treatment from respiratory; pt educated on ways to increased activity while IP and importance of OOB activities; pt verbalized understanding; stable at exit and left with all needs in reach; Continue skilled occupational therapy while IP to maximize patient function and increase patient participation, safety, and independence with basic ADL and everyday activities.       EDUCATION PROVIDED  Patient Education : Role of Occupational Therapy; Plan of Care; Discharge Recommendations; Functional Transfer Techniques; Fall Prevention; Posture/Positioning; Edema Reduction  Patient's Response to Education: Verbalized Understanding    The patient's Approx Degree of Impairment: 56.46% has been calculated based on documentation in the [facility] '6 clicks' Inpatient Daily Activity Short Form.  Research supports that patients with this level of impairment may benefit from GR.  Final disposition will be made by interdisciplinary medical team.    Patient End of Session: Up in chair, Needs met, RN aware of session/findings, Call light within reach, All patient questions and concerns addressed, Alarm set    OT Goals  Patient self-stated goal is: to return to rehab      Patient will complete LE dressing with Min A  Comment:     Patient will complete toilet transfer with CGA  Comment:     Patient will complete self care task at sink level with CGA   Comment:     Comment:         Goals  on:   Frequency:3x week     Patient Evaluation Complexity Level:   Occupational Profile/Medical History MODERATE - Expanded review of history including review of medical or therapy record   Specific performance deficits impacting  engagement in ADL/IADL MODERATE  3 - 5 performance deficits   Client Assessment/Performance Deficits MODERATE - Comorbidities and min to mod modifications of tasks    Clinical Decision Making MODERATE - Analysis of occupational profile, detailed assessments, several treatment options    Overall Complexity MODERATE     OT Session Time: 15 minutes  Self-Care Home Management: 0 minutes  Therapeutic Activity: 15 minutes    Brenden Szymanski, Occupational Therapist, OTR/L ext [phone]

## 2025-07-02 NOTE — PLAN OF CARE
Patient denies pain and discomfort, oxygen@ 4 L. Frequent rounding done. Safety measures in place, call light within reach.  Problem: Patient Centered Care  Goal: Patient preferences are identified and integrated in the patient's plan of care  Description: Interventions:  - What would you like us to know as we care for you? Patient is from [facility]   - Provide timely, complete, and accurate information to patient/family  - Incorporate patient and family knowledge, values, beliefs, and cultural backgrounds into the planning and delivery of care  - Encourage patient/family to participate in care and decision-making at the level they choose  - Honor patient and family perspectives and choices  Outcome: Progressing     Problem: PAIN - ADULT  Goal: Verbalizes/displays adequate comfort level or patient's stated pain goal  Description: INTERVENTIONS:  - Encourage pt to monitor pain and request assistance  - Assess pain using appropriate pain scale  - Administer analgesics based on type and severity of pain and evaluate response  - Implement non-pharmacological measures as appropriate and evaluate response  - Consider cultural and social influences on pain and pain management  - Manage/alleviate anxiety  - Utilize distraction and/or relaxation techniques  - Monitor for opioid side effects  - Notify MD/LIP if interventions unsuccessful or patient reports new pain  - Anticipate increased pain with activity and pre-medicate as appropriate  Outcome: Progressing     Problem: RISK FOR INFECTION - ADULT  Goal: Absence of fever/infection during anticipated neutropenic period  Description: INTERVENTIONS  - Monitor WBC  - Administer growth factors as ordered  - Implement neutropenic guidelines  Outcome: Progressing     Problem: DISCHARGE PLANNING  Goal: Discharge to home or other facility with appropriate resources  Description: INTERVENTIONS:  - Identify barriers to discharge w/pt and caregiver  - Include patient/family/discharge  partner in discharge planning  - Arrange for needed discharge resources and transportation as appropriate  - Identify discharge learning needs (meds, wound care, etc)  - Arrange for interpreters to assist at discharge as needed  - Consider post-discharge preferences of patient/family/discharge partner  - Complete POLST form as appropriate  - Assess patient's ability to be responsible for managing their own health  - Refer to Case Management Department for coordinating discharge planning if the patient needs post-hospital services based on physician/LIP order or complex needs related to functional status, cognitive ability or social support system  Outcome: Progressing     Problem: CARDIOVASCULAR - ADULT  Goal: Maintains optimal cardiac output and hemodynamic stability  Description: INTERVENTIONS:  - Monitor vital signs, rhythm, and trends  - Monitor for bleeding, hypotension and signs of decreased cardiac output  - Evaluate effectiveness of vasoactive medications to optimize hemodynamic stability  - Monitor arterial and/or venous puncture sites for bleeding and/or hematoma  - Assess quality of pulses, skin color and temperature  - Assess for signs of decreased coronary artery perfusion - ex. Angina  - Evaluate fluid balance, assess for edema, trend weights  Outcome: Progressing

## 2025-07-02 NOTE — CM/SW NOTE
07/02/25 1300   LIBERTAD/NEVA Referral Data   Referral Source    Reason for Referral Discharge planning   Informant Patient   Readmission Assessment   Factors that patient feels contributed to this readmission Acute/Chronic Clinical Presentation   Pt's living situation prior to admission? Subacute rehab   Pt's level of independence at discharge? Some assist (mod)   Pt. received education on diagnoses at time of discharge? Yes   Did you know who and how to call someone if you felt worse? Yes   Did any new symptoms or issues develop after you were discharged? No   ----Post D/C symptoms: Symptoms/issue related to previous hospitalization Yes   Did you understand your discharge instructions? Yes   Were medications taken as indicated on discharge instructions? Yes   Did you have a follow-up appointment scheduled at time of discharge? No   Was patient a candidate for: Palliative care   ----Palliative Care Recommendation: Recommended, arranged   Was full assessment completed by SW/LIBERTAD on prior admission? Yes   Was the recommended discharge plan achieved? Yes   Was pt. discharged w/out services? No   Medical Hx   Does patient have an established PCP? Yes  (Taylor Gallardo)   Patient Info   Patient's Current Mental Status at Time of Assessment Alert;Oriented   Patient's Home Environment Condo/Apt no elevator   Number of Stair in Home 12 JAKUB   Patient lives with Alone   Patient Status Prior to Admission   Independent with ADLs and Mobility No   Pt. requires assistance with Ambulating   Discharge Needs   Anticipated D/C needs To be determined      self referral for discharge planning. LIBERTAD completed chart review and met with patient at bedside.    Last admission was 5/12/25 to 6/5/25 and patient was discharged to [facility] with [facility].     Patient lives at home alone. There are 12 steps to enter her apartment. She is ambulatory with a walker. E provides home O2. She has had Residential [facility]  services in the past. Also has a cane and wheelchair.     Discussed PT/OT recommendations to return to [facility]. CM asked DSC to send return referral. Patient is agreeable to returning. Modesto Souza at [facility] informed CM that patient's insurance stop covering right before admission to hospital and they were working on [facility]ing [facility]. Libby will follow up to confirm with patient's daughter. We will need insurance authorization. CM also asked DSC to send return referral to [facility] for CPC.     349 pm CM contacted modesto Souza to follow up on placement. She reported plan is for patient to transition to [facility]. CM will ask DSC to submit for insurance authorization.     407 pm CM informed by modesto Souza she confirmed plan for transition to [facility] instead of [facility] with patient's daughter.    424 pm CM informed by DSC insurance authorization was submitted and is pending. [facility] reserved for CPC in [facility]. [facility] submitted, pending review.    PLAN: Return to [facility]-pending insurance authorization and medical clearance    / to remain available for support and/or discharge planning     Aga EDDY RN   Nurse    [phone]

## 2025-07-02 NOTE — CM/SW NOTE
Department  notified of request for Hospice, aidin referrals started. Assigned CM/SW to follow up with pt/family on further discharge planning.     Jessica Chu, DSC

## 2025-07-02 NOTE — PHYSICAL THERAPY NOTE
PHYSICAL THERAPY EVALUATION - INPATIENT     Room Number: 239/239-A  Evaluation Date: 7/2/2025  Type of Evaluation: Initial   Physician Order: PT Eval and Treat    Presenting Problem: Anemia  Co-Morbidities : lung mass, anemia, anorexia, palliative, dyspnea, panacytopneia, SOB  Reason for Therapy: Mobility Dysfunction and Discharge Planning    PHYSICAL THERAPY ASSESSMENT   Patient is a 70 year old female admitted 7/1/2025 for Anemia, SOB.  Prior to admission, patient's baseline is lives at home alone mod indep with a RW uses home 02, recently admitted for  for rehab.  Patient is currently functioning below baseline with bed mobility, transfers, gait, maintaining seated position, standing prolonged periods, and performing household tasks.  Patient is requiring moderate assist as a result of the following impairments: decreased functional strength, decreased endurance/aerobic capacity, impaired standing balance, impaired coordination, decreased muscular endurance, medical status, and limited AROM BLE's ROM.  Physical Therapy will continue to follow for duration of hospitalization.    Patient will benefit from continued skilled PT Services to promote return to prior level of function and safety with continuous assistance and gradual rehabilitative therapy .    PLAN DURING HOSPITALIZATION  Nursing Mobility Recommendation : 1 Assist  PT Device Recommendation: Rolling walker  PT Treatment Plan: Bed mobility, Body mechanics, Endurance, Energy conservation, Patient education, Gait training, Range of motion, Strengthening, Transfer training, Balance training  Rehab Potential : Good  Frequency (Obs): 3-5x/week     PHYSICAL THERAPY MEDICAL/SOCIAL HISTORY   History related to current admission:   Kelli Fisher is a(n) 70 year old female, with a past medical history significant for metastatic endometrial CA, pulmonary embolism not on any anticoagulation secondary to increased risk of bleeding, and pancytopenia presents to the  ER after lab work indicated a drop in hemoglobin at the nursing home.  Patient denies any bleeding, however does admit to feeling weak.  Denies any dizziness chest pain or worsening shortness of breath     Problem List  Principal Problem:    Anemia, unspecified type      HOME SITUATION  Type of Home: House  Home Layout: One level                     Lives With: Alone    Drives: Yes   Patient Regularly Uses: Home O2 (4 lts)     Prior Level of [place]: Lives alone in home with mod indep mobility with a RW with 4 lts home 02 adult children assist as needed. Admitted from a  rehab setting.     SUBJECTIVE  I plan to return back to the rehab setting then home when I can care for myself.     PHYSICAL THERAPY EXAMINATION   OBJECTIVE  Precautions: Bed/chair alarm  Fall Risk: High fall risk    WEIGHT BEARING RESTRICTION       PAIN ASSESSMENT  Ratin          COGNITION  Overall Cognitive Status:  WFL - within functional limits    RANGE OF MOTION AND STRENGTH ASSESSMENT  Upper extremity ROM and strength are within functional limits   Lower extremity ROM is within functional limits   Lower extremity strength is within functional limits     BALANCE  Static Sitting: Fair +  Dynamic Sitting: Fair  Static Standing: Fair -  Dynamic Standing: Fair -       O2 WALK  Oxygen Therapy  SPO2% on Oxygen at Rest: 92  At rest oxygen flow (liters per minute): 4  SPO2% Ambulation on Oxygen: 93  Ambulation oxygen flow (liters per minute): 4    AM-PAC '6-Clicks' INPATIENT SHORT FORM - BASIC MOBILITY  How much difficulty does the patient currently have...  Patient Difficulty: Turning over in bed (including adjusting bedclothes, sheets and blankets)?: A Little   Patient Difficulty: Sitting down on and standing up from a chair with arms (e.g., wheelchair, bedside commode, etc.): A Little   Patient Difficulty: Moving from lying on back to sitting on the side of the bed?: A Little   How much help from another person does the patient currently  need...   Help from Another: Moving to and from a bed to a chair (including a wheelchair)?: A Little   Help from Another: Need to walk in hospital room?: A Lot   Help from Another: Climbing 3-5 steps with a railing?: A Lot     AM-PAC Score:  Raw Score: 16   Approx Degree of Impairment: 54.16%   Standardized Score (AM-PAC Scale): 40.78   CMS Modifier (G-Code): CK    FUNCTIONAL ABILITY STATUS  Functional Mobility/Gait Assessment  Gait Assistance: Minimum assistance  Distance (ft): 10  Assistive Device: Rolling walker  Pattern: Shuffle (fatigues quickly with SOB)  Rolling: minimal assist  Supine to Sit: minimal assist  Sit to Supine: moderate assist  Sit to Stand: moderate assist    Exercise/Education Provided:  Bed mobility  Body mechanics  Energy conservation  Functional activity tolerated  Gait training  Posture  ROM  Strengthening  Lower therapeutic exercise:  Ankle pumps  Heel slides  LAQ  Transfer training    Skilled Therapy Provided: Pt ed with bed mobility and transfers with RW with min A. Pt ed with with amb 10 steps up to a chair with min A. Pt fatigues quickly with mobility and requires rest breaks. Pt ed with LE therex in chair x 10 reps. Pt is on track for GR with PT, OT as medical progress allows.     The patient's Approx Degree of Impairment: 54.16% has been calculated based on documentation in the [facility] '6 clicks' Inpatient Basic Mobility Short Form.  Research supports that patients with this level of impairment may benefit from GR PT, OT.    Final disposition will be made by interdisciplinary medical team.    Patient End of Session: Up in chair, With  staff, Needs met, Call light within reach, RN aware of session/findings, All patient questions and concerns addressed, Alarm set    CURRENT GOALS  Goals to be met by: 7/15/2025  Patient Goal Patient's self-stated goal is: Return home   Goal #1 Patient is able to demonstrate supine - sit EOB @ level: independent     Goal #1   Current Status    Goal #2  Patient is able to demonstrate transfers Sit to/from Stand at assistance level: modified independent with walker - rolling     Goal #2  Current Status    Goal #3 Patient is able to ambulate 150 feet with assist device: walker - rolling at assistance level: modified independent   Goal #3   Current Status    Goal #4 Patient will negotiate 3 stairs/one curb w/ assistive device and supervision   Goal #4   Current Status    Goal #5 Patient to demonstrate independence with home activity/exercise instructions provided to patient in preparation for discharge.   Goal #5   Current Status    Goal #6    Goal #6  Current Status      Patient Evaluation Complexity Level:  History Low - no personal factors and/or co-morbidities   Examination of body systems Low -  addressing 1-2 elements   Clinical Presentation Low- Stable   Clinical Decision Making  Low Complexity     Gait Training: 10 minutes

## 2025-07-02 NOTE — CM/SW NOTE
Prior Authorization - Destination  Destination Type: Skilled nursing facility  Service Provider: [facility]  Payer Communication Destination Comments: [facility] [id number]  Prior Authorization Status: Submitted/Pending      Jessica Chu DSC

## 2025-07-02 NOTE — CM/SW NOTE
Department  notified of request for [facility] referrals started. Assigned CM/SW to follow up with pt/family on further discharge planning.   Jessica Chu, DSC

## 2025-07-02 NOTE — PROGRESS NOTES
[facility]  part of [facility]    Progress Note    Kelli Fisher Patient Status:  Observation    1955 MRN [id number]   Location [facility] 2W/SW Attending Jim Dai MD   Hosp Day # 0 PCP Taylor Gallardo MD     Chief Complaint:     Anemia    Subjective:   Subjective:    Patient seen and examined this morning    Objective:   Blood pressure 117/76, pulse 78, temperature 97.8 °F (36.6 °C), temperature source Oral, resp. rate 18, height 5' (1.524 m), weight 157 lb 3 oz (71.3 kg), SpO2 93%.  Physical Exam    General: Patient is alert and oriented x3 does not appear to be in acute distress at this time  HEENT: EOMI PERRLA, atraumatic normocephalic  Cardiac: S1-S2 appreciated  Lungs: Good air entry bilaterally clear to auscultation  Abdomen: Soft nontender nondistended positive bowel sounds  Ext: Peripheral pulses are positive  Neuro: No focal deficits noted  Psych: Normal mood  Skin: No rashes noted  MSK: Full range of motion intact      Results:   Lab Results   Component Value Date    WBC 12.5 (H) 2025    HGB 9.7 (L) 2025    HCT 30.3 (L) 2025    .0 2025    CREATSERUM 0.85 2025    BUN 12 2025     (L) 2025    K 3.9 2025    CL 92 (L) 2025    CO2 38.0 (H) 2025     (H) 2025    CA 8.1 (L) 2025    ALB 3.0 (L) 2025    ALKPHO 142 2025    BILT 0.7 2025    TP 6.8 2025    AST 26 2025    ALT 10 2025    PTT 37.0 (H) 2025    INR 1.31 (H) 2025    TSH 3.705 2025    LIP 42 2023    DDIMER 3.05 (H) 2025    MG 2.4 2025    TROPHS 5 2025    B12 >2,000 (H) 2025       No results found.        Assessment & Plan:       Acute on chronic anemia which in turn from chronic disease  Patient to be transfused 1 unit of packed RBC, will continue to monitor for possible signs of bleeding.  Repeat H&H later.     Chronic respiratory failure  with hypoxia  Currently on baseline O2, will continue the same     Metastatic endometrial cancer  Unable to resume chemo due to poor generalized state, consider palliative/hospice consult     Prior PE  Currently not on anticoagulation due to increasing risk of bleed.     Recent malignant pericardial effusion  Status post drain with chest tube, consider need for follow-up limited echo.     Prophylaxis  SCDs, heparin on hold considering significant anemia and risk of bleeding     CODE STATUS  Full     Primary care physician  Taylor Gallardo MD    Global A/P  -currently on amio  -acute respiratory failure with hypoxia  -will have Pulm placed on consult  -palliative care  -will monitor  -Reviewed previous consultant notes  -Reviewed CBC, BMP, Mag, and Phos  -Reviewed tests ordered  -Repeat labs in am  -MDM: High, severe exacerbation of chronic illness posing a threat to life. IV medications requiring close inpatient monitoring.           Jim Dai MD  7/2/2025

## 2025-07-03 ENCOUNTER — APPOINTMENT (OUTPATIENT)
Dept: GENERAL RADIOLOGY | Facility: HOSPITAL | Age: 70
End: 2025-07-03
Attending: INTERNAL MEDICINE

## 2025-07-03 LAB
ANION GAP SERPL CALC-SCNC: 3 MMOL/L (ref 0–18)
BASOPHILS # BLD AUTO: 0.06 X10(3) UL (ref 0–0.2)
BASOPHILS NFR BLD AUTO: 0.5 %
BUN BLD-MCNC: 12 MG/DL (ref 9–23)
BUN/CREAT SERPL: 14.1 (ref 10–20)
CALCIUM BLD-MCNC: 8.7 MG/DL (ref 8.7–10.4)
CHLORIDE SERPL-SCNC: 92 MMOL/L (ref 98–112)
CO2 SERPL-SCNC: 38 MMOL/L (ref 21–32)
CREAT BLD-MCNC: 0.85 MG/DL (ref 0.55–1.02)
DEPRECATED RDW RBC AUTO: 54 FL (ref 35.1–46.3)
EGFRCR SERPLBLD CKD-EPI 2021: 74 ML/MIN/1.73M2 (ref 60–?)
EOSINOPHIL # BLD AUTO: 0.03 X10(3) UL (ref 0–0.7)
EOSINOPHIL NFR BLD AUTO: 0.2 %
ERYTHROCYTE [DISTWIDTH] IN BLOOD BY AUTOMATED COUNT: 17.2 % (ref 11–15)
GLUCOSE BLD-MCNC: 140 MG/DL (ref 70–99)
HCT VFR BLD AUTO: 27.3 % (ref 35–48)
HGB BLD-MCNC: 8.6 G/DL (ref 12–16)
IMM GRANULOCYTES # BLD AUTO: 0.26 X10(3) UL (ref 0–1)
IMM GRANULOCYTES NFR BLD: 2 %
LYMPHOCYTES # BLD AUTO: 0.84 X10(3) UL (ref 1–4)
LYMPHOCYTES NFR BLD AUTO: 6.6 %
MCH RBC QN AUTO: 26.5 PG (ref 26–34)
MCHC RBC AUTO-ENTMCNC: 31.5 G/DL (ref 31–37)
MCV RBC AUTO: 84 FL (ref 80–100)
MONOCYTES # BLD AUTO: 1.65 X10(3) UL (ref 0.1–1)
MONOCYTES NFR BLD AUTO: 13 %
NEUTROPHILS # BLD AUTO: 9.85 X10 (3) UL (ref 1.5–7.7)
NEUTROPHILS # BLD AUTO: 9.85 X10(3) UL (ref 1.5–7.7)
NEUTROPHILS NFR BLD AUTO: 77.7 %
NT-PROBNP SERPL-MCNC: 1064 PG/ML (ref ?–125)
OSMOLALITY SERPL CALC.SUM OF ELEC: 278 MOSM/KG (ref 275–295)
PLATELET # BLD AUTO: 225 10(3)UL (ref 150–450)
POTASSIUM SERPL-SCNC: 3.7 MMOL/L (ref 3.5–5.1)
RBC # BLD AUTO: 3.25 X10(6)UL (ref 3.8–5.3)
SODIUM SERPL-SCNC: 133 MMOL/L (ref 136–145)
WBC # BLD AUTO: 12.7 X10(3) UL (ref 4–11)

## 2025-07-03 PROCEDURE — 71045 X-RAY EXAM CHEST 1 VIEW: CPT | Performed by: RADIOLOGY

## 2025-07-03 PROCEDURE — 99233 SBSQ HOSP IP/OBS HIGH 50: CPT | Performed by: HOSPITALIST

## 2025-07-03 RX ORDER — FUROSEMIDE 10 MG/ML
20 INJECTION INTRAMUSCULAR; INTRAVENOUS DAILY
Status: DISCONTINUED | OUTPATIENT
Start: 2025-07-03 | End: 2025-07-03

## 2025-07-03 RX ORDER — FUROSEMIDE 10 MG/ML
20 INJECTION INTRAMUSCULAR; INTRAVENOUS
Status: DISCONTINUED | OUTPATIENT
Start: 2025-07-03 | End: 2025-07-05

## 2025-07-03 RX ORDER — FUROSEMIDE 10 MG/ML
20 INJECTION INTRAMUSCULAR; INTRAVENOUS ONCE
Status: COMPLETED | OUTPATIENT
Start: 2025-07-03 | End: 2025-07-03

## 2025-07-03 NOTE — CM/SW NOTE
Prior Authorization - Destination  Destination Type: Skilled nursing facility  Service Provider: [facility]  Payer Communication Destination Comments: [facility] ordaz [id number]  Prior Authorization Status: Approved  Prior Authorization Start Date: 07/03/25    Jessica Chu DSC

## 2025-07-03 NOTE — CM/SW NOTE
Ins auth obtained for EDUARDO at [facility]. RN and MD made aware. Pt's daughter Theresa made aware and agreeable for patient to dc to BTL for EDUARDO at [place]. Pt's dc held d/t elevated BNP. PO lasix increased to BID. [facility] placed on will call from 7/4-7, PCS updated.    Plan: BTL for EDUARDO pending medical clearance.    SURJIT Emanuel    [phone]

## 2025-07-03 NOTE — PLAN OF CARE
Problem: Patient Centered Care  Goal: Patient preferences are identified and integrated in the patient's plan of care  Description: Interventions:  - What would you like us to know as we care for you? I live at home  - Provide timely, complete, and accurate information to patient/family  - Incorporate patient and family knowledge, values, beliefs, and cultural backgrounds into the planning and delivery of care  - Encourage patient/family to participate in care and decision-making at the level they choose  - Honor patient and family perspectives and choices  Outcome: Progressing     Problem: Patient/Family Goals  Goal: Patient/Family Long Term Goal  Description: Patient's Long Term Goal: stay healthy    Interventions:  - follow poc  - See additional Care Plan goals for specific interventions  Outcome: Progressing  Goal: Patient/Family Short Term Goal  Description: Patient's Short Term Goal: low hgb resolving    Interventions:   - transfuse as needed  - See additional Care Plan goals for specific interventions  Outcome: Progressing     Problem: PAIN - ADULT  Goal: Verbalizes/displays adequate comfort level or patient's stated pain goal  Description: INTERVENTIONS:  - Encourage pt to monitor pain and request assistance  - Assess pain using appropriate pain scale  - Administer analgesics based on type and severity of pain and evaluate response  - Implement non-pharmacological measures as appropriate and evaluate response  - Consider cultural and social influences on pain and pain management  - Manage/alleviate anxiety  - Utilize distraction and/or relaxation techniques  - Monitor for opioid side effects  - Notify MD/LIP if interventions unsuccessful or patient reports new pain  - Anticipate increased pain with activity and pre-medicate as appropriate  Outcome: Progressing     Problem: RISK FOR INFECTION - ADULT  Goal: Absence of fever/infection during anticipated neutropenic period  Description: INTERVENTIONS  -  Monitor WBC  - Administer growth factors as ordered  - Implement neutropenic guidelines  Outcome: Progressing     Problem: SAFETY ADULT - FALL  Goal: Free from fall injury  Description: INTERVENTIONS:  - Assess pt frequently for physical needs  - Identify cognitive and physical deficits and behaviors that affect risk of falls.  - [place] fall precautions as indicated by assessment.  - Educate pt/family on patient safety including physical limitations  - Instruct pt to call for assistance with activity based on assessment  - Modify environment to reduce risk of injury  - Provide assistive devices as appropriate  - Consider OT/PT consult to assist with strengthening/mobility  - Encourage toileting schedule  Outcome: Progressing     Problem: DISCHARGE PLANNING  Goal: Discharge to home or other facility with appropriate resources  Description: INTERVENTIONS:  - Identify barriers to discharge w/pt and caregiver  - Include patient/family/discharge partner in discharge planning  - Arrange for needed discharge resources and transportation as appropriate  - Identify discharge learning needs (meds, wound care, etc)  - Arrange for interpreters to assist at discharge as needed  - Consider post-discharge preferences of patient/family/discharge partner  - Complete POLST form as appropriate  - Assess patient's ability to be responsible for managing their own health  - Refer to Case Management Department for coordinating discharge planning if the patient needs post-hospital services based on physician/LIP order or complex needs related to functional status, cognitive ability or social support system  Outcome: Progressing     Problem: CARDIOVASCULAR - ADULT  Goal: Maintains optimal cardiac output and hemodynamic stability  Description: INTERVENTIONS:  - Monitor vital signs, rhythm, and trends  - Monitor for bleeding, hypotension and signs of decreased cardiac output  - Evaluate effectiveness of vasoactive medications to optimize  hemodynamic stability  - Monitor arterial and/or venous puncture sites for bleeding and/or hematoma  - Assess quality of pulses, skin color and temperature  - Assess for signs of decreased coronary artery perfusion - ex. Angina  - Evaluate fluid balance, assess for edema, trend weights  Outcome: Progressing   Tums ordered for patient. Transferred to room 460 @2200; preet [place] @[phone] aware of transfer. Report given to Miller TOLLIVER

## 2025-07-03 NOTE — PLAN OF CARE
Problem: Patient Centered Care  Goal: Patient preferences are identified and integrated in the patient's plan of care  Description: Interventions:  - What would you like us to know as we care for you?   - Provide timely, complete, and accurate information to patient/family  - Incorporate patient and family knowledge, values, beliefs, and cultural backgrounds into the planning and delivery of care  - Encourage patient/family to participate in care and decision-making at the level they choose  - Honor patient and family perspectives and choices  Outcome: Progressing     Problem: Patient/Family Goals  Goal: Patient/Family Long Term Goal  Description: Patient's Long Term Goal:     Interventions:  -   - See additional Care Plan goals for specific interventions  Outcome: Progressing  Goal: Patient/Family Short Term Goal  Description: Patient's Short Term Goal:     Interventions:   -   - See additional Care Plan goals for specific interventions  Outcome: Progressing     Problem: PAIN - ADULT  Goal: Verbalizes/displays adequate comfort level or patient's stated pain goal  Description: INTERVENTIONS:  - Encourage pt to monitor pain and request assistance  - Assess pain using appropriate pain scale  - Administer analgesics based on type and severity of pain and evaluate response  - Implement non-pharmacological measures as appropriate and evaluate response  - Consider cultural and social influences on pain and pain management  - Manage/alleviate anxiety  - Utilize distraction and/or relaxation techniques  - Monitor for opioid side effects  - Notify MD/LIP if interventions unsuccessful or patient reports new pain  - Anticipate increased pain with activity and pre-medicate as appropriate  Outcome: Progressing     Problem: RISK FOR INFECTION - ADULT  Goal: Absence of fever/infection during anticipated neutropenic period  Description: INTERVENTIONS  - Monitor WBC  - Administer growth factors as ordered  - Implement neutropenic  guidelines  Outcome: Progressing     Problem: SAFETY ADULT - FALL  Goal: Free from fall injury  Description: INTERVENTIONS:  - Assess pt frequently for physical needs  - Identify cognitive and physical deficits and behaviors that affect risk of falls.  - [place] fall precautions as indicated by assessment.  - Educate pt/family on patient safety including physical limitations  - Instruct pt to call for assistance with activity based on assessment  - Modify environment to reduce risk of injury  - Provide assistive devices as appropriate  - Consider OT/PT consult to assist with strengthening/mobility  - Encourage toileting schedule  Outcome: Progressing     Problem: DISCHARGE PLANNING  Goal: Discharge to home or other facility with appropriate resources  Description: INTERVENTIONS:  - Identify barriers to discharge w/pt and caregiver  - Include patient/family/discharge partner in discharge planning  - Arrange for needed discharge resources and transportation as appropriate  - Identify discharge learning needs (meds, wound care, etc)  - Arrange for interpreters to assist at discharge as needed  - Consider post-discharge preferences of patient/family/discharge partner  - Complete POLST form as appropriate  - Assess patient's ability to be responsible for managing their own health  - Refer to Case Management Department for coordinating discharge planning if the patient needs post-hospital services based on physician/LIP order or complex needs related to functional status, cognitive ability or social support system  Outcome: Progressing     Problem: CARDIOVASCULAR - ADULT  Goal: Maintains optimal cardiac output and hemodynamic stability  Description: INTERVENTIONS:  - Monitor vital signs, rhythm, and trends  - Monitor for bleeding, hypotension and signs of decreased cardiac output  - Evaluate effectiveness of vasoactive medications to optimize hemodynamic stability  - Monitor arterial and/or venous puncture sites for  bleeding and/or hematoma  - Assess quality of pulses, skin color and temperature  - Assess for signs of decreased coronary artery perfusion - ex. Angina  - Evaluate fluid balance, assess for edema, trend weights  Outcome: Progressing

## 2025-07-03 NOTE — PLAN OF CARE
Problem: Patient Centered Care  Goal: Patient preferences are identified and integrated in the patient's plan of care  Description: Interventions:  - What would you like us to know as we care for you?   - Provide timely, complete, and accurate information to patient/family  - Incorporate patient and family knowledge, values, beliefs, and cultural backgrounds into the planning and delivery of care  - Encourage patient/family to participate in care and decision-making at the level they choose  - Honor patient and family perspectives and choices  Outcome: Progressing     Problem: Patient/Family Goals  Goal: Patient/Family Long Term Goal  Description: Patient's Long Term Goal:     Interventions:  -   - See additional Care Plan goals for specific interventions  Outcome: Progressing  Goal: Patient/Family Short Term Goal  Description: Patient's Short Term Goal:     Interventions:   -   - See additional Care Plan goals for specific interventions  Outcome: Progressing     Problem: PAIN - ADULT  Goal: Verbalizes/displays adequate comfort level or patient's stated pain goal  Description: INTERVENTIONS:  - Encourage pt to monitor pain and request assistance  - Assess pain using appropriate pain scale  - Administer analgesics based on type and severity of pain and evaluate response  - Implement non-pharmacological measures as appropriate and evaluate response  - Consider cultural and social influences on pain and pain management  - Manage/alleviate anxiety  - Utilize distraction and/or relaxation techniques  - Monitor for opioid side effects  - Notify MD/LIP if interventions unsuccessful or patient reports new pain  - Anticipate increased pain with activity and pre-medicate as appropriate  Outcome: Progressing     Problem: RISK FOR INFECTION - ADULT  Goal: Absence of fever/infection during anticipated neutropenic period  Description: INTERVENTIONS  - Monitor WBC  - Administer growth factors as ordered  - Implement neutropenic  guidelines  Outcome: Progressing     Problem: SAFETY ADULT - FALL  Goal: Free from fall injury  Description: INTERVENTIONS:  - Assess pt frequently for physical needs  - Identify cognitive and physical deficits and behaviors that affect risk of falls.  - [place] fall precautions as indicated by assessment.  - Educate pt/family on patient safety including physical limitations  - Instruct pt to call for assistance with activity based on assessment  - Modify environment to reduce risk of injury  - Provide assistive devices as appropriate  - Consider OT/PT consult to assist with strengthening/mobility  - Encourage toileting schedule  Outcome: Progressing     Problem: DISCHARGE PLANNING  Goal: Discharge to home or other facility with appropriate resources  Description: INTERVENTIONS:  - Identify barriers to discharge w/pt and caregiver  - Include patient/family/discharge partner in discharge planning  - Arrange for needed discharge resources and transportation as appropriate  - Identify discharge learning needs (meds, wound care, etc)  - Arrange for interpreters to assist at discharge as needed  - Consider post-discharge preferences of patient/family/discharge partner  - Complete POLST form as appropriate  - Assess patient's ability to be responsible for managing their own health  - Refer to Case Management Department for coordinating discharge planning if the patient needs post-hospital services based on physician/LIP order or complex needs related to functional status, cognitive ability or social support system  Outcome: Progressing     Problem: CARDIOVASCULAR - ADULT  Goal: Maintains optimal cardiac output and hemodynamic stability  Description: INTERVENTIONS:  - Monitor vital signs, rhythm, and trends  - Monitor for bleeding, hypotension and signs of decreased cardiac output  - Evaluate effectiveness of vasoactive medications to optimize hemodynamic stability  - Monitor arterial and/or venous puncture sites for  bleeding and/or hematoma  - Assess quality of pulses, skin color and temperature  - Assess for signs of decreased coronary artery perfusion - ex. Angina  - Evaluate fluid balance, assess for edema, trend weights  Outcome: Progressing     Kelli is aware of her plan of care. Patient is alert and oriented x4. On 5L o2 NC at baseline. Increased SOB - MD notified, orders followed through. Pain controlled with PRN Tylenol. Tolerating diet. Voids via purewick. Up with assist and walker. Safety measures in place, call light within reach, will continue to monitor.

## 2025-07-03 NOTE — CONGREGATE LIVING REVIEW
[facility] Authorization    The [facility] Review Committee has reviewed this case and the patient IS APPROVED for discharge to a facility for Short Term Skilled once the following procedure is followed:     - The physician discharge instructions (contained within the [facility] note for SNF) must inlcude the below appropriate and approved COVID instructions to the facility    For questions regarding [facility] approval process, please contact the CM assigned to the case.  For questions regarding RN discharge workflow, please contact the unit Clinical Leader.

## 2025-07-03 NOTE — OCCUPATIONAL THERAPY NOTE
Attempted to see pt for OT today. Pt is refusing as she just received her dinner and requesting to eat at this time. Pt assisted with heating up her food as she states it's too cold. Will re-attempt when pt is available.    Maria E Paiz, OTR/L

## 2025-07-03 NOTE — PROGRESS NOTES
[facility]  part of [facility]    Progress Note    Kelli Fisher Patient Status:  Observation    1955 MRN [id number]   Location [facility] 2W/SW Attending Jim Dai MD   Hosp Day # 0 PCP Tyalor Gallardo MD     Chief Complaint:     Anemia    Subjective:   Subjective:    Patient seen and examined this morning    Objective:   Blood pressure 107/75, pulse 85, temperature 97.7 °F (36.5 °C), temperature source Oral, resp. rate 18, height 5' (1.524 m), weight 157 lb 3 oz (71.3 kg), SpO2 92%.  Physical Exam    General: Patient is alert and oriented x3 does not appear to be in acute distress at this time  HEENT: EOMI PERRLA, atraumatic normocephalic  Cardiac: S1-S2 appreciated  Lungs: Good air entry bilaterally clear to auscultation  Abdomen: Soft nontender nondistended positive bowel sounds  Ext: Peripheral pulses are positive  Neuro: No focal deficits noted  Psych: Normal mood  Skin: No rashes noted  MSK: Full range of motion intact      Results:   Lab Results   Component Value Date    WBC 12.7 (H) 2025    HGB 8.6 (L) 2025    HCT 27.3 (L) 2025    .0 2025    CREATSERUM 0.85 2025    BUN 12 2025     (L) 2025    K 3.7 2025    CL 92 (L) 2025    CO2 38.0 (H) 2025     (H) 2025    CA 8.7 2025    ALB 3.0 (L) 2025    ALKPHO 142 2025    BILT 0.7 2025    TP 6.8 2025    AST 26 2025    ALT 10 2025    PTT 37.0 (H) 2025    INR 1.31 (H) 2025    TSH 3.705 2025    LIP 42 2023    DDIMER 3.05 (H) 2025    MG 2.4 2025    TROPHS 5 2025    B12 >2,000 (H) 2025       XR CHEST AP PORTABLE  (CPT=71045)  Result Date: 7/3/2025  CONCLUSION: Borderline cardiomegaly with bilateral perihilar interstitial opacity which may reflect edema. Left greater than right lung opacities which may reflect combination of small bilateral pleural  effusions and atelectasis with or without superimposed pneumonia. Electronically Verified and Signed by Attending Radiologist: Casey Diallo MD 7/3/2025 7:09 AM Workstation: [id number]          Assessment & Plan:       Acute on chronic anemia which in turn from chronic disease  Patient to be transfused 1 unit of packed RBC, will continue to monitor for possible signs of bleeding.  Repeat H&H later.     Chronic respiratory failure with hypoxia  Currently on baseline O2, will continue the same     Metastatic endometrial cancer  Unable to resume chemo due to poor generalized state, consider palliative/hospice consult     Prior PE  Currently not on anticoagulation due to increasing risk of bleed.     Recent malignant pericardial effusion  Status post drain with chest tube, consider need for follow-up limited echo.     Prophylaxis  SCDs, heparin on hold considering significant anemia and risk of bleeding     CODE STATUS  Full     Primary care physician  Taylor Gallardo MD    Global A/P  -currently on amio  -acute respiratory failure with hypoxia chronic - likely fluid overloa  -low threshold for pulm consult  -palliative care  -will monitor  -bnp significantly elevated - IV lasix given thismorning, will switch to 20of IV lasix bid  -Reviewed previous consultant notes  -Reviewed CBC, BMP, Mag, and Phos  -Reviewed tests ordered  -Repeat labs in am  -MDM: High, severe exacerbation of chronic illness posing a threat to life. IV medications requiring close inpatient monitoring.           Jim Dai MD

## 2025-07-04 LAB
ANION GAP SERPL CALC-SCNC: 6 MMOL/L (ref 0–18)
BASOPHILS # BLD AUTO: 0.04 X10(3) UL (ref 0–0.2)
BASOPHILS NFR BLD AUTO: 0.3 %
BUN BLD-MCNC: 13 MG/DL (ref 9–23)
BUN/CREAT SERPL: 17.8 (ref 10–20)
CALCIUM BLD-MCNC: 8.3 MG/DL (ref 8.7–10.4)
CHLORIDE SERPL-SCNC: 90 MMOL/L (ref 98–112)
CO2 SERPL-SCNC: 39 MMOL/L (ref 21–32)
CREAT BLD-MCNC: 0.73 MG/DL (ref 0.55–1.02)
DEPRECATED RDW RBC AUTO: 54.4 FL (ref 35.1–46.3)
EGFRCR SERPLBLD CKD-EPI 2021: 89 ML/MIN/1.73M2 (ref 60–?)
EOSINOPHIL # BLD AUTO: 0.03 X10(3) UL (ref 0–0.7)
EOSINOPHIL NFR BLD AUTO: 0.2 %
ERYTHROCYTE [DISTWIDTH] IN BLOOD BY AUTOMATED COUNT: 17.2 % (ref 11–15)
GLUCOSE BLD-MCNC: 132 MG/DL (ref 70–99)
HCT VFR BLD AUTO: 26.9 % (ref 35–48)
HGB BLD-MCNC: 8.3 G/DL (ref 12–16)
IMM GRANULOCYTES # BLD AUTO: 0.22 X10(3) UL (ref 0–1)
IMM GRANULOCYTES NFR BLD: 1.8 %
LYMPHOCYTES # BLD AUTO: 1 X10(3) UL (ref 1–4)
LYMPHOCYTES NFR BLD AUTO: 8 %
MAGNESIUM SERPL-MCNC: 1.8 MG/DL (ref 1.6–2.6)
MCH RBC QN AUTO: 26.8 PG (ref 26–34)
MCHC RBC AUTO-ENTMCNC: 30.9 G/DL (ref 31–37)
MCV RBC AUTO: 86.8 FL (ref 80–100)
MONOCYTES # BLD AUTO: 1.57 X10(3) UL (ref 0.1–1)
MONOCYTES NFR BLD AUTO: 12.5 %
NEUTROPHILS # BLD AUTO: 9.66 X10 (3) UL (ref 1.5–7.7)
NEUTROPHILS # BLD AUTO: 9.66 X10(3) UL (ref 1.5–7.7)
NEUTROPHILS NFR BLD AUTO: 77.2 %
OSMOLALITY SERPL CALC.SUM OF ELEC: 282 MOSM/KG (ref 275–295)
PLATELET # BLD AUTO: 227 10(3)UL (ref 150–450)
POTASSIUM SERPL-SCNC: 3.5 MMOL/L (ref 3.5–5.1)
PROCALCITONIN SERPL-MCNC: 0.68 NG/ML (ref ?–0.05)
RBC # BLD AUTO: 3.1 X10(6)UL (ref 3.8–5.3)
SODIUM SERPL-SCNC: 135 MMOL/L (ref 136–145)
WBC # BLD AUTO: 12.5 X10(3) UL (ref 4–11)

## 2025-07-04 PROCEDURE — 99233 SBSQ HOSP IP/OBS HIGH 50: CPT | Performed by: HOSPITALIST

## 2025-07-04 RX ORDER — MAGNESIUM OXIDE 400 MG/1
400 TABLET ORAL ONCE
Status: COMPLETED | OUTPATIENT
Start: 2025-07-04 | End: 2025-07-05

## 2025-07-04 NOTE — PLAN OF CARE
Problem: Patient Centered Care  Goal: Patient preferences are identified and integrated in the patient's plan of care  Description: Interventions:  - What would you like us to know as we care for you?   - Provide timely, complete, and accurate information to patient/family  - Incorporate patient and family knowledge, values, beliefs, and cultural backgrounds into the planning and delivery of care  - Encourage patient/family to participate in care and decision-making at the level they choose  - Honor patient and family perspectives and choices  Outcome: Progressing     Problem: Patient/Family Goals  Goal: Patient/Family Long Term Goal  Description: Patient's Long Term Goal:     Interventions:  - See additional Care Plan goals for specific interventions  Outcome: Progressing  Goal: Patient/Family Short Term Goal  Description: Patient's Short Term Goal:     Interventions:   - See additional Care Plan goals for specific interventions  Outcome: Progressing     Problem: PAIN - ADULT  Goal: Verbalizes/displays adequate comfort level or patient's stated pain goal  Description: INTERVENTIONS:  - Encourage pt to monitor pain and request assistance  - Assess pain using appropriate pain scale  - Administer analgesics based on type and severity of pain and evaluate response  - Implement non-pharmacological measures as appropriate and evaluate response  - Consider cultural and social influences on pain and pain management  - Manage/alleviate anxiety  - Utilize distraction and/or relaxation techniques  - Monitor for opioid side effects  - Notify MD/LIP if interventions unsuccessful or patient reports new pain  - Anticipate increased pain with activity and pre-medicate as appropriate  Outcome: Progressing     Problem: RISK FOR INFECTION - ADULT  Goal: Absence of fever/infection during anticipated neutropenic period  Description: INTERVENTIONS  - Monitor WBC  - Administer growth factors as ordered  - Implement neutropenic  guidelines  Outcome: Progressing     Problem: SAFETY ADULT - FALL  Goal: Free from fall injury  Description: INTERVENTIONS:  - Assess pt frequently for physical needs  - Identify cognitive and physical deficits and behaviors that affect risk of falls.  - [place] fall precautions as indicated by assessment.  - Educate pt/family on patient safety including physical limitations  - Instruct pt to call for assistance with activity based on assessment  - Modify environment to reduce risk of injury  - Provide assistive devices as appropriate  - Consider OT/PT consult to assist with strengthening/mobility  - Encourage toileting schedule  Outcome: Progressing     Problem: DISCHARGE PLANNING  Goal: Discharge to home or other facility with appropriate resources  Description: INTERVENTIONS:  - Identify barriers to discharge w/pt and caregiver  - Include patient/family/discharge partner in discharge planning  - Arrange for needed discharge resources and transportation as appropriate  - Identify discharge learning needs (meds, wound care, etc)  - Arrange for interpreters to assist at discharge as needed  - Consider post-discharge preferences of patient/family/discharge partner  - Complete POLST form as appropriate  - Assess patient's ability to be responsible for managing their own health  - Refer to Case Management Department for coordinating discharge planning if the patient needs post-hospital services based on physician/LIP order or complex needs related to functional status, cognitive ability or social support system  Outcome: Progressing     Problem: CARDIOVASCULAR - ADULT  Goal: Maintains optimal cardiac output and hemodynamic stability  Description: INTERVENTIONS:  - Monitor vital signs, rhythm, and trends  - Monitor for bleeding, hypotension and signs of decreased cardiac output  - Evaluate effectiveness of vasoactive medications to optimize hemodynamic stability  - Monitor arterial and/or venous puncture sites for  bleeding and/or hematoma  - Assess quality of pulses, skin color and temperature  - Assess for signs of decreased coronary artery perfusion - ex. Angina  - Evaluate fluid balance, assess for edema, trend weights  Outcome: Progressing

## 2025-07-04 NOTE — CM/SW NOTE
SW was informed that patient is medically stable for discharge. Fabby followed up with Tammy [facility]gson for bed avail. Ritu at [facility] informed that they dont have a bed today, they will have one tomorrow. RN and MD aware.  Ambulance on will call in anticipation of weekend DC, insurance auth is approved.     Plan: Pending bd avail, DC to [facility], *ambulance on will call      SW/CM to remain available for support and/or discharge planning.     Li CRAIG, MSW, LSW   x [phone]

## 2025-07-04 NOTE — PROGRESS NOTES
[facility]  part of [facility]    Progress Note    Kelli Fisher Patient Status:  Inpatient    1955 MRN [id number]   Location [facility] 4W/SW/SE Attending Bhanu Canada MD   Hosp Day # 1 PCP Taylor Gallardo MD       Subjective:     Breathing stable.  C/o feet swelling.  Wants to go home.    Objective:   Blood pressure 100/67, pulse 81, temperature 97.4 °F (36.3 °C), temperature source Oral, resp. rate 18, height 60\", weight 157 lb 3 oz (71.3 kg), SpO2 100%.    Gen:   NAD.  A and O x 3  CV:   RRR, no m/g/r  Pulm:   diminished breath sounds at bases, otherwise clear bilat  Abd:   +bs, soft, NT, ND  LE:   1+ edema of legs and feet  Neuro:   nonfocal    Results:     Lab Results   Component Value Date    WBC 12.5 (H) 2025    HGB 8.3 (L) 2025    HCT 26.9 (L) 2025    .0 2025    CREATSERUM 0.73 2025    BUN 13 2025     (L) 2025    K 3.5 2025    CL 90 (L) 2025    CO2 39.0 (H) 2025     (H) 2025    CA 8.3 (L) 2025    ALB 3.0 (L) 2025    ALKPHO 142 2025    BILT 0.7 2025    TP 6.8 2025    AST 26 2025    ALT 10 2025    PTT 37.0 (H) 2025    INR 1.31 (H) 2025    TSH 3.705 2025    LIP 42 2023    DDIMER 3.05 (H) 2025    MG 1.8 2025    B12 >2,000 (H) 2025       XR CHEST AP PORTABLE  (CPT=71045)  Result Date: 7/3/2025  CONCLUSION: Borderline cardiomegaly with bilateral perihilar interstitial opacity which may reflect edema. Left greater than right lung opacities which may reflect combination of small bilateral pleural effusions and atelectasis with or without superimposed pneumonia. Electronically Verified and Signed by Attending Radiologist: Casey Diallo MD 7/3/2025 7:09 AM Workstation: [id number]          Assessment and Plan:     Acute on chronic anemia   Anemia of chronic disease  - was transfused 1 unit prbc  - follow  cbc  - not on any blood thinners    Metastatic endometrial cancer  Unable to resume chemo due to poor generalized state     Chronic respiratory failure with hypoxia  Currently on 5L o2  Normally on 4L at NH    Hx of PE  Currently not on anticoagulation due to increasing risk of bleed.     Recent malignant pericardial effusion  Status post drain with chest tube     Volume overload  - cont lasix 20 iv bid     DC planning for return to SNF.  Possibly tomorrow.  D/w pt's daughter today.     dvt proph:    SCDs    Code status:    DNAR-select    MDM:    High Bhanu Zheng MD  7/4/2025

## 2025-07-05 VITALS
TEMPERATURE: 98 F | WEIGHT: 157.19 LBS | OXYGEN SATURATION: 98 % | RESPIRATION RATE: 16 BRPM | DIASTOLIC BLOOD PRESSURE: 78 MMHG | BODY MASS INDEX: 30.86 KG/M2 | SYSTOLIC BLOOD PRESSURE: 115 MMHG | HEART RATE: 80 BPM | HEIGHT: 60 IN

## 2025-07-05 LAB
ANION GAP SERPL CALC-SCNC: 7 MMOL/L (ref 0–18)
BASOPHILS # BLD AUTO: 0.04 X10(3) UL (ref 0–0.2)
BASOPHILS NFR BLD AUTO: 0.3 %
BUN BLD-MCNC: 12 MG/DL (ref 9–23)
BUN/CREAT SERPL: 17.9 (ref 10–20)
CALCIUM BLD-MCNC: 8.4 MG/DL (ref 8.7–10.4)
CHLORIDE SERPL-SCNC: 89 MMOL/L (ref 98–112)
CO2 SERPL-SCNC: 40 MMOL/L (ref 21–32)
CREAT BLD-MCNC: 0.67 MG/DL (ref 0.55–1.02)
DEPRECATED RDW RBC AUTO: 54 FL (ref 35.1–46.3)
EGFRCR SERPLBLD CKD-EPI 2021: 95 ML/MIN/1.73M2 (ref 60–?)
EOSINOPHIL # BLD AUTO: 0.02 X10(3) UL (ref 0–0.7)
EOSINOPHIL NFR BLD AUTO: 0.2 %
ERYTHROCYTE [DISTWIDTH] IN BLOOD BY AUTOMATED COUNT: 17.4 % (ref 11–15)
GLUCOSE BLD-MCNC: 138 MG/DL (ref 70–99)
HCT VFR BLD AUTO: 26.4 % (ref 35–48)
HGB BLD-MCNC: 8.4 G/DL (ref 12–16)
IMM GRANULOCYTES # BLD AUTO: 0.21 X10(3) UL (ref 0–1)
IMM GRANULOCYTES NFR BLD: 1.8 %
LYMPHOCYTES # BLD AUTO: 0.78 X10(3) UL (ref 1–4)
LYMPHOCYTES NFR BLD AUTO: 6.6 %
MAGNESIUM SERPL-MCNC: 1.7 MG/DL (ref 1.6–2.6)
MCH RBC QN AUTO: 26.8 PG (ref 26–34)
MCHC RBC AUTO-ENTMCNC: 31.8 G/DL (ref 31–37)
MCV RBC AUTO: 84.3 FL (ref 80–100)
MONOCYTES # BLD AUTO: 1.44 X10(3) UL (ref 0.1–1)
MONOCYTES NFR BLD AUTO: 12.2 %
NEUTROPHILS # BLD AUTO: 9.35 X10 (3) UL (ref 1.5–7.7)
NEUTROPHILS # BLD AUTO: 9.35 X10(3) UL (ref 1.5–7.7)
NEUTROPHILS NFR BLD AUTO: 78.9 %
OSMOLALITY SERPL CALC.SUM OF ELEC: 284 MOSM/KG (ref 275–295)
PLATELET # BLD AUTO: 223 10(3)UL (ref 150–450)
POTASSIUM SERPL-SCNC: 3.4 MMOL/L (ref 3.5–5.1)
RBC # BLD AUTO: 3.13 X10(6)UL (ref 3.8–5.3)
SODIUM SERPL-SCNC: 136 MMOL/L (ref 136–145)
WBC # BLD AUTO: 11.8 X10(3) UL (ref 4–11)

## 2025-07-05 PROCEDURE — 99233 SBSQ HOSP IP/OBS HIGH 50: CPT | Performed by: HOSPITALIST

## 2025-07-05 RX ORDER — MAGNESIUM OXIDE 400 MG/1
400 TABLET ORAL ONCE
Status: DISCONTINUED | OUTPATIENT
Start: 2025-07-05 | End: 2025-07-05

## 2025-07-05 RX ORDER — ECHINACEA PURPUREA EXTRACT 125 MG
1 TABLET ORAL
Status: DISCONTINUED | OUTPATIENT
Start: 2025-07-05 | End: 2025-07-05

## 2025-07-05 NOTE — PLAN OF CARE
Pt loc  Problem: Patient Centered Care  Goal: Patient preferences are identified and integrated in the patient's plan of care  Description: Interventions:  - What would you like us to know as we care for you?   - Provide timely, complete, and accurate information to patient/family  - Incorporate patient and family knowledge, values, beliefs, and cultural backgrounds into the planning and delivery of care  - Encourage patient/family to participate in care and decision-making at the level they choose  - Honor patient and family perspectives and choices  7/5/2025 1431 by Katherine Villanueva, RN  Outcome: Adequate for Discharge  7/5/2025 1109 by Katherine Villanueva, RN  Outcome: Progressing

## 2025-07-05 NOTE — PLAN OF CARE
Problem: Patient Centered Care  Goal: Patient preferences are identified and integrated in the patient's plan of care  Description: Interventions:  - What would you like us to know as we care for you?   - Provide timely, complete, and accurate information to patient/family  - Incorporate patient and family knowledge, values, beliefs, and cultural backgrounds into the planning and delivery of care  - Encourage patient/family to participate in care and decision-making at the level they choose  - Honor patient and family perspectives and choices  7/5/2025 1431 by Katherine Villanueva, RN  Outcome: Adequate for Discharge  7/5/2025 1109 by Katherine Villanueva, RN  Outcome: Progressing

## 2025-07-05 NOTE — PROGRESS NOTES
[facility]  part of [facility]    Progress Note    Kelli Fisher Patient Status:  Inpatient    1955 MRN [id number]   Location [facility] 4W/SW/SE Attending Bhanu Canada MD   Hosp Day # 2 PCP Taylor Gallardo MD       Subjective:     No complaints.  Wants to leave.  D/w case with son on phone,    Objective:   Blood pressure 115/78, pulse 80, temperature 97.6 °F (36.4 °C), temperature source Oral, resp. rate 16, height 60\", weight 157 lb 3 oz (71.3 kg), SpO2 98%.    Gen:   NAD.  A and O x 3  CV:   RRR, no m/g/r  Pulm:   diminished breath sounds at bases, otherwise clear bilat  Abd:   +bs, soft, NT, ND  LE:   1+ edema of legs and feet  Neuro:   nonfocal    Results:     Lab Results   Component Value Date    WBC 11.8 (H) 2025    HGB 8.4 (L) 2025    HCT 26.4 (L) 2025    .0 2025    CREATSERUM 0.67 2025    BUN 12 2025     2025    K 3.4 (L) 2025    CL 89 (L) 2025    CO2 40.0 (HH) 2025     (H) 2025    CA 8.4 (L) 2025    ALB 3.0 (L) 2025    ALKPHO 142 2025    BILT 0.7 2025    TP 6.8 2025    AST 26 2025    ALT 10 2025    PTT 37.0 (H) 2025    INR 1.31 (H) 2025    TSH 3.705 2025    LIP 42 2023    DDIMER 3.05 (H) 2025    MG 1.7 2025    B12 >2,000 (H) 2025       No results found.        Assessment and Plan:     Acute on chronic anemia   Anemia of chronic disease  - was transfused 1 unit prbc  - follow cbc  - not on any blood thinners     Metastatic endometrial cancer  Unable to resume chemo due to poor generalized state      Chronic respiratory failure with hypoxia  Currently on 5L o2  Normally on 4L at NH     Hx of PE  Currently not on anticoagulation due to increasing risk of bleed.     Recent malignant pericardial effusion  Status post drain with chest tube      Volume overload  - cont lasix 20 iv bid  - PO lasix on dc  -  CXR in am if pt still in hospital     DC planning for return to SNF.  Pt is medically ready for dc to SNF when bed arranged.  D/w pt's son today.     dvt proph:    SCDs     Code status:    DNAR-select    MDM:    High Bhanu Zheng MD  7/5/2025

## 2025-07-05 NOTE — PLAN OF CARE
Patient is alert and oriented. 5L NC. Vital signs stable. Voiding via purewick. Up stand/pivot. General diet. Denies pain. Right port in place and saline locked. Call light and personal belongings within reach. Safety precautions in place.     Received call from lab this morning that patient had a critical CO2 of >40. Notified Dr. Canada. No new orders.     Problem: Patient Centered Care  Goal: Patient preferences are identified and integrated in the patient's plan of care  Description: Interventions:  - Provide timely, complete, and accurate information to patient/family  - Incorporate patient and family knowledge, values, beliefs, and cultural backgrounds into the planning and delivery of care  - Encourage patient/family to participate in care and decision-making at the level they choose  - Honor patient and family perspectives and choices  Outcome: Progressing     Problem: PAIN - ADULT  Goal: Verbalizes/displays adequate comfort level or patient's stated pain goal  Description: INTERVENTIONS:  - Encourage pt to monitor pain and request assistance  - Assess pain using appropriate pain scale  - Administer analgesics based on type and severity of pain and evaluate response  - Implement non-pharmacological measures as appropriate and evaluate response  - Consider cultural and social influences on pain and pain management  - Manage/alleviate anxiety  - Utilize distraction and/or relaxation techniques  - Monitor for opioid side effects  - Notify MD/LIP if interventions unsuccessful or patient reports new pain  - Anticipate increased pain with activity and pre-medicate as appropriate  Outcome: Progressing     Problem: RISK FOR INFECTION - ADULT  Goal: Absence of fever/infection during anticipated neutropenic period  Description: INTERVENTIONS  - Monitor WBC  - Administer growth factors as ordered  - Implement neutropenic guidelines  Outcome: Progressing     Problem: SAFETY ADULT - FALL  Goal: Free from fall  injury  Description: INTERVENTIONS:  - Assess pt frequently for physical needs  - Identify cognitive and physical deficits and behaviors that affect risk of falls.  - [place] fall precautions as indicated by assessment.  - Educate pt/family on patient safety including physical limitations  - Instruct pt to call for assistance with activity based on assessment  - Modify environment to reduce risk of injury  - Provide assistive devices as appropriate  - Consider OT/PT consult to assist with strengthening/mobility  - Encourage toileting schedule  Outcome: Progressing     Problem: DISCHARGE PLANNING  Goal: Discharge to home or other facility with appropriate resources  Description: INTERVENTIONS:  - Identify barriers to discharge w/pt and caregiver  - Include patient/family/discharge partner in discharge planning  - Arrange for needed discharge resources and transportation as appropriate  - Identify discharge learning needs (meds, wound care, etc)  - Arrange for interpreters to assist at discharge as needed  - Consider post-discharge preferences of patient/family/discharge partner  - Complete POLST form as appropriate  - Assess patient's ability to be responsible for managing their own health  - Refer to Case Management Department for coordinating discharge planning if the patient needs post-hospital services based on physician/LIP order or complex needs related to functional status, cognitive ability or social support system  Outcome: Progressing     Problem: CARDIOVASCULAR - ADULT  Goal: Maintains optimal cardiac output and hemodynamic stability  Description: INTERVENTIONS:  - Monitor vital signs, rhythm, and trends  - Monitor for bleeding, hypotension and signs of decreased cardiac output  - Evaluate effectiveness of vasoactive medications to optimize hemodynamic stability  - Monitor arterial and/or venous puncture sites for bleeding and/or hematoma  - Assess quality of pulses, skin color and temperature  - Assess  for signs of decreased coronary artery perfusion - ex. Angina  - Evaluate fluid balance, assess for edema, trend weights  Outcome: Progressing

## 2025-07-05 NOTE — PLAN OF CARE
Patient is alert and oriented. 5L NC. Vital signs stable. Voiding via purewick. Up stand/pivot. BM this morning. General diet. Potassium and magnesium replaced. Denies pain. Right port in place and saline locked. Call light and personal belongings within reach. Safety precautions in place. Possible discharge to [facility].  Problem: Patient Centered Care  Goal: Patient preferences are identified and integrated in the patient's plan of care  Description: Interventions:  - What would you like us to know as we care for you?   - Provide timely, complete, and accurate information to patient/family  - Incorporate patient and family knowledge, values, beliefs, and cultural backgrounds into the planning and delivery of care  - Encourage patient/family to participate in care and decision-making at the level they choose  - Honor patient and family perspectives and choices  Outcome: Progressing

## 2025-07-05 NOTE — CM/SW NOTE
07/05/25 1300   Discharge disposition   Expected discharge disposition subacute   Post Acute Care Provider [facility] [place]   Discharge transportation [facility]     MDO placed for discharge.    CM confirmed with [facility] lia[facility] Lizet via [facility] - bed is available and facility can accept patient at 3 PM today.    Destination: [facility] [place]   [address] [place] [address]  Call for report to: [phone]  Room number: 215A  Transportation provider-[facility]   DC Time: 3 PM    PCS form completed. UNRULY Murphy notified of above.    Patient cleared for discharge from CM/SW standpoint.    Candelaria Villarreal RN, BSN  Nurse   [phone]

## 2025-07-06 NOTE — DISCHARGE SUMMARY
[facility]  part of [facility]    Discharge Summary    Kelli Fisher Patient Status:  Inpatient    1955 MRN [id number]   Location [facility] 4W/SW/SE Attending No att. providers found   Hosp Day # 2 PCP Taylor Gallardo MD     Date of Admission: 2025 Disposition:   SNF Subacute Rehab     Date of Discharge:  2025  4:26 PM    Admitting Diagnosis:   Anemia, unspecified type [D64.9]    Hospital Discharge Diagnoses:    Acute on chronic anemia   Anemia of chronic disease  Metastatic endometrial cancer  Chronic respiratory failure with hypoxia  Hx of PE  Recent malignant pericardial effusion  Status post drain with chest tube   Volume overload    Problem List:     Problem List[1]    Physical Exam:      /78 (BP Location: Right arm)   Pulse 80   Temp 97.6 °F (36.4 °C) (Oral)   Resp 16   Ht 60\"   Wt 157 lb 3 oz (71.3 kg)   SpO2 98%   BMI 30.70 kg/m²     Gen:   NAD.  A and O x 3  CV:   RRR, no m/g/r  Pulm:   diminished breath sounds at bases, otherwise clear bilat  Abd:   +bs, soft, NT, ND  LE:   1+ edema of legs and feet  Neuro:   nonfocal      Reason for Admission:   Abnormal labs    History of Present Illness:   Kelli Fisher is a(n) 70 year old female, with a past medical history significant for metastatic endometrial CA, pulmonary embolism not on any anticoagulation secondary to increased risk of bleeding, and pancytopenia presents to the ER after lab work indicated a drop in hemoglobin at the nursing home.  Patient denies any bleeding, however does admit to feeling weak.  Denies any dizziness chest pain or worsening shortness of breath    Hospital Course:     Acute on chronic anemia   Anemia of chronic disease  - was transfused 1 unit prbc  - not on any blood thinners  Hgb ok now     Metastatic endometrial cancer  Unable to resume chemo due to poor generalized state      Chronic respiratory failure with hypoxia  Chronically on 4-5L o2 and that is what pt is on  now.     Hx of PE  Currently not on anticoagulation due to increasing risk of bleed.     Recent malignant pericardial effusion  Status post drain with chest tube      Volume overload  Was diuresed with IV lasix.  - PO lasix on dc     DC planning for return to SNF.  Pt is medically ready for dc to SNF when bed arranged.  D/w pt's son today.     dvt proph:    SCDs     Code status:    DNAR-select    Consultations:   None    Discharge Condition:  Good    Discharge Medications:      Discharge Medications        CONTINUE taking these medications        Instructions Prescription details   amiodarone 200 MG Tabs  Commonly known as: Pacerone      Take 1 tablet (200 mg total) by mouth daily.   Quantity: 30 tablet  Refills: 0     DUONEB IN      Inhale 3 mL into the lungs every 6 (six) hours as needed.   Refills: 0     Fluticasone Propionate  MCG/ACT Aero  Commonly known as: FLOVENT HFA      Inhale 1 puff into the lungs every 12 (twelve) hours. Rinse mouth following use.   Refills: 0     furosemide 40 MG Tabs  Commonly known as: Lasix      Take 1 tablet (40 mg total) by mouth daily.   Quantity: 30 tablet  Refills: 0     midodrine 10 MG Tabs  Commonly known as: ProAmatine      Take 1 tablet (10 mg total) by mouth in the morning and 1 tablet (10 mg total) at noon and 1 tablet (10 mg total) in the evening.   Quantity: 90 tablet  Refills: 0     pantoprazole 40 MG Tbec  Commonly known as: Protonix      Take 1 tablet (40 mg total) by mouth daily.   Quantity: 30 tablet  Refills: 0     polyethylene glycol (PEG 3350) 17 g Pack  Commonly known as: Miralax      Take 17 g by mouth daily.   Refills: 0     Potassium Chloride ER 20 MEQ Tbcr      Take 1 tablet by mouth daily.   Refills: 0     Synthroid 100 MCG Tabs  Generic drug: levothyroxine      Take 1 tablet (100 mcg total) by mouth before breakfast.   Refills: 0            STOP taking these medications      potassium chloride 20 MEQ Tbcr  Commonly known as: Klor-Con M20                  Follow up Visits:    F/u with PCP in 1 week.    Hospital Discharge Diagnoses: Anemia    Lace+ Score: 80  59-90 High Risk  29-58 Medium Risk  0-28   Low Risk.    TCM Follow-Up Recommendation:  LACE > 58: High Risk of readmission after discharge from the hospital.    >35 minutes spent preparing this discharge.    Bhanu Zheng MD  7/6/2025  10:50 AM        [1]   Patient Active Problem List  Diagnosis    Shortness of breath    Acute hypoxic respiratory failure (HCC)    Lung mass    Supraclavicular adenopathy    Dysphagia, unspecified type    Pericardial effusion (HCC)    Endometrial cancer (HCC)    Microcytic anemia    Thrombocytopenia (HCC)    Azotemia    Pancytopenia (HCC)    Hyperglycemia    Acute respiratory failure with hypoxia (HCC)    Atrial fibrillation with RVR (HCC)    Malnutrition (HCC)    Malignant neoplasm metastatic to lung (HCC)    Palliative care by specialist    Pleural effusion, bilateral    Generalized weakness    Acute pulmonary embolism (HCC)    Malignant pericardial effusion (HCC)    Dyspnea and respiratory abnormalities    Goals of care, counseling/discussion    Nasal dryness    Anorexia    Endometrial adenocarcinoma (HCC)    Acute deep vein thrombosis (DVT) of right lower extremity (HCC)    Anemia, unspecified type

## 2025-07-07 ENCOUNTER — LAB REQUISITION (OUTPATIENT)
Dept: LAB | Facility: HOSPITAL | Age: 70
End: 2025-07-07
Payer: MEDICARE

## 2025-07-07 DIAGNOSIS — C78.00 SECONDARY MALIGNANT NEOPLASM OF UNSPECIFIED LUNG (HCC): ICD-10-CM

## 2025-07-07 PROCEDURE — 88363 XM ARCHIVE TISSUE MOLEC ANAL: CPT | Performed by: PATHOLOGY

## 2025-07-07 NOTE — PAYOR COMM NOTE
--------------  DISCHARGE REVIEW    Payor: [employer] MED[employer] ADV PPO  Subscriber #:  [id number]  Authorization Number: [id number]    Admit date: 7/3/25  Admit time:   2:28 PM  Discharge Date: 2025  4:26 PM     Admitting Physician: Carole Maza MD  Attending Physician:  No att. providers found  Primary Care Physician: Taylor Gallardo MD          Discharge Summary Notes        Discharge Summary signed by Bhanu Canada MD at 2025 10:53 AM       Author: Bhanu Canada MD Specialty: HOSPITALIST, HOSPITALIST Author Type: Physician    Filed: 2025 10:53 AM Date of Service: 2025 10:50 AM Status: Signed    : Bhanu Canada MD (Physician)           [facility]  part of [facility]    Discharge Summary    Kelli Fisher Patient Status:  Inpatient    1955 MRN [id number]   Location [facility] 4W/SW/SE Attending No att. providers found   Hosp Day # 2 PCP Taylor Gallardo MD     Date of Admission: 2025 Disposition:   SNF Subacute Rehab     Date of Discharge:  2025  4:26 PM    Admitting Diagnosis:   Anemia, unspecified type [D64.9]    Hospital Discharge Diagnoses:    Acute on chronic anemia   Anemia of chronic disease  Metastatic endometrial cancer  Chronic respiratory failure with hypoxia  Hx of PE  Recent malignant pericardial effusion  Status post drain with chest tube   Volume overload    Problem List:     Problem List[1]    Physical Exam:      /78 (BP Location: Right arm)   Pulse 80   Temp 97.6 °F (36.4 °C) (Oral)   Resp 16   Ht 60\"   Wt 157 lb 3 oz (71.3 kg)   SpO2 98%   BMI 30.70 kg/m²     Gen:   NAD.  A and O x 3  CV:   RRR, no m/g/r  Pulm:   diminished breath sounds at bases, otherwise clear bilat  Abd:   +bs, soft, NT, ND  LE:   1+ edema of legs and feet  Neuro:   nonfocal      Reason for Admission:   Abnormal labs    History of Present Illness:   Kelli Fisher is a(n) 70 year old female, with a past medical history significant for  metastatic endometrial CA, pulmonary embolism not on any anticoagulation secondary to increased risk of bleeding, and pancytopenia presents to the ER after lab work indicated a drop in hemoglobin at the nursing home.  Patient denies any bleeding, however does admit to feeling weak.  Denies any dizziness chest pain or worsening shortness of breath    Hospital Course:     Acute on chronic anemia   Anemia of chronic disease  - was transfused 1 unit prbc  - not on any blood thinners  Hgb ok now     Metastatic endometrial cancer  Unable to resume chemo due to poor generalized state      Chronic respiratory failure with hypoxia  Chronically on 4-5L o2 and that is what pt is on now.     Hx of PE  Currently not on anticoagulation due to increasing risk of bleed.     Recent malignant pericardial effusion  Status post drain with chest tube      Volume overload  Was diuresed with IV lasix.  - PO lasix on dc     DC planning for return to SNF.  Pt is medically ready for dc to SNF when bed arranged.  D/w pt's son today.     dvt proph:    SCDs     Code status:    DNAR-select    Consultations:   None    Discharge Condition:  Good    Discharge Medications:      Discharge Medications        CONTINUE taking these medications        Instructions Prescription details   amiodarone 200 MG Tabs  Commonly known as: Pacerone      Take 1 tablet (200 mg total) by mouth daily.   Quantity: 30 tablet  Refills: 0     DUONEB IN      Inhale 3 mL into the lungs every 6 (six) hours as needed.   Refills: 0     Fluticasone Propionate  MCG/ACT Aero  Commonly known as: FLOVENT HFA      Inhale 1 puff into the lungs every 12 (twelve) hours. Rinse mouth following use.   Refills: 0     furosemide 40 MG Tabs  Commonly known as: Lasix      Take 1 tablet (40 mg total) by mouth daily.   Quantity: 30 tablet  Refills: 0     midodrine 10 MG Tabs  Commonly known as: ProAmatine      Take 1 tablet (10 mg total) by mouth in the morning and 1 tablet (10 mg total)  at noon and 1 tablet (10 mg total) in the evening.   Quantity: 90 tablet  Refills: 0     pantoprazole 40 MG Tbec  Commonly known as: Protonix      Take 1 tablet (40 mg total) by mouth daily.   Quantity: 30 tablet  Refills: 0     polyethylene glycol (PEG 3350) 17 g Pack  Commonly known as: Miralax      Take 17 g by mouth daily.   Refills: 0     Potassium Chloride ER 20 MEQ Tbcr      Take 1 tablet by mouth daily.   Refills: 0     Synthroid 100 MCG Tabs  Generic drug: levothyroxine      Take 1 tablet (100 mcg total) by mouth before breakfast.   Refills: 0            STOP taking these medications      potassium chloride 20 MEQ Tbcr  Commonly known as: Klor-Con M20                 Follow up Visits:    F/u with PCP in 1 week.    Hospital Discharge Diagnoses: Anemia    Lace+ Score: 80  59-90 High Risk  29-58 Medium Risk  0-28   Low Risk.    TCM Follow-Up Recommendation:  LACE > 58: High Risk of readmission after discharge from the hospital.    >35 minutes spent preparing this discharge.    Bhanu Zheng MD  7/6/2025  10:50 AM       Electronically signed by Bhanu Canada MD on 7/6/2025 10:53 AM         REVIEWER COMMENTS         [1]   Patient Active Problem List  Diagnosis    Shortness of breath    Acute hypoxic respiratory failure (HCC)    Lung mass    Supraclavicular adenopathy    Dysphagia, unspecified type    Pericardial effusion (HCC)    Endometrial cancer (HCC)    Microcytic anemia    Thrombocytopenia (HCC)    Azotemia    Pancytopenia (HCC)    Hyperglycemia    Acute respiratory failure with hypoxia (HCC)    Atrial fibrillation with RVR (HCC)    Malnutrition (HCC)    Malignant neoplasm metastatic to lung (HCC)    Palliative care by specialist    Pleural effusion, bilateral    Generalized weakness    Acute pulmonary embolism (HCC)    Malignant pericardial effusion (HCC)    Dyspnea and respiratory abnormalities    Goals of care, counseling/discussion    Nasal dryness    Anorexia    Endometrial adenocarcinoma (HCC)     Acute deep vein thrombosis (DVT) of right lower extremity (HCC)    Anemia, unspecified type

## 2025-07-07 NOTE — PAYOR COMM NOTE
--------------  ADMISSION REVIEW     Payor: [employer] MEDICARE ADV PPO  Subscriber #:  [id number]  Authorization Number: [id number]    ADMIT TO INPT STATUS   7/3  ADMIT TO OBSERVATION   7/1 7/1 Patient Seen in: [facility]    History  Chief Complaint   Patient presents with    Abnormal Labs     Stated Complaint: abnormal labs    Subjective:   HPI          Pt is 69 yo F who p/w low hemoglobin at NH. Pt states had blood transfusion on last admission. Denies melena or hematochezia. +chronic abdominal pains. No dizziness but feels weak. No other signs of bleeding       Physical Exam    ED Triage Vitals [07/01/25 0018]   /77   Pulse 85   Resp 18   Temp 97.3 °F (36.3 °C)   Temp src Temporal   SpO2 100 %   O2 Device Nasal cannula       Current Vitals:   Vital Signs  BP: 143/77  Pulse: 85  Resp: 18  Temp: 97.3 °F (36.3 °C)  Temp src: Temporal    Oxygen Therapy  SpO2: 100 %  O2 Device: Nasal cannula  O2 Flow Rate (L/min): 5 L/min      Physical Exam  GENERAL: No acute distress, awake and alert  HEENT: EOMI, PERRL  Neck: supple  CV: RRR, no murmurs  Resp: CTAB, no wheezes or retractions  Extremities: FROM of all extremities  Neuro: CN intact, normal speech; no focal deficits  SKIN: warm, dry, no rashes    ED Course  Labs Reviewed   CBC WITH DIFFERENTIAL WITH PLATELET - Abnormal; Notable for the following components:       Result Value    WBC 12.5 (*)     RBC 2.21 (*)     HGB 6.0 (*)     HCT 18.7 (*)     RDW-SD 52.1 (*)     RDW 16.8 (*)     Neutrophil Absolute Prelim 9.70 (*)     Neutrophil Absolute 9.70 (*)     Lymphocyte Absolute 0.89 (*)     Monocyte Absolute 1.56 (*)     All other components within normal limits   BASIC METABOLIC PANEL (8) - Abnormal; Notable for the following components:    Glucose 138 (*)     Sodium 134 (*)     Chloride 92 (*)     CO2 38.0 (*)     Calcium, Total 8.1 (*)     All other components within normal limits   TYPE AND SCREEN    Narrative:     The following  orders were created for panel order Type and screen.  Procedure                               Abnormality         Status                     ---------                               -----------         ------                     ABORH (Blood Type)[[id number]]                               Final result               Antibody Screen[[id number]]                                  Final result                 Please view results for these tests on the individual orders.   ABORH (BLOOD TYPE)   ANTIBODY SCREEN   PREPARE RBC       MDM   Admission disposition: 7/1/2025         Medical Decision Making  No signs of active bleeding  Hemoglobin 6.0-prbcs ordered  Remains hemodynamically stable and will observe in hospital    Amount and/or Complexity of Data Reviewed  External Data Reviewed: labs and notes.     Details: Recent labs, admission notes 6/2025 reviewed  Labs: ordered.  Discussion of management or test interpretation with external provider(s): D/w dr Link Lovell  Drug therapy requiring intensive monitoring for toxicity.  Decision regarding hospitalization.    Critical Care  Total time providing critical care: 30 minutes        Disposition and Plan     Clinical Impression:  1. Anemia, unspecified type         Disposition:  Admit  7/1/2025        Blood Transfusion Record       Product Unit Status Volume Start End            Transfuse RBC       25  [id number]  [id number] Completed 07/01/25 1038 380 mL 07/01/25 0650 07/01/25 1000       25  [id number]  [id number] Completed 07/01/25 0613 347.42 mL 07/01/25 0321 07/01/25 0610               History and Physical         History of Present Illness:  Kelli Fisher is a(n) 70 year old female, with a past medical history significant for metastatic endometrial CA, pulmonary embolism not on any anticoagulation secondary to increased risk of bleeding, and pancytopenia presents to the ER after lab work indicated a drop in hemoglobin at the nursing home.  Patient denies any bleeding,  however does admit to feeling weak.  Denies any dizziness chest pain or worsening shortness of breath    Assessment and Plan:     Acute on chronic anemia which in turn from chronic disease  Patient to be transfused 1 unit of packed RBC, will continue to monitor for possible signs of bleeding.  Repeat H&H later.     Chronic respiratory failure with hypoxia  Currently on baseline O2, will continue the same     Metastatic endometrial cancer  Unable to resume chemo due to poor generalized state, consider palliative/hospice consult     Prior PE  Currently not on anticoagulation due to increasing risk of bleed.     Recent malignant pericardial effusion  Status post drain with chest tube, consider need for follow-up limited echo.        7/2:     Hospitalist Progress Note:      Assessment & Plan:  Acute on chronic anemia which in turn from chronic disease  Patient to be transfused 1 unit of packed RBC, will continue to monitor for possible signs of bleeding.  Repeat H&H later.     Chronic respiratory failure with hypoxia  Currently on baseline O2, will continue the same     Metastatic endometrial cancer  Unable to resume chemo due to poor generalized state, consider palliative/hospice consult     Prior PE  Currently not on anticoagulation due to increasing risk of bleed.     Recent malignant pericardial effusion  Status post drain with chest tube, consider need for follow-up limited echo     Latest Reference Range & Units 07/02/25 04:50   Iron, Serum 50 - 170 ug/dL 11 (L)   Transferrin 250 - 380 mg/dL 73 (L)   Iron Bind.Cap.(TIBC) 250 - 425 ug/dL 127 (L)   Iron Saturation 15 - 50 % 9 (L)   (L): Data is abnormally low        7/3:     Hospitalist Progress Note:     Lab Results   Component Value Date     WBC 12.7 (H) 07/03/2025     HGB 8.6 (L) 07/03/2025     HCT 27.3 (L) 07/03/2025     .0 07/03/2025     CREATSERUM 0.85 07/03/2025     BUN 12 07/03/2025      (L) 07/03/2025     K 3.7 07/03/2025     CL 92 (L)  07/03/2025     CO2 38.0 (H) 07/03/2025      (H) 07/03/2025     CA 8.7 07/03/2025      Latest Reference Range & Units 07/03/25 05:40   Glucose 70 - 99 mg/dL 140 (H)   Sodium 136 - 145 mmol/L 133 (L)   Potassium 3.5 - 5.1 mmol/L 3.7   Chloride 98 - 112 mmol/L 92 (L)   Carbon Dioxide, Total 21.0 - 32.0 mmol/L 38.0 (H)   BUN 9 - 23 mg/dL 12   CREATININE 0.55 - 1.02 mg/dL 0.85   CALCIUM 8.7 - 10.4 mg/dL 8.7   BUN/CREATININE RATIO 10.0 - 20.0  14.1   EGFR >=60 mL/min/1.73m2 74   ANION GAP 0 - 18 mmol/L 3   CALCULATED OSMOLALITY 275 - 295 mOsm/kg 278   pro-BETA NATRIURETIC PEPTIDE <125 pg/mL 1,064 (H)   WBC 4.0 - 11.0 x10(3) uL 12.7 (H)   Hemoglobin 12.0 - 16.0 g/dL 8.6 (L)   Hematocrit 35.0 - 48.0 % 27.3 (L)   Platelet Count 150.0 - 450.0 10(3)uL 225.0   RBC 3.80 - 5.30 x10(6)uL 3.25 (L)   MCH 26.0 - 34.0 pg 26.5   MCHC 31.0 - 37.0 g/dL 31.5   MCV 80.0 - 100.0 fL 84.0   RDW 11.0 - 15.0 % 17.2 (H)   RDW-SD 35.1 - 46.3 fL 54.0 (H)   Prelim Neutrophil Abs 1.50 - 7.70 x10 (3) uL 9.85 (H)   Neutrophils Absolute 1.50 - 7.70 x10(3) uL 9.85 (H)   Lymphocytes Absolute 1.00 - 4.00 x10(3) uL 0.84 (L)   Monocytes Absolute 0.10 - 1.00 x10(3) uL 1.65 (H)   Eosinophils Absolute 0.00 - 0.70 x10(3) uL 0.03   Basophils Absolute 0.00 - 0.20 x10(3) uL 0.06   Immature Granulocyte Absolute 0.00 - 1.00 x10(3) uL 0.26   Neutrophils % % 77.7   Lymphocytes % % 6.6   Monocytes % % 13.0   Eosinophils % % 0.2   Basophils % % 0.5   Immature Granulocyte % % 2.0   (H): Data is abnormally high  (L): Data is abnormally low          XR CHEST AP PORTABLE  (CPT=71045)  Result Date: 7/3/2025  CONCLUSION: Borderline cardiomegaly with bilateral perihilar interstitial opacity which may reflect edema. Left greater than right lung opacities which may reflect combination of small bilateral pleural effusions and atelectasis with or without superimposed pneumonia.       Assessment & Plan:  Acute on chronic anemia which in turn from chronic disease  Patient to  be transfused 1 unit of packed RBC, will continue to monitor for possible signs of bleeding.  Repeat H&H later.     Chronic respiratory failure with hypoxia  Currently on baseline O2, will continue the same     Metastatic endometrial cancer  Unable to resume chemo due to poor generalized state, consider palliative/hospice consult     Prior PE  Currently not on anticoagulation due to increasing risk of bleed.     Recent malignant pericardial effusion  Status post drain with chest tube, consider need for follow-up limited echo        7/4:     Hospitalist Progress Note:     Lab Results   Component Value Date     WBC 12.5 (H) 07/04/2025     HGB 8.3 (L) 07/04/2025     HCT 26.9 (L) 07/04/2025     .0 07/04/2025     CREATSERUM 0.73 07/04/2025     BUN 13 07/04/2025      (L) 07/04/2025     K 3.5 07/04/2025     CL 90 (L) 07/04/2025     CO2 39.0 (H) 07/04/2025      (H) 07/04/2025     CA 8.3 (L) 07/04/2025     Assessment and Plan:      Acute on chronic anemia   Anemia of chronic disease  - was transfused 1 unit prbc  - follow cbc  - not on any blood thinners     Metastatic endometrial cancer  Unable to resume chemo due to poor generalized state      Chronic respiratory failure with hypoxia  Currently on 5L o2  Normally on 4L at NH     Hx of PE  Currently not on anticoagulation due to increasing risk of bleed.     Recent malignant pericardial effusion  Status post drain with chest tube      Volume overload  - cont lasix 20 iv bid      Medications 06/29/25 06/30/25 07/01/25 07/02/25 07/03/25 07/04/25 07/05/25   amiodarone (Pacerone) tab 200 mg  Dose: 200 mg  Freq: Daily Route: OR  Start: 07/01/25 0715 End: 07/05/25 1826      1011 MM-Given      1011 AB-Given      (0923 AN)-Not Given      1017 JF-Given      0912 ST-Given     1826-D/C'd      budesonide (Pulmicort) 0.5 MG/2ML nebulizer suspension 0.5 mg  Dose: 0.5 mg  Freq: 2 times daily Route: Nebulization  Start: 07/01/25 0945 End: 07/03/25 1336   Order specific  questions:         0938 EL-Given     1932 VN-Given      1046 CF-Given     (1916 CD)-Not Given      (0900 EB)-Not Given     1336-D/C'd        fluticasone furoate (Arnuity Ellipta) 200 MCG/ACT inhaler 1 puff  Dose: 1 puff  Freq: Daily Route: IN  Start: 07/01/25 0930 End: 07/01/25 0932   Admin Instructions:   RT To Administer First Dose.           0932-D/C'd          furosemide (Lasix) 10 mg/mL injection 20 mg  Dose: 20 mg  Freq: 2 times daily (diuretic) Route: IV  Start: 07/03/25 1500 End: 07/05/25 1826        1632 AN-Given      1017 JF-Given     1753 JF-Given      0913 ST-Given          1826-D/C'd      furosemide (Lasix) 10 mg/mL injection 20 mg  Dose: 20 mg  Freq: Daily Route: IV  Start: 07/03/25 1245 End: 07/03/25 1345        (1245 AN)-Not Given     1345-D/C'd        furosemide (Lasix) 10 mg/mL injection 20 mg  Dose: 20 mg  Freq: Once Route: IV  Start: 07/03/25 0545 End: 07/03/25 0543        0543 SP-Given          furosemide (Lasix) tab 40 mg  Dose: 40 mg  Freq: Daily Route: OR  Start: 07/01/25 0930 End: 07/04/25 1518      1011 MM-Given      1011 AB-Given      0930 SP-Hold     1344 SS-Held by provider [C]      0930 SS     1518 CL-Unheld by provider     1518-D/C'd       levothyroxine (Synthroid) tab 100 mcg  Dose: 100 mcg  Freq: Before breakfast Route: OR  Start: 07/01/25 0715 End: 07/05/25 1826   Admin Instructions:   Give on an empty stomach. Hold tube feedings 1 hour before AND after.      0900 MM-Given      0612 MI-Given      0923 AN-Given      0514 FW-Given      0452 CM-Given     1826-D/C'd      magnesium oxide (Mag-Ox) tab 400 mg  Dose: 400 mg  Freq: Once Route: OR  Start: 07/05/25 0915 End: 07/05/25 1826          (0915 ST)-Not Given     1826-D/C'd      magnesium oxide (Mag-Ox) tab 400 mg  Dose: 400 mg  Freq: Once Route: OR  Start: 07/04/25 2100 End: 07/05/25 0912 0912 ST-Given        midodrine (ProAmatine) tab 10 mg  Dose: 10 mg  Freq: 3 times daily Route: OR  Start: 07/01/25 0715 End: 07/05/25 1826    Admin Instructions:   Not recommended to be administered within 4 hours of bedtime      (0715 MM)-Not Given     1011 MM-Given     1653 MM-Given      0612 MI-Given     1209 AB-Given     1716 AB-Given      0521 SP-Given     1224 AN-Given     1632 AN-Given      0514 FW-Given     1254 JF-Given     1753 JF-Given      0620 CM-Given     1203 ST-Given          1826-D/C'd      pantoprazole (Protonix) DR tab 40 mg  Dose: 40 mg  Freq: Every morning before breakfast Route: OR  Start: 07/01/25 0715 End: 07/05/25 1826   Admin Instructions:   Do not crush      1011 MM-Given      1011 AB-Given      (1225 AN)-Not Given      1017 JF-Given      0912 ST-Given     1826-D/C'd      potassium chloride 40 mEq in 250mL sodium chloride 0.9% IVPB premix  Dose: 40 mEq  Freq: Once Route: IV  Start: 07/05/25 0915 End: 07/05/25 1213          (0959 ST)-Not Given     1213-D/C'd      potassium chloride 40 mEq in 250mL sodium chloride 0.9% IVPB premix  Dose: 40 mEq  Freq: Once Route: IV  Start: 07/05/25 0915 End: 07/05/25 1826 1826-D/C'd        PRN Meds Sorted by Name  for Kelli Fisher as of 6/29/25 through 7/5/25    1 Day 3 Days 7 Days 10 Days < Today >   Legend:       Medications 06/29/25 06/30/25 07/01/25 07/02/25 07/03/25 07/04/25 07/05/25   acetaminophen (Tylenol) tab 650 mg  Dose: 650 mg  Freq: Every 6 hours PRN Route: OR  PRN Reason: mild pain  Start: 07/01/25 0711 End: 07/05/25 1826 1810 AB-Given [C]      (0021 SP)-Not Given       1826-D/C'd      calcium carbonate (Tums) chewable tab 1,000 mg  Dose: 1,000 mg  Freq: Every 6 hours PRN Route: OR  PRN Reason: Indigestion  Start: 07/02/25 1956 End: 07/05/25 1826 2018 AL-Given        0413 CM-Given     1826-D/C'd      HYDROcodone-acetaminophen (Norco) 5-325 MG per tab 1 tablet  Dose: 1 tablet  Freq: Every 6 hours PRN Route: OR  PRN Reason: moderate pain  Start: 07/01/25 0711 End: 07/05/25 1826 1826-D/C'd      ipratropium-albuterol (Duoneb) 0.5-2.5 (3) MG/3ML  inhalation solution 3 mL  Dose: 3 mL  Freq: Every 6 hours PRN Route: Nebulization  PRN Reason: Wheezing  Start: 07/01/25 0711 End: 07/05/25 1826   Order specific questions:          2042 SS-Given      1109 EB-Given       1826-D/C'd      ondansetron (Zofran) 4 MG/2ML injection 4 mg  Dose: 4 mg  Freq: Every 6 hours PRN Route: IV  PRN Reason: Nausea  Start: 07/01/25 0711 End: 07/05/25 1826 1826-D/C'd      polyethylene glycol (PEG 3350) (Miralax) 17 g oral packet 17 g  Dose: 17 g  Freq: DAILY PRN Route: OR  PRN Reason: constipation  Start: 07/01/25 1621 End: 07/05/25 1826   Admin Instructions:   1 packet=17gm=1 heaping tablespoon; Dissolve in 8 oz of water      1653 MM-Given      1810 AB-Given        1826-D/C'd          Vitals (last day) before discharge      Date/Time Temp Pulse Resp BP SpO2 Weight O2 Device O2 Flow Rate (L/min) [facility]   07/05/25 1206 97.6 °F (36.4 °C) 80 16 115/78 98 % -- Nasal cannula 5 L/min ST   07/05/25 0900 -- 88 16 118/73 100 % -- -- 5 L/min ST   07/05/25 0618 97.9 °F (36.6 °C) 80 18 109/75 100 % -- Nasal cannula 5 L/min CM   07/04/25 2004 98.6 °F (37 °C) 81 20 121/76 97 % -- Nasal cannula 5 L/min CM   07/04/25 1700 -- -- -- 107/72 -- -- -- -- JF   07/04/25 1202 97.4 °F (36.3 °C) 81 18 100/67 100 % -- Nasal cannula 5 L/min JR   07/04/25 0507 97.7 °F (36.5 °C) 85 18 109/71 100 % -- Nasal cannula 5 L/min FW   07/03/25 2000 98.6 °F (37 °C) 81 18 118/79 100 % -- Nasal cannula 5 L/min DS   07/03/25 1556 97.8 °F (36.6 °C) 87 18 101/71 100 % -- Nasal cannula 5 L/min JR   07/03/25 1109 -- -- -- -- 92 % -- Nasal cannula 5 L/min EB   07/03/25 1046 97.7 °F (36.5 °C) -- 18 107/75 -- -- Nasal cannula 5 L/min JR   07/03/25 1046 -- 85 -- -- 96 % -- -- -- AN   07/03/25 0920 98.1 °F (36.7 °C) 80 20 100/74 99 % -- Nasal cannula 5 L/min AN   07/03/25 0400 98.3 °F (36.8 °C) 89 22 107/90 100 % -- Nasal cannula 5 L/min SP   07/02/25 2206 100 °F (37.8 °C) 92 20 125/77 96 % -- -- -- MW   07/02/25 2206 -- -- -- --  -- -- Nasal cannula 5 L/min SP   07/02/25 2015 98.3 °F (36.8 °C) 81 16 108/75 98 % -- Nasal cannula 5 L/min AL   07/02/25 1533 97.6 °F (36.4 °C) 69 18 107/74 100 % -- Nasal cannula 4 L/min SM   07/02/25 1124 97.8 °F (36.6 °C) 78 18 117/76 93 % -- Nasal cannula 4 L/min    07/02/25 1030 -- 80 -- -- -- -- -- -- AA   07/02/25 0800 97.3 °F (36.3 °C) 79 18 106/76 100 % -- Nasal cannula 4 L/min    07/02/25 0609 -- 79 16 96/67 98 % -- -- 4 L/min MI   07/02/25 0419 97.9 °F (36.6 °C) 86 16 111/73 100 % -- Nasal cannula 4 L/min MI   07/02/25 0000 98 °F (36.7 °C) 68 16 110/71 97 % -- Nasal cannula 4 L/min MI   07/01/25 2000 97.6 °F (36.4 °C) 85 16 110/73 98 % -- Nasal cannula 4 L/min MI   07/01/25 1932 -- -- -- -- -- -- Nasal cannula 4 L/min VN   07/01/25 1609 -- -- -- -- -- -- Nasal cannula 4 L/min MM   07/01/25 1609 97.9 °F (36.6 °C) 68 16 102/75 98 % -- -- -- SM   07/01/25 1000 97.6 °F (36.4 °C) 78 16 116/73 98 % -- Nasal cannula 4 L/min MM   07/01/25 0930 97.6 °F (36.4 °C) 88 16 99/67 99 % -- Nasal cannula 4 L/min MM   07/01/25 0845 97.7 °F (36.5 °C) 72 16 103/74 98 % -- Nasal cannula 4 L/min MM   07/01/25 0709 98.1 °F (36.7 °C) -- 18 113/77 -- -- Nasal cannula 5 L/min IM   07/01/25 0706 -- -- -- -- -- 157 lb 3 oz (71.3 kg) -- -- IM   07/01/25 0705 98.1 °F (36.7 °C) 85 18 106/77 98 % -- None (Room air) 5 L/min PG   07/01/25 0645 97.9 °F (36.6 °C) 88 18 109/74 99 % -- Nasal cannula 4 L/min PG   07/01/25 0610 98.2 °F (36.8 °C) 81 16 94/56 99 % -- None (Room air) -- PG   07/01/25 0517 97.7 °F (36.5 °C) 82 16 92/58 100 % -- None (Room air) -- RM   07/01/25 0417 97.9 °F (36.6 °C) 80 18 99/56 100 % -- None (Room air) -- PG   07/01/25 0344 98 °F (36.7 °C) 84 16 96/59 -- -- None (Room air) -- PG   07/01/25 0330 97.6 °F (36.4 °C) 84 16 101/62 100 % -- None (Room air) -- PG   07/01/25 0317 97.8 °F (36.6 °C) 83 16 101/59 100 % -- None (Room air) -- PG   07/01/25 0018 97.3 °F (36.3 °C) 85 18 143/77 100 % 157 lb (71.2 kg) Nasal  cannula 5 L/min SH

## 2025-07-08 ENCOUNTER — SNF VISIT (OUTPATIENT)
Facility: SKILLED NURSING FACILITY | Age: 70
End: 2025-07-08

## 2025-07-08 VITALS
TEMPERATURE: 98 F | RESPIRATION RATE: 18 BRPM | BODY MASS INDEX: 31 KG/M2 | OXYGEN SATURATION: 96 % | WEIGHT: 157.19 LBS | HEART RATE: 91 BPM | SYSTOLIC BLOOD PRESSURE: 116 MMHG | DIASTOLIC BLOOD PRESSURE: 83 MMHG

## 2025-07-08 DIAGNOSIS — R53.1 GENERALIZED WEAKNESS: ICD-10-CM

## 2025-07-08 DIAGNOSIS — R60.0 BILATERAL LEG EDEMA: ICD-10-CM

## 2025-07-08 DIAGNOSIS — Z98.890 S/P PERICARDIAL WINDOW CREATION: ICD-10-CM

## 2025-07-08 DIAGNOSIS — R60.9 EDEMA, UNSPECIFIED TYPE: ICD-10-CM

## 2025-07-08 DIAGNOSIS — I48.91 ATRIAL FIBRILLATION WITH RVR (HCC): ICD-10-CM

## 2025-07-08 DIAGNOSIS — C54.1 ENDOMETRIAL CANCER (HCC): Primary | ICD-10-CM

## 2025-07-08 DIAGNOSIS — J90 PLEURAL EFFUSION, BILATERAL: ICD-10-CM

## 2025-07-08 DIAGNOSIS — Z95.828 S/P IVC FILTER: ICD-10-CM

## 2025-07-08 PROCEDURE — 3079F DIAST BP 80-89 MM HG: CPT | Performed by: NURSE PRACTITIONER

## 2025-07-08 PROCEDURE — 3074F SYST BP LT 130 MM HG: CPT | Performed by: NURSE PRACTITIONER

## 2025-07-08 PROCEDURE — 1160F RVW MEDS BY RX/DR IN RCRD: CPT | Performed by: NURSE PRACTITIONER

## 2025-07-08 PROCEDURE — 1159F MED LIST DOCD IN RCRD: CPT | Performed by: NURSE PRACTITIONER

## 2025-07-08 PROCEDURE — 99309 SBSQ NF CARE MODERATE MDM 30: CPT | Performed by: NURSE PRACTITIONER

## 2025-07-08 RX ORDER — ECHINACEA PURPUREA EXTRACT 125 MG
1 TABLET ORAL AS NEEDED
COMMUNITY

## 2025-07-08 RX ORDER — MULTIVIT-MIN/IRON FUM/FOLIC AC 7.5 MG-4
1 TABLET ORAL DAILY
COMMUNITY

## 2025-07-08 RX ORDER — MELATONIN
1000 DAILY
COMMUNITY

## 2025-07-08 RX ORDER — CHOLECALCIFEROL (VITAMIN D3) 10(400)/ML
160 DROPS ORAL
COMMUNITY

## 2025-07-08 NOTE — PROGRESS NOTES
25  APN Initial APN Encounter 2pm  Kelli Fisher.   1955    Met with daughter and patient requesting additional vitamins and change of Midodrine parameters.  Patient's daughter states in the hospital, she received Midodrine 10 mg every 8 hours when BP was 130 or below.  Will discuss with PCP.  Added:    Slow Fe daily  MVI daily  Vit B12 daily  Saline nasal spray prn congestion.    Daughter requested Med list and after visit summary to check medications received while in the hospital and discharge medications.    Kelli Fisher  : 1955. 70 year old  female patient is admitted to [facility] for rehabilitation and strengthening     Follow up Visit for:     Anemia, chronic  Afib RVR  Pericardial effusion  - s/p pericardial window , chest tube out  ; fluid positive for malignancy   BL PE: acute small segmental/right lower extremity DVT  S/p IVC 2025  Metastatic endometrial cancer    Pancytopenia  Acute on chronic anemia of chronic disease  Acute on chronic respiratory failure   Thrush   Malnutrition     Subjective   Patient was seen at bedside with daughter and sister present.  Patient upright in  her wheelchair.   Patient states her legs and swollen and feel heavy. Therapy notified to attach leg rests to keep legs elevated.  Tubigrips ordered for day time and may remove at night. Patient is oxygen dependent at 5L via n/c.  Pulse Ox 96% with oxygen.  Patient does c/o dyspnea on exertion.  Patient reports eating well. Patient requesting Slow Iron which she takes at home, MVI, Vit B12 and saline nasal spray to be left at bedside needing to use it every 3-4 hrs.    Allergies[1]    IMMUNIZATIONS  There is no immunization history on file for this patient.     CODE STATUS:  DNR    ADVANCED CARE PLANNING TEAM: Will need family care plan updates with daughter.    CURRENT MEDICATIONS - reviewed and updated on [facility] EMR    PHYSICAL EXAM:  Vitals:    25 1400   BP: 116/83   Pulse: 91    Resp: 18   Temp: 97.8 °F (36.6 °C)   SpO2: 96%   Weight: 157 lb 3.2 oz (71.3 kg)      General: Well-developed, well-nourished female in no acute distress.  Oxygen at 5 L via n/c.  Lungs: Clear diminished at the bases.  O2 5L.  Heart: Irregular rhythm - Hx: A-fib  Abdomen: Soft, non-tender, non-distended  Extremities: 2+ pitting edema in both lower legs  Vital Signs: Stable    Lab Results   Component Value Date    WBC 12.5 (H) 07/06/2025    RBC 3.28 (L) 07/06/2025    HGB 8.7 (L) 07/06/2025    HCT 28.5 (L) 07/06/2025    MCV 86.9 07/06/2025    MCH 26.5 07/06/2025    MCHC 30.5 (L) 07/06/2025    RDW 17.7 (H) 07/06/2025    .0 07/06/2025     Lab Results   Component Value Date     (H) 07/06/2025    BUN 12 07/06/2025    CREATSERUM 0.82 07/06/2025    BUNCREA 14.6 07/06/2025    ANIONGAP 3 07/06/2025    CA 9.0 07/06/2025     (L) 07/06/2025    K 4.3 07/06/2025    CL 92 (L) 07/06/2025    CO2 38.0 (H) 07/06/2025    OSMOCALC 279 07/06/2025     SEE PLAN BELOW    Acute on chronic anemia of chronic disease  - Hgb 6.0 on admission  - S/P  1 unit PRBC inpatient  - recent HGB 8.7  - trend weekly labs    Stage IV metastatic endometrial cancer  - S/p 1 cycle of chemotherapy on 5/7; became pancytopenia with pleural effusions  - Per Gyne oncology, does not believe that she will ever be a candidate for any future chemotherapy  - F/U with oncology as directed  - Palliative care consulted  - Monitor goals of care with patient and daughter    *Protein calorie malnutrition  - Poor appetite  - Dietician following'  - Boost daily added  - Supplements as ordered:  Vit B12, MVI, Slow Fe daily    *Generalized weakness   - Secondary to metastatic disease, chemotherapy and prolonged hospitalizations  - Continue PT/OT  - Fall precautions -  high fall risk  - Monitor progress in therapy    *Chronic respiratory failure/Bilateral pleural effusions  - On baseline 4-5L NC  - S/P right thoracentesis on 5/29: 900mL fluid removed  - Cont  inhalers and Duonebs prn  - Saline nasal spray prn  - Monitor respiratory status    *HFpEF  - Cont Lasix 40 mg daily  - Potassium 20 meq daily  - On 4-5L NC - baseline O2  - Trend labs    *Bilateral PE/DVT  - CTA chest 5/13: bilateral segmental PE  - RLE US 5/14: non-occlusive DVT  - S/P IVC filter placement on 5/14  - No AC d/t increased risk of bleeding    *A.fib  - H/O PSVT  - Currently  RRR, rate 91.  - Cont amiodarone 200 mg daily  - No AC d/t anemia    *Hypothyroidism  - Cont levothyroxine 100 mcg daily    *Pericardial effusion - resolved  - ECHO 5/20: large pericardial effusion and evidence of RV collapse  - S/P urgent pericardial window, fluid removal and right pleural chest tube on 5/21  - Chest tube removed 5/23  - Fluid cx: +Malignancy  - Repeated ECHO 5/25: No pericardial effusion  - Respiratory status     Skin care   -wound team following in rehab   - Follow orders per wound APN    Hypotension  -vs q shift and prn  -cont midodrine 10mg po tid , hold for SBP >130      Constipation   - continue miralax daily     Weakness / Deconditioning  - Continue PT/OT    Disposition:  SW to meet with patient and family to arrange a safe discharge.    No future appointments.     35 minutes spent w/ patient and staff, including but not limited to/ reviewing present status, needs, abilities with disciplines, reviewing medical records, vital signs, labs, completing medication reconciliation and entering orders for continued care in [facility]     Note to patient: The 21st Century Cures Act makes medical notes like these available to patients in the interest of transparency. However, this is a medical document intended as peer to peer communication. It is written in medical language and may contain abbreviations or verbiage that are unfamiliar. It may appear blunt or direct. Medical documents are intended to carry relevant information, facts as evident, and the clinical opinion of the practitioner who signs the document.      Janell Ortizkenji Stkenji APRN   07/8/25           [1]   Allergies  Allergen Reactions    Aspirin Tightness in Throat    Penicillins HIVES    Lactose OTHER (SEE COMMENTS)     Cramping, bloating    Other OTHER (SEE COMMENTS)     Pt states IV steroid allergy, states it makes her breathing worse

## 2025-07-11 ENCOUNTER — SNF VISIT (OUTPATIENT)
Facility: SKILLED NURSING FACILITY | Age: 70
End: 2025-07-11

## 2025-07-11 DIAGNOSIS — J18.9 COMMUNITY ACQUIRED PNEUMONIA OF LEFT LOWER LOBE OF LUNG: ICD-10-CM

## 2025-07-11 DIAGNOSIS — C54.1 ENDOMETRIAL CANCER (HCC): Primary | ICD-10-CM

## 2025-07-11 DIAGNOSIS — I48.91 ATRIAL FIBRILLATION WITH RVR (HCC): ICD-10-CM

## 2025-07-11 DIAGNOSIS — J90 PLEURAL EFFUSION, BILATERAL: ICD-10-CM

## 2025-07-11 DIAGNOSIS — D64.9 ANEMIA, UNSPECIFIED TYPE: ICD-10-CM

## 2025-07-11 DIAGNOSIS — J18.9 COMMUNITY ACQUIRED PNEUMONIA OF RIGHT LOWER LOBE OF LUNG: ICD-10-CM

## 2025-07-11 DIAGNOSIS — R53.1 GENERALIZED WEAKNESS: ICD-10-CM

## 2025-07-11 DIAGNOSIS — R60.0 BILATERAL LEG EDEMA: ICD-10-CM

## 2025-07-14 VITALS
SYSTOLIC BLOOD PRESSURE: 103 MMHG | TEMPERATURE: 98 F | DIASTOLIC BLOOD PRESSURE: 51 MMHG | BODY MASS INDEX: 31 KG/M2 | RESPIRATION RATE: 20 BRPM | WEIGHT: 157.19 LBS | OXYGEN SATURATION: 97 % | HEART RATE: 71 BPM

## 2025-07-15 ENCOUNTER — SNF VISIT (OUTPATIENT)
Facility: SKILLED NURSING FACILITY | Age: 70
End: 2025-07-15

## 2025-07-15 VITALS
OXYGEN SATURATION: 100 % | BODY MASS INDEX: 31 KG/M2 | SYSTOLIC BLOOD PRESSURE: 99 MMHG | TEMPERATURE: 99 F | WEIGHT: 157.19 LBS | DIASTOLIC BLOOD PRESSURE: 69 MMHG | HEART RATE: 80 BPM | RESPIRATION RATE: 16 BRPM

## 2025-07-15 DIAGNOSIS — R60.0 BILATERAL LEG EDEMA: ICD-10-CM

## 2025-07-15 DIAGNOSIS — J18.9 COMMUNITY ACQUIRED PNEUMONIA OF LEFT LOWER LOBE OF LUNG: Primary | ICD-10-CM

## 2025-07-15 DIAGNOSIS — J18.9 COMMUNITY ACQUIRED PNEUMONIA OF RIGHT LOWER LOBE OF LUNG: ICD-10-CM

## 2025-07-15 DIAGNOSIS — I48.91 ATRIAL FIBRILLATION WITH RVR (HCC): ICD-10-CM

## 2025-07-15 DIAGNOSIS — D64.9 ANEMIA, UNSPECIFIED TYPE: ICD-10-CM

## 2025-07-15 DIAGNOSIS — C54.1 ENDOMETRIAL CANCER (HCC): ICD-10-CM

## 2025-07-15 DIAGNOSIS — R53.1 GENERALIZED WEAKNESS: ICD-10-CM

## 2025-07-15 DIAGNOSIS — J90 PLEURAL EFFUSION, BILATERAL: ICD-10-CM

## 2025-07-15 PROCEDURE — 1111F DSCHRG MED/CURRENT MED MERGE: CPT | Performed by: NURSE PRACTITIONER

## 2025-07-15 PROCEDURE — 99309 SBSQ NF CARE MODERATE MDM 30: CPT | Performed by: NURSE PRACTITIONER

## 2025-07-15 PROCEDURE — 3078F DIAST BP <80 MM HG: CPT | Performed by: NURSE PRACTITIONER

## 2025-07-15 PROCEDURE — 3074F SYST BP LT 130 MM HG: CPT | Performed by: NURSE PRACTITIONER

## 2025-07-15 PROCEDURE — 1160F RVW MEDS BY RX/DR IN RCRD: CPT | Performed by: NURSE PRACTITIONER

## 2025-07-15 PROCEDURE — 1159F MED LIST DOCD IN RCRD: CPT | Performed by: NURSE PRACTITIONER

## 2025-07-15 NOTE — PROGRESS NOTES
25 APRN Encounter  11 am  Kelli Fisher   1955    Follow up Visit:  chronic anemia, new bilateral lung infiltrates, metastatic endometrial cancer.     Met with patient and daughter at bedside. Daughter wanted to review chest xray results.  Explained to daughter chest xray was done d/t patient refusing TB test; facility protocol to obtain a chest xray.  Results were called into [facility] on call MD who ordered a  Z-akira.  Explained the pros and cons of continuing the antibiotic but it is up to the patient and daughter.  Patient agreed to complete the 5 day Z-akira. On 5L O2.    Anemia - 25 HGB 8.7.  will continue to draw weekly CBC's to monitor HGB. Patient has been transfused in the past.    Metastatic Endometrial Carcinoma.  Patient has one cycle of chemotherapy with side effect of pancytopenia and pleural effusion.  Oncologist does not think patient will tolerate another cycle.    Kelli Fisher  : 1955. 70 year old  female patient is admitted to [facility] for rehabilitation and strengthening    HPI: Pt is a 69 y/o female with PMH of recurrent metastatic endometrial cancer, chronic respiratory failure and A.fib presented to the ED from [facility] for anemia. In the ED, Hgb was  6.0 and underwent a transfusion. Pt was recently admitted to [facility] from  -  for A.fib w/ RVR and acute on chronic respiratory failure. Pt started chemotherapy on  and  was admitted days later with A.fib with RVR and enlarging pericardial effusion and underwent urgent pericardial window and chest tube placement. Pt was admitted to [facility] from  -  for rehabilitation. Pt was stabilized and discharged to [facility] on 25 for rehabilitation and strengthening.    s/p pericardial window , chest tube out  ; fluid positive for malignancy   BL PE: acute small segmental/right lower extremity DVT  S/p IVC 2025  Metastatic endometrial cancer     Pancytopenia  Acute on chronic anemia of chronic disease  Acute on chronic respiratory failure   Thrush   Malnutrition     Subjective   Denies headache, chest pain.  C/O SOB and occasional cough; on continuous 5L oxygen.   C/O dyspnea on exertion.  Denies chills, fevers.  Denies n/v/d/c.    Allergies[1]    IMMUNIZATIONS  There is no immunization history on file for this patient.     CODE STATUS:  DNR    ADVANCED CARE PLANNING TEAM: Will need family care plan updates with daughter.    CURRENT MEDICATIONS - reviewed and updated on [facility] EMR    PHYSICAL EXAM:  Vitals:    07/11/25 1100   BP: 103/51   Pulse: 71   Resp: 20   Temp: 97.7 °F (36.5 °C)   SpO2: 97%   Weight: 157 lb 3.2 oz (71.3 kg)      General: Well-developed, well-nourished female in no acute distress.  Oxygen at 5 L via n/c.  Respiratory:  Lungs: Clear diminished at the bases.  O2 5L. New bilateral infiltrates per recent chest xray.  CV / Heart: Irregular rhythm - Hx: A-fib, rate 71.  Abdomen: Soft, non-tender, non-distended  :  Deferred  Extremities: 2+ pitting edema in both lower legs  Vital Signs: Stable    SEE PLAN BELOW    Acute on chronic anemia of chronic disease  - Hgb 6.0 on admission  - S/P  1 unit PRBC inpatient  - recent HGB 8.7 on 7/7/25  - trend weekly labs    Stage IV metastatic endometrial cancer  - S/p 1 cycle of chemotherapy on 5/7; became pancytopenia with pleural effusions  - Per Gyne oncology, does not believe that she will ever be a candidate for any future chemotherapy  - F/U with oncology as directed  - Palliative care consulted  - Monitor  and discuss goals of care with patient and daughter    Protein calorie malnutrition  - Poor appetite  - Dietician following'  - Boost daily added  - Supplements ordered:  Vit B12, MVI, Slow Fe daily    Generalized weakness   - Secondary to metastatic disease, chemotherapy and prolonged hospitalizations  - Continue PT/OT  - Fall precautions -  high fall risk  - Monitor progress in  therapy    Chronic respiratory failure/Bilateral pleural effusions  - On baseline 5L NC  - S/P right thoracentesis on 5/29: 900mL fluid removed  - Continue inhalers and Duonebs prn  - Saline nasal spray prn  - Monitor respiratory status    HFpEF  - Cont Lasix 40 mg daily  - Potassium 20 meq daily  - On 5L NC - baseline O2  - Trend labs    Bilateral PE/DVT  - CTA chest 5/13: bilateral segmental PE  - RLE US 5/14: non-occlusive DVT  - S/P IVC filter placement on 5/14  - No AC d/t increased risk of bleeding    A.fib  - H/O PSVT  - Currently  RRR, rate 71  - Cont amiodarone 200 mg daily  - No AC d/t anemia    Hypothyroidism  - Cont levothyroxine 100 mcg daily    Pericardial effusion - resolved  - ECHO 5/20: large pericardial effusion and evidence of RV collapse  - S/P urgent pericardial window, fluid removal and right pleural chest tube on 5/21  - Chest tube removed 5/23  - Fluid cx: +Malignancy  - Repeated ECHO 5/25: No pericardial effusion  - Respiratory status     Skin care   -wound team following in rehab   - Follow orders per wound APN    Hypotension  -vs q shift and prn  -cont midodrine 10mg po tid , hold for SBP >130      Constipation   - continue miralax daily     Weakness / Deconditioning  - Continue PT/OT    Disposition:  SW to meet with patient and family to arrange a safe discharge.    No future appointments.     35 minutes spent w/ patient and staff, including but not limited to/ reviewing present status, needs, abilities with disciplines, reviewing medical records, vital signs, labs, completing medication reconciliation and entering orders for continued care in [facility] .       Note to patient: The 21st Century Cures Act makes medical notes like these available to patients in the interest of transparency. However, this is a medical document intended as peer to peer communication. It is written in medical language and may contain abbreviations or verbiage that are unfamiliar. It may appear blunt or direct.  Medical documents are intended to carry relevant information, facts as evident, and the clinical opinion of the practitioner who signs the document.     Janell Pop APRN   07/11/25 11 am           [1]   Allergies  Allergen Reactions    Aspirin Tightness in Throat    Penicillins HIVES    Lactose OTHER (SEE COMMENTS)     Cramping, bloating    Other OTHER (SEE COMMENTS)     Pt states IV steroid allergy, states it makes her breathing worse

## 2025-07-16 NOTE — PROGRESS NOTES
7/15/25 APRN Encounter  2:45 pm  Kelli Fisher.   1955    Follow up Visit:  Bilateral infiltrates, Metastatic Endometrial Cancer, chronic anemia.    Bilateral infiltrates:  patient completed a Z-Kieran today.  Patient reports she feels better, less SOB, less secretions and improved cough.  Patient is afebrile, denies chills, fevers.      Metastatic Endometrial Cancer: Oncologist does not think patient will tolerate another cycle of chemotherapy. Patient had one cycle of chemo and developed side effects of pancytopenia and pleural effusion.    Chronic Anemia:  HGB today 7.3.  STAT order to repeat CBC; awaiting results.  HGB  7 or less, may require transfusion.  Patient has been transfused in the past.    Kelli Fisher  : 1955. 70 year old  female patient is admitted to [facility] for rehabilitation and strengthening    HPI: Pt is a 69 y/o female with PMH of recurrent metastatic endometrial cancer, chronic respiratory failure and A.fib presented to the ED from [facility] for anemia. In the ED, Hgb was  6.0 and underwent a transfusion. Pt was recently admitted to [facility] from  -  for A.fib w/ RVR and acute on chronic respiratory failure. Pt started chemotherapy on  and  was admitted days later with A.fib with RVR and enlarging pericardial effusion and underwent urgent pericardial window and chest tube placement. Pt was admitted to [facility] from  -  for rehabilitation. Pt was stabilized and discharged to [facility] on 25 for rehabilitation and strengthening.    S/P pericardial window , chest tube out  ; fluid positive for malignancy   BL PE: acute small segmental/right lower extremity DVT  S/p IVC 2025  Metastatic endometrial cancer    Pancytopenia  Acute on chronic anemia of chronic disease  Acute on chronic respiratory failure   Thrush   Malnutrition     Subjective   Denies headache, chest pain.  C/O intermittent SOB; improved. On continuous  oxygen 4-5L oxygen.   C/O dyspnea on exertion.  Denies chills, fevers.  Denies n/v/d/c.    Allergies[1]    IMMUNIZATIONS  There is no immunization history on file for this patient.     CODE STATUS:  DNR    ADVANCED CARE PLANNING TEAM: Will need family care plan updates with daughter.    CURRENT MEDICATIONS - reviewed and updated on [facility] EMR    PHYSICAL EXAM:  Vitals:    07/15/25 1445   BP: 99/69   Pulse: 80   Resp: 16   Temp: 98.6 °F (37 °C)   SpO2: 100%   Weight: 157 lb 3.2 oz (71.3 kg)      General: Well-developed, well-nourished female in no acute distress.  Oxygen at 4 L via n/c.  Respiratory:  Lungs: Clear decreased at the bases. O2 continuous at 4L.   Completed Z-Kieran for New bilateral infiltrates per chest xray.  CV / Heart: Irregular rhythm - Hx: A-fib, rate 80.  Abdomen: Soft, non-tender, non-distended  :  Deferred  Extremities: 1-2+ pitting edema in both lower extremities  Vital Signs:  BP 99/69.  POx 100% on oxygen    SEE PLAN BELOW    Acute on chronic anemia of chronic disease  - Hgb 6.0 on admission  - S/P  1 unit PRBC inpatient  - HGB 8.7 on 7/7/25  - HGB today 7/15/25 7.3.  STAT repeat pending.  - trend weekly labs    Bilateral lower lobe infiltrates  Completed Z-Kieran today 7/15/25  Infiltrates vs. Pleural effusion  Chest Xray results 7/9/25: Mild CHF.  Infiltrates both lower lungs consistent with pulmonary edema and/or pneumonia.  Correlate clinically.  Bilateral pleural effusions.     Stage IV metastatic endometrial cancer  - S/p 1 cycle of chemotherapy on 5/7; became pancytopenia with pleural effusions  - Per Gyne oncology, does not believe that she will ever be a candidate for any future chemotherapy  - F/U with oncology as directed  - Palliative care consulted  - Monitor and discuss goals of care with patient and daughter    Protein calorie malnutrition  - Poor appetite  - Dietician following'  - Boost daily   - Supplements:  Vit B12, MVI, Slow Fe daily    Generalized weakness   - Secondary to  metastatic disease, chemotherapy and prolonged hospitalizations  - Continue PT/OT  - Fall precautions -  high fall risk  - Monitor progress in therapy    Chronic respiratory failure/Bilateral pleural effusions  - On baseline 4-5L N/C  - S/P right thoracentesis on 5/29: 900mL fluid removed  - Continue inhalers and Duonebs prn  - Saline nasal spray prn  - Monitor respiratory status    HFpEF  - Cont Lasix 40 mg daily  - Potassium 20 meq daily  - On 4-5L NC - continuous   - Trend labs    Bilateral PE/DVT  - CTA chest 5/13: bilateral segmental PE  - RLE US 5/14: non-occlusive DVT  - S/P IVC filter placement on 5/14  - No AC d/t increased risk of bleeding    A.fib  - H/O PSVT  - Currently  RRR, rate 80  - Cont amiodarone 200 mg daily  - No AC d/t anemia    Hypothyroidism  - Cont levothyroxine 100 mcg daily    Pericardial effusion - resolved  - ECHO 5/20: large pericardial effusion and evidence of RV collapse  - S/P urgent pericardial window, fluid removal and right pleural chest tube on 5/21  - Chest tube removed 5/23  - Fluid cx: +Malignancy  - Repeated ECHO 5/25: No pericardial effusion  - Respiratory status     Skin care   -wound team following in rehab   - Follow orders per wound APN  - Left elbow skin tear    Hypotension  -vs q shift and prn  -cont midodrine 10mg po tid , hold for SBP >130      Constipation   - continue miralax daily     Weakness / Deconditioning  - Continue PT/OT    Disposition:  SW to meet with patient and family to arrange a safe discharge.    No future appointments pending.     35 minutes spent w/ patient and staff, including but not limited to/ reviewing present status, needs, abilities with disciplines, reviewing medical records, vital signs, labs, completing medication reconciliation and entering orders for continued care in [facility] .       Note to patient: The 21st Century Cures Act makes medical notes like these available to patients in the interest of transparency. However, this is a medical  document intended as peer to peer communication. It is written in medical language and may contain abbreviations or verbiage that are unfamiliar. It may appear blunt or direct. Medical documents are intended to carry relevant information, facts as evident, and the clinical opinion of the practitioner who signs the document.     Janell Pop APRN   07/15/25 245 pm          [1]   Allergies  Allergen Reactions    Aspirin Tightness in Throat    Penicillins HIVES    Lactose OTHER (SEE COMMENTS)     Cramping, bloating    Other OTHER (SEE COMMENTS)     Pt states IV steroid allergy, states it makes her breathing worse

## 2025-07-17 NOTE — PROGRESS NOTES
Physician Clarification    Additional information related to the patient's condition    Anemia related to Metastatic endometrial Cancer     This note is part of the patient's medical record.

## 2025-07-19 ENCOUNTER — LAB REQUISITION (OUTPATIENT)
Dept: LAB | Age: 70
End: 2025-07-19

## 2025-07-19 DIAGNOSIS — C54.1 MALIGNANT NEOPLASM OF ENDOMETRIUM  (CMD): ICD-10-CM

## 2025-07-19 DIAGNOSIS — Z13.9 ENCOUNTER FOR SCREENING, UNSPECIFIED: ICD-10-CM

## 2025-07-20 LAB — CANCER AG125 SERPL-ACNC: 1158 UNITS/ML (ref 0–35)

## 2025-07-22 ENCOUNTER — SNF DISCHARGE (OUTPATIENT)
Facility: SKILLED NURSING FACILITY | Age: 70
End: 2025-07-22

## 2025-07-22 VITALS
DIASTOLIC BLOOD PRESSURE: 78 MMHG | SYSTOLIC BLOOD PRESSURE: 118 MMHG | BODY MASS INDEX: 30 KG/M2 | TEMPERATURE: 98 F | HEART RATE: 78 BPM | WEIGHT: 152.5 LBS | OXYGEN SATURATION: 99 % | RESPIRATION RATE: 17 BRPM

## 2025-07-22 DIAGNOSIS — C54.1 ENDOMETRIAL CANCER (HCC): ICD-10-CM

## 2025-07-22 DIAGNOSIS — I48.91 ATRIAL FIBRILLATION WITH RVR (HCC): ICD-10-CM

## 2025-07-22 DIAGNOSIS — D64.9 ANEMIA, UNSPECIFIED TYPE: ICD-10-CM

## 2025-07-22 DIAGNOSIS — J18.9 COMMUNITY ACQUIRED PNEUMONIA OF LEFT LOWER LOBE OF LUNG: Primary | ICD-10-CM

## 2025-07-22 DIAGNOSIS — D72.829 LEUKOCYTOSIS, UNSPECIFIED TYPE: ICD-10-CM

## 2025-07-22 DIAGNOSIS — R60.0 BILATERAL LEG EDEMA: ICD-10-CM

## 2025-07-22 DIAGNOSIS — Z95.828 S/P IVC FILTER: ICD-10-CM

## 2025-07-22 DIAGNOSIS — R53.1 GENERALIZED WEAKNESS: ICD-10-CM

## 2025-07-22 PROCEDURE — 3078F DIAST BP <80 MM HG: CPT | Performed by: NURSE PRACTITIONER

## 2025-07-22 PROCEDURE — 99316 NF DSCHRG MGMT 30 MIN+: CPT | Performed by: NURSE PRACTITIONER

## 2025-07-22 PROCEDURE — 1111F DSCHRG MED/CURRENT MED MERGE: CPT | Performed by: NURSE PRACTITIONER

## 2025-07-22 PROCEDURE — 1159F MED LIST DOCD IN RCRD: CPT | Performed by: NURSE PRACTITIONER

## 2025-07-22 PROCEDURE — 3074F SYST BP LT 130 MM HG: CPT | Performed by: NURSE PRACTITIONER

## 2025-07-22 PROCEDURE — 1160F RVW MEDS BY RX/DR IN RCRD: CPT | Performed by: NURSE PRACTITIONER

## 2025-07-22 NOTE — PROGRESS NOTES
2025 Discharge APN Encounter  10:45am  Kelli FisherShanna    1955    Discharge Summary 25 from [facility].    Kelli Fisehr, 1955, 70 year old, female is being discharged from [facility]     DISCHARGE SUMMARY    Date of Admission to [facility]:  25    Date of Discharge from [facility]:  25                                 Admitting Diagnoses:  Metastatic Endometrial Cancer, Anemia, Bilateral LL Infiltrates.    Reason for Admission to [facility]: Rehabilitation and strengthening     Diagnoses:  Bilateral LL Infiltrates  chronic anemia   Metastatic endometrial cancer  Hx of PE  Recent malignant pericardial effusion  Status post drain with chest tube   Volume overload    HPI:   Kelli Fisher is a(n) 70 year old female, with a past medical history significant for metastatic endometrial CA, pulmonary embolism not on any anticoagulation secondary to increased risk of bleeding, and pancytopenia presented to the ER after lab work indicated a drop in hemoglobin at the nursing home.  Patient denies any bleeding, however does admit to feeling weak.  Denies any dizziness chest pain or worsening shortness of breath    S/P pericardial window , chest tube out  ; fluid positive for malignancy   BL PE: acute small segmental/right lower extremity DVT  S/p IVC 2025  Metastatic endometrial cancer    Pancytopenia  Acute on chronic anemia of chronic disease  Acute on chronic respiratory failure   Thrush  Malnutrition    Subjective:  Denies headache, chest pain. C/O occasional SOB on oxygen 4-6L.  Denies n/v/d/c. Denies chills, fevers.      Anxious to see oncologist, Dr. Dominick Herring this afternoon, then return home with family and [facility].     PHYSICAL EXAM:  118/78. P 78.  RR 17. POx 99% T 976 wgt 152.5#  GENERAL HEALTH: well developed, weak, frail 70 year old female oxygen dependent.  LINES, TUBES, DRAINS:  none  SKIN: pale, warm, dry  EYES:  PERRLA, EOMI, sclera anicteric, conjunctiva normal; no nystagmus, no drainage.  HENT: runny nose; mucous membranes pink, moist, no visible cerumen.   NECK: FROM  BREAST: deferred exam   RESPIRATORY: Lungs decreased at the bases bilaterally; occasional cough.  CARDIOVASCULAR:  RRR, rate 78. HX: -fib  ABDOMEN:  BS+, soft, nondistended; no bruits; nontender, no guarding, no rebound tenderness. Fair appetite.  :Deferred  MUSCULOSKELETAL: weakness upper and lower extremities.  EXTREMITIES/VASCULAR: 2+ edema  NEUROLOGIC:follows commands  PSYCHIATRIC:  OR x 2-3. Pleasant, cooperative, anxious to see oncologist today and return home with home health services.    Bilateral lower lobe infiltrates  Completed Z-Kieran today 7/15/25  Infiltrates vs. Pleural effusion    Lab Results:  7/19/25  CEA 0.7.   MG 1.8.  PHOSP 4.2.  WBC  14.8. HGB 7.1.  CRIT 23.7. PLTS 251.  . K 4.6. CL 92. CO2 33. BUN 14. CREAT 0.7.    CURRENT MANAGEMENT    Stage IV metastatic endometrial cancer  - S/p 1 cycle of chemotherapy on 5/7; became pancytopenia with pleural effusions  - Per Gyne oncology, does not believe she will be a candidate for any future chemotherapy  - F/U with oncology 7/22/25.  - Palliative care consulted  - Monitor and discuss goals of care with patient and daughter     Protein calorie malnutrition  - Poor appetite  - Dietician following'  - Boost daily   - Supplements:  Vit B12, MVI, Slow Fe daily     Generalized weakness   - Secondary to metastatic disease, chemotherapy and prolonged hospitalizations  - Continue home  PT/OT  - Fall precautions -  high fall risk  - Monitor progress in therapy     Chronic respiratory failure/Bilateral pleural effusions  - On baseline 4-6L N/C  - S/P right thoracentesis on 5/29: 900mL fluid removed  - Continue inhalers and Duonebs prn  - Saline nasal spray prn  - Monitor respiratory status     HFpEF  - Cont Lasix 40 mg daily  - Potassium 20 meq daily  - On 4-6L NC - continuous      Bilateral  PE/DVT  - CTA chest 5/13: bilateral segmental PE  - RLE US 5/14: non-occlusive DVT  - S/P IVC filter placement on 5/14  - No AC d/t increased risk of bleeding     A.fib  - H/O PSVT  - Currently  RRR, rate 78  - Cont amiodarone 200 mg daily  - No AC d/t anemia     Hypothyroidism  - Cont levothyroxine 100 mcg daily     Pericardial effusion - resolved  - ECHO 5/20: large pericardial effusion and evidence of RV collapse  - S/P urgent pericardial window, fluid removal and right pleural chest tube on 5/21  - Chest tube removed 5/23  - Fluid cx: +Malignancy  - Repeated ECHO 5/25: No pericardial effusion  - Home with oxygen     Hypotension  -vs q shift and prn  -cont midodrine 10mg po tid , hold for SBP >130      Constipation    -    continue miralax daily      Weakness / Deconditioning  - Continue Home RN/PT/OT/HCA    Medication Reconciliation Completed:  Yes    Follow up with Oncologist 7/22/25 then home with family and home health services.   Follow up with PCP within 2 weeks of discharge from [facility].    Greater than 30 minutes spent today with patient and staff, including but not limited to/ coordination of care in preparation for discharge; reviewing present status, progress with therapy, discharge needs--DME and home health, reviewing medical records, labs, completing medication reconciliation, providing prescriptions and guiding referrals to PCP/specialists for continued medical care and oversight of ongoing needs.  Note to patient: The 21st Century Cures Act makes medical notes like these available to patients in the interest of transparency. However, this is a medical document intended as peer to peer communication. It is written in medical language and may contain abbreviations or verbiage that are unfamiliar. It may appear blunt or direct. Medical documents are intended to carry relevant information, facts as evident and the clinical opinion of the practitioner who signs the document.    Janell Pop (Daya)  ABBIE  7/22/2025 10:45 am  [facility]

## 2025-07-23 PROBLEM — C54.1 ENDOMETRIAL CA (HCC): Status: ACTIVE | Noted: 2025-04-30

## 2025-07-23 PROBLEM — C54.1 ENDOMETRIAL CA (HCC): Status: ACTIVE | Noted: 2025-01-01

## 2025-07-23 PROBLEM — D64.9 ANEMIA, UNSPECIFIED: Status: ACTIVE | Noted: 2025-07-01

## 2025-07-23 PROBLEM — D64.9 ANEMIA, UNSPECIFIED: Status: ACTIVE | Noted: 2025-01-01

## 2025-07-23 RX ORDER — DIPHENHYDRAMINE HCL 50 MG
50 CAPSULE ORAL ONCE
Status: CANCELLED | OUTPATIENT
Start: 2025-07-23

## 2025-07-23 RX ORDER — ACETAMINOPHEN 325 MG/1
650 TABLET ORAL ONCE
Status: CANCELLED | OUTPATIENT
Start: 2025-07-23

## 2025-07-24 ENCOUNTER — TELEPHONE (OUTPATIENT)
Dept: PULMONOLOGY | Facility: CLINIC | Age: 70
End: 2025-07-24

## 2025-07-24 DIAGNOSIS — J96.21 ACUTE ON CHRONIC RESPIRATORY FAILURE WITH HYPOXIA (HCC): ICD-10-CM

## 2025-07-24 DIAGNOSIS — R06.02 SHORTNESS OF BREATH: ICD-10-CM

## 2025-07-24 DIAGNOSIS — J90 PLEURAL EFFUSION: Primary | ICD-10-CM

## 2025-07-24 NOTE — TELEPHONE ENCOUNTER
Fidel requesting orders for patient to have outpatient procedure to remove fluids from lungs.  Patient has chemo this past Tuesday and was told by Oncologist that they can hear lungs filling up with fluids.  Also has additional questions regarding treatment plan.  Please call.  Thank you.

## 2025-07-24 NOTE — TELEPHONE ENCOUNTER
I ordered stat chest x-ray which she should get done today.    Her other option is to go to ER which is not unreasonable given worsening symptoms and increasing oxygen requirements. Then could be evaluated for thoracentesis inpatient if pleural effusions are worse.

## 2025-07-24 NOTE — TELEPHONE ENCOUNTER
Spoke to patient's daughter and she said when at chemo appointment patient was told she had fluid in lungs. Patient has had increasing shortness of breath over the past few weeks and has been doing breathing treatments more often. Oxygen sats in low 90's. Patient was on 4L oxygen at home and is now at 5L at rest (highest the home concentrator goes) and 6L with transport.     Joselo- please advise

## 2025-07-24 NOTE — TELEPHONE ENCOUNTER
Patient's daughter is aware of orders below from Joselo Rodrigues PA-C. Reassurance provided. Encouraged her to let us know if we may be of further assistance.

## 2025-07-28 ENCOUNTER — HOSPITAL ENCOUNTER (OUTPATIENT)
Dept: GENERAL RADIOLOGY | Facility: HOSPITAL | Age: 70
Discharge: HOME | End: 2025-07-28
Attending: PHYSICIAN ASSISTANT
Payer: MEDICARE

## 2025-07-28 ENCOUNTER — OFFICE VISIT (OUTPATIENT)
Facility: LOCATION | Age: 70
End: 2025-07-28
Attending: OBSTETRICS & GYNECOLOGY
Payer: MEDICARE

## 2025-07-28 VITALS
OXYGEN SATURATION: 100 % | SYSTOLIC BLOOD PRESSURE: 97 MMHG | TEMPERATURE: 96 F | RESPIRATION RATE: 16 BRPM | DIASTOLIC BLOOD PRESSURE: 59 MMHG | HEART RATE: 81 BPM

## 2025-07-28 DIAGNOSIS — D64.9 ANEMIA, UNSPECIFIED TYPE: ICD-10-CM

## 2025-07-28 DIAGNOSIS — R06.02 SHORTNESS OF BREATH: ICD-10-CM

## 2025-07-28 DIAGNOSIS — J90 PLEURAL EFFUSION: ICD-10-CM

## 2025-07-28 DIAGNOSIS — C54.1 ENDOMETRIAL ADENOCARCINOMA (HCC): Primary | ICD-10-CM

## 2025-07-28 DIAGNOSIS — J96.21 ACUTE ON CHRONIC RESPIRATORY FAILURE WITH HYPOXIA (HCC): ICD-10-CM

## 2025-07-28 LAB
ANTIBODY SCREEN: NEGATIVE
RH BLOOD TYPE: POSITIVE

## 2025-07-28 PROCEDURE — 71046 X-RAY EXAM CHEST 2 VIEWS: CPT | Performed by: PHYSICIAN ASSISTANT

## 2025-07-28 RX ORDER — ACETAMINOPHEN 325 MG/1
650 TABLET ORAL ONCE
Status: COMPLETED | OUTPATIENT
Start: 2025-07-28 | End: 2025-07-28

## 2025-07-28 RX ORDER — DIPHENHYDRAMINE HCL 50 MG
CAPSULE ORAL
Status: COMPLETED
Start: 2025-07-28 | End: 2025-07-28

## 2025-07-28 RX ORDER — ACETAMINOPHEN 325 MG/1
650 TABLET ORAL ONCE
Status: CANCELLED | OUTPATIENT
Start: 2025-07-28

## 2025-07-28 RX ORDER — ACETAMINOPHEN 325 MG/1
TABLET ORAL
Status: COMPLETED
Start: 2025-07-28 | End: 2025-07-28

## 2025-07-28 RX ORDER — DIPHENHYDRAMINE HCL 50 MG
50 CAPSULE ORAL ONCE
Status: COMPLETED | OUTPATIENT
Start: 2025-07-28 | End: 2025-07-28

## 2025-07-28 RX ORDER — DIPHENHYDRAMINE HCL 50 MG
50 CAPSULE ORAL ONCE
Status: CANCELLED | OUTPATIENT
Start: 2025-07-28

## 2025-07-28 RX ADMIN — ACETAMINOPHEN 650 MG: 325 TABLET ORAL at 09:21:00

## 2025-07-28 RX ADMIN — DIPHENHYDRAMINE HCL 50 MG: 50 MG CAPSULE ORAL at 09:22:00

## 2025-07-28 NOTE — PROGRESS NOTES
Pt here for blood transfusion. To receive 2 unit(s).  Prbcs Appeared to tolerate treatment well.  No S/S of adverse rxn noted at this time. Discharged from infusion, Via wheelchair     Ordering MD: Shanelle    Education Record  Learner:  Patient and Family Member  Barriers / Limitations:  None  Method:  Discussion  General Topics:  Procedure and Plan of care reviewed  Outcome:  Shows understanding

## 2025-07-28 NOTE — TELEPHONE ENCOUNTER
Patient's daughter states patient is doing ok, with shortness of breath coming and going. Patient is currently getting a blood transfusion and will be going to get Chest xray completed once transfusion is done. Daughter notified that we will call with results once reviewed

## 2025-07-29 LAB
BLOOD TYPE BARCODE: 7300
UNIT VOLUME: 350 ML

## 2025-07-31 NOTE — PROGRESS NOTES
Provider Clarification    CONDITION TREATED WITH IV LASIX:       Volume overload secondary to transfusion of 2u PRBCs with underlying chronic diastolic heart failure     This note is part of the patient's medical record.

## 2025-08-06 PROBLEM — N17.9 ACUTE KIDNEY INJURY: Status: ACTIVE | Noted: 2025-01-01

## 2025-08-06 PROBLEM — E87.70 HYPERVOLEMIA: Status: ACTIVE | Noted: 2025-01-01

## 2025-08-06 PROBLEM — J90 PLEURAL EFFUSION: Status: ACTIVE | Noted: 2025-01-01

## 2025-08-06 PROBLEM — E87.70 FLUID OVERLOAD: Status: ACTIVE | Noted: 2025-01-01

## 2025-08-06 PROBLEM — L89.159 PRESSURE INJURY OF SKIN OF SACRAL REGION, UNSPECIFIED INJURY STAGE: Status: ACTIVE | Noted: 2025-01-01

## 2025-08-11 PROBLEM — C54.1 ENDOMETRIAL CANCER (HCC): Status: ACTIVE | Noted: 2025-01-01

## 2025-08-13 PROBLEM — R53.1 WEAKNESS: Status: ACTIVE | Noted: 2025-01-01

## 2025-08-26 ENCOUNTER — TELEPHONE (OUTPATIENT)
Dept: INTERNAL MEDICINE UNIT | Facility: HOSPITAL | Age: 70
End: 2025-08-26

## (undated) DEVICE — SUT VCRL 2-0 36IN CT-1 ABSRB UD L36MM 1/2 CIR

## (undated) DEVICE — APPLICATOR PREP 26ML CHG 2% ISO ALC 70%

## (undated) DEVICE — YANKAUER,FLEXIBLE HANDLE,REGLR CAPACITY: Brand: MEDLINE INDUSTRIES, INC.

## (undated) DEVICE — SOLUTION IRRIG 1000ML 0.9% NACL USP BTL

## (undated) DEVICE — Device

## (undated) DEVICE — DRAIN CHST SGL COLL 1 PT TB FOR ATS BG CMPTBL

## (undated) DEVICE — HANDLE SUR BLU PLAS LT FLX SLIP ON ST DISP

## (undated) DEVICE — ZZ-CONVERTED-TO-522442- SPONGE 4X4 10PK

## (undated) DEVICE — SUCTION CANISTER, 3000CC,SAFELINER: Brand: DEROYAL

## (undated) DEVICE — MINOR GENERAL: Brand: MEDLINE INDUSTRIES, INC.

## (undated) DEVICE — 3M™ IOBAN™ 2 ANTIMICROBIAL INCISE DRAPE 6650EZ: Brand: IOBAN™ 2

## (undated) DEVICE — SUT ETHBND XL 0 30IN CT-1 NABSRB GRN 36MM 1/2

## (undated) DEVICE — TIES TBNG 0.25IN BLK POLYPR SLF LOK STRP

## (undated) DEVICE — DRESSING SIL PD W4.25XL4.25IN HYDRPHLC POLYUR

## (undated) DEVICE — BLADE ELECTRODE: Brand: EDGE

## (undated) DEVICE — SUT VCRL 1-0 36IN CTX ABSRB UD L48MM 1/2 CIR

## (undated) DEVICE — GAMMEX® PI HYBRID SIZE 7, STERILE POWDER-FREE SURGICAL GLOVE, POLYISOPRENE AND NEOPRENE BLEND: Brand: GAMMEX

## (undated) DEVICE — CONNECTOR PERF 3/8X0.25IN REDUC STR

## (undated) DEVICE — CONTAINER,SPECIMEN,OR STERILE,4OZ: Brand: MEDLINE

## (undated) DEVICE — CLIP INT USE SM TI LIG HORZ

## (undated) DEVICE — SHEET, T, LAPAROTOMY, STERILE: Brand: MEDLINE

## (undated) DEVICE — PATIENT RETURN ELECTRODE, SINGLE-USE, CONTACT QUALITY MONITORING, ADULT, WITH 9FT CORD, FOR PATIENTS WEIGING OVER 33LBS. (15KG): Brand: MEGADYNE

## (undated) DEVICE — CLIP LIG M BLU TI HRT SHP WRE HORZ 600 PER BX

## (undated) DEVICE — PD DEFIB QUIK COMB REDI-PK

## (undated) DEVICE — TRAP MCS 40ML 5IN PLS SCR CAP

## (undated) NOTE — LETTER
[facility] 3W/SW  [address] [facility] [address]  [phone]    Blood Transfusion Consent    In the course of your treatment, it may become necessary to administer a transfusion of blood or blood components. This form provides basic information concerning this procedure and, if signed by you, authorizes its administration. By signing this form, you agree that all of your questions about the administration of blood or blood products have been answered by the ordering medical professional or designee.    Description of Procedure  Blood is introduced into one of your veins, commonly in the arm, using a sterilized disposable needle. The amount of blood transfused, and whether the transfusion will be of blood or blood components is a judgement the physician will make based on your particular needs.    Risks  The transfusion is a common procedure of low risk.  MINOR AND TEMPORARY REACTIONS ARE NOT UNCOMMON, including a slight bruise, swelling or local reaction in the area where the needle pierces your skin, or a nonserious reaction to the transfused material itself, including headache, fever or mild skin reaction, such as rash.  Serious reactions are possible, though very unlikely, and include severe allergic reaction (shock) and destruction (hemolysis) of transfused blood cells.  Infectious diseases which are known to be transmitted by blood transfusion include certain types of viral Hepatitis(liver infection from a virus), Human Immunodeficiency Virus (HIV-1,2) infection, a viral infection known to cause Acquired Immunodeficiency Syndrome (AIDS), as well as certain other bacterial, viral, and parasitic diseases. While a minimal risk of acquiring an infectious disease from transfused blood exists, in accordance with the Federal and State law, all due care has been taken in donor selection and testing to avoid transmission of disease.    Alternatives  If loss of blood poses serious threats during your  treatment, THERE IS NO EFFECTIVE ALTERNATIVE TO BLOOD TRANSFUSION. However, if you have any further questions on this matter, your provider will fully explain the alternatives to you if it has not already been done.    I, ______________________________, have read/had read to me the above. I understand the matters bearing on the decision whether or not to authorize a transfusion of blood or blood components. I have no questions which have not been answered to my full satisfaction. I hereby consent to such transfusion as my physician may deem necessary or advisable in the course of my treatment.    ______________________________________________                    ___________________________  (Signature of Patient or Responsible party in case of minor,                 (Printed Name of Patient or incompetent, or unconscious patient)              Responsible Party)    ___________________________               _____________________  (Relationship to Patient if not self)                                    (Date and Time)    __________________________                                                           ______________________              (Signature of Witness)               (Printed Name of Witness)     Language line ()    Telephone/Verbal/Video Consent    __________________________                     ____________________  (Signature of 2nd Witness           (Printed Name of 2nd  Telephone/Verbal/Video Consent)           Witness)    Patient Name: Kelli Fisher     : 1955                 Printed: May 14, 2025     Medical Record #: [id number]      Rev: 2023

## (undated) NOTE — LETTER
[place] [address]  Authorization for Invasive Procedures  Date: 5/14/25           Time: 1030    I hereby authorize Dr. Hopkins/ Dr. Ragsdale, my physician and his/her assistants (if applicable), which may include medical students, residents, and/or fellows, to perform the following surgical operation/ procedure and administer such anesthesia as may be determined necessary by my physician: Inferior Vena Cava Filter Placement on Kelli Barrowin  2.   I recognize that during the surgical operation/procedure, unforeseen conditions may necessitate additional or different procedures than those listed above.  I, therefore, further authorize and request that the above-named surgeon, assistants, or designees perform such procedures as are, in their judgment, necessary and desirable.    3.   My surgeon/physician has discussed prior to my surgery the potential benefits, risks and side effects of this procedure; the likelihood of achieving goals; and potential problems that might occur during recuperation.  They also discussed reasonable alternatives to the procedure, including risks, benefits, and side effects related to the alternatives and risks related to not receiving this procedure.  I have had all my questions answered and I acknowledge that no guarantee has been made as to the result that may be obtained.    4.   Should the need arise during my operation/procedure, which includes change of level of care prior to discharge, I also consent to the administration of blood and/or blood products.  Further, I understand that despite careful testing and screening of blood or blood products by collecting agencies, I may still be subject to ill effects as a result of receiving a blood transfusion and/or blood products.  The following are some, but not all, of the potential risks that can occur: fever and allergic reactions, hemolytic reactions, transmission of diseases such as Hepatitis, AIDS and  Cytomegalovirus (CMV) and fluid overload.  In the event that I wish to have an autologous transfusion of my own blood, or a directed donor transfusion, I will discuss this with my physician.   Check only if Refusing Blood or Blood Products  I understand refusal of blood or blood products as deemed necessary by my physician may have serious consequences to my condition to include possible death. I hereby assume responsibility for my refusal and release the hospital, its personnel, and my physicians from any responsibility for the consequences of my refusal.         o  Refuse         5.   I authorize the use of any specimen, organs, tissues, body parts or foreign objects that may be removed from my body during the operation/procedure for diagnosis, research or teaching purposes and their subsequent disposal by hospital authorities.  I also authorize the release of specimen test results and/or written reports to my treating physician on the hospital medical staff or other referring or consulting physicians involved in my care, at the discretion of the Pathologist or my treating physician.    6.   I consent to the photographing or videotaping of the operations or procedures to be performed, including appropriate portions of my body for medical, scientific, or educational purposes, provided my identity is not revealed by the pictures or by descriptive texts accompanying them.  If the procedure has been photographed/videotaped, the surgeon will obtain the original picture, image, videotape or CD.  The hospital will not be responsible for storage, release or maintenance of the picture, image, tape or CD.    7.   I consent to the presence of a  or observers in the operating room as deemed necessary by my physician or their designees.    8.   I recognize that in the event my procedure results in extended X-Ray/fluoroscopy time, I may develop a skin reaction.    9. If I have a Do Not Attempt Resuscitation  (DNAR) order in place, that status will be suspended while in the operating room, procedural suite, and during the recovery period unless otherwise explicitly stated by me (or a person authorized to consent on my behalf). The surgeon or my attending physician will determine when the applicable recovery period ends for purposes of reinstating the DNAR order.  10. Patients having a sterilization procedure: I understand that if the procedure is successful the results will be permanent and it will therefore be impossible for me to inseminate, conceive, or bear children.  I also understand that the procedure is intended to result in sterility, although the result has not been guaranteed.   11. I acknowledge that my physician has explained sedation/analgesia administration to me including the risk and benefits I consent to the administration of sedation/analgesia as may be necessary or desirable in the judgment of my physician.    I CERTIFY THAT I HAVE READ AND FULLY UNDERSTAND THE ABOVE CONSENT TO OPERATION and/or OTHER PROCEDURE.        ____________________________________       _________________________________      ______________________________  Signature of Patient         Signature of Responsible Person        Printed Name of Responsible Person        ____________________________________      _________________________________      ______________________________       Signature of Witness          Relationship to Patient                       Date                                       Time  Patient Name: Kelli Fisher  : 1955    Reviewed: 2024   Printed: May 14, 2025  Medical Record #: [id number] Page 1 of 2             STATEMENT OF PHYSICIAN My signature below affirms that prior to the time of the procedure; I have explained to the patient and/or his/her legal representative, the risks and benefits involved in the proposed treatment and any reasonable alternative to the proposed treatment. I have also  explained the risks and benefits involved in refusal of the proposed treatment and alternatives to the proposed treatment and have answered the patient's questions. If I have a significant financial interest in a co-management agreement or a significant financial interest in any product or implant, or other significant relationship used in this procedure/surgery, I have disclosed this and had a discussion with my patient.     _______________________________________________________________ _____________________________  (Signature of Physician)                                                                                         (Date)                                   (Time)  Patient Name: Kelli Fisher  : 1955    Reviewed: 2024   Printed: May 14, 2025  Medical Record #: [id number] Page 2 of 2

## (undated) NOTE — LETTER
[facility] 3W/SW  [address] [facility] [address]  [phone]    Blood Transfusion Consent    In the course of your treatment, it may become necessary to administer a transfusion of blood or blood components. This form provides basic information concerning this procedure and, if signed by you, authorizes its administration. By signing this form, you agree that all of your questions about the administration of blood or blood products have been answered by the ordering medical professional or designee.    Description of Procedure  Blood is introduced into one of your veins, commonly in the arm, using a sterilized disposable needle. The amount of blood transfused, and whether the transfusion will be of blood or blood components is a judgement the physician will make based on your particular needs.    Risks  The transfusion is a common procedure of low risk.  MINOR AND TEMPORARY REACTIONS ARE NOT UNCOMMON, including a slight bruise, swelling or local reaction in the area where the needle pierces your skin, or a nonserious reaction to the transfused material itself, including headache, fever or mild skin reaction, such as rash.  Serious reactions are possible, though very unlikely, and include severe allergic reaction (shock) and destruction (hemolysis) of transfused blood cells.  Infectious diseases which are known to be transmitted by blood transfusion include certain types of viral Hepatitis(liver infection from a virus), Human Immunodeficiency Virus (HIV-1,2) infection, a viral infection known to cause Acquired Immunodeficiency Syndrome (AIDS), as well as certain other bacterial, viral, and parasitic diseases. While a minimal risk of acquiring an infectious disease from transfused blood exists, in accordance with the Federal and State law, all due care has been taken in donor selection and testing to avoid transmission of disease.    Alternatives  If loss of blood poses serious threats during your  treatment, THERE IS NO EFFECTIVE ALTERNATIVE TO BLOOD TRANSFUSION. However, if you have any further questions on this matter, your provider will fully explain the alternatives to you if it has not already been done.    I, ______________________________, have read/had read to me the above. I understand the matters bearing on the decision whether or not to authorize a transfusion of blood or blood components. I have no questions which have not been answered to my full satisfaction. I hereby consent to such transfusion as my physician may deem necessary or advisable in the course of my treatment.    ______________________________________________                    ___________________________  (Signature of Patient or Responsible party in case of minor,                 (Printed Name of Patient or incompetent, or unconscious patient)              Responsible Party)    ___________________________               _____________________  (Relationship to Patient if not self)                                    (Date and Time)    __________________________                                                           ______________________              (Signature of Witness)               (Printed Name of Witness)     Language line ()    Telephone/Verbal/Video Consent    __________________________                     ____________________  (Signature of 2nd Witness           (Printed Name of 2nd  Telephone/Verbal/Video Consent)           Witness)    Patient Name: Kelli Fisher     : 1955                 Printed: May 13, 2025     Medical Record #: [id number]      Rev: 2023

## (undated) NOTE — LETTER
[place] [address]  Authorization for Invasive Procedures  Date: 4/14/2025           Time: 0800    I hereby authorize Dr Ragsdale, my physician and his/her assistants (if applicable), which may include medical students, residents, and/or fellows, to perform the following surgical operation/ procedure and administer such anesthesia as may be determined necessary by my physician: Ultrasound guided supraclavicular lymph node biopsy  on Kelli Fisher  2.   I recognize that during the surgical operation/procedure, unforeseen conditions may necessitate additional or different procedures than those listed above.  I, therefore, further authorize and request that the above-named surgeon, assistants, or designees perform such procedures as are, in their judgment, necessary and desirable.    3.   My surgeon/physician has discussed prior to my surgery the potential benefits, risks and side effects of this procedure; the likelihood of achieving goals; and potential problems that might occur during recuperation.  They also discussed reasonable alternatives to the procedure, including risks, benefits, and side effects related to the alternatives and risks related to not receiving this procedure.  I have had all my questions answered and I acknowledge that no guarantee has been made as to the result that may be obtained.    4.   Should the need arise during my operation/procedure, which includes change of level of care prior to discharge, I also consent to the administration of blood and/or blood products.  Further, I understand that despite careful testing and screening of blood or blood products by collecting agencies, I may still be subject to ill effects as a result of receiving a blood transfusion and/or blood products.  The following are some, but not all, of the potential risks that can occur: fever and allergic reactions, hemolytic reactions, transmission of diseases such as Hepatitis, AIDS and  Cytomegalovirus (CMV) and fluid overload.  In the event that I wish to have an autologous transfusion of my own blood, or a directed donor transfusion, I will discuss this with my physician.   Check only if Refusing Blood or Blood Products  I understand refusal of blood or blood products as deemed necessary by my physician may have serious consequences to my condition to include possible death. I hereby assume responsibility for my refusal and release the hospital, its personnel, and my physicians from any responsibility for the consequences of my refusal.         o  Refuse         5.   I authorize the use of any specimen, organs, tissues, body parts or foreign objects that may be removed from my body during the operation/procedure for diagnosis, research or teaching purposes and their subsequent disposal by hospital authorities.  I also authorize the release of specimen test results and/or written reports to my treating physician on the hospital medical staff or other referring or consulting physicians involved in my care, at the discretion of the Pathologist or my treating physician.    6.   I consent to the photographing or videotaping of the operations or procedures to be performed, including appropriate portions of my body for medical, scientific, or educational purposes, provided my identity is not revealed by the pictures or by descriptive texts accompanying them.  If the procedure has been photographed/videotaped, the surgeon will obtain the original picture, image, videotape or CD.  The hospital will not be responsible for storage, release or maintenance of the picture, image, tape or CD.    7.   I consent to the presence of a  or observers in the operating room as deemed necessary by my physician or their designees.    8.   I recognize that in the event my procedure results in extended X-Ray/fluoroscopy time, I may develop a skin reaction.    9. If I have a Do Not Attempt Resuscitation  (DNAR) order in place, that status will be suspended while in the operating room, procedural suite, and during the recovery period unless otherwise explicitly stated by me (or a person authorized to consent on my behalf). The surgeon or my attending physician will determine when the applicable recovery period ends for purposes of reinstating the DNAR order.  10. Patients having a sterilization procedure: I understand that if the procedure is successful the results will be permanent and it will therefore be impossible for me to inseminate, conceive, or bear children.  I also understand that the procedure is intended to result in sterility, although the result has not been guaranteed.   11. I acknowledge that my physician has explained sedation/analgesia administration to me including the risk and benefits I consent to the administration of sedation/analgesia as may be necessary or desirable in the judgment of my physician.    I CERTIFY THAT I HAVE READ AND FULLY UNDERSTAND THE ABOVE CONSENT TO OPERATION and/or OTHER PROCEDURE.        ____________________________________       _________________________________      ______________________________  Signature of Patient         Signature of Responsible Person        Printed Name of Responsible Person        ____________________________________      _________________________________      ______________________________       Signature of Witness          Relationship to Patient                       Date                                       Time  Patient Name: Kelli Fisher  : 1955    Reviewed: 2024   Printed: 2025  Medical Record #: [id number] Page 1 of 2             STATEMENT OF PHYSICIAN My signature below affirms that prior to the time of the procedure; I have explained to the patient and/or his/her legal representative, the risks and benefits involved in the proposed treatment and any reasonable alternative to the proposed treatment. I have also  explained the risks and benefits involved in refusal of the proposed treatment and alternatives to the proposed treatment and have answered the patient's questions. If I have a significant financial interest in a co-management agreement or a significant financial interest in any product or implant, or other significant relationship used in this procedure/surgery, I have disclosed this and had a discussion with my patient.     _______________________________________________________________ _____________________________  (Signature of Physician)                                                                                         (Date)                                   (Time)  Patient Name: Kelli Fisher  : 1955    Reviewed: 2024   Printed: 2025  Medical Record #: [id number] Page 2 of 2

## (undated) NOTE — LETTER
[address], [place], [place]    Authorization for Surgical Operation and Procedure                               I hereby authorize Bebo Yu MD, my physician and his/her assistants (if applicable), which may include medical students, residents, and/or fellows, to perform the following surgical operation/ procedure and administer such anesthesia as may be determined necessary by my physician: Operation/Procedure name (s) SUBXIPHOID PERICARDIAL WINDOW AND DRAINAGE on Kelli Phillip   2.   I recognize that during the surgical operation/procedure, unforeseen conditions may necessitate additional or different procedures than those listed above.  I, therefore, further authorize and request that the above-named surgeon, assistants, or designees perform such procedures as are, in their judgment, necessary and desirable.    3.   My surgeon/physician has discussed prior to my surgery the potential benefits, risks and side effects of this procedure; the likelihood of achieving goals; and potential problems that might occur during recuperation.  They also discussed reasonable alternatives to the procedure, including risks, benefits, and side effects related to the alternatives and risks related to not receiving this procedure.  I have had all my questions answered and I acknowledge that no guarantee has been made as to the result that may be obtained.    4.   Should the need arise during my operation/procedure, which includes change of level of care prior to discharge, I also consent to the administration of blood and/or blood products.  Further, I understand that despite careful testing and screening of blood or blood products by collecting agencies, I may still be subject to ill effects as a result of receiving a blood transfusion and/or blood products.  The following are some, but not all, of the potential risks that can occur: fever and allergic reactions, hemolytic reactions,  transmission of diseases such as Hepatitis, AIDS and Cytomegalovirus (CMV) and fluid overload.  In the event that I wish to have an autologous transfusion of my own blood, or a directed donor transfusion, I will discuss this with my physician.  Check only if Refusing Blood or Blood Products  I understand refusal of blood or blood products as deemed necessary by my physician may have serious consequences to my condition to include possible death. I hereby assume responsibility for my refusal and release the hospital, its personnel, and my physicians from any responsibility for the consequences of my refusal.    o  Refuse   5.   I authorize the use of any specimen, organs, tissues, body parts or foreign objects that may be removed from my body during the operation/procedure for diagnosis, research or teaching purposes and their subsequent disposal by hospital authorities.  I also authorize the release of specimen test results and/or written reports to my treating physician on the hospital medical staff or other referring or consulting physicians involved in my care, at the discretion of the Pathologist or my treating physician.    6.   I consent to the photographing or videotaping of the operations or procedures to be performed, including appropriate portions of my body for medical, scientific, or educational purposes, provided my identity is not revealed by the pictures or by descriptive texts accompanying them.  If the procedure has been photographed/videotaped, the surgeon will obtain the original picture, image, videotape or CD.  The hospital will not be responsible for storage, release or maintenance of the picture, image, tape or CD.    7.   I consent to the presence of a  or observers in the operating room as deemed necessary by my physician or their designees.    8.   I recognize that in the event my procedure results in extended X-Ray/fluoroscopy time, I may develop a skin reaction.    9. If  I have a Do Not Attempt Resuscitation (DNAR) order in place, that status will be suspended while in the operating room, procedural suite, and during the recovery period unless otherwise explicitly stated by me (or a person authorized to consent on my behalf). The surgeon or my attending physician will determine when the applicable recovery period ends for purposes of reinstating the DNAR order.  10. Patients having a sterilization procedure: I understand that if the procedure is successful the results will be permanent and it will therefore be impossible for me to inseminate, conceive, or bear children.  I also understand that the procedure is intended to result in sterility, although the result has not been guaranteed.   11. I acknowledge that my physician has explained sedation/analgesia administration to me including the risk and benefits I consent to the administration of sedation/analgesia as may be necessary or desirable in the judgment of my physician.    I CERTIFY THAT I HAVE READ AND FULLY UNDERSTAND THE ABOVE CONSENT TO OPERATION and/or OTHER PROCEDURE.     ____________________________________  _________________________________        ______________________________  Signature of Patient    Signature of Responsible Person                Printed Name of Responsible Person                                      ____________________________________  _____________________________                ________________________________  Signature of Witness        Date  Time         Relationship to Patient    STATEMENT OF PHYSICIAN My signature below affirms that prior to the time of the procedure; I have explained to the patient and/or his/her legal representative, the risks and benefits involved in the proposed treatment and any reasonable alternative to the proposed treatment. I have also explained the risks and benefits involved in refusal of the proposed treatment and alternatives to the proposed treatment and have  answered the patient's questions. If I have a significant financial interest in a co-management agreement or a significant financial interest in any product or implant, or other significant relationship used in this procedure/surgery, I have disclosed this and had a discussion with my patient.     _____________________________________________________              _____________________________  (Signature of Physician)                                                                                         (Date)                                   (Time)  Patient Name: Kelli Fisher      : 1955      Printed: 2025     Medical Record #: [id number]                                      Page 1 of 1

## (undated) NOTE — LETTER
[address], [place], [place]    Authorization for Surgical Operation and Procedure                               I hereby authorize Dr. Yu, my physician and his/her assistants (if applicable), which may include medical students, residents, and/or fellows, to perform the following surgical operation/ procedure -pericardial window and administer such anesthesia as may be determined necessary by my physician: Operation/Procedure name (s)  on Kelli Fisher   2.   I recognize that during the surgical operation/procedure, unforeseen conditions may necessitate additional or different procedures than those listed above.  I, therefore, further authorize and request that the above-named surgeon, assistants, or designees perform such procedures as are, in their judgment, necessary and desirable.    3.   My surgeon/physician has discussed prior to my surgery the potential benefits, risks and side effects of this procedure; the likelihood of achieving goals; and potential problems that might occur during recuperation.  They also discussed reasonable alternatives to the procedure, including risks, benefits, and side effects related to the alternatives and risks related to not receiving this procedure.  I have had all my questions answered and I acknowledge that no guarantee has been made as to the result that may be obtained.    4.   Should the need arise during my operation/procedure, which includes change of level of care prior to discharge, I also consent to the administration of blood and/or blood products.  Further, I understand that despite careful testing and screening of blood or blood products by collecting agencies, I may still be subject to ill effects as a result of receiving a blood transfusion and/or blood products.  The following are some, but not all, of the potential risks that can occur: fever and allergic reactions, hemolytic reactions, transmission of diseases such as  Hepatitis, AIDS and Cytomegalovirus (CMV) and fluid overload.  In the event that I wish to have an autologous transfusion of my own blood, or a directed donor transfusion, I will discuss this with my physician.  Check only if Refusing Blood or Blood Products  I understand refusal of blood or blood products as deemed necessary by my physician may have serious consequences to my condition to include possible death. I hereby assume responsibility for my refusal and release the hospital, its personnel, and my physicians from any responsibility for the consequences of my refusal.    o  Refuse   5.   I authorize the use of any specimen, organs, tissues, body parts or foreign objects that may be removed from my body during the operation/procedure for diagnosis, research or teaching purposes and their subsequent disposal by hospital authorities.  I also authorize the release of specimen test results and/or written reports to my treating physician on the hospital medical staff or other referring or consulting physicians involved in my care, at the discretion of the Pathologist or my treating physician.    6.   I consent to the photographing or videotaping of the operations or procedures to be performed, including appropriate portions of my body for medical, scientific, or educational purposes, provided my identity is not revealed by the pictures or by descriptive texts accompanying them.  If the procedure has been photographed/videotaped, the surgeon will obtain the original picture, image, videotape or CD.  The hospital will not be responsible for storage, release or maintenance of the picture, image, tape or CD.    7.   I consent to the presence of a  or observers in the operating room as deemed necessary by my physician or their designees.    8.   I recognize that in the event my procedure results in extended X-Ray/fluoroscopy time, I may develop a skin reaction.    9. If I have a Do Not Attempt  Resuscitation (DNAR) order in place, that status will be suspended while in the operating room, procedural suite, and during the recovery period unless otherwise explicitly stated by me (or a person authorized to consent on my behalf). The surgeon or my attending physician will determine when the applicable recovery period ends for purposes of reinstating the DNAR order.  10. Patients having a sterilization procedure: I understand that if the procedure is successful the results will be permanent and it will therefore be impossible for me to inseminate, conceive, or bear children.  I also understand that the procedure is intended to result in sterility, although the result has not been guaranteed.   11. I acknowledge that my physician has explained sedation/analgesia administration to me including the risk and benefits I consent to the administration of sedation/analgesia as may be necessary or desirable in the judgment of my physician.    I CERTIFY THAT I HAVE READ AND FULLY UNDERSTAND THE ABOVE CONSENT TO OPERATION and/or OTHER PROCEDURE.     ____________________________________  _________________________________        ______________________________  Signature of Patient    Signature of Responsible Person                Printed Name of Responsible Person                                      ____________________________________  _____________________________                ________________________________  Signature of Witness        Date  Time         Relationship to Patient    STATEMENT OF PHYSICIAN My signature below affirms that prior to the time of the procedure; I have explained to the patient and/or his/her legal representative, the risks and benefits involved in the proposed treatment and any reasonable alternative to the proposed treatment. I have also explained the risks and benefits involved in refusal of the proposed treatment and alternatives to the proposed treatment and have answered the patient's  questions. If I have a significant financial interest in a co-management agreement or a significant financial interest in any product or implant, or other significant relationship used in this procedure/surgery, I have disclosed this and had a discussion with my patient.     _____________________________________________________              _____________________________  (Signature of Physician)                                                                                         (Date)                                   (Time)  Patient Name: Kelli Fisher      : 1955      Printed: 2025     Medical Record #: [id number]                                      Page 1 of 1

## (undated) NOTE — LETTER
[place] [address]  Authorization for Invasive Procedures  Date: 05/05/2025           Time: 0951    I hereby authorize Dr. Hopkins/Dr Parker, my physician and his/her assistants (if applicable), which may include medical students, residents, and/or fellows, to perform the following surgical operation/ procedure and administer such anesthesia as may be determined necessary by my physician: port insertion on Kelli Phillip  2.   I recognize that during the surgical operation/procedure, unforeseen conditions may necessitate additional or different procedures than those listed above.  I, therefore, further authorize and request that the above-named surgeon, assistants, or designees perform such procedures as are, in their judgment, necessary and desirable.    3.   My surgeon/physician has discussed prior to my surgery the potential benefits, risks and side effects of this procedure; the likelihood of achieving goals; and potential problems that might occur during recuperation.  They also discussed reasonable alternatives to the procedure, including risks, benefits, and side effects related to the alternatives and risks related to not receiving this procedure.  I have had all my questions answered and I acknowledge that no guarantee has been made as to the result that may be obtained.    4.   Should the need arise during my operation/procedure, which includes change of level of care prior to discharge, I also consent to the administration of blood and/or blood products.  Further, I understand that despite careful testing and screening of blood or blood products by collecting agencies, I may still be subject to ill effects as a result of receiving a blood transfusion and/or blood products.  The following are some, but not all, of the potential risks that can occur: fever and allergic reactions, hemolytic reactions, transmission of diseases such as Hepatitis, AIDS and Cytomegalovirus (CMV) and  fluid overload.  In the event that I wish to have an autologous transfusion of my own blood, or a directed donor transfusion, I will discuss this with my physician.   Check only if Refusing Blood or Blood Products  I understand refusal of blood or blood products as deemed necessary by my physician may have serious consequences to my condition to include possible death. I hereby assume responsibility for my refusal and release the hospital, its personnel, and my physicians from any responsibility for the consequences of my refusal.         o  Refuse         5.   I authorize the use of any specimen, organs, tissues, body parts or foreign objects that may be removed from my body during the operation/procedure for diagnosis, research or teaching purposes and their subsequent disposal by hospital authorities.  I also authorize the release of specimen test results and/or written reports to my treating physician on the hospital medical staff or other referring or consulting physicians involved in my care, at the discretion of the Pathologist or my treating physician.    6.   I consent to the photographing or videotaping of the operations or procedures to be performed, including appropriate portions of my body for medical, scientific, or educational purposes, provided my identity is not revealed by the pictures or by descriptive texts accompanying them.  If the procedure has been photographed/videotaped, the surgeon will obtain the original picture, image, videotape or CD.  The hospital will not be responsible for storage, release or maintenance of the picture, image, tape or CD.    7.   I consent to the presence of a  or observers in the operating room as deemed necessary by my physician or their designees.    8.   I recognize that in the event my procedure results in extended X-Ray/fluoroscopy time, I may develop a skin reaction.    9. If I have a Do Not Attempt Resuscitation (DNAR) order in place, that  status will be suspended while in the operating room, procedural suite, and during the recovery period unless otherwise explicitly stated by me (or a person authorized to consent on my behalf). The surgeon or my attending physician will determine when the applicable recovery period ends for purposes of reinstating the DNAR order.  10. Patients having a sterilization procedure: I understand that if the procedure is successful the results will be permanent and it will therefore be impossible for me to inseminate, conceive, or bear children.  I also understand that the procedure is intended to result in sterility, although the result has not been guaranteed.   11. I acknowledge that my physician has explained sedation/analgesia administration to me including the risk and benefits I consent to the administration of sedation/analgesia as may be necessary or desirable in the judgment of my physician.    I CERTIFY THAT I HAVE READ AND FULLY UNDERSTAND THE ABOVE CONSENT TO OPERATION and/or OTHER PROCEDURE.        ____________________________________       _________________________________      ______________________________  Signature of Patient         Signature of Responsible Person        Printed Name of Responsible Person        ____________________________________      _________________________________      ______________________________       Signature of Witness          Relationship to Patient                       Date                                       Time  Patient Name: Kelli Fisher  : 1955    Reviewed: 2024   Printed: May 5, 2025  Medical Record #: [id number] Page 1 of 2             STATEMENT OF PHYSICIAN My signature below affirms that prior to the time of the procedure; I have explained to the patient and/or his/her legal representative, the risks and benefits involved in the proposed treatment and any reasonable alternative to the proposed treatment. I have also explained the risks and  benefits involved in refusal of the proposed treatment and alternatives to the proposed treatment and have answered the patient's questions. If I have a significant financial interest in a co-management agreement or a significant financial interest in any product or implant, or other significant relationship used in this procedure/surgery, I have disclosed this and had a discussion with my patient.     _______________________________________________________________ _____________________________  (Signature of Physician)                                                                                         (Date)                                   (Time)  Patient Name: Kelli Fisher  : 1955    Reviewed: 2024   Printed: May 5, 2025  Medical Record #: [id number] Page 2 of 2

## (undated) NOTE — LETTER
[address], [place], [place]    Authorization for Surgical Operation and Procedure                               I hereby authorize Bebo Yu MD, my physician and his/her assistants (if applicable), which may include medical students, residents, and/or fellows, to perform the following surgical operation/ procedure and administer such anesthesia as may be determined necessary by my physician: Operation/Procedure name (s) SUBXIPHOID PERICARDIAL WINDOW AND DRAINAGE on Kelli Phillip   2.   I recognize that during the surgical operation/procedure, unforeseen conditions may necessitate additional or different procedures than those listed above.  I, therefore, further authorize and request that the above-named surgeon, assistants, or designees perform such procedures as are, in their judgment, necessary and desirable.    3.   My surgeon/physician has discussed prior to my surgery the potential benefits, risks and side effects of this procedure; the likelihood of achieving goals; and potential problems that might occur during recuperation.  They also discussed reasonable alternatives to the procedure, including risks, benefits, and side effects related to the alternatives and risks related to not receiving this procedure.  I have had all my questions answered and I acknowledge that no guarantee has been made as to the result that may be obtained.    4.   Should the need arise during my operation/procedure, which includes change of level of care prior to discharge, I also consent to the administration of blood and/or blood products.  Further, I understand that despite careful testing and screening of blood or blood products by collecting agencies, I may still be subject to ill effects as a result of receiving a blood transfusion and/or blood products.  The following are some, but not all, of the potential risks that can occur: fever and allergic reactions, hemolytic reactions,  transmission of diseases such as Hepatitis, AIDS and Cytomegalovirus (CMV) and fluid overload.  In the event that I wish to have an autologous transfusion of my own blood, or a directed donor transfusion, I will discuss this with my physician.  Check only if Refusing Blood or Blood Products  I understand refusal of blood or blood products as deemed necessary by my physician may have serious consequences to my condition to include possible death. I hereby assume responsibility for my refusal and release the hospital, its personnel, and my physicians from any responsibility for the consequences of my refusal.    o  Refuse   5.   I authorize the use of any specimen, organs, tissues, body parts or foreign objects that may be removed from my body during the operation/procedure for diagnosis, research or teaching purposes and their subsequent disposal by hospital authorities.  I also authorize the release of specimen test results and/or written reports to my treating physician on the hospital medical staff or other referring or consulting physicians involved in my care, at the discretion of the Pathologist or my treating physician.    6.   I consent to the photographing or videotaping of the operations or procedures to be performed, including appropriate portions of my body for medical, scientific, or educational purposes, provided my identity is not revealed by the pictures or by descriptive texts accompanying them.  If the procedure has been photographed/videotaped, the surgeon will obtain the original picture, image, videotape or CD.  The hospital will not be responsible for storage, release or maintenance of the picture, image, tape or CD.    7.   I consent to the presence of a  or observers in the operating room as deemed necessary by my physician or their designees.    8.   I recognize that in the event my procedure results in extended X-Ray/fluoroscopy time, I may develop a skin reaction.    9. If  I have a Do Not Attempt Resuscitation (DNAR) order in place, that status will be suspended while in the operating room, procedural suite, and during the recovery period unless otherwise explicitly stated by me (or a person authorized to consent on my behalf). The surgeon or my attending physician will determine when the applicable recovery period ends for purposes of reinstating the DNAR order.  10. Patients having a sterilization procedure: I understand that if the procedure is successful the results will be permanent and it will therefore be impossible for me to inseminate, conceive, or bear children.  I also understand that the procedure is intended to result in sterility, although the result has not been guaranteed.   11. I acknowledge that my physician has explained sedation/analgesia administration to me including the risk and benefits I consent to the administration of sedation/analgesia as may be necessary or desirable in the judgment of my physician.    I CERTIFY THAT I HAVE READ AND FULLY UNDERSTAND THE ABOVE CONSENT TO OPERATION and/or OTHER PROCEDURE.     ____________________________________  _________________________________        ______________________________  Signature of Patient    Signature of Responsible Person                Printed Name of Responsible Person                                      ____________________________________  _____________________________                ________________________________  Signature of Witness        Date  Time         Relationship to Patient    STATEMENT OF PHYSICIAN My signature below affirms that prior to the time of the procedure; I have explained to the patient and/or his/her legal representative, the risks and benefits involved in the proposed treatment and any reasonable alternative to the proposed treatment. I have also explained the risks and benefits involved in refusal of the proposed treatment and alternatives to the proposed treatment and have  answered the patient's questions. If I have a significant financial interest in a co-management agreement or a significant financial interest in any product or implant, or other significant relationship used in this procedure/surgery, I have disclosed this and had a discussion with my patient.     _____________________________________________________              _____________________________  (Signature of Physician)                                                                                         (Date)                                   (Time)  Patient Name: Kelli Fisher      : 1955      Printed: 2025     Medical Record #: [id number]                                      Page 1 of 1

## (undated) NOTE — LETTER
[facility]  Administration of Anesthesia  IKelli agree to be cared for by a physician anesthesiologist alone and/or with a nurse anesthetist, who is specially trained to monitor me and give me medicine to put me to sleep or keep me comfortable during my procedure    I understand that my anesthesiologist and/or anesthetist is not an employee or agent of [facility] or [employer]. He or she works for [facility] [employer] [facility]    As the patient asking for anesthesia services, I agree to:  Allow the anesthesiologist (anesthesia doctor) to give me medicine and do additional procedures as necessary. Some examples are: Starting or using an “IV” to give me medicine, fluids or blood during my procedure, and having a breathing tube placed to help me breathe when I’m asleep (intubation). In the event that my heart stops working properly, I understand that my anesthesiologist will make every effort to sustain my life, unless otherwise directed by [facility] Do Not Resuscitate documents.  Tell my anesthesia doctor before my procedure:  If I am pregnant.  The last time that I ate or drank.  iii. All of the medicines I take (including prescriptions, herbal supplements, and pills I can buy without a prescription (including street drugs/illegal medications). Failure to inform my anesthesiologist about these medicines may increase my risk of anesthetic complications.  iv.If I am allergic to anything or have had a reaction to anesthesia before.  I understand how the anesthesia medicine will help me (benefits).  I understand that with any type of anesthesia medicine there are risks:  The most common risks are: nausea, vomiting, sore throat, muscle soreness, damage to my eyes, mouth, or teeth (from breathing tube placement).  Rare risks include: remembering what happened during my procedure, allergic reactions to medications, injury to my airway, heart, lungs, vision, nerves, or muscles  and in extremely rare instances death.  My doctor has explained to me other choices available to me for my care (alternatives).  Pregnant Patients (“epidural”):  I understand that the risks of having an epidural (medicine given into my back to help control pain during labor), include itching, low blood pressure, difficulty urinating, headache or slowing of the baby’s heart. Very rare risks include infection, bleeding, seizure, irregular heart rhythms and nerve injury.  Regional Anesthesia (“spinal”, “epidural”, & “nerve blocks”):  I understand that rare but potential complications include headache, bleeding, infection, seizure, irregular heart rhythms, and nerve injury.    _____________________________________________________________________________  Patient (or Representative) Signature/Relationship to Patient  Date   Time    _____________________________________________________________________________   Name (if used)    Language/Organization   Time    _____________________________________________________________________________  Nurse Anesthetist Signature     Date   Time  _____________________________________________________________________________  Anesthesiologist Signature     Date   Time  I have discussed the procedure and information above with the patient (or patient’s representative) and answered their questions. The patient or their representative has agreed to have anesthesia services.    _____________________________________________________________________________  Witness        Date   Time  I have verified that the signature is that of the patient or patient’s representative, and that it was signed before the procedure  Patient Name: Kelli Fisher     : 1955                 Printed: 2025 at 9:29 AM    Medical Record #: [id number]                                            Page 1 of 1  ----------ANESTHESIA CONSENT----------

## (undated) NOTE — LETTER
[facility] CV SURGERY  [address]  [facility] [address]  [phone]    Blood Transfusion Consent    In the course of your treatment, it may become necessary to administer a transfusion of blood or blood components. This form provides basic information concerning this procedure and, if signed by you, authorizes its administration. By signing this form, you agree that all of your questions about the administration of blood or blood products have been answered by the ordering medical professional or designee.    Description of Procedure  Blood is introduced into one of your veins, commonly in the arm, using a sterilized disposable needle. The amount of blood transfused, and whether the transfusion will be of blood or blood components is a judgement the physician will make based on your particular needs.    Risks  The transfusion is a common procedure of low risk.  MINOR AND TEMPORARY REACTIONS ARE NOT UNCOMMON, including a slight bruise, swelling or local reaction in the area where the needle pierces your skin, or a nonserious reaction to the transfused material itself, including headache, fever or mild skin reaction, such as rash.  Serious reactions are possible, though very unlikely, and include severe allergic reaction (shock) and destruction (hemolysis) of transfused blood cells.  Infectious diseases which are known to be transmitted by blood transfusion include certain types of viral Hepatitis(liver infection from a virus), Human Immunodeficiency Virus (HIV-1,2) infection, a viral infection known to cause Acquired Immunodeficiency Syndrome (AIDS), as well as certain other bacterial, viral, and parasitic diseases. While a minimal risk of acquiring an infectious disease from transfused blood exists, in accordance with the Federal and State law, all due care has been taken in donor selection and testing to avoid transmission of disease.    Alternatives  If loss of blood poses serious threats during  your treatment, THERE IS NO EFFECTIVE ALTERNATIVE TO BLOOD TRANSFUSION. However, if you have any further questions on this matter, your provider will fully explain the alternatives to you if it has not already been done.    I, ______________________________, have read/had read to me the above. I understand the matters bearing on the decision whether or not to authorize a transfusion of blood or blood components. I have no questions which have not been answered to my full satisfaction. I hereby consent to such transfusion as my physician may deem necessary or advisable in the course of my treatment.    ______________________________________________                    ___________________________  (Signature of Patient or Responsible party in case of minor,                 (Printed Name of Patient or incompetent, or unconscious patient)              Responsible Party)    ___________________________               _____________________  (Relationship to Patient if not self)                                    (Date and Time)    __________________________                                                           ______________________              (Signature of Witness)               (Printed Name of Witness)     Language line ()    Telephone/Verbal/Video Consent    __________________________                     ____________________  (Signature of 2nd Witness           (Printed Name of 2nd  Telephone/Verbal/Video Consent)           Witness)    Patient Name: Kelli Fisher     : 1955                 Printed: May 21, 2025     Medical Record #: [id number]      Rev: 2023

## (undated) NOTE — LETTER
[facility]  Administration of Anesthesia  IKelli agree to be cared for by a physician anesthesiologist alone and/or with a nurse anesthetist, who is specially trained to monitor me and give me medicine to put me to sleep or keep me comfortable during my procedure    I understand that my anesthesiologist and/or anesthetist is not an employee or agent of [facility] or [employer]. He or she works for [facility]    As the patient asking for anesthesia services, I agree to:  Allow the anesthesiologist (anesthesia doctor) to give me medicine and do additional procedures as necessary. Some examples are: Starting or using an “IV” to give me medicine, fluids or blood during my procedure, and having a breathing tube placed to help me breathe when I’m asleep (intubation). In the event that my heart stops working properly, I understand that my anesthesiologist will make every effort to sustain my life, unless otherwise directed by [facility] Do Not Resuscitate documents.  Tell my anesthesia doctor before my procedure:  If I am pregnant.  The last time that I ate or drank.  iii. All of the medicines I take (including prescriptions, herbal supplements, and pills I can buy without a prescription (including street drugs/illegal medications). Failure to inform my anesthesiologist about these medicines may increase my risk of anesthetic complications.  iv.If I am allergic to anything or have had a reaction to anesthesia before.  I understand how the anesthesia medicine will help me (benefits).  I understand that with any type of anesthesia medicine there are risks:  The most common risks are: nausea, vomiting, sore throat, muscle soreness, damage to my eyes, mouth, or teeth (from breathing tube placement).  Rare risks include: remembering what happened during my procedure, allergic reactions to medications, injury to my airway, heart, lungs, vision, nerves, or muscles  and in extremely rare instances death.  My doctor has explained to me other choices available to me for my care (alternatives).  Pregnant Patients (“epidural”):  I understand that the risks of having an epidural (medicine given into my back to help control pain during labor), include itching, low blood pressure, difficulty urinating, headache or slowing of the baby’s heart. Very rare risks include infection, bleeding, seizure, irregular heart rhythms and nerve injury.  Regional Anesthesia (“spinal”, “epidural”, & “nerve blocks”):  I understand that rare but potential complications include headache, bleeding, infection, seizure, irregular heart rhythms, and nerve injury.    _____________________________________________________________________________  Patient (or Representative) Signature/Relationship to Patient  Date   Time    _____________________________________________________________________________   Name (if used)    Language/Organization   Time    _____________________________________________________________________________  Nurse Anesthetist Signature     Date   Time  _____________________________________________________________________________  Anesthesiologist Signature     Date   Time  I have discussed the procedure and information above with the patient (or patient’s representative) and answered their questions. The patient or their representative has agreed to have anesthesia services.    _____________________________________________________________________________  Witness        Date   Time  I have verified that the signature is that of the patient or patient’s representative, and that it was signed before the procedure  Patient Name: Kelli Fisher     : 1955                 Printed: 2025 at 12:13 PM    Medical Record #: [id number]                                            Page 1 of 1  ----------ANESTHESIA CONSENT----------

## (undated) NOTE — LETTER
[place] [address]  Authorization for Invasive Procedures  Date: 5/21/25           Time: 0745    I hereby authorize Prema Conti, my physician and his/her assistants (if applicable), which may include medical students, residents, and/or fellows, to perform the following surgical operation/ procedure and administer such anesthesia as may be determined necessary by my physician:  Operation/Procedure name (s)  Cardiac Catheterization, Left Ventricular Cineangiography, Bilateral Selective Coronary Angiography and/or Right Heart Catheterization; possible Percutaneous Transluminal Coronary Angioplasty, Coronary Atherectomy, Coronary Stent, Intracoronary Thrombolytic therapy, Antiplatelet therapy and/or Intravascular Ultrasound on Kelli Fsiher   2.   I recognize that during the surgical operation/procedure, unforeseen conditions may necessitate additional or different procedures than those listed above.  I, therefore, further authorize and request that the above-named surgeon, assistants, or designees perform such procedures as are, in their judgment, necessary and desirable.    3.   My surgeon/physician has discussed prior to my surgery the potential benefits, risks and side effects of this procedure; the likelihood of achieving goals; and potential problems that might occur during recuperation.  They also discussed reasonable alternatives to the procedure, including risks, benefits, and side effects related to the alternatives and risks related to not receiving this procedure.  I have had all my questions answered and I acknowledge that no guarantee has been made as to the result that may be obtained.    4.   Should the need arise during my operation/procedure, which includes change of level of care prior to discharge, I also consent to the administration of blood and/or blood products.  Further, I understand that despite careful testing and screening of blood or blood products by collecting  agencies, I may still be subject to ill effects as a result of receiving a blood transfusion and/or blood products.  The following are some, but not all, of the potential risks that can occur: fever and allergic reactions, hemolytic reactions, transmission of diseases such as Hepatitis, AIDS and Cytomegalovirus (CMV) and fluid overload.  In the event that I wish to have an autologous transfusion of my own blood, or a directed donor transfusion, I will discuss this with my physician.  Check only if Refusing Blood or Blood Products  I understand refusal of blood or blood products as deemed necessary by my physician may have serious consequences to my condition to include possible death. I hereby assume responsibility for my refusal and release the hospital, its personnel, and my physicians from any responsibility for the consequences of my refusal.          o  Refuse      5.   I authorize the use of any specimen, organs, tissues, body parts or foreign objects that may be removed from my body during the operation/procedure for diagnosis, research or teaching purposes and their subsequent disposal by hospital authorities.  I also authorize the release of specimen test results and/or written reports to my treating physician on the hospital medical staff or other referring or consulting physicians involved in my care, at the discretion of the Pathologist or my treating physician.    6.   I consent to the photographing or videotaping of the operations or procedures to be performed, including appropriate portions of my body for medical, scientific, or educational purposes, provided my identity is not revealed by the pictures or by descriptive texts accompanying them.  If the procedure has been photographed/videotaped, the surgeon will obtain the original picture, image, videotape or CD.  The hospital will not be responsible for storage, release or maintenance of the picture, image, tape or CD.    7.   I consent to the  presence of a  or observers in the operating room as deemed necessary by my physician or their designees.    8.   I recognize that in the event my procedure results in extended X-Ray/fluoroscopy time, I may develop a skin reaction.    9. If I have a Do Not Attempt Resuscitation (DNAR) order in place, that status will be suspended while in the operating room, procedural suite, and during the recovery period unless otherwise explicitly stated by me (or a person authorized to consent on my behalf). The surgeon or my attending physician will determine when the applicable recovery period ends for purposes of reinstating the DNAR order.  10. Patients having a sterilization procedure: I understand that if the procedure is successful the results will be permanent and it will therefore be impossible for me to inseminate, conceive, or bear children.  I also understand that the procedure is intended to result in sterility, although the result has not been guaranteed.   11. I acknowledge that my physician has explained sedation/analgesia administration to me including the risk and benefits I consent to the administration of sedation/analgesia as may be necessary or desirable in the judgment of my physician.    I CERTIFY THAT I HAVE READ AND FULLY UNDERSTAND THE ABOVE CONSENT TO OPERATION and/or OTHER PROCEDURE.        ____________________________________       _________________________________      ______________________________  Signature of Patient         Signature of Responsible Person        Printed Name of Responsible Person    ____________________________________      _________________________________      ______________________________       Signature of Witness          Relationship to Patient                       Date                                       Time  Patient Name: Kelli Fisher  : 1955    Reviewed: 2024   Printed: May 21, 2025  Medical Record #: [id number] Page 1 of 2            STATEMENT OF PHYSICIAN My signature below affirms that prior to the time of the procedure; I have explained to the patient and/or his/her legal representative, the risks and benefits involved in the proposed treatment and any reasonable alternative to the proposed treatment. I have also explained the risks and benefits involved in refusal of the proposed treatment and alternatives to the proposed treatment and have answered the patient's questions. If I have a significant financial interest in a co-management agreement or a significant financial interest in any product or implant, or other significant relationship used in this procedure/surgery, I have disclosed this and had a discussion with my patient.     _______________________________________________________________ _____________________________  (Signature of Physician)                                                                                         (Date)                                   (Time)  Patient Name: Kelli Fisher  : 1955    Reviewed: 2024   Printed: May 21, 2025  Medical Record #: [id number] Page 2 of 2

## (undated) NOTE — LETTER
[facility] 3W/SW  [address] [employer] [address]  [phone]    Blood Transfusion Consent    In the course of your treatment, it may become necessary to administer a transfusion of blood or blood components. This form provides basic information concerning this procedure and, if signed by you, authorizes its administration. By signing this form, you agree that all of your questions about the administration of blood or blood products have been answered by the ordering medical professional or designee.    Description of Procedure  Blood is introduced into one of your veins, commonly in the arm, using a sterilized disposable needle. The amount of blood transfused, and whether the transfusion will be of blood or blood components is a judgement the physician will make based on your particular needs.    Risks  The transfusion is a common procedure of low risk.  MINOR AND TEMPORARY REACTIONS ARE NOT UNCOMMON, including a slight bruise, swelling or local reaction in the area where the needle pierces your skin, or a nonserious reaction to the transfused material itself, including headache, fever or mild skin reaction, such as rash.  Serious reactions are possible, though very unlikely, and include severe allergic reaction (shock) and destruction (hemolysis) of transfused blood cells.  Infectious diseases which are known to be transmitted by blood transfusion include certain types of viral Hepatitis(liver infection from a virus), Human Immunodeficiency Virus (HIV-1,2) infection, a viral infection known to cause Acquired Immunodeficiency Syndrome (AIDS), as well as certain other bacterial, viral, and parasitic diseases. While a minimal risk of acquiring an infectious disease from transfused blood exists, in accordance with the Federal and State law, all due care has been taken in donor selection and testing to avoid transmission of disease.    Alternatives  If loss of blood poses serious threats during your  treatment, THERE IS NO EFFECTIVE ALTERNATIVE TO BLOOD TRANSFUSION. However, if you have any further questions on this matter, your provider will fully explain the alternatives to you if it has not already been done.    I, ______________________________, have read/had read to me the above. I understand the matters bearing on the decision whether or not to authorize a transfusion of blood or blood components. I have no questions which have not been answered to my full satisfaction. I hereby consent to such transfusion as my physician may deem necessary or advisable in the course of my treatment.    ______________________________________________                    ___________________________  (Signature of Patient or Responsible party in case of minor,                 (Printed Name of Patient or incompetent, or unconscious patient)              Responsible Party)    ___________________________               _____________________  (Relationship to Patient if not self)                                    (Date and Time)    __________________________                                                           ______________________              (Signature of Witness)               (Printed Name of Witness)     Language line ()    Telephone/Verbal/Video Consent    __________________________                     ____________________  (Signature of 2nd Witness           (Printed Name of 2nd  Telephone/Verbal/Video Consent)           Witness)    Patient Name: Kelli Fisher     : 1955                 Printed: May 2, 2025     Medical Record #: [id number]      Rev: 2023